# Patient Record
Sex: MALE | Race: WHITE | NOT HISPANIC OR LATINO | Employment: FULL TIME | URBAN - METROPOLITAN AREA
[De-identification: names, ages, dates, MRNs, and addresses within clinical notes are randomized per-mention and may not be internally consistent; named-entity substitution may affect disease eponyms.]

---

## 2020-05-15 ENCOUNTER — HOSPITAL ENCOUNTER (EMERGENCY)
Facility: HOSPITAL | Age: 55
Discharge: HOME | End: 2020-05-16
Attending: EMERGENCY MEDICINE | Admitting: EMERGENCY MEDICINE
Payer: COMMERCIAL

## 2020-05-15 VITALS
DIASTOLIC BLOOD PRESSURE: 64 MMHG | WEIGHT: 200 LBS | SYSTOLIC BLOOD PRESSURE: 109 MMHG | RESPIRATION RATE: 18 BRPM | OXYGEN SATURATION: 97 % | HEART RATE: 81 BPM | TEMPERATURE: 97.4 F

## 2020-05-15 DIAGNOSIS — S09.90XA INJURY OF HEAD, INITIAL ENCOUNTER: Primary | ICD-10-CM

## 2020-05-15 DIAGNOSIS — S01.81XA LACERATION OF FOREHEAD, INITIAL ENCOUNTER: ICD-10-CM

## 2020-05-15 PROCEDURE — 12011 RPR F/E/E/N/L/M 2.5 CM/<: CPT | Performed by: EMERGENCY MEDICINE

## 2020-05-15 PROCEDURE — 99282 EMERGENCY DEPT VISIT SF MDM: CPT | Performed by: EMERGENCY MEDICINE

## 2020-05-15 PROCEDURE — 99284 EMERGENCY DEPT VISIT MOD MDM: CPT

## 2020-05-15 PROCEDURE — 90715 TDAP VACCINE 7 YRS/> IM: CPT | Performed by: EMERGENCY MEDICINE

## 2020-05-15 PROCEDURE — 90471 IMMUNIZATION ADMIN: CPT

## 2020-05-15 RX ORDER — LIDOCAINE HYDROCHLORIDE AND EPINEPHRINE 10; 10 MG/ML; UG/ML
5 INJECTION, SOLUTION INFILTRATION; PERINEURAL ONCE
Status: COMPLETED | OUTPATIENT
Start: 2020-05-15 | End: 2020-05-15

## 2020-05-15 RX ADMIN — TETANUS TOXOID, REDUCED DIPHTHERIA TOXOID AND ACELLULAR PERTUSSIS VACCINE, ADSORBED 0.5 ML: 5; 2.5; 8; 8; 2.5 SUSPENSION INTRAMUSCULAR at 23:46

## 2020-05-15 RX ADMIN — LIDOCAINE HYDROCHLORIDE,EPINEPHRINE BITARTRATE 5 ML: 10; .01 INJECTION, SOLUTION INFILTRATION; PERINEURAL at 23:46

## 2020-05-16 ENCOUNTER — APPOINTMENT (EMERGENCY)
Dept: RADIOLOGY | Facility: HOSPITAL | Age: 55
End: 2020-05-16
Payer: COMMERCIAL

## 2020-05-16 PROCEDURE — 70450 CT HEAD/BRAIN W/O DYE: CPT

## 2023-04-02 ENCOUNTER — HOSPITAL ENCOUNTER (EMERGENCY)
Facility: HOSPITAL | Age: 58
End: 2023-04-02
Attending: EMERGENCY MEDICINE

## 2023-04-02 ENCOUNTER — APPOINTMENT (EMERGENCY)
Dept: RADIOLOGY | Facility: HOSPITAL | Age: 58
End: 2023-04-02

## 2023-04-02 VITALS
BODY MASS INDEX: 32.52 KG/M2 | WEIGHT: 219.58 LBS | DIASTOLIC BLOOD PRESSURE: 85 MMHG | TEMPERATURE: 98.7 F | SYSTOLIC BLOOD PRESSURE: 153 MMHG | HEART RATE: 94 BPM | OXYGEN SATURATION: 91 % | RESPIRATION RATE: 21 BRPM | HEIGHT: 69 IN

## 2023-04-02 DIAGNOSIS — E83.42 HYPOMAGNESEMIA: ICD-10-CM

## 2023-04-02 DIAGNOSIS — R94.31 QT PROLONGATION: ICD-10-CM

## 2023-04-02 DIAGNOSIS — F10.939 ALCOHOL WITHDRAWAL (HCC): Primary | ICD-10-CM

## 2023-04-02 PROBLEM — K76.0 HEPATIC STEATOSIS: Status: ACTIVE | Noted: 2023-04-02

## 2023-04-02 PROBLEM — K29.20 CHRONIC ALCOHOLIC GASTRITIS: Status: ACTIVE | Noted: 2023-04-02

## 2023-04-02 PROBLEM — B35.4 TINEA CORPORIS: Status: ACTIVE | Noted: 2023-04-02

## 2023-04-02 PROBLEM — F10.20 ALCOHOL USE DISORDER, SEVERE, DEPENDENCE (HCC): Status: ACTIVE | Noted: 2023-04-02

## 2023-04-02 PROBLEM — I10 ESSENTIAL HYPERTENSION: Status: ACTIVE | Noted: 2023-04-02

## 2023-04-02 PROBLEM — F32.9 MAJOR DEPRESSIVE DISORDER: Status: ACTIVE | Noted: 2023-04-02

## 2023-04-02 PROBLEM — R11.2 NAUSEA AND VOMITING: Status: ACTIVE | Noted: 2023-04-02

## 2023-04-02 LAB
2HR DELTA HS TROPONIN: 0 NG/L
ALBUMIN SERPL BCP-MCNC: 4.8 G/DL (ref 3.5–5)
ALP SERPL-CCNC: 123 U/L (ref 34–104)
ALT SERPL W P-5'-P-CCNC: 53 U/L (ref 7–52)
ANION GAP SERPL CALCULATED.3IONS-SCNC: 22 MMOL/L (ref 4–13)
AST SERPL W P-5'-P-CCNC: 84 U/L (ref 13–39)
BASOPHILS # BLD AUTO: 0.01 THOUSANDS/ÂΜL (ref 0–0.1)
BASOPHILS NFR BLD AUTO: 0 % (ref 0–1)
BILIRUB SERPL-MCNC: 2.34 MG/DL (ref 0.2–1)
BUN SERPL-MCNC: 13 MG/DL (ref 5–25)
CALCIUM SERPL-MCNC: 9.8 MG/DL (ref 8.4–10.2)
CARDIAC TROPONIN I PNL SERPL HS: 6 NG/L
CARDIAC TROPONIN I PNL SERPL HS: 6 NG/L
CHLORIDE SERPL-SCNC: 96 MMOL/L (ref 96–108)
CO2 SERPL-SCNC: 23 MMOL/L (ref 21–32)
CREAT SERPL-MCNC: 1.04 MG/DL (ref 0.6–1.3)
EOSINOPHIL # BLD AUTO: 0 THOUSAND/ÂΜL (ref 0–0.61)
EOSINOPHIL NFR BLD AUTO: 0 % (ref 0–6)
ERYTHROCYTE [DISTWIDTH] IN BLOOD BY AUTOMATED COUNT: 12.5 % (ref 11.6–15.1)
GFR SERPL CREATININE-BSD FRML MDRD: 79 ML/MIN/1.73SQ M
GLUCOSE SERPL-MCNC: 209 MG/DL (ref 65–140)
HCT VFR BLD AUTO: 45.9 % (ref 36.5–49.3)
HGB BLD-MCNC: 15.7 G/DL (ref 12–17)
IMM GRANULOCYTES # BLD AUTO: 0.04 THOUSAND/UL (ref 0–0.2)
IMM GRANULOCYTES NFR BLD AUTO: 1 % (ref 0–2)
LIPASE SERPL-CCNC: 15 U/L (ref 11–82)
LYMPHOCYTES # BLD AUTO: 0.48 THOUSANDS/ÂΜL (ref 0.6–4.47)
LYMPHOCYTES NFR BLD AUTO: 7 % (ref 14–44)
MAGNESIUM SERPL-MCNC: 1.2 MG/DL (ref 1.9–2.7)
MCH RBC QN AUTO: 31.7 PG (ref 26.8–34.3)
MCHC RBC AUTO-ENTMCNC: 34.2 G/DL (ref 31.4–37.4)
MCV RBC AUTO: 93 FL (ref 82–98)
MONOCYTES # BLD AUTO: 0.35 THOUSAND/ÂΜL (ref 0.17–1.22)
MONOCYTES NFR BLD AUTO: 5 % (ref 4–12)
NEUTROPHILS # BLD AUTO: 6.24 THOUSANDS/ÂΜL (ref 1.85–7.62)
NEUTS SEG NFR BLD AUTO: 87 % (ref 43–75)
NRBC BLD AUTO-RTO: 0 /100 WBCS
PLATELET # BLD AUTO: 211 THOUSANDS/UL (ref 149–390)
PMV BLD AUTO: 8.6 FL (ref 8.9–12.7)
POTASSIUM SERPL-SCNC: 4 MMOL/L (ref 3.5–5.3)
PROT SERPL-MCNC: 8.3 G/DL (ref 6.4–8.4)
RBC # BLD AUTO: 4.96 MILLION/UL (ref 3.88–5.62)
SODIUM SERPL-SCNC: 141 MMOL/L (ref 135–147)
WBC # BLD AUTO: 7.12 THOUSAND/UL (ref 4.31–10.16)

## 2023-04-02 RX ORDER — DIAZEPAM 5 MG/ML
10 INJECTION, SOLUTION INTRAMUSCULAR; INTRAVENOUS ONCE
Status: COMPLETED | OUTPATIENT
Start: 2023-04-02 | End: 2023-04-02

## 2023-04-02 RX ORDER — ONDANSETRON 2 MG/ML
4 INJECTION INTRAMUSCULAR; INTRAVENOUS ONCE
Status: DISCONTINUED | OUTPATIENT
Start: 2023-04-02 | End: 2023-04-02

## 2023-04-02 RX ORDER — SODIUM CHLORIDE, SODIUM GLUCONATE, SODIUM ACETATE, POTASSIUM CHLORIDE, MAGNESIUM CHLORIDE, SODIUM PHOSPHATE, DIBASIC, AND POTASSIUM PHOSPHATE .53; .5; .37; .037; .03; .012; .00082 G/100ML; G/100ML; G/100ML; G/100ML; G/100ML; G/100ML; G/100ML
75 INJECTION, SOLUTION INTRAVENOUS CONTINUOUS
Status: DISCONTINUED | OUTPATIENT
Start: 2023-04-02 | End: 2023-04-02 | Stop reason: HOSPADM

## 2023-04-02 RX ORDER — ONDANSETRON 2 MG/ML
INJECTION INTRAMUSCULAR; INTRAVENOUS
Status: COMPLETED
Start: 2023-04-02 | End: 2023-04-02

## 2023-04-02 RX ORDER — MAGNESIUM SULFATE HEPTAHYDRATE 40 MG/ML
2 INJECTION, SOLUTION INTRAVENOUS ONCE
Status: COMPLETED | OUTPATIENT
Start: 2023-04-02 | End: 2023-04-02

## 2023-04-02 RX ORDER — CHLORDIAZEPOXIDE HYDROCHLORIDE 25 MG/1
50 CAPSULE, GELATIN COATED ORAL ONCE
Status: COMPLETED | OUTPATIENT
Start: 2023-04-02 | End: 2023-04-02

## 2023-04-02 RX ORDER — ONDANSETRON 2 MG/ML
1 INJECTION INTRAMUSCULAR; INTRAVENOUS ONCE
Status: DISCONTINUED | OUTPATIENT
Start: 2023-04-02 | End: 2023-04-02

## 2023-04-02 RX ADMIN — MAGNESIUM SULFATE HEPTAHYDRATE 2 G: 40 INJECTION, SOLUTION INTRAVENOUS at 08:18

## 2023-04-02 RX ADMIN — SODIUM CHLORIDE, SODIUM GLUCONATE, SODIUM ACETATE, POTASSIUM CHLORIDE, MAGNESIUM CHLORIDE, SODIUM PHOSPHATE, DIBASIC, AND POTASSIUM PHOSPHATE 75 ML/HR: .53; .5; .37; .037; .03; .012; .00082 INJECTION, SOLUTION INTRAVENOUS at 10:15

## 2023-04-02 RX ADMIN — DIAZEPAM 10 MG: 10 INJECTION, SOLUTION INTRAMUSCULAR; INTRAVENOUS at 08:18

## 2023-04-02 RX ADMIN — DIAZEPAM 10 MG: 10 INJECTION, SOLUTION INTRAMUSCULAR; INTRAVENOUS at 09:52

## 2023-04-02 RX ADMIN — CHLORDIAZEPOXIDE HYDROCHLORIDE 50 MG: 25 CAPSULE ORAL at 08:10

## 2023-04-02 RX ADMIN — DIAZEPAM 10 MG: 10 INJECTION, SOLUTION INTRAMUSCULAR; INTRAVENOUS at 08:03

## 2023-04-02 RX ADMIN — DIAZEPAM 10 MG: 10 INJECTION, SOLUTION INTRAMUSCULAR; INTRAVENOUS at 12:41

## 2023-04-02 RX ADMIN — SODIUM CHLORIDE 1000 ML: 0.9 INJECTION, SOLUTION INTRAVENOUS at 08:11

## 2023-04-02 NOTE — EMTALA/ACUTE CARE TRANSFER
[address] [place] [address]  Dept: [phone]      EMTALA TRANSFER CONSENT    NAME Kriss Coombs                                         1965                              MRN [id number]    I have been informed of my rights regarding examination, treatment, and transfer   by Dr Anton Mathur MD    Benefits: Specialized equipment and/or services available at the receiving facility (Include comment)________________________ (detox unit)    Risks: Potential for delay in receiving treatment, Potential deterioration of medical condition, Loss of IV, Increased discomfort during transfer, Possible worsening of condition or death during transfer      Consent for Transfer:  I acknowledge that my medical condition has been evaluated and explained to me by the emergency department physician or other qualified medical person and/or my attending physician, who has recommended that I be transferred to the service of  Accepting Physician: Flash at [address] [place] [address] Rd Name, [address] : [address] [facility]  The above potential benefits of such transfer, the potential risks associated with such transfer, and the probable risks of not being transferred have been explained to me, and I fully understand them  The doctor has explained that, in my case, the benefits of transfer outweigh the risks  I agree to be transferred  I authorize the performance of emergency medical procedures and treatments upon me in both transit and upon arrival at the receiving facility  Additionally, I authorize the release of any and all medical records to the receiving facility and request they be transported with me, if possible  I understand that the safest mode of transportation during a medical emergency is an ambulance and that the Hospital advocates the use of this mode of transport   Risks of traveling to the receiving facility by car, including absence of medical control, life sustaining equipment, such as oxygen, and medical personnel has been explained to me and I fully understand them  (ELINA CORRECT BOX BELOW)  [  ]  I consent to the stated transfer and to be transported by ambulance/helicopter  [  ]  I consent to the stated transfer, but refuse transportation by ambulance and accept full responsibility for my transportation by car  I understand the risks of non-ambulance transfers and I exonerate the Hospital and its staff from any deterioration in my condition that results from this refusal     X___________________________________________    DATE  23  TIME________  Signature of patient or legally responsible individual signing on patient behalf           RELATIONSHIP TO PATIENT_________________________          Provider Certification    NAME Chi Morin                                        [facility] 1965                              MRN [id number]    A medical screening exam was performed on the above named patient  Based on the examination:    Condition Necessitating Transfer The primary encounter diagnosis was Alcohol withdrawal ([facility] [address] )  Diagnoses of Hypomagnesemia and QT prolongation were also pertinent to this visit      Patient Condition: The patient has been stabilized such that within reasonable medical probability, no material deterioration of the patient condition or the condition of the unborn child(camelia) is likely to result from the transfer    Reason for Transfer: Level of Care needed not available at this facility (detox unit)    Transfer Requirements: [address]   · Space available and qualified personnel available for treatment as acknowledged by    · Agreed to accept transfer and to provide appropriate medical treatment as acknowledged by       Flash  · Appropriate medical records of the examination and treatment of the patient are provided at the time of transfer   [address] _______  · Transfer will be performed by qualified personnel from    and appropriate transfer equipment as required, including the use of necessary and appropriate life support measures  Provider Certification: I have examined the patient and explained the following risks and benefits of being transferred/refusing transfer to the patient/family:  General risk, such as traffic hazards, adverse weather conditions, rough terrain or turbulence, possible failure of equipment (including vehicle or aircraft), or consequences of actions of persons outside the control of the transport personnel      Based on these reasonable risks and benefits to the patient and/or the unborn child(camelia), and based upon the information available at the time of the patient’s examination, I certify that the medical benefits reasonably to be expected from the provision of appropriate medical treatments at another medical facility outweigh the increasing risks, if any, to the individual’s medical condition, and in the case of labor to the unborn child, from effecting the transfer      X____________________________________________ DATE 04/02/23        TIME_______      ORIGINAL - SEND TO MEDICAL RECORDS   COPY - SEND WITH PATIENT DURING TRANSFER

## 2023-04-02 NOTE — ED PROVIDER NOTES
History  Chief Complaint   Patient presents with   • Withdrawal - Alcohol     Pt reports heavy drinking of vodka for about 8 years and Friday decided to stop cold turkey  Yesterday started with shaking and vomiting       History provided by:  Patient   used: No    Withdrawal - Alcohol  Associated symptoms: nausea and vomiting    Associated symptoms: no abdominal pain, no headaches, no palpitations, no shortness of breath and no weakness      65year-old presenting with nausea vomiting for headache feeling anxious  Heavy drinker  Last drink 3 days ago  Has been having tremors, shaking, sweaty, vomiting for the last 48 hours  Having chest pain, unsure if it is due to vomiting  No known medical problems  No drug use  No shortness of breath  None       Past Medical History:   Diagnosis Date   • GERD (gastroesophageal reflux disease)    • Hypertension        No past surgical history on file  No family history on file  I have reviewed and agree with the history as documented  E-Cigarette/Vaping   • E-Cigarette Use Never User      E-Cigarette/Vaping Substances     Social History     Tobacco Use   • Smoking status: Never   • Smokeless tobacco: Never   Vaping Use   • Vaping Use: Never used   Substance Use Topics   • Alcohol use: Yes     Comment: up to a handle a day  • Drug use: Never       Review of Systems   Constitutional: Positive for diaphoresis  Negative for chills and fever  Respiratory: Negative for cough, shortness of breath, wheezing and stridor  Cardiovascular: Negative for chest pain, palpitations and leg swelling  Gastrointestinal: Positive for nausea and vomiting  Negative for abdominal pain, blood in stool and diarrhea  Genitourinary: Negative for dysuria, frequency and urgency  Musculoskeletal: Negative for neck pain and neck stiffness  Skin: Negative for pallor and rash  Neurological: Positive for tremors   Negative for dizziness, syncope, weakness, light-headedness and headaches  All other systems reviewed and are negative  Physical Exam  Physical Exam  Vitals reviewed  Constitutional:       General: He is in acute distress  Appearance: He is well-developed  He is ill-appearing and diaphoretic  HENT:      Head: Normocephalic and atraumatic  Eyes:      Extraocular Movements: Extraocular movements intact  Pupils: Pupils are equal, round, and reactive to light  Cardiovascular:      Rate and Rhythm: Regular rhythm  Tachycardia present  Heart sounds: Normal heart sounds  Pulmonary:      Effort: Pulmonary effort is normal  No respiratory distress  Breath sounds: Normal breath sounds  Abdominal:      General: Bowel sounds are normal       Palpations: Abdomen is soft  Tenderness: There is no abdominal tenderness  Musculoskeletal:         General: No swelling or tenderness  Normal range of motion  Cervical back: Normal range of motion and neck supple  Skin:     General: Skin is warm  Capillary Refill: Capillary refill takes less than 2 seconds  Neurological:      General: No focal deficit present  Mental Status: He is alert and oriented to person, place, and time        Comments: Tremors at rest         Vital Signs  ED Triage Vitals   Temperature Pulse Respirations Blood Pressure SpO2   04/02/23 0812 04/02/23 0747 04/02/23 0747 04/02/23 0747 04/02/23 0747   98 7 °F (37 1 °C) (!) 117 (!) 32 (!) 188/110 95 %      Temp Source Heart Rate Source Patient Position - Orthostatic VS BP Location FiO2 (%)   04/02/23 0812 04/02/23 0747 04/02/23 0747 04/02/23 0747 --   Oral Monitor Sitting Right arm       Pain Score       04/02/23 0747       7           Vitals:    04/02/23 0805 04/02/23 0830 04/02/23 1100 04/02/23 1200   BP: (!) 188/110 136/98 152/95 153/85   Pulse: (!) 117 (!) 118 91 94   Patient Position - Orthostatic VS:  Sitting           Visual Acuity      ED Medications  Medications   sodium chloride 0 9 % bolus 1,000 mL (0 mL Intravenous Stopped 4/2/23 1112)   diazepam (VALIUM) injection 10 mg (10 mg Intravenous Given 4/2/23 0803)   chlordiazePOXIDE (LIBRIUM) capsule 50 mg (50 mg Oral Given 4/2/23 0810)   magnesium sulfate 2 g/50 mL IVPB (premix) 2 g (0 g Intravenous Stopped 4/2/23 1014)   diazepam (VALIUM) injection 10 mg (10 mg Intravenous Given 4/2/23 0818)   diazepam (VALIUM) injection 10 mg (10 mg Intravenous Given 4/2/23 0952)   diazepam (VALIUM) injection 10 mg (10 mg Intravenous Given 4/2/23 1241)       Diagnostic Studies  Results Reviewed     Procedure Component Value Units Date/Time    HS Troponin I 2hr [[id number]]  (Normal) Collected: 04/02/23 1018    Lab Status: Final result Specimen: Blood from Arm, Right Updated: 04/02/23 1057     hs TnI 2hr 6 ng/L      Delta 2hr hsTnI 0 ng/L     HS Troponin 0hr (reflex protocol) [[id number]]  (Normal) Collected: 04/02/23 0812    Lab Status: Final result Specimen: Blood from Arm, Right Updated: 04/02/23 0855     hs TnI 0hr 6 ng/L     Comprehensive metabolic panel [[id number]]  (Abnormal) Collected: 04/02/23 0812    Lab Status: Final result Specimen: Blood from Arm, Right Updated: 04/02/23 0844     Sodium 141 mmol/L      Potassium 4 0 mmol/L      Chloride 96 mmol/L      CO2 23 mmol/L      ANION GAP 22 mmol/L      BUN 13 mg/dL      Creatinine 1 04 mg/dL      Glucose 209 mg/dL      Calcium 9 8 mg/dL      AST 84 U/L      ALT 53 U/L      Alkaline Phosphatase 123 U/L      Total Protein 8 3 g/dL      Albumin 4 8 g/dL      Total Bilirubin 2 34 mg/dL      eGFR 79 ml/min/1 73sq m     Narrative:      Cathleen guidelines for Chronic Kidney Disease (CKD):   •  Stage 1 with normal or high GFR (GFR > 90 mL/min/1 73 square meters)  •  Stage 2 Mild CKD (GFR = 60-89 mL/min/1 73 square meters)  •  Stage 3A Moderate CKD (GFR = 45-59 mL/min/1 73 square meters)  •  Stage 3B Moderate CKD (GFR = 30-44 mL/min/1 73 square meters)  •  Stage 4 Severe CKD (GFR = 15-29 mL/min/1 73 square meters)  •  Stage 5 End Stage CKD (GFR <15 mL/min/1 73 square meters)  Note: GFR calculation is accurate only with a steady state creatinine    Magnesium [[id number]]  (Abnormal) Collected: 04/02/23 0812    Lab Status: Final result Specimen: Blood from Arm, Right Updated: 04/02/23 0844     Magnesium 1 2 mg/dL     Lipase [[id number]]  (Normal) Collected: 04/02/23 0812    Lab Status: Final result Specimen: Blood from Arm, Right Updated: 04/02/23 0844     Lipase 15 u/L     CBC and differential [[id number]]  (Abnormal) Collected: 04/02/23 0812    Lab Status: Final result Specimen: Blood from Arm, Right Updated: 04/02/23 0828     WBC 7 12 Thousand/uL      RBC 4 96 Million/uL      Hemoglobin 15 7 g/dL      Hematocrit 45 9 %      MCV 93 fL      MCH 31 7 pg      MCHC 34 2 g/dL      RDW 12 5 %      MPV 8 6 fL      Platelets 031 Thousands/uL      nRBC 0 /100 WBCs      Neutrophils Relative 87 %      Immat GRANS % 1 %      Lymphocytes Relative 7 %      Monocytes Relative 5 %      Eosinophils Relative 0 %      Basophils Relative 0 %      Neutrophils Absolute 6 24 Thousands/µL      Immature Grans Absolute 0 04 Thousand/uL      Lymphocytes Absolute 0 48 Thousands/µL      Monocytes Absolute 0 35 Thousand/µL      Eosinophils Absolute 0 00 Thousand/µL      Basophils Absolute 0 01 Thousands/µL                  XR chest 1 view portable   ED Interpretation by Shane Gomez MD (04/02 7056)   No acute abnormality                 Procedures  CriticalCare Time    Date/Time: 4/2/2023 2:46 PM  Performed by: Shane Gomez MD  Authorized by: Shane Gomez MD     Critical care provider statement:     Critical care time (minutes):  48    Critical care was necessary to treat or prevent imminent or life-threatening deterioration of the following conditions:  CNS failure or compromise and toxidrome    Critical care was time spent personally by me on the following activities:  Interpretation of cardiac output measurements, ordering and performing treatments and interventions, obtaining history from patient or surrogate, development of treatment plan with patient or surrogate, ordering and review of laboratory studies, ordering and review of radiographic studies, re-evaluation of patient's condition, evaluation of patient's response to treatment, review of old charts and examination of patient             ED Course  ED Course as of 04/02/23 1447   Sun Apr 02, 2023   0825 ECG shows rate of 118, sinus, normal axis, normal QRS, no significant ST or T wave changes, QTc prolonged at 611   0827 Given magnesium bolus for qtc prolongation                               SBIRT 20yo+    Flowsheet Row Most Recent Value   SBIRT (23 yo +)    In order to provide better care to our patients, we are screening all of our patients for alcohol and drug use  Would it be okay to ask you these screening questions? Yes Filed at: 04/02/2023 2092   Initial Alcohol Screen: US AUDIT-C     1  How often do you have a drink containing alcohol? 6 Filed at: 04/02/2023 0828   2  How many drinks containing alcohol do you have on a typical day you are drinking? 6 Filed at: 04/02/2023 0828   3a  Male UNDER 65: How often do you have five or more drinks on one occasion? 6 Filed at: 04/02/2023 0828   3b  FEMALE Any Age, or MALE 65+: How often do you have 4 or more drinks on one occassion? 0 Filed at: 04/02/2023 0828   Audit-C Score 18 Filed at: 04/02/2023 3298   Full Alcohol Screen: US AUDIT    4  How often during the last year have you found that you were not able to stop drinking once you had started? 4 Filed at: 04/02/2023 0828   5  How often during past year have you failed to do what was normally expected of you because of drinking? 0 Filed at: 04/02/2023 0828   6  How often in past year have you needed a first drink in the morning to get yourself going after a heavy drinking session? 0 Filed at: 04/02/2023 0828   7   How often in past year have you had feeling of guilt or remorse after drinking? 2 Filed at: 04/02/2023 0828   8  How often in past year have you been unable to remember what happened night before because you had been drinking? 3 Filed at: 04/02/2023 0828   9  Have you or someone else been injured as a result of your drinking? 0 Filed at: 04/02/2023 0828   10  Has a relative, friend, doctor or other health worker been concerned about your drinking and suggested you cut down? 4 Filed at: 04/02/2023 9353   AUDIT Total Score 31 Filed at: 04/02/2023 2574   MILDRED: How many times in the past year have you    Used an illegal drug or used a prescription medication for non-medical reasons? Never Filed at: 04/02/2023 1497                    Medical Decision Making  60-year-old with likely alcohol withdrawal   Will check electrolytes, EKG, troponin to eval for ischemia, lipase to eval for pancreatitis, will treat with benzodiazepines, fluids, initial CIWA score of 21    Patient improving  We will continue to monitor  Will need admission to hospital     Amount and/or Complexity of Data Reviewed  Labs: ordered  Radiology: ordered and independent interpretation performed  Risk  Prescription drug management            Disposition  Final diagnoses:   Alcohol withdrawal ([facility] )   Hypomagnesemia   QT prolongation     Time reflects when diagnosis was documented in both MDM as applicable and the Disposition within this note     Time User Action Codes Description Comment    4/2/2023  9:43 AM Beba Church [F10 939] [facility]     4/2/2023  9:43 AM Beba Church [E83 42] Hypomagnesemia     4/2/2023  9:43 AM Beba Johns [R94 31] QT prolongation       ED Disposition     ED Disposition   Transfer to Another Facility-In Network    Condition   --    Date/Time   Sun Apr 2, 2023 12:46 PM    Comment   --         MD Documentation    Flowsheet Row Most Recent Value   Patient Condition The patient has been stabilized such that within reasonable medical probability, no material deterioration of the patient condition or the condition of the unborn child(camelia) is likely to result from the transfer   Reason for Transfer Level of Care needed not available at this facility  [detox unit]   Benefits of Transfer Specialized equipment and/or services available at the receiving facility (Include comment)________________________  [facility] unit]   Risks of Transfer Potential for delay in receiving treatment, Potential deterioration of medical condition, Loss of IV, Increased discomfort during transfer, Possible worsening of condition or death during transfer   Accepting Physician [address] Name, [address]   Sending [facility]   Provider Certification General risk, such as traffic hazards, adverse weather conditions, rough terrain or turbulence, possible failure of equipment (including vehicle or aircraft), or consequences of actions of persons outside the control of the transport personnel      RN Documentation    Flowsheet Row Most [address] [facility] Name, [facility]      Follow-up Information    None         There are no discharge medications for this patient  No discharge procedures on file      PDMP Review     None          ED Provider  Electronically Signed by           Emmie Sofia MD  04/02/23 0422

## 2023-04-03 PROBLEM — R05.1 ACUTE COUGH: Status: ACTIVE | Noted: 2023-04-03

## 2023-04-03 PROBLEM — D69.6 THROMBOCYTOPENIA (HCC): Status: ACTIVE | Noted: 2023-04-03

## 2023-04-03 PROBLEM — R11.2 NAUSEA AND VOMITING: Status: RESOLVED | Noted: 2023-04-02 | Resolved: 2023-04-03

## 2023-04-03 PROBLEM — E83.42 HYPOMAGNESEMIA: Status: RESOLVED | Noted: 2023-04-02 | Resolved: 2023-04-03

## 2023-04-03 PROBLEM — E87.6 HYPOKALEMIA: Status: ACTIVE | Noted: 2023-04-03

## 2023-04-03 PROBLEM — R94.31 PROLONGED Q-T INTERVAL ON ECG: Status: RESOLVED | Noted: 2023-04-02 | Resolved: 2023-04-03

## 2023-04-04 PROBLEM — F10.939 ALCOHOL WITHDRAWAL SYNDROME WITH COMPLICATION (HCC): Status: RESOLVED | Noted: 2023-04-02 | Resolved: 2023-04-04

## 2023-04-04 PROBLEM — R05.1 ACUTE COUGH: Status: RESOLVED | Noted: 2023-04-03 | Resolved: 2023-04-04

## 2023-04-09 LAB
ATRIAL RATE: 111 BPM
QRS AXIS: 73 DEGREES
QRSD INTERVAL: 90 MS
QT INTERVAL: 436 MS
QTC INTERVAL: 611 MS
T WAVE AXIS: 56 DEGREES
VENTRICULAR RATE: 118 BPM

## 2023-10-01 ENCOUNTER — APPOINTMENT (EMERGENCY)
Dept: RADIOLOGY | Facility: HOSPITAL | Age: 58
End: 2023-10-01
Payer: COMMERCIAL

## 2023-10-01 ENCOUNTER — HOSPITAL ENCOUNTER (INPATIENT)
Facility: HOSPITAL | Age: 58
LOS: 1 days | Discharge: HOME | End: 2023-10-02
Attending: EMERGENCY MEDICINE | Admitting: HOSPITALIST
Payer: COMMERCIAL

## 2023-10-01 DIAGNOSIS — F10.939 ALCOHOL WITHDRAWAL WITH COMPLICATION WITH INPATIENT TREATMENT (HCC): ICD-10-CM

## 2023-10-01 DIAGNOSIS — F10.930 ALCOHOL WITHDRAWAL SYNDROME WITHOUT COMPLICATION (HCC): ICD-10-CM

## 2023-10-01 DIAGNOSIS — E87.29 ALCOHOLIC KETOACIDOSIS: Primary | ICD-10-CM

## 2023-10-01 DIAGNOSIS — F33.9 EPISODE OF RECURRENT MAJOR DEPRESSIVE DISORDER, UNSPECIFIED DEPRESSION EPISODE SEVERITY (HCC): ICD-10-CM

## 2023-10-01 PROBLEM — R79.89 ELEVATED LACTIC ACID LEVEL: Status: ACTIVE | Noted: 2023-10-01

## 2023-10-01 LAB
2HR DELTA HS TROPONIN: -3 NG/L
4HR DELTA HS TROPONIN: -3 NG/L
ALBUMIN SERPL BCP-MCNC: 4.9 G/DL (ref 3.5–5)
ALP SERPL-CCNC: 127 U/L (ref 34–104)
ALT SERPL W P-5'-P-CCNC: 36 U/L (ref 7–52)
ANION GAP SERPL CALCULATED.3IONS-SCNC: 18 MMOL/L
AST SERPL W P-5'-P-CCNC: 53 U/L (ref 13–39)
BASOPHILS # BLD AUTO: 0.05 THOUSANDS/ÂΜL (ref 0–0.1)
BASOPHILS NFR BLD AUTO: 1 % (ref 0–1)
BETA-HYDROXYBUTYRATE: 0.8 MMOL/L
BILIRUB SERPL-MCNC: 1.37 MG/DL (ref 0.2–1)
BUN SERPL-MCNC: 6 MG/DL (ref 5–25)
CALCIUM SERPL-MCNC: 10 MG/DL (ref 8.4–10.2)
CARDIAC TROPONIN I PNL SERPL HS: 4 NG/L
CARDIAC TROPONIN I PNL SERPL HS: 4 NG/L
CARDIAC TROPONIN I PNL SERPL HS: 7 NG/L
CHLORIDE SERPL-SCNC: 98 MMOL/L (ref 96–108)
CO2 SERPL-SCNC: 24 MMOL/L (ref 21–32)
CREAT SERPL-MCNC: 0.97 MG/DL (ref 0.6–1.3)
EOSINOPHIL # BLD AUTO: 0.01 THOUSAND/ÂΜL (ref 0–0.61)
EOSINOPHIL NFR BLD AUTO: 0 % (ref 0–6)
ERYTHROCYTE [DISTWIDTH] IN BLOOD BY AUTOMATED COUNT: 12.1 % (ref 11.6–15.1)
ETHANOL SERPL-MCNC: <10 MG/DL
GFR SERPL CREATININE-BSD FRML MDRD: 86 ML/MIN/1.73SQ M
GLUCOSE SERPL-MCNC: 166 MG/DL (ref 65–140)
HCT VFR BLD AUTO: 49.6 % (ref 36.5–49.3)
HGB BLD-MCNC: 17.4 G/DL (ref 12–17)
IMM GRANULOCYTES # BLD AUTO: 0.04 THOUSAND/UL (ref 0–0.2)
IMM GRANULOCYTES NFR BLD AUTO: 0 % (ref 0–2)
LACTATE SERPL-SCNC: 1.2 MMOL/L (ref 0.5–2)
LACTATE SERPL-SCNC: 4.6 MMOL/L (ref 0.5–2)
LIPASE SERPL-CCNC: 13 U/L (ref 11–82)
LYMPHOCYTES # BLD AUTO: 1.27 THOUSANDS/ÂΜL (ref 0.6–4.47)
LYMPHOCYTES NFR BLD AUTO: 13 % (ref 14–44)
MAGNESIUM SERPL-MCNC: 1.1 MG/DL (ref 1.9–2.7)
MCH RBC QN AUTO: 32.8 PG (ref 26.8–34.3)
MCHC RBC AUTO-ENTMCNC: 35.1 G/DL (ref 31.4–37.4)
MCV RBC AUTO: 93 FL (ref 82–98)
MONOCYTES # BLD AUTO: 0.57 THOUSAND/ÂΜL (ref 0.17–1.22)
MONOCYTES NFR BLD AUTO: 6 % (ref 4–12)
NEUTROPHILS # BLD AUTO: 8.03 THOUSANDS/ÂΜL (ref 1.85–7.62)
NEUTS SEG NFR BLD AUTO: 80 % (ref 43–75)
NRBC BLD AUTO-RTO: 0 /100 WBCS
PLATELET # BLD AUTO: 274 THOUSANDS/UL (ref 149–390)
PMV BLD AUTO: 8.6 FL (ref 8.9–12.7)
POTASSIUM SERPL-SCNC: 3.9 MMOL/L (ref 3.5–5.3)
PROT SERPL-MCNC: 8.6 G/DL (ref 6.4–8.4)
RBC # BLD AUTO: 5.31 MILLION/UL (ref 3.88–5.62)
SODIUM SERPL-SCNC: 140 MMOL/L (ref 135–147)
WBC # BLD AUTO: 9.97 THOUSAND/UL (ref 4.31–10.16)

## 2023-10-01 PROCEDURE — 99291 CRITICAL CARE FIRST HOUR: CPT | Performed by: EMERGENCY MEDICINE

## 2023-10-01 PROCEDURE — 83690 ASSAY OF LIPASE: CPT | Performed by: EMERGENCY MEDICINE

## 2023-10-01 PROCEDURE — 96368 THER/DIAG CONCURRENT INF: CPT

## 2023-10-01 PROCEDURE — 80053 COMPREHEN METABOLIC PANEL: CPT

## 2023-10-01 PROCEDURE — 93005 ELECTROCARDIOGRAM TRACING: CPT

## 2023-10-01 PROCEDURE — 83735 ASSAY OF MAGNESIUM: CPT

## 2023-10-01 PROCEDURE — 96376 TX/PRO/DX INJ SAME DRUG ADON: CPT

## 2023-10-01 PROCEDURE — 96375 TX/PRO/DX INJ NEW DRUG ADDON: CPT

## 2023-10-01 PROCEDURE — 71045 X-RAY EXAM CHEST 1 VIEW: CPT

## 2023-10-01 PROCEDURE — 96361 HYDRATE IV INFUSION ADD-ON: CPT

## 2023-10-01 PROCEDURE — 83605 ASSAY OF LACTIC ACID: CPT

## 2023-10-01 PROCEDURE — 99223 1ST HOSP IP/OBS HIGH 75: CPT | Performed by: HOSPITALIST

## 2023-10-01 PROCEDURE — 82077 ASSAY SPEC XCP UR&BREATH IA: CPT | Performed by: EMERGENCY MEDICINE

## 2023-10-01 PROCEDURE — 96365 THER/PROPH/DIAG IV INF INIT: CPT

## 2023-10-01 PROCEDURE — 99285 EMERGENCY DEPT VISIT HI MDM: CPT

## 2023-10-01 PROCEDURE — 36415 COLL VENOUS BLD VENIPUNCTURE: CPT

## 2023-10-01 PROCEDURE — 85025 COMPLETE CBC W/AUTO DIFF WBC: CPT

## 2023-10-01 PROCEDURE — 83605 ASSAY OF LACTIC ACID: CPT | Performed by: EMERGENCY MEDICINE

## 2023-10-01 PROCEDURE — 82010 KETONE BODYS QUAN: CPT

## 2023-10-01 PROCEDURE — 84484 ASSAY OF TROPONIN QUANT: CPT

## 2023-10-01 PROCEDURE — 96366 THER/PROPH/DIAG IV INF ADDON: CPT

## 2023-10-01 RX ORDER — ONDANSETRON 2 MG/ML
4 INJECTION INTRAMUSCULAR; INTRAVENOUS ONCE
Status: COMPLETED | OUTPATIENT
Start: 2023-10-01 | End: 2023-10-01

## 2023-10-01 RX ORDER — ACETAMINOPHEN 325 MG/1
650 TABLET ORAL EVERY 6 HOURS PRN
Status: DISCONTINUED | OUTPATIENT
Start: 2023-10-01 | End: 2023-10-02 | Stop reason: HOSPADM

## 2023-10-01 RX ORDER — MAGNESIUM SULFATE HEPTAHYDRATE 40 MG/ML
4 INJECTION, SOLUTION INTRAVENOUS ONCE
Status: COMPLETED | OUTPATIENT
Start: 2023-10-01 | End: 2023-10-01

## 2023-10-01 RX ORDER — ONDANSETRON 2 MG/ML
4 INJECTION INTRAMUSCULAR; INTRAVENOUS EVERY 6 HOURS PRN
Status: DISCONTINUED | OUTPATIENT
Start: 2023-10-01 | End: 2023-10-02 | Stop reason: HOSPADM

## 2023-10-01 RX ORDER — DIAZEPAM 5 MG/ML
10 INJECTION, SOLUTION INTRAMUSCULAR; INTRAVENOUS ONCE
Status: DISCONTINUED | OUTPATIENT
Start: 2023-10-01 | End: 2023-10-01

## 2023-10-01 RX ORDER — ESCITALOPRAM OXALATE 20 MG/1
20 TABLET ORAL DAILY
Status: DISCONTINUED | OUTPATIENT
Start: 2023-10-02 | End: 2023-10-02 | Stop reason: HOSPADM

## 2023-10-01 RX ORDER — FOLIC ACID 1 MG/1
1 TABLET ORAL DAILY
Status: DISCONTINUED | OUTPATIENT
Start: 2023-10-01 | End: 2023-10-02 | Stop reason: HOSPADM

## 2023-10-01 RX ORDER — ENOXAPARIN SODIUM 100 MG/ML
40 INJECTION SUBCUTANEOUS DAILY
Status: DISCONTINUED | OUTPATIENT
Start: 2023-10-02 | End: 2023-10-02 | Stop reason: HOSPADM

## 2023-10-01 RX ORDER — DIAZEPAM 5 MG/1
10 TABLET ORAL EVERY 4 HOURS PRN
Status: DISCONTINUED | OUTPATIENT
Start: 2023-10-01 | End: 2023-10-02 | Stop reason: HOSPADM

## 2023-10-01 RX ORDER — LANOLIN ALCOHOL/MO/W.PET/CERES
100 CREAM (GRAM) TOPICAL DAILY
Status: DISCONTINUED | OUTPATIENT
Start: 2023-10-01 | End: 2023-10-02 | Stop reason: HOSPADM

## 2023-10-01 RX ORDER — PANTOPRAZOLE SODIUM 40 MG/1
40 TABLET, DELAYED RELEASE ORAL
Status: DISCONTINUED | OUTPATIENT
Start: 2023-10-02 | End: 2023-10-02 | Stop reason: HOSPADM

## 2023-10-01 RX ORDER — DIAZEPAM 5 MG/ML
10 INJECTION, SOLUTION INTRAMUSCULAR; INTRAVENOUS ONCE
Status: COMPLETED | OUTPATIENT
Start: 2023-10-01 | End: 2023-10-01

## 2023-10-01 RX ORDER — DIAZEPAM 5 MG/ML
10 INJECTION, SOLUTION INTRAMUSCULAR; INTRAVENOUS
Status: DISCONTINUED | OUTPATIENT
Start: 2023-10-01 | End: 2023-10-02 | Stop reason: HOSPADM

## 2023-10-01 RX ADMIN — DIAZEPAM 10 MG: 10 INJECTION, SOLUTION INTRAMUSCULAR; INTRAVENOUS at 17:47

## 2023-10-01 RX ADMIN — MAGNESIUM SULFATE HEPTAHYDRATE 4 G: 40 INJECTION, SOLUTION INTRAVENOUS at 15:11

## 2023-10-01 RX ADMIN — DEXTROSE AND SODIUM CHLORIDE 1000 ML: 5; .9 INJECTION, SOLUTION INTRAVENOUS at 14:12

## 2023-10-01 RX ADMIN — DIAZEPAM 10 MG: 10 INJECTION, SOLUTION INTRAMUSCULAR; INTRAVENOUS at 14:44

## 2023-10-01 RX ADMIN — PHENOBARBITAL SODIUM 650 MG: 65 INJECTION INTRAMUSCULAR at 15:43

## 2023-10-01 RX ADMIN — DIAZEPAM 10 MG: 10 INJECTION, SOLUTION INTRAMUSCULAR; INTRAVENOUS at 12:36

## 2023-10-01 RX ADMIN — MULTIPLE VITAMINS W/ MINERALS TAB 1 TABLET: TAB ORAL at 16:30

## 2023-10-01 RX ADMIN — ONDANSETRON 4 MG: 2 INJECTION INTRAMUSCULAR; INTRAVENOUS at 12:36

## 2023-10-01 RX ADMIN — Medication 100 MG: at 16:30

## 2023-10-01 RX ADMIN — DIAZEPAM 10 MG: 10 INJECTION, SOLUTION INTRAMUSCULAR; INTRAVENOUS at 19:48

## 2023-10-01 RX ADMIN — SODIUM CHLORIDE 1000 ML: 0.9 INJECTION, SOLUTION INTRAVENOUS at 12:32

## 2023-10-01 RX ADMIN — ONDANSETRON 4 MG: 2 INJECTION INTRAMUSCULAR; INTRAVENOUS at 14:46

## 2023-10-01 RX ADMIN — THIAMINE HYDROCHLORIDE 500 MG: 100 INJECTION, SOLUTION INTRAMUSCULAR; INTRAVENOUS at 13:14

## 2023-10-01 RX ADMIN — FOLIC ACID 1 MG: 1 TABLET ORAL at 16:30

## 2023-10-01 NOTE — ED PROVIDER NOTES
History  Chief Complaint   Patient presents with   • Withdrawal - Alcohol     Pt reports being alcoholic for "years". Had 3/4 bottle of vodka lastnight and started shaking/nausea/vomiting/diaphoretic. No hx seizures     Patient is a           Prior to Admission Medications   Prescriptions Last Dose Informant Patient Reported? Taking? clotrimazole (LOTRIMIN) 1 % cream   Yes No   Sig: Apply 1 application. topically 2 (two) times a day   escitalopram (LEXAPRO) 20 mg tablet   Yes No   Sig: Take 1 tablet by mouth daily   folic acid (FOLVITE) 1 mg tablet   No No   Sig: Take 1 tablet (1 mg total) by mouth daily   hydrOXYzine HCL (ATARAX) 50 mg tablet   No No   Sig: Take 1 tablet (50 mg total) by mouth every 6 (six) hours as needed for anxiety   lisinopril (ZESTRIL) 40 mg tablet   No No   Sig: Take 1 tablet (40 mg total) by mouth daily   magnesium gluconate (MAGONATE) 500 mg tablet   No No   Sig: Take 1 tablet (500 mg total) by mouth once for 1 dose   naltrexone (REVIA) 50 mg tablet   No No   Sig: Take 1 tablet (50 mg total) by mouth daily   omeprazole (PriLOSEC) 40 MG capsule   Yes No   Sig: Take 40 mg by mouth 2 (two) times a day   potassium chloride (K-DUR,KLOR-CON) 20 mEq tablet   No No   Sig: Take 1 tablet (20 mEq total) by mouth daily for 7 days   thiamine 100 MG tablet   No No   Sig: Take 1 tablet (100 mg total) by mouth daily   traZODone (DESYREL) 50 mg tablet   No No   Sig: Take 1 tablet (50 mg total) by mouth daily at bedtime as needed for sleep      Facility-Administered Medications: None       Past Medical History:   Diagnosis Date   • GERD (gastroesophageal reflux disease)    • Hypertension        History reviewed. No pertinent surgical history. History reviewed. No pertinent family history. I have reviewed and agree with the history as documented.     E-Cigarette/Vaping   • E-Cigarette Use Never User      E-Cigarette/Vaping Substances     Social History     Tobacco Use   • Smoking status: Never   • Smokeless tobacco: Never   Vaping Use   • Vaping Use: Never used   Substance Use Topics   • Alcohol use: Yes     Comment: up to a handle a day. • Drug use: Never        Review of Systems    Physical Exam  ED Triage Vitals [10/01/23 1201]   Temperature Pulse Respirations Blood Pressure SpO2   98 °F (36.7 °C) (!) 151 (!) 24 (!) 177/85 98 %      Temp Source Heart Rate Source Patient Position - Orthostatic VS BP Location FiO2 (%)   Oral Monitor Sitting Left arm --      Pain Score       --             Orthostatic Vital Signs  Vitals:    10/01/23 1201   BP: (!) 177/85   Pulse: (!) 151   Patient Position - Orthostatic VS: Sitting       Physical Exam    ED Medications  Medications - No data to display    Diagnostic Studies  Results Reviewed     None                 No orders to display         Procedures  Procedures      ED Course                                       MDM      Disposition  Final diagnoses:   None     ED Disposition     None      Follow-up Information    None         Patient's Medications   Discharge Prescriptions    No medications on file     No discharge procedures on file. PDMP Review     None           ED Provider  Attending physically available and evaluated Ishan Borjas I managed the patient along with the ED Attending.     Electronically Signed by Normal appearance. Comments: Patient appears anxious and mildly agitated   HENT:      Head: Normocephalic and atraumatic. Nose: Nose normal.      Mouth/Throat:      Mouth: Mucous membranes are dry. Pharynx: Oropharynx is clear. Comments: No tongue fasciculations  Eyes:      Extraocular Movements: Extraocular movements intact. Conjunctiva/sclera: Conjunctivae normal.      Pupils: Pupils are equal, round, and reactive to light. Cardiovascular:      Rate and Rhythm: Regular rhythm. Tachycardia present. Pulses: Normal pulses. Heart sounds: Normal heart sounds. Pulmonary:      Breath sounds: Normal breath sounds. Comments: Patient appears mildly tachynpnic  Abdominal:      General: Abdomen is flat. Bowel sounds are normal.      Palpations: Abdomen is soft. Skin:     Capillary Refill: Capillary refill takes less than 2 seconds. Neurological:      General: No focal deficit present. Mental Status: He is alert and oriented to person, place, and time. Comments: Significant tremulousness noted, intention tremor and resting tremor.          ED Medications  Medications   sodium chloride 0.9 % bolus 1,000 mL (0 mL Intravenous Stopped 10/1/23 1559)   ondansetron (ZOFRAN) injection 4 mg (4 mg Intravenous Given 10/1/23 1236)   diazepam (VALIUM) injection 10 mg (10 mg Intravenous Given 10/1/23 1236)   thiamine (VITAMIN B1) 500 mg in sodium chloride 0.9 % 50 mL IVPB (0 mg Intravenous Stopped 10/1/23 1559)   dextrose 5 % and sodium chloride 0.9 % bolus 1,000 mL (0 mL Intravenous Stopped 10/1/23 1600)   magnesium sulfate 4 g/100 mL IVPB (premix) 4 g (4 g Intravenous New Bag 10/1/23 1511)   ondansetron (ZOFRAN) injection 4 mg (4 mg Intravenous Given 10/1/23 1446)   PHENobarbital 650 mg in sodium chloride 0.9 % 100 mL IVPB (0 mg Intravenous Stopped 10/1/23 1643)   diazepam (VALIUM) injection 10 mg (10 mg Intravenous Given 10/1/23 1444)   potassium chloride (K-DUR,KLOR-CON) CR tablet 40 mEq (40 mEq Oral Given 10/2/23 0958)       Diagnostic Studies  Results Reviewed     Procedure Component Value Units Date/Time    HS Troponin I 4hr [[id number]]  (Normal) Collected: 10/01/23 1649    Lab Status: Final result Specimen: Blood from Arm, Left Updated: 10/01/23 1753     hs TnI 4hr 4 ng/L      Delta 4hr hsTnI -3 ng/L     HS Troponin I 2hr [[id number]]  (Normal) Collected: 10/01/23 1450    Lab Status: Final result Specimen: Blood from Arm, Right Updated: 10/01/23 1529     hs TnI 2hr 4 ng/L      Delta 2hr hsTnI -3 ng/L     Lactic acid 2 Hours [[id number]]  (Normal) Collected: 10/01/23 1450    Lab Status: Final result Specimen: Blood from Arm, Right Updated: 10/01/23 1524     LACTIC ACID 1.2 mmol/L     Narrative:      Result may be elevated if tourniquet was used during collection. HS Troponin 0hr (reflex protocol) [[id number]]  (Normal) Collected: 10/01/23 1235    Lab Status: Final result Specimen: Blood from Arm, Right Updated: 10/01/23 1332     hs TnI 0hr 7 ng/L     Lipase [[id number]]  (Normal) Collected: 10/01/23 1235    Lab Status: Final result Specimen: Blood from Arm, Right Updated: 10/01/23 1325     Lipase 13 u/L     Lactic acid, plasma (w/reflex if result > 2.0) [[id number]]  (Abnormal) Collected: 10/01/23 1235    Lab Status: Final result Specimen: Blood from Arm, Right Updated: 10/01/23 1313     LACTIC ACID 4.6 mmol/L     Narrative:      Result may be elevated if tourniquet was used during collection.     Magnesium [[id number]]  (Abnormal) Collected: 10/01/23 1235    Lab Status: Final result Specimen: Blood from Arm, Right Updated: 10/01/23 1313     Magnesium 1.1 mg/dL     Comprehensive metabolic panel [[id number]]  (Abnormal) Collected: 10/01/23 1235    Lab Status: Final result Specimen: Blood from Arm, Right Updated: 10/01/23 1313     Sodium 140 mmol/L      Potassium 3.9 mmol/L      Chloride 98 mmol/L      CO2 24 mmol/L      ANION GAP 18 mmol/L      BUN 6 mg/dL      Creatinine 0.97 mg/dL      Glucose 166 mg/dL      Calcium 10.0 mg/dL      AST 53 U/L      ALT 36 U/L      Alkaline Phosphatase 127 U/L      Total Protein 8.6 g/dL      Albumin 4.9 g/dL      Total Bilirubin 1.37 mg/dL      eGFR 86 ml/min/1.73sq m     Narrative:      [facility] guidelines for Chronic Kidney Disease (CKD):   •  Stage 1 with normal or high GFR (GFR > 90 mL/min/1.73 square meters)  •  Stage 2 Mild CKD (GFR = 60-89 mL/min/1.73 square meters)  •  Stage 3A Moderate CKD (GFR = 45-59 mL/min/1.73 square meters)  •  Stage 3B Moderate CKD (GFR = 30-44 mL/min/1.73 square meters)  •  Stage 4 Severe CKD (GFR = 15-29 mL/min/1.73 square meters)  •  Stage 5 End Stage CKD (GFR <15 mL/min/1.73 square meters)  Note: GFR calculation is accurate only with a steady state creatinine    Ethanol [[id number]]  (Normal) Collected: 10/01/23 1235    Lab Status: Final result Specimen: Blood from Arm, Right Updated: 10/01/23 1313     Ethanol Lvl <10 mg/dL     Beta Hydroxybutyrate [[id number]]  (Abnormal) Collected: 10/01/23 1235    Lab Status: Final result Specimen: Blood from Arm, Right Updated: 10/01/23 1304     BETA-HYDROXYBUTYRATE 0.8 mmol/L     CBC and differential [[id number]]  (Abnormal) Collected: 10/01/23 1235    Lab Status: Final result Specimen: Blood from Arm, Right Updated: 10/01/23 1246     WBC 9.97 Thousand/uL      RBC 5.31 Million/uL      Hemoglobin 17.4 g/dL      Hematocrit 49.6 %      MCV 93 fL      MCH 32.8 pg      MCHC 35.1 g/dL      RDW 12.1 %      MPV 8.6 fL      Platelets 468 Thousands/uL      nRBC 0 /100 WBCs      Neutrophils Relative 80 %      Immat GRANS % 0 %      Lymphocytes Relative 13 %      Monocytes Relative 6 %      Eosinophils Relative 0 %      Basophils Relative 1 %      Neutrophils Absolute 8.03 Thousands/µL      Immature Grans Absolute 0.04 Thousand/uL      Lymphocytes Absolute 1.27 Thousands/µL      Monocytes Absolute 0.57 Thousand/µL      Eosinophils Absolute 0.01 Thousand/µL      Basophils Absolute 0.05 Thousands/µL                  XR chest 1 view portable   Final Result by Mary Mcclelland MD (10/02 1003)      No acute cardiopulmonary disease.                Workstation performed: [id number]               Procedures  ECG 12 Lead Documentation Only    Date/Time: 10/1/2023 12:30 PM    Performed by: Cherelle Barboza MD  Authorized by: Cherelle Barboza MD    Indications / Diagnosis:  Chest Pain  Patient location:  ED  Interpretation:     Interpretation: abnormal    Rate:     ECG rate:  128    ECG rate assessment: tachycardic    Rhythm:     Rhythm: sinus tachycardia    Ectopy:     Ectopy: none    QRS:     QRS axis:  Right    QRS intervals:  Normal  Conduction:     Conduction: normal    ST segments:     ST segments:  Normal  T waves:     T waves: normal            ED Course  ED Course as of 10/10/23 0557   Sun Oct 01, 2023   0815 Patient in AKA, thiamine and IV hydration given   1003 XR chest 1 view portable  Unremarkable CXR   1211 Pulse 151   1235 Lactic acid, plasma (w/reflex if result > 2.0)(!!)  Lactic acid 4.6   1235 HS Troponin 0hr (reflex protocol)  Initial trop 7   1235 Ethanol  Ethanol negative   1235 Beta Hydroxybutyrate(!)  0.8, elevated   1235 Magnesium(!)  1.1   1236 Valium 10mg given for agitation/anxiety   1444 Additional valium 10mg given   1446 Zofran given for nausea   1450 Lactic acid 2 Hours  2 hour delta lactic 1.2   1543 Consulted toxicology for alcohol withdrawal, patient started on phenobarbital             HEART Risk Score    Flowsheet Row Most Recent Value   Heart Score Risk Calculator    History 1 Filed at: 10/01/2023 1638   ECG 0 Filed at: 10/01/2023 1638   Age 1 Filed at: 10/01/2023 1638   Risk Factors 1 Filed at: 10/01/2023 1638   Troponin 0 Filed at: 10/01/2023 1638   HEART Score 3 Filed at: 10/01/2023 1638                                Medical Decision Making  Patient is a 63year old male with history of alcohol use disorder who presented to the ED for vomiting, tremors, and chest pain. Patient states that he believes that he is in alcohol withdrawals but that it has never felt this bad. He was found to have alcoholic ketoacidosis and given thiamine and continued IV fluids. Patient given multiple doses valium for alcohol withdrawal. Toxicology consulted and patient started on phenobarbital. Patient continued to be tachycardic throughout ED stay. Re-evaluated frequently, continued to feel mildly better with intermittent periods of increased tremulousness, discomfort, nausea, and abdominal pain. Patient unsure if he wants to undergo detox/wants help at this time. Patient was admitted to medicine for further management of AKA and alcohol withdrawal.    Amount and/or Complexity of Data Reviewed  Labs: ordered. Decision-making details documented in ED Course. Radiology: ordered. Decision-making details documented in ED Course. Risk  Prescription drug management. Decision regarding hospitalization.             Disposition  Final diagnoses:   Alcohol withdrawal with complication with inpatient treatment [facility])   Alcoholic ketoacidosis     Time reflects when diagnosis was documented in both MDM as applicable and the Disposition within this note     Time User Action Codes Description Comment    10/1/2023  2:43 PM Dre Crisostomo Add [F10.939] Alcohol withdrawal with complication with inpatient treatment ([facility])     10/1/2023  2:51 PM Asya Rogue Add [F10.939] Alcohol withdrawal ([address] )     10/1/2023  2:51 PM Asyaron Herrera Remove [F10.939] Alcohol withdrawal ([address] )     10/1/2023  2:51 PM Asya Rogreyna Add [A78.42] Alcoholic ketoacidosis     10/1/2023  2:51 PM Asyaron Herrera Modify [F10.939] Alcohol withdrawal with complication with inpatient treatment ([address] )     10/1/2023  2:51 PM Deena Silvius [E29.75] Alcoholic ketoacidosis     10/2/2023  8:40 AM Hurley Richters Add [F10.930] Alcohol withdrawal syndrome without complication ([facility] [address] )     24/1/6042  8:40 AM Hurley Richters Add [F33.9] Episode of recurrent major depressive disorder, unspecified depression episode severity [facility])       ED Disposition     ED Disposition   Admit    Condition   Stable    Date/Time   Sun Oct 1, 2023  2:51 PM    Comment   Case was discussed with LILY and the patient's admission status was agreed to be Admission Status: inpatient status to the service of Dr. Francesca Blancas.           Alisha Hunt up With Specialties Details Why Contact Info    Melissa Singh MD Emergency Medicine Follow up  [address] [facility]] [id number] [phone]      [employer]  Follow up  [phone]            Discharge Medication List as of 10/2/2023  2:50 PM      START taking these medications    Details   folic acid (FOLVITE) 1 mg tablet Take 1 tablet (1 mg total) by mouth daily Do not start before October 3, 2023., Starting Tue 10/3/2023, No Print      thiamine 100 MG tablet Take 1 tablet (100 mg total) by mouth daily Do not start before October 3, 2023., Starting Tue 10/3/2023, No Print         CONTINUE these medications which have NOT CHANGED    Details   escitalopram (LEXAPRO) 20 mg tablet Take 1 tablet by mouth daily, Starting Tue 2/21/2023, Historical Med      lisinopril (ZESTRIL) 40 mg tablet Take 1 tablet (40 mg total) by mouth daily, Starting Tue 4/4/2023, Normal      omeprazole (PriLOSEC) 40 MG capsule Take 40 mg by mouth 2 (two) times a day, Starting Mon 12/5/2022, Historical Med         STOP taking these medications       magnesium gluconate (MAGONATE) 500 mg tablet Comments:   Reason for Stopping:         potassium chloride (K-DUR,KLOR-CON) 20 mEq tablet Comments:   Reason for Stopping:             Outpatient Discharge Orders   Discharge Diet     Activity as tolerated       PDMP Review     None           ED Provider  Attending physically available and evaluated Lexie Patton. I managed the patient along with the ED Attending.     Electronically Signed by         Isra Reyna MD  10/10/23 0376

## 2023-10-01 NOTE — H&P
[address]36 [facility]  H&P  Name: Jovan Paulson 63 y.o. male I MRN: [id number]  Unit/Bed#: ED-24 I Date of Admission: 10/1/2023   Date of Service: 10/1/2023 I Hospital Day: 0      Assessment/Plan   * Alcohol withdrawal [facility])  Assessment & Plan  · Discussed with toxicology - formal consult pending  · Refused transfer to [address] [place] [address] detox unit as he does not want to go to [facility]. Has been there before. · Last drink 9/29 8 PM. Drinks 750 ML of vodka/day. · Loading with phenobarbital in ED - no further dosing needed at this time  · S/P 2 doses of IV valium in the ED with improvement in symptoms  · Recommended to use CIWA protocol but in place of ativan to use valium given his response to valium in the ED  · Add thiamine, folic acid, multivitamin  · SD2 given likely high need for IV benzos and high risk for decompensation/withdrawal    Modified CIWA:  0  No intervention  Reassess q4 hours. Consider discontinuing CIWA 24 hours after ethanol concentration of zero, with a score of zero    1-7  Diazepam 10 mg PO Q4hr PRN score 1-7  Reassess q4hr    8-14  Diazepam 10mg IV Q1hr PRN score 8-14  Reassess q1hr  Contact Medical Toxicology/Addiction "Network Detox AP On Call" via [facility] Text to discuss transfer to detox unit, step down or critical care per Detox AP. 15-19  Diazepam 20mg IV Q1hr PRN score 15-19  Reassess q1hr  Contact Medical Toxicology/Addiction "Network Detox AP On Call" via [employer] to discuss transfer to detox unit, step down or critical care per Detox AP and further treatment recommendations.       Elevated lactic acid level  Assessment & Plan  · Anion gap 18, lactic acid 4.6 improved to 1.2, beta hydroxybuyrate 0.8   · Not acidotic based on BMP  · S/P IV fluids in ED  · Repeat BMP in AM  · Likely from poor oral intake x 2 days/alcohol intake    Hypomagnesemia  Assessment & Plan  · Mag noted to be 1.1  · Status post 4 mg of IV magnesium in the emergency department  · Repeat magnesium level in the morning    Essential hypertension  Assessment & Plan  · Previously was on lisinopril 40 mg daily and metoprolol 25 mg p.o. twice daily but stopped these approximately 4 months ago  · Hold off on reinitation and monitor BP to determine if need to reinitiate antihypertensives  · Current -160s    Chronic alcoholic gastritis  Assessment & Plan  · Continue PPI       VTE Pharmacologic Prophylaxis: lovenox  Code Status: FULL CODE  Discussion with family: Updated  (significant other) at bedside. Anticipated Length of Stay: Patient will be admitted on an inpatient basis with an anticipated length of stay of greater than 2 midnights secondary to alcohol withdrawal.    Total Time Spent on Date of Encounter in care of patient: mins. This time was spent on one or more of the following: performing physical exam; counseling and coordination of care; obtaining or reviewing history; documenting in the medical record; reviewing/ordering tests, medications or procedures; communicating with other healthcare professionals and discussing with patient's family/caregivers. Chief Complaint: nausea, vomiting    History of Present Illness:  Jamari Streeter is a 63 y.o. male with a PMH of chronic alcohol abuse, hypertension, chronic alcohol gastritis, obesity who presents with nausea, vomiting, diarrhea, generalized weakness. The patient reports that his mother and father both have cancer and that his girlfriend was not feeling well recently and therefore he has had increased life stressors at home causing him to continue to drink significant amounts of alcohol. He reports that typically he drinks 750 mL of vodka per day. He last drank on Friday when he drank throughout the evening and then stopped with his last drink at approximately 8 PM.  He drank an entire bottle of vodka which he reports may be between a 750 mL bottle and a 1.5 L in size. He began having dizziness, nausea, vomiting, diarrhea on Saturday. He reports that this lasted for 15 to 16 hours prior to coming to the emergency department. He did not eat anything on Friday or Saturday. His significant other found him at home and said that he did not look well and therefore came to the emergency department. He was also noted to start shaking on Saturday evening at home. In the emergency department the patient was given 20 mg total of IV Valium as well as initiated on fluids, magnesium, thiamine and phenobarbital.  Patient was seen in consultation by toxicology. Patient refused transfer to [facility] detox unit. Was noted to have elevated anion gap, elevated lactic acid and elevated beta hydroxybutyrate. Doesn't take BP meds x 4 months at least.     Review of Systems:  Review of Systems   Constitutional: Positive for activity change, appetite change and fatigue. Negative for chills, diaphoresis, fever and unexpected weight change. HENT: Negative for sore throat. Respiratory: Negative for cough, chest tightness and shortness of breath. Cardiovascular: Negative for chest pain, palpitations and leg swelling. Gastrointestinal: Positive for diarrhea, nausea and vomiting. Negative for abdominal pain. Genitourinary: Negative for dysuria. Musculoskeletal: Negative for myalgias. Neurological: Positive for dizziness, tremors, weakness and headaches. Negative for syncope and numbness. Psychiatric/Behavioral: Negative for confusion. All other systems reviewed and are negative. Past Medical and Surgical History:   Past Medical History:   Diagnosis Date   • GERD (gastroesophageal reflux disease)    • Hypertension        History reviewed. No pertinent surgical history. Meds/Allergies:  Prior to Admission medications    Medication Sig Start Date End Date Taking?  Authorizing Provider   escitalopram (LEXAPRO) 20 mg tablet Take 1 tablet by mouth daily 2/21/23   Historical Provider, MD   lisinopril (ZESTRIL) 40 mg tablet Take 1 tablet (40 mg total) by mouth daily 4/4/23   Deneen Velasquez PA-C   magnesium gluconate (MAGONATE) 500 mg tablet Take 1 tablet (500 mg total) by mouth once for 1 dose 4/4/23 4/4/23  Deneen Velasquez PA-C   omeprazole (PriLOSEC) 40 MG capsule Take 40 mg by mouth 2 (two) times a day 12/5/22   Historical Provider, MD   potassium chloride (K-DUR,KLOR-CON) 20 mEq tablet Take 1 tablet (20 mEq total) by mouth daily for 7 days 4/4/23 4/11/23  Deneen Velasquez PA-C   clotrimazole (LOTRIMIN) 1 % cream Apply 1 application. topically 2 (two) times a day 2/28/23 10/1/23  Historical Provider, MD   folic acid (FOLVITE) 1 mg tablet Take 1 tablet (1 mg total) by mouth daily 4/4/23 10/1/23  Deneen Velasquez PA-C   hydrOXYzine HCL (ATARAX) 50 mg tablet Take 1 tablet (50 mg total) by mouth every 6 (six) hours as needed for anxiety 4/4/23 10/1/23  Deneen Velasquez PA-C   naltrexone (REVIA) 50 mg tablet Take 1 tablet (50 mg total) by mouth daily 4/4/23 10/1/23  Deneen Velasquez PA-C   thiamine 100 MG tablet Take 1 tablet (100 mg total) by mouth daily 4/4/23 10/1/23  Deneen Velasquez PA-C   traZODone (DESYREL) 50 mg tablet Take 1 tablet (50 mg total) by mouth daily at bedtime as needed for sleep 4/4/23 10/1/23  Deneen Velasquez PA-C     I have reviewed home medications with patient personally. Allergies: Allergies   Allergen Reactions   • Cefdinir Rash       Social History:  Marital Status: Single   Occupation:   Patient Pre-hospital Living Situation: Home  Patient Pre-hospital Level of Mobility: walks  Patient Pre-hospital Diet Restrictions: none  Substance Use History:   Social History     Substance and Sexual Activity   Alcohol Use Yes    Comment: up to a handle a day. Social History     Tobacco Use   Smoking Status Never   Smokeless Tobacco Never     Social History     Substance and Sexual Activity   Drug Use Never       Family History:  History reviewed. No pertinent family history.     Physical Exam: Vitals:   Blood Pressure: 166/99 (10/01/23 1500)  Pulse: 99 (10/01/23 1500)  Temperature: 98 °F (36.7 °C) (10/01/23 1201)  Temp Source: Oral (10/01/23 1201)  Respirations: 18 (10/01/23 1500)  SpO2: 95 % (10/01/23 1500)    Physical Exam  Vitals and nursing note reviewed. Constitutional:       General: He is not in acute distress. Appearance: Normal appearance. He is obese. He is ill-appearing. He is not diaphoretic. HENT:      Head: Normocephalic and atraumatic. Mouth/Throat:      Mouth: Mucous membranes are dry. Cardiovascular:      Rate and Rhythm: Regular rhythm. Tachycardia present. Heart sounds: No murmur heard. Pulmonary:      Effort: Pulmonary effort is normal.      Breath sounds: Normal breath sounds. No stridor. No wheezing, rhonchi or rales. Abdominal:      General: Bowel sounds are normal.      Palpations: Abdomen is soft. There is no mass. Tenderness: There is no abdominal tenderness. There is no guarding. Musculoskeletal:      Right lower leg: No edema. Left lower leg: No edema. Skin:     General: Skin is warm and dry. Neurological:      Mental Status: He is alert. Comments: Hand tremors noted. + nystagmus horizontal. EOMS intact. follows commands. speech clear.           Additional Data:     Lab Results:  Results from last 7 days   Lab Units 10/01/23  1235   WBC Thousand/uL 9.97   HEMOGLOBIN g/dL 17.4*   HEMATOCRIT % 49.6*   PLATELETS Thousands/uL 274   NEUTROS PCT % 80*   LYMPHS PCT % 13*   MONOS PCT % 6   EOS PCT % 0     Results from last 7 days   Lab Units 10/01/23  1235   SODIUM mmol/L 140   POTASSIUM mmol/L 3.9   CHLORIDE mmol/L 98   CO2 mmol/L 24   BUN mg/dL 6   CREATININE mg/dL 0.97   ANION GAP mmol/L 18   CALCIUM mg/dL 10.0   ALBUMIN g/dL 4.9   TOTAL BILIRUBIN mg/dL 1.37*   ALK PHOS U/L 127*   ALT U/L 36   AST U/L 53*   GLUCOSE RANDOM mg/dL 166*                 Results from last 7 days   Lab Units 10/01/23  1450 10/01/23  1235   LACTIC ACID mmol/L 1.2 4.6*       Lines/Drains:  Invasive Devices     Peripheral Intravenous Line  Duration           Peripheral IV 10/01/23 Left Antecubital <1 day    Peripheral IV 10/01/23 Right Antecubital <1 day                    Imaging: Personally reviewed the following imaging: chest xray  XR chest 1 view portable    (Results Pending)       EKG and Other Studies Reviewed on Admission:   · EKG: Sinus Tachycardia. . ** Please Note: This note has been constructed using a voice recognition system.  **

## 2023-10-01 NOTE — ASSESSMENT & PLAN NOTE
· Previously was on lisinopril 40 mg daily and metoprolol 25 mg p.o. twice daily but stopped these approximately 4 months ago  · Hold off on reinitation and monitor BP to determine if need to reinitiate antihypertensives  · Current -160s

## 2023-10-01 NOTE — ED ATTENDING ATTESTATION
10/1/2023  IMariana MD, saw and evaluated the patient. I have discussed the patient with the resident/non-physician practitioner and agree with the resident's/non-physician practitioner's findings, Plan of Care, and MDM as documented in the resident's/non-physician practitioner's note, except where noted. All available labs and Radiology studies were reviewed. I was present for key portions of any procedure(s) performed by the resident/non-physician practitioner and I was immediately available to provide assistance. At this point I agree with the current assessment done in the Emergency Department. I have conducted an independent evaluation of this patient a history and physical is as follows:    61-year-old male with a history of alcohol use disorder presenting for evaluation of vomiting, tremulousness, and chest pain. Patient was admitted to the withdrawal management unit in April but states he started drinking shortly after that after he lost his job and his father was diagnosed with cancer. Has been drinking at least 8 shots of liquor daily but drinks 750 mL of alcohol yesterday. Last drink was around 6 PM.  Patient states he took a nap and then started making food for his father when he started vomiting. Notes continued nausea and dry heaving in addition to generalized abdominal pain. Has a midsternal chest pressure without radiation in addition to shortness of breath. Notes tremor, as well. Denies recent falls. Patient is unsure if he wants to stop drinking at this time. Denies withdrawal seizures. Please see resident documentation for histories and review of systems.     Exam: Vital signs and nursing notes reviewed  General: Awake, alert, diaphoretic, ill-appearing  HEENT: Normocephalic, atraumatic, mucous membranes dry  Neck: Supple  Heart: Tachycardic but regular  Lungs: Clear to auscultation bilaterally  Abdomen: Soft, nontender, nondistended  Extremities: No swelling or deformity  Skin: Warm, dry, intact, no rash  Neuro: No tongue fasciculations.   Bilateral intention tremor present in addition to resting tremor  Psych: Anxious, agitated    EKG: Reviewed by myself the resident physician agree with documentation: Sinus tachycardia without evidence of ischemia or malignant dysrhythmia    ED Course  ED Course as of 10/01/23 1637   Sun Oct 01, 2023   1211 Pulse(!): 151   1211 Blood Pressure(!): 177/85   1306 BETA-HYDROXYBUTYRATE(!): 0.8   1306 Hemoglobin(!): 17.4   1306 WBC: 9.97   1307 Neutrophils %(!): 80   1316 MEDICAL ALCOHOL: <10   1316 LACTIC ACID(!!): 4.6   1316 Magnesium(!): 1.1   1316 Anion Gap: 18   1316 CO2: 24   1316 TOTAL BILIRUBIN(!): 1.37   1316 Comprehensive metabolic panel(!)  Grossly normal   1317 Blood Pressure: 160/98   1317 Pulse(!): 118   1330 Lipase: 13   1333 hs TnI 0hr: 7   1403 CXR per my interpretation no acute cardiopulmonary abnormality   1520 Pulse: 97   1520 Pulse: 99   1526 LACTIC ACID: 1.2   1606 hs TnI 2hr: 4   1606 Delta 2hr hsTnI: -3     Critical Care Time  CriticalCare Time    Date/Time: 10/1/2023 1:24 PM    Performed by: Mariana Almanzar MD  Authorized by: Mariana Almanzar MD    Critical care provider statement:     Critical care time (minutes):  45    Critical care time was exclusive of:  Separately billable procedures and treating other patients and teaching time    Critical care was necessary to treat or prevent imminent or life-threatening deterioration of the following conditions:  Metabolic crisis and toxidrome    Critical care was time spent personally by me on the following activities:  Obtaining history from patient or surrogate, discussions with primary provider, evaluation of patient's response to treatment, examination of patient, review of old charts, re-evaluation of patient's condition, ordering and review of radiographic studies, ordering and review of laboratory studies and ordering and performing treatments and interventions    I assumed direction of critical care for this patient from another provider in my specialty: no    Comments:      Management of early alcohol withdrawal and alcoholic ketoacidosis      ED course/medical decision makin-year-old male with history of alcohol use disorder presenting for evaluation of vomiting, chest pain, tremor. Differential diagnosis includes acute coronary syndrome, malignant dysrhythmia, pneumonia, pneumothorax, pulmonary edema, electrolyte derangement, dehydration, alcoholic ketoacidosis, pancreatitis, hepatitis, gastroenteritis, gastritis, alcohol withdrawal.  EKG shows sinus tachycardia without evidence of ischemia or malignant dysrhythmia; initial troponin is 7 with a 2-hour troponin of 4 and a delta of -3. Low suspicion for ACS at this time. Heart score is 3. Laboratory studies notable for mildly elevated beta hydroxybutyrate, elevated lactate, mildly elevated anion gap, suspicious for alcoholic ketoacidosis. Significant hypomagnesemia is also present. Hemoconcentration is present, likely due to dehydration and vomiting. Other electrolytes are grossly normal.  Lipase is normal.  Chest x-ray per my interpretation is negative for acute cardiopulmonary abnormality. Patient provided with initially antiemetics, IV fluid hydration, and diazepam with improvement in his symptoms and heart rate. Patient then given additional diazepam and then loaded with phenobarbital for management of early alcohol withdrawal.  Patient given high-dose thiamine in addition to dextrose containing fluids for alcoholic ketoacidosis. Provided with 4 g of IV magnesium for replacement and optimization of magnesium.   At this time, patient would benefit from further inpatient management of his metabolic derangements and alcohol withdrawal.  Discussed transfer to withdrawal management unit with patient on several occasions, who declines transfer due to "not liking the area" where the hospital is located. Patient states he is also not ready to stop drinking alcohol at this time. Therefore, patient will be admitted to medicine here. Placed a consultation to medical toxicology for withdrawal recommendations. Discussed care with internal medicine, as well. Patient will be admitted to stepdown two for further care.     Diagnosis: Alcohol withdrawal with complication, alcohol use disorder severe dependence, hypomagnesemia, alcoholic ketoacidosis  Disposition: Admission

## 2023-10-01 NOTE — ASSESSMENT & PLAN NOTE
· Mag noted to be 1.1  · Status post 4 mg of IV magnesium in the emergency department  · Repeat magnesium level in the morning

## 2023-10-01 NOTE — ED NOTES
Report called to ICU RN. Pt transported to room with RN on monitor.       Shane Penn RN  10/01/23 0792

## 2023-10-01 NOTE — ASSESSMENT & PLAN NOTE
· Anion gap 18, lactic acid 4.6 improved to 1.2, beta hydroxybuyrate 0.8   · Not acidotic based on BMP  · S/P IV fluids in ED  · Repeat BMP in AM  · Likely from poor oral intake x 2 days/alcohol intake

## 2023-10-01 NOTE — ASSESSMENT & PLAN NOTE
· Discussed with toxicology - formal consult pending  · Refused transfer to [address] [place] [address] detox unit as he does not want to go to [facility]. Has been there before. · Last drink 9/29 8 PM. Drinks 750 ML of vodka/day. · Loading with phenobarbital in ED - no further dosing needed at this time  · S/P 2 doses of IV valium in the ED with improvement in symptoms  · Recommended to use CIWA protocol but in place of ativan to use valium given his response to valium in the ED  · Add thiamine, folic acid, multivitamin  · SD2 given likely high need for IV benzos and high risk for decompensation/withdrawal    Modified CIWA:  0  No intervention  Reassess q4 hours. Consider discontinuing CIWA 24 hours after ethanol concentration of zero, with a score of zero    1-7  Diazepam 10 mg PO Q4hr PRN score 1-7  Reassess q4hr    8-14  Diazepam 10mg IV Q1hr PRN score 8-14  Reassess q1hr  Contact Medical Toxicology/Addiction "Network Detox AP On Call" via [employer] Text to discuss transfer to detox unit, step down or critical care per Detox AP. 15-19  Diazepam 20mg IV Q1hr PRN score 15-19  Reassess q1hr  Contact Medical Toxicology/Addiction "Network Detox AP On Call" via [employer] Text to discuss transfer to detox unit, step down or critical care per Detox AP and further treatment recommendations.

## 2023-10-02 VITALS
SYSTOLIC BLOOD PRESSURE: 153 MMHG | DIASTOLIC BLOOD PRESSURE: 95 MMHG | RESPIRATION RATE: 17 BRPM | HEIGHT: 69 IN | OXYGEN SATURATION: 95 % | TEMPERATURE: 98.9 F | BODY MASS INDEX: 32.58 KG/M2 | WEIGHT: 220 LBS | HEART RATE: 77 BPM

## 2023-10-02 LAB
ANION GAP SERPL CALCULATED.3IONS-SCNC: 4 MMOL/L
BUN SERPL-MCNC: 6 MG/DL (ref 5–25)
CALCIUM SERPL-MCNC: 8.6 MG/DL (ref 8.4–10.2)
CHLORIDE SERPL-SCNC: 103 MMOL/L (ref 96–108)
CO2 SERPL-SCNC: 32 MMOL/L (ref 21–32)
CREAT SERPL-MCNC: 0.92 MG/DL (ref 0.6–1.3)
ERYTHROCYTE [DISTWIDTH] IN BLOOD BY AUTOMATED COUNT: 12 % (ref 11.6–15.1)
GFR SERPL CREATININE-BSD FRML MDRD: 91 ML/MIN/1.73SQ M
GLUCOSE SERPL-MCNC: 105 MG/DL (ref 65–140)
HCT VFR BLD AUTO: 38.7 % (ref 36.5–49.3)
HGB BLD-MCNC: 13.5 G/DL (ref 12–17)
MAGNESIUM SERPL-MCNC: 2 MG/DL (ref 1.9–2.7)
MCH RBC QN AUTO: 32.7 PG (ref 26.8–34.3)
MCHC RBC AUTO-ENTMCNC: 33.9 G/DL (ref 31.4–37.4)
MCV RBC AUTO: 97 FL (ref 82–98)
PLATELET # BLD AUTO: 172 THOUSANDS/UL (ref 149–390)
PMV BLD AUTO: 8.6 FL (ref 8.9–12.7)
POTASSIUM SERPL-SCNC: 3.5 MMOL/L (ref 3.5–5.3)
RBC # BLD AUTO: 4.01 MILLION/UL (ref 3.88–5.62)
SODIUM SERPL-SCNC: 139 MMOL/L (ref 135–147)
WBC # BLD AUTO: 4.76 THOUSAND/UL (ref 4.31–10.16)

## 2023-10-02 PROCEDURE — 99239 HOSP IP/OBS DSCHRG MGMT >30: CPT | Performed by: PHYSICIAN ASSISTANT

## 2023-10-02 PROCEDURE — 85027 COMPLETE CBC AUTOMATED: CPT | Performed by: NURSE PRACTITIONER

## 2023-10-02 PROCEDURE — 83735 ASSAY OF MAGNESIUM: CPT | Performed by: NURSE PRACTITIONER

## 2023-10-02 PROCEDURE — 99448 NTRPROF PH1/NTRNET/EHR 21-30: CPT | Performed by: EMERGENCY MEDICINE

## 2023-10-02 PROCEDURE — 80048 BASIC METABOLIC PNL TOTAL CA: CPT | Performed by: NURSE PRACTITIONER

## 2023-10-02 RX ORDER — POTASSIUM CHLORIDE 20 MEQ/1
40 TABLET, EXTENDED RELEASE ORAL ONCE
Status: COMPLETED | OUTPATIENT
Start: 2023-10-02 | End: 2023-10-02

## 2023-10-02 RX ORDER — FOLIC ACID 1 MG/1
1 TABLET ORAL DAILY
Refills: 0
Start: 2023-10-03

## 2023-10-02 RX ORDER — LANOLIN ALCOHOL/MO/W.PET/CERES
100 CREAM (GRAM) TOPICAL DAILY
Refills: 0
Start: 2023-10-03

## 2023-10-02 RX ADMIN — POTASSIUM CHLORIDE 40 MEQ: 1500 TABLET, EXTENDED RELEASE ORAL at 09:58

## 2023-10-02 RX ADMIN — ESCITALOPRAM OXALATE 20 MG: 20 TABLET ORAL at 08:15

## 2023-10-02 RX ADMIN — ENOXAPARIN SODIUM 40 MG: 40 INJECTION SUBCUTANEOUS at 08:14

## 2023-10-02 RX ADMIN — DIAZEPAM 10 MG: 5 TABLET ORAL at 08:30

## 2023-10-02 RX ADMIN — Medication 100 MG: at 08:15

## 2023-10-02 RX ADMIN — DIAZEPAM 10 MG: 10 INJECTION, SOLUTION INTRAMUSCULAR; INTRAVENOUS at 06:21

## 2023-10-02 RX ADMIN — PANTOPRAZOLE SODIUM 40 MG: 40 TABLET, DELAYED RELEASE ORAL at 05:13

## 2023-10-02 RX ADMIN — FOLIC ACID 1 MG: 1 TABLET ORAL at 08:15

## 2023-10-02 RX ADMIN — MULTIPLE VITAMINS W/ MINERALS TAB 1 TABLET: TAB ORAL at 08:15

## 2023-10-02 NOTE — CASE MANAGEMENT
Case Management Progress Note    Patient name Dayna Tyler  Location ICU 13/ICU 13 MRN [id number]  : 1965 Date 10/2/2023       LOS (days): 1  Geometric Mean LOS (GMLOS) (days):   Days to GMLOS:        OBJECTIVE:        Current admission status: Inpatient  Preferred Pharmacy:   [facility] [id number], [place] - [address]  [address] [facility] [address]  Phone: [phone] Fax: [address] [place] [address], [address] [place] [address]  [address] [facility] [address]  Phone: [phone] Fax: [phone]    Primary Care Provider: Ernesto Schmidt MD    Primary Insurance: DESIREE FORBES PENDING  Secondary Insurance:     PROGRESS NOTE:    CM notified by Aureliano Flower, that she completed pt assessment for OP Treatment and will be sending referrals to have pt established. She is aware of pt dc today and pt has her contact information.

## 2023-10-02 NOTE — CASE MANAGEMENT
Case Management Progress Note    Patient name Matt Daniels  Location ICU 13/ICU 13 MRN [id number]  : 1965 Date 10/2/2023       LOS (days): 1  Geometric Mean LOS (GMLOS) (days):   Days to GMLOS:        OBJECTIVE:        Current admission status: Inpatient  Preferred Pharmacy:   [facility] [address] [address], [place] - [address]  [address] [place] [address]  Phone: [phone] Fax: [address] [place] [address], [address] [place] [address]  Phone: [phone] Fax: [phone]    Primary Care Provider: Guero Harrison MD    Primary Insurance: DESRIEE FORBES PENDING  Secondary Insurance:     PROGRESS NOTE:    CM received consult for [facility]. CM contacted [facility]  to refer patient and provided minimal necessary information. CRS to meet with patient and follow up with CM to provide update on plan of care following patient connection.

## 2023-10-02 NOTE — DISCHARGE SUMMARY
[address]  Discharge- Dong Corpus 1965, 63 y.o. male MRN: [id number]  Unit/Bed#: ICU 13 Encounter: [id number]  Primary Care Provider: Lucas Miner MD   Date and time admitted to hospital: 10/1/2023 12:03 PM    * Alcohol withdrawal [facility])  Assessment & Plan  · Discussed with toxicology - consult appreciated  · Refused transfer to [address] [place] [address] detox unit as he does not want to go to [facility]. Has been there before. · Last drink 9/29 8 PM. Drinks 750 ML of vodka/day. · Loading with phenobarbital in ED - no further dosing needed at this time  · S/P 2 doses of IV valium in the ED with improvement in symptoms  · Recommended to use CIWA protocol but in place of ativan to use valium given his response to valium in the ED  · Add thiamine, folic acid, multivitamin    Modified CIWA:  0  No intervention  Reassess q4 hours. Consider discontinuing CIWA 24 hours after ethanol concentration of zero, with a score of zero    1-7  Diazepam 10 mg PO Q4hr PRN score 1-7  Reassess q4hr    8-14  Diazepam 10mg IV Q1hr PRN score 8-14  Reassess q1hr  Contact Medical Toxicology/Addiction "Network Detox AP On Call" via [facility] Text to discuss transfer to detox unit, step down or critical care per Detox AP. 15-19  Diazepam 20mg IV Q1hr PRN score 15-19  Reassess q1hr  Contact Medical Toxicology/Addiction "Network Detox AP On Call" via [employer] Text to discuss transfer to detox unit, step down or critical care per Detox AP and further treatment recommendations. Patient clinically improved. He would like to go home. Discussed that Valium is a ling acting med and he may still have symptoms when he gets home. He understands and prefers to go home. A representative from [facility] will talk to patient about outpatient rehab.        Chronic alcoholic gastritis  Assessment & Plan  · Continue PPI    Elevated lactic acid level  Assessment & Plan  · Anion gap 18, lactic acid 4.6 improved to 1.2, beta hydroxybuyrate 0.8   · Not acidotic based on BMP  · S/P IV fluids in ED  · Repeat BMP in AM  · Likely from poor oral intake x 2 days/alcohol intake    Essential hypertension  Assessment & Plan  · Previously was on lisinopril 40 mg daily and metoprolol 25 mg p.o. twice daily but stopped these approximately 4 months ago  · Current -160s  · Restart Lisinopril 40mg daily at discharge    Hypomagnesemia  Assessment & Plan  · Mag noted to be 1.1  · Status post 4 mg of IV magnesium in the emergency department  · Repeat magnesium level in the morning - 2.0      Medical Problems     Resolved Problems  Date Reviewed: 10/2/2023   None       Discharging Physician / Practitioner: Mine Hahn PA-C  PCP: Roque Peace MD  Admission Date:   Admission Orders (From admission, onward)     Ordered        10/01/23 2002 [facility]  Once                      Discharge Date: 10/02/23    Consultations During Hospital Stay:  · Dr Bridgett Grant    Procedures Performed:     CXR  No acute cardiopulmonary disease. Significant Findings / Test Results:   · See above    Incidental Findings:   · none     Test Results Pending at Discharge (will require follow up):   · none     Outpatient Tests Requested:  · BMP, Mg in 1 week    Complications:  none    Reason for Admission: alcohol withdrawal     Hospital Course:   Ole Judd is a 63 y.o. male patient who originally presented to the hospital on 10/1/2023 due to alcohol withdrawal. He was placed on a modified CIWA protocol with Valium. Patient improved clinically and will discharge to home. He would like to go to outpatient rehab. A referral to [facility] was placed. Please see above list of diagnoses and related plan for additional information. Condition at Discharge: good    Discharge Day Visit / Exam:   Subjective:  Feels much better today. Tolerating diet. Tremor improved.    Vitals: Blood Pressure: 153/95 (10/02/23 1245)  Pulse: 77 (10/02/23 1245)  Temperature: 98.9 °F (37.2 °C) (10/01/23 2300)  Temp Source: Oral (10/01/23 1700)  Respirations: 17 (10/02/23 0635)  Height: 5' 9" (175.3 cm) (10/01/23 1630)  Weight - Scale: 99.8 kg (220 lb) (10/01/23 1630)  SpO2: 95 % (10/02/23 4440)  Exam:   Physical Exam  Vitals and nursing note reviewed. Constitutional:       General: He is not in acute distress. Appearance: He is well-developed. HENT:      Head: Normocephalic and atraumatic. Eyes:      Conjunctiva/sclera: Conjunctivae normal.   Cardiovascular:      Rate and Rhythm: Normal rate and regular rhythm. Heart sounds: No murmur heard. Pulmonary:      Effort: Pulmonary effort is normal. No respiratory distress. Breath sounds: Normal breath sounds. Abdominal:      Palpations: Abdomen is soft. Tenderness: There is no abdominal tenderness. Musculoskeletal:         General: No swelling. Cervical back: Neck supple. Skin:     General: Skin is warm and dry. Neurological:      Mental Status: He is alert. Psychiatric:         Mood and Affect: Mood normal.          Discussion with Family: Patient declined call to . Discharge instructions/Information to patient and family:   See after visit summary for information provided to patient and family. Provisions for Follow-Up Care:  See after visit summary for information related to follow-up care and any pertinent home health orders. Disposition:   Home    Planned Readmission: none     Discharge Statement:  I spent 35 minutes discharging the patient. This time was spent on the day of discharge. I had direct contact with the patient on the day of discharge. Greater than 50% of the total time was spent examining patient, answering all patient questions, arranging and discussing plan of care with patient as well as directly providing post-discharge instructions. Additional time then spent on discharge activities.     Discharge Medications:  See after visit summary for reconciled discharge medications provided to patient and/or family.       **Please Note: This note may have been constructed using a voice recognition system**

## 2023-10-02 NOTE — UTILIZATION REVIEW
Initial Clinical Review    Admission: Date/Time/Statement:   Admission Orders (From admission, onward)     Ordered        10/01/23 [address]  Once                      Orders Placed This Encounter   Procedures   • INPATIENT ADMISSION     Standing Status:   Standing     Number of Occurrences:   1     Order Specific Question:   Level of Care     Answer:   Level 2 Stepdown / HOT [14]     Order Specific Question:   Estimated length of stay     Answer:   More than 2 Midnights     Order Specific Question:   Certification     Answer:   I certify that inpatient services are medically necessary for this patient for a duration of greater than two midnights. See H&P and MD Progress Notes for additional information about the patient's course of treatment. ED Arrival Information     Expected   -    Arrival   10/1/2023 11:39    Acuity   Emergent            Means of arrival   Walk-In    Escorted by   Spouse    Service   Hospitalist    Admission type   Emergency            Arrival complaint   alcohol withdrawl           Chief Complaint   Patient presents with   • Withdrawal - Alcohol     Pt reports being alcoholic for "years". Had 3/4 bottle of vodka lastnight and started shaking/nausea/vomiting/diaphoretic. No hx seizures       Initial Presentation: 63 y.o. male from home to ED admitted inpatient due to alcohol withdrawal/elevated lactic acid/hypomagnesemia. PMH of hpt, alcoholic gastritis. Drinks 8 shots daily and day prior to arrival drank  750 ML of vodka. Last drink 9/29/23 2000. No history of withdrawal seizures, was admitted 4/2023 Detox. Presented due to shaking, vomiting, diaphoretic starting about 17 hours prior to arrival.  On exam: Bilateral intention tremor present in addition to resting tremor. Anxious. Agitated. Hypertensive. Medical alcohol < 10.   lactic acid 4.6. Mg 1.1. Total Bilirubin 1.37.    In the ED given antiemetics, IV hydration and diazepam then given additional diazepam and loaded  With phenobarb for withdrawal.  Given IV Mag and high dose thiamine. Does not want to go to Detox center, not sure if wants to stop drinking. Plan is consult toxicology. CIWA with Valium. Continue oral  thiamine and folic acid. Monitor BP       10/2/23 per toxicology: patient with Alcohol withdrawal/Alcohol use disorder, severe, dependence/AKA (resolved). Plan is modified CIWA, with Diazepam.  Continue daily thiamine and folate, encourage po intake. If deteriorates, contact toxicology or critical care. Modified CIWA:  0  No intervention  Reassess q4 hours. Consider discontinuing CIWA 24 hours after ethanol concentration of zero, with a score of zero  1-7  Diazepam 10 mg PO Q4hr PRN score 1-7  Reassess q4hr   8-14  Diazepam 10mg IV Q1hr PRN score 8-14  Reassess q1hr  Contact Medical Toxicology/Addiction "Network Detox AP On Call" via [employer] Text to discuss transfer to detox unit, step down or critical care per Detox AP. 15-19  Diazepam 20mg IV Q1hr PRN score 15-19  Reassess q1hr  Contact Medical Toxicology/Addiction "Network Detox AP On Call" via [employer] Text to discuss transfer to detox unit, step down or critical care per Detox AP and further treatment recommendations.     ED Triage Vitals   Temperature Pulse Respirations Blood Pressure SpO2   10/01/23 1201 10/01/23 1201 10/01/23 1201 10/01/23 1201 10/01/23 1201   98 °F (36.7 °C) (!) 151 (!) 24 (!) 177/85 98 %      Temp Source Heart Rate Source Patient Position - Orthostatic VS BP Location FiO2 (%)   10/01/23 1201 10/01/23 1201 10/01/23 1201 10/01/23 1201 --   Oral Monitor Sitting Left arm       Pain Score       10/01/23 1630       No Pain          Wt Readings from Last 1 Encounters:   10/01/23 99.8 kg (220 lb)     Additional Vital Signs:   10/02/23 0600 -- 63 -- 145/89 -- -- -- --   10/02/23 0500 -- 62 13 126/70 93 96 % -- --   10/02/23 0400 -- 60 13 121/69 90 97 % -- --   10/02/23 0300 -- 78 15 142/87 109 94 % -- --   10/02/23 0200 -- 66 -- 140/84 -- -- -- --   10/02/23 0100 -- 79 14 122/78 95 94 % -- --   10/02/23 0000 -- 78 16 134/84 104 95 % -- --   10/01/23 2300 98.9 °F (37.2 °C) 82 18 140/87 109 94 % -- --   10/01/23 2125 -- 110 Abnormal  25 Abnormal  149/89 112 100 % -- --   10/01/23 2000 -- 111 Abnormal  33 Abnormal  163/95 122 94 % -- --   10/01/23 1700 99.2 °F (37.3 °C) 102 20 166/100 126 94 % None (Room air) Lying   10/01/23 1600 -- 100 -- 160/108 Abnormal  129 95 % -- --   10/01/23 1500 -- 99 18 166/99 127 95 % None (Room air) Lying   10/01/23 1430 -- 97 -- 147/110 Abnormal  136 95 % -- --   10/01/23 1316 -- 118 Abnormal  24 Abnormal  160/98 123 96 % None (Room air) Sitting   10/01/23 1230 -- 151 Abnormal  -- 177/85 Abnormal  -- -- --      CIWA  10/2/23:   5 at 0813.   9 at 0600.   6 at 0400.   6 at 0100.     10/1/23:  4 at 2125.   12 at 1942.  12 at 1729.  8 at 1630.   14 at 1545.  10 at 1430/  21 at 1230    Pertinent Labs/Diagnostic Test Results:   XR chest 1 view portable   Final Result by Sugar Murphy MD (10/02 1003)      No acute cardiopulmonary disease.                Workstation performed: [id number]             Results from last 7 days   Lab Units 10/02/23  0445 10/01/23  1235   WBC Thousand/uL 4.76 9.97   HEMOGLOBIN g/dL 13.5 17.4*   HEMATOCRIT % 38.7 49.6*   PLATELETS Thousands/uL 172 274   NEUTROS ABS Thousands/µL  --  8.03*     Results from last 7 days   Lab Units 10/02/23  0445 10/01/23  1235   SODIUM mmol/L 139 140   POTASSIUM mmol/L 3.5 3.9   CHLORIDE mmol/L 103 98   CO2 mmol/L 32 24   ANION GAP mmol/L 4 18   BUN mg/dL 6 6   CREATININE mg/dL 0.92 0.97   EGFR ml/min/1.73sq m 91 86   CALCIUM mg/dL 8.6 10.0   MAGNESIUM mg/dL 2.0 1.1*     Results from last 7 days   Lab Units 10/01/23  1235   AST U/L 53*   ALT U/L 36   ALK PHOS U/L 127*   TOTAL PROTEIN g/dL 8.6*   ALBUMIN g/dL 4.9   TOTAL BILIRUBIN mg/dL 1.37*     Results from last 7 days   Lab Units 10/02/23  0445 10/01/23  1235   GLUCOSE RANDOM mg/dL 105 166* BETA-HYDROXYBUTYRATE   Date Value Ref Range Status   10/01/2023 0.8 (H) <0.6 mmol/L Final      Results from last 7 days   Lab Units 10/01/23  1649 10/01/23  1450 10/01/23  1235   HS TNI 0HR ng/L  --   --  7   HS TNI 2HR ng/L  --  4  --    HSTNI D2 ng/L  --  -3  --    HS TNI 4HR ng/L 4  --   --    HSTNI D4 ng/L -3  --   --      Results from last 7 days   Lab Units 10/01/23  1450 10/01/23  1235   LACTIC ACID mmol/L 1.2 4.6*     Results from last 7 days   Lab Units 10/01/23  1235   LIPASE u/L 13     Results from last 7 days   Lab Units 10/01/23  1235   ETHANOL LVL mg/dL <10     ED Treatment:   Medication Administration from 10/01/2023 1139 to 10/01/2023 1657       Date/Time Order Dose Route Action Comments     10/01/2023 1232 EDT sodium chloride 0.9 % bolus 1,000 mL 1,000 mL Intravenous New Bag --     10/01/2023 1236 EDT ondansetron (ZOFRAN) injection 4 mg 4 mg Intravenous Given --     10/01/2023 1236 EDT diazepam (VALIUM) injection 10 mg 10 mg Intravenous Given --     10/01/2023 1314 EDT thiamine (VITAMIN B1) 500 mg in sodium chloride 0.9 % 50 mL IVPB 500 mg Intravenous New Bag --     10/01/2023 1412 EDT dextrose 5 % and sodium chloride 0.9 % bolus 1,000 mL 1,000 mL Intravenous New Bag --     10/01/2023 1511 EDT magnesium sulfate 4 g/100 mL IVPB (premix) 4 g 4 g Intravenous New Bag --     10/01/2023 1446 EDT ondansetron (ZOFRAN) injection 4 mg 4 mg Intravenous Given --     10/01/2023 1543 EDT PHENobarbital 650 mg in sodium chloride 0.9 % 100 mL IVPB 650 mg Intravenous New Bag --     10/01/2023 1444 EDT diazepam (VALIUM) injection 10 mg 10 mg Intravenous Given medication ordered by provider, given by RN, discontinued by provider. Advised to provider that it was already given.      10/01/2023 1630 EDT thiamine tablet 100 mg 100 mg Oral Given --     73/44/0671 2149 EDT folic acid (FOLVITE) tablet 1 mg 1 mg Oral Given --     10/01/2023 1630 EDT multivitamin-minerals (CENTRUM) tablet 1 tablet 1 tablet Oral Given -- Past Medical History:   Diagnosis Date   • GERD (gastroesophageal reflux disease)    • Hypertension      Present on Admission:  • Hypomagnesemia  • Essential hypertension  • Chronic alcoholic gastritis      Admitting Diagnosis: Alcohol withdrawal ([address] [facility]) [G05.763]  Alcoholic ketoacidosis [Z91.12]  Alcohol withdrawal with complication with inpatient treatment [facility]) [F10.939]  Age/Sex: 63 y.o. male  Admission Orders:  10/01/23 1457 inpatient to Level 2 Stepdown/HOT  Scheduled Medications:  enoxaparin, 40 mg, Subcutaneous, Daily  escitalopram, 20 mg, Oral, Daily  folic acid, 1 mg, Oral, Daily  multivitamin-minerals, 1 tablet, Oral, Daily  pantoprazole, 40 mg, Oral, Early Morning  thiamine, 100 mg, Oral, Daily    potassium chloride (K-DUR,KLOR-CON) CR tablet 40 mEq  Dose: 40 mEq  Freq: Once Route: PO  Start: 10/02/23 0915 End: 10/02/23 0958    Continuous IV Infusions: none      PRN Meds:  acetaminophen, 650 mg, Oral, Q6H PRN  diazepam, 10 mg, Intravenous, Q1H PRN x 1 10/1/23,  X 1 10/2/23   diazepam, 10 mg, Oral, Q4H PRN x 1 10/2/23   ondansetron, 4 mg, Intravenous, Q6H PRN    CIWA  Telemetry   Aspiration precautions. Continuous pulse oximetry     IP CONSULT TO TOXICOLOGY      Network Utilization Review Department  ATTENTION: Please call with any questions or concerns to [phone] and carefully listen to the prompts so that you are directed to the right person. All voicemails are confidential.  Kam Lyman all requests for admission clinical reviews, approved or denied determinations and any other requests to dedicated fax number below belonging to the campus where the patient is receiving treatment.  List of dedicated fax numbers for the Facilities:  [place] DENIALS (Administrative/Medical Necessity) [phone] [address] [facility]/Pediatrics) [phone]   [address] [place] [address] Drive [phone]   [facility] [phone]   [facility] [address]  [phone]   [address] [address] [facility] [address] [place] [address] [phone]

## 2023-10-02 NOTE — PROGRESS NOTES
10/02/23 1500   Clinical Encounter Type   Visited With Patient   Routine Visit Introduction   Crisis Visit Critical Care  (Step Down)   Referral From Nurse  (EPIC Consult)   Patient Spiritual Encounters   Spiritual Assessment 4   Suffering Severity 2  (Emotianl/psychological, primarily)   Fear Level 3   Coping 3   Grief Resolution 3   Spiritual Encounter Notes Pt states he is concerned about his father's diagnosis and prognosis and that he worries about being unemployed for the past few months and the financial challenges that will create. Pt states his grief over mother's death is not yet fully resolved and is more challenging due to his father's health situation. Pt states he has younger sisters but that he, as oldest, feels obligated to be his father's primary caregiver. Arron Ortiz has visited with Pt and offered prayer which Pt accepted.

## 2023-10-02 NOTE — CONSULTS
Visited w/Pt for about 10 minutes (on second attempt) on 10/2/23. Pt informed me that Fr. Sarah Wei had stopped by to visit with him and that they had prayed together. Pt. Stated that his mother  last November a few months after a CA diagnosis and that since that time Pt has lost his job and his father has become ill with bladder CA. Pt states he is father's primary caregiver (by choice) but that Pt has some sisters who are involved, but not as much as he is. Pt stated he has had ETOH addiction for some time and that the recent stressors in his life exacerbated his abuse of alcohol, resulting in his hospitalization. Pt seemed in relatively good humor, though did tear up at one point in the visit.   I will check in on Pt again this week if he is still in hospital.

## 2023-10-02 NOTE — ASSESSMENT & PLAN NOTE
· Discussed with toxicology - consult appreciated  · Refused transfer to [address] [place] [address] detox unit as he does not want to go to [facility]. Has been there before. · Last drink 9/29 8 PM. Drinks 750 ML of vodka/day. · Loading with phenobarbital in ED - no further dosing needed at this time  · S/P 2 doses of IV valium in the ED with improvement in symptoms  · Recommended to use CIWA protocol but in place of ativan to use valium given his response to valium in the ED  · Add thiamine, folic acid, multivitamin    Modified CIWA:  0  No intervention  Reassess q4 hours. Consider discontinuing CIWA 24 hours after ethanol concentration of zero, with a score of zero    1-7  Diazepam 10 mg PO Q4hr PRN score 1-7  Reassess q4hr    8-14  Diazepam 10mg IV Q1hr PRN score 8-14  Reassess q1hr  Contact Medical Toxicology/Addiction "Network Detox AP On Call" via [employer] Text to discuss transfer to detox unit, step down or critical care per Detox AP. 15-19  Diazepam 20mg IV Q1hr PRN score 15-19  Reassess q1hr  Contact Medical Toxicology/Addiction "Network Detox AP On Call" via [employer] Text to discuss transfer to detox unit, step down or critical care per Detox AP and further treatment recommendations. Patient clinically improved. He would like to go home. Discussed that Valium is a ling acting med and he may still have symptoms when he gets home. He understands and prefers to go home. A representative from [facility] will talk to patient about outpatient rehab.

## 2023-10-02 NOTE — CONSULTS
PHONE [address] [facility] Toxicology  Ernestina Chapa 63 y.o. male MRN: [id number]  Unit/Bed#: ICU 13 Encounter: [id number]      Reason for Consult / Principal Problem: Alcohol withdrawal    Inpatient consult to Toxicology  Consult performed by: Dyllan Matt MD  Consult ordered by: Arrie Najjar, MD        10/02/23      ASSESSMENT:  1) Alcohol withdrawal  2) Alcohol use disorder, severe, dependence  3) AKA (resolved)    RECOMMENDATIONS:  Agree with plan for modified CIWA per hospitalist H&P. Please continue daily thiamine and folate, and encourage PO intake. Consider engagement with WHO/ case management for outpatient or reheb resources. If patient's condition deteriorates in spite of modified CIWA, please consult critical care or reach back out to us to discuss further. For further questions, please call [facility]  Service or Patient Access Center to reach the medical  on call. Hx and PE limited by the dynamics of a phone consultation. I have not personally interviewed or evaluated the patient, but only advised based on the information provided to me. Primary provider is responsible for all clinical decisions. Pertinent history, physical exam and clinical findings and course discussed: Ernestina Chapa is a 63y.o. year old male with history of alcohol use disorder who presented with alcohol withdrawal.    Review of systems and physical exam not performed by me. Historical Information   Past Medical History:   Diagnosis Date   • GERD (gastroesophageal reflux disease)    • Hypertension      History reviewed. No pertinent surgical history. Social History   Social History     Substance and Sexual Activity   Alcohol Use Yes    Comment: up to a handle a day. Social History     Substance and Sexual Activity   Drug Use Never     Social History     Tobacco Use   Smoking Status Never   Smokeless Tobacco Never     History reviewed.  No pertinent family history. Prior to Admission medications    Medication Sig Start Date End Date Taking? Authorizing Provider   escitalopram (LEXAPRO) 20 mg tablet Take 1 tablet by mouth daily 2/21/23   Historical Provider, MD   lisinopril (ZESTRIL) 40 mg tablet Take 1 tablet (40 mg total) by mouth daily 4/4/23   Deneen Velasquez PA-C   magnesium gluconate (MAGONATE) 500 mg tablet Take 1 tablet (500 mg total) by mouth once for 1 dose 4/4/23 4/4/23  Deneen Velasquez PA-C   omeprazole (PriLOSEC) 40 MG capsule Take 40 mg by mouth 2 (two) times a day 12/5/22   Historical Provider, MD   potassium chloride (K-DUR,KLOR-CON) 20 mEq tablet Take 1 tablet (20 mEq total) by mouth daily for 7 days 4/4/23 4/11/23  Deneen Velasquez PA-C       Current Facility-Administered Medications   Medication Dose Route Frequency   • acetaminophen (TYLENOL) tablet 650 mg  650 mg Oral Q6H PRN   • diazepam (VALIUM) injection 10 mg  10 mg Intravenous Q1H PRN   • diazepam (VALIUM) tablet 10 mg  10 mg Oral Q4H PRN   • enoxaparin (LOVENOX) subcutaneous injection 40 mg  40 mg Subcutaneous Daily   • escitalopram (LEXAPRO) tablet 20 mg  20 mg Oral Daily   • folic acid (FOLVITE) tablet 1 mg  1 mg Oral Daily   • multivitamin-minerals (CENTRUM) tablet 1 tablet  1 tablet Oral Daily   • ondansetron (ZOFRAN) injection 4 mg  4 mg Intravenous Q6H PRN   • pantoprazole (PROTONIX) EC tablet 40 mg  40 mg Oral Early Morning   • thiamine tablet 100 mg  100 mg Oral Daily       Allergies   Allergen Reactions   • Cefdinir Rash       Objective       Intake/Output Summary (Last 24 hours) at 10/2/2023 0752  Last data filed at 10/1/2023 1643  Gross per 24 hour   Intake 2150 ml   Output --   Net 2150 ml       Invasive Devices:   Peripheral IV 10/01/23 Right Antecubital (Active)   Site Assessment WDL 10/01/23 2000   Dressing Type Transparent 10/01/23 2000   Line Status Blood return noted; Flushed & Clamped 10/01/23 2000   Dressing Status Clean;Dry; Intact 10/01/23 2000   Dressing Intervention Dressing changed 10/01/23 1711   Dressing Change Due 10/05/23 10/01/23 2000   Reason Not Rotated Not due 10/01/23 2000       Peripheral IV 10/01/23 Left Antecubital (Active)   Site Assessment WDL 10/01/23 2000   Dressing Type Transparent 10/01/23 2000   Line Status Blood return noted; Flushed & Clamped 10/01/23 2000   Dressing Status Clean;Dry; Intact 10/01/23 2000   Dressing Intervention Dressing changed 10/01/23 1711   Dressing Change Due 10/05/23 10/01/23 2000   Reason Not Rotated Not due 10/01/23 2000       Vitals   Vitals:    10/02/23 0430 10/02/23 0500 10/02/23 0600 10/02/23 0635   BP: 112/62 126/70 145/89 140/89   TempSrc:       Pulse: 65 62 63 77   Resp: 13 13  17   Patient Position - Orthostatic VS:       Temp:             EKG, Pathology, and/or Other Studies: I have personally reviewed pertinent reports. Lab Results: I have personally reviewed pertinent reports. Labs:  Results from last 7 days   Lab Units 10/02/23  0445 10/01/23  1235   WBC Thousand/uL 4.76 9.97   HEMOGLOBIN g/dL 13.5 17.4*   HEMATOCRIT % 38.7 49.6*   PLATELETS Thousands/uL 172 274   NEUTROS PCT %  --  80*   LYMPHS PCT %  --  13*   MONOS PCT %  --  6   EOS PCT %  --  0      Results from last 7 days   Lab Units 10/02/23  0445 10/01/23  1235   POTASSIUM mmol/L 3.5 3.9   CHLORIDE mmol/L 103 98   CO2 mmol/L 32 24   BUN mg/dL 6 6   CREATININE mg/dL 0.92 0.97   CALCIUM mg/dL 8.6 10.0   ALK PHOS U/L  --  127*   ALT U/L  --  36   AST U/L  --  53*   MAGNESIUM mg/dL 2.0 1.1*          Results from last 7 days   Lab Units 10/01/23  1450 10/01/23  1235   LACTIC ACID mmol/L 1.2 4.6*     No results found for: "TROPONINI"      Results from last 7 days   Lab Units 10/01/23  1235   ETHANOL LVL mg/dL <10     Invalid input(s): "EXTPREGUR"    Imaging Studies: I have personally reviewed pertinent reports. Counseling / Coordination of Care  Total time spent today 21 minutes.  This was a phone consultation

## 2023-10-02 NOTE — ASSESSMENT & PLAN NOTE
· Mag noted to be 1.1  · Status post 4 mg of IV magnesium in the emergency department  · Repeat magnesium level in the morning - 2.0

## 2023-10-02 NOTE — ASSESSMENT & PLAN NOTE
· Previously was on lisinopril 40 mg daily and metoprolol 25 mg p.o. twice daily but stopped these approximately 4 months ago  · Current -160s  · Restart Lisinopril 40mg daily at discharge

## 2023-10-03 LAB
ATRIAL RATE: 128 BPM
P AXIS: 56 DEGREES
PR INTERVAL: 136 MS
QRS AXIS: 98 DEGREES
QRSD INTERVAL: 86 MS
QT INTERVAL: 312 MS
QTC INTERVAL: 455 MS
T WAVE AXIS: 26 DEGREES
VENTRICULAR RATE: 128 BPM

## 2023-10-03 PROCEDURE — 93010 ELECTROCARDIOGRAM REPORT: CPT | Performed by: INTERNAL MEDICINE

## 2023-10-16 ENCOUNTER — HOSPITAL ENCOUNTER (INPATIENT)
Facility: HOSPITAL | Age: 58
LOS: 1 days | Discharge: HOME | End: 2023-10-18
Attending: EMERGENCY MEDICINE | Admitting: INTERNAL MEDICINE
Payer: COMMERCIAL

## 2023-10-16 DIAGNOSIS — E87.29 ALCOHOLIC KETOACIDOSIS: Primary | ICD-10-CM

## 2023-10-16 DIAGNOSIS — F10.20 ALCOHOL USE DISORDER, SEVERE, DEPENDENCE (HCC): ICD-10-CM

## 2023-10-16 DIAGNOSIS — F10.939 ALCOHOL WITHDRAWAL (HCC): ICD-10-CM

## 2023-10-16 DIAGNOSIS — F33.9 EPISODE OF RECURRENT MAJOR DEPRESSIVE DISORDER, UNSPECIFIED DEPRESSION EPISODE SEVERITY (HCC): ICD-10-CM

## 2023-10-16 LAB
ALBUMIN SERPL BCP-MCNC: 5.3 G/DL (ref 3.5–5)
ALP SERPL-CCNC: 169 U/L (ref 34–104)
ALT SERPL W P-5'-P-CCNC: 56 U/L (ref 7–52)
ANION GAP SERPL CALCULATED.3IONS-SCNC: 20 MMOL/L
AST SERPL W P-5'-P-CCNC: 81 U/L (ref 13–39)
BASOPHILS # BLD AUTO: 0.07 THOUSANDS/ÂΜL (ref 0–0.1)
BASOPHILS NFR BLD AUTO: 1 % (ref 0–1)
BETA-HYDROXYBUTYRATE: 2.9 MMOL/L
BILIRUB SERPL-MCNC: 1 MG/DL (ref 0.2–1)
BUN SERPL-MCNC: 6 MG/DL (ref 5–25)
CALCIUM SERPL-MCNC: 10.4 MG/DL (ref 8.4–10.2)
CHLORIDE SERPL-SCNC: 91 MMOL/L (ref 96–108)
CO2 SERPL-SCNC: 24 MMOL/L (ref 21–32)
CREAT SERPL-MCNC: 1.04 MG/DL (ref 0.6–1.3)
EOSINOPHIL # BLD AUTO: 0.02 THOUSAND/ÂΜL (ref 0–0.61)
EOSINOPHIL NFR BLD AUTO: 0 % (ref 0–6)
ERYTHROCYTE [DISTWIDTH] IN BLOOD BY AUTOMATED COUNT: 11.9 % (ref 11.6–15.1)
GFR SERPL CREATININE-BSD FRML MDRD: 79 ML/MIN/1.73SQ M
GLUCOSE SERPL-MCNC: 106 MG/DL (ref 65–140)
HCT VFR BLD AUTO: 49.7 % (ref 36.5–49.3)
HGB BLD-MCNC: 17.8 G/DL (ref 12–17)
IMM GRANULOCYTES # BLD AUTO: 0.06 THOUSAND/UL (ref 0–0.2)
IMM GRANULOCYTES NFR BLD AUTO: 1 % (ref 0–2)
LACTATE SERPL-SCNC: 0.7 MMOL/L (ref 0.5–2)
LACTATE SERPL-SCNC: 3.8 MMOL/L (ref 0.5–2)
LIPASE SERPL-CCNC: 11 U/L (ref 11–82)
LYMPHOCYTES # BLD AUTO: 1.21 THOUSANDS/ÂΜL (ref 0.6–4.47)
LYMPHOCYTES NFR BLD AUTO: 11 % (ref 14–44)
MAGNESIUM SERPL-MCNC: 1.4 MG/DL (ref 1.9–2.7)
MCH RBC QN AUTO: 33.8 PG (ref 26.8–34.3)
MCHC RBC AUTO-ENTMCNC: 35.8 G/DL (ref 31.4–37.4)
MCV RBC AUTO: 95 FL (ref 82–98)
MONOCYTES # BLD AUTO: 0.49 THOUSAND/ÂΜL (ref 0.17–1.22)
MONOCYTES NFR BLD AUTO: 4 % (ref 4–12)
NEUTROPHILS # BLD AUTO: 9.51 THOUSANDS/ÂΜL (ref 1.85–7.62)
NEUTS SEG NFR BLD AUTO: 83 % (ref 43–75)
NRBC BLD AUTO-RTO: 0 /100 WBCS
PLATELET # BLD AUTO: 506 THOUSANDS/UL (ref 149–390)
PMV BLD AUTO: 8.6 FL (ref 8.9–12.7)
POTASSIUM SERPL-SCNC: 4.8 MMOL/L (ref 3.5–5.3)
PROT SERPL-MCNC: 9.2 G/DL (ref 6.4–8.4)
RBC # BLD AUTO: 5.26 MILLION/UL (ref 3.88–5.62)
SODIUM SERPL-SCNC: 135 MMOL/L (ref 135–147)
WBC # BLD AUTO: 11.36 THOUSAND/UL (ref 4.31–10.16)

## 2023-10-16 PROCEDURE — 99284 EMERGENCY DEPT VISIT MOD MDM: CPT

## 2023-10-16 PROCEDURE — 82010 KETONE BODYS QUAN: CPT

## 2023-10-16 PROCEDURE — 96374 THER/PROPH/DIAG INJ IV PUSH: CPT

## 2023-10-16 PROCEDURE — 83735 ASSAY OF MAGNESIUM: CPT

## 2023-10-16 PROCEDURE — 96361 HYDRATE IV INFUSION ADD-ON: CPT

## 2023-10-16 PROCEDURE — 85025 COMPLETE CBC W/AUTO DIFF WBC: CPT | Performed by: EMERGENCY MEDICINE

## 2023-10-16 PROCEDURE — 80053 COMPREHEN METABOLIC PANEL: CPT | Performed by: EMERGENCY MEDICINE

## 2023-10-16 PROCEDURE — 83690 ASSAY OF LIPASE: CPT

## 2023-10-16 PROCEDURE — 96376 TX/PRO/DX INJ SAME DRUG ADON: CPT

## 2023-10-16 PROCEDURE — 96375 TX/PRO/DX INJ NEW DRUG ADDON: CPT

## 2023-10-16 PROCEDURE — 83605 ASSAY OF LACTIC ACID: CPT

## 2023-10-16 PROCEDURE — 36415 COLL VENOUS BLD VENIPUNCTURE: CPT

## 2023-10-16 RX ORDER — METOCLOPRAMIDE HYDROCHLORIDE 5 MG/ML
10 INJECTION INTRAMUSCULAR; INTRAVENOUS ONCE
Status: COMPLETED | OUTPATIENT
Start: 2023-10-16 | End: 2023-10-16

## 2023-10-16 RX ORDER — FOLIC ACID 1 MG/1
1 TABLET ORAL DAILY
Status: DISCONTINUED | OUTPATIENT
Start: 2023-10-17 | End: 2023-10-17 | Stop reason: SDUPTHER

## 2023-10-16 RX ORDER — FOLIC ACID 1 MG/1
1 TABLET ORAL DAILY
Status: DISCONTINUED | OUTPATIENT
Start: 2023-10-17 | End: 2023-10-18 | Stop reason: HOSPADM

## 2023-10-16 RX ORDER — LANOLIN ALCOHOL/MO/W.PET/CERES
100 CREAM (GRAM) TOPICAL DAILY
Status: DISCONTINUED | OUTPATIENT
Start: 2023-10-17 | End: 2023-10-18 | Stop reason: HOSPADM

## 2023-10-16 RX ORDER — DIAZEPAM 5 MG/ML
10 INJECTION, SOLUTION INTRAMUSCULAR; INTRAVENOUS ONCE
Status: COMPLETED | OUTPATIENT
Start: 2023-10-16 | End: 2023-10-16

## 2023-10-16 RX ORDER — ESCITALOPRAM OXALATE 20 MG/1
20 TABLET ORAL DAILY
Status: DISCONTINUED | OUTPATIENT
Start: 2023-10-17 | End: 2023-10-18 | Stop reason: HOSPADM

## 2023-10-16 RX ORDER — LISINOPRIL 20 MG/1
40 TABLET ORAL DAILY
Status: DISCONTINUED | OUTPATIENT
Start: 2023-10-17 | End: 2023-10-18 | Stop reason: HOSPADM

## 2023-10-16 RX ORDER — ONDANSETRON 2 MG/ML
4 INJECTION INTRAMUSCULAR; INTRAVENOUS ONCE
Status: COMPLETED | OUTPATIENT
Start: 2023-10-16 | End: 2023-10-16

## 2023-10-16 RX ORDER — ONDANSETRON 4 MG/1
4 TABLET, ORALLY DISINTEGRATING ORAL ONCE
Status: DISCONTINUED | OUTPATIENT
Start: 2023-10-16 | End: 2023-10-16

## 2023-10-16 RX ORDER — ONDANSETRON 2 MG/ML
INJECTION INTRAMUSCULAR; INTRAVENOUS
Status: COMPLETED
Start: 2023-10-16 | End: 2023-10-16

## 2023-10-16 RX ORDER — DIAZEPAM 5 MG/ML
5 INJECTION, SOLUTION INTRAMUSCULAR; INTRAVENOUS ONCE
Status: DISCONTINUED | OUTPATIENT
Start: 2023-10-16 | End: 2023-10-16

## 2023-10-16 RX ORDER — PANTOPRAZOLE SODIUM 20 MG/1
20 TABLET, DELAYED RELEASE ORAL
Status: DISCONTINUED | OUTPATIENT
Start: 2023-10-17 | End: 2023-10-18 | Stop reason: HOSPADM

## 2023-10-16 RX ORDER — ONDANSETRON 2 MG/ML
4 INJECTION INTRAMUSCULAR; INTRAVENOUS ONCE
Status: CANCELLED | OUTPATIENT
Start: 2023-10-16 | End: 2023-10-16

## 2023-10-16 RX ORDER — SODIUM CHLORIDE 9 MG/ML
100 INJECTION, SOLUTION INTRAVENOUS CONTINUOUS
Status: DISCONTINUED | OUTPATIENT
Start: 2023-10-16 | End: 2023-10-17

## 2023-10-16 RX ORDER — MAGNESIUM SULFATE HEPTAHYDRATE 40 MG/ML
2 INJECTION, SOLUTION INTRAVENOUS ONCE
Status: COMPLETED | OUTPATIENT
Start: 2023-10-16 | End: 2023-10-17

## 2023-10-16 RX ORDER — LANOLIN ALCOHOL/MO/W.PET/CERES
100 CREAM (GRAM) TOPICAL DAILY
Status: DISCONTINUED | OUTPATIENT
Start: 2023-10-17 | End: 2023-10-17 | Stop reason: SDUPTHER

## 2023-10-16 RX ADMIN — DIAZEPAM 10 MG: 10 INJECTION, SOLUTION INTRAMUSCULAR; INTRAVENOUS at 20:33

## 2023-10-16 RX ADMIN — ONDANSETRON 4 MG: 2 INJECTION INTRAMUSCULAR; INTRAVENOUS at 19:48

## 2023-10-16 RX ADMIN — SODIUM CHLORIDE 1000 ML: 0.9 INJECTION, SOLUTION INTRAVENOUS at 19:48

## 2023-10-16 RX ADMIN — ONDANSETRON 4 MG: 2 INJECTION INTRAMUSCULAR; INTRAVENOUS at 21:55

## 2023-10-16 RX ADMIN — METOCLOPRAMIDE HYDROCHLORIDE 10 MG: 5 INJECTION INTRAMUSCULAR; INTRAVENOUS at 23:18

## 2023-10-16 RX ADMIN — DIAZEPAM 10 MG: 10 INJECTION, SOLUTION INTRAMUSCULAR; INTRAVENOUS at 21:51

## 2023-10-16 RX ADMIN — SODIUM CHLORIDE 1000 ML: 0.9 INJECTION, SOLUTION INTRAVENOUS at 21:37

## 2023-10-16 RX ADMIN — MAGNESIUM SULFATE HEPTAHYDRATE 2 G: 40 INJECTION, SOLUTION INTRAVENOUS at 23:41

## 2023-10-16 NOTE — Clinical Note
Case was discussed with LILY and the patient's admission status was agreed to be Admission Status: inpatient status to the service of Dr. Susana Johns .

## 2023-10-17 ENCOUNTER — APPOINTMENT (INPATIENT)
Dept: ULTRASOUND IMAGING | Facility: HOSPITAL | Age: 58
End: 2023-10-17
Payer: COMMERCIAL

## 2023-10-17 PROBLEM — R11.10 VOMITING: Status: ACTIVE | Noted: 2023-04-02

## 2023-10-17 LAB
ALBUMIN SERPL BCP-MCNC: 3.8 G/DL (ref 3.5–5)
ALP SERPL-CCNC: 107 U/L (ref 34–104)
ALT SERPL W P-5'-P-CCNC: 33 U/L (ref 7–52)
ANION GAP SERPL CALCULATED.3IONS-SCNC: 7 MMOL/L
AST SERPL W P-5'-P-CCNC: 40 U/L (ref 13–39)
BASOPHILS # BLD AUTO: 0.04 THOUSANDS/ÂΜL (ref 0–0.1)
BASOPHILS NFR BLD AUTO: 1 % (ref 0–1)
BILIRUB SERPL-MCNC: 0.83 MG/DL (ref 0.2–1)
BUN SERPL-MCNC: 8 MG/DL (ref 5–25)
CALCIUM SERPL-MCNC: 8.8 MG/DL (ref 8.4–10.2)
CHLORIDE SERPL-SCNC: 102 MMOL/L (ref 96–108)
CO2 SERPL-SCNC: 29 MMOL/L (ref 21–32)
CREAT SERPL-MCNC: 0.97 MG/DL (ref 0.6–1.3)
EOSINOPHIL # BLD AUTO: 0.06 THOUSAND/ÂΜL (ref 0–0.61)
EOSINOPHIL NFR BLD AUTO: 1 % (ref 0–6)
ERYTHROCYTE [DISTWIDTH] IN BLOOD BY AUTOMATED COUNT: 11.9 % (ref 11.6–15.1)
GFR SERPL CREATININE-BSD FRML MDRD: 86 ML/MIN/1.73SQ M
GLUCOSE P FAST SERPL-MCNC: 86 MG/DL (ref 65–99)
GLUCOSE SERPL-MCNC: 86 MG/DL (ref 65–140)
HCT VFR BLD AUTO: 40.5 % (ref 36.5–49.3)
HGB BLD-MCNC: 13.7 G/DL (ref 12–17)
IMM GRANULOCYTES # BLD AUTO: 0.02 THOUSAND/UL (ref 0–0.2)
IMM GRANULOCYTES NFR BLD AUTO: 0 % (ref 0–2)
LYMPHOCYTES # BLD AUTO: 1.07 THOUSANDS/ÂΜL (ref 0.6–4.47)
LYMPHOCYTES NFR BLD AUTO: 15 % (ref 14–44)
MAGNESIUM SERPL-MCNC: 2 MG/DL (ref 1.9–2.7)
MCH RBC QN AUTO: 32.9 PG (ref 26.8–34.3)
MCHC RBC AUTO-ENTMCNC: 33.8 G/DL (ref 31.4–37.4)
MCV RBC AUTO: 97 FL (ref 82–98)
MONOCYTES # BLD AUTO: 0.54 THOUSAND/ÂΜL (ref 0.17–1.22)
MONOCYTES NFR BLD AUTO: 8 % (ref 4–12)
NEUTROPHILS # BLD AUTO: 5.25 THOUSANDS/ÂΜL (ref 1.85–7.62)
NEUTS SEG NFR BLD AUTO: 75 % (ref 43–75)
NRBC BLD AUTO-RTO: 0 /100 WBCS
PHOSPHATE SERPL-MCNC: 4.1 MG/DL (ref 2.7–4.5)
PLATELET # BLD AUTO: 234 THOUSANDS/UL (ref 149–390)
PMV BLD AUTO: 8.8 FL (ref 8.9–12.7)
POTASSIUM SERPL-SCNC: 4.4 MMOL/L (ref 3.5–5.3)
PROT SERPL-MCNC: 6.4 G/DL (ref 6.4–8.4)
RBC # BLD AUTO: 4.16 MILLION/UL (ref 3.88–5.62)
SODIUM SERPL-SCNC: 138 MMOL/L (ref 135–147)
WBC # BLD AUTO: 6.98 THOUSAND/UL (ref 4.31–10.16)

## 2023-10-17 PROCEDURE — 84100 ASSAY OF PHOSPHORUS: CPT

## 2023-10-17 PROCEDURE — 99223 1ST HOSP IP/OBS HIGH 75: CPT | Performed by: INTERNAL MEDICINE

## 2023-10-17 PROCEDURE — 83735 ASSAY OF MAGNESIUM: CPT

## 2023-10-17 PROCEDURE — 80053 COMPREHEN METABOLIC PANEL: CPT

## 2023-10-17 PROCEDURE — 99254 IP/OBS CNSLTJ NEW/EST MOD 60: CPT | Performed by: PSYCHIATRY & NEUROLOGY

## 2023-10-17 PROCEDURE — 85025 COMPLETE CBC W/AUTO DIFF WBC: CPT

## 2023-10-17 PROCEDURE — 76705 ECHO EXAM OF ABDOMEN: CPT

## 2023-10-17 RX ORDER — DEXTROSE AND SODIUM CHLORIDE 5; .9 G/100ML; G/100ML
75 INJECTION, SOLUTION INTRAVENOUS CONTINUOUS
Status: DISCONTINUED | OUTPATIENT
Start: 2023-10-17 | End: 2023-10-18

## 2023-10-17 RX ORDER — LOPERAMIDE HYDROCHLORIDE 2 MG/1
2 CAPSULE ORAL 3 TIMES DAILY PRN
Status: DISCONTINUED | OUTPATIENT
Start: 2023-10-17 | End: 2023-10-18 | Stop reason: HOSPADM

## 2023-10-17 RX ORDER — LORAZEPAM 1 MG/1
2 TABLET ORAL ONCE
Status: COMPLETED | OUTPATIENT
Start: 2023-10-17 | End: 2023-10-17

## 2023-10-17 RX ORDER — LORAZEPAM 2 MG/ML
2 INJECTION INTRAMUSCULAR ONCE
Status: COMPLETED | OUTPATIENT
Start: 2023-10-17 | End: 2023-10-17

## 2023-10-17 RX ORDER — CHLORDIAZEPOXIDE HYDROCHLORIDE 10 MG/1
10 CAPSULE, GELATIN COATED ORAL EVERY 6 HOURS SCHEDULED
Status: DISCONTINUED | OUTPATIENT
Start: 2023-10-17 | End: 2023-10-17

## 2023-10-17 RX ORDER — ONDANSETRON 2 MG/ML
4 INJECTION INTRAMUSCULAR; INTRAVENOUS EVERY 6 HOURS PRN
Status: DISCONTINUED | OUTPATIENT
Start: 2023-10-17 | End: 2023-10-18 | Stop reason: HOSPADM

## 2023-10-17 RX ADMIN — LORAZEPAM 2 MG: 1 TABLET ORAL at 13:24

## 2023-10-17 RX ADMIN — Medication 100 MG: at 08:53

## 2023-10-17 RX ADMIN — DEXTROSE AND SODIUM CHLORIDE 75 ML/HR: 5; .9 INJECTION, SOLUTION INTRAVENOUS at 08:53

## 2023-10-17 RX ADMIN — SODIUM CHLORIDE 100 ML/HR: 0.9 INJECTION, SOLUTION INTRAVENOUS at 00:00

## 2023-10-17 RX ADMIN — MULTIPLE VITAMINS W/ MINERALS TAB 1 TABLET: TAB ORAL at 08:52

## 2023-10-17 RX ADMIN — LORAZEPAM 2 MG: 2 INJECTION INTRAMUSCULAR; INTRAVENOUS at 00:38

## 2023-10-17 RX ADMIN — PANTOPRAZOLE SODIUM 20 MG: 20 TABLET, DELAYED RELEASE ORAL at 16:25

## 2023-10-17 RX ADMIN — ESCITALOPRAM OXALATE 20 MG: 20 TABLET ORAL at 08:53

## 2023-10-17 RX ADMIN — FOLIC ACID 1 MG: 1 TABLET ORAL at 08:53

## 2023-10-17 RX ADMIN — LISINOPRIL 40 MG: 20 TABLET ORAL at 08:52

## 2023-10-17 RX ADMIN — LOPERAMIDE HYDROCHLORIDE 2 MG: 2 CAPSULE ORAL at 17:15

## 2023-10-17 NOTE — QUICK NOTE
Patient seen and examined at bedside this morning. Reports improvement in his abdominal pain, denies any episodes of nausea or vomiting. Did report a few episodes of diarrhea. Will plan to continue the patient on dextrose and normal saline, as well as CIWA protocol. Psychiatry consult placed due to patient's anxiety, depression, and distress over his social situation. Toxicology consult also placed. Continue to monitor for any signs or symptoms of withdrawal including tachycardia, tremor, diaphoresis, nausea or vomiting.

## 2023-10-17 NOTE — PLAN OF CARE
Problem: Potential for Falls  Goal: Patient will remain free of falls  Description: INTERVENTIONS:  - Educate patient/family on patient safety including physical limitations  - Instruct patient to call for assistance with activity   - Consult OT/PT to assist with strengthening/mobility   - Keep Call bell within reach  - Keep bed low and locked with side rails adjusted as appropriate  - Keep care items and personal belongings within reach  - Initiate and maintain comfort rounds  - Make Fall Risk Sign visible to staff  - Offer Toileting every  Hours, in advance of need  - Initiate/Maintain alarm  - Obtain necessary fall risk management equipment:  - Apply yellow socks and bracelet for high fall risk patients  - Consider moving patient to room near nurses station  Outcome: Progressing     Problem: Nutrition/Hydration-ADULT  Goal: Nutrient/Hydration intake appropriate for improving, restoring or maintaining nutritional needs  Description: Monitor and assess patient's nutrition/hydration status for malnutrition. Collaborate with interdisciplinary team and initiate plan and interventions as ordered. Monitor patient's weight and dietary intake as ordered or per policy. Utilize nutrition screening tool and intervene as necessary. Determine patient's food preferences and provide high-protein, high-caloric foods as appropriate.    INTERVENTIONS:  - Monitor oral intake, urinary output, labs, and treatment plans  - Assess nutrition and hydration status and recommend course of action  - Evaluate amount of meals eaten  - Assist patient with eating if necessary   - Allow adequate time for meals  - Recommend/ encourage appropriate diets, oral nutritional supplements, and vitamin/mineral supplements  - Order, calculate, and assess calorie counts as needed  - Recommend, monitor, and adjust tube feedings and TPN/PPN based on assessed needs  - Assess need for intravenous fluids  - Provide specific nutrition/hydration education as appropriate  - Include patient/family/caregiver in decisions related to nutrition  Outcome: Progressing

## 2023-10-17 NOTE — ASSESSMENT & PLAN NOTE
Pt is known alcoholic, presented due to symptoms of withdrawal such as tremors, nausea, vomiting   Drinks one bottle of wine daily for the past 2 days, last drink today morning   Pt has been recently admitted due to same reasons  Received zofran twice in ED along with valium X2, with fluids  Elevated LFT's    PLAN:  [facility] protocol   Folic acid and thiamine supplementation  Fluids 100ml/hr   Abdominal US

## 2023-10-17 NOTE — H&P
[address] [facility]  H&P  Name: Bennie Goldmann 63 y.o. male I MRN: [id number]  Unit/Bed#: ED-16 I Date of Admission: 10/16/2023   Date of Service: 10/16/2023 I Hospital Day: 0      Assessment/Plan   Alcohol use disorder, severe, dependence ([address])  Assessment & Plan  Pt is known alcoholic, presented due to symptoms of withdrawal such as tremors, nausea, vomiting   Drinks one bottle of wine daily for the past 2 days, last drink today morning   Pt has been recently admitted due to same reasons  Received zofran twice in ED along with valium X2, with fluids  Elevated LFT's    PLAN:  [facility] protocol   Folic acid and thiamine supplementation  Fluids 100ml/hr   Abdominal US        Alcoholic ketoacidosis  Assessment & Plan  Pt has elevated Lactic acid of 3.8  Beta- hydroxybutyrate levels of 2.9  Received 2 L of fluids in the ED    PLAN:  Continue fluids   Monitor lactic acid levels     Hypertension  Assessment & Plan  Continue lisinopril         VTE Pharmacologic Prophylaxis: VTE Score: 2 Low Risk (Score 0-2) - Encourage Ambulation. Code Status: Level 1 - Full Code   Discussion with family: Patient declined call to . Anticipated Length of Stay: Patient will be admitted on an observation basis with an anticipated length of stay of less than 2 midnights secondary to management of alcohol withdrawal.    Total Time Spent on Date of Encounter in care of patient: 45 mins. This time was spent on one or more of the following: performing physical exam; counseling and coordination of care; obtaining or reviewing history; documenting in the medical record; reviewing/ordering tests, medications or procedures; communicating with other healthcare professionals and discussing with patient's family/caregivers. Chief Complaint: Alcohol withdrawal     History of Present Illness:  Bennie Goldmann is a 63 y.o. male with a PMH of Alcohol use disorder who presents with vomiting, nausea and tremors.  Pt is a known alcoholic and has had recent admissions due to similar reason. Pt has been drinking one bottle per day for the past few day, last drink being today when he started to experience nausea and vomiting. Pt is a heavy drinker, has tried to quit few times. But due to some family reasons, he is not able to quit. Patient denies any seizures or syncope and also states that he has never had seizures or DTs with his previous withdrawals     Review of Systems:   Review of Systems   Constitutional:  Positive for appetite change and diaphoresis. HENT: Negative. Eyes: Negative. Respiratory:  Positive for shortness of breath. Cardiovascular: Negative. Gastrointestinal:  Positive for abdominal distention, abdominal pain, nausea and vomiting. Endocrine: Negative. Genitourinary: Negative. Musculoskeletal: Negative. Skin: Negative. Allergic/Immunologic: Negative. Neurological:  Positive for tremors. Negative for seizures. Hematological: Negative. Psychiatric/Behavioral: Negative. Past Medical and Surgical History:   Past Medical History:   Diagnosis Date    GERD (gastroesophageal reflux disease)     Hypertension        History reviewed. No pertinent surgical history. Meds/Allergies:  Prior to Admission medications    Medication Sig Start Date End Date Taking?  Authorizing Provider   escitalopram (LEXAPRO) 20 mg tablet Take 1 tablet by mouth daily 2/21/23   Historical Provider, MD   folic acid (FOLVITE) 1 mg tablet Take 1 tablet (1 mg total) by mouth daily Do not start before October 3, 2023. 10/3/23   Marvel Curiel PA-C   lisinopril (ZESTRIL) 40 mg tablet Take 1 tablet (40 mg total) by mouth daily 4/4/23   Deneen Velasquez PA-C   omeprazole (PriLOSEC) 40 MG capsule Take 40 mg by mouth 2 (two) times a day 12/5/22   Historical Provider, MD   thiamine 100 MG tablet Take 1 tablet (100 mg total) by mouth daily Do not start before October 3, 2023. 10/3/23   ANNEMARIE Sanderson have reviewed home medications with patient personally. Allergies: Allergies   Allergen Reactions    Cefdinir Rash       Social History:  Marital Status: Single   Occupation: N/a  Patient Pre-hospital Living Situation: Home  Patient Pre-hospital Level of Mobility: walks  Patient Pre-hospital Diet Restrictions: None  Substance Use History:   Social History     Substance and Sexual Activity   Alcohol Use Yes    Comment: up to a handle a day. Social History     Tobacco Use   Smoking Status Never   Smokeless Tobacco Never     Social History     Substance and Sexual Activity   Drug Use Never       Family History:  History reviewed. No pertinent family history. Physical Exam:     Vitals:   Blood Pressure: 140/83 (10/16/23 2245)  Pulse: 105 (10/16/23 2245)  Temperature: 98.4 °F (36.9 °C) (10/16/23 1952)  Temp Source: Oral (10/16/23 1952)  Respirations: (!) 24 (10/16/23 1936)  Weight - Scale: 104 kg (230 lb 6.1 oz) (10/16/23 1936)  SpO2: 93 % (10/16/23 2245)     Physical Exam  Constitutional:       General: He is in acute distress. Appearance: He is obese. HENT:      Head: Normocephalic and atraumatic. Right Ear: External ear normal.      Left Ear: External ear normal.      Nose: Nose normal.      Mouth/Throat:      Mouth: Mucous membranes are moist.      Pharynx: Oropharynx is clear. Eyes:      Extraocular Movements: Extraocular movements intact. Conjunctiva/sclera: Conjunctivae normal.      Pupils: Pupils are equal, round, and reactive to light. Cardiovascular:      Rate and Rhythm: Tachycardia present. Pulses: Normal pulses. Heart sounds: Normal heart sounds. Pulmonary:      Effort: Respiratory distress present. Breath sounds: Normal breath sounds. Abdominal:      General: Bowel sounds are normal. There is distension. Tenderness: There is no abdominal tenderness. Musculoskeletal:         General: Normal range of motion. Cervical back: Normal range of motion. Skin:     General: Skin is warm. Capillary Refill: Capillary refill takes less than 2 seconds. Neurological:      General: No focal deficit present. Mental Status: He is alert and oriented to person, place, and time. Additional Data:     Lab Results:  Results from last 7 days   Lab Units 10/16/23  1954   WBC Thousand/uL 11.36*   HEMOGLOBIN g/dL 17.8*   HEMATOCRIT % 49.7*   PLATELETS Thousands/uL 506*   NEUTROS PCT % 83*   LYMPHS PCT % 11*   MONOS PCT % 4   EOS PCT % 0     Results from last 7 days   Lab Units 10/16/23  1954   SODIUM mmol/L 135   POTASSIUM mmol/L 4.8   CHLORIDE mmol/L 91*   CO2 mmol/L 24   BUN mg/dL 6   CREATININE mg/dL 1.04   ANION GAP mmol/L 20   CALCIUM mg/dL 10.4*   ALBUMIN g/dL 5.3*   TOTAL BILIRUBIN mg/dL 1.00   ALK PHOS U/L 169*   ALT U/L 56*   AST U/L 81*   GLUCOSE RANDOM mg/dL 106                 Results from last 7 days   Lab Units 10/16/23  2038   LACTIC ACID mmol/L 3.8*       Lines/Drains:  Invasive Devices       Peripheral Intravenous Line  Duration             Peripheral IV 10/16/23 Distal;Left;Upper;Ventral (anterior) Arm <1 day                        Imaging: Reviewed radiology reports from this admission including: chest xray  US abdomen complete    (Results Pending)       EKG and Other Studies Reviewed on Admission:   EKG: Sinus Tachycardia. . ** Please Note: This note has been constructed using a voice recognition system.  **

## 2023-10-17 NOTE — ED PROVIDER NOTES
History  Chief Complaint   Patient presents with    Alcohol Intoxication     Pt states he was here about a week and a half ago and quit drinking at that time. States he began drinking yesterday after the passing of his dog. Pt states his last drink was a glass of wine today at 1300. Reports has not eatin in a few days. +NVD, +HA. Patient is a 63year old male with history of alcohol use disorder who presented to the ED for vomiting, tremors, tremulousness. Patient was seen here at [facility] ED at the beginning of this month for very similar symptoms and was found to have alcoholic ketoacidosis in addition to withdrawals. He stated that afterwards he stopped drinking for a little while but picked back up a few days ago after his dog . Had last drink today at noon and since then immedietly started feeling nausea and has thrown up 10+ times and then started dry heaving. States that he feels similar to how he did when he had the alcoholic ketoacidosis recently. Patient states that he has been drinking heavily for several decades with some intermittent periods of sobriety. Patient was also admitted to detox unit this last April but started drinking again shortly after which he attributes to stressors in his life including losing his job and his father being diagnosed with cancer. Patient denies any seizures or syncope and also states that he has never had seizures or DTs with his previous withdrawals. Prior to Admission Medications   Prescriptions Last Dose Informant Patient Reported? Taking?   escitalopram (LEXAPRO) 20 mg tablet   Yes No   Sig: Take 1 tablet by mouth daily   folic acid (FOLVITE) 1 mg tablet   No No   Sig: Take 1 tablet (1 mg total) by mouth daily Do not start before October 3, 2023.    lisinopril (ZESTRIL) 40 mg tablet   No No   Sig: Take 1 tablet (40 mg total) by mouth daily   omeprazole (PriLOSEC) 40 MG capsule   Yes No   Sig: Take 40 mg by mouth 2 (two) times a day   thiamine 100 MG tablet   No No   Sig: Take 1 tablet (100 mg total) by mouth daily Do not start before October 3, 2023. Facility-Administered Medications: None       Past Medical History:   Diagnosis Date    GERD (gastroesophageal reflux disease)     Hypertension        History reviewed. No pertinent surgical history. History reviewed. No pertinent family history. I have reviewed and agree with the history as documented. E-Cigarette/Vaping    E-Cigarette Use Never User      E-Cigarette/Vaping Substances     Social History     Tobacco Use    Smoking status: Never    Smokeless tobacco: Never   Vaping Use    Vaping Use: Never used   Substance Use Topics    Alcohol use: Yes     Comment: up to a handle a day. Drug use: Never        Review of Systems   All other systems reviewed and are negative. Review of Systems  Constitutional: Positive for chills, diaphoresis and fatigue. HENT: Negative. Respiratory: Negative for shortness of breath. Negative for cough. Cardiovascular: Negative for chest pain  Gastrointestinal: Positive for nausea and vomiting. Genitourinary: Negative for dysuria and urgency. Skin: Negative for color change and rash. Neurological: Positive for headaches. Negative for syncope. Psychiatric/Behavioral: Positive for agitation. The patient is nervous/anxious.     Physical Exam  ED Triage Vitals   Temperature Pulse Respirations Blood Pressure SpO2   10/16/23 1952 10/16/23 1936 10/16/23 1936 10/16/23 1936 10/16/23 1936   98.4 °F (36.9 °C) (!) 130 (!) 24 (!) 167/123 98 %      Temp Source Heart Rate Source Patient Position - Orthostatic VS BP Location FiO2 (%)   10/16/23 1952 10/16/23 1936 10/16/23 1936 10/16/23 1936 --   Oral Monitor Sitting Left arm       Pain Score       10/17/23 0000       No Pain             Orthostatic Vital Signs  Vitals:    10/17/23 0100 10/17/23 0500 10/17/23 0700 10/17/23 1232   BP: 129/97  149/86 165/99   Pulse: 89 63 74 92   Patient Position - Orthostatic VS: Physical Exam  Physical Exam  Vitals and nursing note reviewed. Constitutional:       General: He is in acute distress. Appearance: Normal appearance. Comments: Patient appears anxious and mildly agitated   HENT:      Head: Normocephalic and atraumatic. Nose: Nose normal.      Mouth/Throat:      Mouth: Mucous membranes are dry. Pharynx: Oropharynx is clear. Comments: Mild tongue fasciculations  Eyes:      Extraocular Movements: Extraocular movements intact. Conjunctiva/sclera: Conjunctivae normal.      Pupils: Pupils are equal, round, and reactive to light. Cardiovascular:      Rate and Rhythm: Regular rhythm. Tachycardia present. Pulses: Normal pulses. Heart sounds: Normal heart sounds. Pulmonary:      Breath sounds: Normal breath sounds. Comments: Patient appears mildly tachynpnic  Abdominal:      General: Abdomen is flat. Bowel sounds are normal.      Palpations: Abdomen is soft. Skin:     Capillary Refill: Capillary refill takes less than 2 seconds. Neurological:      General: No focal deficit present. Mental Status: He is alert and oriented to person, place, and time. Comments: Significant tremulousness noted, intention tremor and resting tremor.    ED Medications  Medications   escitalopram (LEXAPRO) tablet 20 mg (20 mg Oral Given 10/17/23 0853)   lisinopril (ZESTRIL) tablet 40 mg (40 mg Oral Given 10/17/23 0852)   pantoprazole (PROTONIX) EC tablet 20 mg (20 mg Oral Not Given 10/17/23 0501)   thiamine tablet 100 mg (100 mg Oral Given 60/12/35 6910)   folic acid (FOLVITE) tablet 1 mg (1 mg Oral Given 10/17/23 0853)   multivitamin-minerals (CENTRUM) tablet 1 tablet (1 tablet Oral Given 10/17/23 0852)   dextrose 5 % and sodium chloride 0.9 % infusion (75 mL/hr Intravenous New Bag 10/17/23 0853)   ondansetron (ZOFRAN) injection 4 mg (has no administration in time range)   sodium chloride 0.9 % bolus 1,000 mL (0 mL Intravenous Stopped 10/16/23 2048)   ondansetron (ZOFRAN) injection 4 mg (4 mg Intravenous Given 10/16/23 1948)   diazepam (VALIUM) injection 10 mg (10 mg Intravenous Given 10/16/23 2033)   sodium chloride 0.9 % bolus 1,000 mL (0 mL Intravenous Stopped 10/16/23 2237)   diazepam (VALIUM) injection 10 mg (10 mg Intravenous Given 10/16/23 2151)   ondansetron (ZOFRAN) injection 4 mg (4 mg Intravenous Given 10/16/23 2155)   metoclopramide (REGLAN) injection 10 mg (10 mg Intravenous Given 10/16/23 2318)   magnesium sulfate 2 g/50 mL IVPB (premix) 2 g (0 g Intravenous Stopped 10/17/23 0141)   LORazepam (ATIVAN) injection 2 mg (2 mg Intravenous Given 10/17/23 0038)   LORazepam (ATIVAN) tablet 2 mg (2 mg Oral Given 10/17/23 1324)       Diagnostic Studies  Results Reviewed       Procedure Component Value Units Date/Time    Comprehensive metabolic panel [[id number]]  (Abnormal) Collected: 10/17/23 0536    Lab Status: Final result Specimen: Blood from Arm, Left Updated: 10/17/23 0748     Sodium 138 mmol/L      Potassium 4.4 mmol/L      Chloride 102 mmol/L      CO2 29 mmol/L      ANION GAP 7 mmol/L      BUN 8 mg/dL      Creatinine 0.97 mg/dL      Glucose 86 mg/dL      Glucose, Fasting 86 mg/dL      Calcium 8.8 mg/dL      AST 40 U/L      ALT 33 U/L      Alkaline Phosphatase 107 U/L      Total Protein 6.4 g/dL      Albumin 3.8 g/dL      Total Bilirubin 0.83 mg/dL      eGFR 86 ml/min/1.73sq m     Narrative:      [place] guidelines for Chronic Kidney Disease (CKD):     Stage 1 with normal or high GFR (GFR > 90 mL/min/1.73 square meters)    Stage 2 Mild CKD (GFR = 60-89 mL/min/1.73 square meters)    Stage 3A Moderate CKD (GFR = 45-59 mL/min/1.73 square meters)    Stage 3B Moderate CKD (GFR = 30-44 mL/min/1.73 square meters)    Stage 4 Severe CKD (GFR = 15-29 mL/min/1.73 square meters)    Stage 5 End Stage CKD (GFR <15 mL/min/1.73 square meters)  Note: GFR calculation is accurate only with a steady state creatinine    Magnesium [[id number]]  (Normal) Collected: 10/17/23 0536    Lab Status: Final result Specimen: Blood from Arm, Left Updated: 10/17/23 0748     Magnesium 2.0 mg/dL     Lactic acid 2 Hours [[id number]]  (Normal) Collected: 10/16/23 2315    Lab Status: Final result Specimen: Blood from Arm, Left Updated: 10/16/23 2351     LACTIC ACID 0.7 mmol/L     Narrative:      Result may be elevated if tourniquet was used during collection. Lactic acid, plasma (w/reflex if result > 2.0) [[id number]]  (Abnormal) Collected: 10/16/23 2038    Lab Status: Final result Specimen: Blood from Arm, Left Updated: 10/16/23 2116     LACTIC ACID 3.8 mmol/L     Narrative:      Result may be elevated if tourniquet was used during collection.     Lipase [[id number]]  (Normal) Collected: 10/16/23 1954    Lab Status: Final result Specimen: Blood from Arm, Left Updated: 10/16/23 2114     Lipase 11 u/L     Magnesium [[id number]]  (Abnormal) Collected: 10/16/23 1954    Lab Status: Final result Specimen: Blood from Arm, Left Updated: 10/16/23 2114     Magnesium 1.4 mg/dL     Beta Hydroxybutyrate [[id number]]  (Abnormal) Collected: 10/16/23 2038    Lab Status: Final result Specimen: Blood from Arm, Left Updated: 10/16/23 2054     BETA-HYDROXYBUTYRATE 2.9 mmol/L     Comprehensive metabolic panel [[id number]]  (Abnormal) Collected: 10/16/23 1954    Lab Status: Final result Specimen: Blood from Arm, Left Updated: 10/16/23 2022     Sodium 135 mmol/L      Potassium 4.8 mmol/L      Chloride 91 mmol/L      CO2 24 mmol/L      ANION GAP 20 mmol/L      BUN 6 mg/dL      Creatinine 1.04 mg/dL      Glucose 106 mg/dL      Calcium 10.4 mg/dL      AST 81 U/L      ALT 56 U/L      Alkaline Phosphatase 169 U/L      Total Protein 9.2 g/dL      Albumin 5.3 g/dL      Total Bilirubin 1.00 mg/dL      eGFR 79 ml/min/1.73sq m     Narrative:      [place] guidelines for Chronic Kidney Disease (CKD):     Stage 1 with normal or high GFR (GFR > 90 mL/min/1.73 square meters)    Stage 2 Mild CKD (GFR = 60-89 mL/min/1.73 square meters)    Stage 3A Moderate CKD (GFR = 45-59 mL/min/1.73 square meters)    Stage 3B Moderate CKD (GFR = 30-44 mL/min/1.73 square meters)    Stage 4 Severe CKD (GFR = 15-29 mL/min/1.73 square meters)    Stage 5 End Stage CKD (GFR <15 mL/min/1.73 square meters)  Note: GFR calculation is accurate only with a steady state creatinine    CBC and differential [[id number]]  (Abnormal) Collected: 10/16/23 1954    Lab Status: Final result Specimen: Blood from Arm, Left Updated: 10/16/23 2009     WBC 11.36 Thousand/uL      RBC 5.26 Million/uL      Hemoglobin 17.8 g/dL      Hematocrit 49.7 %      MCV 95 fL      MCH 33.8 pg      MCHC 35.8 g/dL      RDW 11.9 %      MPV 8.6 fL      Platelets 040 Thousands/uL      nRBC 0 /100 WBCs      Neutrophils Relative 83 %      Immat GRANS % 1 %      Lymphocytes Relative 11 %      Monocytes Relative 4 %      Eosinophils Relative 0 %      Basophils Relative 1 %      Neutrophils Absolute 9.51 Thousands/µL      Immature Grans Absolute 0.06 Thousand/uL      Lymphocytes Absolute 1.21 Thousands/µL      Monocytes Absolute 0.49 Thousand/µL      Eosinophils Absolute 0.02 Thousand/µL      Basophils Absolute 0.07 Thousands/µL                     right upper quadrant   Final Result by Rojelio Massey MD (10/17 1229)      Hepatomegaly and hepatic steatosis.       Workstation performed: [id number]               Procedures  ECG 12 Lead Documentation Only    Date/Time: 10/16/2023 8:30 PM    Performed by: Loretta Londono MD  Authorized by: Loretta Londono MD    Indications / Diagnosis:  Tachycardia  ECG reviewed by me, the ED Provider: yes    Patient location:  ED  Interpretation:     Interpretation: non-specific    Rate:     ECG rate:  128    ECG rate assessment: tachycardic    Rhythm:     Rhythm: sinus tachycardia    Ectopy:     Ectopy: none    QRS:     QRS axis:  Normal    QRS intervals:  Normal  Conduction:     Conduction: normal    ST segments: ST segments:  Normal  T waves:     T waves: normal          ED Course  ED Course as of 10/17/23 1325   Mon Oct 16, 2023   1948 2L NS given for AKA   1954 Breathalyzer ETOH level negative   2028 WBC(!): 11.36   2033 Valium 10mg given for ETOH withdrawal along with Zofran 4mg for nausea   2038 Lactic acid, plasma (w/reflex if result > 2.0)(!!)  Lactic Acid 3.8   2038 Beta Hydroxybutyrate(!)  Elevated, 2.9   2151 Additional Zofran and Valium given for refractory nausea and withdrawal   2318 Reglan given for persistent nausea                             SBIRT 22yo+      Flowsheet Row Most Recent Value   Initial Alcohol Screen: US AUDIT-C     1. How often do you have a drink containing alcohol? 6  [has only had to wine to drink the past few days] Filed at: 10/16/2023 2143   2. How many drinks containing alcohol do you have on a typical day you are drinking? 6 Filed at: 10/16/2023 2143   3a. Male UNDER 65: How often do you have five or more drinks on one occasion? 6 Filed at: 10/16/2023 2143   Audit-C Score 18 Filed at: 10/16/2023 2143   Full Alcohol Screen: US AUDIT    4. How often during the last year have you found that you were not able to stop drinking once you had started? 4 Filed at: 10/16/2023 2143   5. How often during past year have you failed to do what was normally expected of you because of drinking? 1 Filed at: 10/16/2023 2143   6. How often in past year have you needed a first drink in the morning to get yourself going after a heavy drinking session? 2 Filed at: 10/16/2023 2143   7. How often in past year have you had feeling of guilt or remorse after drinking? 4 Filed at: 10/16/2023 2143   8. How often in past year have you been unable to remember what happened night before because you had been drinking? 0 Filed at: 10/16/2023 2143   9. Have you or someone else been injured as a result of your drinking? 0 Filed at: 10/16/2023 2143   10.  Has a relative, friend, doctor or other health worker been concerned about your drinking and suggested you cut down? 4 Filed at: 10/16/2023 2143   AUDIT Total Score 33 Filed at: 10/16/2023 2143   MILDRED: How many times in the past year have you. .. Used an illegal drug or used a prescription medication for non-medical reasons? Never Filed at: 10/16/2023 2143                  Medical Decision Making  Patient is a 63year old male with history of alcohol use disorder who presented to the ED for vomiting, tremors, tremulousness. Patient was seen here at [facility] ED at the beginning of this month for very similar symptoms and was found to have alcoholic ketoacidosis in addition to withdrawals. He stated that afterwards he stopped drinking for a little while but picked back up a few days ago after his dog . Had last drink today at noon and since then immedietly started feeling nausea and has thrown up 10+ times and then started dry heaving. States that he feels similar to how he did when he had the alcoholic ketoacidosis recently. Patient states that he has been drinking heavily for several decades with some intermittent periods of sobriety. Patient was also admitted to detox unit this last April but started drinking again shortly after which he attributes to stressors in his life including losing his job and his father being diagnosed with cancer. Patient denies any seizures or syncope and also states that he has never had seizures or DTs with his previous withdrawals. Patient appeared with very similar presentation to last time I treated him in [facility] ED at the beginning of the month, was suspecting similar AKA+withdrawal pathology. Beta hydroxybutyrate was elevated 2.9, lactate 3.8, consistent with AKA. Patient was given 2L NS for this presentation. Breath ETOH level negative consistent with patient's withdrawal symptoms, patient was given Valium 10mg x2 for withdrawal which patient responded well to.  He was also given Zofran 4mg x2 and Reglan 10mg x1 for nausea, initial doses helped but patient needed additional medication for refractory nausea. He remained mildly tachycardic to low 100s HR but hemodynamically stable and non-toxic appearing for duration of ED stay and was admitted to medicine. Amount and/or Complexity of Data Reviewed  Labs: ordered. Decision-making details documented in ED Course. Risk  Prescription drug management. Decision regarding hospitalization. Disposition  Final diagnoses:   Alcoholic ketoacidosis   Alcohol withdrawal ([address])     Time reflects when diagnosis was documented in both MDM as applicable and the Disposition within this note       Time User Action Codes Description Comment    10/16/2023 11:15 PM Lonchandral Lecher Add [O45.92] Alcoholic ketoacidosis     10/16/2023 11:15 PM Lonzell Lecher Add [F10.939] Alcohol withdrawal ([address])     10/17/2023 10:12 AM [address] [place], [place] [address]Mary [F33.9] Episode of recurrent major depressive disorder, unspecified depression episode severity ([address])     10/17/2023 10:12 AM [address] [place], [place] [address]Mary [F10.20] Alcohol use disorder, severe, dependence [facility])           ED Disposition       ED Disposition   Admit    Condition   Stable    Date/Time   Mon Oct 16, 2023 7203    Comment   Case was discussed with LILY and the patient's admission status was agreed to be Admission Status: observation status to the service of Dr. Krishna Sarkar. Follow-up Information    None         Current Discharge Medication List        CONTINUE these medications which have NOT CHANGED    Details   escitalopram (LEXAPRO) 20 mg tablet Take 1 tablet by mouth daily      folic acid (FOLVITE) 1 mg tablet Take 1 tablet (1 mg total) by mouth daily Do not start before October 3, 2023.   Refills: 0    Associated Diagnoses: Alcohol withdrawal syndrome without complication (HCC)      lisinopril (ZESTRIL) 40 mg tablet Take 1 tablet (40 mg total) by mouth daily  Qty: 30 tablet, Refills: 0    Associated Diagnoses: Essential hypertension      omeprazole (PriLOSEC) 40 MG capsule Take 40 mg by mouth 2 (two) times a day      thiamine 100 MG tablet Take 1 tablet (100 mg total) by mouth daily Do not start before October 3, 2023. Refills: 0    Associated Diagnoses: Alcohol withdrawal syndrome without complication (HCC)           No discharge procedures on file. PDMP Review       None             ED Provider  Attending physically available and evaluated [place]. I managed the patient along with the ED Attending.     Electronically Signed by           Radha Enrique MD  10/17/23 8748       Radha Enrique MD  10/17/23 9244

## 2023-10-17 NOTE — ASSESSMENT & PLAN NOTE
POA pt has elevated Lactic acid of 3.8  Beta- hydroxybutyrate levels of 2.9  Received 2 L of fluids in the ED and subsequently switched to with IV dextrose NS  Likely in the setting of nausea, vomiting, loose stools with associated alcohol abuse  Resolved

## 2023-10-17 NOTE — ASSESSMENT & PLAN NOTE
Pt is known alcoholic, previously seen [facility] detox. At baseline drinks 1 bottle of wine daily last drink morning of admission. POA presented due to symptoms of withdrawal such as tremors, nausea, vomiting   Drinks one bottle of wine daily for the past 2 days, last drink today morning   Upon admission noted with elevated LFTs, alcoholic ketoacidosis without associated nausea, vomiting and abdominal pain  Ultrasound that showed hepatomegaly with hepatic steatosis likely in the setting of chronic alcohol use. Toxicology consulted recommended be monitoring  over the past 24 hours CIWA score noted 1  no benzodiazepines given overnight. Alcohol use disorder severe dependence likely in the setting of multiple social stressors for discussions  Psychiatry also consulted patient without suicidal ideation homicidal ideation recommendations to switch patient to Zoloft at the time patient declined given was willing stable on current MDD treatment.  -Evaluated patient at baseline referrals and resources provided patient previously established outpatient rehab hopeful to continue pursue outpatient follow-up.     Plan  Alcohol cessation counseling provided  Continue with folic acid, thiamine supplementation as well as multivitamin  As per toxicology okay to discharge patient on naltrexone 50 mg once a day  Follow-up with outpatient rehab resources as provided by rishabh

## 2023-10-17 NOTE — CASE MANAGEMENT
Case Management Progress Note    Patient name Ishan Vaca  Location S /S -75 MRN [id number]  : 1965 Date 10/17/2023       LOS (days): 0  Geometric Mean LOS (GMLOS) (days):   Days to GMLOS:        OBJECTIVE:        Current admission status: Inpatient  Preferred Pharmacy:   [facility]/pharmacy [id number] [place] - [address]  [address] [facility]  [address] [facility]  Phone: [phone] Fax: [address] [place] [address], [address] [place] [address] [address] [address]  Phone: [phone] Fax: [phone]    Primary Care Provider: Lorne Blankenship MD    Primary Insurance:   Secondary Insurance:     PROGRESS NOTE:    Call made to Olamide Sykes at [facility] with referral - will try to stop by to see patient today vs tomorrow AM.

## 2023-10-17 NOTE — PLAN OF CARE
Problem: Potential for Falls  Goal: Patient will remain free of falls  Description: INTERVENTIONS:  - Educate patient/family on patient safety including physical limitations  - Instruct patient to call for assistance with activity   - Consult OT/PT to assist with strengthening/mobility   - Keep Call bell within reach  - Keep bed low and locked with side rails adjusted as appropriate  - Keep care items and personal belongings within reach  - Initiate and maintain comfort rounds  - Make Fall Risk Sign visible to staff  - Offer Toileting every 2 Hours, in advance of need  - Initiate/Maintain bed alarm  - Obtain necessary fall risk management equipment: alarms  - Apply yellow socks and bracelet for high fall risk patients  - Consider moving patient to room near nurses station  Outcome: Progressing     Problem: Nutrition/Hydration-ADULT  Goal: Nutrient/Hydration intake appropriate for improving, restoring or maintaining nutritional needs  Description: Monitor and assess patient's nutrition/hydration status for malnutrition. Collaborate with interdisciplinary team and initiate plan and interventions as ordered. Monitor patient's weight and dietary intake as ordered or per policy. Utilize nutrition screening tool and intervene as necessary. Determine patient's food preferences and provide high-protein, high-caloric foods as appropriate.      INTERVENTIONS:  - Monitor oral intake, urinary output, labs, and treatment plans  - Assess nutrition and hydration status and recommend course of action  - Evaluate amount of meals eaten  - Assist patient with eating if necessary   - Allow adequate time for meals  - Recommend/ encourage appropriate diets, oral nutritional supplements, and vitamin/mineral supplements  - Order, calculate, and assess calorie counts as needed  - Recommend, monitor, and adjust tube feedings and TPN/PPN based on assessed needs  - Assess need for intravenous fluids  - Provide specific nutrition/hydration education as appropriate  - Include patient/family/caregiver in decisions related to nutrition  Outcome: Progressing

## 2023-10-17 NOTE — ASSESSMENT & PLAN NOTE
Pt has elevated Lactic acid of 3.8  Beta- hydroxybutyrate levels of 2.9  Received 2 L of fluids in the ED    PLAN:  Continue fluids   Monitor lactic acid levels

## 2023-10-17 NOTE — ASSESSMENT & PLAN NOTE
POA patient was expressing anxiety and distress over his current social situation.   He recently lost his job, lost his health insurance, is taking care of his father with bladder cancer, and is doing with grievance over the loss of his dog.  -Psychiatry consult placed patient was noted not suicidal no homicidal ideation  Recommendations by psychiatric consultant to switch patient to Zoloft however patient refer  Patient continue with continue home dose of Lexapro  Follow-up with primary care provider within 1 week

## 2023-10-17 NOTE — CONSULTS
Consultation - Ace Travis 63 y.o. male MRN: [id number]  Unit/Bed#: S -01 Encounter: [id number]      Assessment/Plan     Present on Admission:   Alcohol use disorder, severe, dependence ([address])   Hypertension    Assessment:  Unspecified Depressive Disorder  Alcohol dependence    Treatment Plan:  Planned Medication Changes:    T/C change from lexapro to zoloft 50mg Qday (patient not sure if he wants to change at this time given he is just starting to feel better regarding his withdrawal)  Discussed IOP, [facility] referral. Had been referred to [facility] after detox admission and would like referral for outpatient  No indication for inpatient psychiatry at this time  Was started on naltrexone at Detox and found it helpful, encouraged to continue (he as some at home still)  Cont CIWA, MVI and Thiamine  Current Medications:     Current Facility-Administered Medications   Medication Dose Route Frequency Provider Last Rate    dextrose 5 % and sodium chloride 0.9 %  75 mL/hr Intravenous Continuous Alfreda Connell MD 75 mL/hr (10/17/23 0853)    escitalopram  20 mg Oral Daily Abdelrahman Hyde MD      folic acid  1 mg Oral Daily Abdelrahman Hyde MD      lisinopril  40 mg Oral Daily Abdelrahman Hyde MD      multivitamin-minerals  1 tablet Oral Daily Abdelrahman Hyde MD      pantoprazole  20 mg Oral BID AC Abdelrahman Hyde MD      thiamine  100 mg Oral Daily Abdelrahman Hyde MD         Risks / Benefits of Treatment:    Risks, benefits, and possible side effects of medications explained to patient and patient verbalizes understanding.       Other treatment modalities recommended as indicated:    outpatient referral  indication for drug and alcohol treatment    Inpatient consult to Psychiatry  Consult performed by: Zeeshan Cardenas MD  Consult ordered by: Alfreda Connell MD      Physician Requesting Consult: Viridiana Valadez MD  Principal Problem:Alcohol use disorder, severe, dependence [facility])    Reason for Consult: worsening depression, anxiety, and alcohol abuse    History of Present Illness      Patient is a 63 y.o. male with a history of  alcohol withdrawal  who was admitted to the medical service on 10/16/2023 due to worsening withdrawal and depressed and anxious mood. Psychiatric consultation was requested due to depressive symptoms and alcohol abuse. Psychiatric symptoms prior to consultation included alcohol withdrawal, anxiety attacks, and feeling depressed. Onset of symptoms was abrupt starting 2 days ago with rapidly worsening course since that time. Psychosocial stressors included drug and alcohol use problems, occupational problems, and illness in father and death of dog . On initial psychiatric consultation March Ortiz he reports "I am ok, just under a lot of stress and anxiety". Recent death of his mother to cancer with increased alcohol use to cope. Patient's father currently being treated for bladder cancer and he lost his job 4 months ago. Preceding this admission is the death of his dog 2 days ago. He reports drinking "hard alcohol" heavily and did abstain for 3 months after detox admission. Began to drink wine instead this time (large bottles) but was experiencing withdrawal. Just received Ativan which provided relief for his anxiety. He wants to get better for his family and his health and denies any recent thoughts of self harm or suicide. When felt that way during time of divorce used coping skills. On further questioning about his alcohol use he states:  C- yes tried to cut back from hard liquor to wine  A- denies being annoyed but would argue with girlfriend about it  G- yes, guilt over health and "letting [his] family down"  E- denies  Denies ever being formally diagnosed with depression, expresses guilt over thinking about if he was a good enough son or good enough father. Lexapro was started by PCP for anxiety but he denies ever receiving a formal anxiety diagnosis.  Patient is considering switching Lexapro to Zoloft. Psychiatric Review Of Systems:  sleep: yes, poor sleep, sleeping 3 to 4 hours nightly, and using melatonin  appetite changes: yes, worse recently from withdrawal (did not eat for 2.5 days) but was gaining weight from alcohol  energy/anergy: yes, decreased energy  interest/pleasure/anhedonia: yes, gave up golf  concentration: yes, decreased  somatic symptoms: yes, withdrawal sxs  anxiety/panic: yes  guilty/hopeless: yes  self injurious behavior/risky behavior: excessive alcohol use    Historical Information     Past Psychiatric History:   Psychiatric Hospitalizations:   No history of past inpatient psychiatric admissions  Outpatient Treatment History:   no  Suicide Attempts:   None.  Did have prior thoughts to hang self during divorce, hung rope but did not progress further  History of self-harm:   None  Violence History:   None  Past Psychiatric medication trials: lexapro from PCP for anxiety    Substance Abuse History:  Use of Alcohol:  was recently drinking 1.5L wine daily, was drinking 1 bottle hard liquor prior to detox admission     recent clean time: 3 months after his detox stay  History of IP/OP rehabilitation program: hx of detox admission at [facility]  Smoking history:  used to have occasional cigar  Use of Caffeine:  cut back from 2-3 cups a day to 1 cup a week    Family Psychiatric History:   Denies    Social History:  Unemployed, 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  Has girlfriend that he describes as "supportive"  Helps care for his father along with his 3 sisters   with two adult children (21 yo male, 24 yo male)    Traumatic History:   Denies hx verbal, physical, or sexual abuse  No hx seizure  Denies hx traumatic head injury, struck head on a granite countertop while intoxicated in 5/2020 (no acute intracranial abnormality on CT) per chart review      Past Medical History:   Diagnosis Date    GERD (gastroesophageal reflux disease)     Hypertension        Medical Review Of Systems:    Review of Systems    Meds/Allergies     all current active meds have been reviewed  Allergies   Allergen Reactions    Cefdinir Rash       Objective     Vital signs in last 24 hours:  Temp:  [98 °F (36.7 °C)-98.4 °F (36.9 °C)] 98 °F (36.7 °C)  HR:  [] 74  Resp:  [18-24] 18  BP: (129-168)/() 149/86      Intake/Output Summary (Last 24 hours) at 10/17/2023 1139  Last data filed at 10/17/2023 0744  Gross per 24 hour   Intake 2773.33 ml   Output --   Net 2773.33 ml       Mental Status Evaluation:    Appearance:  age appropriate, casually dressed in shorts and T-shirt, well-groomed, obese, good eye contact   Behavior:  normal, cooperative   Speech:  normal pitch and normal volume   Mood:  anxious and depressed   Affect:  mood-congruent and appears tearful at times   Language: naming objects and repeating phrases   Thought Process:  normal   Associations intact associations   Thought Content:  normal   Perceptual Disturbances: None   Risk Potential: Suicidal Ideations none  Homicidal Ideations none and no access to firearms in the house  Potential for Aggression No   Sensorium:  person, place, situation, day of week, month of year, and year   Cognition:  recent and remote memory grossly intact   Consciousness:  alert and awake    Attention: attention span and concentration were age appropriate   Intellect: within normal limits   Fund of Knowledge: awareness of current events: intact   Insight:  fair   Judgment: fair   Muscle Strength:  Muscle Tone: In bed   in bed  Not assessed   Gait/Station: normal gait/station   Motor Activity: no abnormal movements       Lab Results: I have personally reviewed all pertinent laboratory/tests results.      Most Recent Labs:   Lab Results   Component Value Date    WBC 11.36 (H) 10/16/2023    RBC 5.26 10/16/2023    HGB 17.8 (H) 10/16/2023    HCT 49.7 (H) 10/16/2023     (H) 10/16/2023    RDW 11.9 10/16/2023 NEUTROABS 9.51 (H) 10/16/2023    SODIUM 138 10/17/2023    K 4.4 10/17/2023     10/17/2023    CO2 29 10/17/2023    BUN 8 10/17/2023    CREATININE 0.97 10/17/2023    GLUC 86 10/17/2023    GLUF 86 10/17/2023    CALCIUM 8.8 10/17/2023    AST 40 (H) 10/17/2023    ALT 33 10/17/2023    ALKPHOS 107 (H) 10/17/2023    TP 6.4 10/17/2023    ALB 3.8 10/17/2023    TBILI 0.83 10/17/2023    HGBA1C 5.0 11/12/2019    EAG 97 11/12/2019       Imaging Studies: XR chest 1 view portable    Result Date: 10/2/2023  Narrative: CHEST INDICATION:   Chest Pain, SOB. COMPARISON: CXR 4/2/2023. EXAM PERFORMED/VIEWS:  XR CHEST PORTABLE. FINDINGS: Cardiomediastinal silhouette normal. Lungs clear. No effusion or pneumothorax. Upper abdomen normal. Bones normal for age. Impression: No acute cardiopulmonary disease. Workstation performed: [id number]     EKG/Pathology/Other Studies:   Lab Results   Component Value Date    VENTRATE 128 10/01/2023    ATRIALRATE 128 10/01/2023    PRINT 136 10/01/2023    QRSDINT 86 10/01/2023    QTINT 312 10/01/2023    QTCINT 455 10/01/2023    PAXIS 56 10/01/2023    QRSAXIS 98 10/01/2023    TWAVEAXIS 26 10/01/2023        Code Status: Level 1 - Full Code  Advance Directive and Living Will:      Power of :    POLST:      Screenings:    1. Nutrition Screening  Nutrition Assessment (completed by Staff):      2. Pain Screening  Pain Screening: Pain Assessment  Pain Assessment Tool: 0-10  Pain Score: 0    3. Suicide ScreeningSuicide Risk Assessment completed by the Consultant: The following ratings are based on assessment at the time of the interview. Demographic risk factors include: ,  status, male, age: over 48 or older. Historical Risk Factors include: alcohol use. Recent Specific Risk Factors include: current anxiety symptoms, substance abuse. Protective Factors: no current suicidal ideation. Weapons: none.  The following steps have been taken to ensure weapons are properly secured: not applicable. Based on today's assessment, Guillaume Oakes presents the following risk of harm to self: minimal.    Counseling / Coordination of Care: Total floor / unit time spent today 60 minutes. Greater than 50% of total time was spent with the patient and / or family counseling and / or coordination of care.  A description of the counseling / coordination of care: medication, counseling

## 2023-10-17 NOTE — UTILIZATION REVIEW
Initial Clinical Review    Admission: Date/Time/Statement:   Admission Orders (From admission, onward)       Ordered        10/16/23 2716  Place in Observation  Once                          Orders Placed This Encounter   Procedures    Place in Observation     Standing Status:   Standing     Number of Occurrences:   1     Order Specific Question:   Level of Care     Answer:   Med Surg [16]     ED Arrival Information       Expected   -    Arrival   10/16/2023 19:02    Acuity   Emergent              Means of arrival   Walk-In    Escorted by   Self    Service   Hospitalist    Admission type   Emergency              Arrival complaint   vomiting/alcohol             Chief Complaint   Patient presents with    Alcohol Intoxication     Pt states he was here about a week and a half ago and quit drinking at that time. States he began drinking yesterday after the passing of his dog. Pt states his last drink was a glass of wine today at 1300. Reports has not eatin in a few days. +NVD, +HA. Initial Presentation: 63 y.o. male with a PMH of Alcohol use disorder who presents with vomiting, nausea and tremors. Pt is a known alcoholic and has had recent admissions due to similar reason. Pt has been drinking one bottle per day for the past few day, last drink being today when he started to experience nausea and vomiting. Pt is a heavy drinker, has tried to quit few times. But due to some family reasons, he is not able to quit. Patient denies any seizures or syncope and also states that he has never had seizures or DTs with his previous withdrawals. Plan: Observation for alcoholic use disorder, alcoholic ketoacidosis, HTN: CIWA protocol, folic acid, thiamine, IV fluids, abdominal US, IV fluids, monitor lactic acid levels, continue lisinopril.        ED Triage Vitals   Temperature Pulse Respirations Blood Pressure SpO2   10/16/23 1952 10/16/23 1936 10/16/23 1936 10/16/23 1936 10/16/23 1936   98.4 °F (36.9 °C) (!) 130 (!) 24 (!) 167/123 98 %      Temp Source Heart Rate Source Patient Position - Orthostatic VS BP Location FiO2 (%)   10/16/23 1952 10/16/23 1936 10/16/23 1936 10/16/23 1936 --   Oral Monitor Sitting Left arm       Pain Score       10/17/23 0000       No Pain          Wt Readings from Last 1 Encounters:   10/16/23 104 kg (230 lb 6.1 oz)     Additional Vital Signs:     Date/Time Temp Pulse Resp BP MAP (mmHg) SpO2 O2 Device   10/17/23 0700 98 °F (36.7 °C) 74 18 149/86 107 94 % --   10/17/23 0500 -- 63 -- -- -- -- --   10/17/23 0100 -- 89 -- 129/97 -- 94 % None (Room air)   10/17/23 0003 98.2 °F (36.8 °C) 97 18 150/100 117 96 % --   10/16/23 2312 -- -- -- -- -- 96 % None (Room air)   10/16/23 2245 -- 105 -- 140/83 108 93 % --   10/16/23 2215 -- 106 Abnormal  -- 134/85 103 92 % --   10/16/23 2200 -- 111 Abnormal  -- 148/93 112 93 % --   10/16/23 2145 -- 110 Abnormal  -- 165/101 Abnormal  127 94 % --   10/16/23 2133 -- 118 Abnormal  -- 165/101 Abnormal  -- -- --   10/16/23 2100 -- 104 -- 136/93 111 92 % --   10/16/23 2045 -- 104 -- 160/96 121 94 % --   10/16/23 2030 -- 106 Abnormal  -- 168/104 Abnormal  125 94 % --   10/16/23 2015 -- 111 Abnormal  -- 161/104 Abnormal  127 95 % --   10/16/23 2000 -- 113 Abnormal  -- 157/100 119 96 % --   10/16/23 1952 98.4 °F (36.9 °C) -- -- -- -- -- --   10/16/23 1949 -- 121 Abnormal  -- 155/96 -- -- --     CIWA-Ar Score    Row Name 10/17/23 0900 10/17/23 0700 10/17/23 0500 10/17/23 0100 10/17/23 0003   CIWA-Ar   BP -- 149/86  -LC -- 129/97  -/100  -DI   Pulse -- 74  -LC 63  -DS 89  -DS 97  -DI   Nausea and Vomiting 0  -BT -- 0  -DS 1  -DS 1  -DS   Tactile Disturbances 0  -BT -- 0  -DS 0  -DS 0  -DS   Tremor 0  -BT -- 1  -DS 1  -DS 2  -DS   Auditory Disturbances 0  -BT -- 0  -DS 0  -DS 0  -DS   Paroxysmal Sweats 0  -BT -- 1  -DS 1  -DS 1  -DS   Visual Disturbances 0  -BT -- 0  -DS 0  -DS 0  -DS   Anxiety 1  -BT -- 1  -DS 2  -DS 2  -DS   Headache, Fullness in Head 0  -BT -- 0  -DS 1  -DS 1  -DS Agitation 0  -BT -- 0  -DS 0  -DS 1  -DS   Orientation and Clouding of Sensorium 0  -BT -- 0  -DS 0  -DS 0  -DS   CIWA-Ar Total 1  -BT -- 3  -DS 6  -DS 8  -DS   Row Name 10/16/23 2255 10/16/23 2245 10/16/23 2215 10/16/23 2200 10/16/23 2145   CIWA-Ar   BP -- 140/83  -/85  -/93  -/101 Abnormal   -AH   Pulse -- 105  - Abnormal   - Abnormal   - Abnormal   -AH   Nausea and Vomiting 2  -AH -- -- -- --   Tactile Disturbances 0  -AH -- -- -- --   Tremor 1  -AH -- -- -- --   Auditory Disturbances 0  -AH -- -- -- --   Paroxysmal Sweats 1  -AH -- -- -- --   Visual Disturbances 0  -AH -- -- -- --   Anxiety 1  -AH -- -- -- --   Headache, Fullness in Head 1  -AH -- -- -- --   Agitation 1  -AH -- -- -- --   Orientation and Clouding of Sensorium 0  -AH -- -- -- --   CIWA-Ar Total 7  -AH -- -- -- --   Row Name 10/16/23 2133 10/16/23 2110 10/16/23 2100 10/16/23 2045 10/16/23 2030   CIWA-Ar   /101 Abnormal   -AH -- 136/93  -/96  -/104 Abnormal   -AH   Pulse 118 Abnormal   -AH -- 104  -  - Abnormal   -AH   Nausea and Vomiting 3  -AH 2  -AH -- -- --   Tactile Disturbances 0  -AH 2  -AH -- -- --   Tremor 7  -AH 2  -AH -- -- --   Auditory Disturbances 0  -AH 0  -AH -- -- --   Paroxysmal Sweats 1  -AH 2  -AH -- -- --   Visual Disturbances 0  -AH 0  -AH -- -- --   Anxiety 2  -AH 2  -AH -- -- --   Headache, Fullness in Head 1  -AH 2  -AH -- -- --   Agitation 1  -AH 2  -AH -- -- --   Orientation and Clouding of Sensorium 0  -AH 0  -AH -- -- --   CIWA-Ar Total 15  -AH 14  -AH -- -- --   Row Name 10/16/23 2015 10/16/23 2000 10/16/23 1949 10/16/23 1936    CIWA-Ar   /104 Abnormal   -/100  -/96  -/123 Abnormal   -AH    Pulse 111 Abnormal   - Abnormal   - Abnormal   - Abnormal   -AH    Nausea and Vomiting -- -- 4  -AH --    Tactile Disturbances -- -- 2  -AH --    Tremor -- -- 4  -AH --    Auditory Disturbances -- -- 0  -AH --    Paroxysmal Sweats -- -- 2  -AH --    Visual Disturbances -- -- 0  -AH --    Anxiety -- -- 3  -AH --    Headache, Fullness in Head -- -- 4  -AH --    Agitation -- -- 3  -AH --    Orientation and Clouding of Sensorium -- -- 0  -AH --    CIWA-Ar Total -- -- 22  -AH --      Pertinent Labs/Diagnostic Test Results:   US right upper quadrant    (Results Pending)         Results from last 7 days   Lab Units 10/16/23  1954   WBC Thousand/uL 11.36*   HEMOGLOBIN g/dL 17.8*   HEMATOCRIT % 49.7*   PLATELETS Thousands/uL 506*   NEUTROS ABS Thousands/µL 9.51*         Results from last 7 days   Lab Units 10/17/23  0536 10/16/23  1954   SODIUM mmol/L 138 135   POTASSIUM mmol/L 4.4 4.8   CHLORIDE mmol/L 102 91*   CO2 mmol/L 29 24   ANION GAP mmol/L 7 20   BUN mg/dL 8 6   CREATININE mg/dL 0.97 1.04   EGFR ml/min/1.73sq m 86 79   CALCIUM mg/dL 8.8 10.4*   MAGNESIUM mg/dL 2.0 1.4*     Results from last 7 days   Lab Units 10/17/23  0536 10/16/23  1954   AST U/L 40* 81*   ALT U/L 33 56*   ALK PHOS U/L 107* 169*   TOTAL PROTEIN g/dL 6.4 9.2*   ALBUMIN g/dL 3.8 5.3*   TOTAL BILIRUBIN mg/dL 0.83 1.00         Results from last 7 days   Lab Units 10/17/23  0536 10/16/23  1954   GLUCOSE RANDOM mg/dL 86 106             BETA-HYDROXYBUTYRATE   Date Value Ref Range Status   10/16/2023 2.9 (H) <0.6 mmol/L Final   10/01/2023 0.8 (H) <0.6 mmol/L Final              Results from last 7 days   Lab Units 10/16/23  2315 10/16/23  2038   LACTIC ACID mmol/L 0.7 3.8*         Results from last 7 days   Lab Units 10/16/23  1954   LIPASE u/L 11         ED Treatment:   Medication Administration from 10/16/2023 1902 to 10/16/2023 2358         Date/Time Order Dose Route Action     10/16/2023 1948 EDT sodium chloride 0.9 % bolus 1,000 mL 1,000 mL Intravenous New Bag     10/16/2023 1948 EDT ondansetron (ZOFRAN) injection 4 mg 4 mg Intravenous Given     10/16/2023 2033 EDT diazepam (VALIUM) injection 10 mg 10 mg Intravenous Given     10/16/2023 2137 EDT sodium chloride 0.9 % bolus 1,000 mL 1,000 mL Intravenous New Bag     10/16/2023 2151 EDT diazepam (VALIUM) injection 10 mg 10 mg Intravenous Given     10/16/2023 2155 EDT ondansetron (ZOFRAN) injection 4 mg 4 mg Intravenous Given     10/16/2023 2318 EDT metoclopramide (REGLAN) injection 10 mg 10 mg Intravenous Given     10/16/2023 2341 EDT magnesium sulfate 2 g/50 mL IVPB (premix) 2 g 2 g Intravenous New Bag          Past Medical History:   Diagnosis Date    GERD (gastroesophageal reflux disease)     Hypertension      Present on Admission:   Alcohol use disorder, severe, dependence ([facility])   Hypertension      Admitting Diagnosis: Alcohol withdrawal ([facility]) [P28.965]  Alcoholic ketoacidosis [L64.00]  Alcohol abuse [F10.10]  Age/Sex: 63 y.o. male  Admission Orders:  Scheduled Medications:  escitalopram, 20 mg, Oral, Daily  folic acid, 1 mg, Oral, Daily  lisinopril, 40 mg, Oral, Daily  multivitamin-minerals, 1 tablet, Oral, Daily  pantoprazole, 20 mg, Oral, BID AC  thiamine, 100 mg, Oral, Daily      Continuous IV Infusions:  dextrose 5 % and sodium chloride 0.9 %, 75 mL/hr, Intravenous, Continuous      PRN Meds:       None    Network Utilization Review Department  ATTENTION: Please call with any questions or concerns to [phone] and carefully listen to the prompts so that you are directed to the right person. All voicemails are confidential.   For Discharge needs, contact Care Management DC Support Team at [phone] opt. 2  Send all requests for admission clinical reviews, approved or denied determinations and any other requests to dedicated fax number below belonging to the campus where the patient is receiving treatment. List of dedicated fax numbers for the Facilities:  [place] DENIALS (Administrative/Medical Necessity) [phone]   DISCHARGE SUPPORT TEAM (NETWORK) [id number] [address]annon (Maternity/NICU/Pediatrics) [phone]   [address] [phone]   [facility] [address] [facility] [phone] [address] [facility] [address] [address] [facility] [address] [place] [address] [phone]

## 2023-10-17 NOTE — UTILIZATION REVIEW
Initial inpatient Review    Observation 10/16 2326 changed to inpatient 10/17 1018. Pt requiring continued stay for alcohol withdrawal.     Admission Orders (From admission, onward)       Ordered        10/17/23 1018  Inpatient Admission  Once            10/16/23 2326  Place in Observation  Once                          Orders Placed This Encounter   Procedures    Inpatient Admission     Standing Status:   Standing     Number of Occurrences:   1     Order Specific Question:   Level of Care     Answer:   Med Surg [16]     Order Specific Question:   Estimated length of stay     Answer:   More than 2 Midnights     Order Specific Question:   Certification     Answer:   I certify that inpatient services are medically necessary for this patient for a duration of greater than two midnights. See H&P and MD Progress Notes for additional information about the patient's course of treatment. Date: 10/17                          Current Patient Class: IP  Current Level of Care: Med Surg    HPI:58 y.o. male initially admitted on 10/17     Assessment/Plan:     10/17 Observation changed to inpatient. Medical Toxicology consult:  IV fluid hydration, cardiac monitoring, advancing diet. His alcohol ketoacidosis seems to have resolved and his transaminitis associated with alcohol use has improved. Regarding his withdrawal, since he has a normal heart rate at this time, I suspect there has been significant improvement and would suggest continuing the course with [place] protocol. Internal medicine: He has been drinking heavily since his mother passed away and has tried to quit on multiple occasions. He states he gets "the shakes" when he tries to stop drinking. He is try to cut back in the form of drinking only 1 instead of hard liquor however he still drinks 1 bottle a day. He is also clearly struggling from a psychiatry standpoint, with multiple overwhelming life stressors over the last 2 years.  He would benefit from inpatient psychiatric consultation although he thankfully does not have any suicidal or homicidal ideation.  He does admit to having suicidal thoughts in the past.     Vital Signs:       Date/Time Temp Pulse Resp BP MAP (mmHg) SpO2 O2 Device   10/17/23 0700 98 °F (36.7 °C) 74 18 149/86 107 94 % --   10/17/23 0500 -- 63 -- -- -- -- --   10/17/23 0100 -- 89 -- 129/97 -- 94 % None (Room air)   10/17/23 0003 98.2 °F (36.8 °C) 97 18 150/100 117 96 % --   10/16/23 2312 -- -- -- -- -- 96 % None (Room air)   10/16/23 2245 -- 105 -- 140/83 108 93 % --   10/16/23 2215 -- 106 Abnormal  -- 134/85 103 92 % --   10/16/23 2200 -- 111 Abnormal  -- 148/93 112 93 % --   10/16/23 2145 -- 110 Abnormal  -- 165/101 Abnormal  127 94 % --   10/16/23 2133 -- 118 Abnormal  -- 165/101 Abnormal  -- -- --   10/16/23 2100 -- 104 -- 136/93 111 92 % --   10/16/23 2045 -- 104 -- 160/96 121 94 % --   10/16/23 2030 -- 106 Abnormal  -- 168/104 Abnormal  125 94 % --   10/16/23 2015 -- 111 Abnormal  -- 161/104 Abnormal  127 95 % --   10/16/23 2000 -- 113 Abnormal  -- 157/100 119 96 % --   10/16/23 1952 98.4 °F (36.9 °C) -- -- -- -- -- --   10/16/23 1949 -- 121 Abnormal  -- 155/96 -- -- --      CIWA-Ar Score     Row Name 10/17/23 0900 10/17/23 0700 10/17/23 0500 10/17/23 0100 10/17/23 0003   CIWA-Ar   BP -- 149/86  -LC -- 129/97  -/100  -DI   Pulse -- 74  -LC 63  -DS 89  -DS 97  -DI   Nausea and Vomiting 0  -BT -- 0  -DS 1  -DS 1  -DS   Tactile Disturbances 0  -BT -- 0  -DS 0  -DS 0  -DS   Tremor 0  -BT -- 1  -DS 1  -DS 2  -DS   Auditory Disturbances 0  -BT -- 0  -DS 0  -DS 0  -DS   Paroxysmal Sweats 0  -BT -- 1  -DS 1  -DS 1  -DS   Visual Disturbances 0  -BT -- 0  -DS 0  -DS 0  -DS   Anxiety 1  -BT -- 1  -DS 2  -DS 2  -DS   Headache, Fullness in Head 0  -BT -- 0  -DS 1  -DS 1  -DS   Agitation 0  -BT -- 0  -DS 0  -DS 1  -DS   Orientation and Clouding of Sensorium 0  -BT -- 0  -DS 0  -DS 0  -DS   CIWA-Ar Total 1  -BT -- 3  -DS 6  -DS 8  -DS   Row Name 10/16/23 2255 10/16/23 2245 10/16/23 2215 10/16/23 2200 10/16/23 2145   CIWA-Ar   BP -- 140/83  -/85  -/93  -/101 Abnormal   -AH   Pulse -- 105  - Abnormal   - Abnormal   - Abnormal   -AH   Nausea and Vomiting 2  -AH -- -- -- --   Tactile Disturbances 0  -AH -- -- -- --   Tremor 1  -AH -- -- -- --   Auditory Disturbances 0  -AH -- -- -- --   Paroxysmal Sweats 1  -AH -- -- -- --   Visual Disturbances 0  -AH -- -- -- --   Anxiety 1  -AH -- -- -- --   Headache, Fullness in Head 1  -AH -- -- -- --   Agitation 1  -AH -- -- -- --   Orientation and Clouding of Sensorium 0  -AH -- -- -- --   CIWA-Ar Total 7  -AH -- -- -- --   Row Name 10/16/23 2133 10/16/23 2110 10/16/23 2100 10/16/23 2045 10/16/23 2030   CIWA-Ar   /101 Abnormal   -AH -- 136/93  -/96  -/104 Abnormal   -AH   Pulse 118 Abnormal   -AH -- 104  -  - Abnormal   -AH   Nausea and Vomiting 3  -AH 2  -AH -- -- --   Tactile Disturbances 0  -AH 2  -AH -- -- --   Tremor 7  -AH 2  -AH -- -- --   Auditory Disturbances 0  -AH 0  -AH -- -- --   Paroxysmal Sweats 1  -AH 2  -AH -- -- --   Visual Disturbances 0  -AH 0  -AH -- -- --   Anxiety 2  -AH 2  -AH -- -- --   Headache, Fullness in Head 1  -AH 2  -AH -- -- --   Agitation 1  -AH 2  -AH -- -- --   Orientation and Clouding of Sensorium 0  -AH 0  -AH -- -- --   CIWA-Ar Total 15  -AH 14  -AH -- -- --   Row Name 10/16/23 2015 10/16/23 2000 10/16/23 1949 10/16/23 1936     [facility]   /104 Abnormal   -/100  -/96  -/123 Abnormal   -AH     Pulse 111 Abnormal   - Abnormal   - Abnormal   - Abnormal   -AH     Nausea and Vomiting -- -- 4  -AH --     Tactile Disturbances -- -- 2  -AH --     Tremor -- -- 4  -AH --     Auditory Disturbances -- -- 0  -AH --     Paroxysmal Sweats -- -- 2  -AH --     Visual Disturbances -- -- 0  -AH --     Anxiety -- -- 3  -AH --     Headache, Fullness in Head -- -- 4  -AH --     Agitation -- -- 3 -AH --     Orientation and Clouding of Sensorium -- -- 0  -AH --     CIWA-Ar Total -- -- 22  -AH --          Pertinent Labs/Diagnostic Results:       Results from last 7 days   Lab Units 10/16/23  1954   WBC Thousand/uL 11.36*   HEMOGLOBIN g/dL 17.8*   HEMATOCRIT % 49.7*   PLATELETS Thousands/uL 506*   NEUTROS ABS Thousands/µL 9.51*         Results from last 7 days   Lab Units 10/17/23  0536 10/16/23  1954   SODIUM mmol/L 138 135   POTASSIUM mmol/L 4.4 4.8   CHLORIDE mmol/L 102 91*   CO2 mmol/L 29 24   ANION GAP mmol/L 7 20   BUN mg/dL 8 6   CREATININE mg/dL 0.97 1.04   EGFR ml/min/1.73sq m 86 79   CALCIUM mg/dL 8.8 10.4*   MAGNESIUM mg/dL 2.0 1.4*   PHOSPHORUS mg/dL 4.1  --      Results from last 7 days   Lab Units 10/17/23  0536 10/16/23  1954   AST U/L 40* 81*   ALT U/L 33 56*   ALK PHOS U/L 107* 169*   TOTAL PROTEIN g/dL 6.4 9.2*   ALBUMIN g/dL 3.8 5.3*   TOTAL BILIRUBIN mg/dL 0.83 1.00         Results from last 7 days   Lab Units 10/17/23  0536 10/16/23  1954   GLUCOSE RANDOM mg/dL 86 106             BETA-HYDROXYBUTYRATE   Date Value Ref Range Status   10/16/2023 2.9 (H) <0.6 mmol/L Final   10/01/2023 0.8 (H) <0.6 mmol/L Final          Results from last 7 days   Lab Units 10/16/23  2315 10/16/23  2038   LACTIC ACID mmol/L 0.7 3.8*           Results from last 7 days   Lab Units 10/16/23  1954   LIPASE u/L 11         Medications:   Scheduled Medications:  escitalopram, 20 mg, Oral, Daily  folic acid, 1 mg, Oral, Daily  lisinopril, 40 mg, Oral, Daily  multivitamin-minerals, 1 tablet, Oral, Daily  pantoprazole, 20 mg, Oral, BID AC  thiamine, 100 mg, Oral, Daily      Continuous IV Infusions:  dextrose 5 % and sodium chloride 0.9 %, 75 mL/hr, Intravenous, Continuous      PRN Meds:       Discharge Plan: TBD    Network Utilization Review Department  ATTENTION: Please call with any questions or concerns to [phone] and carefully listen to the prompts so that you are directed to the right person.  All voicemails are confidential.   For Discharge needs, contact Care Management DC Support Team at [phone] opt. 2  Send all requests for admission clinical reviews, approved or denied determinations and any other requests to dedicated fax number below belonging to the campus where the patient is receiving treatment.  List of dedicated fax numbers for the Facilities:  [place] [facility] (Administrative/Medical Necessity) [phone]   DISCHARGE SUPPORT TEAM (NETWORK) [id number] [address] [facility] (Maternity/NICU/Pediatrics) [phone]   [address] [place] [address] Drive [address] [place] [address] 1000 [facility] [phone] [address] [place] [address] [address] [address] [facility] [address] [facility] [address] [phone]

## 2023-10-17 NOTE — ASSESSMENT & PLAN NOTE
POA patient presented with multiple episodes of nausea, vomiting, and abdominal pain. This occurred after his last drink in the morning of October 16. Patient has had multiple prior admissions for alcohol use.     Likely in the setting of viral gastroenteritis versus alcohol withdrawals resolved with IV hydration p.o. intake

## 2023-10-17 NOTE — CONSULTS
INTERPROFESSIONAL (PHONE) [phone] [facility] Toxicology  Aguila Pena 63 y.o. male MRN: [id number]  Unit/Bed#: S -01 Encounter: [id number]      Reason for Consult / Principal Problem: alcohol withdrawal   Inpatient consult to Toxicology  Consult performed by: Herminia Santos DO  Consult ordered by: Dal Epley, MD        10/17/23      ASSESSMENT:  60-year-old male with alcohol use disorder  Alcohol withdrawal, mild  Alcoholic ketoacidosis  Hypomagnesemia      RECOMMENDATIONS:  Please continue supportive care, including IV fluid hydration, cardiac monitoring, advancing diet. His alcohol ketoacidosis seems to have resolved and his transaminitis associated with alcohol use has improved. Regarding his withdrawal, since he has a normal heart rate at this time, I suspect there has been significant improvement and would suggest continuing the course with CIWA protocol. If patient for any reason gets worse, ie tachycardic, return of tremor, diaphoreses, nausea/vomiting, please reach out to medical detox unit for discussion of potential next steps. For further questions, please contact the medical  on call via [place] Text or throughl the [employer] or [employer]. Please see additional teaching note below:    Hx and PE limited by the dynamics of a phone consultation. I have not personally interviewed or evaluated the patient, but only advised based on the information provided to me. Primary provider is responsible for all clinical decisions. Pertinent history, physical exam and clinical findings and course discussed: Aguila Pena is a 63y.o. year old male who presents with alcohol withdrawal and alcoholic ketoacidosis to the [facility] emergency department and was admitted to internal medicine. A toxicology consult was placed the next day. Patient seems to have improved at this point.     Review of systems and physical exam not performed by me.    Historical Information   Past Medical History:   Diagnosis Date    GERD (gastroesophageal reflux disease)     Hypertension      History reviewed. No pertinent surgical history. Social History   Social History     Substance and Sexual Activity   Alcohol Use Yes    Comment: up to a handle a day. Social History     Substance and Sexual Activity   Drug Use Never     Social History     Tobacco Use   Smoking Status Never   Smokeless Tobacco Never     History reviewed. No pertinent family history. Prior to Admission medications    Medication Sig Start Date End Date Taking?  Authorizing Provider   escitalopram (LEXAPRO) 20 mg tablet Take 1 tablet by mouth daily 2/21/23   Historical Provider, MD   folic acid (FOLVITE) 1 mg tablet Take 1 tablet (1 mg total) by mouth daily Do not start before October 3, 2023. 10/3/23   Jomar Maldonado PA-C   lisinopril (ZESTRIL) 40 mg tablet Take 1 tablet (40 mg total) by mouth daily 4/4/23   Deneen Velasquez PA-C   omeprazole (PriLOSEC) 40 MG capsule Take 40 mg by mouth 2 (two) times a day 12/5/22   Historical Provider, MD   thiamine 100 MG tablet Take 1 tablet (100 mg total) by mouth daily Do not start before October 3, 2023. 10/3/23   Jomar Maldonado PA-C       Current Facility-Administered Medications   Medication Dose Route Frequency    dextrose 5 % and sodium chloride 0.9 % infusion  75 mL/hr Intravenous Continuous    escitalopram (LEXAPRO) tablet 20 mg  20 mg Oral Daily    folic acid (FOLVITE) tablet 1 mg  1 mg Oral Daily    lisinopril (ZESTRIL) tablet 40 mg  40 mg Oral Daily    multivitamin-minerals (CENTRUM) tablet 1 tablet  1 tablet Oral Daily    pantoprazole (PROTONIX) EC tablet 20 mg  20 mg Oral BID AC    thiamine tablet 100 mg  100 mg Oral Daily       Allergies   Allergen Reactions    Cefdinir Rash       Objective       Intake/Output Summary (Last 24 hours) at 10/17/2023 1038  Last data filed at 10/17/2023 0744  Gross per 24 hour   Intake 2773.33 ml   Output -- Net 2773.33 ml       Invasive Devices:   Peripheral IV 10/16/23 Distal;Left;Upper;Ventral (anterior) Arm (Active)       Vitals   Vitals:    10/17/23 0003 10/17/23 0100 10/17/23 0500 10/17/23 0700   BP: 150/100 129/97  149/86   TempSrc:       Pulse: 97 89 63 74   Resp: 18   18   Patient Position - Orthostatic VS:       Temp: 98.2 °F (36.8 °C)   98 °F (36.7 °C)         Lab Results: I have personally reviewed pertinent reports. Labs:    Results from last 7 days   Lab Units 10/16/23  1954   WBC Thousand/uL 11.36*   HEMOGLOBIN g/dL 17.8*   HEMATOCRIT % 49.7*   PLATELETS Thousands/uL 506*   NEUTROS PCT % 83*   LYMPHS PCT % 11*   MONOS PCT % 4   EOS PCT % 0      Results from last 7 days   Lab Units 10/17/23  0536   SODIUM mmol/L 138   POTASSIUM mmol/L 4.4   CHLORIDE mmol/L 102   CO2 mmol/L 29   BUN mg/dL 8   CREATININE mg/dL 0.97   CALCIUM mg/dL 8.8   ALK PHOS U/L 107*   ALT U/L 33   AST U/L 40*   MAGNESIUM mg/dL 2.0   PHOSPHORUS mg/dL 4.1          Results from last 7 days   Lab Units 10/16/23  2315 10/16/23  2038   LACTIC ACID mmol/L 0.7 3.8*         Counseling / Coordination of Care  Total time spent today 20 minutes. This was a phone consultation.

## 2023-10-17 NOTE — DISCHARGE SUMMARY
[address] [facility]  [facility] 1965, 63 y[facility] male MRN: [id number]  Unit/Bed#: S -01 Encounter: [id number]  Primary Care Provider: Souleymane Graham MD   Date and time admitted to hospital: 10/16/2023  7:29 PM    * Alcohol use disorder, severe, dependence ([address])  Assessment & Plan  Pt is known alcoholic, previously seen [facility] detox. At baseline drinks 1 bottle of wine daily last drink morning of admission. POA presented due to symptoms of withdrawal such as tremors, nausea, vomiting   Drinks one bottle of wine daily for the past 2 days, last drink today morning   Upon admission noted with elevated LFTs, alcoholic ketoacidosis without associated nausea, vomiting and abdominal pain  Ultrasound that showed hepatomegaly with hepatic steatosis likely in the setting of chronic alcohol use. Toxicology consulted recommended be monitoring  over the past 24 hours CIWA score noted 1  no benzodiazepines given overnight. Alcohol use disorder severe dependence likely in the setting of multiple social stressors for discussions  Psychiatry also consulted patient without suicidal ideation homicidal ideation recommendations to switch patient to Zoloft at the time patient declined given was willing stable on current MDD treatment.  -Evaluated patient at baseline referrals and resources provided patient previously established outpatient rehab hopeful to continue pursue outpatient follow-up. Plan  Alcohol cessation counseling provided  Continue with folic acid, thiamine supplementation as well as multivitamin  As per toxicology okay to discharge patient on naltrexone 50 mg once a day  Follow-up with outpatient rehab resources as provided by rishabh        Vomiting-resolved as of 10/18/2023  Assessment & Plan  POA patient presented with multiple episodes of nausea, vomiting, and abdominal pain. This occurred after his last drink in the morning of October 16.   Patient has had multiple prior admissions for alcohol use. Likely in the setting of viral gastroenteritis versus alcohol withdrawals resolved with IV hydration p.o. intake    Alcoholic ketoacidosis-resolved as of 10/18/2023  Assessment & Plan  POA pt has elevated Lactic acid of 3.8  Beta- hydroxybutyrate levels of 2.9  Received 2 L of fluids in the ED and subsequently switched to with IV dextrose NS  Likely in the setting of nausea, vomiting, loose stools with associated alcohol abuse  Resolved    Major depressive disorder  Assessment & Plan  POA patient was expressing anxiety and distress over his current social situation.   He recently lost his job, lost his health insurance, is taking care of his father with bladder cancer, and is doing with grievance over the loss of his dog.  -Psychiatry consult placed patient was noted not suicidal no homicidal ideation  Recommendations by psychiatric consultant to switch patient to Zoloft however patient refer  Patient continue with continue home dose of Lexapro  Follow-up with primary care provider within 1 week    Hypertension  Assessment & Plan  Blood Pressure: 143/99  Continue PTA lisinopril        Tobacco and Toxic Substance Assessment and Intervention:     Alcohol and drug use screening performed    Alcohol cessation counseling provided    Alcohol cessation resource information provided    Medication Assisted Therapy visit recommended      Other interventions: Prescription for naltrexone provided     Medical Problems       Resolved Problems  Date Reviewed: 10/18/2023            Resolved    Vomiting 10/18/2023     Resolved by  Paula Blunt MD    Alcoholic ketoacidosis 57/33/9424     Resolved by  Paula Blunt MD        Discharging Physician / Practitioner: Paula Blunt MD  PCP: Adrianna Stokes MD  Admission Date:   Admission Orders (From admission, onward)       Ordered        10/17/23 1018  Inpatient Admission  Once            10/16/23 2326  Place in Observation  Once                          Discharge Date: 10/18/23    Consultations During Hospital Stay:  Psychiatry  Toxicology    Procedures Performed:   None    Significant Findings / Test Results:   US right upper quadrant   Final Result by Deny Shirley MD (10/17 1221)      Hepatomegaly and hepatic steatosis. Workstation performed: [id number]              Incidental Findings:   None      Test Results Pending at Discharge (will require follow up): None     Outpatient Tests Requested:  None    Complications: None    Reason for Admission: Alcohol ketoacidosis    Hospital Course:   Ole Judd is a 63 y.o. male patient who originally presented to the hospital on 10/16/2023 due to nausea, vomiting, abdominal pain. History of alcohol use disorder, and has had multiple prior admissions for similar episodes. Patient reports increasing nausea and vomiting since the morning of October 16 when he had his last drink. Patient was given IV Zofran as well as 2 doses of Valium while in the ED, as this was given to the patient in prior admissions. Patient was admitted to medicine floors under [facility] protocol for further monitoring of alcohol withdrawal symptoms. Discussion of potential gastroenteritis symptoms were discussed with patient, however symptoms pointing more towards alcohol related issues. Psychiatry was also placed as a consult for the patient due to his distress and anxiety over his current social situation. Patient was maintained on IV fluids, and antinausea medication as needed. He was continued on all of his prior to admission medications. Today patient was deemed stable for discharge, toxicology was consulted recommended continue patient on naltrexone for alcohol cessation. Case management was consulted for catch/rehab evaluation patient acceptable to follow-up on the outpatient setting resources were provided.       Please see above list of diagnoses and related plan for additional information. Condition at Discharge: stable    Discharge Day Visit / Exam:   Subjective: No acute overnight events reported by nursing team.  Patient seen at bedside denies any acute complaint. Endorse tolerating p.o. intake, no abdominal pain, nausea, vomiting. Vitals: Blood Pressure: 143/99 (10/18/23 0751)  Pulse: 74 (10/18/23 0751)  Temperature: 98 °F (36.7 °C) (10/17/23 2054)  Temp Source: Oral (10/17/23 1507)  Respirations: 18 (10/18/23 0751)  Weight - Scale: 104 kg (230 lb 6.1 oz) (10/16/23 1936)  SpO2: 95 % (10/18/23 0751)  Exam:   Physical Exam  Vitals and nursing note reviewed. Constitutional:       General: He is not in acute distress. Appearance: He is well-developed. He is not ill-appearing. HENT:      Head: Normocephalic and atraumatic. Right Ear: External ear normal.      Left Ear: External ear normal.      Nose: Nose normal.      Mouth/Throat:      Mouth: Mucous membranes are moist.   Eyes:      General: No scleral icterus. Extraocular Movements: Extraocular movements intact. Conjunctiva/sclera: Conjunctivae normal.      Pupils: Pupils are equal, round, and reactive to light. Cardiovascular:      Rate and Rhythm: Normal rate and regular rhythm. Pulses: Normal pulses. Heart sounds: Normal heart sounds. No murmur heard. Pulmonary:      Effort: Pulmonary effort is normal. No respiratory distress. Breath sounds: Normal breath sounds. Abdominal:      General: Bowel sounds are normal. There is no distension. Palpations: Abdomen is soft. Tenderness: There is no abdominal tenderness. Musculoskeletal:         General: No swelling. Cervical back: Neck supple. Right lower leg: No edema. Left lower leg: No edema. Skin:     General: Skin is warm and dry. Capillary Refill: Capillary refill takes less than 2 seconds. Neurological:      Mental Status: He is alert and oriented to person, place, and time.       Motor: No tremor. Psychiatric:         Attention and Perception: Attention and perception normal.         Mood and Affect: Mood and affect normal.         Discussion with Family: Patient declined call to . Discharge instructions/Information to patient and family:   See after visit summary for information provided to patient and family. Provisions for Follow-Up Care:  See after visit summary for information related to follow-up care and any pertinent home health orders. Disposition:   Home    Planned Readmission: None     Discharge Statement:  I spent 40 minutes discharging the patient. This time was spent on the day of discharge. I had direct contact with the patient on the day of discharge. Greater than 50% of the total time was spent examining patient, answering all patient questions, arranging and discussing plan of care with patient as well as directly providing post-discharge instructions. Additional time then spent on discharge activities. Discharge Medications:  See after visit summary for reconciled discharge medications provided to patient and/or family.       **Please Note: This note may have been constructed using a voice recognition system**

## 2023-10-18 VITALS
WEIGHT: 230.38 LBS | TEMPERATURE: 98 F | RESPIRATION RATE: 18 BRPM | SYSTOLIC BLOOD PRESSURE: 143 MMHG | DIASTOLIC BLOOD PRESSURE: 99 MMHG | OXYGEN SATURATION: 95 % | HEART RATE: 74 BPM | BODY MASS INDEX: 34.02 KG/M2

## 2023-10-18 PROBLEM — E87.29 ALCOHOLIC KETOACIDOSIS: Status: RESOLVED | Noted: 2023-10-16 | Resolved: 2023-10-18

## 2023-10-18 PROBLEM — R11.10 VOMITING: Status: RESOLVED | Noted: 2023-04-02 | Resolved: 2023-10-18

## 2023-10-18 LAB
ANION GAP SERPL CALCULATED.3IONS-SCNC: 7 MMOL/L
BUN SERPL-MCNC: 9 MG/DL (ref 5–25)
CALCIUM SERPL-MCNC: 9.1 MG/DL (ref 8.4–10.2)
CHLORIDE SERPL-SCNC: 103 MMOL/L (ref 96–108)
CO2 SERPL-SCNC: 29 MMOL/L (ref 21–32)
CREAT SERPL-MCNC: 1.01 MG/DL (ref 0.6–1.3)
ERYTHROCYTE [DISTWIDTH] IN BLOOD BY AUTOMATED COUNT: 11.9 % (ref 11.6–15.1)
GFR SERPL CREATININE-BSD FRML MDRD: 82 ML/MIN/1.73SQ M
GLUCOSE SERPL-MCNC: 88 MG/DL (ref 65–140)
HCT VFR BLD AUTO: 39.4 % (ref 36.5–49.3)
HGB BLD-MCNC: 13 G/DL (ref 12–17)
MCH RBC QN AUTO: 32.9 PG (ref 26.8–34.3)
MCHC RBC AUTO-ENTMCNC: 33 G/DL (ref 31.4–37.4)
MCV RBC AUTO: 100 FL (ref 82–98)
PLATELET # BLD AUTO: 212 THOUSANDS/UL (ref 149–390)
PMV BLD AUTO: 9 FL (ref 8.9–12.7)
POTASSIUM SERPL-SCNC: 4.1 MMOL/L (ref 3.5–5.3)
RBC # BLD AUTO: 3.95 MILLION/UL (ref 3.88–5.62)
SODIUM SERPL-SCNC: 139 MMOL/L (ref 135–147)
WBC # BLD AUTO: 5.4 THOUSAND/UL (ref 4.31–10.16)

## 2023-10-18 PROCEDURE — 80048 BASIC METABOLIC PNL TOTAL CA: CPT

## 2023-10-18 PROCEDURE — 85027 COMPLETE CBC AUTOMATED: CPT

## 2023-10-18 PROCEDURE — 99239 HOSP IP/OBS DSCHRG MGMT >30: CPT | Performed by: INTERNAL MEDICINE

## 2023-10-18 RX ORDER — FOLIC ACID/MULTIVIT,IRON,MINER .4-18-35
1 TABLET,CHEWABLE ORAL DAILY
Qty: 30 TABLET | Refills: 0 | Status: SHIPPED | OUTPATIENT
Start: 2023-10-18 | End: 2023-11-17

## 2023-10-18 RX ORDER — NALTREXONE HYDROCHLORIDE 50 MG/1
50 TABLET, FILM COATED ORAL DAILY
Qty: 30 TABLET | Refills: 0 | Status: SHIPPED | OUTPATIENT
Start: 2023-10-18 | End: 2023-11-17

## 2023-10-18 RX ADMIN — ESCITALOPRAM OXALATE 20 MG: 20 TABLET ORAL at 09:00

## 2023-10-18 RX ADMIN — PANTOPRAZOLE SODIUM 20 MG: 20 TABLET, DELAYED RELEASE ORAL at 04:49

## 2023-10-18 RX ADMIN — MULTIPLE VITAMINS W/ MINERALS TAB 1 TABLET: TAB ORAL at 09:00

## 2023-10-18 RX ADMIN — Medication 100 MG: at 09:00

## 2023-10-18 RX ADMIN — FOLIC ACID 1 MG: 1 TABLET ORAL at 09:00

## 2023-10-18 RX ADMIN — LISINOPRIL 40 MG: 20 TABLET ORAL at 09:00

## 2023-10-18 NOTE — DISCHARGE INSTR - AVS FIRST PAGE
Dear Calli Arteaga,     It was our pleasure to care for you here at [address] [facility]. It is our hope that we were always able to exceed the expected standards for your care during your stay. You were hospitalized due to Abdominal pain/ Ketoacidosis. You were cared for on the 3rd floor by Vin Gillespie MD under the service of Helen De Jesus MD with the [facility] who covers for your primary care physician (PCP), Shelby Matt MD, while you were hospitalized. If you have any questions or concerns related to this hospitalization, you may contact us at [phone]. For follow up as well as any medication refills, we recommend that you follow up with your primary care physician. A registered nurse will reach out to you by phone within a few days after your discharge to answer any additional questions that you may have after going home. However, at this time we provide for you here, the most important instructions / recommendations at discharge:     Notable Medication Adjustments -   Start naltrexone 50 mg once a day  Continue with multivitamins 1 tablet once a day  Continue with folic acid 1 mg tablet once a day  Continue with thiamine 100 mg tablet once a day. Testing Required after Discharge -   None  ** Please contact your PCP to request testing orders for any of the testing recommended here **  Important follow up information -   Follow-up with primary care provider within 1 week  Follow-up with [facility] on the outpatient setting for further outpatient rehab. Follow-up with behavioral health/psychiatry on the outpatient setting. Other Instructions -     Please review this entire after visit summary as additional general instructions including medication list, appointments, activity, diet, any pertinent wound care, and other additional recommendations from your care team that may be provided for you.       Sincerely,     Kamilah Condon Radha Wood MD

## 2023-10-18 NOTE — PLAN OF CARE
Problem: Potential for Falls  Goal: Patient will remain free of falls  Description: INTERVENTIONS:  - Educate patient/family on patient safety including physical limitations  - Instruct patient to call for assistance with activity   - Consult OT/PT to assist with strengthening/mobility   - Keep Call bell within reach  - Keep bed low and locked with side rails adjusted as appropriate  - Keep care items and personal belongings within reach  - Initiate and maintain comfort rounds  - Make Fall Risk Sign visible to staff  - Offer Toileting every 2 Hours, in advance of need  - Initiate/Maintain alarm  - Obtain necessary fall risk management equipment:   - Apply yellow socks and bracelet for high fall risk patients  - Consider moving patient to room near nurses station  Outcome: Progressing     Problem: Nutrition/Hydration-ADULT  Goal: Nutrient/Hydration intake appropriate for improving, restoring or maintaining nutritional needs  Description: Monitor and assess patient's nutrition/hydration status for malnutrition. Collaborate with interdisciplinary team and initiate plan and interventions as ordered. Monitor patient's weight and dietary intake as ordered or per policy. Utilize nutrition screening tool and intervene as necessary. Determine patient's food preferences and provide high-protein, high-caloric foods as appropriate.      INTERVENTIONS:  - Monitor oral intake, urinary output, labs, and treatment plans  - Assess nutrition and hydration status and recommend course of action  - Evaluate amount of meals eaten  - Assist patient with eating if necessary   - Allow adequate time for meals  - Recommend/ encourage appropriate diets, oral nutritional supplements, and vitamin/mineral supplements  - Order, calculate, and assess calorie counts as needed  - Recommend, monitor, and adjust tube feedings and TPN/PPN based on assessed needs  - Assess need for intravenous fluids  - Provide specific nutrition/hydration education as appropriate  - Include patient/family/caregiver in decisions related to nutrition  Outcome: Progressing

## 2023-11-28 ENCOUNTER — HOSPITAL ENCOUNTER (INPATIENT)
Facility: HOSPITAL | Age: 58
LOS: 1 days | End: 2023-11-28
Attending: EMERGENCY MEDICINE | Admitting: INTERNAL MEDICINE
Payer: COMMERCIAL

## 2023-11-28 ENCOUNTER — HOSPITAL ENCOUNTER (INPATIENT)
Facility: HOSPITAL | Age: 58
LOS: 3 days | Discharge: HOME | End: 2023-12-01
Attending: EMERGENCY MEDICINE | Admitting: EMERGENCY MEDICINE
Payer: COMMERCIAL

## 2023-11-28 VITALS
TEMPERATURE: 98 F | SYSTOLIC BLOOD PRESSURE: 154 MMHG | OXYGEN SATURATION: 95 % | HEIGHT: 68 IN | HEART RATE: 126 BPM | BODY MASS INDEX: 33.38 KG/M2 | RESPIRATION RATE: 18 BRPM | WEIGHT: 220.24 LBS | DIASTOLIC BLOOD PRESSURE: 106 MMHG

## 2023-11-28 DIAGNOSIS — F10.939 ALCOHOL WITHDRAWAL (HCC): Primary | ICD-10-CM

## 2023-11-28 DIAGNOSIS — R31.9 HEMATURIA: ICD-10-CM

## 2023-11-28 DIAGNOSIS — R07.89 CHEST TIGHTNESS: ICD-10-CM

## 2023-11-28 DIAGNOSIS — R00.0 RAPID HEART RATE: ICD-10-CM

## 2023-11-28 DIAGNOSIS — R07.9 CHEST PAIN: ICD-10-CM

## 2023-11-28 DIAGNOSIS — F10.20 ALCOHOL USE DISORDER, SEVERE, DEPENDENCE (HCC): Primary | ICD-10-CM

## 2023-11-28 PROBLEM — F10.932 ALCOHOL WITHDRAWAL SYNDROME WITH PERCEPTUAL DISTURBANCE (HCC): Status: ACTIVE | Noted: 2023-10-01

## 2023-11-28 PROBLEM — F10.930 ALCOHOL WITHDRAWAL SYNDROME WITHOUT COMPLICATION (HCC): Status: ACTIVE | Noted: 2023-10-01

## 2023-11-28 LAB
2HR DELTA HS TROPONIN: 1 NG/L
4HR DELTA HS TROPONIN: 1 NG/L
ALBUMIN SERPL BCP-MCNC: 4.5 G/DL (ref 3.5–5)
ALP SERPL-CCNC: 163 U/L (ref 34–104)
ALT SERPL W P-5'-P-CCNC: 20 U/L (ref 7–52)
ANION GAP SERPL CALCULATED.3IONS-SCNC: 18 MMOL/L
APTT PPP: 26 SECONDS (ref 23–37)
AST SERPL W P-5'-P-CCNC: 47 U/L (ref 13–39)
BASE EX.OXY STD BLDV CALC-SCNC: 88.1 % (ref 60–80)
BASE EXCESS BLDV CALC-SCNC: -2.1 MMOL/L
BASOPHILS # BLD AUTO: 0.11 THOUSANDS/ÂΜL (ref 0–0.1)
BASOPHILS NFR BLD AUTO: 1 % (ref 0–1)
BILIRUB SERPL-MCNC: 1.42 MG/DL (ref 0.2–1)
BUN SERPL-MCNC: 9 MG/DL (ref 5–25)
CALCIUM SERPL-MCNC: 9.4 MG/DL (ref 8.4–10.2)
CARDIAC TROPONIN I PNL SERPL HS: 5 NG/L
CARDIAC TROPONIN I PNL SERPL HS: 5 NG/L
CARDIAC TROPONIN I PNL SERPL HS: 6 NG/L
CARDIAC TROPONIN I PNL SERPL HS: 6 NG/L
CHLORIDE SERPL-SCNC: 98 MMOL/L (ref 96–108)
CO2 SERPL-SCNC: 20 MMOL/L (ref 21–32)
CREAT SERPL-MCNC: 1.11 MG/DL (ref 0.6–1.3)
EOSINOPHIL # BLD AUTO: 0.03 THOUSAND/ÂΜL (ref 0–0.61)
EOSINOPHIL NFR BLD AUTO: 0 % (ref 0–6)
ERYTHROCYTE [DISTWIDTH] IN BLOOD BY AUTOMATED COUNT: 13.3 % (ref 11.6–15.1)
ETHANOL SERPL-MCNC: <10 MG/DL
GFR SERPL CREATININE-BSD FRML MDRD: 73 ML/MIN/1.73SQ M
GLUCOSE SERPL-MCNC: 134 MG/DL (ref 65–140)
HCO3 BLDV-SCNC: 20.7 MMOL/L (ref 24–30)
HCT VFR BLD AUTO: 50.3 % (ref 36.5–49.3)
HGB BLD-MCNC: 17.7 G/DL (ref 12–17)
IMM GRANULOCYTES # BLD AUTO: 0.07 THOUSAND/UL (ref 0–0.2)
IMM GRANULOCYTES NFR BLD AUTO: 0 % (ref 0–2)
INR PPP: 1.06 (ref 0.84–1.19)
LIPASE SERPL-CCNC: 20 U/L (ref 11–82)
LYMPHOCYTES # BLD AUTO: 2.72 THOUSANDS/ÂΜL (ref 0.6–4.47)
LYMPHOCYTES NFR BLD AUTO: 16 % (ref 14–44)
MAGNESIUM SERPL-MCNC: 1.4 MG/DL (ref 1.9–2.7)
MCH RBC QN AUTO: 32.7 PG (ref 26.8–34.3)
MCHC RBC AUTO-ENTMCNC: 35.2 G/DL (ref 31.4–37.4)
MCV RBC AUTO: 93 FL (ref 82–98)
MONOCYTES # BLD AUTO: 0.69 THOUSAND/ÂΜL (ref 0.17–1.22)
MONOCYTES NFR BLD AUTO: 4 % (ref 4–12)
NEUTROPHILS # BLD AUTO: 13.02 THOUSANDS/ÂΜL (ref 1.85–7.62)
NEUTS SEG NFR BLD AUTO: 79 % (ref 43–75)
NRBC BLD AUTO-RTO: 0 /100 WBCS
O2 CT BLDV-SCNC: 22.3 ML/DL
PCO2 BLDV: 31.3 MM HG (ref 42–50)
PH BLDV: 7.44 [PH] (ref 7.3–7.4)
PHOSPHATE SERPL-MCNC: 2.8 MG/DL (ref 2.7–4.5)
PLATELET # BLD AUTO: 293 THOUSANDS/UL (ref 149–390)
PMV BLD AUTO: 8.4 FL (ref 8.9–12.7)
PO2 BLDV: 57.7 MM HG (ref 35–45)
POTASSIUM SERPL-SCNC: 4.5 MMOL/L (ref 3.5–5.3)
PROT SERPL-MCNC: 8.1 G/DL (ref 6.4–8.4)
PROTHROMBIN TIME: 14.4 SECONDS (ref 11.6–14.5)
RBC # BLD AUTO: 5.41 MILLION/UL (ref 3.88–5.62)
SODIUM SERPL-SCNC: 136 MMOL/L (ref 135–147)
TSH SERPL DL<=0.05 MIU/L-ACNC: 2.09 UIU/ML (ref 0.45–4.5)
WBC # BLD AUTO: 16.64 THOUSAND/UL (ref 4.31–10.16)

## 2023-11-28 PROCEDURE — 83690 ASSAY OF LIPASE: CPT | Performed by: EMERGENCY MEDICINE

## 2023-11-28 PROCEDURE — 36415 COLL VENOUS BLD VENIPUNCTURE: CPT | Performed by: EMERGENCY MEDICINE

## 2023-11-28 PROCEDURE — 84100 ASSAY OF PHOSPHORUS: CPT | Performed by: EMERGENCY MEDICINE

## 2023-11-28 PROCEDURE — 85025 COMPLETE CBC W/AUTO DIFF WBC: CPT | Performed by: EMERGENCY MEDICINE

## 2023-11-28 PROCEDURE — 80053 COMPREHEN METABOLIC PANEL: CPT | Performed by: EMERGENCY MEDICINE

## 2023-11-28 PROCEDURE — 93005 ELECTROCARDIOGRAM TRACING: CPT

## 2023-11-28 PROCEDURE — 96361 HYDRATE IV INFUSION ADD-ON: CPT

## 2023-11-28 PROCEDURE — HZ2ZZZZ DETOXIFICATION SERVICES FOR SUBSTANCE ABUSE TREATMENT: ICD-10-PCS | Performed by: EMERGENCY MEDICINE

## 2023-11-28 PROCEDURE — NC001 PR NO CHARGE: Performed by: INTERNAL MEDICINE

## 2023-11-28 PROCEDURE — 96374 THER/PROPH/DIAG INJ IV PUSH: CPT

## 2023-11-28 PROCEDURE — 99285 EMERGENCY DEPT VISIT HI MDM: CPT | Performed by: EMERGENCY MEDICINE

## 2023-11-28 PROCEDURE — 83735 ASSAY OF MAGNESIUM: CPT | Performed by: EMERGENCY MEDICINE

## 2023-11-28 PROCEDURE — 84484 ASSAY OF TROPONIN QUANT: CPT | Performed by: PHYSICIAN ASSISTANT

## 2023-11-28 PROCEDURE — C9113 INJ PANTOPRAZOLE SODIUM, VIA: HCPCS | Performed by: INTERNAL MEDICINE

## 2023-11-28 PROCEDURE — 85610 PROTHROMBIN TIME: CPT | Performed by: EMERGENCY MEDICINE

## 2023-11-28 PROCEDURE — 82805 BLOOD GASES W/O2 SATURATION: CPT | Performed by: EMERGENCY MEDICINE

## 2023-11-28 PROCEDURE — 85730 THROMBOPLASTIN TIME PARTIAL: CPT | Performed by: EMERGENCY MEDICINE

## 2023-11-28 PROCEDURE — 84443 ASSAY THYROID STIM HORMONE: CPT | Performed by: INTERNAL MEDICINE

## 2023-11-28 PROCEDURE — 82077 ASSAY SPEC XCP UR&BREATH IA: CPT | Performed by: EMERGENCY MEDICINE

## 2023-11-28 PROCEDURE — 99285 EMERGENCY DEPT VISIT HI MDM: CPT

## 2023-11-28 PROCEDURE — 99223 1ST HOSP IP/OBS HIGH 75: CPT | Performed by: INTERNAL MEDICINE

## 2023-11-28 PROCEDURE — 99291 CRITICAL CARE FIRST HOUR: CPT | Performed by: PHYSICIAN ASSISTANT

## 2023-11-28 PROCEDURE — 84484 ASSAY OF TROPONIN QUANT: CPT | Performed by: EMERGENCY MEDICINE

## 2023-11-28 RX ORDER — PANTOPRAZOLE SODIUM 40 MG/10ML
40 INJECTION, POWDER, LYOPHILIZED, FOR SOLUTION INTRAVENOUS EVERY 12 HOURS SCHEDULED
Status: DISCONTINUED | OUTPATIENT
Start: 2023-11-29 | End: 2023-12-01 | Stop reason: HOSPADM

## 2023-11-28 RX ORDER — ESCITALOPRAM OXALATE 10 MG/1
20 TABLET ORAL DAILY
Status: DISCONTINUED | OUTPATIENT
Start: 2023-11-29 | End: 2023-12-01 | Stop reason: HOSPADM

## 2023-11-28 RX ORDER — SODIUM CHLORIDE, SODIUM GLUCONATE, SODIUM ACETATE, POTASSIUM CHLORIDE, MAGNESIUM CHLORIDE, SODIUM PHOSPHATE, DIBASIC, AND POTASSIUM PHOSPHATE .53; .5; .37; .037; .03; .012; .00082 G/100ML; G/100ML; G/100ML; G/100ML; G/100ML; G/100ML; G/100ML
100 INJECTION, SOLUTION INTRAVENOUS CONTINUOUS
Status: DISCONTINUED | OUTPATIENT
Start: 2023-11-28 | End: 2023-11-28 | Stop reason: HOSPADM

## 2023-11-28 RX ORDER — SUCRALFATE 1 G/1
1 TABLET ORAL EVERY 6 HOURS SCHEDULED
Status: DISCONTINUED | OUTPATIENT
Start: 2023-11-29 | End: 2023-12-01 | Stop reason: HOSPADM

## 2023-11-28 RX ORDER — MAGNESIUM SULFATE HEPTAHYDRATE 40 MG/ML
2 INJECTION, SOLUTION INTRAVENOUS ONCE
Status: COMPLETED | OUTPATIENT
Start: 2023-11-28 | End: 2023-11-28

## 2023-11-28 RX ORDER — FOLIC ACID 1 MG/1
1 TABLET ORAL DAILY
Status: CANCELLED | OUTPATIENT
Start: 2023-11-29

## 2023-11-28 RX ORDER — METOPROLOL TARTRATE 1 MG/ML
5 INJECTION, SOLUTION INTRAVENOUS ONCE
Status: COMPLETED | OUTPATIENT
Start: 2023-11-28 | End: 2023-11-28

## 2023-11-28 RX ORDER — FOLIC ACID 1 MG/1
1 TABLET ORAL DAILY
Status: DISCONTINUED | OUTPATIENT
Start: 2023-11-28 | End: 2023-11-28

## 2023-11-28 RX ORDER — PANTOPRAZOLE SODIUM 40 MG/10ML
40 INJECTION, POWDER, LYOPHILIZED, FOR SOLUTION INTRAVENOUS EVERY 12 HOURS SCHEDULED
Status: DISCONTINUED | OUTPATIENT
Start: 2023-11-28 | End: 2023-11-28 | Stop reason: HOSPADM

## 2023-11-28 RX ORDER — SUCRALFATE 1 G/1
1 TABLET ORAL EVERY 6 HOURS SCHEDULED
Status: CANCELLED | OUTPATIENT
Start: 2023-11-29

## 2023-11-28 RX ORDER — FOLIC ACID 1 MG/1
1 TABLET ORAL DAILY
Status: DISCONTINUED | OUTPATIENT
Start: 2023-11-28 | End: 2023-11-28 | Stop reason: HOSPADM

## 2023-11-28 RX ORDER — LORAZEPAM 2 MG/ML
1 INJECTION INTRAMUSCULAR ONCE
Status: COMPLETED | OUTPATIENT
Start: 2023-11-28 | End: 2023-11-28

## 2023-11-28 RX ORDER — MAGNESIUM HYDROXIDE/ALUMINUM HYDROXICE/SIMETHICONE 120; 1200; 1200 MG/30ML; MG/30ML; MG/30ML
30 SUSPENSION ORAL EVERY 6 HOURS PRN
Status: DISCONTINUED | OUTPATIENT
Start: 2023-11-28 | End: 2023-12-01 | Stop reason: HOSPADM

## 2023-11-28 RX ORDER — DIAZEPAM 5 MG/ML
5 INJECTION, SOLUTION INTRAMUSCULAR; INTRAVENOUS ONCE
Status: COMPLETED | OUTPATIENT
Start: 2023-11-28 | End: 2023-11-28

## 2023-11-28 RX ORDER — SODIUM CHLORIDE 9 MG/ML
150 INJECTION, SOLUTION INTRAVENOUS CONTINUOUS
Status: DISCONTINUED | OUTPATIENT
Start: 2023-11-28 | End: 2023-11-28

## 2023-11-28 RX ORDER — SUCRALFATE 1 G/1
1 TABLET ORAL EVERY 6 HOURS SCHEDULED
Status: DISCONTINUED | OUTPATIENT
Start: 2023-11-28 | End: 2023-11-28 | Stop reason: HOSPADM

## 2023-11-28 RX ORDER — ENOXAPARIN SODIUM 100 MG/ML
40 INJECTION SUBCUTANEOUS DAILY
Status: DISCONTINUED | OUTPATIENT
Start: 2023-11-29 | End: 2023-12-01 | Stop reason: HOSPADM

## 2023-11-28 RX ORDER — METOPROLOL TARTRATE 1 MG/ML
2.5 INJECTION, SOLUTION INTRAVENOUS EVERY 6 HOURS
Status: CANCELLED | OUTPATIENT
Start: 2023-11-29

## 2023-11-28 RX ORDER — LANOLIN ALCOHOL/MO/W.PET/CERES
100 CREAM (GRAM) TOPICAL DAILY
Status: DISCONTINUED | OUTPATIENT
Start: 2023-11-28 | End: 2023-11-28

## 2023-11-28 RX ORDER — LANOLIN ALCOHOL/MO/W.PET/CERES
100 CREAM (GRAM) TOPICAL DAILY
Status: DISCONTINUED | OUTPATIENT
Start: 2023-11-28 | End: 2023-11-28 | Stop reason: HOSPADM

## 2023-11-28 RX ORDER — LORAZEPAM 2 MG/ML
4 INJECTION INTRAMUSCULAR ONCE
Status: CANCELLED | OUTPATIENT
Start: 2023-11-28

## 2023-11-28 RX ORDER — SODIUM CHLORIDE, SODIUM GLUCONATE, SODIUM ACETATE, POTASSIUM CHLORIDE, MAGNESIUM CHLORIDE, SODIUM PHOSPHATE, DIBASIC, AND POTASSIUM PHOSPHATE .53; .5; .37; .037; .03; .012; .00082 G/100ML; G/100ML; G/100ML; G/100ML; G/100ML; G/100ML; G/100ML
125 INJECTION, SOLUTION INTRAVENOUS CONTINUOUS
Status: DISCONTINUED | OUTPATIENT
Start: 2023-11-28 | End: 2023-11-30

## 2023-11-28 RX ORDER — ESCITALOPRAM OXALATE 20 MG/1
20 TABLET ORAL DAILY
Status: DISCONTINUED | OUTPATIENT
Start: 2023-11-28 | End: 2023-11-28 | Stop reason: HOSPADM

## 2023-11-28 RX ORDER — LORAZEPAM 2 MG/ML
4 INJECTION INTRAMUSCULAR ONCE
Status: COMPLETED | OUTPATIENT
Start: 2023-11-28 | End: 2023-11-28

## 2023-11-28 RX ORDER — ADENOSINE 3 MG/ML
1 INJECTION, SOLUTION INTRAVENOUS ONCE
Status: COMPLETED | OUTPATIENT
Start: 2023-11-28 | End: 2023-11-28

## 2023-11-28 RX ORDER — ACETAMINOPHEN 325 MG/1
650 TABLET ORAL EVERY 6 HOURS PRN
Status: DISCONTINUED | OUTPATIENT
Start: 2023-11-28 | End: 2023-12-01 | Stop reason: HOSPADM

## 2023-11-28 RX ORDER — ESCITALOPRAM OXALATE 20 MG/1
20 TABLET ORAL DAILY
Status: CANCELLED | OUTPATIENT
Start: 2023-11-29

## 2023-11-28 RX ORDER — LORAZEPAM 2 MG/ML
4 INJECTION INTRAMUSCULAR ONCE
Status: DISCONTINUED | OUTPATIENT
Start: 2023-11-28 | End: 2023-11-28 | Stop reason: HOSPADM

## 2023-11-28 RX ORDER — DIAZEPAM 5 MG/ML
20 INJECTION, SOLUTION INTRAMUSCULAR; INTRAVENOUS ONCE
Status: COMPLETED | OUTPATIENT
Start: 2023-11-28 | End: 2023-11-28

## 2023-11-28 RX ORDER — DOCUSATE SODIUM 100 MG/1
100 CAPSULE, LIQUID FILLED ORAL 2 TIMES DAILY
Status: DISCONTINUED | OUTPATIENT
Start: 2023-11-29 | End: 2023-12-01 | Stop reason: HOSPADM

## 2023-11-28 RX ORDER — ADENOSINE 3 MG/ML
2 INJECTION, SOLUTION INTRAVENOUS ONCE
Status: COMPLETED | OUTPATIENT
Start: 2023-11-28 | End: 2023-11-28

## 2023-11-28 RX ORDER — DILTIAZEM HYDROCHLORIDE 5 MG/ML
20 INJECTION INTRAVENOUS ONCE
Status: COMPLETED | OUTPATIENT
Start: 2023-11-28 | End: 2023-11-28

## 2023-11-28 RX ORDER — MAGNESIUM SULFATE HEPTAHYDRATE 40 MG/ML
2 INJECTION, SOLUTION INTRAVENOUS ONCE
Status: COMPLETED | OUTPATIENT
Start: 2023-11-28 | End: 2023-11-29

## 2023-11-28 RX ORDER — SODIUM CHLORIDE, SODIUM GLUCONATE, SODIUM ACETATE, POTASSIUM CHLORIDE, MAGNESIUM CHLORIDE, SODIUM PHOSPHATE, DIBASIC, AND POTASSIUM PHOSPHATE .53; .5; .37; .037; .03; .012; .00082 G/100ML; G/100ML; G/100ML; G/100ML; G/100ML; G/100ML; G/100ML
100 INJECTION, SOLUTION INTRAVENOUS CONTINUOUS
Status: CANCELLED | OUTPATIENT
Start: 2023-11-28

## 2023-11-28 RX ORDER — TRAZODONE HYDROCHLORIDE 50 MG/1
50 TABLET ORAL
Status: DISCONTINUED | OUTPATIENT
Start: 2023-11-28 | End: 2023-12-01 | Stop reason: HOSPADM

## 2023-11-28 RX ORDER — PANTOPRAZOLE SODIUM 40 MG/10ML
40 INJECTION, POWDER, LYOPHILIZED, FOR SOLUTION INTRAVENOUS EVERY 12 HOURS SCHEDULED
Status: CANCELLED | OUTPATIENT
Start: 2023-11-28

## 2023-11-28 RX ORDER — LANOLIN ALCOHOL/MO/W.PET/CERES
100 CREAM (GRAM) TOPICAL DAILY
Status: CANCELLED | OUTPATIENT
Start: 2023-11-29

## 2023-11-28 RX ORDER — POTASSIUM CHLORIDE 14.9 MG/ML
20 INJECTION INTRAVENOUS ONCE
Status: COMPLETED | OUTPATIENT
Start: 2023-11-28 | End: 2023-11-28

## 2023-11-28 RX ORDER — ONDANSETRON 2 MG/ML
4 INJECTION INTRAMUSCULAR; INTRAVENOUS EVERY 6 HOURS PRN
Status: DISCONTINUED | OUTPATIENT
Start: 2023-11-28 | End: 2023-12-01 | Stop reason: HOSPADM

## 2023-11-28 RX ORDER — METOPROLOL TARTRATE 1 MG/ML
2.5 INJECTION, SOLUTION INTRAVENOUS EVERY 6 HOURS
Status: DISCONTINUED | OUTPATIENT
Start: 2023-11-28 | End: 2023-11-28 | Stop reason: HOSPADM

## 2023-11-28 RX ORDER — LORAZEPAM 1 MG/1
2 TABLET ORAL ONCE
Status: COMPLETED | OUTPATIENT
Start: 2023-11-28 | End: 2023-11-28

## 2023-11-28 RX ADMIN — LORAZEPAM 1 MG: 2 INJECTION INTRAMUSCULAR; INTRAVENOUS at 12:10

## 2023-11-28 RX ADMIN — MAGNESIUM SULFATE HEPTAHYDRATE 2 G: 40 INJECTION, SOLUTION INTRAVENOUS at 23:22

## 2023-11-28 RX ADMIN — SODIUM CHLORIDE, SODIUM GLUCONATE, SODIUM ACETATE, POTASSIUM CHLORIDE, MAGNESIUM CHLORIDE, SODIUM PHOSPHATE, DIBASIC, AND POTASSIUM PHOSPHATE 100 ML/HR: .53; .5; .37; .037; .03; .012; .00082 INJECTION, SOLUTION INTRAVENOUS at 14:34

## 2023-11-28 RX ADMIN — Medication 100 MG: at 16:30

## 2023-11-28 RX ADMIN — SODIUM CHLORIDE 1000 ML: 0.9 INJECTION, SOLUTION INTRAVENOUS at 12:09

## 2023-11-28 RX ADMIN — DIAZEPAM 5 MG: 10 INJECTION, SOLUTION INTRAMUSCULAR; INTRAVENOUS at 21:29

## 2023-11-28 RX ADMIN — FOLIC ACID 1 MG: 1 TABLET ORAL at 16:30

## 2023-11-28 RX ADMIN — SODIUM CHLORIDE, SODIUM GLUCONATE, SODIUM ACETATE, POTASSIUM CHLORIDE, MAGNESIUM CHLORIDE, SODIUM PHOSPHATE, DIBASIC, AND POTASSIUM PHOSPHATE 125 ML/HR: .53; .5; .37; .037; .03; .012; .00082 INJECTION, SOLUTION INTRAVENOUS at 23:22

## 2023-11-28 RX ADMIN — Medication 650 MG: at 22:54

## 2023-11-28 RX ADMIN — METOROPROLOL TARTRATE 2.5 MG: 5 INJECTION, SOLUTION INTRAVENOUS at 18:23

## 2023-11-28 RX ADMIN — SUCRALFATE 1 G: 1 TABLET ORAL at 16:29

## 2023-11-28 RX ADMIN — POTASSIUM CHLORIDE 20 MEQ: 14.9 INJECTION, SOLUTION INTRAVENOUS at 13:30

## 2023-11-28 RX ADMIN — DIAZEPAM 20 MG: 5 INJECTION, SOLUTION INTRAMUSCULAR; INTRAVENOUS at 18:29

## 2023-11-28 RX ADMIN — METOROPROLOL TARTRATE 5 MG: 5 INJECTION, SOLUTION INTRAVENOUS at 14:45

## 2023-11-28 RX ADMIN — MULTIPLE VITAMINS W/ MINERALS TAB 1 TABLET: TAB ORAL at 16:30

## 2023-11-28 RX ADMIN — ESCITALOPRAM OXALATE 20 MG: 20 TABLET ORAL at 16:30

## 2023-11-28 RX ADMIN — LORAZEPAM 4 MG: 2 INJECTION INTRAMUSCULAR; INTRAVENOUS at 16:45

## 2023-11-28 RX ADMIN — LORAZEPAM 4 MG: 2 INJECTION INTRAMUSCULAR; INTRAVENOUS at 14:22

## 2023-11-28 RX ADMIN — SODIUM CHLORIDE 1000 ML: 0.9 INJECTION, SOLUTION INTRAVENOUS at 23:12

## 2023-11-28 RX ADMIN — LORAZEPAM 2 MG: 1 TABLET ORAL at 15:30

## 2023-11-28 RX ADMIN — SODIUM CHLORIDE 150 ML/HR: 0.9 INJECTION, SOLUTION INTRAVENOUS at 13:30

## 2023-11-28 RX ADMIN — MAGNESIUM SULFATE HEPTAHYDRATE 2 G: 40 INJECTION, SOLUTION INTRAVENOUS at 13:31

## 2023-11-28 RX ADMIN — PANTOPRAZOLE SODIUM 40 MG: 40 INJECTION, POWDER, FOR SOLUTION INTRAVENOUS at 16:31

## 2023-11-28 RX ADMIN — LORAZEPAM 4 MG: 2 INJECTION INTRAMUSCULAR; INTRAVENOUS at 17:42

## 2023-11-28 RX ADMIN — SUCRALFATE 1 G: 1 TABLET ORAL at 23:23

## 2023-11-28 RX ADMIN — LORAZEPAM 1 MG: 2 INJECTION INTRAMUSCULAR; INTRAVENOUS at 19:57

## 2023-11-28 NOTE — ASSESSMENT & PLAN NOTE
Patient presenting with palpitations, generalized shaking, chest tightness, multiple episodes of dry heaving overnight  Last drink was last night -1 large and 1 small bottle of Chardonnay. No hard liquor. His usual daily alcohol intake. On CIWA protocol patient continued to require high-dose IV Ativan. Despite multiple doses patient continued to worsen clinically with increased confusion, agitation and, hallucination. Case discussed with on-call  Dr. Charline Orellana -recommended transferring patient to [facility] detox. Patient to be transferred as soon as transport is arranged.   Called and informed patient's significant other [facility]

## 2023-11-28 NOTE — ASSESSMENT & PLAN NOTE
Multifactorial possibly from sinus tachycardia and also from gastritis/GERD related to alcohol abuse. Troponin negative x1. No acute ST changes seen on EKG. Continue to monitor closely on telemetry. Trend troponin x3. We will start the patient on Protonix IV and Carafate.

## 2023-11-28 NOTE — ASSESSMENT & PLAN NOTE
Multifactorial possibly from sinus tachycardia and also from gastritis/GERD related to alcohol abuse. Troponin negative x3. No acute ST changes seen on EKG. Continue to monitor closely on telemetry. Continue Protonix IV and Carafate.

## 2023-11-28 NOTE — ASSESSMENT & PLAN NOTE
Patient presented with palpitations with chest tightness. Noted to have heart rate in 140s to 160. En route to the hospital was given adenosine and Cardizem. EKG showed sinus tachycardia without any acute ST-T changes  Possibly from alcohol withdrawal.  Troponin negative x 3. We will continue to monitor on telemetry. Improved after getting metoprolol 5 mg IV. Will hold the patient's home and start metoprolol 5 mg IV every 6 to help with tachycardia and blood pressure.

## 2023-11-28 NOTE — H&P
[address] [facility]  H&P  Name: Ishan Vaca 63 y.o. male I MRN: [id number]  Unit/Bed#: S -01 I Date of Admission: 11/28/2023   Date of Service: 11/28/2023 I Hospital Day: 0      Assessment/Plan   * Alcohol withdrawal syndrome without complication [facility])  Assessment & Plan  Patient presenting with palpitations, generalized shaking, chest tightness, multiple episodes of dry heaving overnight  Last drink was last night -1 large and 1 small bottle of Chardonnay. No hard liquor. His usual daily alcohol intake. Continue on CIWA protocol. Last CIWA score very high. We will continue to monitor closely for now. Patient also noted to have mild elevation in AST most likely from alcohol abuse. Continue to monitor. Sinus tachycardia  Assessment & Plan  Patient presented with palpitations with chest tightness. Noted to have heart rate in 140s to 160. In route to the hospital was given adenosine and Cardizem. Possibly from alcohol withdrawal.  We will continue to monitor on telemetry. We will administer metoprolol 5 mg IV for now. Patient without any known cardiac history. Check TSH      Chest tightness  Assessment & Plan  Multifactorial possibly from sinus tachycardia and also from gastritis/GERD related to alcohol abuse. Troponin negative x1. No acute ST changes seen on EKG. Continue to monitor closely on telemetry. Trend troponin x3. We will start the patient on Protonix IV and Carafate. VTE Prophylaxis:  low risk   / sequential compression device   Code Status: full  POLST: POLST form is not discussed and not completed at this time. Discussion with family: pt    Anticipated Length of Stay:  Patient will be admitted on an Observation basis with an anticipated length of stay of  < 2 midnights. Justification for Hospital Stay: above    Total Time for Visit, including Counseling / Coordination of Care: 70 minutes.   Greater than 50% of this total time spent on direct patient counseling and coordination of care. Chief Complaint: Palpitations, chest tightness    History of Present Illness:    Calli Arteaga is a 63 y.o. male who presents with palpitations, chest tightness, multiple episodes of dry heaving starting last night. Patient has history of alcohol abuse. Previously used to drink hard liquor but now has switched to wine. Drinks approximately 1 large bottle of wine every day. Last evening he drank 1 large bottle and a small bottle of wine. Since then has had multiple episodes of dry heaving. Also reports substernal chest tightness/heaviness, difficulty getting air in, palpitations. Also reports generalized shaking. Denies any known cardiac history. He is very anxious and concerned about having a heart attack. Reports parents having heart attack in their 62s. Denies any other drug use. Review of Systems:    Review of Systems   Constitutional:  Negative for chills and fever. HENT:  Negative for congestion and sore throat. Respiratory:  Positive for chest tightness and shortness of breath. Negative for cough. Cardiovascular:  Positive for palpitations. Negative for chest pain and leg swelling. Gastrointestinal:  Positive for nausea and vomiting. Negative for abdominal pain. Genitourinary:  Negative for difficulty urinating, flank pain, frequency and urgency. Musculoskeletal:  Negative for arthralgias and myalgias. Skin:  Negative for color change and rash. Neurological:  Negative for dizziness and light-headedness. Psychiatric/Behavioral:  Negative for agitation, behavioral problems and confusion. Past Medical and Surgical History:     Past Medical History:   Diagnosis Date    Alcoholic ketoacidosis 56/32/7272    GERD (gastroesophageal reflux disease)     Hypertension     Vomiting 04/02/2023       History reviewed. No pertinent surgical history.     Meds/Allergies:    Prior to Admission medications    Medication Sig Start Date End Date Taking? Authorizing Provider   escitalopram (LEXAPRO) 20 mg tablet Take 1 tablet by mouth daily 2/21/23  Yes Historical Provider, MD   folic acid (FOLVITE) 1 mg tablet Take 1 tablet (1 mg total) by mouth daily Do not start before October 3, 2023. 10/3/23  Yes Ernestina Loomis PA-C   lisinopril (ZESTRIL) 40 mg tablet Take 1 tablet (40 mg total) by mouth daily 4/4/23  Yes Deneen Velasquez PA-C   omeprazole (PriLOSEC) 40 MG capsule Take 40 mg by mouth 2 (two) times a day 12/5/22  Yes Historical Provider, MD   thiamine 100 MG tablet Take 1 tablet (100 mg total) by mouth daily Do not start before October 3, 2023. 10/3/23  Yes Ernestina Loomis PA-C   multivitamin-iron-minerals-folic acid (CENTRUM) chewable tablet Chew 1 tablet daily 10/18/23 11/17/23  Marbella Das MD   naltrexone (REVIA) 50 mg tablet Take 1 tablet (50 mg total) by mouth daily 10/18/23 11/17/23  Marbella Das MD     I have reviewed home medications with patient personally. Allergies: Allergies   Allergen Reactions    Cefdinir Rash       Social History:     Marital Status: Single   Occupation:   Patient Pre-hospital Living Situation:   Patient Pre-hospital Level of Mobility:   Patient Pre-hospital Diet Restrictions:   Substance Use History:   Social History     Substance and Sexual Activity   Alcohol Use Yes    Comment: up to a handle a day. Social History     Tobacco Use   Smoking Status Never   Smokeless Tobacco Never     Social History     Substance and Sexual Activity   Drug Use Never       Family History:    History reviewed. No pertinent family history.     Physical Exam:     Vitals:   Blood Pressure: 143/93 (11/28/23 1357)  Pulse: (!) 140 (11/28/23 1357)  Temperature: 97.7 °F (36.5 °C) (11/28/23 1357)  Temp Source: Oral (11/28/23 1357)  Respirations: 18 (11/28/23 1357)  Height: 5' 8" (172.7 cm) (11/28/23 1357)  Weight - Scale: 99.9 kg (220 lb 3.8 oz) (11/28/23 1357)  SpO2: 96 % (11/28/23 1357)    Physical Exam    Constitutional: Pt appears comfortable. Not in any acute distress. HENT:   Head: Normocephalic and atraumatic. Eyes: EOM are normal.   Neck: Neck supple. Cardiovascular: tachycardia, regular rhythm, normal heart sounds. No murmur heard. Pulmonary/Chest: Effort normal, air entry b/l equal. No respiratory distress. Pt has no wheezes or crackles. Abdominal: Soft. Non-distended, Non-tender. Bowel sounds are normal.   Musculoskeletal: Normal range of motion. Neurological: awake, alert. Moving all extremities spontaneously. Psychiatric: anxious    Additional Data:     Lab Results: I have personally reviewed pertinent reports. Results from last 7 days   Lab Units 11/28/23  1208   WBC Thousand/uL 16.64*   HEMOGLOBIN g/dL 17.7*   HEMATOCRIT % 50.3*   PLATELETS Thousands/uL 293   NEUTROS PCT % 79*   LYMPHS PCT % 16   MONOS PCT % 4   EOS PCT % 0     Results from last 7 days   Lab Units 11/28/23  1208   SODIUM mmol/L 136   POTASSIUM mmol/L 4.5   CHLORIDE mmol/L 98   CO2 mmol/L 20*   BUN mg/dL 9   CREATININE mg/dL 1.11   ANION GAP mmol/L 18   CALCIUM mg/dL 9.4   ALBUMIN g/dL 4.5   TOTAL BILIRUBIN mg/dL 1.42*   ALK PHOS U/L 163*   ALT U/L 20   AST U/L 47*   GLUCOSE RANDOM mg/dL 134     Results from last 7 days   Lab Units 11/28/23  1208   INR  1.06                   Imaging: I have personally reviewed pertinent reports. No orders to display       EKG, Pathology, and Other Studies Reviewed on Admission:   EKG: sinus tachycardia, no acute STT changes    [facility] Reviewed: Yes     ** Please Note: This note has been constructed using a voice recognition system.  **

## 2023-11-28 NOTE — ASSESSMENT & PLAN NOTE
Patient presenting with palpitations, generalized shaking, chest tightness, multiple episodes of dry heaving overnight  Last drink was last night -1 large and 1 small bottle of Chardonnay. No hard liquor. His usual daily alcohol intake. Continue on CIWA protocol. Last CIWA score very high. We will continue to monitor closely for now. Patient also noted to have mild elevation in AST most likely from alcohol abuse. Continue to monitor.

## 2023-11-28 NOTE — ASSESSMENT & PLAN NOTE
Patient presented with palpitations with chest tightness. Noted to have heart rate in 140s to 160. In route to the hospital was given adenosine and Cardizem. Possibly from alcohol withdrawal.  We will continue to monitor on telemetry. We will administer metoprolol 5 mg IV for now. Patient without any known cardiac history.   Check TSH English

## 2023-11-28 NOTE — DISCHARGE SUMMARY
[address] [facility]  Discharge- Matt Quale 1965, 63 y.o. male MRN: [id number]  Unit/Bed#: S -01 Encounter: [id number]  Primary Care Provider: Guero Harrison MD   Date and time admitted to hospital: 11/28/2023 11:56 AM    * Alcohol withdrawal syndrome without complication [facility])  Assessment & Plan  Patient presenting with palpitations, generalized shaking, chest tightness, multiple episodes of dry heaving overnight  Last drink was last night -1 large and 1 small bottle of Chardonnay. No hard liquor. His usual daily alcohol intake. On CIWA protocol patient continued to require high-dose IV Ativan. Despite multiple doses patient continued to worsen clinically with increased confusion, agitation and, hallucination. Case discussed with on-call  Dr. Neisha Thompson -recommended transferring patient to [facility] detox. Patient to be transferred as soon as transport is arranged. Called and informed patient's significant other Holly    Sinus tachycardia  Assessment & Plan  Patient presented with palpitations with chest tightness. Noted to have heart rate in 140s to 160. En route to the hospital was given adenosine and Cardizem. EKG showed sinus tachycardia without any acute ST-T changes  Possibly from alcohol withdrawal.  Troponin negative x 3. We will continue to monitor on telemetry. Improved after getting metoprolol 5 mg IV. Will hold the patient's home and start metoprolol 5 mg IV every 6 to help with tachycardia and blood pressure. Chest tightness  Assessment & Plan  Multifactorial possibly from sinus tachycardia and also from gastritis/GERD related to alcohol abuse. Troponin negative x3. No acute ST changes seen on EKG. Continue to monitor closely on telemetry. Continue Protonix IV and Carafate.         Discharging Physician / Practitioner: Erendira Arnett MD  PCP: Guero Harrison MD  Admission Date:   Admission Orders (From admission, onward)       Ordered 11/28/23 1742  Inpatient Admission  Once            11/28/23 1316  Place in Observation  Once                          Discharge Date: 11/28/23    Medical Problems       Resolved Problems  Date Reviewed: 10/18/2023   None             Reason for Admission:   Palpitations, chest tightness, anxiety. Hospital Course:     Carina Jane is a 63 y.o. male patient who originally presented to the hospital on 11/28/2023 due to symptoms. Most likely from alcohol withdrawal.  Despite getting multiple doses of IV Ativan patient symptoms continue to worsen. 20 mg of IV diazepam was administered after discussion with on-call  and it was recommended the patient be transferred to [facility] detox unit. The patient, initially admitted to the hospital as inpatient, was discharged earlier than expected given the following: Transfer to detox unit. Please see above list of diagnoses and related plan for additional information. Condition at Discharge: poor     Discharge Day Visit / Exam:     Subjective: Patient currently very confused, intermittently agitated. Vitals: Blood Pressure: 143/94 (11/28/23 1824)  Pulse: (!) 130 (11/28/23 1824)  Temperature: 98.7 °F (37.1 °C) (11/28/23 1824)  Temp Source: Oral (11/28/23 1505)  Respirations: 18 (11/28/23 1824)  Height: 5' 8" (172.7 cm) (11/28/23 1357)  Weight - Scale: 99.9 kg (220 lb 3.8 oz) (11/28/23 1357)  SpO2: 94 % (11/28/23 1629)    Exam:   Physical Exam    Constitutional: Pt appears comfortable. Not in any acute distress. Cardiovascular: Normal rate, regular rhythm, normal heart sounds. No murmur heard. Pulmonary/Chest: Effort normal, air entry b/l equal. No respiratory distress. Pt has no wheezes or crackles. Abdominal: Soft. Non-distended, Non-tender. Bowel sounds are normal.    Neurological: awake, alert. Moving all extremities spontaneously. Psychiatric: Confused, mildly agitated.   Hallucinating -talking to his father    Discussion with Family: called and updated significant other - Holly    Discharge instructions/Information to patient and family:   See after visit summary for information provided to patient and family. Provisions for Follow-Up Care:  See after visit summary for information related to follow-up care and any pertinent home health orders. Disposition:     [facility]     Discharge Statement:  I spent 45 minutes discharging the patient. This time was spent on the day of discharge. I had direct contact with the patient on the day of discharge. Greater than 50% of the total time was spent examining patient, answering all patient questions, arranging and discussing plan of care with patient as well as directly providing post-discharge instructions. Additional time then spent on discharge activities. Discharge Medications:  See after visit summary for reconciled discharge medications provided to patient and family.       ** Please Note: This note has been constructed using a voice recognition system **

## 2023-11-28 NOTE — ED PROVIDER NOTES
History  Chief Complaint   Patient presents with    Rapid Heart Rate     Chest pressure and palpitations starting yesterday after drinking a bottle of wine. Called EMS today for worsening symptoms. HR for -180s. Eleno Diallo is a 63 y.o. male who is brought in by EMS with palpitations and rapid heart rate. En route he was given adenosine x 2, diltiazem and an IVF bolus without any improvement in his heart rate (he presents in the 150's - looks like sinus tach on monitor). He has a history of alcoholism with withdrawal and reports he first started feeling poorly last night. Patient had an admission last week for similar symptoms. He states he drank a bottle of chardonnay yesterday which is his normal etoh intake. History provided by:  Patient and EMS personnel   used: No    Rapid Heart Rate  Palpitations quality:  Fast  Onset quality:  Sudden  Timing:  Constant  Progression:  Worsening  Chronicity:  New      Prior to Admission Medications   Prescriptions Last Dose Informant Patient Reported? Taking?   escitalopram (LEXAPRO) 20 mg tablet 11/27/2023  Yes Yes   Sig: Take 1 tablet by mouth daily   folic acid (FOLVITE) 1 mg tablet Past Month  No Yes   Sig: Take 1 tablet (1 mg total) by mouth daily Do not start before October 3, 2023. lisinopril (ZESTRIL) 40 mg tablet Past Week  No Yes   Sig: Take 1 tablet (40 mg total) by mouth daily   multivitamin-iron-minerals-folic acid (CENTRUM) chewable tablet   No No   Sig: Chew 1 tablet daily   naltrexone (REVIA) 50 mg tablet   No No   Sig: Take 1 tablet (50 mg total) by mouth daily   omeprazole (PriLOSEC) 40 MG capsule 11/27/2023  Yes Yes   Sig: Take 40 mg by mouth 2 (two) times a day   thiamine 100 MG tablet Past Month  No Yes   Sig: Take 1 tablet (100 mg total) by mouth daily Do not start before October 3, 2023.       Facility-Administered Medications: None       Past Medical History:   Diagnosis Date    Alcoholic ketoacidosis 69/16/7308 GERD (gastroesophageal reflux disease)     Hypertension     Vomiting 04/02/2023       History reviewed. No pertinent surgical history. History reviewed. No pertinent family history. I have reviewed and agree with the history as documented. E-Cigarette/Vaping    E-Cigarette Use Never User      E-Cigarette/Vaping Substances     Social History     Tobacco Use    Smoking status: Never    Smokeless tobacco: Never   Vaping Use    Vaping Use: Never used   Substance Use Topics    Alcohol use: Yes     Comment: up to a handle a day. Drug use: Never       Review of Systems   Cardiovascular:  Positive for palpitations. Physical Exam  Physical Exam  Vitals and nursing note reviewed. Constitutional:       General: He is in acute distress. Appearance: He is well-developed. He is diaphoretic. HENT:      Head: Normocephalic and atraumatic. Eyes:      Extraocular Movements: Extraocular movements intact. Pupils: Pupils are equal, round, and reactive to light. Neck:      Vascular: No JVD. Cardiovascular:      Rate and Rhythm: Regular rhythm. Tachycardia present. Heart sounds: Normal heart sounds. No murmur heard. No friction rub. No gallop. Pulmonary:      Effort: Pulmonary effort is normal. No respiratory distress. Breath sounds: Normal breath sounds. No wheezing or rales. Comments: Hyperventilating    Chest:      Chest wall: No tenderness. Musculoskeletal:         General: No tenderness. Normal range of motion. Cervical back: Normal range of motion. Skin:     General: Skin is warm. Neurological:      General: No focal deficit present. Mental Status: He is alert and oriented to person, place, and time. Psychiatric:         Mood and Affect: Mood is anxious. Behavior: Behavior normal.         Thought Content:  Thought content normal.         Judgment: Judgment normal.         Vital Signs  ED Triage Vitals   Temperature Pulse Respirations Blood Pressure SpO2   11/28/23 1212 11/28/23 1201 11/28/23 1201 11/28/23 1201 11/28/23 1201   99.1 °F (37.3 °C) (!) 162 (!) 24 159/93 96 %      Temp Source Heart Rate Source Patient Position - Orthostatic VS BP Location FiO2 (%)   11/28/23 1212 11/28/23 1201 11/28/23 1201 11/28/23 1201 --   Axillary Monitor Sitting Right arm       Pain Score       --                  Vitals:    11/28/23 1201 11/28/23 1216 11/28/23 1230 11/28/23 1300   BP: 159/93 159/93 143/88 150/91   Pulse: (!) 162 (!) 138 (!) 138 (!) 144   Patient Position - Orthostatic VS: Sitting            Visual Acuity      ED Medications  Medications   thiamine (VITAMIN B1) 100 mg in sodium chloride 0.9 % 50 mL IVPB (has no administration in time range)   magnesium sulfate 2 g/50 mL IVPB (premix) 2 g (has no administration in time range)   potassium chloride 20 mEq IVPB (premix) (has no administration in time range)   sodium chloride 0.9 % infusion (150 mL/hr Intravenous New Bag 11/28/23 1330)   adenosine (FOR EMS ONLY) (ADENOCARD) 6 mg/2 mL injection 12 mg (0 mg Does not apply Given to EMS 11/28/23 1208)   adenosine (FOR EMS ONLY) (ADENOCARD) 6 mg/2 mL injection 6 mg (0 mg Does not apply Given to EMS 11/28/23 1208)   sodium chloride 0.9 % bolus 1,000 mL (0 mL Intravenous Stopped 11/28/23 1309)   LORazepam (ATIVAN) injection 1 mg (1 mg Intravenous Given 11/28/23 1210)   diltiazem (CARDIZEM) injection 20 mg (0 mg Intravenous Given to EMS 11/28/23 1208)       Diagnostic Studies  Results Reviewed       Procedure Component Value Units Date/Time    HS Troponin 0hr (reflex protocol) [[id number]] Collected: 11/28/23 1319    Lab Status: In process Specimen: Blood from Arm, Left Updated: 11/28/23 1326    Lipase [[id number]] Collected: 11/28/23 1319    Lab Status: In process Specimen: Blood from Arm, Left Updated: 11/28/23 1326    Magnesium [[id number]] Collected: 11/28/23 1319    Lab Status:  In process Specimen: Blood from Arm, Left Updated: 11/28/23 1326    Phosphorus [[id number]] Collected: 11/28/23 1319    Lab Status:  In process Specimen: Blood from Arm, Left Updated: 11/28/23 1326    Protime-INR [[id number]]  (Normal) Collected: 11/28/23 1208    Lab Status: Final result Specimen: Blood from Arm, Right Updated: 11/28/23 1312     Protime 14.4 seconds      INR 1.06    APTT [[id number]]  (Normal) Collected: 11/28/23 1208    Lab Status: Final result Specimen: Blood from Arm, Right Updated: 11/28/23 1312     PTT 26 seconds     Comprehensive metabolic panel [[id number]]  (Abnormal) Collected: 11/28/23 1208    Lab Status: Final result Specimen: Blood from Arm, Right Updated: 11/28/23 1241     Sodium 136 mmol/L      Potassium 4.5 mmol/L      Chloride 98 mmol/L      CO2 20 mmol/L      ANION GAP 18 mmol/L      BUN 9 mg/dL      Creatinine 1.11 mg/dL      Glucose 134 mg/dL      Calcium 9.4 mg/dL      AST 47 U/L      ALT 20 U/L      Alkaline Phosphatase 163 U/L      Total Protein 8.1 g/dL      Albumin 4.5 g/dL      Total Bilirubin 1.42 mg/dL      eGFR 73 ml/min/1.73sq m     Narrative:      [facility] guidelines for Chronic Kidney Disease (CKD):     Stage 1 with normal or high GFR (GFR > 90 mL/min/1.73 square meters)    Stage 2 Mild CKD (GFR = 60-89 mL/min/1.73 square meters)    Stage 3A Moderate CKD (GFR = 45-59 mL/min/1.73 square meters)    Stage 3B Moderate CKD (GFR = 30-44 mL/min/1.73 square meters)    Stage 4 Severe CKD (GFR = 15-29 mL/min/1.73 square meters)    Stage 5 End Stage CKD (GFR <15 mL/min/1.73 square meters)  Note: GFR calculation is accurate only with a steady state creatinine    Ethanol [[id number]]  (Normal) Collected: 11/28/23 1208    Lab Status: Final result Specimen: Blood from Arm, Right Updated: 11/28/23 1240     Ethanol Lvl <10 mg/dL     CBC and differential [[id number]]  (Abnormal) Collected: 11/28/23 1208    Lab Status: Final result Specimen: Blood from Arm, Right Updated: 11/28/23 1219     WBC 16.64 Thousand/uL      RBC 5.41 Million/uL      Hemoglobin 17.7 g/dL      Hematocrit 50.3 %      MCV 93 fL      MCH 32.7 pg      MCHC 35.2 g/dL      RDW 13.3 %      MPV 8.4 fL      Platelets 206 Thousands/uL      nRBC 0 /100 WBCs      Neutrophils Relative 79 %      Immat GRANS % 0 %      Lymphocytes Relative 16 %      Monocytes Relative 4 %      Eosinophils Relative 0 %      Basophils Relative 1 %      Neutrophils Absolute 13.02 Thousands/µL      Immature Grans Absolute 0.07 Thousand/uL      Lymphocytes Absolute 2.72 Thousands/µL      Monocytes Absolute 0.69 Thousand/µL      Eosinophils Absolute 0.03 Thousand/µL      Basophils Absolute 0.11 Thousands/µL     Blood gas, venous [[id number]]  (Abnormal) Collected: 11/28/23 1208    Lab Status: Final result Specimen: Blood from Arm, Right Updated: 11/28/23 1219     pH, Cy 7.438     pCO2, Cy 31.3 mm Hg      pO2, Cy 57.7 mm Hg      HCO3, Cy 20.7 mmol/L      Base Excess, Cy -2.1 mmol/L      O2 Content, Cy 22.3 ml/dL      O2 HGB, VENOUS 88.1 %                    No orders to display              Procedures  ECG 12 Lead Documentation Only    Date/Time: 11/28/2023 1:32 PM    Performed by: Gaylin Schwab, MD  Authorized by: Gaylin Schwab, MD    Indications / Diagnosis:  Arrhythmia  ECG reviewed by me, the ED Provider: yes    Patient location:  ED  Previous ECG:     Previous ECG:  Compared to current    Comparison ECG info:  10/01/23    Similarity:  No change  Interpretation:     Interpretation: abnormal    Rate:     ECG rate:  156    ECG rate assessment: tachycardic    Rhythm:     Rhythm: sinus tachycardia    Ectopy:     Ectopy: none    QRS:     QRS axis:  Right    QRS intervals:  Normal  Conduction:     Conduction: normal    ST segments:     ST segments:  Normal  T waves:     T waves: normal    CriticalCare Time    Date/Time: 11/28/2023 1:33 PM    Performed by: Gaylin Schwab, MD  Authorized by: Gaylin Schwab, MD    Critical care provider statement:     Critical care time (minutes):  45    Critical care time was exclusive of:  Separately billable procedures and treating other patients and teaching time    Critical care was necessary to treat or prevent imminent or life-threatening deterioration of the following conditions:  Toxidrome (alcohol withdrawal)    Critical care was time spent personally by me on the following activities:  Blood draw for specimens, obtaining history from patient or surrogate, development of treatment plan with patient or surrogate, evaluation of patient's response to treatment, examination of patient, interpretation of cardiac output measurements, ordering and performing treatments and interventions, ordering and review of laboratory studies, re-evaluation of patient's condition and review of old charts           ED Course                                             Medical Decision Making  Background: 63 y.o. male presents with tachycardia, anxiety, hyperventilation    Differential DX includes but is not limited to: alcohol withdrawal, metabolic derangement, dehydration, arrhythmia (no evidence for SVT or afib at this time), doubt acs, doubt PE    Plan: cbc, cmp, vbg, etoh, ivf, benzodiazepine, admission       Amount and/or Complexity of Data Reviewed  Labs: ordered. Risk  Prescription drug management. Decision regarding hospitalization.              Disposition  Final diagnoses:   Alcohol withdrawal ([address])   Rapid heart rate     Time reflects when diagnosis was documented in both MDM as applicable and the Disposition within this note       Time User Action Codes Description Comment    11/28/2023  1:16 PM Kelly Moss [F10.939] Alcohol withdrawal ([address])     11/28/2023  1:16 PM Kelly Moss [R00.0] Rapid heart rate           ED Disposition       ED Disposition   Admit    Condition   Stable    Date/Time   Tue Nov 28, 2023  1:16 PM    Comment   Case was discussed with Dr. David Bowen and the patient's admission status was agreed to be Admission Status: observation status to the service of Dr. Herman Strickland . Follow-up Information    None         Patient's Medications   Discharge Prescriptions    No medications on file       No discharge procedures on file.     PDMP Review       None            ED Provider  Electronically Signed by             Sebas Yu MD  11/28/23 9987

## 2023-11-29 ENCOUNTER — APPOINTMENT (INPATIENT)
Dept: CT IMAGING | Facility: HOSPITAL | Age: 58
End: 2023-11-29
Payer: COMMERCIAL

## 2023-11-29 ENCOUNTER — APPOINTMENT (INPATIENT)
Dept: NON INVASIVE DIAGNOSTICS | Facility: HOSPITAL | Age: 58
End: 2023-11-29
Payer: COMMERCIAL

## 2023-11-29 PROBLEM — E83.42 HYPOMAGNESEMIA: Status: RESOLVED | Noted: 2023-04-02 | Resolved: 2023-11-29

## 2023-11-29 PROBLEM — E86.0 DEHYDRATION: Status: ACTIVE | Noted: 2023-11-29

## 2023-11-29 LAB
ALBUMIN SERPL BCP-MCNC: 3.9 G/DL (ref 3.5–5)
ALP SERPL-CCNC: 116 U/L (ref 34–104)
ALT SERPL W P-5'-P-CCNC: 11 U/L (ref 7–52)
ANION GAP SERPL CALCULATED.3IONS-SCNC: 10 MMOL/L
AORTIC ROOT: 3.8 CM
APICAL FOUR CHAMBER EJECTION FRACTION: 58 %
AST SERPL W P-5'-P-CCNC: 29 U/L (ref 13–39)
ATRIAL RATE: 115 BPM
ATRIAL RATE: 118 BPM
BACTERIA UR QL AUTO: ABNORMAL /HPF
BILIRUB SERPL-MCNC: 1.56 MG/DL (ref 0.2–1)
BILIRUB UR QL STRIP: NEGATIVE
BUN SERPL-MCNC: 10 MG/DL (ref 5–25)
CALCIUM SERPL-MCNC: 8.7 MG/DL (ref 8.4–10.2)
CARDIAC TROPONIN I PNL SERPL HS: 3 NG/L
CHLORIDE SERPL-SCNC: 103 MMOL/L (ref 96–108)
CLARITY UR: CLEAR
CO2 SERPL-SCNC: 24 MMOL/L (ref 21–32)
COLOR UR: ABNORMAL
CREAT SERPL-MCNC: 1 MG/DL (ref 0.6–1.3)
E WAVE DECELERATION TIME: 121 MS
E/A RATIO: 0.72
ERYTHROCYTE [DISTWIDTH] IN BLOOD BY AUTOMATED COUNT: 13.2 % (ref 11.6–15.1)
FRACTIONAL SHORTENING: 30 (ref 28–44)
GFR SERPL CREATININE-BSD FRML MDRD: 83 ML/MIN/1.73SQ M
GLUCOSE SERPL-MCNC: 101 MG/DL (ref 65–140)
GLUCOSE UR STRIP-MCNC: NEGATIVE MG/DL
HCT VFR BLD AUTO: 44.9 % (ref 36.5–49.3)
HGB BLD-MCNC: 15.1 G/DL (ref 12–17)
HGB UR QL STRIP.AUTO: 250
INTERVENTRICULAR SEPTUM IN DIASTOLE (PARASTERNAL SHORT AXIS VIEW): 1.2 CM
INTERVENTRICULAR SEPTUM: 1.2 CM (ref 0.6–1.1)
KETONES UR STRIP-MCNC: NEGATIVE MG/DL
LEFT ATRIUM SIZE: 2.8 CM
LEFT INTERNAL DIMENSION IN SYSTOLE: 3.1 CM (ref 2.1–4)
LEFT VENTRICULAR INTERNAL DIMENSION IN DIASTOLE: 4.4 CM (ref 3.5–6)
LEFT VENTRICULAR POSTERIOR WALL IN END DIASTOLE: 1.2 CM
LEFT VENTRICULAR STROKE VOLUME: 48 ML
LEUKOCYTE ESTERASE UR QL STRIP: NEGATIVE
LVSV (TEICH): 48 ML
MAGNESIUM SERPL-MCNC: 2.8 MG/DL (ref 1.9–2.7)
MCH RBC QN AUTO: 32.4 PG (ref 26.8–34.3)
MCHC RBC AUTO-ENTMCNC: 33.6 G/DL (ref 31.4–37.4)
MCV RBC AUTO: 96 FL (ref 82–98)
MV E'TISSUE VEL-SEP: 8 CM/S
MV PEAK A VEL: 0.8 M/S
MV PEAK E VEL: 58 CM/S
MV STENOSIS PRESSURE HALF TIME: 35 MS
MV VALVE AREA P 1/2 METHOD: 6.29
NITRITE UR QL STRIP: NEGATIVE
NON-SQ EPI CELLS URNS QL MICRO: ABNORMAL /HPF
P AXIS: 43 DEGREES
P AXIS: 44 DEGREES
PH UR STRIP.AUTO: 7 [PH]
PLATELET # BLD AUTO: 147 THOUSANDS/UL (ref 149–390)
PMV BLD AUTO: 8.9 FL (ref 8.9–12.7)
POTASSIUM SERPL-SCNC: 4.2 MMOL/L (ref 3.5–5.3)
PR INTERVAL: 148 MS
PR INTERVAL: 150 MS
PROT SERPL-MCNC: 6.5 G/DL (ref 6.4–8.4)
PROT UR STRIP-MCNC: NEGATIVE MG/DL
QRS AXIS: 16 DEGREES
QRS AXIS: 7 DEGREES
QRSD INTERVAL: 92 MS
QRSD INTERVAL: 92 MS
QT INTERVAL: 332 MS
QT INTERVAL: 350 MS
QTC INTERVAL: 465 MS
QTC INTERVAL: 484 MS
RBC # BLD AUTO: 4.66 MILLION/UL (ref 3.88–5.62)
RBC #/AREA URNS AUTO: ABNORMAL /HPF
RIGHT VENTRICLE ID DIMENSION: 3.3 CM
SL CV LV EF: 55
SL CV PED ECHO LEFT VENTRICLE DIASTOLIC VOLUME (MOD BIPLANE) 2D: 86 ML
SL CV PED ECHO LEFT VENTRICLE SYSTOLIC VOLUME (MOD BIPLANE) 2D: 38 ML
SODIUM SERPL-SCNC: 137 MMOL/L (ref 135–147)
SP GR UR STRIP.AUTO: 1.01 (ref 1–1.04)
T WAVE AXIS: -2 DEGREES
T WAVE AXIS: 32 DEGREES
TR MAX PG: 18 MMHG
TR PEAK VELOCITY: 2.1 M/S
TRICUSPID ANNULAR PLANE SYSTOLIC EXCURSION: 2 CM
TRICUSPID VALVE PEAK REGURGITATION VELOCITY: 2.12 M/S
UROBILINOGEN UA: 1 MG/DL
VENTRICULAR RATE: 115 BPM
VENTRICULAR RATE: 118 BPM
WBC # BLD AUTO: 6.33 THOUSAND/UL (ref 4.31–10.16)
WBC #/AREA URNS AUTO: ABNORMAL /HPF

## 2023-11-29 PROCEDURE — 70450 CT HEAD/BRAIN W/O DYE: CPT

## 2023-11-29 PROCEDURE — 99233 SBSQ HOSP IP/OBS HIGH 50: CPT | Performed by: EMERGENCY MEDICINE

## 2023-11-29 PROCEDURE — 83735 ASSAY OF MAGNESIUM: CPT | Performed by: PHYSICIAN ASSISTANT

## 2023-11-29 PROCEDURE — 80053 COMPREHEN METABOLIC PANEL: CPT | Performed by: PHYSICIAN ASSISTANT

## 2023-11-29 PROCEDURE — 81001 URINALYSIS AUTO W/SCOPE: CPT | Performed by: PHYSICIAN ASSISTANT

## 2023-11-29 PROCEDURE — 85027 COMPLETE CBC AUTOMATED: CPT | Performed by: PHYSICIAN ASSISTANT

## 2023-11-29 PROCEDURE — G1004 CDSM NDSC: HCPCS

## 2023-11-29 PROCEDURE — 84484 ASSAY OF TROPONIN QUANT: CPT | Performed by: EMERGENCY MEDICINE

## 2023-11-29 PROCEDURE — 93010 ELECTROCARDIOGRAM REPORT: CPT

## 2023-11-29 PROCEDURE — C9113 INJ PANTOPRAZOLE SODIUM, VIA: HCPCS | Performed by: PHYSICIAN ASSISTANT

## 2023-11-29 PROCEDURE — 93005 ELECTROCARDIOGRAM TRACING: CPT

## 2023-11-29 PROCEDURE — 93306 TTE W/DOPPLER COMPLETE: CPT

## 2023-11-29 PROCEDURE — 99223 1ST HOSP IP/OBS HIGH 75: CPT

## 2023-11-29 PROCEDURE — 81003 URINALYSIS AUTO W/O SCOPE: CPT | Performed by: PHYSICIAN ASSISTANT

## 2023-11-29 RX ORDER — PHENOBARBITAL SODIUM 130 MG/ML
130 INJECTION INTRAMUSCULAR ONCE
Status: COMPLETED | OUTPATIENT
Start: 2023-11-29 | End: 2023-11-29

## 2023-11-29 RX ORDER — WATER 10 ML/10ML
INJECTION INTRAMUSCULAR; INTRAVENOUS; SUBCUTANEOUS
Status: COMPLETED
Start: 2023-11-29 | End: 2023-11-29

## 2023-11-29 RX ORDER — PHENOBARBITAL SODIUM 130 MG/ML
260 INJECTION INTRAMUSCULAR ONCE
Status: COMPLETED | OUTPATIENT
Start: 2023-11-29 | End: 2023-11-29

## 2023-11-29 RX ADMIN — SUCRALFATE 1 G: 1 TABLET ORAL at 11:01

## 2023-11-29 RX ADMIN — ENOXAPARIN SODIUM 40 MG: 40 INJECTION SUBCUTANEOUS at 08:49

## 2023-11-29 RX ADMIN — SUCRALFATE 1 G: 1 TABLET ORAL at 05:10

## 2023-11-29 RX ADMIN — SODIUM CHLORIDE, SODIUM GLUCONATE, SODIUM ACETATE, POTASSIUM CHLORIDE, MAGNESIUM CHLORIDE, SODIUM PHOSPHATE, DIBASIC, AND POTASSIUM PHOSPHATE 125 ML/HR: .53; .5; .37; .037; .03; .012; .00082 INJECTION, SOLUTION INTRAVENOUS at 11:40

## 2023-11-29 RX ADMIN — WATER 10 ML: 1 INJECTION, SOLUTION INTRAMUSCULAR; INTRAVENOUS; SUBCUTANEOUS at 08:36

## 2023-11-29 RX ADMIN — THIAMINE HYDROCHLORIDE 500 MG: 100 INJECTION, SOLUTION INTRAMUSCULAR; INTRAVENOUS at 01:39

## 2023-11-29 RX ADMIN — DOCUSATE SODIUM 100 MG: 100 CAPSULE, LIQUID FILLED ORAL at 10:55

## 2023-11-29 RX ADMIN — THIAMINE HYDROCHLORIDE 500 MG: 100 INJECTION, SOLUTION INTRAMUSCULAR; INTRAVENOUS at 05:10

## 2023-11-29 RX ADMIN — SODIUM CHLORIDE, SODIUM GLUCONATE, SODIUM ACETATE, POTASSIUM CHLORIDE, MAGNESIUM CHLORIDE, SODIUM PHOSPHATE, DIBASIC, AND POTASSIUM PHOSPHATE 125 ML/HR: .53; .5; .37; .037; .03; .012; .00082 INJECTION, SOLUTION INTRAVENOUS at 21:24

## 2023-11-29 RX ADMIN — ESCITALOPRAM OXALATE 20 MG: 10 TABLET ORAL at 10:55

## 2023-11-29 RX ADMIN — PANTOPRAZOLE SODIUM 40 MG: 40 INJECTION, POWDER, FOR SOLUTION INTRAVENOUS at 08:37

## 2023-11-29 RX ADMIN — PHENOBARBITAL SODIUM 130 MG: 130 INJECTION INTRAMUSCULAR; INTRAVENOUS at 21:58

## 2023-11-29 RX ADMIN — PHENOBARBITAL SODIUM 130 MG: 130 INJECTION INTRAMUSCULAR; INTRAVENOUS at 07:46

## 2023-11-29 RX ADMIN — PHENOBARBITAL SODIUM 130 MG: 130 INJECTION INTRAMUSCULAR; INTRAVENOUS at 10:48

## 2023-11-29 RX ADMIN — THIAMINE HYDROCHLORIDE 500 MG: 100 INJECTION, SOLUTION INTRAMUSCULAR; INTRAVENOUS at 14:48

## 2023-11-29 RX ADMIN — PHENOBARBITAL SODIUM 130 MG: 130 INJECTION INTRAMUSCULAR; INTRAVENOUS at 17:31

## 2023-11-29 RX ADMIN — PHENOBARBITAL SODIUM 130 MG: 130 INJECTION INTRAMUSCULAR; INTRAVENOUS at 11:40

## 2023-11-29 RX ADMIN — PHENOBARBITAL SODIUM 260 MG: 130 INJECTION INTRAMUSCULAR; INTRAVENOUS at 06:10

## 2023-11-29 RX ADMIN — PANTOPRAZOLE SODIUM 40 MG: 40 INJECTION, POWDER, FOR SOLUTION INTRAVENOUS at 21:15

## 2023-11-29 RX ADMIN — THIAMINE HYDROCHLORIDE 500 MG: 100 INJECTION, SOLUTION INTRAMUSCULAR; INTRAVENOUS at 21:15

## 2023-11-29 RX ADMIN — PHENOBARBITAL SODIUM 130 MG: 130 INJECTION INTRAMUSCULAR; INTRAVENOUS at 14:49

## 2023-11-29 NOTE — ASSESSMENT & PLAN NOTE
Chest pain persistent on 11/28  Repeat ECG last night with sinus tachycardia and new ischemic changes in V2-V3 and q wave lead 3, this morning anterior leads improved. Troponins negative x4  Echocardiogram    Left Ventricle: Left ventricular cavity size is normal. Wall thickness is mildly increased. The left ventricular ejection fraction is 55%. Systolic function is normal. Wall motion is normal.    Aortic Valve: There is trace regurgitation. Mitral Valve: There is trace regurgitation. Tricuspid Valve: There is trace regurgitation. Pulmonic Valve: There is trace regurgitation. Aorta: The aortic root is mildly dilated.      Normal heart rate and no chest pain today or shortness of breath

## 2023-11-29 NOTE — ASSESSMENT & PLAN NOTE
Associated with AUD  Encourage cessation of ETOH use and follow up as outpatient. Anticipate improvement.    Outpatient follow-up

## 2023-11-29 NOTE — ASSESSMENT & PLAN NOTE
Patient with a history of chronic daily alcohol use  Last drink 1130 11/28/23  Serum alcohol <10 in the ED with evidence of withdrawal at that time. Received High dose Ativan (Multiple doses) & Valium PTA with worsening of symptoms while inpatient, prompting consultation.   Received total 1950 phenobarbital, last dose 11/30 at 6 AM  Medically stabilized at this time

## 2023-11-29 NOTE — PROGRESS NOTES
PROGRESS NOTE  DEPARTMENT OF MEDICAL TOXICOLOGY  LEVEL 4 MEDICAL DETOX UNIT  Ross Bains 63 y.o. male MRN: [id number]  Unit/Bed#: 5T DETOX 898-07 Encounter: [id number]      Reason for Admission/Principal Problem: Alcohol withdrawal  Rounding Provider: Herminio Roche DO  Attending Provider: Herminio Roche DO   11/28/2023 10:15 PM           Dehydration  Assessment & Plan  Hemoconcentrated on initial presentation, given an additional saline bolus, and maintenance 125 an hour Plasma-Lyte, now improved. Continue maintenance fluids and encourage oral diet. Given some confusion with his ETOH WD, swallow eval performed and passed. Chest tightness  Assessment & Plan  Chest pain recurrent last night. Repeat ECG last night with sinus tachycardia and new ischemic changes in V2-V3 and q wave lead 3, this morning anterior leads improved. Troponins negative x4  Echocardiogram performed and results pending. I suspect the chest tightness is demand related as he has been significantly tachycardic from alcohol withdrawal during these episodes. Will continue to treat underlying alcohol withdrawal and seek further recommendations from cardiology. Consult cardiology, appreciate recommendations. Thrombocytopenia ([address])  Assessment & Plan  Associated with AUD  Encourage cessation of ETOH use and follow up as outpatient. Anticipate improvement. Hypertension  Assessment & Plan  Takes lisinopril on a daily basis  Self discontinued  Will continue to monitor vital signs and escalate as needed  Restarting lisinopril    Chronic alcoholic gastritis  Assessment & Plan  Epigastric pain, that extends to chest pain, believed to be exacerbated by recent binge  IV Protonix 40 twice daily, transition to PO once improved. Carafate  Maalox  Lipase is normal      Hepatic steatosis  Assessment & Plan  Follow up with GI as o/p  Encourage ETOH cessation.      Alcohol use disorder, severe, dependence ([address])  Assessment & Plan  Daily, 1 handle of liquor  Last noon drink 11/30/2011/28  Follow treatment plan as listed  Continue folate and thiamine   Offer naltrexone. * Alcohol withdrawal syndrome with perceptual disturbance [facility])  Assessment & Plan  Patient with a history of chronic daily alcohol use  Last drink 1130 11/28/23  Serum alcohol <10 in the ED with evidence of withdrawal at that time. Received High dose Ativan (Multiple doses) & Valium PTA with worsening of symptoms while inpatient, prompting consultation. Mild confusion, CT head. Continue SEWS protocol for medical management of alcohol withdrawal, total phenobarbital 1040 mg as of this morning. Continuous pulse ox and telemetry monitoring    Hypomagnesemia-resolved as of 11/29/2023  Assessment & Plan  Recent Labs     11/28/23  1319   MG 1.4*      Replaced in the ED with 2 g  Additional 2 g placed here. Repeat labs in the a.m. Minutes of critical care time 39  -Critical care time was exclusive of separately billable procedures and teaching time.   -Critical care was necessary to treat or prevent imminent or life-threatening deterioration of the following condition: toxidrome, withdrawal, CNS failure/compromise, and dehydration  -Critical care time was spent personally by me on the following activities as well as the above as per the course and rest of chart: obtaining history from patient/surrogate, review of old charts, development of a treatment plan, discussions with referring provider(s) and/or consultants, examination of the patient, performing treatments and interventions, evaluation of patient's response to the treatment, re-evaluation of the patient's condition, ordering/interpreting laboratory studies, ordering/interpreting of radiographic studies.      VTE Pharmacologic Prophylaxis:   Pharmacologic: Enoxaparin (Lovenox)  Mechanical VTE Prophylaxis in Place: no    Code Status: Level 1 - Full Code    Patient Centered Rounds: I have performed bedside rounds with nursing staff today. Discussions with Specialists or Other Care Team Provider: Cardiology    Education and Discussions with Family / Patient: Patient    Time Spent for Care: 45 minutes. More than 50% of total time spent on counseling and coordination of care as described above. Current Length of Stay: 1 day(s)    Current Patient Status: Inpatient     Certification Statement: The patient will continue to require additional inpatient hospital stay due to continued stabilization of alcohol withdrawal.  Discharge Plan: Pending case management      Subjective:   Patient feels better today without chest pain. He reports that he initially presented to the hospital for severe chest pain, crushing in nature with tingling in left arm and diaphoresis. The symptoms subsided and he then experienced similar symptoms last night but again they have subsided.     Objective:       SEWS Total Score: 13 (11/29/2023 11:21 AM)        Last 24 Hours Medication List:   Current Facility-Administered Medications   Medication Dose Route Frequency Provider Last Rate    acetaminophen  650 mg Oral Q6H PRN DESIREE Mayes-CHAPIS      aluminum-magnesium hydroxide-simethicone  30 mL Oral Q6H PRN Harpreet Delgado PA-C      docusate sodium  100 mg Oral BID Harpreet Delgado PA-C      enoxaparin  40 mg Subcutaneous Daily Harpreet Delgado PA-C      escitalopram  20 mg Oral Daily Gabi Mayes      multi-electrolyte  125 mL/hr Intravenous Continuous Gabi Mayes 125 mL/hr (11/28/23 1172)    ondansetron  4 mg Intravenous Q6H PRN Harpreet Delgado PA-C      pantoprazole  40 mg Intravenous Q12H [address] Marley Gonzalez PA-C      PHENobarbital  130 mg Intravenous Once [employer] Nappe, DO      sucralfate  1 g Oral Q6H [address] Harpreet Delgado PA-C      thiamine  500 mg Intravenous [facility] Harpreet Delgado PA-C 500 mg (11/29/23 0510)    traZODone  50 mg Oral HS PRN Harpreet Delgado PA-C           Vitals:   Belenda Prey (24hrs), Av.3 °F (36.8 °C), Min:97.7 °F (36.5 °C), Max:99.1 °F (37.3 °C)    Temp:  [97.7 °F (36.5 °C)-99.1 °F (37.3 °C)] 98.2 °F (36.8 °C)  HR:  [103-162] 115  Resp:  [16-24] 22  BP: (120-159)/() 142/98  SpO2:  [91 %-96 %] 94 %  Body mass index is 33.45 kg/m². Input and Output Summary (last 24 hours):No intake or output data in the 24 hours ending 23 1123    Physical Exam:   Physical Exam  Vitals and nursing note reviewed. Constitutional:       Appearance: He is toxic-appearing and diaphoretic. HENT:      Head: Normocephalic and atraumatic. Nose: Nose normal.      Mouth/Throat:      Mouth: Mucous membranes are moist.   Eyes:      Extraocular Movements: Extraocular movements intact. Conjunctiva/sclera: Conjunctivae normal.      Pupils: Pupils are equal, round, and reactive to light. Cardiovascular:      Rate and Rhythm: Regular rhythm. Tachycardia present. Pulmonary:      Effort: Pulmonary effort is normal.   Abdominal:      General: Abdomen is flat. Palpations: Abdomen is soft. Tenderness: There is no abdominal tenderness. Musculoskeletal:         General: No swelling or tenderness. Normal range of motion. Cervical back: Normal range of motion. Right lower leg: No edema. Left lower leg: No edema. Skin:     General: Skin is warm. Neurological:      General: No focal deficit present. Mental Status: He is alert and oriented to person, place, and time. Psychiatric:         Attention and Perception: He is inattentive. He does not perceive visual hallucinations. Mood and Affect: Mood is anxious. Speech: Speech is slurred. Behavior: Behavior is cooperative.          Cognition and Memory: Cognition normal.      Comments: Speech slightly slurred  Slightly disoriented to place, likely from being transferred between hospitals         Additional Data:     Labs:   Results from last 7 days   Lab Units 23  0605 23  4520 WBC Thousand/uL 6.33 16.64*   HEMOGLOBIN g/dL 15.1 17.7*   HEMATOCRIT % 44.9 50.3*   PLATELETS Thousands/uL 147* 293   NEUTROS PCT %  --  79*   LYMPHS PCT %  --  16   MONOS PCT %  --  4   EOS PCT %  --  0      Results from last 7 days   Lab Units 23  0605   SODIUM mmol/L 137   POTASSIUM mmol/L 4.2   CHLORIDE mmol/L 103   CO2 mmol/L 24   BUN mg/dL 10   CREATININE mg/dL 1.00   ANION GAP mmol/L 10   CALCIUM mg/dL 8.7   ALBUMIN g/dL 3.9   TOTAL BILIRUBIN mg/dL 1.56*   ALK PHOS U/L 116*   ALT U/L 11   AST U/L 29   GLUCOSE RANDOM mg/dL 101      Results from last 7 days   Lab Units 23  1208   INR  1.06                             EK23sinus tachycardia, ST depression in V2, V3, Q waves in 3. EK23nonspecific ST and T waves changes, sinus tachycardia, Q waves in 3.        * I Have Reviewed All Lab Data Listed Above. * Additional Pertinent Lab Tests Reviewed: [address] [place] [address] Admission Reviewed      Imaging Studies: I have personally reviewed pertinent reports. Today, Patient Was Seen By: Juan Jay DO    ** Please Note: Dictation voice to text software may have been used in the creation of this document.  **

## 2023-11-29 NOTE — H&P
HISTORY & PHYSICAL EXAM  DEPARTMENT OF MEDICAL TOXICOLOGY  LEVEL 4 MEDICAL DETOX UNIT  Merari Schooling 63 y.o. male MRN: [id number]  Unit/Bed#:  DETOX 915-09 Encounter: [id number]      Reason for Admission/Principal Problem: Ethanol withdrawal, Ethanol use disorder  Admitting Provider: Ceci Glaser PA-C  Attending Provider: Dorian Cerrato DO   11/28/2023 10:15 PM      Alcohol use disorder, severe, dependence ([address])  Assessment & Plan  Daily, 1 handle of liquor  Last noon drink 11/30/2011/28  Gap is open, will start on fluids repeat labs in the a.m. Follow treatment plan as listed  Repeat labs in the a.m.   Escalate as needed    * Alcohol withdrawal syndrome with perceptual disturbance [facility])  Assessment & Plan  Patient with a history of chronic daily alcohol use  Last drink 1130 11/28/23  Serum alcohol <10 in the ED  Received High dose Ativan (Multiple doses) & Valium PTA  Initiate SEWS protocol for medical management of alcohol withdrawal  Current alcohol withdrawal signs/symptoms include tremors, confusion, tachycardia, nausea, abdominal pain, hallucinations  SEWS score 15 upon admission  Received 650 mg of phenobarbital as initial dose  Continue monitoring under protocol and administer phenobarbital as indicated  Continuous pulse ox and telemetry monitoring    Hemocondensed, given an additional saline bolus, and starting on 125 an hour Plasma-Lyte  Repeat EKG, prior to arrival x 3 troponin negative  Will consider echocardiogram    Hypertension  Assessment & Plan  Takes lisinopril on a daily basis  Self discontinued  Will continue to monitor vital signs and escalate as needed  Restarting lisinopril    Chronic alcoholic gastritis  Assessment & Plan  Epigastric pain, that extends to chest pain, believed to be exacerbated by recent binge  Repeat troponins x 3 are negative  IV Protonix 40 twice daily  Carafate  Maalox  Lipase is normal      Hypomagnesemia  Assessment & Plan  Recent Labs 11/28/23  1319   MG 1.4*      Replaced in the ED with 2 g  Additional 2 g placed here. Repeat labs in the a.m. VTE Prophylaxis: Enoxaparin (Lovenox)  / sequential compression device   Code Status: FULL      Anticipated Length of Stay:  Patient will be admitted on an Inpatient basis with an anticipated length of stay of  2  midnights. Justification for Hospital Stay: Medical management alcohol detox    For any questions or concerns, please Tiger Text the advanced practitioner in the role of [facility]-DETOX-AP On Call      This patient qualifies for Level IV medically managed intensive inpatient services under the criteria set by the American Society of Addiction Medicine, including dimensions 1-3. The patient is in withdrawal (or is intoxicated with high risk of withdrawal), with severe and unstable medical and/or psychiatric (dual diagnosis) problems, requiring requires 24-hour medical and nursing care and the full resources of a [facility] hospital.          [facility] patient to medical detox unit and continue supportive care and stabilization of acute ethanol withdrawal per medical toxicology/detox treatment pathway. Monitor ethanol withdrawal severity via the Severity of Ethanol Withdrawal Scale (SEWS) Q4 hours and then hourly if/when SEWS > 6. Treat withdrawal per pathway and reassess Q30-60 minutes. Mild SEWS Score 1-6  Administer medications* (IV or PO; PO preferred): If initial SEWS score: diazepam 10mg PO/IV x 1 AND phenobarbital 65 mg PO/IV x 1  If repeat SEWS score 1-6: phenobarbital 65 mg PO/IV q1 hour x 5 doses maximum   Reassessment:   SEWS q1 hour after each dose until SEWS 0 x 2 hours  VS q1 hours (until SEWS 0, then q4 hours)  Notify provider for bedside evaluation if 5-dose maximum is reached, RASS of -3 to -5, or SEWS score escalates to moderate or severe.    Moderate SEWS Score 7-12  Administer medications* (IV):  If initial SEWS score: diazepam 10mg IV x 1 AND phenobarbital 260 mg IV x 1  If repeat SEWS score 7-12 or score escalated from mild: phenobarbital 130 mg IV q30 minutes x 5 doses maximum   Reassessment:  SEWS q30 minutes after each dose until SEWS < 7 (then hourly until SEWS 0 x 2 hours)  VS q30 minutes until SEWS < 7 (then hourly until SEWS 0, then q4 hours)  Notify provider for bedside evaluation if 5-dose maximum is reached, RASS of -3 to -5, or SEWS score escalates to severe. Severe SEWS Score ? 13  Administer medications* (IV):  If initial SEWS score: Diazepam 10 mg IV x 1 AND phenobarbital 650 mg IV piggyback x 1 over 15-30 minutes  If repeat SEWS score ? 13 or score escalated from mild or moderate: phenobarbital 130 mg IV q30 minutes x 5 doses maximum   Reassessment:  SEWS q30 minutes after each dose until SEWS < 7 (then hourly until SEWS 0 x 2 hours)   VS q30 minutes until SEWS < 7 (then hourly until SEWS 0, then q4 hours)  Notify provider for bedside evaluation if 5-dose maximum is reached or RASS of -3 to -5   *Hold medications and notify provider if CNS depression, respirations < 10/min, or RASS of -3 to -5. Medications to be administered adjunctively if more than 2 grams of phenobarbital is needed for stabilization of withdrawal; require attending approval.   Dexmedetomidine infusion 0.1-1mcg/kg/hr IV infusion, titratable to reduced agitation (Goal: RASS -2)  Ketamine   Acute agitated delirium: 1-2 mg/kg IV or 4-5 mg/kg IM  Refractory withdrawal: 0.1-1mg/kg/hr IV infusion, titratable to reduced agitation (Goal: RASS -2)    Further evaluation, screening and treatment:  Evaluate complete metabolic panel, transaminases, INR, and lipase. Assess hepatic ultrasound for any sign of alcoholic liver disease or cirrhosis, and ultimately refer for further hepatic evaluation and care as/if indicated. Additional medications for ethanol associated malnutrition:   Thiamine 100 mg IV daily, increase to 500 mg TID for signs/symptoms of Wernicke's Encephalopathy or Wernicke Korsakoff Syndrome   Folic acid 1 mg IV daily   Multivitamin PO daily      Will offer first monthly injection of Naltrexone 380 mg IM, once patient is stabilized, as it has been shown to assist in decreasing cravings for ethanol. Evaluate and treat for coexisting substance use, such as opioids and nicotine. Discuss risk factors for infectious disease, such as history of intravenous drug abuse, and offer hepatitis and HIV screening if indicated. Case management consultation to assist with coordination of subsequent treatment after discharge. HPI: Alfonso Aleman is a 63y.o. year old male who presents with severe alcohol detox. He was seen at [facility] the ED after being brought in via EMS with sustaining palpitations. EMS evaluated in field noted a heart rate into the high 100s, administered 2 doses of adenosine and Cardizem. He was seen in the ED worked up and found refractory to treatment in the ED including high amounts of Ativan. He has a chronic history of alcohol abuse. He recently had 1 bottle of Chardonnay last evening, and then stopped drinking. He had a small amount of drink at 1130 this morning with some water. He then began experiencing palpitations and substernal chest pain. He then summoned EMS. Patient was admitted to the floor at [facility], then consulted detox attending, and the decision to transfer to our facility was made. He presents tremulous, confused, hallucinating, tachycardic, normotensive.     Preferred alcoholic beverage(s): 1 handle of liquor  Quantity and frequency of alcohol intake: Daily  Use of any ethanol substitutes (toxic alcohols): no  Date/Time of last alcohol intake: 11:30 AM 11/28/2023  Current signs and symptoms of ethanol withdrawal: anxiety, tremor, diaphoresis, tachycardia, nausea, hallucinations, ABD/Chest pain, and disorientation    SEWS Total Score: 15 (11/28/2023 10:27 PM)      Ethanol Withdrawal History  Previous ethanol withdrawal? yes  Prior inpatient treatment for ethanol withdrawal? yes  Prior outpatient treatment for ethanol withdrawal? yes  History of seizures with prior ethanol withdrawal? no  Prior treatment with naltrexone (Vivitrol)? yes  Current treatment with naltrexone (Vivitrol)? no  Other current treatment for ethanol use disorder? no  Co-existing substance use? no    Review of PDMP: yes     Social History     Substance and Sexual Activity   Alcohol Use Yes    Comment: up to a handle of vodka per day     Social History     Substance and Sexual Activity   Drug Use Never     Social History     Tobacco Use   Smoking Status Never   Smokeless Tobacco Never       Review of Systems   Constitutional:  Positive for diaphoresis and fatigue. Negative for chills and fever. HENT:  Negative for ear pain and sore throat. Eyes:  Negative for pain and visual disturbance. Respiratory:  Positive for shortness of breath. Negative for cough. Cardiovascular:  Positive for chest pain and palpitations. Gastrointestinal:  Positive for abdominal pain and nausea. Negative for vomiting. Genitourinary:  Negative for dysuria and hematuria. Musculoskeletal:  Negative for arthralgias and back pain. Skin:  Negative for color change and rash. Neurological:  Negative for seizures and syncope. Psychiatric/Behavioral:  Positive for confusion, hallucinations and sleep disturbance. The patient is hyperactive. All other systems reviewed and are negative. Historical Information   Past Medical History:   Diagnosis Date    Alcoholic ketoacidosis 85/51/8909    GERD (gastroesophageal reflux disease)     Hypertension     Vomiting 04/02/2023     History reviewed. No pertinent surgical history. History reviewed. No pertinent family history.   Social History   Marital Status: Single     Allergies   Allergen Reactions    Cefdinir Rash       Prior to Admission medications    Medication Sig Start Date End Date Taking? Authorizing Provider   escitalopram (LEXAPRO) 20 mg tablet Take 1 tablet by mouth daily 2/21/23  Yes Historical Provider, MD   omeprazole (PriLOSEC) 40 MG capsule Take 40 mg by mouth 2 (two) times a day 12/5/22  Yes Historical Provider, MD   folic acid (FOLVITE) 1 mg tablet Take 1 tablet (1 mg total) by mouth daily Do not start before October 3, 2023. Patient not taking: Reported on 11/28/2023 10/3/23   Glendy Mackenzie PA-C   lisinopril (ZESTRIL) 40 mg tablet Take 1 tablet (40 mg total) by mouth daily  Patient not taking: Reported on 11/28/2023 4/4/23   Deneen Velasquez PA-C   multivitamin-iron-minerals-folic acid (CENTRUM) chewable tablet Chew 1 tablet daily 10/18/23 11/17/23  Sami Bedolla MD   naltrexone (REVIA) 50 mg tablet Take 1 tablet (50 mg total) by mouth daily 10/18/23 11/17/23  Sami Bedolla MD   thiamine 100 MG tablet Take 1 tablet (100 mg total) by mouth daily Do not start before October 3, 2023.   Patient not taking: Reported on 11/28/2023 10/3/23   Glendy Mackenzie PA-C       Current Facility-Administered Medications   Medication Dose Route Frequency    acetaminophen (TYLENOL) tablet 650 mg  650 mg Oral Q6H PRN    aluminum-magnesium hydroxide-simethicone (MAALOX) oral suspension 30 mL  30 mL Oral Q6H PRN    [START ON 11/29/2023] docusate sodium (COLACE) capsule 100 mg  100 mg Oral BID    [START ON 11/29/2023] enoxaparin (LOVENOX) subcutaneous injection 40 mg  40 mg Subcutaneous Daily    [START ON 11/29/2023] escitalopram (LEXAPRO) tablet 20 mg  20 mg Oral Daily    magnesium sulfate 2 g/50 mL IVPB (premix) 2 g  2 g Intravenous Once    multi-electrolyte (PLASMALYTE-A/ISOLYTE-S PH 7.4) IV solution  125 mL/hr Intravenous Continuous    ondansetron (ZOFRAN) injection 4 mg  4 mg Intravenous Q6H PRN    [START ON 11/29/2023] pantoprazole (PROTONIX) injection 40 mg  40 mg Intravenous Q12H [facility]    phenobarbital in sodium chloride 0.9% 650 mg  650 mg Intravenous Once    sodium chloride 0.9 % bolus 1,000 mL  1,000 mL Intravenous Once    [START ON 11/29/2023] sucralfate (CARAFATE) tablet 1 g  1 g Oral Q6H [facility]    thiamine (VITAMIN B1) 500 mg in sodium chloride 0.9 % 50 mL IVPB  500 mg Intravenous Q8H [facility]    traZODone (DESYREL) tablet 50 mg  50 mg Oral HS PRN       Continuous Infusions:multi-electrolyte, 125 mL/hr             Objective     No intake or output data in the 24 hours ending 11/28/23 2322    Invasive Devices:   Peripheral IV 11/28/23 Left Antecubital (Active)   Site Assessment WDL 11/28/23 1500   Dressing Type Securing device 11/28/23 1500   Line Status Infusing 11/28/23 1500   Dressing Status Clean;Dry; Intact 11/28/23 1500       Peripheral IV 11/28/23 Right;Ventral (anterior) Forearm (Active)   Site Assessment WDL 11/28/23 1500   Dressing Type Transparent 11/28/23 1500   Line Status Infusing 11/28/23 1500   Dressing Status Clean;Dry; Intact 11/28/23 1500       Vitals   Vitals:    11/28/23 2225   BP: 145/91   TempSrc: Temporal   Pulse: (!) 121   Resp: 18   Patient Position - Orthostatic VS: Lying   Temp: 98.3 °F (36.8 °C)       Physical Exam  PHYSICAL EXAM    General: No acute distress  Neuro: GCS 15, A&Ox3, No focal deficits, Sensation intact, No asterixis, Tremors  Eyes: Conjunctivae are pink. ENT: Mucous membranes are moist and intact. Nares are patent w/o inflammation/foreign body, Oropharynx is clear and symmetric with no erythema or exudates. Neck: Trachea midline, No JVD  CV: Tachycardic, normotensive, No murmurs/gallops/rubs, +2 pulses  Pulm: CTA/B, No wheezing, No chest wall tenderness  GI: Soft, nontender, bowel sounds heard throughout all quadrants. MSK: Moves all extremities  Back: No step offs, ecchymosis, trauma noted, No CVA tenderness  Skin: Warm, diaphoretic, pink  Psych: Appropriate and cooperative, Anxious      Data:    EKG, Pathology, and Other Studies: I have personally reviewed pertinent reports.     EKG: EKG Interpreted independently by ED Provider  Time Interpreted: 1803  Rhythm: sinus tachycardia  Rate: 118  Interpretation:  T wave inversion in the inferior leads, depression and inverted T waves in the anterior leads , QTc is 465  Comparison: These are acute changes when compared to EKG earlier today will repeat troponins, patient still complaining of chest pain    Lab Results:  CBC ETOH     Lab Results   Component Value Date    WBC 16.64 (H) 11/28/2023    RBC 5.41 11/28/2023    HGB 17.7 (H) 11/28/2023    HCT 50.3 (H) 11/28/2023    MCV 93 11/28/2023    MCH 32.7 11/28/2023    MCHC 35.2 11/28/2023    RDW 13.3 11/28/2023     11/28/2023    MPV 8.4 (L) 11/28/2023      Lab Results   Component Value Date    LACTICACID 0.7 10/16/2023      CMP UA         Component Value Date/Time    K 4.5 11/28/2023 1208    CL 98 11/28/2023 1208    CO2 20 (L) 11/28/2023 1208    BUN 9 11/28/2023 1208    CREATININE 1.11 11/28/2023 1208         Component Value Date/Time    CALCIUM 9.4 11/28/2023 1208    ALKPHOS 163 (H) 11/28/2023 1208    AST 47 (H) 11/28/2023 1208    ALT 20 11/28/2023 1208      No results found for: "CLARITYU", "COLORU", "Sumeet Clem", "PHUR", "GLUCOSEU", "[employer]", "BLOODU", "PROTEIN UA", "NITRITE", "BILIRUBINUR", "UROBILINOGEN", "LEUKOCYTESUR", "Randeen Rotunda", "[place]", "HYALINE", "BACTERIA", "EPIS"     Liver Function Test: ASA     Lab Results   Component Value Date    TBILI 1.42 (H) 11/28/2023    ALKPHOS 163 (H) 11/28/2023    AST 47 (H) 11/28/2023    ALT 20 11/28/2023    TP 8.1 11/28/2023    ALB 4.5 11/28/2023      No results found for: "SALICYLATE"   Troponin APAP     No results found for: "TROPONINI"   No results found for: "ACTMNPHEN"   VBG HCG     No results found for: "PHVEN", "SCA1HBN", "PO2VEN", "TPO9OOP", "Chasity Kays", "J9OJIYEQO", "D6GZYDC"   No results found for: "HCGQUANT"   ABG Urine Drug Screen     No results found for: "PHART", "JJR9UJW", "PO2ART", "TFD9ZJB", "BEART", "D5RQFMOMP", "O2HGB", "SOURC", "DEVI", "VTAC", "ACRATE", "INSPIREDAIR", "PEEP"   No results found for: "AMPMETHUR", "Blair Outhouse", "Sharene Latch", "Pantera Crape", "Nancy Hilary", "OPIATEUR", "PCPUR", "Patti Client", "OXYCODONEUR"   Lactate INR     Lab Results   Component Value Date    LACTICACID 0.7 10/16/2023      Lab Results   Component Value Date    INR 1.06 11/28/2023      PTT Protime     Lab Results   Component Value Date/Time    PTT 26 11/28/2023 12:08 PM        Lab Results   Component Value Date/Time    PROTIME 14.4 11/28/2023 12:08 PM              Imaging Studies: I have personally reviewed pertinent reports. Counseling / Coordination of Care  Total floor / unit time spent today 45 minutes. Greater than 50% of total time was spent with the patient and / or family counseling and / or coordination of care. Minutes of critical care time 61  -Critical care time was exclusive of separately billable procedures and teaching time.   -Critical care was necessary to treat or prevent imminent or life-threatening deterioration of the following condition: CNS failure/compromise, toxidrome (ethanol withdrawal),  toxidrome and withdrawal  -Critical care time was spent personally by me on the following activities as well as the above as per the course and rest of chart: obtaining history from patient/surrogate, development of a treatment plan, discussions with referring provider(s), evaluation of patient's response to the treatment, examination of the patient, performing treatments and interventions, re-evaluation of the patient's condition, review of old charts, ordering/interpreting laboratory studies, ordering/interpreting of radiographic studies. ** Please Note: This note has been constructed using a voice recognition system.  **

## 2023-11-29 NOTE — UTILIZATION REVIEW
Initial Clinical Review    Pt initially presented to [facility] ED. Pt was transferred by EMS to [facility] for its Level IV medically managed intensive inpatient detox unit, not available at [address] [facility]. Admission: Date/Time/Statement:   Admission Orders (From admission, onward)       Ordered        11/28/23 2220  Inpatient Admission  Once                          Orders Placed This Encounter   Procedures    Inpatient Admission     Standing Status:   Standing     Number of Occurrences:   1     Order Specific Question:   Level of Care     Answer:   Level 2 Stepdown / HOT [14]     Order Specific Question:   Estimated length of stay     Answer:   More than 2 Midnights     Order Specific Question:   Certification     Answer:   I certify that inpatient services are medically necessary for this patient for a duration of greater than two midnights. See H&P and MD Progress Notes for additional information about the patient's course of treatment. Initial Presentation: 63 y.o. male who presented to medical detox. Inpatient admission for evaluation and treatment of alcohol withdrawal syndrome. Presented w/ chest pain described as crushing, w/ tingling in L arm and diaphoresis. Pt was transferred from [facility] s/t need for detox from alcohol w/ increased confusion, agitation, and hallucinations despite multiple doses of Ativan. Reports 1 handle of liquor daily, last drink on 11/28 @ 1130. Has prior rehab treatment for withdrawal. Reports no hx of withdrawal seizures. On exam, anxiety, tremors, diaphoresis, tachycardic, nausea, hallucinations, disorientation. SEWS 15. Plan: SEWS monitoring w/ phenobarbital management, high-dose IV thiamine/folic acid supplement, IVF, telemetry, continuous pulse ox, IV PPI BID, carafate, trend labs, replete electrolytes as needed. Date: 11/29/23       Day 2: Pt reports feeling improved, no chest pain this morning.  Suspect chest tightness is demand  related as pt has been significantly tachycardic during episodes of chest pain. On exam, diaphoretic, tachycardic, inattentive, anxious, slurred speech, some disorientation remains. SEWS 9. Plan: continue SEWS monitoring w/ phenobarbital management, high-dose IV thiamine/folic acid supplement, telemetry, continuous pulse ox, continue PTA meds, trend labs, replete electrolytes as needed. Cardiology consulted. Cardiology: Pt w/ resting sinus tachycardia possibly related to alcohol withdrawal. Troponins negative. MI ruled out. Consider Zio patch if ongoing palpitations after adequate treatment of alcohol withdrawal.      Wt Readings from Last 1 Encounters:   11/29/23 99.8 kg (220 lb)     Vital Signs:   Date/Time Temp Pulse Resp BP MAP (mmHg) SpO2 O2 Device   11/29/23 0839 -- 108 Abnormal  18 137/95 109 94 % None (Room air)   11/29/23 0833 -- 106 Abnormal  -- 148/102 Abnormal  -- -- --   11/29/23 0721 98.2 °F (36.8 °C) 106 Abnormal  20 148/102 Abnormal  117 95 % None (Room air)   11/29/23 0557 98.3 °F (36.8 °C) 109 Abnormal  18 132/94 -- 93 % None (Room air)   11/29/23 0400 98.3 °F (36.8 °C) 110 Abnormal  18 139/95 -- 92 % None (Room air)   11/29/23 0200 98.2 °F (36.8 °C) 114 Abnormal  18 134/85 -- 92 % None (Room air)   11/29/23 0000 98.3 °F (36.8 °C) 117 Abnormal  18 130/82 -- 92 % None (Room air)   11/28/23 2225 98.3 °F (36.8 °C) 121 Abnormal  18 145/91 109 93 % None (Room air)       Severity of Ethanol Withdrawal Scale (SEWS):    11/29/23 0840 11/29/23 0727 11/29/23 0557 11/28/23 2227   ANXIETY: Do you feel that something bad is about to happen to you right now? 0  -WL 3  -WL 3  -NR 3  -NR   NAUSEA and DRY HEAVES or VOMITING? 0  -WL 0  -WL 0  -NR 0  -NR   SWEATING: (includes moist palms, sweating now)? Score 0 or 2 0  -WL 2  -WL 2  -NR 2  -NR   TREMOR: with arms extended eyes closed? 0  -WL 0  -WL 2  -NR 2  -NR   AGITATION: Fidgety, restless, pacing?  0  -WL 0  -WL 3  -NR 3  -NR DISORIENTATION: 0  -WL 1  -WL 1  -NR 1  -NR   HALLUCINATIONS: 0  -WL 0  -WL 0  -NR 1  -NR   VITAL SIGNS: ANY (Pulse >733, Diastolic BP >54, Temp >49.2) 0  -WL 3  -WL 3  -NR 3  -NR   SEWS Total Score 0  -WL 9  -WL 14  -NR 15  -NR         Pertinent Labs/Diagnostic Test Results:    11/29 - Echo    Left Ventricle: Left ventricular cavity size is normal. Wall thickness is mildly increased. The left ventricular ejection fraction is 55%. Systolic function is normal. Wall motion is normal.    Aortic Valve: There is trace regurgitation. Mitral Valve: There is trace regurgitation. Tricuspid Valve: There is trace regurgitation. Pulmonic Valve: There is trace regurgitation. Aorta: The aortic root is mildly dilated.     Results from last 7 days   Lab Units 11/29/23  0605 11/28/23  1208   WBC Thousand/uL 6.33 16.64*   HEMOGLOBIN g/dL 15.1 17.7*   HEMATOCRIT % 44.9 50.3*   PLATELETS Thousands/uL 147* 293   NEUTROS ABS Thousands/µL  --  13.02*         Results from last 7 days   Lab Units 11/29/23  0605 11/28/23  1319 11/28/23  1208   SODIUM mmol/L 137  --  136   POTASSIUM mmol/L 4.2  --  4.5   CHLORIDE mmol/L 103  --  98   CO2 mmol/L 24  --  20*   ANION GAP mmol/L 10  --  18   BUN mg/dL 10  --  9   CREATININE mg/dL 1.00  --  1.11   EGFR ml/min/1.73sq m 83  --  73   CALCIUM mg/dL 8.7  --  9.4   MAGNESIUM mg/dL 2.8* 1.4*  --    PHOSPHORUS mg/dL  --  2.8  --      Results from last 7 days   Lab Units 11/29/23  0605 11/28/23  1208   AST U/L 29 47*   ALT U/L 11 20   ALK PHOS U/L 116* 163*   TOTAL PROTEIN g/dL 6.5 8.1   ALBUMIN g/dL 3.9 4.5   TOTAL BILIRUBIN mg/dL 1.56* 1.42*         Results from last 7 days   Lab Units 11/29/23  0605 11/28/23  1208   GLUCOSE RANDOM mg/dL 101 134      Results from last 7 days   Lab Units 11/28/23  1208   PH FANG  7.438*   PCO2 FANG mm Hg 31.3*   PO2 FANG mm Hg 57.7*   HCO3 FANG mmol/L 20.7*   BASE EXC FANG mmol/L -2.1   O2 CONTENT FANG ml/dL 22.3   O2 HGB, VENOUS % 88.1*      Results from last 7 days   Lab Units 11/28/23  2319 11/28/23  1651 11/28/23  1438 11/28/23  1319   HS TNI 0HR ng/L 5  --   --  5   HS TNI 2HR ng/L  --   --  6  --    HSTNI D2 ng/L  --   --  1  --    HS TNI 4HR ng/L  --  6  --   --    HSTNI D4 ng/L  --  1  --   --          Results from last 7 days   Lab Units 11/28/23  1208   PROTIME seconds 14.4   INR  1.06   PTT seconds 26     Results from last 7 days   Lab Units 11/28/23  1319   TSH 3RD GENERATON uIU/mL 2.088      Results from last 7 days   Lab Units 11/28/23  1319   LIPASE u/L 20          Results from last 7 days   Lab Units 11/28/23  1208   ETHANOL LVL mg/dL <10             Past Medical History:   Diagnosis Date    Alcoholic ketoacidosis 47/88/1790    GERD (gastroesophageal reflux disease)     Hypertension     Vomiting 04/02/2023     Present on Admission:   Alcohol use disorder, severe, dependence ([address])   Chronic alcoholic gastritis   Hypomagnesemia   Hypertension   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 male  Admission Orders:  Regular Diet. SCDs. Fall & Seizure Precautions. SEWS monitoring. Telemetry & Continuous Pulse Ox. Continual Observation for safety.     Scheduled Medications:  docusate sodium, 100 mg, Oral, BID  enoxaparin, 40 mg, Subcutaneous, Daily  escitalopram, 20 mg, Oral, Daily  pantoprazole, 40 mg, Intravenous, Q12H [facility]  sucralfate, 1 g, Oral, Q6H [facility]  thiamine, 500 mg, Intravenous, Q8H [facility]    Continuous IV Infusions:  multi-electrolyte, 125 mL/hr, Intravenous, Continuous    PRN Meds:  acetaminophen, 650 mg, Oral, Q6H PRN  aluminum-magnesium hydroxide-simethicone, 30 mL, Oral, Q6H PRN  magnesium sulfate, 2 g, Intravenous; 11/28 x1  ondansetron, 4 mg, Intravenous, Q6H PRN  traZODone, 50 mg, Oral, HS PRN  phenobarbital, 650 mg, Intravenous; 11/28 x1  phenobarbital, 260 mg, Intravenous; 11/29 x1  phenobarbital, 130 mg, Intravenous; 11/29 x1  sodium chloride 0.9 %, 1,000 mL bolus, Intravenous, 11/28 x1      IP CONSULT TO CASE MANAGEMENT    Network Utilization Review Department  ATTENTION: Please call with any questions or concerns to [phone] and carefully listen to the prompts so that you are directed to the right person. All voicemails are confidential.   For Discharge needs, contact Care Management DC Support Team at [phone] opt. [phone]  Send all requests for admission clinical reviews, approved or denied determinations and any other requests to dedicated fax number below belonging to the campus where the patient is receiving treatment.  List of dedicated fax numbers for the Facilities:  [place] DENIALS (Administrative/Medical Necessity) [phone]   DISCHARGE SUPPORT TEAM (NETWORK) [address] [facility] (Maternity/NICU/Pediatrics) [phone]   [address] 1521 [facility] 1000 [facility] [phone] 1505 [facility] 207 [facility] 5220 [facility] [address] [address] [facility] [address] 1300 [facility]  [facility] [phone]

## 2023-11-29 NOTE — ASSESSMENT & PLAN NOTE
Daily, 1 handle of liquor  Last noon drink 11/30/2011/28  Follow treatment plan as listed  Continue folate and thiamine   Only wanted outpatient resources, provided  Interested in IM naltrexone, also provided

## 2023-11-29 NOTE — UTILIZATION REVIEW
Initial Clinical Review    Admission: Date/Time/Statement:   Admission Orders (From admission, onward)       Ordered        11/28/23 1742  Inpatient Admission  Once            11/28/23 1316  Place in Observation  Once                          Orders Placed This Encounter   Procedures    Place in Observation     Standing Status:   Standing     Number of Occurrences:   1     Order Specific Question:   Level of Care     Answer:   Med Surg [16]    Inpatient Admission     Standing Status:   Standing     Number of Occurrences:   1     Order Specific Question:   Level of Care     Answer:   Med Surg [16]     Order Specific Question:   Estimated length of stay     Answer:   More than 2 Midnights     Order Specific Question:   Certification     Answer:   I certify that inpatient services are medically necessary for this patient for a duration of greater than two midnights. See H&P and MD Progress Notes for additional information about the patient's course of treatment. ED Arrival Information       Expected   -    Arrival   11/28/2023 11:55    Acuity   Emergent              Means of arrival   Ambulance    Escorted by   [facility]    Admission type   Emergency              Arrival complaint   EMS             Chief Complaint   Patient presents with    Rapid Heart Rate     Chest pressure and palpitations starting yesterday after drinking a bottle of wine. Called EMS today for worsening symptoms. HR for -180s. Initial Presentation: 63 y.o. male with hx alcohol abuse. (  Previously used to drink hard liquor but now has switched to wine. Drinks approximately 1 large bottle of wine every day. Last drink- last evening he drank 1 large bottle and a small bottle of wine.) who presents to ED via EMS from home with palpitations, substernal chest tightness, multiple episodes of dry heaving starting last night . Reports generalized shaking.  Pt anxious and concerned about having a heart attack . Pt given  adenosine and Cardizem en route . On exam, pt tachycardic, hyperventilating, diaphoretic,anxious . normal heat and lung sounds . CIWA 21. Lab WBC 16.64, Elevated H/H , T bili 1.42, AST 47, alk phos 163. Troponin neg x 1. ECG- ST w/o acute changes . Pt  given IV Ativan, IVF, lyte repletion in ED. Pt admitted to telemetry as OBS and then converted to IP with alcohol withdrawal syndrome . ST . Chest tightness. Plan - CIWA  monitoring. Monitor LFT's . Telemetry. IV metoprolol x 1 now. TSH . Trend troponin . Statr IV PPI and Carafate Case discussed with on-call  Dr. Frederick Burkitt -recommended transferring patient to [facility] detox. Update-Despite  multiple doses high dose Ativan, pt continued to worsen clinically with increased confusion, agitation and, hallucination . Case discussed with on-call  Dr. Frederick Burkitt -recommended transferring patient to [facility] detox. 20 mg of IV diazepam given . Tachycardia improved after IV metoprolol. start metoprolol 5 mg IV every 6 to help with tachycardia and blood pressure.        ED Triage Vitals   Temperature Pulse Respirations Blood Pressure SpO2   11/28/23 1212 11/28/23 1201 11/28/23 1201 11/28/23 1201 11/28/23 1201   99.1 °F (37.3 °C) (!) 162 (!) 24 159/93 96 %      Temp Source Heart Rate Source Patient Position - Orthostatic VS BP Location FiO2 (%)   11/28/23 1212 11/28/23 1201 11/28/23 1201 11/28/23 1201 --   Axillary Monitor Sitting Right arm       Pain Score       11/28/23 1357       5          Wt Readings from Last 1 Encounters:   11/28/23 99.9 kg (220 lb 3.8 oz)     Additional Vital Signs:   ate/Time Temp Pulse Resp BP MAP (mmHg) SpO2 Calculated FIO2 (%) - Nasal Cannula Nasal Cannula O2 Flow Rate (L/min)   11/28/23 21:31:08 98 °F (36.7 °C) 126 Abnormal  18 154/106 Abnormal  122 95 % -- --   11/28/23 21:02:24 98.6 °F (37 °C) 120 Abnormal  17 154/111 Abnormal  125 91 % -- --   11/28/23 20:28:01 97.7 °F (36.5 °C) 120 Abnormal  18 120/86 97 95 % -- --   11/28/23 19:22:39 97.9 °F (36.6 °C) 118 Abnormal  18 138/93 108 95 % -- --   11/28/23 18:24:25 98.7 °F (37.1 °C) 130 Abnormal  18 143/94 110 -- -- --   11/28/23 17:15:56 98.2 °F (36.8 °C) 123 Abnormal  18 137/71 93 -- -- --   11/28/23 1700 -- -- -- -- -- 94 % -- --   11/28/23 16:29:42 98.1 °F (36.7 °C) 123 Abnormal  16 131/95 107 94 % -- --   11/28/23 15:05:05 98.7 °F (37.1 °C) 103 16 136/94 108 95 % 28 2 L/min   11/28/23 1500 -- -- -- -- -- 95 % 28 2 L/min   11/28/23 13:57:46 97.7 °F (36.5 °C) 140 Abnormal  18 143/93 110 96 % -- --   11/28/23 13:57:12 -- 139 Abnormal  -- 143/93 110 96 % -- --   11/28/23 1300 -- 144 Abnormal  20 150/91 115 95 % -- --   11/28/23 1230 -- 138 Abnormal  20 143/88 107 91 % -- --   11/28/23 1218 -- -- -- -- -- -- -- --   11/28/23 1216 -- 138 Abnormal  -- 159/93 -- -- -- --     Date and Time Eye Opening Best Verbal Response Best Motor Response [place] Coma Scale Score   11/28/23 1500 4 4 6 14   11/28/23 1219 4 5 6 15       IWA-Ar Score    Row Name 11/28/23 21:31:08 11/28/23 21:02:24 11/28/23 20:28:01 11/28/23 19:22:39 11/28/23 18:24:25   CIWA-Ar   /106 Abnormal   -/111 Abnormal   -/86  -/93  -/94  -DI   Pulse 126 Abnormal   - Abnormal   - Abnormal   - Abnormal   - Abnormal   -LH   Nausea and Vomiting -- 0  -AP 0  -AP 0  -AP 0  -LH   Tactile Disturbances -- 0  -AP 0  -AP 0  -AP 0  -LH   Tremor -- 4  -AP 4  -AP 4  -AP 4  -LH   Auditory Disturbances -- 0  -AP 0  -AP 0  -AP 1  -LH   Paroxysmal Sweats -- 4  -AP 4  -AP 3  -AP 4  -LH   Visual Disturbances -- 1  -AP 1  -AP 1  -AP 2  -LH   Anxiety -- 4  -AP 3  -AP 2  -AP 3  -LH   Headache, Fullness in Head -- 0  -AP 0  -AP 0  -AP 0  -LH   Agitation -- 4  -AP 4  -AP 3  -AP 4  -LH   Orientation and Clouding of Sensorium -- 1  -AP 1  -AP 3  -AP 2  -LH   CIWA-Ar Total -- 18  -AP 17  -AP 16  -AP 20  -736 Nasrin Name 11/28/23 17:15:56 11/28/23 16:29:42 11/28/23 15:05:05 11/28/23 1400 11/28/23 13:57:46   CIWA-Ar   /71  -/95  -/94  -DI -- 143/93  -DI   Pulse 123 Abnormal   - Abnormal   -  -DI -- 140 Abnormal   -DI   Nausea and Vomiting 0  -LH 0  -LH 0  -LH 0  -LH --   Tactile Disturbances 0  -LH 0  -LH 0  -LH 0  -LH --   Tremor 4  -LH 4  -LH 4  -LH 4  -LH --   Auditory Disturbances 0  -LH 1  -LH 0  -LH 0  -LH --   Paroxysmal Sweats 3  -LH 4  -LH 2  -LH 4  -LH --   Visual Disturbances 2  -LH 2  -LH 0  -LH 0  -LH --   Anxiety 3  -LH 3  -LH 3  -LH 4  -LH --   Headache, Fullness in Head 0  -LH 0  -LH 0  -LH 0  -LH --   Agitation 4  -LH 4  -LH 3  -LH 4  -LH --   Orientation and Clouding of Sensorium 1  -LH 2  -LH 0  -LH 0  -LH --   CIWA-Ar Total 17  -LH 20  -LH 12  -LH 16  -LH --   Row Name 11/28/23 13:57:12 11/28/23 1300 11/28/23 1230 11/28/23 1216 11/28/23 1201   CIWA-Ar   /93  -/91  -/88  -/93  -/93  -KM   Pulse 139 Abnormal   - Abnormal   - Abnormal   - Abnormal   - Abnormal   -KM   Nausea and Vomiting -- -- -- 2  -KM --   Tactile Disturbances -- -- -- 2  -KM --   Tremor -- -- -- 4  -KM --   Auditory Disturbances -- -- -- 0  -KM --   Paroxysmal Sweats -- -- -- 5  -KM --   Visual Disturbances -- -- -- 0  -KM --   Anxiety -- -- -- 4  -KM --   Headache, Fullness in Head -- -- -- 0  -KM --   Agitation -- -- -- 4  -KM --   Orientation and Clouding of Sensorium -- -- -- 0  -KM --   CIWA-Ar Total -- -- -- 21  -KM --         Pertinent Labs/Diagnostic Test Results:    11/28 ECG- ECG rate:  156     ECG rate assessment: tachycardic    Rhythm:     Rhythm: sinus tachycardia    Ectopy:     Ectopy: none    QRS:     QRS axis:  Right     QRS intervals:  Normal   Conduction:     Conduction: normal    ST segments:     ST segments:  Normal   T waves:     T waves: normal         Results from last 7 days   Lab Units 11/28/23  1208   WBC Thousand/uL 16.64*   HEMOGLOBIN g/dL 17.7*   HEMATOCRIT % 50.3*   PLATELETS Thousands/uL 293 NEUTROS ABS Thousands/µL 13.02*         Results from last 7 days   Lab Units 11/28/23  1319 11/28/23  1208   SODIUM mmol/L  --  136   POTASSIUM mmol/L  --  4.5   CHLORIDE mmol/L  --  98   CO2 mmol/L  --  20*   ANION GAP mmol/L  --  18   BUN mg/dL  --  9   CREATININE mg/dL  --  1.11   EGFR ml/min/1.73sq m  --  73   CALCIUM mg/dL  --  9.4   MAGNESIUM mg/dL 1.4*  --    PHOSPHORUS mg/dL 2.8  --      Results from last 7 days   Lab Units 11/28/23  1208   AST U/L 47*   ALT U/L 20   ALK PHOS U/L 163*   TOTAL PROTEIN g/dL 8.1   ALBUMIN g/dL 4.5   TOTAL BILIRUBIN mg/dL 1.42*         Results from last 7 days   Lab Units 11/28/23  1208   GLUCOSE RANDOM mg/dL 134             BETA-HYDROXYBUTYRATE   Date Value Ref Range Status   10/16/2023 2.9 (H) <0.6 mmol/L Final   10/01/2023 0.8 (H) <0.6 mmol/L Final          Results from last 7 days   Lab Units 11/28/23  1208   PH FANG  7.438*   PCO2 FANG mm Hg 31.3*   PO2 FANG mm Hg 57.7*   HCO3 FANG mmol/L 20.7*   BASE EXC FANG mmol/L -2.1   O2 CONTENT FANG ml/dL 22.3   O2 HGB, VENOUS % 88.1*             Results from last 7 days   Lab Units 11/28/23  2319 11/28/23  1651 11/28/23  1438 11/28/23  1319   HS TNI 0HR ng/L 5  --   --  5   HS TNI 2HR ng/L  --   --  6  --    HSTNI D2 ng/L  --   --  1  --    HS TNI 4HR ng/L  --  6  --   --    HSTNI D4 ng/L  --  1  --   --          Results from last 7 days   Lab Units 11/28/23  1208   PROTIME seconds 14.4   INR  1.06   PTT seconds 26     Results from last 7 days   Lab Units 11/28/23  1319   TSH 3RD GENERATON uIU/mL 2.088                                         Results from last 7 days   Lab Units 11/28/23  1319   LIPASE u/L 20                                 Results from last 7 days   Lab Units 11/28/23  1208   ETHANOL LVL mg/dL <10                                   ED Treatment:   Medication Administration from 11/28/2023 1155 to 11/28/2023 1353         Date/Time Order Dose Route Action Comments     11/28/2023 1208 EST adenosine (FOR EMS ONLY) (ADENOCARD) 6 mg/2 mL injection 12 mg 0 mg Does not apply Given to EMS --     11/28/2023 1208 EST adenosine (FOR EMS ONLY) (ADENOCARD) 6 mg/2 mL injection 6 mg 0 mg Does not apply Given to EMS --     11/28/2023 1309 EST sodium chloride 0.9 % bolus 1,000 mL 0 mL Intravenous Stopped --     11/28/2023 1209 EST sodium chloride 0.9 % bolus 1,000 mL 1,000 mL Intravenous New Bag --     11/28/2023 1210 EST LORazepam (ATIVAN) injection 1 mg 1 mg Intravenous Given --     11/28/2023 1208 EST diltiazem (CARDIZEM) injection 20 mg 0 mg Intravenous Given to EMS --     11/28/2023 1307 EST thiamine tablet 100 mg 100 mg Oral Not Given fail dysphagia     91/99/8075 7314 EST folic acid (FOLVITE) tablet 1 mg 1 mg Oral Not Given fail dysphagia     11/28/2023 1307 EST multivitamin-minerals (CENTRUM) tablet 1 tablet 1 tablet Oral Not Given Fail dysphagia     11/28/2023 1331 EST magnesium sulfate 2 g/50 mL IVPB (premix) 2 g 2 g Intravenous New Bag --     11/28/2023 1330 EST potassium chloride 20 mEq IVPB (premix) 20 mEq Intravenous New Bag --     11/28/2023 1330 EST sodium chloride 0.9 % infusion 150 mL/hr Intravenous New Bag --          Past Medical History:   Diagnosis Date    Alcoholic ketoacidosis 78/19/1158    GERD (gastroesophageal reflux disease)     Hypertension     Vomiting 04/02/2023     Present on Admission:   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 male  Admission Orders:  Scheduled Medications:  diazepam (VALIUM) injection 20 mg  Dose: 20 mg  Freq: Once Route: IV  Start: 11/28/23 1815 End: 11/28/23 1829   Admin Instructions:         diazepam (VALIUM) injection 5 mg  Dose: 5 mg  Freq:  Once Route: IV  Start: 11/28/23 2115 End: 11/28/23 2129        escitalopram (LEXAPRO) tablet 20 mg  Dose: 20 mg  Freq: Daily Route: PO  Start: 11/28/23 1445 End: 11/28/23 0677   Admin Instructions:         folic acid (FOLVITE) tablet 1 mg  Dose: 1 mg  Freq: Daily Route: PO  Start: 11/28/23 1430 End: 82/50/38 5323      folic acid (FOLVITE) tablet 1 mg  Dose: 1 mg  Freq: Daily Route: PO  Start: 11/28/23 1300 End: 11/28/23 1313      LORazepam (ATIVAN) injection 1 mg  Dose: 1 mg  Freq: Once Route: IV  Indications of Use: AGITATION  Start: 11/28/23 2000 End: 11/28/23 1957        LORazepam (ATIVAN) injection 4 mg  Dose: 4 mg  Freq: Once Route: IV  Start: 11/28/23 1745 End: 11/28/23 1742   Admin Instructions:         LORazepam (ATIVAN) injection 4 mg  Dose: 4 mg  Freq: Once Route: IV  Start: 11/28/23 1645 End: 11/28/23 1645   Admin Instructions:         LORazepam (ATIVAN) injection 4 mg  Dose: 4 mg  Freq: Once Route: IV  Start: 11/28/23 1415 End: 11/28/23 1422   Admin Instructions:         LORazepam (ATIVAN) tablet 2 mg  Dose: 2 mg  Freq: Once Route: PO  Indications of Use: ALCOHOL WITHDRAWAL SYNDROME  Start: 11/28/23 1530 End: 11/28/23 1530        metoprolol (LOPRESSOR) injection 2.5 mg  Dose: 2.5 mg  Freq: Every 6 hours Route: IV  Start: 11/28/23 1815 End: 11/28/23 2215   Admin Instructions:         metoprolol (LOPRESSOR) injection 5 mg  Dose: 5 mg  Freq: Once Route: IV  Start: 11/28/23 1445 End: 11/28/23 1445   Admin Instructions:         multivitamin-minerals (CENTRUM) tablet 1 tablet  Dose: 1 tablet  Freq: Daily Route: PO  Start: 11/28/23 1430 End: 11/28/23 2215      pantoprazole (PROTONIX) injection 40 mg  Dose: 40 mg  Freq: Every 12 hours scheduled Route: IV  Start: 11/28/23 1445 End: 11/28/23 2215   sodium chloride 0.9 % bolus 1,000 mL  Dose: 1,000 mL  Freq:  Once Route: IV  Last Dose: Stopped (11/29/23 0726)  Start: 11/28/23 2245 End: 11/29/23 0726   sucralfate (CARAFATE) tablet 1 g  Dose: 1 g  Freq: Every 6 hours scheduled Route: PO  Start: 11/28/23 1445 End: 11/28/23 2215     thiamine tablet 100 mg  Dose: 100 mg  Freq: Daily Route: PO  Start: 11/28/23 1430 End: 11/28/23 2215                   Continuous IV Infusions:  multi-electrolyte (PLASMALYTE-A/ISOLYTE-S PH 7.4) IV solution  Rate: 100 mL/hr Dose: 100 mL/hr  Freq: Continuous Route: IV  Last Dose: Stopped (11/28/23 2152)  Start: 11/28/23 1430 End: 11/28/23 2215      sodium chloride 0.9 % infusion  Rate: 150 mL/hr Dose: 150 mL/hr  Freq: Continuous Route: IV  Indications of Use: IV Hydration  Last Dose: Stopped (11/28/23 1442)  Start: 11/28/23 1330 End: 11/28/23 1425          PRN Meds:    CIWA    Continuous pulse ox    Telemetry    Continual observation       Network Utilization Review Department  ATTENTION: Please call with any questions or concerns to [phone] and carefully listen to the prompts so that you are directed to the right person. All voicemails are confidential.   For Discharge needs, contact Care Management DC Support Team at [phone] opt. 2  Send all requests for admission clinical reviews, approved or denied determinations and any other requests to dedicated fax number below belonging to the campus where the patient is receiving treatment.  List of dedicated fax numbers for the Facilities:  [place] DENIALS (Administrative/Medical Necessity) [phone]   DISCHARGE SUPPORT TEAM (NETWORK) [id number] [facility] (Maternity/NICU/Pediatrics) [phone]   [address] [facility] [phone]   [address] [facility] [address] [address] [facility] [address] [facility]  [facility] [phone]

## 2023-11-29 NOTE — CONSULTS
Cardiology Consultation  MD Yovany Torres MD, Samira Schilling DO, PRATIMA Ledezma MD Sherron Plumb, DO, Susy Bonner DO, [facility]  ----------------------------------------------------------------  [address], [place] [address]    Noe Band 63 y.o. male MRN: [id number]  Unit/Bed#: Irma Collier 691-25 Encounter: [id number]      11/29/23    Referring Physician: [place] DO    Chief Complain/Reason for Referal: Chest pain    IMPRESSION:  Atypical chest pain, ruled out for acute MI  Sinus tachycardia suspect secondary to acute withdrawal  Cannot rule out underlying SVT. Currently when EMS picked him up to bring him to the hospital he had a heart rate of 170 to 180 bpm and was given adenosine, however looking through the chart I do not see evidence of the prior tachycardic rhythm or the rhythm tracing with adenosine response  Alcohol use disorder and alcohol withdrawal  Hypertension  Thrombocytopenia  Alcoholic gastritis      DISCUSSION/RECOMMENDATIONS:  He is currently in the detox unit for alcohol abuse/alcohol withdrawal.  He has resting sinus tachycardia which may be related to his alcohol withdrawal.  He had some chest pain last night   Troponins have been checked x4 and are all negative. ECG is without acute MI. Echo shows preserved LV systolic function and preserved RV function. There is trace AI MR TR and PI. No regional abnormalities. He is ruled out for acute MI. He does have minimal CAD seen on CT scan about 3 years ago. Would continue to treat alcohol withdrawal as per the detox team and plan for outpatient follow-up with cardiology, consider stress testing as outpatient.   If he does have recurrent chest pain, could consider dedicated aorta imaging to rule out dissection, however right now he is comfortable and is asymptomatic currently  Would hold off on AV node blockers to treat his sinus tachycardia and instead treat the underlying cause which is likely his withdrawal symptoms. He is receiving phenobarbital injections rather frequently for this. .  There is also some question of him having a SVT on admission where he was given adenosine. We will consider outpatient rhythm monitoring with Zio patch if he has continued symptoms of palpitations, after he is adequately treated for his alcohol abuse/withdrawal    Shayna Sanders DO, [facility], [facility]    ----------------------------------------------------  EKG: Sinus tachycardia with poor anterior R wave progression and nonspecific T wave changes anteriorly which have since resolved  TELE: Sinus tachycardia  CXR:   PRIOR STRESS TEST:   PRIOR CATH:    ECHO:      ======================================================    HPI:  I am seeing this patient in cardiology consultation for: Chest pain    Theo Thakkar is a 63 y.o. male with:   Alcohol use disorder and alcohol withdrawal  Hypertension  Thrombocytopenia  Alcoholic gastritis    He has no prior history of coronary artery disease. In the past he had a CT of the chest in 2020 which showed minimal evidence of CAD. He is currently in the inpatient detox unit due to alcohol abuse and alcohol withdrawal.  Prior to coming to the hospital apparently when he was first found by EMS his heart rate was 170-180 bpm and he was given IV adenosine. There is no scanned tracings of the rhythm response to adenosine in his chart however. Since then, he has been moved to the inpatient detox unit and has had persistent sinus tachycardia. He is also having withdrawal symptoms and is being aggressively treated for this as well. Last night he did note chest pain in the center of his chest which was severe. Troponins have been checked x4 and are all negative. ECG is without acute MI. Echo shows preserved LV systolic function and preserved RV function. There is trace AI MR TR and PI.   No regional abnormalities. Past Medical History:   Diagnosis Date    Alcoholic ketoacidosis 53/26/9624    GERD (gastroesophageal reflux disease)     Hypertension     Hypomagnesemia 04/02/2023    Vomiting 04/02/2023         Scheduled Meds:  Current Facility-Administered Medications   Medication Dose Route Frequency Provider Last Rate    acetaminophen  650 mg Oral Q6H PRN Beto Nichols PA-C      aluminum-magnesium hydroxide-simethicone  30 mL Oral Q6H PRN Beto Nichols PA-C      docusate sodium  100 mg Oral BID Beto Nichols PA-C      enoxaparin  40 mg Subcutaneous Daily Beto Nichols PA-C      escitalopram  20 mg Oral Daily Gabi Perez      multi-electrolyte  125 mL/hr Intravenous Continuous Beto Nichols PA-C 125 mL/hr (11/29/23 1140)    ondansetron  4 mg Intravenous Q6H PRN Beto Nichols PA-C      pantoprazole  40 mg Intravenous Q12H [address] Beto Nichols PA-C      sucralfate  1 g Oral Q6H [address]  Beto Nichols PA-C      thiamine  500 mg Intravenous [facility] Beto Nichols PA-C 500 mg (11/29/23 0510)    traZODone  50 mg Oral HS PRN Beto Nichols PA-C       Continuous Infusions:multi-electrolyte, 125 mL/hr, Last Rate: 125 mL/hr (11/29/23 1140)      PRN Meds:.  acetaminophen    aluminum-magnesium hydroxide-simethicone    ondansetron    traZODone  Allergies   Allergen Reactions    Cefdinir Rash     I reviewed the Home Medication list in the chart. History reviewed. No pertinent family history.     Social History     Socioeconomic History    Marital status: Single     Spouse name: Not on file    Number of children: Not on file    Years of education: Not on file    Highest education level: Not on file   Occupational History    Not on file   Tobacco Use    Smoking status: Never    Smokeless tobacco: Never   Vaping Use    Vaping Use: Never used   Substance and Sexual Activity    Alcohol use: Yes     Comment: up to a handle of vodka per day    Drug use: Never    Sexual activity: Not on file   Other Topics Concern    Not on file   Social History Narrative    Not on file     Social Determinants of Health     Financial Resource Strain: Not on file   Food Insecurity: Not on file   Transportation Needs: Not on file   Physical Activity: Not on file   Stress: Not on file   Social Connections: Not on file   Intimate Partner Violence: Not on file   Housing Stability: Not on file       Review of Systems   Review of Systems   Constitutional:  Negative for chills and fever. HENT:  Negative for facial swelling and sore throat. Eyes:  Negative for visual disturbance. Respiratory:  Negative for cough, chest tightness, shortness of breath and wheezing. Cardiovascular:  Positive for chest pain (last night, however he has no chest pain at all right now). Negative for palpitations and leg swelling. Gastrointestinal:  Negative for abdominal pain, blood in stool, constipation, diarrhea, nausea and vomiting. Endocrine: Negative for cold intolerance and heat intolerance. Genitourinary:  Negative for decreased urine volume, difficulty urinating, dysuria and hematuria. Musculoskeletal:  Negative for arthralgias, back pain and myalgias. Skin:  Negative for rash. Neurological:  Negative for dizziness, syncope, weakness and numbness. Psychiatric/Behavioral:  Negative for agitation, behavioral problems and confusion. The patient is not nervous/anxious. Vitals:    11/29/23 1118   BP: 142/98   Pulse: (!) 115   Resp: 22   Temp:    SpO2: 94%     I/O         11/27 0701  11/28 0700 11/28 0701  11/29 0700 11/29 0701  11/30 0700    P. O.   960    Total Intake(mL/kg)   960 (9.6)    Net   +960                 Weight (last 2 days)       Date/Time Weight    11/29/23 0833 99.8 (220)    11/28/23 2225 99.9 (220.24)            Physical Exam  Constitutional: awake, alert and oriented, in no acute distress, no obvious deformities, obese male  Head: Normocephalic, without obvious abnormality, atraumatic  Eyes: conjunctivae clear and moist. Sclera anicteric. No xanthelasmas. Pupils equal bilaterally. Extraocular motions are full. Ear nose mouth and throat: ears are symmetrical bilaterally, hearing appears to be equal bilaterally, no nasal discharge or epistaxis, oropharynx is clear with moist mucous membranes  Neck: Trachea is midline, neck is supple, no thyromegaly or significant lymphadenopathy, there is full range of motion. Lungs: clear to auscultation bilaterally, no wheezes, no rales, no rhonchi, no accessory muscle use, breathing is nonlabored  Heart: regular rate and rhythm, S1, S2 normal, no murmur, no click, no rub and no gallop, no lower extremity edema  Abdomen: Obese, soft, non-tender; bowel sounds normal; no masses, no organomegaly  Psychiatric: Patient is oriented to time, place, person, mood/affect is negative for depression, anxiety, agitation, appears to have appropriate insight  Skin: Skin is warm, dry, intact. No obvious rashes or lesions on exposed extremities. Nail beds are pink with no cyanosis or clubbing.     Results from last 7 days   Lab Units 11/29/23  0605 11/28/23  1208   WBC Thousand/uL 6.33 16.64*   HEMOGLOBIN g/dL 15.1 17.7*   HEMATOCRIT % 44.9 50.3*   PLATELETS Thousands/uL 147* 293   NEUTROS PCT %  --  79*   MONOS PCT %  --  4   EOS PCT %  --  0     Results from last 7 days   Lab Units 11/29/23  0605 11/28/23  1208   POTASSIUM mmol/L 4.2 4.5   CHLORIDE mmol/L 103 98   CO2 mmol/L 24 20*   BUN mg/dL 10 9   CREATININE mg/dL 1.00 1.11   CALCIUM mg/dL 8.7 9.4     Results from last 7 days   Lab Units 11/29/23  0605 11/28/23  1208   POTASSIUM mmol/L 4.2 4.5   CHLORIDE mmol/L 103 98   CO2 mmol/L 24 20*   BUN mg/dL 10 9   CREATININE mg/dL 1.00 1.11   CALCIUM mg/dL 8.7 9.4   ALK PHOS U/L 116* 163*   ALT U/L 11 20   AST U/L 29 47*     No results found for: "TROPONINT"              Results from last 7 days   Lab Units 11/28/23  1208   INR  1.06               I have personally reviewed the EKG, CXR and Telemetry images directly. Patient Active Problem List    Diagnosis Date Noted    Dehydration 11/29/2023    Sinus tachycardia 11/28/2023    Chest tightness 11/28/2023    Alcohol withdrawal syndrome with perceptual disturbance (HCC) 10/01/2023    Elevated lactic acid level 10/01/2023    Hypokalemia 04/03/2023    Thrombocytopenia ([address]) 04/03/2023    Alcohol use disorder, severe, dependence ([address]) 04/02/2023    Hepatic steatosis 04/02/2023    Chronic alcoholic gastritis 49/60/1400    Major depressive disorder 04/02/2023    Hypertension 04/02/2023    Tinea corporis 04/02/2023       Portions of the record may have been created with voice recognition software. Occasional wrong word or "sound a like" substitutions may have occurred due to the inherent limitations of voice recognition software. Read the chart carefully and recognize, using context, where substitutions have occurred.     Corey Crump DO, [facility]  11/29/2023 12:38 PM

## 2023-11-30 ENCOUNTER — TELEPHONE (OUTPATIENT)
Dept: OTHER | Facility: HOSPITAL | Age: 58
End: 2023-11-30

## 2023-11-30 LAB
ALBUMIN SERPL BCP-MCNC: 3.5 G/DL (ref 3.5–5)
ALP SERPL-CCNC: 105 U/L (ref 34–104)
ALT SERPL W P-5'-P-CCNC: 11 U/L (ref 7–52)
ANION GAP SERPL CALCULATED.3IONS-SCNC: 9 MMOL/L
AST SERPL W P-5'-P-CCNC: 30 U/L (ref 13–39)
BILIRUB SERPL-MCNC: 0.91 MG/DL (ref 0.2–1)
BUN SERPL-MCNC: 7 MG/DL (ref 5–25)
CALCIUM SERPL-MCNC: 8.3 MG/DL (ref 8.4–10.2)
CHLORIDE SERPL-SCNC: 102 MMOL/L (ref 96–108)
CO2 SERPL-SCNC: 25 MMOL/L (ref 21–32)
CREAT SERPL-MCNC: 0.91 MG/DL (ref 0.6–1.3)
ERYTHROCYTE [DISTWIDTH] IN BLOOD BY AUTOMATED COUNT: 12.9 % (ref 11.6–15.1)
GFR SERPL CREATININE-BSD FRML MDRD: 93 ML/MIN/1.73SQ M
GLUCOSE SERPL-MCNC: 98 MG/DL (ref 65–140)
HCT VFR BLD AUTO: 37 % (ref 36.5–49.3)
HGB BLD-MCNC: 12.8 G/DL (ref 12–17)
MCH RBC QN AUTO: 32.7 PG (ref 26.8–34.3)
MCHC RBC AUTO-ENTMCNC: 34.6 G/DL (ref 31.4–37.4)
MCV RBC AUTO: 95 FL (ref 82–98)
PLATELET # BLD AUTO: 106 THOUSANDS/UL (ref 149–390)
PMV BLD AUTO: 9 FL (ref 8.9–12.7)
POTASSIUM SERPL-SCNC: 3.8 MMOL/L (ref 3.5–5.3)
PROT SERPL-MCNC: 5.7 G/DL (ref 6.4–8.4)
RBC # BLD AUTO: 3.91 MILLION/UL (ref 3.88–5.62)
SODIUM SERPL-SCNC: 136 MMOL/L (ref 135–147)
WBC # BLD AUTO: 3.91 THOUSAND/UL (ref 4.31–10.16)

## 2023-11-30 PROCEDURE — 99233 SBSQ HOSP IP/OBS HIGH 50: CPT | Performed by: PHYSICIAN ASSISTANT

## 2023-11-30 PROCEDURE — 99222 1ST HOSP IP/OBS MODERATE 55: CPT | Performed by: UROLOGY

## 2023-11-30 PROCEDURE — 80053 COMPREHEN METABOLIC PANEL: CPT | Performed by: PHYSICIAN ASSISTANT

## 2023-11-30 PROCEDURE — 97163 PT EVAL HIGH COMPLEX 45 MIN: CPT

## 2023-11-30 PROCEDURE — C9113 INJ PANTOPRAZOLE SODIUM, VIA: HCPCS | Performed by: PHYSICIAN ASSISTANT

## 2023-11-30 PROCEDURE — 85027 COMPLETE CBC AUTOMATED: CPT | Performed by: PHYSICIAN ASSISTANT

## 2023-11-30 RX ORDER — NALTREXONE HYDROCHLORIDE 50 MG/1
50 TABLET, FILM COATED ORAL DAILY
Status: DISCONTINUED | OUTPATIENT
Start: 2023-11-30 | End: 2023-12-01

## 2023-11-30 RX ORDER — PHENOBARBITAL SODIUM 130 MG/ML
130 INJECTION INTRAMUSCULAR ONCE
Status: COMPLETED | OUTPATIENT
Start: 2023-11-30 | End: 2023-11-30

## 2023-11-30 RX ORDER — LOPERAMIDE HYDROCHLORIDE 2 MG/1
4 CAPSULE ORAL 4 TIMES DAILY PRN
Status: DISCONTINUED | OUTPATIENT
Start: 2023-11-30 | End: 2023-12-01 | Stop reason: HOSPADM

## 2023-11-30 RX ORDER — LISINOPRIL 20 MG/1
40 TABLET ORAL DAILY
Status: DISCONTINUED | OUTPATIENT
Start: 2023-11-30 | End: 2023-12-01 | Stop reason: HOSPADM

## 2023-11-30 RX ADMIN — LOPERAMIDE HYDROCHLORIDE 4 MG: 2 CAPSULE ORAL at 09:52

## 2023-11-30 RX ADMIN — PHENOBARBITAL SODIUM 130 MG: 130 INJECTION INTRAMUSCULAR; INTRAVENOUS at 06:00

## 2023-11-30 RX ADMIN — PHENOBARBITAL SODIUM 130 MG: 130 INJECTION INTRAMUSCULAR; INTRAVENOUS at 03:04

## 2023-11-30 RX ADMIN — THIAMINE HYDROCHLORIDE 500 MG: 100 INJECTION, SOLUTION INTRAMUSCULAR; INTRAVENOUS at 13:57

## 2023-11-30 RX ADMIN — SUCRALFATE 1 G: 1 TABLET ORAL at 17:14

## 2023-11-30 RX ADMIN — SUCRALFATE 1 G: 1 TABLET ORAL at 05:14

## 2023-11-30 RX ADMIN — ENOXAPARIN SODIUM 40 MG: 40 INJECTION SUBCUTANEOUS at 08:04

## 2023-11-30 RX ADMIN — NALTREXONE HYDROCHLORIDE 50 MG: 50 TABLET, FILM COATED ORAL at 13:57

## 2023-11-30 RX ADMIN — PANTOPRAZOLE SODIUM 40 MG: 40 INJECTION, POWDER, FOR SOLUTION INTRAVENOUS at 08:08

## 2023-11-30 RX ADMIN — ESCITALOPRAM OXALATE 20 MG: 10 TABLET ORAL at 08:08

## 2023-11-30 RX ADMIN — SUCRALFATE 1 G: 1 TABLET ORAL at 11:43

## 2023-11-30 RX ADMIN — PANTOPRAZOLE SODIUM 40 MG: 40 INJECTION, POWDER, FOR SOLUTION INTRAVENOUS at 21:46

## 2023-11-30 RX ADMIN — SODIUM CHLORIDE, SODIUM GLUCONATE, SODIUM ACETATE, POTASSIUM CHLORIDE, MAGNESIUM CHLORIDE, SODIUM PHOSPHATE, DIBASIC, AND POTASSIUM PHOSPHATE 125 ML/HR: .53; .5; .37; .037; .03; .012; .00082 INJECTION, SOLUTION INTRAVENOUS at 05:59

## 2023-11-30 RX ADMIN — LISINOPRIL 40 MG: 20 TABLET ORAL at 13:57

## 2023-11-30 RX ADMIN — THIAMINE HYDROCHLORIDE 500 MG: 100 INJECTION, SOLUTION INTRAMUSCULAR; INTRAVENOUS at 21:46

## 2023-11-30 RX ADMIN — THIAMINE HYDROCHLORIDE 500 MG: 100 INJECTION, SOLUTION INTRAMUSCULAR; INTRAVENOUS at 05:14

## 2023-11-30 NOTE — NURSING NOTE
Pt pulled off continuous pulse ox. RN replaced. Pt voices frustration that he doesn't "have time to be in the hospital right now because I have more important things to do."  Pt states that his alcohol use has nothing to do with why he came to the hospital.  Pt also believes that he could safely D/C home at the present and is fully capable of caring for himself, despite being a heavy 2 assist to bedside commode after episode of bowel incontinence. Cipriano Bolden PA-C made aware.

## 2023-11-30 NOTE — DISCHARGE INSTR - OTHER ORDERS
CRISIS INFORMATION  If you are experiencing a mental health emergency, you may call the [facility] 24 hours a day, 7 days per week at [phone]. In [facility], call [phone]. Tate Carrero is a confidential 24/7 telephone support service manned by trained mental health consumers. Silvestre provides support, a listening ear and can provide information about available services. [facility] specializes in the concerns of mental health consumers, their families and friends. However, we are also here for anyone who has a mental health concern, is confused about or just doesn't know anything about mental health or where to get information. To reach Tate Carrero, call [phone]. HOW TO GET SUBSTANCE ABUSE HELP:  If you or someone you know has a drug or alcohol problem, there is help:  Lauren Serna,6Th Floor: [address]: [phone]  An assessment is the first step. In addition to those listed there are other programs available in the area but assessment is best to determine an appropriate level of care. If you DO NOT have Medical Assistance (MA) or [employer], an assessment can be scheduled at one of these providers:  [facility]  [address] [facility], [address] [facility]  [phone]   [facility]  [address] [facility],2 And 3 S Floors., [address]  [address] [facility].  [facility], [address] [facility] [address]  [address]  [address] [facility]   Step by [address]  [address]  [address] [facility]., [facility], [address] [place] [address]  [phone]     If you [address], an assessment can be scheduled at one of these providers:  [place] on Alcohol & Drug Abuse  [employer] [facility], [address] [facility]  [address] [facility] [address], [address] [facility]  150 [facility] D&A Intake Unit  [address] 111[address] [facility]., 1st Floor, [place]  [phone]  1 [facility], [facility] [address] [facility]  1700 [facility],2 And 3 S Floors., [facility], [address] [facility]  [address] [facility]. INDER, [address]  [phone]   NET ([facility])  [address] [facility] 1801 [facility], [place]  [address]  [address]   Step by [address] [facility], [address] [facility]  [address]., [facility], [address] [facility]  [address] [facility] [address] [facility], [address] [facility]  [phone]     If you [address] [facility], an assessment can be scheduled at one of these providers. Please contact these Providers to determine if they are in your network plan:  [facility] D&A Intake Unit  [address] 1113 [facility], 1st Floor, [place]  [facility]  17[address] [facility],2 And 3 S Floors., [facility], [address] [facility]  [phone]   [address] [facility]. INDER, [address]  [phone]   [facility] ([facility])  [address] [facility]  1801 [facility], [place]  [address]  [address] [facility] [address] [facility]., [facility], [address]

## 2023-11-30 NOTE — PROGRESS NOTES
11/30/23 1100   Referral Data   Referral Source Patient   Referral Name self   Referral Reason Drug/Alcohol [address] of Residence [place]   Readmission Root Cause   30 Day Readmission No   Patient Information   Mental Status Alert   Primary Caregiver Self   Accompanied by/Relationship BIB Bill [employer] Butcher   Support System Immediate family   Confucianist/Cultural Requests Jew   Activities of Daily Living Prior to Admission   Functional Status Independent   Assistive Device No device needed   Living Arrangement Lives alone   Ambulation Independent   Access to Firearms   Access to Firearms No   Income [address] [place] [address] [employer] of [employer]   Means of Transport to Appts: Drives Self        15/70/88 1100   Substance Abuse Addendum Details   History of Withdrawal Symptoms Other withdrawal symptoms (specify in comment)  (anxiety, tingling)   Sober Supports girlfriend   Present Treatment withdrawal management unit   Substance Abuse Treatment Hx Past detox   Stage of Change   Stage of Change Contemplation   Additional Substance Use Detail    Questions Responses   Problems Due to Past Use of Alcohol? Yes   Alcohol Use Frequency Daily   Alcohol Drink of Choice Wine   1st Use of Alcohol 13   Last Use of Alcohol & Amount 11/28/23- bottle of wine   Longest Abstinence from Alcohol 14 days     Pt is a 63 yr old male admitted to the withdrawal management unit for alcohol withdrawal. Pt was BIB ambo to [facility]. Pt called [facility] due to shortness of breath and tingling in arms. Pt's name, date of birth, home address and telephone number were verified. Pt was informed of case management role and the purpose of the completion of intake with case management. Pt stated that he began drinking heavily after divorce 7 years ago and heavy drinking increased during [place]. Pt reported that he has been to detox in the past and has managed to stay clean for a few weeks at a time.  Recently he began drinking again around the anniversary of his mother's death. Pt reported that he switched alcohols from Vodka to [place] that he drinks daily with friends and they drink several bottles. Pt stated that he feels strong but foggy. Pt denied past outpatient or facility based recovery programs and denied any AA attendance. Pt denied Blackouts, DT's and withdrawal seizure by hx. Pt denied any other substance use. Pt stated that he does not drink and drive and drinks at home with friends, which became easier during [place] and has become increasingly easier after losing his job 6 months ago. Etoh: <10 in ED  PAWSS: 6    Pt denied past Mental Health treatment. Pt denied past IP tx and no therapist or psychiatrist. Pt noted that the divorce, loss of his mother, father is fighting bladder cancer, and loss of job 6 motnhs ago are traumatic and stressors. Pt denied SI/HI and denied AH/VH in and outside of the context of alcohol use and withdrawal.    Pt denied serious medical issues but has hx of HTN. Pt reported that he has not been to an MD in many years and no longer has a PCP. Pt recently got on [place] medicaid for insurance coverage. Pt stated that he has a preferred pharmacy of [facility]. Pt signed INNA got Chano Kaur to make appointment for PCP. Pt denied past or current legal charges. Pt reported that he was laid off of work 6 months ago. Pt reported epifanio have a college degree. Pt denied  service history. Pt Identified as Evangelical. Pt reported that he has an active license and a car and transports self around. Pt will need transport at time of d/c to home. Pt resides at [facility] in [facility]. Pt lives alone and can return home. Pt has KAMI, Holly and pt signed INNA. Pt denied having any other sober supports. Pt's father is battling bladder cancer and pt does not want to bother him. Pt has 2 adult children that live out of state and pt does not have much contact with them.      Pt and CM completed and signed Relapse Prevention Plan. Pt to receive copy. Pt identified grief and loss and multiple psycho-social stressors as factors in continued drinking. He noted that health activities that he could engage in to occupy his time are working out, outdoor activities like hiking and fishing, and work and job search. Warning signs to be aware of are the physical symptoms he had when he was home and called 911. Clinical impression: pt has limited insight into addiction and problematic drinking and its consequences. Pt believes that his heavy drinking is related to psycho-social stressors. Motivation to remain abstinence is unknown. Pt has been to the detox in the past but lacked any follow up with treatment following D/C. Pt appears to be in contemplation stage of change. Relapse potential remains high with lack of sober supports and lack of treatment history or connection.

## 2023-11-30 NOTE — PLAN OF CARE
Problem: PHYSICAL THERAPY ADULT  Goal: Performs mobility at highest level of function for planned discharge setting. See evaluation for individualized goals. Description: Treatment/Interventions: ADL retraining, LE strengthening/ROM, Functional transfer training, Elevations, Therapeutic exercise, Endurance training, Patient/family training, Equipment eval/education, Bed mobility, Gait training, Spoke to nursing, Spoke to case management, OT  Equipment Recommended: Olga Cedeno       See flowsheet documentation for full assessment, interventions and recommendations. Outcome: Progressing  Note: Prognosis: Good  Problem List: Decreased strength, Decreased endurance, Impaired balance, Decreased mobility, Decreased coordination, Decreased safety awareness, Pain     Barriers to Discharge: Inaccessible home environment, Decreased caregiver support     Rehab Resource Intensity Level, PT: III (Minimum Resource Intensity)    See flowsheet documentation for full assessment.

## 2023-11-30 NOTE — TELEPHONE ENCOUNTER
Patient currently admitted to medical detox unit. Consulted for microscopic hematuria, negative for infection, normal coagulopathy. Urine cytology ordered. Please schedule follow up for office cysto.

## 2023-11-30 NOTE — PROGRESS NOTES
PROGRESS NOTE  DEPARTMENT OF MEDICAL TOXICOLOGY  LEVEL 4 MEDICAL DETOX UNIT  Felisha Baker 63 y.o. male MRN: [id number]  Unit/Bed#: 5T DETOX 993-96 Encounter: [id number]      Reason for Admission/Principal Problem: Medical management of alcohol detox  Rounding Provider: Noah Ohara PA-C  Attending Provider: Chuyita Davidson DO   11/28/2023 10:15 PM           Alcohol use disorder, severe, dependence ([address])  Assessment & Plan  Daily, 1 handle of liquor  Last noon drink 11/30/2011/28  Follow treatment plan as listed  Continue folate and thiamine   Offer naltrexone. * Alcohol withdrawal syndrome with perceptual disturbance [facility])  Assessment & Plan  Patient with a history of chronic daily alcohol use  Last drink 1130 11/28/23  Serum alcohol <10 in the ED with evidence of withdrawal at that time. Received High dose Ativan (Multiple doses) & Valium PTA with worsening of symptoms while inpatient, prompting consultation. Mild confusion, CT head. Continue SEWS protocol for medical management of alcohol withdrawal, total phenobarbital 1040 mg as of this morning. Continuous pulse ox and telemetry monitoring    Dehydration  Assessment & Plan  Resolving  Continue 125/hr plasmalyte  Remains Tachycardic      Chest tightness  Assessment & Plan  Chest pain recurrent last night. Repeat ECG last night with sinus tachycardia and new ischemic changes in V2-V3 and q wave lead 3, this morning anterior leads improved. Troponins negative x4  Echocardiogram Pending results  Remains tachycardic, but resting comfortably, No chest pain at this point  Consider Dissection study/PE study if CP Continues. Thrombocytopenia ([address])  Assessment & Plan  Associated with AUD  Encourage cessation of ETOH use and follow up as outpatient. Anticipate improvement.      Hypertension  Assessment & Plan  Takes lisinopril on a daily basis  Self discontinued  Will continue to monitor vital signs and escalate as needed  Restarting lisinopril    Chronic alcoholic gastritis  Assessment & Plan  Epigastric pain, that extends to chest pain, believed to be exacerbated by recent binge  IV Protonix 40 twice daily, transition to PO once improved. Carafate  Maalox  Lipase is normal      Hepatic steatosis  Assessment & Plan  Follow up with GI as o/p  Encourage ETOH cessation. Hypomagnesemia-resolved as of 11/29/2023  Assessment & Plan  Recent Labs     11/28/23  1319   MG 1.4*      Replaced in the ED with 2 g  Additional 2 g placed here. Repeat labs in the a.m. VTE Pharmacologic Prophylaxis:   Pharmacologic: Enoxaparin (Lovenox)  Mechanical VTE Prophylaxis in Place: yes    Code Status: Level 1 - Full Code    Patient Centered Rounds: I have performed bedside rounds with nursing staff today. Time Spent for Care: 30 minutes. More than 50% of total time spent on counseling and coordination of care as described above. Current Length of Stay: 2 day(s)    Current Patient Status: Inpatient     Certification Statement: The patient will continue to require additional inpatient hospital stay due to Medical managed alcohol detox  Discharge Plan: Case management is consulted        Subjective:   Pt seen and examined bedside today during rounds. He is resting comfortably sleeping in bed. Remains tachycardic. No reported changes per RN.      Objective:     Clinical Opiate Withdrawal Scale  Pulse: 97    SEWS Total Score: 8 (11/30/2023  5:48 AM)        Last 24 Hours Medication List:   Current Facility-Administered Medications   Medication Dose Route Frequency Provider Last Rate    acetaminophen  650 mg Oral Q6H PRN Felicie Axon, PA-C      aluminum-magnesium hydroxide-simethicone  30 mL Oral Q6H PRN Felicie Axon, PA-C      docusate sodium  100 mg Oral BID Felicie Axon, PA-C      enoxaparin  40 mg Subcutaneous Daily Felicie Axon, PA-C      escitalopram  20 mg Oral Daily Felicie Axon, PA-C      multi-electrolyte 125 mL/hr Intravenous Continuous Harpreet Delgado PA-C 125 mL/hr (23 0559)    ondansetron  4 mg Intravenous Q6H PRN Harpreet Delgado PA-C      pantoprazole  40 mg Intravenous Q12H [address] Harpreet Delgado PA-C      sucralfate  1 g Oral Q6H [address] Harpreet Delgado PA-C      thiamine  500 mg Intravenous [facility] HARRY MayesC 500 mg (23 0514)    traZODone  50 mg Oral HS PRN Harpreet Delgado PA-C           Vitals:   Temp (24hrs), Av.2 °F (36.8 °C), Min:98.2 °F (36.8 °C), Max:98.4 °F (36.9 °C)    Temp:  [98.2 °F (36.8 °C)-98.4 °F (36.9 °C)] 98.4 °F (36.9 °C)  HR:  [] 97  Resp:  [18-22] 18  BP: (126-162)/() 147/99  SpO2:  [93 %-96 %] 95 %  Body mass index is 33.45 kg/m². Input and Output Summary (last 24 hours): Intake/Output Summary (Last 24 hours) at 2023 0643  Last data filed at 2023 1515  Gross per 24 hour   Intake 960 ml   Output 78 ml   Net 882 ml       Physical Exam:   Physical Exam    PHYSICAL EXAM    General: Ill appearance, Diaphoretic  Neuro: GCS 15, A&Ox3, No focal deficits, Sensation intact  Eyes: Conjunctivae are pink. ENT: Mucous membranes are moist and intact. Nares are patent w/o inflammation/foreign body, Oropharynx is clear and symmetric with no erythema or exudates. Neck: Trachea midline, No JVD  CV: Tachycardic, No murmurs/gallops/rubs, +2 pulses  Pulm: CTA/B, No wheezing, No chest wall tenderness  GI: Soft, nontender, bowel sounds heard throughout all quadrants.   MSK: Moves all extremities  Back: No step offs, ecchymosis, trauma noted, No CVA tenderness  Skin: Warm, dry, pink  Psych: Appropriate and cooperative, but anxious    Additional Data:     Labs:   Results from last 7 days   Lab Units 23  0605 23  1208   WBC Thousand/uL 6.33 16.64*   HEMOGLOBIN g/dL 15.1 17.7*   HEMATOCRIT % 44.9 50.3*   PLATELETS Thousands/uL 147* 293   NEUTROS PCT %  --  79*   LYMPHS PCT %  --  16   MONOS PCT %  --  4   EOS PCT %  --  0      Results from last 7 days   Lab Units 11/29/23  0605   SODIUM mmol/L 137   POTASSIUM mmol/L 4.2   CHLORIDE mmol/L 103   CO2 mmol/L 24   BUN mg/dL 10   CREATININE mg/dL 1.00   ANION GAP mmol/L 10   CALCIUM mg/dL 8.7   ALBUMIN g/dL 3.9   TOTAL BILIRUBIN mg/dL 1.56*   ALK PHOS U/L 116*   ALT U/L 11   AST U/L 29   GLUCOSE RANDOM mg/dL 101      Results from last 7 days   Lab Units 11/28/23  1208   INR  1.06                         * I Have Reviewed All Lab Data Listed Above. * Additional Pertinent Lab Tests Reviewed: [facility] Admission Reviewed      Imaging Studies: I have personally reviewed pertinent reports. Recent Cultures (last 7 days): Today, Patient Was Seen By: Tulio Linda PA-C    ** Please Note: Dictation voice to text software may have been used in the creation of this document.  **

## 2023-11-30 NOTE — PLAN OF CARE
Problem: SUBSTANCE USE/ABUSE  Goal: By discharge, will develop insight into their chemical dependency and sustain motivation to continue in recovery  Description: INTERVENTIONS:  - Attends all daily group sessions and scheduled AA groups  - Actively practices coping skills through participation in the therapeutic community and adherence to program rules  - Reviews and completes assignments from individual treatment plan  - Assist patient development of understanding of their personal cycle of addiction and relapse triggers  Outcome: Progressing  Goal: By discharge, patient will have ongoing treatment plan addressing chemical dependency  Description: INTERVENTIONS:  - Assist patient with resources and/or appointments for ongoing recovery based living  Outcome: Progressing     Problem: SAFETY ADULT  Goal: Patient will remain free of falls  Description: INTERVENTIONS:  - Educate patient/family on patient safety including physical limitations  - Instruct patient to call for assistance with activity   - Consult OT/PT to assist with strengthening/mobility   - Keep Call bell within reach  - Keep bed low and locked with side rails adjusted as appropriate  - Keep care items and personal belongings within reach  - Initiate and maintain comfort rounds  - Make Fall Risk Sign visible to staff  - Offer Toileting every 2 Hours, in advance of need  - Initiate/Maintain bed alarm  - Obtain necessary fall risk management equipment  - Apply yellow socks and bracelet for high fall risk patients  - Consider moving patient to room near nurses station  Outcome: Progressing  Goal: Maintain or return to baseline ADL function  Description: INTERVENTIONS:  -  Assess patient's ability to carry out ADLs; assess patient's baseline for ADL function and identify physical deficits which impact ability to perform ADLs (bathing, care of mouth/teeth, toileting, grooming, dressing, etc.)  - Assess/evaluate cause of self-care deficits   - Assess range of motion  - Assess patient's mobility; develop plan if impaired  - Assess patient's need for assistive devices and provide as appropriate  - Encourage maximum independence but intervene and supervise when necessary  - Involve family in performance of ADLs  - Assess for home care needs following discharge   - Consider OT consult to assist with ADL evaluation and planning for discharge  - Provide patient education as appropriate  Outcome: Progressing  Goal: Maintains/Returns to pre admission functional level  Description: INTERVENTIONS:  - Perform AM-PAC 6 Click Basic Mobility/ Daily Activity assessment daily.  - Set and communicate daily mobility goal to care team and patient/family/caregiver. - Collaborate with rehabilitation services on mobility goals if consulted  - Reposition patient every 2 hours.   - Dangle patient 3 times a day  - Stand patient 3 times a day    - Out of bed for toileting  - Record patient progress and toleration of activity level   Outcome: Progressing

## 2023-11-30 NOTE — QUICK NOTE
Notified by nursing of patient requesting discharge. Patient re-evaluated at this time. Patient states that he does not understand why he has to stay in the hospital. Re-educated patient that he is not yet medically cleared as we are still monitoring alcohol withdrawal and he remains unsteady and requires PT consultation. Stressed to patient that given his unsteady gait, he is unsafe to be to discharged at this time. Patient informed that physical therapy will be by to assess him soon. Patient agreeable to participate in PT assessment. Addendum: reviewed PT assessment with Juanpablo Bee PT. Per PT, patient is not cleared from PT standpoint at this time. PT will continue to follow and will reassess tomorrow. Patient agreeable to remain in hospital at this time. Continue to monitor. Patient remains a fall risk, continue fall precautions and virtual observation for safety.

## 2023-11-30 NOTE — QUICK NOTE
Patient is currently undergoing medical management of alcohol withdrawal. Received 1950 mg phenobarbital thus far, last dose administered 11/30/2023. Of note, patient has been very unsteady requiring 1-2 assist.     On exam: patient is resting in bedside chair, no tremors, A&Ox4. BP and HR mildly elevated but improving. Alcohol withdrawal appears to be improving. Educated patient that he is not yet medically clear as we are still monitoring his alcohol withdrawal and also educated regarding concerns of unsteady gait. PT consult is pending. Continuing high-dose thiamine and virtual observation for safety. Discussed naltrexone with patient, patient is interested in resuming medication.

## 2023-11-30 NOTE — CONSULTS
Consult - Urology   Bennie Goldmann 1965, 63 y.o. male MRN: [id number]    Unit/Bed#: 5T [facility] 518-01 Encounter: [id number]      Microscopic hematuria   UA negative for infection, + blood   Urine yellow  PT/PTT normal   Will follow up outpatient for office cysto   Will send urine cytology         Subjective:   Brigette Li is a 63year old male who presented to the ED with two days ago with palpitations, chest tightness and dry heaves. He has a hx of alcohol abuse and now admitted to the medical detox unit. Patient denies any hematuria or difficulty urinating. Denies smoking history. Objective:  Vitals: Blood pressure 145/91, pulse 90, temperature (!) 97.2 °F (36.2 °C), temperature source Temporal, resp. rate 16, height 5' 8" (1.727 m), weight 99.8 kg (220 lb), SpO2 94 %. ,Body mass index is 33.45 kg/m². Physical Exam  Vitals reviewed. Constitutional:       Appearance: Normal appearance. He is obese. HENT:      Head: Normocephalic and atraumatic. Cardiovascular:      Rate and Rhythm: Tachycardia present. Pulmonary:      Effort: Pulmonary effort is normal.   Abdominal:      General: Bowel sounds are normal.      Palpations: Abdomen is soft. Musculoskeletal:         General: Normal range of motion. Cervical back: Normal range of motion. Skin:     General: Skin is warm and dry. Neurological:      Mental Status: He is alert and oriented to person, place, and time.    Psychiatric:         Mood and Affect: Mood normal.             Labs:  Recent Labs     11/28/23  1208 11/29/23  0605 11/30/23  0551   WBC 16.64* 6.33 3.91*     Recent Labs     11/28/23  1208 11/29/23  0605 11/30/23  0551   HGB 17.7* 15.1 12.8       Recent Labs     11/28/23  1208 11/29/23  0605 11/30/23  0551   CREATININE 1.11 1.00 0.91       Microbiology:  Positive blood     History:  Social History     Socioeconomic History    Marital status: Single     Spouse name: None    Number of children: None    Years of education: None    Highest education level: None   Occupational History    None   Tobacco Use    Smoking status: Never    Smokeless tobacco: Never   Vaping Use    Vaping Use: Never used   Substance and Sexual Activity    Alcohol use: Yes     Comment: up to a handle of vodka per day    Drug use: Never    Sexual activity: None   Other Topics Concern    None   Social History Narrative    None     Social Determinants of Health     Financial Resource Strain: Not on file   Food Insecurity: Not on file   Transportation Needs: Not on file   Physical Activity: Not on file   Stress: Not on file   Social Connections: Not on file   Intimate Partner Violence: Not on file   Housing Stability: Not on file       Past Medical History:   Diagnosis Date    Alcoholic ketoacidosis 76/79/6587    GERD (gastroesophageal reflux disease)     Hypertension     Hypomagnesemia 04/02/2023    Vomiting 04/02/2023     History reviewed. No pertinent surgical history. History reviewed. No pertinent family history.     MARCK Lang  Date: 11/30/2023 Time: 2:52 PM

## 2023-11-30 NOTE — PHYSICAL THERAPY NOTE
Physical Therapy Evaluation    Patient's Name: Theo Thakakr    Admitting Diagnosis  Alcohol withdrawal syndrome without complication [facility])    Problem List  Patient Active Problem List   Diagnosis    Alcohol use disorder, severe, dependence ([address])    Hepatic steatosis    Chronic alcoholic gastritis    Major depressive disorder    Hypertension    Tinea corporis    Hypokalemia    Thrombocytopenia (HCC)    Alcohol withdrawal syndrome with perceptual disturbance (HCC)    Elevated lactic acid level    Sinus tachycardia    Chest tightness    Dehydration       Past Medical History  Past Medical History:   Diagnosis Date    Alcoholic ketoacidosis 42/15/0408    GERD (gastroesophageal reflux disease)     Hypertension     Hypomagnesemia 04/02/2023    Vomiting 04/02/2023       Past Surgical History  History reviewed. No pertinent surgical history. Recent Imaging  CT head wo contrast   Final Result by JOSEF Mccollum MD (11/29 1329)      No acute intracranial abnormality. Workstation performed: [id number]             Recent Vital Signs  Vitals:    11/30/23 0547 11/30/23 0700 11/30/23 1129 11/30/23 1541   BP: 147/99 140/90 145/91 146/100   BP Location: Left arm Left arm Left arm Left arm   Pulse: 97 94 90 90   Resp: 18 16 16 18   Temp: 98.4 °F (36.9 °C) (!) 97.4 °F (36.3 °C) (!) 97.2 °F (36.2 °C) 98.1 °F (36.7 °C)   TempSrc: Temporal Temporal Temporal Temporal   SpO2: 95% 98% 94% 96%   Weight:       Height:            11/30/23 1450   PT Last Visit   PT Visit Date 11/30/23   Note Type   Note type Evaluation   Pain Assessment   Pain Assessment Tool 0-10   Pain Score No Pain   Restrictions/Precautions   Weight Bearing Precautions Per Order No   Other Precautions Pain; Fall Risk   Home Living   Type of [address] Two level;Stairs to enter with rails;1/2 bath on main level;Bed/bath upstairs   Bathroom Shower/Tub Tub/shower unit   [employer] Standard   Prior Function   Level of [place] Independent with ADLs; Independent with functional mobility   Lives With Juan in the last 6 months 1 to 4   General   Family/Caregiver Present No   Cognition   Overall Cognitive Status WFL   Arousal/Participation Alert   Orientation Level Oriented to person;Oriented to place;Oriented to time  (poor insight into deficits)   Memory Within functional limits   Following Commands Follows one step commands without difficulty   RLE Assessment   RLE Assessment   (4/5)   LLE Assessment   LLE Assessment   (4/5)   Coordination   Movements are Fluid and Coordinated 0   Coordination and Movement Description decreased LE coordination and reduced standing and dynamic balance   Sensation WFL   Light Touch   RLE Light Touch Grossly intact   LLE Light Touch Grossly intact   Bed Mobility   Supine to Sit 5  Supervision   Additional items Increased time required   Sit to Supine 5  Supervision   Additional items Increased time required   Transfers   Sit to Stand 4  Minimal assistance   Additional items Assist x 1; Armrests; Increased time required;Verbal cues   Stand to Sit 4  Minimal assistance   Additional items Assist x 1; Armrests; Increased time required;Verbal cues   Additional Comments posterior LOB when standing, needing multiple attempts to stand successfully   Ambulation/Elevation   Gait pattern Step through pattern;Decreased toe off;Decreased heel strike; Excessively slow; Short stride;Decreased foot clearance;Decreased R stance; Improper Weight shift   Gait Assistance 4  Minimal assist   Additional items Assist x 1;Verbal cues; Tactile cues   Assistive Device None   Distance 220ft   Balance   Static Sitting Fair -   Dynamic Sitting Fair -   Static Standing Poor +   Dynamic Standing Poor +   Ambulatory Poor +   Endurance Deficit   Endurance Deficit Yes   Endurance Deficit Description reduced from baseline   Activity Tolerance   Activity Tolerance Patient limited by fatigue   Medical Staff Made Aware spoke to [facility]   Nurse Made Aware spoke to RN   Assessment   Prognosis Good   Problem List Decreased strength;Decreased endurance; Impaired balance;Decreased mobility; Decreased coordination;Decreased safety awareness;Pain   Barriers to Discharge Inaccessible home environment;Decreased caregiver support   Goals   Patient Goals to go home   STG Expiration Date 12/10/23   Short Term Goal #1 see eval note   Plan   Treatment/Interventions ADL retraining;LE strengthening/ROM; Functional transfer training;Elevations; Therapeutic exercise; Endurance training;Patient/family training;Equipment eval/education; Bed mobility;Gait training;Spoke to nursing;Spoke to case management;OT   PT Frequency 2-3x/wk   Discharge Recommendation   Rehab Resource Intensity Level, PT III (Minimum Resource Intensity)   Equipment Recommended Anita Parkinson Package Recommended Wheeled walker   AM-PAC Basic Mobility Inpatient   Turning in Flat Bed Without Bedrails 3   Lying on Back to Sitting on Edge of Flat Bed Without Bedrails 3   Moving Bed to Chair 3   Standing Up From Chair Using Arms 3   Walk in Room 2   Climb 3-5 Stairs With Railing 1   Basic Mobility Inpatient Raw Score 15   Basic Mobility Standardized Score 36.97   Highest Level Of Mobility   -HL Goal 4: Move to chair/commode   JH-HLM Achieved 7: Walk 25 feet or more   End of Consult   Patient Position at End of Consult Supine; All needs within reach         ASSESSMENT                                                                                                                     María Riley is a 63 y.o. male admitted to [facility] on 11/28/2023 for Alcohol withdrawal syndrome with perceptual disturbance ([address] Pt  has a past medical history of Alcoholic ketoacidosis (58/74/3455), GERD (gastroesophageal reflux disease), Hypertension, Hypomagnesemia (04/02/2023), and Vomiting (04/02/2023). . PT was consulted and pt was seen on 11/30/2023 for mobility assessment and d/c planning.   Impairments limiting pt at this time include impaired balance, decreased endurance, decreased coordination, new onset of impairment of functional mobility, decreased ADLS, decreased IADLS, pain, and decreased strength. Pt is currently functioning at a supervision assistance x1 level for bed mobility, minimum assistance x1 level for transfers, minimum assistance x1 level for ambulation with no assistive device. The patient's AM-PAC Basic Mobility Inpatient Short Form Raw Score is 15. A Raw score of less than or equal to 16 suggests the patient may benefit from discharge to post-acute rehabilitation services. Please also refer to the recommendation of the Physical Therapist for safe discharge planning. Goals                                                                                                                                    1) Bed mobility skills with modified independent assistance to facilitate safe return to previous living environment 2) Functional transfers with modified independent assistance to facilitate safe return to previous living environment  3) Ambulation with least restrictive AD modified independent assistance without LOB and stable vitals for safe ambulation home/ community distances. 4) Stair training up/down flight 12 step/s with appropriate rail/s  and modified independent assistance for safe access to previous living environment. 5) Improve balance grades to fair + to reduce risk of falls. 6)Improve LE strength grades by 1 to increase independence w/ transfers and gait. 7) PT for ongoing pt and family education; DME needs and D/C planning to promote highest level of function in least restrictive environment.      Recommendations                                                                                                              Pt will benefit from continued skilled IP PT to address the above mentioned impairments in order to maximize recovery and increase functional independence when completing mobility and ADLs. See flow sheet for goals and POC.      DME:  likely non, may need RW pending progress    Discharge Disposition:   likely OP vs no needs pending progress, not safe for D/C at this time, will re-assess 12/1/23      Aurelia Mcmillan PT, DPT

## 2023-12-01 VITALS
RESPIRATION RATE: 18 BRPM | HEIGHT: 68 IN | HEART RATE: 100 BPM | DIASTOLIC BLOOD PRESSURE: 101 MMHG | OXYGEN SATURATION: 95 % | TEMPERATURE: 97.8 F | WEIGHT: 220 LBS | BODY MASS INDEX: 33.34 KG/M2 | SYSTOLIC BLOOD PRESSURE: 155 MMHG

## 2023-12-01 LAB
ALBUMIN SERPL BCP-MCNC: 4 G/DL (ref 3.5–5)
ALP SERPL-CCNC: 117 U/L (ref 34–104)
ALT SERPL W P-5'-P-CCNC: 12 U/L (ref 7–52)
ANION GAP SERPL CALCULATED.3IONS-SCNC: 11 MMOL/L
AST SERPL W P-5'-P-CCNC: 36 U/L (ref 13–39)
BILIRUB SERPL-MCNC: 0.99 MG/DL (ref 0.2–1)
BUN SERPL-MCNC: 5 MG/DL (ref 5–25)
CALCIUM SERPL-MCNC: 9.1 MG/DL (ref 8.4–10.2)
CHLORIDE SERPL-SCNC: 102 MMOL/L (ref 96–108)
CO2 SERPL-SCNC: 24 MMOL/L (ref 21–32)
CREAT SERPL-MCNC: 0.86 MG/DL (ref 0.6–1.3)
ERYTHROCYTE [DISTWIDTH] IN BLOOD BY AUTOMATED COUNT: 12.7 % (ref 11.6–15.1)
GFR SERPL CREATININE-BSD FRML MDRD: 96 ML/MIN/1.73SQ M
GLUCOSE SERPL-MCNC: 88 MG/DL (ref 65–140)
HCT VFR BLD AUTO: 41.2 % (ref 36.5–49.3)
HGB BLD-MCNC: 14.4 G/DL (ref 12–17)
MCH RBC QN AUTO: 32.8 PG (ref 26.8–34.3)
MCHC RBC AUTO-ENTMCNC: 35 G/DL (ref 31.4–37.4)
MCV RBC AUTO: 94 FL (ref 82–98)
PLATELET # BLD AUTO: 160 THOUSANDS/UL (ref 149–390)
PMV BLD AUTO: 9.4 FL (ref 8.9–12.7)
POTASSIUM SERPL-SCNC: 3.8 MMOL/L (ref 3.5–5.3)
PROT SERPL-MCNC: 6.7 G/DL (ref 6.4–8.4)
RBC # BLD AUTO: 4.39 MILLION/UL (ref 3.88–5.62)
SODIUM SERPL-SCNC: 137 MMOL/L (ref 135–147)
WBC # BLD AUTO: 7.1 THOUSAND/UL (ref 4.31–10.16)

## 2023-12-01 PROCEDURE — C9113 INJ PANTOPRAZOLE SODIUM, VIA: HCPCS | Performed by: PHYSICIAN ASSISTANT

## 2023-12-01 PROCEDURE — 97116 GAIT TRAINING THERAPY: CPT

## 2023-12-01 PROCEDURE — 85027 COMPLETE CBC AUTOMATED: CPT | Performed by: PHYSICIAN ASSISTANT

## 2023-12-01 PROCEDURE — 97530 THERAPEUTIC ACTIVITIES: CPT

## 2023-12-01 PROCEDURE — 80053 COMPREHEN METABOLIC PANEL: CPT | Performed by: PHYSICIAN ASSISTANT

## 2023-12-01 PROCEDURE — 99239 HOSP IP/OBS DSCHRG MGMT >30: CPT | Performed by: EMERGENCY MEDICINE

## 2023-12-01 RX ORDER — IBUPROFEN 600 MG/1
600 TABLET ORAL EVERY 6 HOURS PRN
Status: DISCONTINUED | OUTPATIENT
Start: 2023-12-01 | End: 2023-12-01 | Stop reason: HOSPADM

## 2023-12-01 RX ADMIN — NALTREXONE HYDROCHLORIDE 50 MG: 50 TABLET, FILM COATED ORAL at 09:13

## 2023-12-01 RX ADMIN — IBUPROFEN 600 MG: 600 TABLET ORAL at 09:13

## 2023-12-01 RX ADMIN — ACETAMINOPHEN 650 MG: 325 TABLET ORAL at 05:11

## 2023-12-01 RX ADMIN — ESCITALOPRAM OXALATE 20 MG: 10 TABLET ORAL at 09:13

## 2023-12-01 RX ADMIN — LISINOPRIL 40 MG: 20 TABLET ORAL at 09:13

## 2023-12-01 RX ADMIN — PANTOPRAZOLE SODIUM 40 MG: 40 INJECTION, POWDER, FOR SOLUTION INTRAVENOUS at 09:13

## 2023-12-01 RX ADMIN — THIAMINE HYDROCHLORIDE 500 MG: 100 INJECTION, SOLUTION INTRAMUSCULAR; INTRAVENOUS at 05:11

## 2023-12-01 RX ADMIN — NALTREXONE 380 MG: KIT at 12:12

## 2023-12-01 RX ADMIN — SUCRALFATE 1 G: 1 TABLET ORAL at 11:30

## 2023-12-01 RX ADMIN — SUCRALFATE 1 G: 1 TABLET ORAL at 05:11

## 2023-12-01 NOTE — DISCHARGE SUMMARY
MEDICAL DETOX UNIT, LEVEL 4  Department of Medical Toxicology  Reason for Admission/Principal Problem: ETOH withdrawl  Admitting provider: Laura Shafer MD  No att. providers found   11/28/2023 10:15 PM       Discharging Physician / Practitioner: Laura Shafer MD  PCP: Lorne Blankenship MD  Admission Date:   Admission Orders (From admission, onward)       Ordered        11/28/23 2220  Inpatient Admission  Once                          Discharge Date: 12/01/23    Medical Problems       Resolved Problems  Date Reviewed: 10/18/2023            Resolved    Hypomagnesemia 11/29/2023     Resolved by  [id number] DO Raquel          Dehydration  Assessment & Plan  Resolved    Chest tightness  Assessment & Plan  Chest pain persistent on 11/28  Repeat ECG last night with sinus tachycardia and new ischemic changes in V2-V3 and q wave lead 3, this morning anterior leads improved. Troponins negative x4  Echocardiogram    Left Ventricle: Left ventricular cavity size is normal. Wall thickness is mildly increased. The left ventricular ejection fraction is 55%. Systolic function is normal. Wall motion is normal.    Aortic Valve: There is trace regurgitation. Mitral Valve: There is trace regurgitation. Tricuspid Valve: There is trace regurgitation. Pulmonic Valve: There is trace regurgitation. Aorta: The aortic root is mildly dilated. Normal heart rate and no chest pain today or shortness of breath      Thrombocytopenia ([address])  Assessment & Plan  Associated with AUD  Encourage cessation of ETOH use and follow up as outpatient. Anticipate improvement. Outpatient follow-up    Hypertension  Assessment & Plan  Continue home lisinopril  Outpatient follow-up    Chronic alcoholic gastritis  Assessment & Plan    Encourage alcohol cessation, outpatient follow-up    Hepatic steatosis  Assessment & Plan  Follow up with GI as o/p  Encourage ETOH cessation.      Alcohol use disorder, severe, dependence ([address])  Assessment & Plan  Daily, 1 handle of liquor  Last noon drink 11/30/2011/28  Follow treatment plan as listed  Continue folate and thiamine   Only wanted outpatient resources, provided  Interested in IM naltrexone, also provided    * Alcohol withdrawal syndrome with perceptual disturbance [facility])  Assessment & Plan  Patient with a history of chronic daily alcohol use  Last drink 1130 11/28/23  Serum alcohol <10 in the ED with evidence of withdrawal at that time. Received High dose Ativan (Multiple doses) & Valium PTA with worsening of symptoms while inpatient, prompting consultation. Received total 1950 phenobarbital, last dose 11/30 at 6 AM  Medically stabilized at this time    Hypomagnesemia-resolved as of 11/29/2023  Assessment & Plan  Recent Labs     11/29/23  0605   MG 2.8*      Improved  Encourage p.o. intake  Outpatient follow-up        Consultations During Hospital Stay:  Case Management    Procedures Performed:   None    Significant Findings / Test Results:   See above    Incidental Findings:   None     Test Results Pending at Discharge (will require follow up): No     Outpatient Tests Requested:  No    Complications:  None    Reason for Admission: ETOH withdrawal    Hospital Course:     Dong Peacock is a 63 y.o. male patient who originally presented to the hospital on 11/28/2023 due to palpitation. Past medical history significant for alcohol use disorder, hyperlipidemia, hypertension. Patient was found by EMS to have heart rates between 160s to 180s, was given 2 doses of adenosine and diltiazem bolus with IV fluids without improvement in his heart rate. On arrival to [facility], patient reported that he typically drinks 1 handle of liquor daily, last drink was a small amount of unspecified liquor on 11/28 after he started to feel unwell. He has a longstanding history of alcohol use disorder with history of previous withdrawals.   Patient was initially admitted to [facility], then transferred to [facility] for further management. On arrival to [address] [place] [address], patient was tremulous, confused, hallucinating, tachycardic. During admission, he was treated with symptom triggered phenobarbital administration and high-dose thiamine. Received a total of 1950 mg phenobarbital.  He also began to develop persistent chest pain which was evaluated on the unit. Patient was found to have EF 55%, but otherwise no evidence of STEMI. Patient requested to be started on naltrexone once withdrawal was medically stabilized. IM naltrexone was provided at patient request after test p.o. dose. On day of discharge, patient was fully ambulatory, cleared by PT, without any complaints. Please see above list of diagnoses and related plan for additional information. Condition at Discharge: good     Discharge Day Visit / Exam:     Subjective: Denies dizziness, headache, visual changes, chest pain, shortness of breath, nausea, vomiting, palpitations, poor appetite, abdominal pain, urinary symptoms, changes in stool, leg pain or leg swelling, rash. Patient does report some right sided hallux pain due to gout symptoms. Otherwise no other complaints and patient able to eat breakfast well. Eboni Nieves to go home. Vitals: Blood Pressure: (!) 155/101 (12/01/23 1113)  Pulse: 100 (12/01/23 1113)  Temperature: 97.8 °F (36.6 °C) (12/01/23 1113)  Temp Source: Temporal (12/01/23 1113)  Respirations: 18 (12/01/23 1113)  Height: 5' 8" (172.7 cm) (11/29/23 4470)  Weight - Scale: 99.8 kg (220 lb) (11/29/23 0833)  SpO2: 95 % (12/01/23 1113)  Exam:   Physical Exam  Vitals and nursing note reviewed. Constitutional:       General: He is not in acute distress. Appearance: Normal appearance. He is well-developed. He is not ill-appearing, toxic-appearing or diaphoretic. HENT:      Head: Normocephalic and atraumatic.       Right Ear: External ear normal.      Left Ear: External ear normal.      Nose: Nose normal.      Mouth/Throat:      Mouth: Mucous membranes are moist.      Pharynx: No oropharyngeal exudate. Eyes:      General:         Right eye: No discharge. Left eye: No discharge. Extraocular Movements: Extraocular movements intact. Conjunctiva/sclera: Conjunctivae normal.      Pupils: Pupils are equal, round, and reactive to light. Cardiovascular:      Rate and Rhythm: Normal rate and regular rhythm. Heart sounds: Normal heart sounds. No friction rub. No gallop. Pulmonary:      Effort: Pulmonary effort is normal. No respiratory distress. Breath sounds: Normal breath sounds. No stridor. No wheezing or rales. Chest:      Chest wall: No tenderness. Abdominal:      General: Bowel sounds are normal.      Palpations: Abdomen is soft. Tenderness: There is no abdominal tenderness. Musculoskeletal:         General: No tenderness or deformity. Normal range of motion. Cervical back: Normal range of motion and neck supple. Right lower leg: No edema. Left lower leg: No edema. Skin:     General: Skin is warm and dry. Capillary Refill: Capillary refill takes less than 2 seconds. Neurological:      Mental Status: He is alert and oriented to person, place, and time. GCS: GCS eye subscore is 4. GCS verbal subscore is 5. GCS motor subscore is 6. Motor: No weakness or tremor. Gait: Gait normal.   Psychiatric:         Mood and Affect: Mood normal.       Discussion with Family: no    Discharge instructions/Information to patient and family:   See after visit summary for information provided to patient and family. Provisions for Follow-Up Care:  See after visit summary for information related to follow-up care and any pertinent home health orders. Disposition:     Home    For Discharges to [facility]:   Not Applicable to this Patient - Not Applicable to this Patient    Planned Readmission: no     Discharge Statement:  I spent 75 minutes discharging the patient.  This time was spent on the day of discharge. I had direct contact with the patient on the day of discharge. Greater than 50% of the total time was spent examining patient, answering all patient questions, arranging and discussing plan of care with patient as well as directly providing post-discharge instructions. Additional time then spent on discharge activities. Discharge Medications:  See after visit summary for reconciled discharge medications provided to patient and family.       ** Please Note: This note has been constructed using a voice recognition system **

## 2023-12-01 NOTE — UTILIZATION REVIEW
URGENT/EMERGENT  INPATIENT/SPU AUTHORIZATION REQUEST    Date: 12/01/23            # Pages in this Request:     X New Request   Additional Information for PA#:     Office Contact Name:  Dalton Anderson Title: Utilization Review, Registed Nurse     Phone: [phone]  Ext. Availability (Date/Time): Wednesday - Friday 8 am- 4 pm    X Inpatient Review  SPU Review       X Current        Late Pick-up   How your facility was first notified of the Late Pick-up:  When your facility was first notified of the Late Pick-up (date):         RECIPIENT INFORMATION    Recipient ID#: [id number]   Recipient Name: Delores Harley      YOB: 1965  63 y.o. Recipient Alias:     Gender:  X Male  Female Medicaid Eligibility ([address] [place] [address]): INSURANCE INFORMATION    (All other private or governmental health insurance benefits must be utilized prior to billing the [place] Program)    Was this admission the result of an MVA, other accident, assault, injury, fall, gunshot, bite etc.? Yes x No                   If yes, provide a brief description of the incident. Does the recipient have other insurance coverage? Yes x No        Insurance Company Name/Policy #      Did that insurance pay on this claim? Yes  No        Did that insurance deny this claim? Yes  No    If yes, reason for denial:      Does the recipient have Medicare? Yes x No        Did Medicare exhaust prior to this admission? Yes  No        Did Medicare partially pay this claim? Yes  No        Did that insurance deny this claim? Yes  No    If yes, reason for denial:          Was the recipient a prisoner at the time of admission? Yes x No            PROVIDER INFORMATION    Hospital Name: [address] [place] [address] Provider ID#: [id number]    [address] [place] Physician Name: Jac Warren Provider ID#: [id number]        ADMISSION INFORMATION    Type of Admission: (please choose one)    X ED      Direct    If yes, from where?      Transfer    If yes, transferring hospital (inpatient, rehab, or psych) Provider Name/Provider ID#: Admission Floor or Unit Type: Med Surg     Dates/Times:        ED Date/Time: 11/28/2023 11:56 AM        Observation Date/Time: 11/28/2023  13:16        Admission Date/Time: 11/28/23  5:42 PM        Discharge or Transfer Date/Time: 11/28/2023  9:52 PM        DIAGNOSIS/PROCEDURE CODES    Primary Diagnosis Code/Primary Diagnosis Code description:  F10.939  Alcohol use, unspecified with withdrawal, unspecified   R00.0 Tachycardia, unspecified  Additional Diagnosis Code(s) and Description(s)-(up to three additional codes):    Procedure Code (one) and description:        CLINICAL INFORMATION - PRIOR ADMISSION ONLY    Is there a prior admission with a discharge date within 30 days of the date of this admission? X No (Proceed to the next section - "Clinical Information - General Review Checklist:)      Yes (Provide the following information)     Prior admission dates:    MA Prior Authorization Number:        Review Outcome:     Diagnosis Code(s)/Description:    Procedure Code/Description:    Findings:    Treatment:    Condition on Discharge:   Vitals:    Labs:   Imaging:   Medications: Follow-up Instructions:    Disposition:        CLINICAL INFORMATION - GENERAL REVIEW CHECKLIST    EMERGENCY DEPARTMENT: (Proceed to "ADMISSION" if Direct Admission)    Presenting Signs/Symptoms:  63 y.o. male with hx alcohol abuse. (  Previously used to drink hard liquor but now has switched to wine. Drinks approximately 1 large bottle of wine every day. Last drink- last evening he drank 1 large bottle and a small bottle of wine.) who presents to ED via EMS from home with palpitations, substernal chest tightness, multiple episodes of dry heaving starting last night . Reports generalized shaking. Pt anxious and concerned about having a heart attack . Pt given  adenosine and Cardizem en route .  On exam, pt tachycardic, hyperventilating, diaphoretic,anxious . normal heat and lung sounds . CIWA 21. Lab WBC 16.64, Elevated H/H , T bili 1.42, AST 47, alk phos 163. Troponin neg x 1. ECG- ST w/o acute changes . Pt  given IV Ativan, IVF, lyte repletion in ED. Pt admitted to telemetry as OBS and then converted to IP with alcohol withdrawal syndrome . ST . Chest tightness. Plan - CIWA  monitoring. Monitor LFT's . Telemetry. IV metoprolol x 1 now. TSH . Trend troponin . Statr IV PPI and Carafate Case discussed with on-call  Dr. Sherri Genao -recommended transferring patient to [facility] detox.       Medication/treatment prior to arrival in the ED:    Past Medical History:    Clinical Exam:    Initial Vital Signs: (Temp, Pulse, Resp, and BP)   ED Triage Vitals   Temperature Pulse Respirations Blood Pressure SpO2   11/28/23 1212 11/28/23 1201 11/28/23 1201 11/28/23 1201 11/28/23 1201   99.1 °F (37.3 °C) (!) 162 (!) 24 159/93 96 %      Temp Source Heart Rate Source Patient Position - Orthostatic VS BP Location FiO2 (%)   11/28/23 1212 11/28/23 1201 11/28/23 1201 11/28/23 1201 --   Axillary Monitor Sitting Right arm       Pain Score       11/28/23 1357       5           Pertinent Repeat Vital Signs: (include times they were obtained)      Date/Time Temp Pulse Resp BP MAP (mmHg) SpO2 Calculated FIO2 (%) - Nasal Cannula Nasal Cannula O2 Flow Rate (L/min) O2 Device Patient Position - Orthostatic VS   11/28/23 21:31:08 98 °F (36.7 °C) 126 Abnormal  18 154/106 Abnormal  122 95 % -- -- -- --   11/28/23 21:02:24 98.6 °F (37 °C) 120 Abnormal  17 154/111 Abnormal  125 91 % -- -- -- --   11/28/23 20:28:01 97.7 °F (36.5 °C) 120 Abnormal  18 120/86 97 95 % -- -- -- Lying   11/28/23 19:22:39 97.9 °F (36.6 °C) 118 Abnormal  18 138/93 108 95 % -- -- None (Room air) Lying   11/28/23 18:24:25 98.7 °F (37.1 °C) 130 Abnormal  18 143/94 110 -- -- -- -- --   11/28/23 17:15:56 98.2 °F (36.8 °C) 123 Abnormal  18 137/71 93 -- -- -- -- --   11/28/23 1700 -- -- -- -- -- 94 % -- -- None (Room air) -- 11/28/23 16:29:42 98.1 °F (36.7 °C) 123 Abnormal  16 131/95 107 94 % -- -- -- --   11/28/23 15:05:05 98.7 °F (37.1 °C) 103 16 136/94 108 95 % 28 2 L/min Nasal cannula Lying   11/28/23 1500 -- -- -- -- -- 95 % 28 2 L/min Nasal cannula --   11/28/23 13:57:46 97.7 °F (36.5 °C) 140 Abnormal  18 143/93 110 96 % -- -- None (Room air) Lying   11/28/23 13:57:12 -- 139 Abnormal  -- 143/93 110 96 % -- -- -- --   11/28/23 1300 -- 144 Abnormal  20 150/91 115 95 % -- -- None (Room air) --   11/28/23 1230 -- 138 Abnormal  20 143/88 107 91 % -- -- None (Room air) --   11/28/23 1218 -- -- -- -- -- -- -- -- None (Room air) --   11/28/23 1216 -- 138 Abnormal  -- 159/93 -- -- -- -- -- --   11/28/23 1212 99.1 °F (37.3 °C) -- -- -- -- -- -- -- -- --   11/28/23 1201 -- 162 Abnormal  24 Abnormal  159/93 -- 96 % -- -- None (Room air) Sitting           Pertinent Sustained Findings: (include times they were obtained)     CIWA-Ar Score     Row Name 11/28/23 21:31:08 11/28/23 21:02:24 11/28/23 20:28:01 11/28/23 19:22:39 11/28/23 18:24:25   CIWA-Ar   /106 Abnormal   -/111 Abnormal   -/86  -/93  -/94  -DI   Pulse 126 Abnormal   - Abnormal   - Abnormal   - Abnormal   - Abnormal   -LH   Nausea and Vomiting -- 0  -AP 0  -AP 0  -AP 0  -LH   Tactile Disturbances -- 0  -AP 0  -AP 0  -AP 0  -LH   Tremor -- 4  -AP 4  -AP 4  -AP 4  -LH   Auditory Disturbances -- 0  -AP 0  -AP 0  -AP 1  -LH   Paroxysmal Sweats -- 4  -AP 4  -AP 3  -AP 4  -LH   Visual Disturbances -- 1  -AP 1  -AP 1  -AP 2  -LH   Anxiety -- 4  -AP 3  -AP 2  -AP 3  -LH   Headache, Fullness in Head -- 0  -AP 0  -AP 0  -AP 0  -LH   Agitation -- 4  -AP 4  -AP 3  -AP 4  -LH   Orientation and Clouding of Sensorium -- 1  -AP 1  -AP 3  -AP 2  -LH   CIWA-Ar Total -- 18  -AP 17  -AP 16  -AP 20  -LH   Row Name 11/28/23 17:15:56 11/28/23 16:29:42 11/28/23 15:05:05 11/28/23 1400 11/28/23 13:57:46   CIWA-Ar   /71  -/95  -/94  -DI -- 143/93  -DI   Pulse 123 Abnormal   - Abnormal   -  -DI -- 140 Abnormal   -DI   Nausea and Vomiting 0  -LH 0  -LH 0  -LH 0  -LH --   Tactile Disturbances 0  -LH 0  -LH 0  -LH 0  -LH --   Tremor 4  -LH 4  -LH 4  -LH 4  -LH --   Auditory Disturbances 0  -LH 1  -LH 0  -LH 0  -LH --   Paroxysmal Sweats 3  -LH 4  -LH 2  -LH 4  -LH --   Visual Disturbances 2  -LH 2  -LH 0  -LH 0  -LH --   Anxiety 3  -LH 3  -LH 3  -LH 4  -LH --   Headache, Fullness in Head 0  -LH 0  -LH 0  -LH 0  -LH --   Agitation 4  -LH 4  -LH 3  -LH 4  -LH --   Orientation and Clouding of Sensorium 1  -LH 2  -LH 0  -LH 0  -LH --   CIWA-Ar Total 17  -LH 20  -LH 12  -LH 16  -LH --   Row Name 11/28/23 13:57:12 11/28/23 1300 11/28/23 1230 11/28/23 1216 11/28/23 1201   CIWA-Ar   /93  -/91  -/88  -/93  -/93  -KM   Pulse 139 Abnormal   - Abnormal   - Abnormal   - Abnormal   - Abnormal   -KM   Nausea and Vomiting -- -- -- 2  -KM --   Tactile Disturbances -- -- -- 2  -KM --   Tremor -- -- -- 4  -KM --   Auditory Disturbances -- -- -- 0  -KM --   Paroxysmal Sweats -- -- -- 5  -KM --   Visual Disturbances -- -- -- 0  -KM --   Anxiety -- -- -- 4  -KM --   Headache, Fullness in Head -- -- -- 0  -KM --   Agitation -- -- -- 4  -KM --   Orientation and Clouding of Sensorium -- -- -- 0  -KM --   CIWA-Ar Total -- -- -- 21  -KM --     Weight in Kilograms:  Wt Readings from Last 1 Encounters:   11/28/23 99.9 kg (220 lb 3.8 oz)       Pertinent Labs (results):       Results from last 7 days   Lab Units 11/28/23  1208   WBC Thousand/uL 16.64*   HEMOGLOBIN g/dL 17.7*   HEMATOCRIT % 50.3*   PLATELETS Thousands/uL 293   NEUTROS ABS Thousands/µL 13.02*                Results from last 7 days   Lab Units 11/28/23  1319 11/28/23  1208   SODIUM mmol/L  --  136   POTASSIUM mmol/L  --  4.5   CHLORIDE mmol/L  --  98   CO2 mmol/L  --  20*   ANION GAP mmol/L  --  18   BUN mg/dL  --  9   CREATININE mg/dL  --  1.11   EGFR ml/min/1.73sq m  --  73   CALCIUM mg/dL  --  9.4   MAGNESIUM mg/dL 1.4*  --    PHOSPHORUS mg/dL 2.8  --            Results from last 7 days   Lab Units 11/28/23  1208   AST U/L 47*   ALT U/L 20   ALK PHOS U/L 163*   TOTAL PROTEIN g/dL 8.1   ALBUMIN g/dL 4.5   TOTAL BILIRUBIN mg/dL 1.42*             Results from last 7 days   Lab Units 11/28/23  1208   GLUCOSE RANDOM mg/dL 134            BETA-HYDROXYBUTYRATE   Date Value Ref Range Status   10/16/2023 2.9 (H) <0.6 mmol/L Final   10/01/2023 0.8 (H) <0.6 mmol/L Final            Results from last 7 days   Lab Units 11/28/23  1208   PH FANG   7.438*   PCO2 FANG mm Hg 31.3*   PO2 FANG mm Hg 57.7*   HCO3 FANG mmol/L 20.7*   BASE EXC FANG mmol/L -2.1   O2 CONTENT FANG ml/dL 22.3   O2 HGB, VENOUS % 88.1*               Results from last 7 days   Lab Units 11/28/23  2319 11/28/23  1651 11/28/23  1438 11/28/23  1319   HS TNI 0HR ng/L 5  --   --  5   HS TNI 2HR ng/L  --   --  6  --    HSTNI D2 ng/L  --   --  1  --    HS TNI 4HR ng/L  --  6  --   --    HSTNI D4 ng/L  --  1  --   --              Results from last 7 days   Lab Units 11/28/23  1208   PROTIME seconds 14.4   INR   1.06   PTT seconds 26           Results from last 7 days   Lab Units 11/28/23  1319   TSH 3RD GENERATON uIU/mL 2.088             Results from last 7 days   Lab Units 11/28/23  1319   LIPASE u/L 20          Results from last 7 days   Lab Units 11/28/23  1208   ETHANOL LVL mg/dL <10              Radiology (results):    EKG (results):   11/28 ECG- ECG rate:  156     ECG rate assessment: tachycardic      Rhythm: sinus tachycardia      Ectopy: none      QRS axis:  Right     QRS intervals:  Normal     Conduction: normal      ST segments:  Normal     T waves: normal            Other tests (results):    Tests pending final results:    Treatment in the ED:   Medication Administration from 11/28/2023 1155 to 11/28/2023 1353         Date/Time Order Dose Route Action Comments     11/28/2023 1209 EST sodium chloride 0.9 % bolus 1,000 mL 1,000 mL Intravenous New Bag --     11/28/2023 1210 EST LORazepam (ATIVAN) injection 1 mg 1 mg Intravenous Given --     11/28/2023 1331 EST magnesium sulfate 2 g/50 mL IVPB (premix) 2 g 2 g Intravenous New Bag --     11/28/2023 1330 EST potassium chloride 20 mEq IVPB (premix) 20 mEq Intravenous New Bag --     11/28/2023 1330 EST sodium chloride 0.9 % infusion 150 mL/hr Intravenous New Bag --             Other treatments:      Change in condition while in the ED:     Response to ED Treatment:          OBSERVATION: (Proceed to "ADMISSION" if Direct Admission)    Orders written during the observation period  Meds Name, dose, route, time, how may doses given:  PRN Meds Name, dose, route, time, how many doses given within the first 24 hrs.:  IVs Type, rate, and total amt. ordered/given:  Labs, imaging, other:  Consults and findings:    Test Results during the observation period  Pertinent Lab tests (dates/results):  Culture results (blood, urine, spinal, wound, respiratory, etc.):  Imaging tests (dates/results):  EKG (dates/results):   Other test (dates/results):  Tests pending (dates/results):    Surgical or Invasive Procedures during the observation period  Name of surgery/procedure:  Date & Time:  Patient Response:  Post-operative orders:  Operative Report/Findings:    Response to Treatment, Major Change in Condition, Major Charge in Treatment during the observation period          ADMISSION:    DIRECT Admissions Only:    Presenting Signs/Symptoms:     Medication/treatment prior to arrival:    Past Medical History:    Clinical Exam on admission:    Vital Signs on admission: (Temp, Pulse, Resp, and BP)    Weight in kilograms:     ALL Admissions:    Admission Orders and Other Orders written within the first 24 hrs after admission    CIWA    Continuous pulse ox    Telemetry    Continual observation             Meds Name, dose, route, time, how may doses given:  diazepam (VALIUM) injection 20 mg  Dose: 20 mg  Freq: Once Route: IV  Start: 11/28/23 1815 End: 11/28/23 1829   Admin Instructions:          diazepam (VALIUM) injection 5 mg  Dose: 5 mg  Freq: Once Route: IV  Start: 11/28/23 2115 End: 11/28/23 2129         escitalopram (LEXAPRO) tablet 20 mg  Dose: 20 mg  Freq: Daily Route: PO  Start: 11/28/23 1445 End: 11/28/23 2215   Admin Instructions:          folic acid (FOLVITE) tablet 1 mg  Dose: 1 mg  Freq: Daily Route: PO  Start: 11/28/23 1430 End: 22/80/98 6675      folic acid (FOLVITE) tablet 1 mg  Dose: 1 mg  Freq: Daily Route: PO  Start: 11/28/23 1300 End: 11/28/23 1313      LORazepam (ATIVAN) injection 1 mg  Dose: 1 mg  Freq: Once Route: IV  Indications of Use: AGITATION  Start: 11/28/23 2000 End: 11/28/23 1957         LORazepam (ATIVAN) injection 4 mg  Dose: 4 mg  Freq: Once Route: IV  Start: 11/28/23 1745 End: 11/28/23 1742   Admin Instructions:          LORazepam (ATIVAN) injection 4 mg  Dose: 4 mg  Freq: Once Route: IV  Start: 11/28/23 1645 End: 11/28/23 1645   Admin Instructions:          LORazepam (ATIVAN) injection 4 mg  Dose: 4 mg  Freq: Once Route: IV  Start: 11/28/23 1415 End: 11/28/23 1422   Admin Instructions:          LORazepam (ATIVAN) tablet 2 mg  Dose: 2 mg  Freq: Once Route: PO  Indications of Use: ALCOHOL WITHDRAWAL SYNDROME  Start: 11/28/23 1530 End: 11/28/23 1530         metoprolol (LOPRESSOR) injection 2.5 mg  Dose: 2.5 mg  Freq: Every 6 hours Route: IV  Start: 11/28/23 1815 End: 11/28/23 2215   Admin Instructions:          metoprolol (LOPRESSOR) injection 5 mg  Dose: 5 mg  Freq:  Once Route: IV  Start: 11/28/23 1445 End: 11/28/23 1445   Admin Instructions:          multivitamin-minerals (CENTRUM) tablet 1 tablet  Dose: 1 tablet  Freq: Daily Route: PO  Start: 11/28/23 1430 End: 11/28/23 2215      pantoprazole (PROTONIX) injection 40 mg  Dose: 40 mg  Freq: Every 12 hours scheduled Route: IV  Start: 11/28/23 1445 End: 11/28/23 2215   sodium chloride 0.9 % bolus 1,000 mL  Dose: 1,000 mL  Freq: Once Route: IV  Last Dose: Stopped (11/29/23 0726)  Start: 11/28/23 2245 End: 11/29/23 0726   sucralfate (CARAFATE) tablet 1 g  Dose: 1 g  Freq: Every 6 hours scheduled Route: PO  Start: 11/28/23 1445 End: 11/28/23 2215      thiamine tablet 100 mg  Dose: 100 mg  Freq: Daily Route: PO  Start: 11/28/23 1430 End: 11/28/23 2215                    PRN Meds Name, dose, route, time, how many doses given within the first 24 hrs.:  IVs Type, rate, and total amt. ordered/given:  multi-electrolyte (PLASMALYTE-A/ISOLYTE-S PH 7.4) IV solution  Rate: 100 mL/hr Dose: 100 mL/hr  Freq: Continuous Route: IV  Last Dose: Stopped (11/28/23 2152)  Start: 11/28/23 1430 End: 11/28/23 2215      sodium chloride 0.9 % infusion  Rate: 150 mL/hr Dose: 150 mL/hr  Freq: Continuous Route: IV  Indications of Use: IV Hydration  Last Dose: Stopped (11/28/23 1442)  Start: 11/28/23 1330 End: 11/28/23 1425           Labs, imaging, other:      Consults and findings:    Test Results after admission  Pertinent Lab tests (dates/results):  Culture results (blood, urine, spinal, wound, respiratory, etc.):  Imaging tests (dates/results):  EKG (dates/results): Other test (dates/results):  Tests pending (dates/results):    Surgical or Invasive Procedures  Name of surgery/procedure:  Date & Time:  Patient Response:  Post-operative orders:  Operative Report/Findings:    Response to Treatment, Major Change in Condition, Major Charge in Treatment anytime during admission  Update-Despite  multiple doses high dose Ativan, pt continued to worsen clinically with increased confusion, agitation and, hallucination . Case discussed with on-call  Dr. Latosha Crook -recommended transferring patient to [facility] detox. 20 mg of IV diazepam given . Tachycardia improved after IV metoprolol. start metoprolol 5 mg IV every 6 to help with tachycardia and blood pressure.         Hospital Course:   Bennie Goldmann is a 63 y.o. male patient who originally presented to the [facility] on 11/28/2023 due to symptoms. Most likely from alcohol withdrawal.  Despite getting multiple doses of IV Ativan patient symptoms continue to worsen. 20 mg of IV diazepam was administered after discussion with on-call  and it was recommended the patient be transferred to [facility] detox unit. The patient, initially admitted to the hospital as inpatient, was discharged earlier than expected given the following: Transfer to detox unit. Disposition on Discharge  Home, Rehab, SNF, LTC, Shelter, etc.: [id number] [facility] (Medical Detox unit )    Cease to Breathe (CTB)  If a patient expires during an admission, in addition to the above information, please include:    Summary/timeline of the patient's decline in condition:    Medications and treatment:    Patient response to treatment:    Date and time patient ceased to breathe:        Is there a Readmission that follows this admission? Yes X No    If yes, provide dates:          InterQual Review    InterQual Criteria Met: X Yes  No  N/A        Please include the InterQual Review, InterQual year/version used, and the criteria selected:   Criteria Review   REVIEW SUMMARY     InterQual® Review Status: In Primary  Review Type: Admission  Criteria Status: Critical Met  Day of review: Episode Day 1  Condition Specific: Yes        REVIEW DETAILS     Product: [place] Adult  Subset: Withdrawal Syndrome            [X] Select Day, One:          [X] Episode Day 1, One:              [X] CRITICAL, >= One:                  [X] Alcohol withdrawal syndrome and CIWA-Ar > 20 or delirium tremens        Version: [email or web address]® 2023, Mar. 2023 Release  InterQual® criteria (IQ) is confidential and proprietary information and is being provided to you solely as it pertains to the information requested. IQ may contain advanced clinical knowledge which we recommend you discuss with your physician upon disclosure to you.  Use permitted by and subject to license with [address] [place] [address] and/or one of its [address] [facility]. IQ reflects clinical interpretations and analyses and cannot alone either (a) resolve medical ambiguities of particular situations; or (b) provide the sole basis for definitive decisions. IQ is intended solely for use as screening guidelines with respect to medical appropriateness of healthcare services. All ultimate care decisions are strictly and solely the obligation and responsibility of your health care provider. © [address] [facility]  and/or one of its subsidiaries. All Rights Reserved. CPT® only © 7403-2439 [employer]. All Rights Reserved. PLEASE SUBMIT THE COMPLETED FORM TO THE DEPARTMENT OF HUMAN SERVICES - DIVISION OF CLINICAL  REVIEW VIA FAX -[phone] or VIA E-MAIL TO [email or web address]    Signature: Shayna Chow Date:  12/01/23    Confidentiality Notice: The documents accompanying this telecopy may contain confidential information belonging to the sender. The information is intended only for the use of the individual named above. If you are not the intended recipient, you are hereby notified  That any disclosure, copying, distribution or taking of any telecopy is strictly prohibited.

## 2023-12-01 NOTE — PHYSICAL THERAPY NOTE
Physical TherapyTreatment Note    Patient's Name: Ross Bains    Admitting Diagnosis  Alcohol withdrawal syndrome without complication [facility])    Problem List  Patient Active Problem List   Diagnosis    Alcohol use disorder, severe, dependence ([address])    Hepatic steatosis    Chronic alcoholic gastritis    Major depressive disorder    Hypertension    Tinea corporis    Hypokalemia    Thrombocytopenia (HCC)    Alcohol withdrawal syndrome with perceptual disturbance (HCC)    Elevated lactic acid level    Sinus tachycardia    Chest tightness    Dehydration       Past Medical History  Past Medical History:   Diagnosis Date    Alcoholic ketoacidosis 74/65/9783    GERD (gastroesophageal reflux disease)     Hypertension     Hypomagnesemia 04/02/2023    Vomiting 04/02/2023       Past Surgical History  History reviewed. No pertinent surgical history. Recent Imaging  CT head wo contrast   Final Result by JOSEF Plascencia MD (11/29 1329)      No acute intracranial abnormality. Workstation performed: [id number]             Recent Vital Signs  Vitals:    11/30/23 2010 12/01/23 0511 12/01/23 0730 12/01/23 1113   BP: 145/91 (!) 162/105 145/96 (!) 155/101   BP Location: Left arm Left arm Right arm Left arm   Pulse: 90 95 80 100   Resp: 18 18 18 18   Temp: 98 °F (36.7 °C) 98 °F (36.7 °C) 97.5 °F (36.4 °C) 97.8 °F (36.6 °C)   TempSrc: Temporal Temporal Temporal Temporal   SpO2: 97% 95% 94% 95%   Weight:       Height:            12/01/23 1025   PT Last Visit   PT Visit Date 12/01/23   Note Type   Note Type Treatment   Pain Assessment   Pain Assessment Tool 0-10   Pain Score 8   Pain Location/Orientation Orientation: Right;Location: Foot  (1st met head)   Restrictions/Precautions   Weight Bearing Precautions Per Order No   Other Precautions Pain   General   Chart Reviewed Yes   Response to Previous Treatment Patient with no complaints from previous session.    Family/Caregiver Present No   Cognition   Overall Cognitive Status WFL   Arousal/Participation Alert   Attention Within functional limits   Orientation Level Oriented X4   Memory Within functional limits   Following Commands Follows one step commands without difficulty   Bed Mobility   Supine to Sit 6  Modified independent   Additional items Increased time required   Sit to Supine 6  Modified independent   Additional items Increased time required   Transfers   Sit to Stand 6  Modified independent   Additional items Increased time required   Stand to Sit 6  Modified independent   Additional items Increased time required   Ambulation/Elevation   Gait pattern Decreased R stance;Decreased foot clearance; Short stride; Step through pattern;Decreased toe off;Decreased heel strike; Improper Weight shift; Antalgic   Gait Assistance 6  Modified independent   Additional items Verbal cues   Assistive Device None   Distance 240ft   Balance   Static Sitting Fair +   Dynamic Sitting Fair   Static Standing Fair   Dynamic Standing Fair -   Ambulatory Fair -   Endurance Deficit   Endurance Deficit Yes   Endurance Deficit Description reduced from baseline due to pain in the foot   Activity Tolerance   Activity Tolerance Patient limited by pain   Medical Staff Made Aware spoke to CM   Nurse Made Aware spoke to RN   Assessment   Prognosis Good   Problem List Decreased strength;Decreased endurance; Impaired balance;Decreased mobility; Decreased coordination;Pain   Assessment Pt with improved balance and safety awareness today. No LOB when ambulating with no AD. Continue to report pain in the R foot at the 1st met head, he feels that is due to gout. Able to tolerate household distance ambulation without LOB. Able to D/C home safely today.    Barriers to Discharge Inaccessible home environment;Decreased caregiver support   Goals   Patient Goals go home   STG Expiration Date 12/10/23   Short Term Goal #1 see eval note   PT Treatment Day 1   Plan   Treatment/Interventions Functional transfer training;ADL retraining;LE strengthening/ROM; Elevations; Therapeutic exercise; Endurance training;Patient/family training;Equipment eval/education; Bed mobility;Gait training;Spoke to nursing;Spoke to case management;OT   Progress Progressing toward goals   PT Frequency 2-3x/wk   Discharge Recommendation   Rehab Resource Intensity Level, PT No post-acute rehabilitation needs   AM-PAC Basic Mobility Inpatient   Turning in Flat Bed Without Bedrails 4   Lying on Back to Sitting on Edge of Flat Bed Without Bedrails 4   Moving Bed to Chair 4   Standing Up From Chair Using Arms 4   Walk in Room 4   Climb 3-5 Stairs With Railing 3   Basic Mobility Inpatient Raw Score 23   Basic Mobility Standardized Score 50.88   Highest Level Of Mobility   JH-HLM Goal 7: Walk 25 feet or more   JH-HLM Achieved 7: Walk 25 feet or more   Education   Education Provided Mobility training   Patient Explanation/teachback used;Demonstrates verbal understanding   End of Consult   Patient Position at End of Consult Seated edge of bed; All needs within reach       [facility] PT, DPT

## 2023-12-01 NOTE — PLAN OF CARE
Problem: PHYSICAL THERAPY ADULT  Goal: Performs mobility at highest level of function for planned discharge setting. See evaluation for individualized goals. Description: Treatment/Interventions: Functional transfer training, ADL retraining, LE strengthening/ROM, Elevations, Therapeutic exercise, Endurance training, Patient/family training, Equipment eval/education, Bed mobility, Gait training, Spoke to nursing, Spoke to case management, OT  Equipment Recommended: Darroll Marking       See flowsheet documentation for full assessment, interventions and recommendations. Outcome: Progressing  Note: Prognosis: Good  Problem List: Decreased strength, Decreased endurance, Impaired balance, Decreased mobility, Decreased coordination, Pain  Assessment: Pt with improved balance and safety awareness today. No LOB when ambulating with no AD. Continue to report pain in the R foot at the 1st met head, he feels that is due to gout. Able to tolerate household distance ambulation without LOB. Able to D/C home safely today. Barriers to Discharge: Inaccessible home environment, Decreased caregiver support     Rehab Resource Intensity Level, PT: No post-acute rehabilitation needs    See flowsheet documentation for full assessment.

## 2023-12-01 NOTE — CASE MANAGEMENT
Case Management Discharge Planning Note    Patient name Aguila Pena  Location 5T DETOX 518/5T DETOX 51* MRN [id number]  : 1965 Date 2023       Current Admission Date: 2023  Current Admission Diagnosis:Alcohol withdrawal syndrome with perceptual disturbance [facility])   Patient Active Problem List    Diagnosis Date Noted    Dehydration 2023    Sinus tachycardia 2023    Chest tightness 2023    Alcohol withdrawal syndrome with perceptual disturbance (HCC) 10/01/2023    Elevated lactic acid level 10/01/2023    Hypokalemia 2023    Thrombocytopenia ([address]) 2023    Alcohol use disorder, severe, dependence ([address]) 2023    Hepatic steatosis 2023    Chronic alcoholic gastritis     Major depressive disorder 2023    Hypertension 2023    Tinea corporis 2023      LOS (days): 3  Geometric Mean LOS (GMLOS) (days): 3.40  Days to GMLOS:0.9     OBJECTIVE:  Risk of Unplanned Readmission Score: 13.2         Current admission status: Inpatient   Preferred Pharmacy:   [facility] [address] [place] [address], [address] [place] [address]  Phone: [phone] Fax: [address] [address]  Phone: [phone] Fax: [phone]    Primary Care Provider: Katherin Camarillo MD    Primary Insurance: [place] [employer]  Secondary Insurance:     DISCHARGE DETAILS:    Discharge planning discussed with[de-identified] patient  Freedom of Choice: Yes                   Contacts  Patient Contacts: JESUS Vu PCP  Relationship to Patient[de-identified] Treatment Provider  Contact Method: Fax              Other Referral/Resources/Interventions Provided:  Referrals Provided[de-identified] Crisis Hotline, Other (Specify) (outpatient TONIA tx)  Post Acute Placement to[de-identified] Substance Abuse Treatment               Transport at Discharge : Auto with designated   Dispatcher Contacted:  No Transport Service Arrived: No  Transfer Mode: Ambulate  Accompanied by: Alone           Family notified[de-identified] MARTINE Barrera           Pt to discharge today. Pt denies any withdrawal symptoms at this time. Pt to discharge to Home and Significant other will  upon discharge. Pt to follow up with PCP on 5/4/23.  Pt to follow up with outpatient [facility]ONIA referral.     Discharge Address:   Phone Number:

## 2023-12-02 LAB
ATRIAL RATE: 156 BPM
P AXIS: 60 DEGREES
PR INTERVAL: 114 MS
QRS AXIS: 90 DEGREES
QRSD INTERVAL: 84 MS
QT INTERVAL: 328 MS
QTC INTERVAL: 528 MS
T WAVE AXIS: 63 DEGREES
VENTRICULAR RATE: 156 BPM

## 2023-12-02 PROCEDURE — 93010 ELECTROCARDIOGRAM REPORT: CPT | Performed by: INTERNAL MEDICINE

## 2023-12-06 NOTE — UTILIZATION REVIEW
URGENT/EMERGENT  INPATIENT/SPU AUTHORIZATION REQUEST    Date: 12/06/23            # Pages in this Request:     X New Request   Additional Information for PA#:     Office Contact Name:  Ly Mcrae Title: Utilization Review, [place] Nurse     Phone: [phone]  Ext. Availability (Date/Time): Wednesday - Friday 8 am- 4 pm    x Inpatient Review  SPU Review       x Current        Late Pick-up   How your facility was first notified of the Late Pick-up:  When your facility was first notified of the Late Pick-up (date):         RECIPIENT INFORMATION    Recipient ID#: [id number]   Recipient Name: María Riley      YOB: 1965  63 y.o. Recipient Alias:     Gender:  X Male  Female Medicaid Eligibility ([address] [place] [address]): INSURANCE INFORMATION    (All other private or governmental health insurance benefits must be utilized prior to billing the [place] Program)    Was this admission the result of an MVA, other accident, assault, injury, fall, gunshot, bite etc.? Yes X No                   If yes, provide a brief description of the incident. Does the recipient have other insurance coverage? Yes x No        Insurance Company Name/Policy #      Did that insurance pay on this claim? Yes  No        Did that insurance deny this claim? Yes  No    If yes, reason for denial:      Does the recipient have Medicare? Yes x No        Did Medicare exhaust prior to this admission? Yes  No        Did Medicare partially pay this claim? Yes  No        Did that insurance deny this claim? Yes  No    If yes, reason for denial:          Was the recipient a prisoner at the time of admission? Yes x No            PROVIDER INFORMATION    Hospital Name: [address] [place] [address] Provider ID#: [id number]    Admitting Physician Name: [facility] [address] [place] [address] ED ( Detox)  [facility] Provider ID#: [id number]        ADMISSION INFORMATION    Type of Admission: (please choose one)     ED      Direct    If yes, from where?      X Transfer If yes, transferring hospital (inpatient, rehab, or psych) Provider Name/Provider ID#:[facility] - 21         Admission Floor or Unit Type: Level 4 medical Detox Unit     Dates/Times:        ED Date/Time:         Observation Date/Time:         Admission Date/Time: 11/28/23 10:15 PM        Discharge or Transfer Date/Time: 12/1/2023 12:22 PM        DIAGNOSIS/PROCEDURE CODES    Primary Diagnosis Code/Primary Diagnosis Code description:  F10.232 Alcohol dependence with withdrawal with perceptual disturbance    D69.6 Thrombocytopenia, unspecified   E83.42 Hypomagnesemia   E86.0 Dehydration   Additional Diagnosis Code(s) and Description(s)-(up to three additional codes):    Procedure Code (one) and description:        CLINICAL INFORMATION - PRIOR ADMISSION ONLY    Is there a prior admission with a discharge date within 30 days of the date of this admission? X No (Proceed to the next section - "Clinical Information - General Review Checklist:)      Yes (Provide the following information)     Prior admission dates:    MA Prior Authorization Number:        Review Outcome:     Diagnosis Code(s)/Description:    Procedure Code/Description:    Findings:    Treatment:    Condition on Discharge:   Vitals:    Labs:   Imaging:   Medications: Follow-up Instructions:    Disposition:        CLINICAL INFORMATION - GENERAL REVIEW CHECKLIST    EMERGENCY DEPARTMENT: (Proceed to "ADMISSION" if Direct Admission)    Presenting Signs/Symptoms:    Medication/treatment prior to arrival in the ED:    Past Medical History:    Clinical Exam:    Initial Vital Signs: (Temp, Pulse, Resp, and BP)       Pertinent Repeat Vital Signs: (include times they were obtained)    Pertinent Sustained Findings: (include times they were obtained)    Weight in Kilograms:  Pertinent Labs (results):    Radiology (results):    EKG (results):      Other tests (results):    Tests pending final results:    Treatment in the ED:          Other treatments:      Change in condition while in the ED:     Response to ED Treatment:          OBSERVATION: (Proceed to "ADMISSION" if Direct Admission)    Orders written during the observation period  Meds Name, dose, route, time, how may doses given:  PRN Meds Name, dose, route, time, how many doses given within the first 24 hrs.:  IVs Type, rate, and total amt. ordered/given:  Labs, imaging, other:  Consults and findings:    Test Results during the observation period  Pertinent Lab tests (dates/results):  Culture results (blood, urine, spinal, wound, respiratory, etc.):  Imaging tests (dates/results):  EKG (dates/results): Other test (dates/results):  Tests pending (dates/results):    Surgical or Invasive Procedures during the observation period  Name of surgery/procedure:  Date & Time:  Patient Response:  Post-operative orders:  Operative Report/Findings:    Response to Treatment, Major Change in Condition, Major Charge in Treatment during the observation period          ADMISSION:    DIRECT Admissions Only:  Pt initially presented to [address] [place] [address] ED. He was admitted inpatient. Pt was transferred by EMS to [facility] for its Level IV medically managed intensive inpatient detox unit, not available at [address] [place] [address]. Presenting Signs/Symptoms:  63 y.o. male who presented to medical detox. Inpatient admission for evaluation and treatment of alcohol withdrawal syndrome. Presented w/ chest pain described as crushing, w/ tingling in L arm and diaphoresis. Pt was transferred from [address] [place] [address] s/t need for detox from alcohol w/ increased confusion, agitation, and hallucinations despite multiple doses of Ativan. Reports 1 handle of liquor daily, last drink on 11/28 @ 1130. Has prior rehab treatment for withdrawal. Reports no hx of withdrawal seizures. On exam, anxiety, tremors, diaphoresis, tachycardic, nausea, hallucinations, disorientation.  SEWS 15. Plan: SEWS monitoring w/ phenobarbital management, high-dose IV thiamine/folic acid supplement, IVF, telemetry, continuous pulse ox, IV PPI BID, carafate, trend labs, replete electrolytes as needed. Medication/treatment prior to arrival:    Past Medical History:    Clinical Exam on admission:    Vital Signs on admission: (Temp, Pulse, Resp, and BP)    Date/Time Temp Pulse Resp BP MAP (mmHg) SpO2 O2 Device   11/29/23 0839 -- 108 Abnormal  18 137/95 109 94 % None (Room air)   11/29/23 0833 -- 106 Abnormal  -- 148/102 Abnormal  -- -- --   11/29/23 0721 98.2 °F (36.8 °C) 106 Abnormal  20 148/102 Abnormal  117 95 % None (Room air)   11/29/23 0557 98.3 °F (36.8 °C) 109 Abnormal  18 132/94 -- 93 % None (Room air)   11/29/23 0400 98.3 °F (36.8 °C) 110 Abnormal  18 139/95 -- 92 % None (Room air)   11/29/23 0200 98.2 °F (36.8 °C) 114 Abnormal  18 134/85 -- 92 % None (Room air)   11/29/23 0000 98.3 °F (36.8 °C) 117 Abnormal  18 130/82 -- 92 % None (Room air)   11/28/23 2225 98.3 °F (36.8 °C) 121 Abnormal  18 145/91 109 93 % None (Room air)        Weight in kilograms:   11/29/23 99.8 kg (220 lb)     ALL Admissions:    Admission Orders and Other Orders written within the first 24 hrs after admission  Regular Diet. SCDs. Fall & Seizure Precautions. SEWS monitoring. Telemetry & Continuous Pulse Ox. Continual Observation for safety.         Meds Name, dose, route, time, how may doses given:  docusate sodium, 100 mg, Oral, BID  enoxaparin, 40 mg, Subcutaneous, Daily  escitalopram, 20 mg, Oral, Daily  pantoprazole, 40 mg, Intravenous, Q12H COLBY  sucralfate, 1 g, Oral, Q6H [facility]  thiamine, 500 mg, Intravenous, Q8H COLBY  Revia 50 mg po qd- started 11/30  Vivitrol 380 mg I'm once- 12/1 x1         PRN Meds Name, dose, route, time, how many doses given within the first 24 hrs.:  acetaminophen, 650 mg, Oral, Q6H PRN-12/1 x 1  aluminum-magnesium hydroxide-simethicone, 30 mL, Oral, Q6H PRN  magnesium sulfate, 2 g, Intravenous; 11/28 x1  ondansetron, 4 mg, Intravenous, Q6H PRN  traZODone, 50 mg, Oral, HS PRN  Motrin 600 mg po prn - 12/1 x 1   Loperamide 4 mg po prn - 11/30 x 1   phenobarbital, 650 mg, Intravenous; 11/28 x1  phenobarbital, 260 mg, Intravenous; 11/29 x1  phenobarbital, 130 mg, Intravenous; 11/29 x 6 - 11/30 x 2   sodium chloride 0.9 %, 1,000 mL bolus, Intravenous, 11/28 x1          IVs Type, rate, and total amt. ordered/given:  multi-electrolyte, 125 mL/hr, Intravenous, Continuous       Labs, imaging, other:      Consults and findings:Cardiology: 11/29 - Pt w/ resting sinus tachycardia possibly related to alcohol withdrawal. Troponins negative. MI ruled out.  Consider Zio patch if ongoing palpitations after adequate treatment of alcohol withdrawal.     Test Results after admission  Pertinent Lab tests (dates/results):  Results from last 7 days   Lab Units 11/29/23  0605 11/28/23  1208   WBC Thousand/uL 6.33 16.64*   HEMOGLOBIN g/dL 15.1 17.7*   HEMATOCRIT % 44.9 50.3*   PLATELETS Thousands/uL 147* 293   NEUTROS ABS Thousands/µL  --  13.02*                 Results from last 7 days   Lab Units 11/29/23  0605 11/28/23  1319 11/28/23  1208   SODIUM mmol/L 137  --  136   POTASSIUM mmol/L 4.2  --  4.5   CHLORIDE mmol/L 103  --  98   CO2 mmol/L 24  --  20*   ANION GAP mmol/L 10  --  18   BUN mg/dL 10  --  9   CREATININE mg/dL 1.00  --  1.11   EGFR ml/min/1.73sq m 83  --  73   CALCIUM mg/dL 8.7  --  9.4   MAGNESIUM mg/dL 2.8* 1.4*  --    PHOSPHORUS mg/dL  --  2.8  --             Results from last 7 days   Lab Units 11/29/23  0605 11/28/23  1208   AST U/L 29 47*   ALT U/L 11 20   ALK PHOS U/L 116* 163*   TOTAL PROTEIN g/dL 6.5 8.1   ALBUMIN g/dL 3.9 4.5   TOTAL BILIRUBIN mg/dL 1.56* 1.42*                Results from last 7 days   Lab Units 11/29/23  0605 11/28/23  1208   GLUCOSE RANDOM mg/dL 101 134           Results from last 7 days   Lab Units 11/28/23  1208   PH FANG   7.438*   PCO2 FANG mm Hg 31.3*   PO2 FANG mm Hg 57.7*   HCO3 FANG mmol/L 20.7*   BASE EXC FANG mmol/L -2.1   O2 CONTENT FANG ml/dL 22.3   O2 HGB, VENOUS % 88.1*              Results from last 7 days   Lab Units 11/28/23  2319 11/28/23  1651 11/28/23  1438 11/28/23  1319   HS TNI 0HR ng/L 5  --   --  5   HS TNI 2HR ng/L  --   --  6  --    HSTNI D2 ng/L  --   --  1  --    HS TNI 4HR ng/L  --  6  --   --    HSTNI D4 ng/L  --  1  --   --                Results from last 7 days   Lab Units 11/28/23  1208   PROTIME seconds 14.4   INR   1.06   PTT seconds 26           Results from last 7 days   Lab Units 11/28/23  1319   TSH 3RD GENERATON uIU/mL 2.088           Results from last 7 days   Lab Units 11/28/23  1319   LIPASE u/L 20               Results from last 7 days   Lab Units 11/28/23  1208   ETHANOL LVL mg/dL <10                  Culture results (blood, urine, spinal, wound, respiratory, etc.):  Imaging tests (dates/results):  EKG (dates/results):    11/29 - Echo    Left Ventricle: Left ventricular cavity size is normal. Wall thickness is mildly increased. The left ventricular ejection fraction is 55%. Systolic function is normal. Wall motion is normal.    Aortic Valve: There is trace regurgitation. Mitral Valve: There is trace regurgitation. Tricuspid Valve: There is trace regurgitation. Pulmonic Valve: There is trace regurgitation. Aorta: The aortic root is mildly dilated. Other test (dates/results):  Severity of Ethanol Withdrawal Scale (SEWS):     11/29/23 0840 11/29/23 0727 11/29/23 0557 11/28/23 2227   ANXIETY: Do you feel that something bad is about to happen to you right now? 0  -WL 3  -WL 3  -NR 3  -NR   NAUSEA and DRY HEAVES or VOMITING? 0  -WL 0  -WL 0  -NR 0  -NR   SWEATING: (includes moist palms, sweating now)? Score 0 or 2 0  -WL 2  -WL 2  -NR 2  -NR   TREMOR: with arms extended eyes closed? 0  -WL 0  -WL 2  -NR 2  -NR   AGITATION: Fidgety, restless, pacing?  0  -WL 0  -WL 3  -NR 3  -NR   DISORIENTATION: 0  -WL 1  -WL 1  -NR 1  -NR   HALLUCINATIONS: 0  -WL 0  -WL 0  -NR 1  -NR VITAL SIGNS: ANY (Pulse >979, Diastolic BP >25, Temp >09.7) 0  -WL 3  -WL 3  -NR 3  -NR   SEWS Total Score 0  -WL 9  -WL 14  -NR 15  -NR              Tests pending (dates/results):    Surgical or Invasive Procedures  Name of surgery/procedure:  Date & Time:  Patient Response:  Post-operative orders:  Operative Report/Findings:    Response to Treatment, Major Change in Condition, Major Charge in Treatment anytime during admission    Date: 11/29/23       Day 2: Pt reports feeling improved, no chest pain this morning. Suspect chest tightness is demand  related as pt has been significantly tachycardic during episodes of chest pain. On exam, diaphoretic, tachycardic, inattentive, anxious, slurred speech, some disorientation remains. SEWS 9. Plan: continue SEWS monitoring w/ phenobarbital management, high-dose IV thiamine/folic acid supplement, telemetry, continuous pulse ox, continue PTA meds, trend labs, replete electrolytes as needed. Cardiology consulted. Disposition on Discharge  Home, Rehab, SNF, LTC, Shelter, etc.: Home/Self Care    Cease to Breathe (CTB)  If a patient expires during an admission, in addition to the above information, please include:    Summary/timeline of the patient's decline in condition:    Medications and treatment:    Patient response to treatment:    Date and time patient ceased to breathe:        Is there a Readmission that follows this admission? Yes X No    If yes, provide dates:          InterQual Review    InterQual Criteria Met: X Yes  No  N/A        Please include the InterQual Review, InterQual year/version used, and the criteria selected:   Criteria Review   REVIEW SUMMARY     InterQual® Review Status: In Primary  Review Type: Admission  Criteria Status: Acute Met  Day of review: Episode Day 1  Condition Specific: Yes        REVIEW DETAILS     Product: Lele Braswell Adult  Subset:  Withdrawal Syndrome            [X] Select Day, One:          [X] Episode Day 1, One: [X] ACUTE, One:                  [X] Alcohol or anxiolytic or hypnotic or sedative withdrawal syndrome and, Both:                      [X] Finding, >= One:                          [X] Heart rate > 100/min, sustained                          [X] Myoclonic contractions or tremors                      [X] Intervention, >= One:                          [X] Barbiturate (includes PO)        Version: InterQual® 2023, Mar. 2023 Release  InterQual® criteria (IQ) is confidential and proprietary information and is being provided to you solely as it pertains to the information requested. IQ may contain advanced clinical knowledge which we recommend you discuss with your physician upon disclosure to you. Use permitted by and subject to license with [address] [place] [address] and/or one of its [address] . IQ reflects clinical interpretations and analyses and cannot alone either (a) resolve medical ambiguities of particular situations; or (b) provide the sole basis for definitive decisions. IQ is intended solely for use as screening guidelines with respect to medical appropriateness of healthcare services. All ultimate care decisions are strictly and solely the obligation and responsibility of your health care provider. © [address]  and/or one of its subsidiaries. All Rights Reserved. CPT® only © 6674-9557 American [employer] Association. All Rights Reserved. PLEASE SUBMIT THE COMPLETED FORM TO THE DEPARTMENT OF HUMAN SERVICES - DIVISION OF CLINICAL  REVIEW VIA FAX -[phone] or VIA E-MAIL TO [email or web address]    Signature: Rodolfo Kebede Date:  12/06/23    Confidentiality Notice: The documents accompanying this telecopy may contain confidential information belonging to the sender. The information is intended only for the use of the individual named above.  If you are not the intended recipient, you are hereby notified  That any disclosure, copying, distribution or taking of any telecopy is strictly prohibited.

## 2023-12-06 NOTE — TELEPHONE ENCOUNTER
11/28/2023 - 12/1/2023 (3 days)  [address] [facility]    2ND Attempt    Called the patient and left a VM informing him I am calling to schedule an appointment for a cysto, in addition urine testing orders are in the system please have this completed, please call us at [phone], thank-you.

## 2023-12-07 NOTE — TELEPHONE ENCOUNTER
3RD Attempt     Called the patient and left a VM informing him I am calling to schedule an appointment for a cysto, in addition urine testing orders are in the system please have this completed, please call us at [phone], thank-you. Attempt to reach letter has been sent.

## 2023-12-12 ENCOUNTER — APPOINTMENT (EMERGENCY)
Dept: CT IMAGING | Facility: HOSPITAL | Age: 58
DRG: 425 | End: 2023-12-12
Payer: COMMERCIAL

## 2023-12-12 ENCOUNTER — HOSPITAL ENCOUNTER (INPATIENT)
Facility: HOSPITAL | Age: 58
LOS: 6 days | Discharge: HOME | DRG: 425 | End: 2023-12-18
Attending: STUDENT IN AN ORGANIZED HEALTH CARE EDUCATION/TRAINING PROGRAM | Admitting: INTERNAL MEDICINE
Payer: COMMERCIAL

## 2023-12-12 ENCOUNTER — APPOINTMENT (EMERGENCY)
Dept: RADIOLOGY | Facility: HOSPITAL | Age: 58
DRG: 425 | End: 2023-12-12
Payer: COMMERCIAL
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
## 2023-12-12 DIAGNOSIS — K29.20 CHRONIC ALCOHOLIC GASTRITIS WITHOUT HEMORRHAGE: ICD-10-CM

## 2023-12-12 DIAGNOSIS — I10 PRIMARY HYPERTENSION: ICD-10-CM

## 2023-12-12 DIAGNOSIS — K59.00 CONSTIPATION: ICD-10-CM

## 2023-12-12 DIAGNOSIS — F10.20 ALCOHOL USE DISORDER, SEVERE, DEPENDENCE (HCC): ICD-10-CM

## 2023-12-12 DIAGNOSIS — F10.932 ALCOHOL WITHDRAWAL SYNDROME WITH PERCEPTUAL DISTURBANCE (HCC): ICD-10-CM

## 2023-12-12 LAB
ABO GROUP BLD: NORMAL
ALBUMIN SERPL BCP-MCNC: 4.7 G/DL (ref 3.5–5)
ALP SERPL-CCNC: 144 U/L (ref 34–104)
ALT SERPL W P-5'-P-CCNC: 24 U/L (ref 7–52)
ANION GAP SERPL CALCULATED.3IONS-SCNC: 28 MMOL/L
APAP SERPL-MCNC: <2 UG/ML (ref 10–20)
AST SERPL W P-5'-P-CCNC: 53 U/L (ref 13–39)
BASE EXCESS BLDA CALC-SCNC: -8 MMOL/L (ref -2–3)
BASOPHILS # BLD AUTO: 0.07 THOUSANDS/ÂΜL (ref 0–0.1)
BASOPHILS NFR BLD AUTO: 1 % (ref 0–1)
BILIRUB SERPL-MCNC: 0.72 MG/DL (ref 0.2–1)
BLD GP AB SCN SERPL QL: NEGATIVE
BUN SERPL-MCNC: 15 MG/DL (ref 5–25)
CA-I BLD-SCNC: 1.02 MMOL/L (ref 1.12–1.32)
CALCIUM SERPL-MCNC: 9.1 MG/DL (ref 8.4–10.2)
CHLORIDE SERPL-SCNC: 96 MMOL/L (ref 96–108)
CO2 SERPL-SCNC: 15 MMOL/L (ref 21–32)
CREAT SERPL-MCNC: 0.82 MG/DL (ref 0.6–1.3)
EOSINOPHIL # BLD AUTO: 0.1 THOUSAND/ÂΜL (ref 0–0.61)
EOSINOPHIL NFR BLD AUTO: 1 % (ref 0–6)
ERYTHROCYTE [DISTWIDTH] IN BLOOD BY AUTOMATED COUNT: 13.4 % (ref 11.6–15.1)
ETHANOL SERPL-MCNC: 198 MG/DL
GFR SERPL CREATININE-BSD FRML MDRD: 98 ML/MIN/1.73SQ M
GLUCOSE SERPL-MCNC: 61 MG/DL (ref 65–140)
GLUCOSE SERPL-MCNC: 69 MG/DL (ref 65–140)
HCO3 BLDA-SCNC: 14.8 MMOL/L (ref 24–30)
HCT VFR BLD AUTO: 44.5 % (ref 36.5–49.3)
HCT VFR BLD CALC: 44 % (ref 36.5–49.3)
HGB BLD-MCNC: 15.2 G/DL (ref 12–17)
HGB BLDA-MCNC: 15 G/DL (ref 12–17)
IMM GRANULOCYTES # BLD AUTO: 0.04 THOUSAND/UL (ref 0–0.2)
IMM GRANULOCYTES NFR BLD AUTO: 0 % (ref 0–2)
LYMPHOCYTES # BLD AUTO: 1.9 THOUSANDS/ÂΜL (ref 0.6–4.47)
LYMPHOCYTES NFR BLD AUTO: 19 % (ref 14–44)
MCH RBC QN AUTO: 32.6 PG (ref 26.8–34.3)
MCHC RBC AUTO-ENTMCNC: 34.2 G/DL (ref 31.4–37.4)
MCV RBC AUTO: 96 FL (ref 82–98)
MONOCYTES # BLD AUTO: 0.39 THOUSAND/ÂΜL (ref 0.17–1.22)
MONOCYTES NFR BLD AUTO: 4 % (ref 4–12)
NEUTROPHILS # BLD AUTO: 7.64 THOUSANDS/ÂΜL (ref 1.85–7.62)
NEUTS SEG NFR BLD AUTO: 75 % (ref 43–75)
NRBC BLD AUTO-RTO: 0 /100 WBCS
PCO2 BLD: 15 MMOL/L (ref 21–32)
PCO2 BLD: 24 MM HG (ref 42–50)
PH BLD: 7.4 [PH] (ref 7.3–7.4)
PLATELET # BLD AUTO: 365 THOUSANDS/UL (ref 149–390)
PMV BLD AUTO: 8.2 FL (ref 8.9–12.7)
PO2 BLD: 71 MM HG (ref 35–45)
POTASSIUM BLD-SCNC: 4.2 MMOL/L (ref 3.5–5.3)
POTASSIUM SERPL-SCNC: 4.1 MMOL/L (ref 3.5–5.3)
PROT SERPL-MCNC: 8.3 G/DL (ref 6.4–8.4)
RBC # BLD AUTO: 4.66 MILLION/UL (ref 3.88–5.62)
RH BLD: POSITIVE
SALICYLATES SERPL-MCNC: <5 MG/DL (ref 3–20)
SAO2 % BLD FROM PO2: 94 % (ref 60–85)
SODIUM BLD-SCNC: 138 MMOL/L (ref 136–145)
SODIUM SERPL-SCNC: 139 MMOL/L (ref 135–147)
SPECIMEN EXPIRATION DATE: NORMAL
SPECIMEN SOURCE: ABNORMAL
WBC # BLD AUTO: 10.14 THOUSAND/UL (ref 4.31–10.16)

## 2023-12-12 PROCEDURE — 80179 DRUG ASSAY SALICYLATE: CPT | Performed by: STUDENT IN AN ORGANIZED HEALTH CARE EDUCATION/TRAINING PROGRAM

## 2023-12-12 PROCEDURE — NC001 PR NO CHARGE: Performed by: SURGERY

## 2023-12-12 PROCEDURE — 80053 COMPREHEN METABOLIC PANEL: CPT | Performed by: STUDENT IN AN ORGANIZED HEALTH CARE EDUCATION/TRAINING PROGRAM

## 2023-12-12 PROCEDURE — 80143 DRUG ASSAY ACETAMINOPHEN: CPT | Performed by: STUDENT IN AN ORGANIZED HEALTH CARE EDUCATION/TRAINING PROGRAM

## 2023-12-12 PROCEDURE — 96367 TX/PROPH/DG ADDL SEQ IV INF: CPT

## 2023-12-12 PROCEDURE — 70450 CT HEAD/BRAIN W/O DYE: CPT

## 2023-12-12 PROCEDURE — 76705 ECHO EXAM OF ABDOMEN: CPT | Performed by: PHYSICIAN ASSISTANT

## 2023-12-12 PROCEDURE — 82330 ASSAY OF CALCIUM: CPT

## 2023-12-12 PROCEDURE — 86900 BLOOD TYPING SEROLOGIC ABO: CPT | Performed by: STUDENT IN AN ORGANIZED HEALTH CARE EDUCATION/TRAINING PROGRAM

## 2023-12-12 PROCEDURE — 71045 X-RAY EXAM CHEST 1 VIEW: CPT

## 2023-12-12 PROCEDURE — 36415 COLL VENOUS BLD VENIPUNCTURE: CPT | Performed by: STUDENT IN AN ORGANIZED HEALTH CARE EDUCATION/TRAINING PROGRAM

## 2023-12-12 PROCEDURE — 71260 CT THORAX DX C+: CPT

## 2023-12-12 PROCEDURE — 96375 TX/PRO/DX INJ NEW DRUG ADDON: CPT

## 2023-12-12 PROCEDURE — 85014 HEMATOCRIT: CPT

## 2023-12-12 PROCEDURE — 99233 SBSQ HOSP IP/OBS HIGH 50: CPT | Performed by: NURSE PRACTITIONER

## 2023-12-12 PROCEDURE — 72125 CT NECK SPINE W/O DYE: CPT

## 2023-12-12 PROCEDURE — EDAIR PR ED AIR: Performed by: EMERGENCY MEDICINE

## 2023-12-12 PROCEDURE — 96376 TX/PRO/DX INJ SAME DRUG ADON: CPT

## 2023-12-12 PROCEDURE — 84132 ASSAY OF SERUM POTASSIUM: CPT

## 2023-12-12 PROCEDURE — 86850 RBC ANTIBODY SCREEN: CPT | Performed by: STUDENT IN AN ORGANIZED HEALTH CARE EDUCATION/TRAINING PROGRAM

## 2023-12-12 PROCEDURE — 99285 EMERGENCY DEPT VISIT HI MDM: CPT

## 2023-12-12 PROCEDURE — 84295 ASSAY OF SERUM SODIUM: CPT

## 2023-12-12 PROCEDURE — 85025 COMPLETE CBC W/AUTO DIFF WBC: CPT | Performed by: STUDENT IN AN ORGANIZED HEALTH CARE EDUCATION/TRAINING PROGRAM

## 2023-12-12 PROCEDURE — 82803 BLOOD GASES ANY COMBINATION: CPT

## 2023-12-12 PROCEDURE — 82077 ASSAY SPEC XCP UR&BREATH IA: CPT | Performed by: STUDENT IN AN ORGANIZED HEALTH CARE EDUCATION/TRAINING PROGRAM

## 2023-12-12 PROCEDURE — 74177 CT ABD & PELVIS W/CONTRAST: CPT

## 2023-12-12 PROCEDURE — 96365 THER/PROPH/DIAG IV INF INIT: CPT

## 2023-12-12 PROCEDURE — 99291 CRITICAL CARE FIRST HOUR: CPT | Performed by: STUDENT IN AN ORGANIZED HEALTH CARE EDUCATION/TRAINING PROGRAM

## 2023-12-12 PROCEDURE — 82947 ASSAY GLUCOSE BLOOD QUANT: CPT

## 2023-12-12 PROCEDURE — 76604 US EXAM CHEST: CPT | Performed by: PHYSICIAN ASSISTANT

## 2023-12-12 PROCEDURE — 86901 BLOOD TYPING SEROLOGIC RH(D): CPT | Performed by: STUDENT IN AN ORGANIZED HEALTH CARE EDUCATION/TRAINING PROGRAM

## 2023-12-12 PROCEDURE — NC001 PR NO CHARGE: Performed by: STUDENT IN AN ORGANIZED HEALTH CARE EDUCATION/TRAINING PROGRAM

## 2023-12-12 PROCEDURE — 93308 TTE F-UP OR LMTD: CPT | Performed by: PHYSICIAN ASSISTANT

## 2023-12-12 RX ORDER — ONDANSETRON 2 MG/ML
INJECTION INTRAMUSCULAR; INTRAVENOUS CODE/TRAUMA/SEDATION MEDICATION
Status: COMPLETED | OUTPATIENT
Start: 2023-12-12 | End: 2023-12-12

## 2023-12-12 RX ORDER — DIAZEPAM 5 MG/ML
INJECTION, SOLUTION INTRAMUSCULAR; INTRAVENOUS
Status: COMPLETED
Start: 2023-12-12 | End: 2023-12-12

## 2023-12-12 RX ORDER — PHENOBARBITAL SODIUM 65 MG/ML
130 INJECTION INTRAMUSCULAR ONCE
Status: COMPLETED | OUTPATIENT
Start: 2023-12-12 | End: 2023-12-12

## 2023-12-12 RX ORDER — DEXTROSE, SODIUM CHLORIDE, SODIUM LACTATE, POTASSIUM CHLORIDE, AND CALCIUM CHLORIDE 5; .6; .31; .03; .02 G/100ML; G/100ML; G/100ML; G/100ML; G/100ML
125 INJECTION, SOLUTION INTRAVENOUS CONTINUOUS
Status: DISCONTINUED | OUTPATIENT
Start: 2023-12-12 | End: 2023-12-18 | Stop reason: HOSPADM

## 2023-12-12 RX ORDER — SODIUM CHLORIDE, SODIUM GLUCONATE, SODIUM ACETATE, POTASSIUM CHLORIDE, MAGNESIUM CHLORIDE, SODIUM PHOSPHATE, DIBASIC, AND POTASSIUM PHOSPHATE .53; .5; .37; .037; .03; .012; .00082 G/100ML; G/100ML; G/100ML; G/100ML; G/100ML; G/100ML; G/100ML
1000 INJECTION, SOLUTION INTRAVENOUS ONCE
Status: COMPLETED | OUTPATIENT
Start: 2023-12-12 | End: 2023-12-12

## 2023-12-12 RX ORDER — MAGNESIUM HYDROXIDE/ALUMINUM HYDROXICE/SIMETHICONE 120; 1200; 1200 MG/30ML; MG/30ML; MG/30ML
30 SUSPENSION ORAL EVERY 4 HOURS PRN
Status: DISCONTINUED | OUTPATIENT
Start: 2023-12-12 | End: 2023-12-17

## 2023-12-12 RX ORDER — ONDANSETRON 2 MG/ML
INJECTION INTRAMUSCULAR; INTRAVENOUS
Status: COMPLETED
Start: 2023-12-12 | End: 2023-12-12

## 2023-12-12 RX ORDER — FAMOTIDINE 10 MG/ML
20 INJECTION, SOLUTION INTRAVENOUS ONCE
Status: COMPLETED | OUTPATIENT
Start: 2023-12-12 | End: 2023-12-12

## 2023-12-12 RX ORDER — ENOXAPARIN SODIUM 100 MG/ML
40 INJECTION SUBCUTANEOUS DAILY
Status: DISCONTINUED | OUTPATIENT
Start: 2023-12-13 | End: 2023-12-18 | Stop reason: HOSPADM

## 2023-12-12 RX ORDER — ACETAMINOPHEN 325 MG/1
650 TABLET ORAL EVERY 4 HOURS PRN
Status: DISCONTINUED | OUTPATIENT
Start: 2023-12-12 | End: 2023-12-18 | Stop reason: HOSPADM

## 2023-12-12 RX ORDER — ONDANSETRON 2 MG/ML
4 INJECTION INTRAMUSCULAR; INTRAVENOUS ONCE
Status: COMPLETED | OUTPATIENT
Start: 2023-12-12 | End: 2023-12-12

## 2023-12-12 RX ORDER — LORAZEPAM 2 MG/ML
2 INJECTION INTRAMUSCULAR ONCE
Status: COMPLETED | OUTPATIENT
Start: 2023-12-12 | End: 2023-12-12

## 2023-12-12 RX ORDER — ACETAMINOPHEN 10 MG/ML
1000 INJECTION, SOLUTION INTRAVENOUS ONCE
Status: COMPLETED | OUTPATIENT
Start: 2023-12-12 | End: 2023-12-12

## 2023-12-12 RX ORDER — FAMOTIDINE 20 MG/1
20 TABLET, FILM COATED ORAL 2 TIMES DAILY
Status: DISCONTINUED | OUTPATIENT
Start: 2023-12-12 | End: 2023-12-14

## 2023-12-12 RX ORDER — DIAZEPAM 5 MG/ML
10 INJECTION, SOLUTION INTRAMUSCULAR; INTRAVENOUS ONCE
Status: COMPLETED | OUTPATIENT
Start: 2023-12-12 | End: 2023-12-12

## 2023-12-12 RX ORDER — DIAZEPAM 5 MG/ML
10 INJECTION, SOLUTION INTRAMUSCULAR; INTRAVENOUS ONCE
Status: DISCONTINUED | OUTPATIENT
Start: 2023-12-12 | End: 2023-12-12

## 2023-12-12 RX ORDER — IBUPROFEN 600 MG/1
600 TABLET ORAL EVERY 6 HOURS SCHEDULED
Status: DISCONTINUED | OUTPATIENT
Start: 2023-12-12 | End: 2023-12-18 | Stop reason: HOSPADM

## 2023-12-12 RX ORDER — CHLORHEXIDINE GLUCONATE ORAL RINSE 1.2 MG/ML
15 SOLUTION DENTAL EVERY 12 HOURS SCHEDULED
Status: DISCONTINUED | OUTPATIENT
Start: 2023-12-12 | End: 2023-12-13

## 2023-12-12 RX ADMIN — THIAMINE HYDROCHLORIDE 500 MG: 100 INJECTION, SOLUTION INTRAMUSCULAR; INTRAVENOUS at 20:52

## 2023-12-12 RX ADMIN — ALUMINUM HYDROXIDE, MAGNESIUM HYDROXIDE, AND DIMETHICONE 30 ML: 200; 20; 200 SUSPENSION ORAL at 23:00

## 2023-12-12 RX ADMIN — IOHEXOL 100 ML: 350 INJECTION, SOLUTION INTRAVENOUS at 16:29

## 2023-12-12 RX ADMIN — DIAZEPAM 10 MG: 5 INJECTION, SOLUTION INTRAMUSCULAR; INTRAVENOUS at 21:28

## 2023-12-12 RX ADMIN — IBUPROFEN 600 MG: 600 TABLET, FILM COATED ORAL at 20:17

## 2023-12-12 RX ADMIN — DEXTROSE, SODIUM CHLORIDE, SODIUM LACTATE, POTASSIUM CHLORIDE, AND CALCIUM CHLORIDE 125 ML/HR: 5; .6; .31; .03; .02 INJECTION, SOLUTION INTRAVENOUS at 22:44

## 2023-12-12 RX ADMIN — ACETAMINOPHEN 1000 MG: 10 INJECTION INTRAVENOUS at 22:44

## 2023-12-12 RX ADMIN — ONDANSETRON 4 MG: 2 INJECTION INTRAMUSCULAR; INTRAVENOUS at 21:26

## 2023-12-12 RX ADMIN — SODIUM CHLORIDE, SODIUM GLUCONATE, SODIUM ACETATE, POTASSIUM CHLORIDE, MAGNESIUM CHLORIDE, SODIUM PHOSPHATE, DIBASIC, AND POTASSIUM PHOSPHATE 1000 ML: .53; .5; .37; .037; .03; .012; .00082 INJECTION, SOLUTION INTRAVENOUS at 20:01

## 2023-12-12 RX ADMIN — FAMOTIDINE 20 MG: 10 INJECTION INTRAVENOUS at 21:33

## 2023-12-12 RX ADMIN — ONDANSETRON 4 MG: 2 INJECTION INTRAMUSCULAR; INTRAVENOUS at 16:18

## 2023-12-12 RX ADMIN — ACETAMINOPHEN 650 MG: 325 TABLET, FILM COATED ORAL at 21:09

## 2023-12-12 RX ADMIN — IBUPROFEN 600 MG: 600 TABLET, FILM COATED ORAL at 23:55

## 2023-12-12 RX ADMIN — PHENOBARBITAL SODIUM 130 MG: 65 INJECTION INTRAMUSCULAR at 20:18

## 2023-12-12 RX ADMIN — LORAZEPAM 2 MG: 2 INJECTION INTRAMUSCULAR; INTRAVENOUS at 22:44

## 2023-12-12 RX ADMIN — PHENOBARBITAL SODIUM 360 MG: 65 INJECTION INTRAMUSCULAR at 16:46

## 2023-12-12 RX ADMIN — DEXTROSE, SODIUM CHLORIDE, SODIUM LACTATE, POTASSIUM CHLORIDE, AND CALCIUM CHLORIDE 125 ML/HR: 5; .6; .31; .03; .02 INJECTION, SOLUTION INTRAVENOUS at 19:59

## 2023-12-12 RX ADMIN — FAMOTIDINE 20 MG: 20 TABLET, FILM COATED ORAL at 20:56

## 2023-12-12 RX ADMIN — PHENOBARBITAL SODIUM 130 MG: 65 INJECTION INTRAMUSCULAR at 21:29

## 2023-12-12 RX ADMIN — PHENOBARBITAL SODIUM 130 MG: 65 INJECTION INTRAMUSCULAR at 18:37

## 2023-12-12 NOTE — CASE MANAGEMENT
Case Management ED Progress Note    Patient name Diogo Travis  Location TR-03/TR-03 MRN [id number]  : 1965 Date 2023        OBJECTIVE:    Chief Complaint: Alcohol use disorder, severe, dependence (HCC)   Patient Class: Emergency  Preferred Pharmacy:   [facility] #[id number]  DESIREE MARIE - [address]  [address] [facility] [address]  Phone: [phone] Fax: [phone]    [facility] DES[place]REE Edwards - [address] [address]  Phone: [phone] Fax: [phone]    Primary Care Provider: Alice Stroud MD    Primary Insurance: [place] [employer]  Secondary Insurance:     ED Progress Note:  CM responded to trauma alert. Patient responsive to medical team's questions and instructions. Family member escorted into ED. No current identified CM needs. CM will follow and update screening, assessment, and discharge planning as appropriate.

## 2023-12-12 NOTE — ED PROVIDER NOTES
Emergency Department Airway Evaluation and Management Form    History  Obtained from: Patient, nursing  Rondadinir  Chief Complaint   Patient presents with    Head Injury     Patient presents for fall down 13 steps this morning while intoxicated. Patient states he has been having vision changes throughout the day including hallucinations of his loved ones. +LOC at time of incident.      HPI    Patient presents as a level B trauma alert due to fall down 13 stairs.  Remainder of HPI per trauma note.    Past Medical History:   Diagnosis Date    Alcoholic ketoacidosis 10/16/2023    GERD (gastroesophageal reflux disease)     Hypertension     Hypomagnesemia 04/02/2023    Vomiting 04/02/2023     No past surgical history on file.  No family history on file.  Social History     Tobacco Use    Smoking status: Never    Smokeless tobacco: Never   Vaping Use    Vaping Use: Never used   Substance Use Topics    Alcohol use: Yes     Comment: up to a handle of vodka per day    Drug use: Never     I have reviewed and agree with the history as documented.    Review of Systems    ROS per trauma note    Physical Exam  /99   Pulse (!) 127   Temp 97.8 °F (36.6 °C) (Oral)   Resp 20   Wt 100 kg (220 lb 10.9 oz)   SpO2 95%   BMI 33.55 kg/m²     Physical Exam    Airway intact.  Remainder of physical exam per trauma note    ED Medications  Medications - No data to display    Intubation  Procedures    Notes  Airway intact    Final Diagnosis  Final diagnoses:   None       ED Provider  Electronically Signed by     Chandu Porter MD  12/12/23 2549

## 2023-12-12 NOTE — QUICK NOTE
On evaluation of patient, patient had removed cervical collar independently. Explained risks and benefits of the collar including my inability to clear the collar at this time due to his clinical intoxication. Patient is refusing to wear collar at this time.    HLD (hyperlipidemia)    HTN (hypertension)

## 2023-12-12 NOTE — ASSESSMENT & PLAN NOTE
- Reports significant alcohol abuse disorder and drinks up to a bottle of rum daily.  - Patient reports last drink was 12/12/2023 at 2 AM (1 bottle of rum) and subsequently fell down stairs.  - Plan to treat alcohol withdrawal symptoms with phenobarbital.  - Plan to admit for observation and further management of alcohol withdrawal symptoms as indicated.   - If no acute traumatic injuries, will discuss with medicine for possible admission to their service.  - Case management consultation for disposition planning.

## 2023-12-12 NOTE — ASSESSMENT & PLAN NOTE
- Fall down stairs earlier this morning with head strike, loss of consciousness, no AC/AP, but intoxicated with alcohol.  - Patient reporting headaches, hallucinations, neck and back pain.  - Issues as noted.  - No significant acute traumatic injury suspected; will await final interpretation by radiology of trauma CT imaging.

## 2023-12-12 NOTE — PROCEDURES
POC FAST US    Date/Time: 12/12/2023 4:13 PM    Performed by: Andi Maya PA-C  Authorized by: Andi Maya PA-C    Patient location:  Trauma  Procedure details:     Exam Type:  Diagnostic    Indications: blunt abdominal trauma and blunt chest trauma      Assess for:  Intra-abdominal fluid, pericardial effusion and pneumothorax    Technique: extended FAST      Views obtained:  Heart - Pericardial sac, LUQ - Splenorenal space, Suprapubic - Pouch of Danilo, RUQ - Clark's Pouch, Left thorax and Right thorax    Image quality: diagnostic      Image availability:  Images available in PACS and video obtained  FAST Findings:     RUQ (Hepatorenal) free fluid: absent      LUQ (Splenorenal) free fluid: absent      Suprapubic free fluid: absent      Cardiac wall motion: identified      Pericardial effusion: absent    extended FAST (Pulmonary) findings:     Left lung sliding: Present      Right lung sliding: Present    Interpretation:     Impressions: negative

## 2023-12-12 NOTE — H&P
[facility]  H&P  Name: Diogo Travis 58 y.o. male I MRN: [id number]  Unit/Bed#: ED-37 I Date of Admission: 12/12/2023   Date of Service: 12/12/2023 I Hospital Day: 0      Assessment/Plan   Fall down stairs  Assessment & Plan  - Fall down stairs earlier this morning with head strike, loss of consciousness, no AC/AP, but intoxicated with alcohol.  - Patient reporting headaches, hallucinations, neck and back pain.  - Issues as noted.  - No significant acute traumatic injury suspected; will await final interpretation by radiology of trauma CT imaging.    Alcohol use disorder, severe, dependence (HCC)  Assessment & Plan  - Reports significant alcohol abuse disorder and drinks up to a bottle of rum daily.  - Patient reports last drink was 12/12/2023 at 2 AM (1 bottle of rum) and subsequently fell down stairs.  - Plan to treat alcohol withdrawal symptoms with phenobarbital.  - Plan to admit for observation and further management of alcohol withdrawal symptoms as indicated.   - If no acute traumatic injuries, will discuss with medicine for possible admission to their service.  - Case management consultation for disposition planning.             Trauma Alert: Level B   Model of Arrival: Self    Trauma Team: Attending Ullrich and CAROLYN Hughes  Consultants:     None     History of Present Illness     Chief Complaint: Back pain  Mechanism:Fall     HPI:    Diogo Travis is a 58 y.o. male who presents with neck and back pain after a fall down 13 stairs. Patient reports he drinks alcohol daily, last drink was at 2 AM. He drank a bottle of rum. While intoxicated, he fell down 13 stairs with head strike, loss of consciousness, no AC/AP. He reports he was hallucinating this morning and his pain worsened, so his father brought him into the ED. He reports he hasn't eaten in 3 days and has been having nausea and intermittent emesis.    Review of Systems   Constitutional:  Positive for activity change and  appetite change.   HENT:  Negative for facial swelling.    Eyes:  Negative for photophobia.   Respiratory:  Negative for shortness of breath.    Cardiovascular:  Negative for chest pain.   Gastrointestinal:  Positive for nausea and vomiting. Negative for abdominal pain.   Musculoskeletal:  Positive for arthralgias, back pain, myalgias, neck pain and neck stiffness.   Skin:  Negative for wound.   Neurological:  Positive for syncope and headaches. Negative for dizziness, weakness, light-headedness and numbness.   Psychiatric/Behavioral:  Positive for confusion.    All other systems reviewed and are negative.    12-point, complete review of systems was reviewed and negative except as stated above.     Historical Information     Past Medical History:   Diagnosis Date    Alcoholic ketoacidosis 10/16/2023    GERD (gastroesophageal reflux disease)     Hypertension     Hypomagnesemia 04/02/2023    Vomiting 04/02/2023     No past surgical history on file.     Social History     Tobacco Use    Smoking status: Never    Smokeless tobacco: Never   Vaping Use    Vaping Use: Never used   Substance Use Topics    Alcohol use: Yes     Comment: up to a handle of vodka per day    Drug use: Never     Immunization History   Administered Date(s) Administered    COVID-19 MODERNA VACC 0.5 ML IM 02/07/2022, 03/04/2022    Tdap 05/15/2020     Last Tetanus: 2020  Family History: Non-contributory     Meds/Allergies   all current active meds have been reviewed  Allergies have not been reviewed;    Allergies   Allergen Reactions    Cefdinir Rash       Objective   Initial Vitals:   Temperature: 98.2 °F (36.8 °C) (12/12/23 1554)  Pulse: (!) 142 (12/12/23 1554)  Respirations: (!) 24 (12/12/23 1554)  Blood Pressure: (!) 139/103 (12/12/23 1554)    Primary Survey:   Airway:        Status: patent;        Pre-hospital Interventions: none        Hospital Interventions: none  Breathing:        Pre-hospital Interventions: none       Effort: normal        Right breath sounds: normal       Left breath sounds: normal  Circulation:        Rhythm: regular       Rate: regular   Right Pulses Left Pulses    R radial: 2+  R femoral: 2+  R pedal: 2+     L radial: 2+  L femoral: 2+  L pedal: 2+       Disability:        GCS: Eye: 4; Verbal: 5 Motor: 6 Total: 15       Right Pupil: round;  reactive         Left Pupil:  round;  reactive      R Motor Strength L Motor Strength    R : 5/5  R dorsiflex: 5/5  R plantarflex: 5/5 L : 5/5  L dorsiflex: 5/5  L plantarflex: 5/5        Sensory:  No sensory deficit  Exposure:       Completed: Yes      Secondary Survey:  Physical Exam  Vitals reviewed.   Constitutional:       General: He is in acute distress.      Appearance: He is toxic-appearing.   HENT:      Head: Normocephalic and atraumatic.      Comments: Tender to posterior scalp     Right Ear: External ear normal.      Left Ear: External ear normal.      Nose: Nose normal.      Mouth/Throat:      Mouth: Mucous membranes are moist.      Pharynx: Oropharynx is clear.   Eyes:      Extraocular Movements: Extraocular movements intact.      Conjunctiva/sclera: Conjunctivae normal.      Pupils: Pupils are equal, round, and reactive to light.   Cardiovascular:      Rate and Rhythm: Regular rhythm. Tachycardia present.      Pulses: Normal pulses.      Heart sounds: Normal heart sounds.   Pulmonary:      Effort: Pulmonary effort is normal. No respiratory distress.      Breath sounds: Normal breath sounds.   Chest:      Chest wall: No tenderness.   Abdominal:      General: Bowel sounds are normal. There is distension.      Palpations: There is no mass.      Tenderness: There is no abdominal tenderness. There is no guarding or rebound.      Hernia: No hernia is present.   Musculoskeletal:         General: Tenderness present. No swelling, deformity or signs of injury.      Cervical back: Tenderness present.      Comments: Thoracic and lumbar tenderness   Skin:     General: Skin is warm and  dry.      Capillary Refill: Capillary refill takes less than 2 seconds.      Findings: No bruising or lesion.   Neurological:      General: No focal deficit present.      Mental Status: He is alert. He is disoriented.      Sensory: No sensory deficit.      Motor: No weakness.   Psychiatric:         Mood and Affect: Mood normal.         Behavior: Behavior normal.         Invasive Devices       Peripheral Intravenous Line  Duration             Peripheral IV 12/12/23 Left Antecubital <1 day                  Lab Results: I have personally reviewed all pertinent laboratory/test results from 12/12/23, including the preceding 24 hours.  Recent Labs     12/12/23  1611 12/12/23  1622   WBC  --  10.14   HGB 15.0 15.2   HCT 44 44.5   PLT  --  365   SODIUM  --  139   K  --  4.1   CL  --  96   CO2 15* 15*   BUN  --  15   CREATININE  --  0.82   GLUC  --  61*   CAIONIZED 1.02*  --    AST  --  53*   ALT  --  24   ALB  --  4.7   TBILI  --  0.72   ALKPHOS  --  144*       Imaging Results: I have personally reviewed pertinent images saved in PACS. CT scan findings (and other pertinent positive findings on images) were discussed with radiology. My interpretation of the images/reports are as follows:  Chest Xray(s): negative for acute findings   FAST exam(s): negative for acute findings   CT Scan(s): See below ; final radiologic interpretation is pending.   Additional Xray(s): N/A     Other Studies:   XR trauma multiple    (Results Pending)   XR chest 1 view    (Results Pending)   TRAUMA - CT head wo contrast    (Results Pending)   TRAUMA - CT chest abdomen pelvis w contrast    (Results Pending)   TRAUMA - CT spine cervical wo contrast    (Results Pending)         Code Status: Prior  Advance Directive and Living Will:      Power of :    POLST:    I have spent 40 minutes with Patient and family today in which greater than 50% of this time was spent in counseling/coordination of care regarding Diagnostic results, Prognosis, Risks  and benefits of tx options, Instructions for management, Patient and family education, Importance of tx compliance, Risk factor reductions, Impressions, Counseling / Coordination of care, Documenting in the medical record, Reviewing / ordering tests, medicine, procedures  , Obtaining or reviewing history  , and Communicating with other healthcare professionals .

## 2023-12-13 LAB
ALBUMIN SERPL BCP-MCNC: 3.8 G/DL (ref 3.5–5)
ALP SERPL-CCNC: 109 U/L (ref 34–104)
ALT SERPL W P-5'-P-CCNC: 17 U/L (ref 7–52)
ANION GAP SERPL CALCULATED.3IONS-SCNC: 9 MMOL/L
AST SERPL W P-5'-P-CCNC: 32 U/L (ref 13–39)
BASOPHILS # BLD AUTO: 0.03 THOUSANDS/ÂΜL (ref 0–0.1)
BASOPHILS NFR BLD AUTO: 0 % (ref 0–1)
BILIRUB SERPL-MCNC: 0.87 MG/DL (ref 0.2–1)
BUN SERPL-MCNC: 13 MG/DL (ref 5–25)
CALCIUM SERPL-MCNC: 8.2 MG/DL (ref 8.4–10.2)
CHLORIDE SERPL-SCNC: 98 MMOL/L (ref 96–108)
CO2 SERPL-SCNC: 28 MMOL/L (ref 21–32)
CREAT SERPL-MCNC: 0.79 MG/DL (ref 0.6–1.3)
EOSINOPHIL # BLD AUTO: 0.09 THOUSAND/ÂΜL (ref 0–0.61)
EOSINOPHIL NFR BLD AUTO: 1 % (ref 0–6)
ERYTHROCYTE [DISTWIDTH] IN BLOOD BY AUTOMATED COUNT: 13.6 % (ref 11.6–15.1)
GFR SERPL CREATININE-BSD FRML MDRD: 99 ML/MIN/1.73SQ M
GLUCOSE SERPL-MCNC: 167 MG/DL (ref 65–140)
HCT VFR BLD AUTO: 36.8 % (ref 36.5–49.3)
HGB BLD-MCNC: 12.5 G/DL (ref 12–17)
IMM GRANULOCYTES # BLD AUTO: 0.03 THOUSAND/UL (ref 0–0.2)
IMM GRANULOCYTES NFR BLD AUTO: 0 % (ref 0–2)
LYMPHOCYTES # BLD AUTO: 1 THOUSANDS/ÂΜL (ref 0.6–4.47)
LYMPHOCYTES NFR BLD AUTO: 15 % (ref 14–44)
MAGNESIUM SERPL-MCNC: 1.8 MG/DL (ref 1.9–2.7)
MCH RBC QN AUTO: 32.5 PG (ref 26.8–34.3)
MCHC RBC AUTO-ENTMCNC: 34 G/DL (ref 31.4–37.4)
MCV RBC AUTO: 96 FL (ref 82–98)
MONOCYTES # BLD AUTO: 0.46 THOUSAND/ÂΜL (ref 0.17–1.22)
MONOCYTES NFR BLD AUTO: 7 % (ref 4–12)
NEUTROPHILS # BLD AUTO: 5.24 THOUSANDS/ÂΜL (ref 1.85–7.62)
NEUTS SEG NFR BLD AUTO: 77 % (ref 43–75)
NRBC BLD AUTO-RTO: 0 /100 WBCS
PHOSPHATE SERPL-MCNC: 2.4 MG/DL (ref 2.7–4.5)
PLATELET # BLD AUTO: 273 THOUSANDS/UL (ref 149–390)
PMV BLD AUTO: 8.6 FL (ref 8.9–12.7)
POTASSIUM SERPL-SCNC: 3.6 MMOL/L (ref 3.5–5.3)
PROT SERPL-MCNC: 6.5 G/DL (ref 6.4–8.4)
RBC # BLD AUTO: 3.85 MILLION/UL (ref 3.88–5.62)
SODIUM SERPL-SCNC: 135 MMOL/L (ref 135–147)
WBC # BLD AUTO: 6.85 THOUSAND/UL (ref 4.31–10.16)

## 2023-12-13 PROCEDURE — NC001 PR NO CHARGE: Performed by: STUDENT IN AN ORGANIZED HEALTH CARE EDUCATION/TRAINING PROGRAM

## 2023-12-13 PROCEDURE — 80053 COMPREHEN METABOLIC PANEL: CPT

## 2023-12-13 PROCEDURE — 83735 ASSAY OF MAGNESIUM: CPT

## 2023-12-13 PROCEDURE — 99232 SBSQ HOSP IP/OBS MODERATE 35: CPT | Performed by: STUDENT IN AN ORGANIZED HEALTH CARE EDUCATION/TRAINING PROGRAM

## 2023-12-13 PROCEDURE — 84100 ASSAY OF PHOSPHORUS: CPT

## 2023-12-13 PROCEDURE — 92610 EVALUATE SWALLOWING FUNCTION: CPT

## 2023-12-13 PROCEDURE — 85025 COMPLETE CBC W/AUTO DIFF WBC: CPT

## 2023-12-13 RX ORDER — LORAZEPAM 1 MG/1
2 TABLET ORAL ONCE
Status: COMPLETED | OUTPATIENT
Start: 2023-12-13 | End: 2023-12-13

## 2023-12-13 RX ORDER — LORAZEPAM 2 MG/ML
4 INJECTION INTRAMUSCULAR ONCE
Status: COMPLETED | OUTPATIENT
Start: 2023-12-13 | End: 2023-12-13

## 2023-12-13 RX ORDER — LORAZEPAM 2 MG/ML
2 INJECTION INTRAMUSCULAR ONCE
Status: COMPLETED | OUTPATIENT
Start: 2023-12-13 | End: 2023-12-13

## 2023-12-13 RX ORDER — MAGNESIUM SULFATE HEPTAHYDRATE 40 MG/ML
2 INJECTION, SOLUTION INTRAVENOUS ONCE
Status: COMPLETED | OUTPATIENT
Start: 2023-12-13 | End: 2023-12-13

## 2023-12-13 RX ORDER — LORAZEPAM 1 MG/1
2 TABLET ORAL ONCE
Status: DISCONTINUED | OUTPATIENT
Start: 2023-12-13 | End: 2023-12-18 | Stop reason: HOSPADM

## 2023-12-13 RX ORDER — LORAZEPAM 2 MG/ML
4 INJECTION INTRAMUSCULAR ONCE
Status: CANCELLED | OUTPATIENT
Start: 2023-12-13 | End: 2023-12-13

## 2023-12-13 RX ADMIN — LORAZEPAM 2 MG: 1 TABLET ORAL at 23:53

## 2023-12-13 RX ADMIN — LORAZEPAM 2 MG: 2 INJECTION INTRAMUSCULAR; INTRAVENOUS at 12:18

## 2023-12-13 RX ADMIN — THIAMINE HYDROCHLORIDE 500 MG: 100 INJECTION, SOLUTION INTRAMUSCULAR; INTRAVENOUS at 10:49

## 2023-12-13 RX ADMIN — IBUPROFEN 600 MG: 600 TABLET, FILM COATED ORAL at 23:53

## 2023-12-13 RX ADMIN — MAGNESIUM SULFATE HEPTAHYDRATE 2 G: 40 INJECTION, SOLUTION INTRAVENOUS at 08:34

## 2023-12-13 RX ADMIN — ALUMINUM HYDROXIDE, MAGNESIUM HYDROXIDE, AND DIMETHICONE 30 ML: 200; 20; 200 SUSPENSION ORAL at 12:08

## 2023-12-13 RX ADMIN — IBUPROFEN 600 MG: 600 TABLET, FILM COATED ORAL at 17:51

## 2023-12-13 RX ADMIN — DEXTROSE, SODIUM CHLORIDE, SODIUM LACTATE, POTASSIUM CHLORIDE, AND CALCIUM CHLORIDE 125 ML/HR: 5; .6; .31; .03; .02 INJECTION, SOLUTION INTRAVENOUS at 17:49

## 2023-12-13 RX ADMIN — LORAZEPAM 4 MG: 2 INJECTION INTRAMUSCULAR; INTRAVENOUS at 20:53

## 2023-12-13 RX ADMIN — FAMOTIDINE 20 MG: 20 TABLET, FILM COATED ORAL at 08:07

## 2023-12-13 RX ADMIN — THIAMINE HYDROCHLORIDE 500 MG: 100 INJECTION, SOLUTION INTRAMUSCULAR; INTRAVENOUS at 04:19

## 2023-12-13 RX ADMIN — IBUPROFEN 600 MG: 600 TABLET, FILM COATED ORAL at 10:50

## 2023-12-13 RX ADMIN — CHLORHEXIDINE GLUCONATE 15 ML: 1.2 SOLUTION ORAL at 08:07

## 2023-12-13 RX ADMIN — FAMOTIDINE 20 MG: 20 TABLET, FILM COATED ORAL at 17:51

## 2023-12-13 RX ADMIN — POTASSIUM & SODIUM PHOSPHATES POWDER PACK 280-160-250 MG 1 PACKET: 280-160-250 PACK at 08:34

## 2023-12-13 RX ADMIN — ENOXAPARIN SODIUM 40 MG: 40 INJECTION SUBCUTANEOUS at 08:07

## 2023-12-13 RX ADMIN — FOLIC ACID 1 MG: 5 INJECTION, SOLUTION INTRAMUSCULAR; INTRAVENOUS; SUBCUTANEOUS at 08:07

## 2023-12-13 RX ADMIN — DEXTROSE, SODIUM CHLORIDE, SODIUM LACTATE, POTASSIUM CHLORIDE, AND CALCIUM CHLORIDE 125 ML/HR: 5; .6; .31; .03; .02 INJECTION, SOLUTION INTRAVENOUS at 07:29

## 2023-12-13 RX ADMIN — IBUPROFEN 600 MG: 600 TABLET, FILM COATED ORAL at 05:10

## 2023-12-13 NOTE — PLAN OF CARE
Problem: PAIN - ADULT  Goal: Verbalizes/displays adequate comfort level or baseline comfort level  Description: Interventions:  - Encourage patient to monitor pain and request assistance  - Assess pain using appropriate pain scale  - Administer analgesics based on type and severity of pain and evaluate response  - Implement non-pharmacological measures as appropriate and evaluate response  - Consider cultural and social influences on pain and pain management  - Notify physician/advanced practitioner if interventions unsuccessful or patient reports new pain  Outcome: Progressing     Problem: INFECTION - ADULT  Goal: Absence or prevention of progression during hospitalization  Description: INTERVENTIONS:  - Assess and monitor for signs and symptoms of infection  - Monitor lab/diagnostic results  - Monitor all insertion sites, i.e. indwelling lines, tubes, and drains  - Monitor endotracheal if appropriate and nasal secretions for changes in amount and color  - [place] appropriate cooling/warming therapies per order  - Administer medications as ordered  - Instruct and encourage patient and family to use good hand hygiene technique  - Identify and instruct in appropriate isolation precautions for identified infection/condition  Outcome: Progressing  Goal: Absence of fever/infection during neutropenic period  Description: INTERVENTIONS:  - Monitor WBC    Outcome: Progressing     Problem: SAFETY ADULT  Goal: Patient will remain free of falls  Description: INTERVENTIONS:  - Educate patient/family on patient safety including physical limitations  - Instruct patient to call for assistance with activity   - Consult OT/PT to assist with strengthening/mobility   - Keep Call bell within reach  - Keep bed low and locked with side rails adjusted as appropriate  - Keep care items and personal belongings within reach  - Initiate and maintain comfort rounds  - Make Fall Risk Sign visible to staff  - Offer Toileting every  Hours,  in advance of need  - Initiate/Maintain alarm  - Obtain necessary fall risk management equipment:   - Apply yellow socks and bracelet for high fall risk patients  - Consider moving patient to room near nurses station  Outcome: Progressing  Goal: Maintain or return to baseline ADL function  Description: INTERVENTIONS:  -  Assess patient's ability to carry out ADLs; assess patient's baseline for ADL function and identify physical deficits which impact ability to perform ADLs (bathing, care of mouth/teeth, toileting, grooming, dressing, etc.)  - Assess/evaluate cause of self-care deficits   - Assess range of motion  - Assess patient's mobility; develop plan if impaired  - Assess patient's need for assistive devices and provide as appropriate  - Encourage maximum independence but intervene and supervise when necessary  - Involve family in performance of ADLs  - Assess for home care needs following discharge   - Consider OT consult to assist with ADL evaluation and planning for discharge  - Provide patient education as appropriate  Outcome: Progressing  Goal: Maintains/Returns to pre admission functional level  Description: INTERVENTIONS:  - Perform AM-PAC 6 Click Basic Mobility/ Daily Activity assessment daily.  - Set and communicate daily mobility goal to care team and patient/family/caregiver.   - Collaborate with rehabilitation services on mobility goals if consulted  - Perform Range of Motion  times a day.  - Reposition patient every  hours.  - Dangle patient  times a day  - Stand patient  times a day  - Ambulate patient  times a day  - Out of bed to chair  times a day   - Out of bed for meal times a day  - Out of bed for toileting  - Record patient progress and toleration of activity level   Outcome: Progressing     Problem: DISCHARGE PLANNING  Goal: Discharge to home or other facility with appropriate resources  Description: INTERVENTIONS:  - Identify barriers to discharge w/patient and caregiver  - Arrange for  needed discharge resources and transportation as appropriate  - Identify discharge learning needs (meds, wound care, etc.)  - Arrange for interpretive services to assist at discharge as needed  - Refer to Case Management Department for coordinating discharge planning if the patient needs post-hospital services based on physician/advanced practitioner order or complex needs related to functional status, cognitive ability, or social support system  Outcome: Progressing     Problem: Knowledge Deficit  Goal: Patient/family/caregiver demonstrates understanding of disease process, treatment plan, medications, and discharge instructions  Description: Complete learning assessment and assess knowledge base.  Interventions:  - Provide teaching at level of understanding  - Provide teaching via preferred learning methods  Outcome: Progressing

## 2023-12-13 NOTE — CONSULTS
[facility]  Consult  Name: Diogo Travis 58 y.o. male I MRN: [id number]  Unit/Bed#: ICU 07 I Date of Admission: 12/12/2023   Date of Service: 12/12/2023 I Hospital Day: 0    Consults    Assessment/Plan   * Alcohol use disorder, severe, dependence (HCC)  Assessment & Plan  Patient with a history of alcohol use disorder that presented to the ED after falling down 13 stairs while intoxicated. States that his last drink was at 2 am. His CIWA score is 12 and he was given a total of 490 mg phenobarbital. He is being admitted to ICU following administration of phenobarbital.    Plan:  -CIWA protocol   -Treatment any withdrawal symptoms as needed with Ativan  -Total goal of phenobarbital is 650 mg as per Tox.  -Thiamine and folic acid repletion  -IV hydration    Alcohol withdrawal syndrome with perceptual disturbance (HCC)  Assessment & Plan  See Alcohol use disorder.          Alcoholic ketoacidosis  Assessment & Plan  Patient with history of alcohol use disorder and alcohol withdrawal presented to the ED as Trauma B after falling down stairs. CIWA score of 12 and was given phenobarbital as per toxicology.    Plan:  -IV hydration using 1 L bolus Isolyte and D5LR.    Fall down stairs  Assessment & Plan  Patient fell down 13 stairs while intoxicated. Was brought into the ED as a Trauma B.     Plan:  -Trauma following and will appreciate recommendations.           History of Present Illness     HPI: Diogo Travis is a 58 y.o. with a PMH of Alcohol use disorder, alcohol withdrawal, GERD, HTN, and HLD who presented to the ED for visual hallucinations that started this afternoon. He mentions that his drinking has increased in the past few months due to several life stressors and he currently drinks a bottle of vodka daily. He also states that his last drink was at 2 am on 12/12/23 and he has not eaten in 3 days. While in the ED, he was seen as a trauma patient for a fall that happened yesterday as  well when he fell down 13 stairs. His scans in showed no acute concerns at this time and he was admitted to the ICU service for observation after administration of phenobarbital.    History obtained from chart review and the patient.  Review of Systems   Constitutional:  Negative for chills, diaphoresis, fatigue and fever.   HENT:  Negative for congestion, drooling, postnasal drip, rhinorrhea and trouble swallowing.    Eyes:  Negative for pain and visual disturbance.   Respiratory:  Negative for cough and shortness of breath.    Cardiovascular:  Negative for chest pain.   Gastrointestinal:  Positive for nausea and vomiting. Negative for abdominal pain, constipation and diarrhea.   Genitourinary:  Negative for dysuria and hematuria.   Musculoskeletal:  Negative for arthralgias and back pain.   Skin:  Negative for color change and rash.   Neurological:  Positive for tremors. Negative for dizziness, light-headedness and headaches.   Psychiatric/Behavioral:  Positive for agitation and hallucinations. The patient is nervous/anxious.      Disposition: Stepdown Level 1   Historical Information   Past Medical History:  10/16/2023: Alcoholic ketoacidosis  No date: GERD (gastroesophageal reflux disease)  No date: Hypertension  04/02/2023: Hypomagnesemia  04/02/2023: Vomiting No past surgical history on file.   Current Outpatient Medications   Medication Instructions    escitalopram (LEXAPRO) 20 mg tablet 1 tablet, Oral, Daily    omeprazole (PRILOSEC) 40 mg, Oral, 2 times daily    Allergies   Allergen Reactions    Cefdinir Rash      Social History     Tobacco Use    Smoking status: Never    Smokeless tobacco: Never   Vaping Use    Vaping Use: Never used   Substance Use Topics    Alcohol use: Yes     Comment: up to a handle of vodka per day    Drug use: Never    No family history on file.     Objective                            Vitals I/O      Most Recent Min/Max in 24hrs   Temp 98.3 °F (36.8 °C) Temp  Min: 97.8 °F (36.6 °C)   Max: 98.3 °F (36.8 °C)   Pulse (!) 112 Pulse  Min: 104  Max: 145   Resp 20 Resp  Min: 13  Max: 97   /90 BP  Min: 130/81  Max: 180/87   O2 Sat 94 % SpO2  Min: 82 %  Max: 96 %      Intake/Output Summary (Last 24 hours) at 12/12/2023 2245  Last data filed at 12/12/2023 2125  Gross per 24 hour   Intake 55 ml   Output 750 ml   Net -695 ml       Diet NPO    Invasive Monitoring           Physical Exam   Physical Exam  Eyes:      General: Vision grossly intact.      Extraocular Movements: Extraocular movements intact.      Conjunctiva/sclera: Conjunctivae normal.      Pupils: Pupils are equal, round, and reactive to light.   Skin:     General: Skin is warm and dry.   HENT:      Head: Normocephalic and atraumatic.      Nose: No congestion or rhinorrhea.      Mouth/Throat:      Mouth: Mucous membranes are dry.   Cardiovascular:      Rate and Rhythm: Normal rate and regular rhythm.      Pulses: Normal pulses.      Heart sounds: Normal heart sounds.      Comments: RRR with +S1 and S2, no murmurs appreciated on exam. Peripheral pulses intact.    Musculoskeletal:         General: Normal range of motion.   Abdominal: General: Bowel sounds are normal. There is no distension.      Palpations: Abdomen is soft.      Tenderness: There is no abdominal tenderness.      Comments: Soft, non tender, normo-active bowel sounds. Without rigidity, guarding, or distension.     Constitutional:       Appearance: He is ill-appearing.   Pulmonary:      Effort: Pulmonary effort is normal.      Breath sounds: Normal breath sounds.      Comments: CTABL with no abnormal lung sounds such as wheezes or rales appreciated on exam.     Neurological:      General: No focal deficit present.      Mental Status: He is alert and oriented to person, place and time. Mental status is at baseline. He is agitated. He is CAM ICU negative.      Sensory: Sensation is intact.      Motor: Strength full and intact in all extremities.      Comments: Patient noted to  have significant upper and lower extremity tremors that are more notable in the lower extremities.            Diagnostic Studies      EK/12- EKG showing sinus tachycardia.  Imagin/12- CT head: showing no acute intracranial abnormality. - CT chest/abdo/pelvis: No acute posttraumatic abnormality in the chest, abdomen, or pelvis. With diffuse hepatic steatosis. - CT cervical spine: No acute fracture or malalignment. - X rays: no acute cardiopulmonary disease.     Medications:  Scheduled PRN   acetaminophen, 1,000 mg, Once  chlorhexidine, 15 mL, Q12H COLBY  [START ON 2023] enoxaparin, 40 mg, Daily  famotidine, 20 mg, BID  [START ON 2023] folic acid 1 mg in sodium chloride 0.9 % 50 mL IVPB, 1 mg, Daily  ibuprofen, 600 mg, Q6H COLBY  thiamine, 500 mg, Q8H      acetaminophen, 650 mg, Q4H PRN       Continuous    dextrose 5% lactated ringer's, 125 mL/hr, Last Rate: 125 mL/hr (23 [id number])         Labs:    CBC    Recent Labs     23  1611 23  1622   WBC  --  10.14   HGB 15.0 15.2   HCT 44 44.5   PLT  --  365     BMP    Recent Labs     23  1611 23  1622   SODIUM  --  139   K  --  4.1   CL  --  96   CO2 15* 15*   AGAP  --  28   BUN  --  15   CREATININE  --  0.82   CALCIUM  --  9.1       Coags    No recent results     Additional Electrolytes  Recent Labs     23  1611   CAIONIZED 1.02*          Blood Gas    No recent results  No recent results LFTs  Recent Labs     23  1622   ALT 24   AST 53*   ALKPHOS 144*   ALB 4.7   TBILI 0.72       Infectious  No recent results  Glucose  Recent Labs     23  1622   GLUC 61*               David Mann MD

## 2023-12-13 NOTE — ASSESSMENT & PLAN NOTE
Patient fell down 13 stairs while intoxicated. Was brought into the ED as a Trauma B.   Xray trauma: No acute cardiopulmonary disease within limitations of supine imaging.   CT head: No acute intracranial abnormality.   CT chest abdomen pelvis:  No acute posttraumatic abnormality in the chest, abdomen, or pelvis. Diffuse hepatic steatosis.  CT cervical spine: No acute cervical spine fracture or traumatic malalignment.     Plan:  -Trauma following and will appreciate recommendations.

## 2023-12-13 NOTE — ASSESSMENT & PLAN NOTE
Patient with history of alcohol use disorder and alcohol withdrawal presented to the ED as Trauma B after falling down stairs. CIWA score of 12 and was given phenobarbital as per toxicology.    Plan:  -IV hydration using 1 L bolus Isolyte and D5LR.

## 2023-12-13 NOTE — PLAN OF CARE
Problem: PAIN - ADULT  Goal: Verbalizes/displays adequate comfort level or baseline comfort level  Description: Interventions:  - Encourage patient to monitor pain and request assistance  - Assess pain using appropriate pain scale  - Administer analgesics based on type and severity of pain and evaluate response  - Implement non-pharmacological measures as appropriate and evaluate response  - Consider cultural and social influences on pain and pain management  - Notify physician/advanced practitioner if interventions unsuccessful or patient reports new pain  Outcome: Progressing     Problem: INFECTION - ADULT  Goal: Absence or prevention of progression during hospitalization  Description: INTERVENTIONS:  - Assess and monitor for signs and symptoms of infection  - Monitor lab/diagnostic results  - Monitor all insertion sites, i.e. indwelling lines, tubes, and drains  - Monitor endotracheal if appropriate and nasal secretions for changes in amount and color  - [place] appropriate cooling/warming therapies per order  - Administer medications as ordered  - Instruct and encourage patient and family to use good hand hygiene technique  - Identify and instruct in appropriate isolation precautions for identified infection/condition  Outcome: Progressing  Goal: Absence of fever/infection during neutropenic period  Description: INTERVENTIONS:  - Monitor WBC    Outcome: Progressing     Problem: SAFETY ADULT  Goal: Patient will remain free of falls  Description: INTERVENTIONS:  - Educate patient/family on patient safety including physical limitations  - Instruct patient to call for assistance with activity   - Consult OT/PT to assist with strengthening/mobility   - Keep Call bell within reach  - Keep bed low and locked with side rails adjusted as appropriate  - Keep care items and personal belongings within reach  - Initiate and maintain comfort rounds  - Make Fall Risk Sign visible to staff  - Offer Toileting every 2 Hours,  in advance of need  - Initiate/Maintain bed alarm  - Apply yellow socks and bracelet for high fall risk patients  - Consider moving patient to room near nurses station  Outcome: Progressing  Goal: Maintain or return to baseline ADL function  Description: INTERVENTIONS:  -  Assess patient's ability to carry out ADLs; assess patient's baseline for ADL function and identify physical deficits which impact ability to perform ADLs (bathing, care of mouth/teeth, toileting, grooming, dressing, etc.)  - Assess/evaluate cause of self-care deficits   - Assess range of motion  - Assess patient's mobility; develop plan if impaired  - Assess patient's need for assistive devices and provide as appropriate  - Encourage maximum independence but intervene and supervise when necessary  - Involve family in performance of ADLs  - Assess for home care needs following discharge   - Consider OT consult to assist with ADL evaluation and planning for discharge  - Provide patient education as appropriate  Outcome: Progressing  Goal: Maintains/Returns to pre admission functional level  Description: INTERVENTIONS:  - Perform AM-PAC 6 Click Basic Mobility/ Daily Activity assessment daily.  - Set and communicate daily mobility goal to care team and patient/family/caregiver.   - Collaborate with rehabilitation services on mobility goals if consulted  - Reposition patient every 2 hours.  - Record patient progress and toleration of activity level   Outcome: Progressing     Problem: DISCHARGE PLANNING  Goal: Discharge to home or other facility with appropriate resources  Description: INTERVENTIONS:  - Identify barriers to discharge w/patient and caregiver  - Arrange for needed discharge resources and transportation as appropriate  - Identify discharge learning needs (meds, wound care, etc.)  - Arrange for interpretive services to assist at discharge as needed  - Refer to Case Management Department for coordinating discharge planning if the patient  needs post-hospital services based on physician/advanced practitioner order or complex needs related to functional status, cognitive ability, or social support system  Outcome: Progressing     Problem: Knowledge Deficit  Goal: Patient/family/caregiver demonstrates understanding of disease process, treatment plan, medications, and discharge instructions  Description: Complete learning assessment and assess knowledge base.  Interventions:  - Provide teaching at level of understanding  - Provide teaching via preferred learning methods  Outcome: Progressing

## 2023-12-13 NOTE — PROGRESS NOTES
[facility]  Progress Note  Name: Diogo Travis I  MRN: [id number]  Unit/Bed#: ICU 07 I Date of Admission: 12/12/2023   Date of Service: 12/13/2023 I Hospital Day: 1    Assessment/Plan   * Alcohol use disorder, severe, dependence (HCC)  Assessment & Plan  Patient with a history of alcohol use disorder that presented to the ED after falling down 13 stairs while intoxicated. States that his last drink was at 2 am. His CIWA score is 12 and he was given a total of 490 mg phenobarbital. He is being admitted to ICU following administration of phenobarbital.    Plan:  -CIWA protocol   -Treatment any withdrawal symptoms as needed with Ativan  -Total goal of phenobarbital is 620 mg as per Tox.  -Thiamine and folic acid repletion  -IV hydration    Alcohol withdrawal syndrome with perceptual disturbance (HCC)  Assessment & Plan  See Alcohol use disorder.          Alcoholic ketoacidosis  Assessment & Plan  Patient with history of alcohol use disorder and alcohol withdrawal presented to the ED as Trauma B after falling down stairs. CIWA score of 12 and was given phenobarbital as per toxicology.    Plan:  -IV hydration using 1 L bolus Isolyte and D5LR.    Fall down stairs  Assessment & Plan  Patient fell down 13 stairs while intoxicated. Was brought into the ED as a Trauma B.   Xray trauma: No acute cardiopulmonary disease within limitations of supine imaging.   CT head: No acute intracranial abnormality.   CT chest abdomen pelvis:  No acute posttraumatic abnormality in the chest, abdomen, or pelvis. Diffuse hepatic steatosis.  CT cervical spine: No acute cervical spine fracture or traumatic malalignment.     Plan:  -Trauma following and will appreciate recommendations.             Disposition: Stepdown Level 1    ICU Core Measures     A: Assess, Prevent, and Manage Pain Has pain been assessed? Yes  Need for changes to pain regimen? No   B: Both SAT/SAT  N/A   C: Choice of Sedation RASS Goal: 0 Alert  and Calm or +1 Restless  Need for changes to sedation or analgesia regimen? No   D: Delirium CAM-ICU: Negative   E: Early Mobility  Plan for early mobility? NA   F: Family Engagement Plan for family engagement today? Yes         Prophylaxis:  VTE VTE covered by:  enoxaparin, Subcutaneous, 40 mg at 12/13/23 0807       Stress Ulcer  covered byfamotidine (PEPCID) tablet 20 mg [[id number]], omeprazole (PriLOSEC) 40 MG capsule [[id number]] (Long-Term Med)         Significant 24hr Events     24hr events: Pt had tachycardia overnight along with some chest burning.   Subjective   Review of Systems   Constitutional: Negative.  Negative for diaphoresis.   HENT: Negative.     Eyes: Negative.    Respiratory: Negative.     Cardiovascular: Negative.    Gastrointestinal: Negative.  Negative for abdominal pain and vomiting.   Endocrine: Negative.    Genitourinary: Negative.    Musculoskeletal: Negative.    Skin: Negative.    Allergic/Immunologic: Negative.    Neurological:  Positive for tremors.   Hematological: Negative.    Psychiatric/Behavioral: Negative.        Objective                            Vitals I/O      Most Recent Min/Max in 24hrs   Temp 98.5 °F (36.9 °C) Temp  Min: 97.6 °F (36.4 °C)  Max: 98.5 °F (36.9 °C)   Pulse 88 Pulse  Min: 88  Max: 145   Resp 16 Resp  Min: 13  Max: 97   /78 BP  Min: 125/76  Max: 180/87   O2 Sat 93 % SpO2  Min: 82 %  Max: 96 %      Intake/Output Summary (Last 24 hours) at 12/13/2023 0838  Last data filed at 12/13/2023 0600  Gross per 24 hour   Intake 1856.25 ml   Output 1250 ml   Net 606.25 ml       Diet Regular; Regular House    Invasive Monitoring           Physical Exam   Physical Exam  Eyes:      General: Vision grossly intact.      Extraocular Movements: Extraocular movements intact.      Conjunctiva/sclera: Conjunctivae normal.      Pupils: Pupils are equal, round, and reactive to light.   Skin:     General: Skin is warm.   HENT:      Head: Normocephalic and atraumatic.       Mouth/Throat:      Mouth: Mucous membranes are moist.   Cardiovascular:      Rate and Rhythm: Normal rate and regular rhythm.      Pulses: Normal pulses.      Heart sounds: Normal heart sounds.   Musculoskeletal:         General: Normal range of motion.   Abdominal:      Palpations: Abdomen is soft.   Constitutional:       Appearance: He is ill-appearing.   Pulmonary:      Effort: Pulmonary effort is normal.      Breath sounds: Normal breath sounds.   Neurological:      Mental Status: He is alert and oriented to person, place and time.      Comments: Tremors present             Diagnostic Studies      Imaging: Xray trauma: No acute cardiopulmonary disease within limitations of supine imaging.   CT head: No acute intracranial abnormality.   CT chest abdomen pelvis:  No acute posttraumatic abnormality in the chest, abdomen, or pelvis. Diffuse hepatic steatosis.  CT cervical spine: No acute cervical spine fracture or traumatic malalignment.  I have personally reviewed pertinent reports.       Medications:  Scheduled PRN   chlorhexidine, 15 mL, Q12H COLBY  enoxaparin, 40 mg, Daily  famotidine, 20 mg, BID  folic acid 1 mg in sodium chloride 0.9 % 50 mL IVPB, 1 mg, Daily  ibuprofen, 600 mg, Q6H COLBY  magnesium sulfate, 2 g, Once  thiamine, 500 mg, Q8H      acetaminophen, 650 mg, Q4H PRN  aluminum-magnesium hydroxide-simethicone, 30 mL, Q4H PRN       Continuous    dextrose 5% lactated ringer's, 125 mL/hr, Last Rate: 125 mL/hr (12/13/23 0729)         Labs:    CBC    Recent Labs     12/12/23  1622 12/13/23  0453   WBC 10.14 6.85   HGB 15.2 12.5   HCT 44.5 36.8    273     BMP    Recent Labs     12/12/23  1622 12/13/23  0453   SODIUM 139 135   K 4.1 3.6   CL 96 98   CO2 15* 28   AGAP 28 9   BUN 15 13   CREATININE 0.82 0.79   CALCIUM 9.1 8.2*       Coags    No recent results     Additional Electrolytes  Recent Labs     12/12/23  1611 12/13/23  0453   MG  --  1.8*   PHOS  --  2.4*   CAIONIZED 1.02*  --           Blood  Gas    No recent results  No recent results LFTs  Recent Labs     12/12/23  1622 12/13/23  0453   ALT 24 17   AST 53* 32   ALKPHOS 144* 109*   ALB 4.7 3.8   TBILI 0.72 0.87       Infectious  No recent results  Glucose  Recent Labs     12/12/23  1622 12/13/23  0453   GLUC 61* 167*             Alan Hernandez MD

## 2023-12-13 NOTE — CONSULTS
INTERPROFESSIONAL (PHONE) CONSULTATION - Medical Toxicology  Diogo Travis 58 y.o. male MRN: [id number]  Unit/Bed#: ED-37 Encounter: [id number]      Reason for Consult / Principal Problem: Alcohol withdrawal with complication    Inpatient consult to Toxicology  Consult performed by: Shari Shelby MD  Consult ordered by: Анна Elias MD        12/12/23    ASSESSMENT:  Alcohol withdrawal with complication  Alcohol use disorder, severe dependence  Alcoholic ketoacidosis  Fall    RECOMMENDATIONS:  Continue supportive care measures, including airway monitoring, head of bed elevation, aspiration precautions, cardiac telemetry, and continuous pulse oximetry.    Recommend loading patient with 650 mg IV phenobarbital and then continuing phenobarbital 130 mg IV for mild-moderate withdrawal symptoms or phenobarbital 260 mg IV for moderate-severe withdrawal symptoms. Phenobarbital dosing can be titrated to RASS -2 to -3. Max total dosing of phenobarbital is 2-2.5 grams. Diazepam dosing per [facility] is also provided below.    If patient's alcohol withdrawal continues to worsen despite phenobarbital, can consider adjunctive medications such as ketamine (0.3 mg/kg/hr continuous infusion) or Precedex. CLAUDIA agonists such as phenobarbital should still be continued while receiving these medications.    Patient's initial laboratory studies are consistent with alcoholic ketoacidosis; recommend dextrose-containing IVFs (D5NS or D5LR) in addition to thiamine 500 mg IV every 8 hours until anion gap closes, acidosis improved, and patient is tolerating PO intake.    Recommend daily folate and multivitamin supplementation.    Recommend case management consultation for disposition planning, including consideration of inpatient rehabilitation if desired.     Patient received IM naltrexone prior to discharge from the withdrawal management unit on 12/1.    For further questions, please contact the medical  on call  via Tiger Text between 8am and 9pm. If between 9pm and 8am, please reach out to the [facility] at [phone].     Please see additional teaching note below:      Alcoholic Ketoacidosis Discussion  Department of Medical Toxicology  [facility]    In alcoholic patients who present with metabolic acidosis, the differential diagnosis is broad and includes sepsis, methanol or ethylene glycol ingestion, alcoholic ketoacidosis (AKA), and diabetic ketoacidosis (DKA).  AKA should always be considered in chronic alcoholics presenting with metabolic acidosis.      In general AKA is seen in chronic ethanol users who present after a few days of “binge” drinking and become acutely starved due to cessation in oral intake which can be due to either binging itself or to nausea, vomiting, gastritis, hepatitis, pancreatitis, or other concurrent acute illness.  The normal response to starvation and depletion of hepatic glycogen stores is for amino acids to be converted to pyruvate.  Pyruvate can then serve as a substrate for gluconeogenesis, be converted to acetyl-CoA which can enter the Kreb’s cycle, or can be utilized in a variety of other pathways.  Metabolism of ethanol requires NAD+ as a cofactor, which is reduced to NADH.  After episodes of heavy drinking this leads to a high redox state (low NAD+ and high NADH) which favors the conversion of pyruvate to lactate with decreased ability to use pyruvate as a substrate for gluconeogenesis.  The body responds by mobilizing fat from adipose tissue and increasing fatty acid metabolism, which ultimately leads to the creation and accumulation of acetoacetate and B-hydroxybutyrate.    Patients with AKA will generally present with a low or non-detectable blood ethanol concentration (YANNA) as cessation of drinking will have occurred some hours before.  However, there are still cases where patients with AKA can present with a high YANNA, particularly if their peak  YANNA several hours earlier was exceptionally high.  The degree of metabolic acidosis and academia seen in these patients is quite variable, but can be profound with the potential to see serum bicarbonate concentrations well under 10 mEq/L and pH well under 7.0.  Often these findings are surprising given that they can be seen in patients who are awake and conversant.  However it should be noted that this can also be seen in patients with other etiologies including toxic alcohol ingestion, DKA, and in some cases sepsis.  In rare situations it is possible to see a metabolic alkalosis rather than a metabolic acidosis early in the clinical course due to profound volume loss, however this is not the typical presentation.  Patients with AKA will usually have a mildly low to normal blood glucose concentration, and will certainly appear volume depleted.  A variety of electrolyte abnormalities are possible due to volume contraction, GI losses, and metabolic acidosis and include hypernatremia or hyponatremia, and hyperkalemia or hypokalemia.      Management includes adequate crystalloid fluid replacement, dextrose, thiamine, folic acid, and electrolyte repletion.  Dextrose containing fluids should be initiated as soon as possible, as it is unlikely to see significant improvement without it.  Dextrose facilitates the synthesis of ATP which reverses the pyruvate-to-lactate and NAD+ to NADH ratio abnormalities.  It additionally stimulates the release of insulin, decreases the release of glucagon, and reduces the oxidation of fatty acids.  Thiamine facilitates pyruvate entry into the Kreb’s cycle, increasing ATP production.  Administration of insulin or sodium bicarbonate is generally unnecessary.  Hemodialysis can be considered especially in cases of renal failure where it is felt that volume repletion will not be well tolerated without it, however it is often not necessary for metabolic abnormalities as these will generally  "improve quite rapidly with adequate medical treatment.      During the initial evaluation other potential etiologies should continue to be considered, and a medical  should be consulted as soon as possible to help evaluate the likelihood of AKA versus other toxicological or medical causes and to guide treatment.    References    antonio Woods al, Ed.  Analilia’s Toxicologic Emergencies, 10th ed.  2015.  [place], [place].  Paulie ROJAS  Ethanol.  pp 5978-2167.  rebeca Zarate, Ed.  Critical Care Toxicology, 2nd ed.  2017.  [place], [place].  Rudy KHALIL.  Complications of chronic alcoholism that affect critical illness.  pp 249-66.    Medical Toxicology recommendations for CIWA monitoring using diazepam for treatment:    CIWA score & Treatment     Monitoring:   Modified CIWA Score   Telemetry  Continuous pulse oximetry   Initiate RASS with dosing and hold any sedatives for RASS less than -2  Request provider re-evaluation after every three doses.  Step down for CIWA > 7  Contact Medical Toxicology/Addiction \"Network Detox AP On Call\" via Tiger Text for worsening CIWA, persistent tachycardia or encephalopathy.       0  No intervention  Reassess q4 hours.   Consider discontinuing CIWA 24 hours after ethanol concentration of zero, with a score of zero    1-7  Diazepam 10 mg PO Q4hr PRN score 1-7  Reassess q4hr    8-14  Diazepam 10mg IV Q1hr PRN score 8-14  Reassess q1hr  Contact Medical Toxicology/Addiction \"Network Detox AP On Call\" via Tiger Text to discuss transfer to detox unit, step down or critical care per Detox AP.    15-19  Diazepam 20mg IV Q1hr PRN score 15-19  Reassess q1hr  Contact Medical Toxicology/Addiction \"Network Detox AP On Call\" via Tiger Text to discuss transfer to detox unit, step down or critical care per Detox AP and further treatment recommendations.    >20  Diazepam 40mg IV Q1hr PRN score > 20  Contact Medical Toxicology/Addiction \"Network Detox AP On Call\" via " "Tiger Text for additional treatment recommendations.       Once the patient's withdrawal is effectively managed, the patient can be placed on a diazepam taper, if needed.    Diazepam can be decreased by 1/2 of the last dose every hour until the patient is not needing more than 10 mg at a time; at which point diazepam 5mg PO  may be given Q6 hours PRN for 24 hours. Prior to discontinuation.     Please reach out to Medical Toxicology/Addiction \"Network Detox AP On Call\" via Tiger Text with any additional concerns.     Hx and PE limited by the dynamics of a phone consultation. I have not personally interviewed or evaluated the patient, but only advised based on the information provided to me. Primary provider is responsible for all clinical decisions.     Pertinent history, physical exam and clinical findings and course discussed: Diogo Travis is a 58 y.o. year old male who presents with fall down stairs while acutely intoxicated. Patient has a history of alcohol use disorder with recent admission to the withdrawal management unit earlier this month. Patient received 2 grams of phenobarbital at that time in addition to IM naltrexone prior to discharge. Patient has been admitted previously for alcohol withdrawal. Received two doses of phenobarbital in ED for alcohol withdrawal. Patient remains tachycardic and tachypneic. Patient does not agree with transfer to the withdrawal management unit at this time. Plan is to admit to trauma service in ICU for further care.    Review of systems and physical exam not performed by me.    Historical Information   Past Medical History:   Diagnosis Date    Alcoholic ketoacidosis 10/16/2023    GERD (gastroesophageal reflux disease)     Hypertension     Hypomagnesemia 04/02/2023    Vomiting 04/02/2023     No past surgical history on file.  Social History   Social History     Substance and Sexual Activity   Alcohol Use Yes    Comment: up to a handle of vodka per day     Social History "     Substance and Sexual Activity   Drug Use Never     Social History     Tobacco Use   Smoking Status Never   Smokeless Tobacco Never     No family history on file.     Prior to Admission medications    Medication Sig Start Date End Date Taking? Authorizing Provider   escitalopram (LEXAPRO) 20 mg tablet Take 1 tablet by mouth daily 2/21/23   Historical Provider, MD   omeprazole (PriLOSEC) 40 MG capsule Take 40 mg by mouth 2 (two) times a day 12/5/22   Historical Provider, MD       Current Facility-Administered Medications   Medication Dose Route Frequency    dextrose 5 % in lactated Ringer's infusion  125 mL/hr Intravenous Continuous    [START ON 12/13/2023] folic acid 1 mg in sodium chloride 0.9 % 50 mL IVPB  1 mg Intravenous Daily    multi-electrolyte (ISOLYTE-S PH 7.4) bolus 1,000 mL  1,000 mL Intravenous Once    ondansetron (ZOFRAN) injection   Intravenous Code/Trauma/Sedation Med    PHENobarbital injection 130 mg  130 mg Intravenous Once    [START ON 12/13/2023] thiamine (VITAMIN B1) 100 mg in sodium chloride 0.9 % 50 mL IVPB  100 mg Intravenous Daily       Allergies   Allergen Reactions    Cefdinir Rash       Objective     No intake or output data in the 24 hours ending 12/12/23 1942    Invasive Devices:   Peripheral IV 12/12/23 Left Antecubital (Active)   Site Assessment Clean;Dry;Intact 12/12/23 1606   Dressing Type Transparent 12/12/23 1606   Line Status Blood return noted;Capped;Flushed 12/12/23 1606   Dressing Status Clean;Dry;Intact 12/12/23 1606       Vitals   Vitals:    12/12/23 1838 12/12/23 1839 12/12/23 1845 12/12/23 1908   BP:   143/87 142/81   TempSrc:       Pulse: (!) 117 (!) 115 (!) 118 (!) 112   Resp: (!) 30 (!) 24 (!) 30    Patient Position - Orthostatic VS:       Temp:             EKG, Pathology, and/or Other Studies: I have personally reviewed pertinent reports.        Lab Results: I have personally reviewed pertinent reports.      Labs:    Results from last 7 days   Lab Units  "12/12/23  1622   WBC Thousand/uL 10.14   HEMOGLOBIN g/dL 15.2   HEMATOCRIT % 44.5   PLATELETS Thousands/uL 365   NEUTROS PCT % 75   LYMPHS PCT % 19   MONOS PCT % 4   EOS PCT % 1      Results from last 7 days   Lab Units 12/12/23  1622 12/12/23  1611   SODIUM mmol/L 139  --    POTASSIUM mmol/L 4.1  --    CHLORIDE mmol/L 96  --    CO2 mmol/L 15*  --    CO2, I-STAT mmol/L  --  15*   BUN mg/dL 15  --    CREATININE mg/dL 0.82  --    CALCIUM mg/dL 9.1  --    ALK PHOS U/L 144*  --    ALT U/L 24  --    AST U/L 53*  --    GLUCOSE, ISTAT mg/dl  --  69              No results found for: \"TROPONINI\"      Results from last 7 days   Lab Units 12/12/23  1622   ACETAMINOPHEN LVL ug/mL <2*   ETHANOL LVL mg/dL 198*   SALICYLATE LVL mg/dL <5     Invalid input(s): \"EXTPREGUR\"      Imaging Studies: I have personally reviewed pertinent reports.      Counseling / Coordination of Care  Total time spent today 24 minutes. This was a phone consultation; greater than 50% of time spent in discussion with primary providers and coordination of care.  "

## 2023-12-13 NOTE — UTILIZATION REVIEW
Initial Clinical Review    Admission: Date/Time/Statement:   Admission Orders (From admission, onward)       Ordered        12/12/23 2048  Inpatient Admission  Once                          Orders Placed This Encounter   Procedures    Inpatient Admission     Standing Status:   Standing     Number of Occurrences:   1     Order Specific Question:   Level of Care     Answer:   Level 1 Stepdown [13]     Order Specific Question:   Estimated length of stay     Answer:   More than 2 Midnights     Order Specific Question:   Certification     Answer:   I certify that inpatient services are medically necessary for this patient for a duration of greater than two midnights. See H&P and MD Progress Notes for additional information about the patient's course of treatment.     ED Arrival Information       Expected   -    Arrival   12/12/2023 15:36    Acuity   Emergent              Means of arrival   Wheelchair    Escorted by   Family Member    Service   Critical Care/ICU    Admission type   Emergency              Arrival complaint   Head injury from fall  while intoxicated  neg thinners and ASA             Chief Complaint   Patient presents with    Head Injury     Patient presents for fall down 13 steps this morning while intoxicated. Patient states he has been having vision changes throughout the day including hallucinations of his loved ones. +LOC at time of incident.        Initial Presentation: 58 y.o. male w/ PMH of alcohol abuse presented to the ED form hoe w/ neck and back pain after a fall down 13 stairs.   Pt reports that he drinks alcohol daily, last drink was at 2 AM. He drank a bottle of rum. While intoxicated, he fell down 13 stairs with head strike, loss of consciousness, no AC/AP. He has not eaten x 3 days d/t nausea and intermittent vomiting. He reports he was hallucinating this morning and his pain worsened, so his father brought him into the ED.   In the ED, , RR 24, /103. On exam, toxic appearing,  alert, disoriented, abdominal distention, thoracic and lumbar tenderness present.  Given IV Zofran IV Phenobarbital, 1L IVf bolus, IV Valium, IV thiamine, IV/po famotidine.    Admit as Inpatient for evaluation and treatment of fall, severe alc use disorder.  Plan: f/u CT trauma imaging. CIWA. treat alcohol withdrawal symptoms with phenobarbital.     12/12  Med Tox Consult: Alcohol withdrawal with complication, Alcohol use disorder, severe dependence, Alcoholic ketoacidosis, Fall:  Plan: Continue supportive care measures, including airway monitoring, head of bed elevation, aspiration precautions, cardiac telemetry, and continuous pulse oximetry. Recommend loading patient with 650 mg IV phenobarbital and then continuing phenobarbital 130 mg IV for mild-moderate withdrawal symptoms or phenobarbital 260 mg IV for moderate-severe withdrawal symptoms. Phenobarbital dosing can be titrated to RASS -2 to -3. Max total dosing of phenobarbital is 2-2.5 grams. Diazepam dosing per CIWA. recommend dextrose-containing IVFs (D5NS or D5LR) in addition to thiamine 500 mg IV every 8 hours until anion gap closes, acidosis improved, and patient is tolerating PO intake. Recommend daily folate and multivitamin supplementation.     Rapid Response Notes: Rapid response was called d/t pt vomiting. . Alert and protecting airway on arrival. Phenobarb 130mg ordered, Zofran 4mg, and Pepcid 20mg ordered. Transferred to ICU for continued ETOH withdrawal.     Critical Care Notes: Pt found w/ alc intoxication. CIWA score is 12 and he was given a total of 490 mg phenobarbital.   Plan: Cont CIWA. Treatment any withdrawal symptoms as needed with Ativan. Total goal of phenobarbital is 650 mg. Thiamine and folic acid repletion. IV hydration    ED Triage Vitals   Temperature Pulse Respirations Blood Pressure SpO2   12/12/23 1554 12/12/23 1554 12/12/23 1554 12/12/23 1554 12/12/23 1554   98.2 °F (36.8 °C) (!) 142 (!) 24 (!) 139/103 96 %      Temp Source  Heart Rate Source Patient Position - Orthostatic VS BP Location FiO2 (%)   12/12/23 1554 12/12/23 1554 12/12/23 1554 12/12/23 1554 --   Oral Monitor Sitting Right arm       Pain Score       12/12/23 2017       10 - Worst Possible Pain          Wt Readings from Last 1 Encounters:   12/13/23 105 kg (231 lb 11.3 oz)     Additional Vital Signs:   Date/Time Temp Pulse Resp BP MAP (mmHg) SpO2 O2 Device Patient Position - Orthostatic VS   12/13/23 0800 -- 88 -- 132/78 -- -- -- --   12/13/23 0719 98.5 °F (36.9 °C) 89 16 129/78 97 93 % None (Room air) Lying   12/13/23 0600 -- 95 17 125/76 96 88 % Abnormal  -- --   12/13/23 0500 -- 108 Abnormal  15 134/84 104 93 % -- --   12/13/23 0457 -- 107 Abnormal  -- -- -- -- -- --   12/13/23 0400 -- 114 Abnormal  23 Abnormal  126/79 98 94 % -- --   12/13/23 0300 97.6 °F (36.4 °C) 106 Abnormal  22 -- -- 94 % Nasal cannula Lying   12/13/23 0200 -- 102 14 134/75 98 94 % -- --   12/13/23 0100 -- 107 Abnormal  15 132/75 96 93 % -- --   12/13/23 0057 -- 106 Abnormal  -- -- -- -- -- --   12/13/23 0000 -- 110 Abnormal  16 133/79 99 93 % -- --   12/12/23 2357 -- 115 Abnormal  -- -- -- -- -- --   12/12/23 2300 -- 107 Abnormal  17 144/77 102 92 % -- --   12/12/23 2257 -- 109 Abnormal  -- 144/77 -- -- -- --   12/12/23 2209 98.3 °F (36.8 °C) 112 Abnormal  20 154/90 -- -- -- --   12/12/23 2200 -- 113 Abnormal  -- 150/87 -- -- -- --   12/12/23 2157 -- 114 Abnormal  22 150/87 112 -- -- --   12/12/23 2135 -- 122 Abnormal  -- 141/88 -- -- -- --   12/12/23 21:32:15 -- 125 Abnormal  24 Abnormal  140/89 -- 94 % Nasal cannula --   12/12/23 2130 -- 125 Abnormal  -- -- -- -- -- --   12/12/23 2125 -- 130 Abnormal  34 Abnormal  178/92 Abnormal  -- 95 % -- --   12/12/23 2120 -- 144 Abnormal  97 Abnormal  -- -- 94 % -- --   12/12/23 2119 -- -- -- 180/87 Abnormal  -- -- -- --   12/12/23 2115 -- 145 Abnormal  32 Abnormal  140/89 -- 93 % -- --   12/12/23 2110 -- 114 Abnormal  23 Abnormal  -- -- 96 % -- --   12/12/23  2105 -- 112 Abnormal  18 -- -- 94 % -- --   12/12/23 2100 -- 114 Abnormal  17 156/87 -- 94 % -- --   12/12/23 2055 -- 111 Abnormal  19 -- -- 94 % -- --   12/12/23 2050 -- 110 Abnormal  19 -- -- 94 % -- --   12/12/23 2045 -- 114 Abnormal  27 Abnormal  130/81 -- 95 % Nasal cannula --   12/12/23 2040 -- 110 Abnormal  18 -- -- 95 % -- --   12/12/23 2035 -- 107 Abnormal  17 -- -- 95 % -- --   12/12/23 2030 -- 104 14 138/81 -- 94 % -- --   12/12/23 2025 -- 107 Abnormal  18 -- -- 95 % -- --   12/12/23 2020 -- 112 Abnormal  23 Abnormal  -- -- -- -- --   12/12/23 2015 -- 112 Abnormal  19 -- -- -- -- --   12/12/23 2010 -- 114 Abnormal  19 -- -- -- -- --   12/12/23 2008 -- -- -- 156/85 -- -- -- --   12/12/23 2005 -- 120 Abnormal  24 Abnormal  -- -- -- -- --   12/12/23 2000 -- 114 Abnormal  19 146/85 -- -- -- --   12/12/23 1955 -- 131 Abnormal  33 Abnormal  -- -- -- -- --   12/12/23 1950 -- 114 Abnormal  16 -- -- 93 % -- --   12/12/23 1945 -- 112 Abnormal  17 145/79 -- 92 % Nasal cannula Lying   12/12/23 1940 -- 114 Abnormal  18 -- -- 92 % -- --   12/12/23 1935 -- 113 Abnormal  17 -- -- 92 % -- --   12/12/23 1930 -- 113 Abnormal  16 145/79 -- 92 % -- --   12/12/23 1925 -- 116 Abnormal  16 -- -- 93 % -- --   12/12/23 1920 -- 122 Abnormal  28 Abnormal  -- -- 94 % -- --   12/12/23 1915 -- 116 Abnormal  23 Abnormal  -- -- 92 % -- --   12/12/23 1910 -- 113 Abnormal  24 Abnormal  -- -- 93 % -- --   12/12/23 1908 -- 112 Abnormal  -- 142/81 -- -- -- --   12/12/23 1905 -- 110 Abnormal  17 -- -- 92 % -- --   12/12/23 1900 -- 110 Abnormal  15 -- -- 91 % -- --   12/12/23 1855 -- 115 Abnormal  29 Abnormal  -- -- 91 % -- --   12/12/23 1850 -- 126 Abnormal  47 Abnormal  -- -- 94 % -- --   12/12/23 1845 -- 118 Abnormal  30 Abnormal  143/87 -- 95 % Nasal cannula --   12/12/23 1840 -- 112 Abnormal  24 Abnormal  -- -- 94 % -- --   12/12/23 1839 -- 115 Abnormal  24 Abnormal  -- -- 95 % -- --   12/12/23 1838 -- 117 Abnormal  30 Abnormal  -- -- 95 %  -- --   12/12/23 1837 -- 114 Abnormal  31 Abnormal  -- -- 95 % -- --   12/12/23 1836 -- 113 Abnormal  24 Abnormal  -- -- 94 % -- --   12/12/23 1835 -- 114 Abnormal  36 Abnormal  -- -- 95 % -- --   12/12/23 1834 -- 114 Abnormal  25 Abnormal  -- -- 95 % -- --   12/12/23 1833 -- 112 Abnormal  22 -- -- 95 % -- --   12/12/23 1832 -- 111 Abnormal  22 -- -- 95 % -- --   12/12/23 1831 -- 113 Abnormal  31 Abnormal  -- -- 95 % -- --   12/12/23 1830 -- 111 Abnormal  30 Abnormal  144/83 106 95 % -- --   12/12/23 1829 -- 113 Abnormal  26 Abnormal  -- -- 95 % -- --   12/12/23 1828 -- 113 Abnormal  24 Abnormal  -- -- 95 % -- --   12/12/23 1827 -- 113 Abnormal  28 Abnormal  -- -- 95 % -- --   12/12/23 1826 -- 113 Abnormal  19 -- -- 96 % -- --   12/12/23 1825 -- 117 Abnormal  21 -- -- 95 % -- --   12/12/23 1824 -- 124 Abnormal  47 Abnormal  -- -- 95 % -- --   12/12/23 1823 -- 119 Abnormal  31 Abnormal  -- -- 95 % -- --   12/12/23 1822 -- 125 Abnormal  29 Abnormal  -- -- 95 % -- --   12/12/23 1821 -- 131 Abnormal  52 Abnormal  -- -- 95 % -- --   12/12/23 1820 -- 128 Abnormal  54 Abnormal  -- -- 96 % -- --   12/12/23 1819 -- 121 Abnormal  47 Abnormal  142/83 -- 95 % -- --   12/12/23 1818 -- 120 Abnormal  35 Abnormal  -- -- 93 % -- --   12/12/23 1817 -- 121 Abnormal  27 Abnormal  -- -- 93 % -- --   12/12/23 1816 -- 118 Abnormal  22 -- -- 94 % -- --   12/12/23 1815 -- 120 Abnormal  25 Abnormal  -- 107 95 % -- --   12/12/23 1814 -- 118 Abnormal  26 Abnormal  -- -- 94 % -- --   12/12/23 1813 -- 116 Abnormal  21 -- -- 95 % -- --   12/12/23 1812 -- 116 Abnormal  24 Abnormal  -- -- 95 % -- --   12/12/23 1811 -- 112 Abnormal  17 -- -- 95 % -- --   12/12/23 1810 -- 107 Abnormal  14 143/80 104 95 % -- --   12/12/23 1809 -- 110 Abnormal  17 -- -- 95 % -- --   12/12/23 1808 -- 107 Abnormal  15 -- -- 95 % -- --   12/12/23 1807 -- 107 Abnormal  13 -- -- 96 % -- --   12/12/23 1806 -- 108 Abnormal  14 -- -- 95 % -- --   12/12/23 1805 -- 106 Abnormal   13 135/77 100 95 % -- --   12/12/23 1804 -- 105 13 -- -- 95 % -- --   12/12/23 1803 -- 107 Abnormal  13 -- -- 95 % -- --   12/12/23 1802 -- 109 Abnormal  14 -- -- 95 % -- --   12/12/23 1801 -- 108 Abnormal  15 -- -- 95 % -- --   12/12/23 1800 -- 107 Abnormal  15 140/80 104 95 % -- --   12/12/23 1759 -- 107 Abnormal  15 -- -- 95 % -- --   12/12/23 1758 -- 109 Abnormal  15 -- -- 95 % -- --   12/12/23 1757 -- 107 Abnormal  15 -- -- 95 % -- --   12/12/23 1756 -- 111 Abnormal  17 -- -- 96 % -- --   12/12/23 1755 -- 105 14 138/80 101 95 % -- --   12/12/23 1754 -- 107 Abnormal  15 -- -- 95 % -- --   12/12/23 1753 -- 105 15 -- -- 95 % -- --   12/12/23 1752 -- 107 Abnormal  16 -- -- 95 % -- --   12/12/23 1751 -- 108 Abnormal  15 -- -- 95 % -- --   12/12/23 1750 -- 106 Abnormal  15 134/78 100 95 % -- --   12/12/23 1749 -- 107 Abnormal  15 -- -- 95 % -- --   12/12/23 1748 -- 107 Abnormal  14 -- -- 95 % -- --   12/12/23 1747 -- 109 Abnormal  16 -- -- 95 % -- --   12/12/23 1746 -- 108 Abnormal  16 -- -- 95 % -- --   12/12/23 1745 -- 107 Abnormal   14 137/78 101 95 % Nasal cannula Lying   Pulse: Simultaneous filing. User may not have seen previous data. at 12/12/23 1745 12/12/23 1744 -- 109 Abnormal  17 -- -- 95 % -- --   12/12/23 1743 -- 109 Abnormal  17 -- -- 95 % -- --   12/12/23 1742 -- 109 Abnormal  16 -- -- 95 % -- --   12/12/23 1741 -- 109 Abnormal  16 -- -- 95 % -- --   12/12/23 1740 -- 111 Abnormal  16 142/82 102 95 % -- --   12/12/23 1739 -- 112 Abnormal  17 -- -- 95 % -- --   12/12/23 1738 -- 110 Abnormal  18 -- -- 95 % -- --   12/12/23 1737 -- 111 Abnormal  16 -- -- 95 % -- --   12/12/23 1736 -- 111 Abnormal  17 -- -- 94 % -- --   12/12/23 1735 -- 111 Abnormal  16 142/83 104 95 % -- --   12/12/23 1734 -- 110 Abnormal  16 -- -- 95 % -- --   12/12/23 1733 -- 112 Abnormal  24 Abnormal  -- -- 96 % -- --   12/12/23 1732 -- 116 Abnormal  28 Abnormal  -- -- 96 % -- --   12/12/23 1731 -- 116 Abnormal  40 Abnormal  -- -- 96  % -- --   12/12/23 1730 -- 116 Abnormal  30 Abnormal  147/85 109 96 % -- --   12/12/23 1729 -- 116 Abnormal  28 Abnormal  -- -- 96 % -- --   12/12/23 1728 -- 115 Abnormal  22 -- -- 96 % -- --   12/12/23 1727 -- 118 Abnormal  27 Abnormal  -- -- 95 % -- --   12/12/23 1726 -- 120 Abnormal  23 Abnormal  -- -- 96 % -- --   12/12/23 1725 -- 115 Abnormal  21 157/88 114 96 % -- --   12/12/23 1724 -- 117 Abnormal  30 Abnormal  -- -- 95 % -- --   12/12/23 1723 -- 116 Abnormal  30 Abnormal  -- -- 95 % -- --   12/12/23 1722 -- 114 Abnormal  37 Abnormal  -- -- 95 % -- --   12/12/23 1721 -- 114 Abnormal  34 Abnormal  -- -- 96 % -- --   12/12/23 1720 -- 112 Abnormal  28 Abnormal  144/90 108 95 % -- --   12/12/23 1719 -- 114 Abnormal  25 Abnormal  -- -- 95 % -- --   12/12/23 1718 -- 113 Abnormal  30 Abnormal  -- -- 95 % -- --   12/12/23 1717 -- 115 Abnormal  18 -- -- 95 % -- --   12/12/23 1716 -- 119 Abnormal  27 Abnormal  -- -- 95 % -- --   12/12/23 1715 -- 118 Abnormal  20 147/94 115 95 % Nasal cannula --   12/12/23 1714 -- 120 Abnormal  21 -- -- 95 % -- --   12/12/23 1713 -- 121 Abnormal  25 Abnormal  -- -- 94 % -- --   12/12/23 1712 -- 117 Abnormal  30 Abnormal  -- -- 95 % -- --   12/12/23 1711 -- 116 Abnormal  18 -- -- 95 % -- --   12/12/23 1710 -- 118 Abnormal  20 136/90 108 95 % -- --   12/12/23 1709 -- 118 Abnormal  27 Abnormal  -- -- 95 % -- --   12/12/23 1708 -- 122 Abnormal  32 Abnormal  -- -- 95 % -- --   12/12/23 1707 -- 122 Abnormal  30 Abnormal  -- -- 95 % -- --   12/12/23 1706 -- 123 Abnormal  44 Abnormal  -- -- 95 % -- --   12/12/23 1705 -- 121 Abnormal  30 Abnormal  158/89 116 95 % -- --   12/12/23 1704 -- 121 Abnormal  27 Abnormal  -- -- 95 % -- --   12/12/23 1703 -- 122 Abnormal  22 -- -- 95 % -- --   12/12/23 1702 -- 120 Abnormal  28 Abnormal  -- -- 95 % -- --   12/12/23 1701 -- 118 Abnormal  19 -- -- 95 % -- --   12/12/23 1700 -- 118 Abnormal  18 144/88 109 94 % -- --   12/12/23 1659 -- 117 Abnormal  25  Abnormal  -- -- 94 % -- --   12/12/23 1658 -- 115 Abnormal  19 -- -- 94 % -- --   12/12/23 1657 -- 114 Abnormal  19 -- -- 93 % -- --   12/12/23 1656 -- 113 Abnormal  16 -- -- 93 % -- --   12/12/23 1655 -- 110 Abnormal  15 150/89 112 93 % -- --   12/12/23 1654 -- 112 Abnormal  17 -- -- 93 % -- --   12/12/23 1653 -- 113 Abnormal  14 -- -- 94 % -- --   12/12/23 1652 -- 116 Abnormal  28 Abnormal  -- -- 94 % -- --   12/12/23 1651 -- 117 Abnormal  20 -- -- 94 % -- --   12/12/23 1650 -- 120 Abnormal  29 Abnormal  150/92 114 94 % -- --   12/12/23 1649 -- 117 Abnormal  33 Abnormal  -- -- 94 % -- --   12/12/23 1648 -- 116 Abnormal  35 Abnormal  -- -- 95 % -- --   12/12/23 1647 -- 118 Abnormal  30 Abnormal  -- -- 95 % -- --   12/12/23 1646 -- 117 Abnormal  39 Abnormal  -- -- 95 % -- --   12/12/23 1645 -- 115 Abnormal  18 150/88 112 91 %  Nasal cannula Lying   SpO2: pt placed 4L nasal cannula at 12/12/23 1645   12/12/23 1644 -- 117 Abnormal  -- -- -- -- -- --   12/12/23 1643 -- 119 Abnormal  29 Abnormal  -- -- 88 % Abnormal  -- --   12/12/23 1642 -- 116 Abnormal  21 -- -- 82 % Abnormal  -- --   12/12/23 1641 -- 114 Abnormal  19 -- -- 84 % Abnormal  -- --   12/12/23 1640 -- 111 Abnormal  14 -- -- 88 % Abnormal  -- --   12/12/23 1639 -- 115 Abnormal  21 -- -- 93 % -- --   12/12/23 1638 -- 118 Abnormal  28 Abnormal  -- -- 94 % -- --   12/12/23 1637 -- 118 Abnormal  37 Abnormal  -- -- 93 % -- --   12/12/23 1636 -- 121 Abnormal  31 Abnormal  -- -- 94 % -- --   12/12/23 1635 -- 122 Abnormal  40 Abnormal  149/89 112 94 % -- --   12/12/23 1634 -- 122 Abnormal  36 Abnormal  144/80 -- 95 % -- --   12/12/23 1630 -- 118 Abnormal  18 144/80 103 91 %  -- Lying   SpO2: pt placed on 2L nasal cannula at 12/12/23 1630   12/12/23 1624 -- -- -- 148/90 113 -- -- --   12/12/23 1615 -- 122 Abnormal  18 148/90 -- 94 % None (Room air) Lying   12/12/23 1610 -- 127 Abnormal  29 Abnormal  -- 113 95 % -- --   12/12/23 1609 -- 128 Abnormal  42 Abnormal  --  -- 95 % -- --   12/12/23 1608 -- 126 Abnormal  32 Abnormal  -- -- 94 % -- --   12/12/23 1607 -- 124 Abnormal  -- -- -- 95 % -- --   12/12/23 1606 -- 127 Abnormal  -- -- -- 95 % -- --   12/12/23 1605 97.8 °F (36.6 °C) 127 Abnormal  20 164/99 -- 95 % None (Room air) Lying   12/12/23 1604 -- 130 Abnormal  -- 164/99 126 94 % -- --       Pertinent Labs/Diagnostic Test Results:   TRAUMA - CT head wo contrast   Final Result by Ziggy Barrera MD (12/12 1719)      No acute intracranial abnormality.               I personally discussed this study with LAURYN ULLRICH on 12/12/2023 5:19 PM.            Workstation performed: [id number]         TRAUMA - CT chest abdomen pelvis w contrast   Final Result by Ziggy Barrera MD (12/12 1719)         1. No acute posttraumatic abnormality in the chest, abdomen, or pelvis.   2. Diffuse hepatic steatosis.   3. Additional findings as noted.         I personally discussed this study with LAURYN ULLRICH on 12/12/2023 5:18 PM.                  Workstation performed: [id number]         TRAUMA - CT spine cervical wo contrast   Final Result by Ziggy Barrera MD (12/12 1719)      No acute cervical spine fracture or traumatic malalignment.               I personally discussed this study with LAURYN ULLRICH on 12/12/2023 5:19 PM.            Workstation performed: [id number]         XR trauma multiple   Final Result by Ziggy Barrera MD (12/12 1719)      No acute cardiopulmonary disease within limitations of supine imaging.               Workstation performed: [id number]         XR chest 1 view    (Results Pending)     Date and Time Eye Opening Best Verbal Response Best Motor Response [place] Coma Scale Score   12/13/23 0800 4 5 6 15   12/13/23 0400 4 5 6 15   12/13/23 0000 4 5 6 15   12/12/23 2209 4 5 6 15   12/12/23 2132 4 5 6 15   12/12/23 2045 4 5 6 15   12/12/23 1945 4 5 6 15   12/12/23 1845 4 5 6 15   12/12/23 1745 4 5 6 15   12/12/23 1715 4 5 6 15   12/12/23 1645 4 5 6 15    12/12/23 1630 4 5 6 15   12/12/23 1615 4 5 6 15   12/12/23 1605 4 5 6 15   12/12/23 1604 4 5 6 15     Row Name 12/12/23 1637 12/12/23 1636 12/12/23 1635 12/12/23 1634 12/12/23 1630   [facility]   BP -- -- 149/89  -/80  -/80  -SA   Pulse 118 Abnormal   - Abnormal   - Abnormal   - Abnormal   - Abnormal   -SA   Nausea and Vomiting -- -- -- 3  -SA --   Tactile Disturbances -- -- -- 4  -SA --   Tremor -- -- -- 7  -SA --   Auditory Disturbances -- -- -- 0  -SA --   Paroxysmal Sweats -- -- -- 0  -SA --   Visual Disturbances -- -- -- 0  -SA --   Anxiety -- -- -- 5  -SA --   Headache, Fullness in Head -- -- -- 4  -SA --   Agitation -- -- -- 5  -SA --   Orientation and Clouding of Sensorium -- -- -- 3  -SA --   CIWA-Ar Total -- -- -- 31  -SA --     Row Name 12/12/23 1822 12/12/23 1821 12/12/23 1820 12/12/23 1819 12/12/23 1818   [facility]   BP -- -- -- 142/83  -SA --   Pulse 125 Abnormal   - Abnormal   - Abnormal   - Abnormal   - Abnormal   -SA   Nausea and Vomiting -- -- -- 3  -SA --   Tactile Disturbances -- -- -- 4  -SA --   Tremor -- -- -- 7  -SA --   Auditory Disturbances -- -- -- 0  -SA --   Paroxysmal Sweats -- -- -- 0  -SA --   Visual Disturbances -- -- -- 3  -SA --   Anxiety -- -- -- 5  -SA --   Headache, Fullness in Head -- -- -- 2  -SA --   Agitation -- -- -- 5  -SA --   Orientation and Clouding of Sensorium -- -- -- 0  -SA --   CIWA-Ar Total -- -- -- 29  -SA --     Row Name 12/12/23 1910 12/12/23 1908 12/12/23 1905 12/12/23 1900 12/12/23 1855   [place]   BP -- 142/81  -SA -- -- --   Pulse 113 Abnormal   - Abnormal   - Abnormal   - Abnormal   - Abnormal   -SA   Nausea and Vomiting -- 2  -SA -- -- --   Tactile Disturbances -- 1  -SA -- -- --   Tremor -- 2  -SA -- -- --   Auditory Disturbances -- 0  -SA -- -- --   Paroxysmal Sweats -- 0  -SA -- -- --   Visual Disturbances -- 0  -SA -- -- --   Anxiety -- 1  -SA -- -- --   Headache,  Fullness in Head -- 6  -SA -- -- --   Agitation -- 0  -SA -- -- --   Orientation and Clouding of Sensorium -- 0  -SA -- -- --   [facility] Total -- 12  -SA -- -- --     Row Name 12/12/23 2040 12/12/23 2035 12/12/23 2030 12/12/23 2025 12/12/23 2021   [facility]   BP -- -- 138/81  -SA -- --   Pulse 110 Abnormal   - Abnormal   -  - Abnormal   -SA --   Nausea and Vomiting -- -- -- -- 1  -SA   Tactile Disturbances -- -- -- -- 1  -SA   Tremor -- -- -- -- 2  -SA   Auditory Disturbances -- -- -- -- 0  -SA   Paroxysmal Sweats -- -- -- -- 0  -SA   Visual Disturbances -- -- -- -- 1  -SA   Anxiety -- -- -- -- 1  -SA   Headache, Fullness in Head -- -- -- -- 5  -SA   Agitation -- -- -- -- 2  -SA   Orientation and Clouding of Sensorium -- -- -- -- 0  -SA   [facility] Total -- -- -- -- 13  -SA     Row Name 12/12/23 2125 12/12/23 2120 12/12/23 2119 12/12/23 2115 12/12/23 2110   [facility]   /92 Abnormal   -SA -- 180/87 Abnormal   -/89  -SA --   Pulse 130 Abnormal   - Abnormal   -SA -- 145 Abnormal   - Abnormal   -SA   Nausea and Vomiting -- -- -- 6  -SA --   Tactile Disturbances -- -- -- 1  -SA --   Tremor -- -- -- 7  -SA --   Auditory Disturbances -- -- -- 1  -SA --   Paroxysmal Sweats -- -- -- 0  -SA --   Visual Disturbances -- -- -- 0  -SA --   Anxiety -- -- -- 7  -SA --   Headache, Fullness in Head -- -- -- 6  -SA --   Agitation -- -- -- 2  -SA --   Orientation and Clouding of Sensorium -- -- -- 0  -SA --   CIWA-Ar Total -- -- -- 30  -SA --     Row Name 12/12/23 2200 12/12/23 2157 12/12/23 2135 12/12/23 21:32:15 12/12/23 2130   CIWA-Ar   /87  -/87  -/88  -/89  -SA --   Pulse 113 Abnormal   - Abnormal   - Abnormal   - Abnormal   - Abnormal   -SA   Nausea and Vomiting -- 0  -BL 1  -SA -- --   Tactile Disturbances -- 1  -BL 1  -SA -- --   Tremor -- 5  -BL 1  -SA -- --   Auditory Disturbances -- 0  -BL 0  -SA -- --   Paroxysmal Sweats -- 2  -BL 0  -SA --  --   Visual Disturbances -- 0  -BL 0  -SA -- --   Anxiety -- 1  -BL 2  -SA -- --   Headache, Fullness in Head -- 4  -BL 6  -SA -- --   Agitation -- 0  -BL 0  -SA -- --   Orientation and Clouding of Sensorium -- 0  -BL 0  -SA -- --   CIWA-Ar Total -- 13  -BL 11  -SA -- --     Row Name 12/13/23 0000 12/12/23 2357 12/12/23 2300 12/12/23 2257 12/12/23 2209   CIWA-Ar   /79  -BL -- 144/77  -/77  -/90  -BL   Pulse 110 Abnormal   - Abnormal   - Abnormal   - Abnormal   - Abnormal   -BL   Nausea and Vomiting -- 0  -BL --  -BL 1  -BL --   Tactile Disturbances -- 1  -BL --  -BL 1  -BL --   Tremor -- 2  -BL --  -BL 4  -BL --   Auditory Disturbances -- 0  -BL -- 0  -BL --   Paroxysmal Sweats -- 0  -BL -- 2  -BL --   Visual Disturbances -- 0  -BL -- 0  -BL --   Anxiety -- 2  -BL -- 1  -BL --   Headache, Fullness in Head -- 3  -BL -- 4  -BL --   Agitation -- 0  -BL -- 0  -BL --   Orientation and Clouding of Sensorium -- 0  -BL -- 0  -BL --   CIWA-Ar Total -- 8  -BL -- 13  -BL --     Row Name 12/13/23 0400 12/13/23 0300 12/13/23 0200 12/13/23 0100 12/13/23 0057   CIWA-Ar   /79  -BL -- 134/75  -/75  -BL --   Pulse 114 Abnormal   - Abnormal   -  - Abnormal   - Abnormal   -BL   Nausea and Vomiting -- -- -- -- 0  -BL   Tactile Disturbances -- -- -- -- 0  -BL   Tremor -- -- -- -- 2  -BL   Auditory Disturbances -- -- -- -- 0  -BL   Paroxysmal Sweats -- -- -- -- 0  -BL   Visual Disturbances -- -- -- -- 0  -BL   Anxiety -- -- -- -- 1  -BL   Headache, Fullness in Head -- -- -- -- 2  -BL   Agitation -- -- -- -- 0  -BL   Orientation and Clouding of Sensorium -- -- -- -- 0  -BL   CIWA-Ar Total -- -- -- -- 5  -BL     Row Name 12/13/23 0800 12/13/23 0719 12/13/23 0600 12/13/23 0500 12/13/23 0457   CIWA-Ar   /78  -/78  -/76  -/84  -BL --   Pulse 88  -AM 89  -AM 95  - Abnormal   - Abnormal   -BL   Nausea and Vomiting -- 0  -AM -- -- 0   -BL   Tactile Disturbances -- 0  -AM -- -- 0  -BL   Tremor -- 4  -AM -- -- 1  -BL   Auditory Disturbances -- 0  -AM -- -- 0  -BL   Paroxysmal Sweats -- 0  -AM -- -- 1  -BL   Visual Disturbances -- 0  -AM -- -- 0  -BL   Anxiety -- 1  -AM -- -- 1  -BL   Headache, Fullness in Head -- 0  -AM -- -- 2  -BL   Agitation -- 0  -AM -- -- 0  -BL   Orientation and Clouding of Sensorium -- 0  -AM -- -- 0  -BL   CIWA-Ar Total -- 5  -AM -- -- 5  -BL         Results from last 7 days   Lab Units 12/13/23 0453 12/12/23  1622 12/12/23  1611   WBC Thousand/uL 6.85 10.14  --    HEMOGLOBIN g/dL 12.5 15.2  --    I STAT HEMOGLOBIN g/dl  --   --  15.0   HEMATOCRIT % 36.8 44.5  --    HEMATOCRIT, ISTAT %  --   --  44   PLATELETS Thousands/uL 273 365  --    NEUTROS ABS Thousands/µL 5.24 7.64*  --          Results from last 7 days   Lab Units 12/13/23 0453 12/12/23  1622 12/12/23  1611   SODIUM mmol/L 135 139  --    POTASSIUM mmol/L 3.6 4.1  --    CHLORIDE mmol/L 98 96  --    CO2 mmol/L 28 15*  --    CO2, I-STAT mmol/L  --   --  15*   ANION GAP mmol/L 9 28  --    BUN mg/dL 13 15  --    CREATININE mg/dL 0.79 0.82  --    EGFR ml/min/1.73sq m 99 98  --    CALCIUM mg/dL 8.2* 9.1  --    CALCIUM, IONIZED, ISTAT mmol/L  --   --  1.02*   MAGNESIUM mg/dL 1.8*  --   --    PHOSPHORUS mg/dL 2.4*  --   --      Results from last 7 days   Lab Units 12/13/23 0453 12/12/23  1622   AST U/L 32 53*   ALT U/L 17 24   ALK PHOS U/L 109* 144*   TOTAL PROTEIN g/dL 6.5 8.3   ALBUMIN g/dL 3.8 4.7   TOTAL BILIRUBIN mg/dL 0.87 0.72         Results from last 7 days   Lab Units 12/13/23  0453 12/12/23  1622   GLUCOSE RANDOM mg/dL 167* 61*             BETA-HYDROXYBUTYRATE   Date Value Ref Range Status   10/16/2023 2.9 (H) <0.6 mmol/L Final   10/01/2023 0.8 (H) <0.6 mmol/L Final              Results from last 7 days   Lab Units 12/12/23  1611   PH, FANG I-STAT  7.397   PCO2, FANG ISTAT mm HG 24.0*   PO2, FANG ISTAT mm HG 71.0*   HCO3, FANG ISTAT mmol/L 14.8*   I STAT BASE EXC  mmol/L -8*   I STAT O2 SAT % 94*                   Results from last 7 days   Lab Units 12/12/23  1622   ETHANOL LVL mg/dL 198*   ACETAMINOPHEN LVL ug/mL <2*   SALICYLATE LVL mg/dL <5         ED Treatment:   Medication Administration from 12/12/2023 1535 to 12/12/2023 2157         Date/Time Order Dose Route Action     12/12/2023 1629 EST iohexol (OMNIPAQUE) 350 MG/ML injection (MULTI-DOSE) 100 mL 100 mL Intravenous Given     12/12/2023 1618 EST ondansetron (ZOFRAN) injection 4 mg Intravenous Given     12/12/2023 1803 EST PHENobarbital 360 mg in sodium chloride 0.9 % 100 mL IVPB 0 mg Intravenous Stopped     12/12/2023 1646 EST PHENobarbital 360 mg in sodium chloride 0.9 % 100 mL IVPB 360 mg Intravenous New Bag     12/12/2023 1837 EST PHENobarbital injection 130 mg 130 mg Intravenous Given     12/12/2023 2018 EST PHENobarbital injection 130 mg 130 mg Intravenous Given     12/12/2023 2126 EST multi-electrolyte (ISOLYTE-S PH 7.4) bolus 1,000 mL 0 mL Intravenous Stopped     12/12/2023 2001 EST multi-electrolyte (ISOLYTE-S PH 7.4) bolus 1,000 mL 1,000 mL Intravenous New Bag     12/12/2023 1959 EST dextrose 5 % in lactated Ringer's infusion 125 mL/hr Intravenous New Bag     12/12/2023 2125 EST thiamine (VITAMIN B1) 500 mg in sodium chloride 0.9 % 50 mL IVPB 0 mg Intravenous Stopped     12/12/2023 2052 EST thiamine (VITAMIN B1) 500 mg in sodium chloride 0.9 % 50 mL IVPB 500 mg Intravenous New Bag     12/12/2023 2109 EST acetaminophen (TYLENOL) tablet 650 mg 650 mg Oral Given     12/12/2023 2017 EST ibuprofen (MOTRIN) tablet 600 mg 600 mg Oral Given     12/12/2023 2056 EST famotidine (PEPCID) tablet 20 mg 20 mg Oral Given     12/12/2023 2128 EST diazepam (VALIUM) injection 10 mg 10 mg Intravenous Given     12/12/2023 2129 EST PHENobarbital injection 130 mg 130 mg Intravenous Given     12/12/2023 2126 EST ondansetron (ZOFRAN) injection 4 mg 4 mg Intravenous Given     12/12/2023 2133 EST Famotidine (PF) (PEPCID) injection 20  mg 20 mg Intravenous Given          Past Medical History:   Diagnosis Date    Alcoholic ketoacidosis 10/16/2023    GERD (gastroesophageal reflux disease)     Hypertension     Hypomagnesemia 04/02/2023    Vomiting 04/02/2023     Present on Admission:   Alcohol use disorder, severe, dependence (HCC)   Fall down stairs   Alcohol withdrawal syndrome with perceptual disturbance (HCC)   Alcoholic ketoacidosis      Admitting Diagnosis: Head trauma [S09.90XA]  Alcohol withdrawal syndrome with perceptual disturbance (HCC) [F10.932]  Age/Sex: 58 y.o. male  Admission Orders:  SCD  PT/OT  Cardio-Pulmonary Monitoring, Neuro Checks, Nursing dysphagia screen, I/O, Daily weights, Vital signs  Continuous pulse ox    Scheduled Medications:  chlorhexidine, 15 mL, Mouth/Throat, Q12H COLBY  enoxaparin, 40 mg, Subcutaneous, Daily  famotidine, 20 mg, Oral, BID  folic acid 1 mg in sodium chloride 0.9 % 50 mL IVPB, 1 mg, Intravenous, Daily  ibuprofen, 600 mg, Oral, Q6H COLBY  magnesium sulfate, 2 g, Intravenous, Once  thiamine, 500 mg, Intravenous, Q8H      Continuous IV Infusions:  dextrose 5% lactated ringer's, 125 mL/hr, Intravenous, Continuous      PRN Meds:  acetaminophen, 650 mg, Oral, Q4H PRN  aluminum-magnesium hydroxide-simethicone, 30 mL, Oral, Q4H PRN 12/12 x 1        IP CONSULT TO MEDICAL CRITICAL CARE  IP CONSULT TO TOXICOLOGY  IP CONSULT TO CASE MANAGEMENT    Network Utilization Review Department  ATTENTION: Please call with any questions or concerns to [phone] and carefully listen to the prompts so that you are directed to the right person. All voicemails are confidential.   For Discharge needs, contact Care Management DC Support Team at [phone] opt. 2  Send all requests for admission clinical reviews, approved or denied determinations and any other requests to dedicated fax number below belonging to the campus where the patient is receiving treatment. List of dedicated fax numbers for the Facilities:  FACILITY NAME UR  FAX NUMBER   ADMISSION DENIALS (Administrative/Medical Necessity) [phone]   DISCHARGE SUPPORT TEAM (NETWORK) [phone]   PARENT CHILD HEALTH (Maternity/NICU/Pediatrics) [phone]   [facility] [phone]   [facility] [phone]   [facility] [phone]   [facility] [phone]   [facility] [phone]   [facility] [phone]   [facility] [phone]   [facility] [phone]   [facility] [phone]   On license of [facility] [phone]   [facility] [phone]

## 2023-12-13 NOTE — ASSESSMENT & PLAN NOTE
Patient fell down 13 stairs while intoxicated. Was brought into the ED as a Trauma B.     Plan:  -Trauma following and will appreciate recommendations.

## 2023-12-13 NOTE — RAPID RESPONSE
Rapid Response Note  Diogo Travis 58 y.o. male MRN: [id number]  Unit/Bed#: ED-37 Encounter: [id number]    Rapid Response Notification(s):   Response called date/time:  12/12/2023 9:20 PM  Response team arrival date/time:  12/12/2023 9:21 PM  Response end date/time:  12/12/2023 9:25 PM  Level of care:  ICU  Rapid response location:  ED  Primary reason for rapid response call:  Other (comment) (Vomitting)    Rapid Response Intervention(s):   Airway:  None  Breathing:  None  Circulation:  None  Fluids administered:  None  Medications administered:  None       Assessment:   RRT called for vomiting. Alert and protecting airway on arrival. Phenobarb 130mg ordered, Zofran 4mg, and Pepcid 20mg ordered.     Plan:   Admit to ICU, continue ETOH withdrawal protocol     Rapid Response Outcome:   Transfer:  Transfer to ICU  Primary service notified of transfer: Yes    Code Status: Level 1 (Full Code)    Comments:  Pt. In ED awaiting ICU bed    Family notified of transfer: no  Family member contacted: No     Background/Situation:   Diogo Travis is a 58 y.o. male who was admitted to fall. No acute injuries noted, pt. Transferred to ICU service for ETOH withdrawal    Review of Systems   Constitutional:  Positive for chills and diaphoresis.   HENT: Negative.     Respiratory: Negative.     Cardiovascular: Negative.    Gastrointestinal: Negative.         Complains of burning sensation in throat   Endocrine: Negative.    Genitourinary: Negative.    Musculoskeletal: Negative.    Neurological:         Shivering       Objective:   Vitals:    12/12/23 2120 12/12/23 2125 12/12/23 2130 12/12/23 2132   BP:  (!) 178/92  140/89   BP Location:       Pulse: (!) 144 (!) 130 (!) 125 (!) 125   Resp: (!) 97 (!) 34  (!) 24   Temp:       TempSrc:       SpO2: 94% 95%  94%   Weight:         Physical Exam  Vitals and nursing note reviewed.   Constitutional:       Appearance: He is ill-appearing and diaphoretic.   HENT:      Head: Normocephalic and  "atraumatic.      Nose: Nose normal.      Mouth/Throat:      Mouth: Mucous membranes are dry.      Pharynx: Oropharynx is clear.   Eyes:      Extraocular Movements: Extraocular movements intact.      Conjunctiva/sclera: Conjunctivae normal.      Pupils: Pupils are equal, round, and reactive to light.   Cardiovascular:      Rate and Rhythm: Regular rhythm. Tachycardia present.      Pulses: Normal pulses.      Heart sounds: Normal heart sounds.   Pulmonary:      Effort: Pulmonary effort is normal.      Breath sounds: Rhonchi present.   Abdominal:      General: Bowel sounds are normal.      Comments: Rounded abdomen   Skin:     General: Skin is warm.      Capillary Refill: Capillary refill takes less than 2 seconds.   Neurological:      Mental Status: He is alert.      Comments: ETOH withdrawal         Portions of the record may have been created with voice recognition software.  Occasional wrong word or \"sound a like\" substitutions may have occurred due to the inherent limitations of voice recognition software.  Read the chart carefully and recognize, using context, where substitutions have occurred.    MARCK Grant      "

## 2023-12-13 NOTE — SPEECH THERAPY NOTE
Speech-Language Pathology Bedside Swallow Evaluation        Patient Name: Diogo Travis    Today's Date: 12/13/2023     Problem List  Principal Problem:    Alcohol use disorder, severe, dependence (HCC)  Active Problems:    Alcohol withdrawal syndrome with perceptual disturbance (HCC)    Alcoholic ketoacidosis    Fall down stairs         Past Medical History  Past Medical History:   Diagnosis Date    Alcoholic ketoacidosis 10/16/2023    GERD (gastroesophageal reflux disease)     Hypertension     Hypomagnesemia 04/02/2023    Vomiting 04/02/2023       Past Surgical History  No past surgical history on file.    Summary    Pt presents with normal oral and pharyngeal swallowing skills, pt c/o food dysphagia symptoms earlier likely related to GERD. No overt s/s aspiration noted, pt stated swallowing is much improved at this time and symptoms have resolved.      Recommendations:   Diet: regular diet and thin liquids   Meds: whole with liquid   Frequent Oral care: 2x/day  Other Recommendations/ considerations: cont PPI, follow up w/ GI as needed.       Current Medical Status  Pt is a 58 y.o. male who presented to [facility]  with alcohol use, s/p fall down steps. Patient reports he drinks alcohol daily, last drink was at 2 AM. He drank a bottle of rum. While intoxicated, he fell down 13 stairs with head strike, loss of consciousness, no AC/AP. He reports he was hallucinating this morning and his pain worsened, so his father brought him into the ED. He reports he hasn't eaten in 3 days and has been having nausea and intermittent emesis.     Past medical history:   Please see H&P for details    Special Studies:  CT chest/abd/pelvis w/ contrast: 12/12/23 1. No acute posttraumatic abnormality in the chest, abdomen, or pelvis.  2. Diffuse hepatic steatosis.  3. Additional findings as noted.    Social/Education/Vocational Hx:  Pt lives  w/ girlfriend sometimes    Swallow Information   Current Risks for Dysphagia &  Aspiration:  head injury, hx of GERD  Current Symptoms/Concerns:  c/o globus sensation, food/pills sticking, pt expressed concern about expectorating blood.   Current Diet: regular diet and thin liquids   Baseline Diet: regular diet and thin liquids  Takes pills- whole w/ water     Baseline Assessment   Behavior/Cognition: alert, ? Confused   Speech/Language Status: able to participate in basic conversation and able to follow commands  Patient Positioning: upright in bed     Swallow Mechanism Exam   Facial: symmetrical  Labial: WFL  Lingual: WFL  Velum: unable to visualize  Mandible: adequate ROM  Dentition: adequate  Vocal quality:clear/adequate   Volitional Cough: strong/productive   Respiratory: RA    Consistencies Assessed and Performance   Consistencies Administered: thin liquids, puree, and soft solids (jelly bread)     Oral Stage: pt was able to bite bread, drink liquids from cup w/ good oral control/transfer. Mastication/manipulation appeared WNL, no pocketing or oral residue. Timely bolus transfers.     Pharyngeal Stage: swallows were prompt w/ complete laryngeal excursion. No coughing, throat clearing noted. Pt denied food stasis at this time, stated it is better than before.       Esophageal Concerns: none reported      Results Reviewed with: patient and RN   Dysphagia Goals: none at this time      Kinga Mayfield MA CCC-SLP  Speech Pathologist  [place] license # [id number]  [place] license # [id number]  Available via [employer] Text

## 2023-12-13 NOTE — QUICK NOTE
Discussed with Dr. Rios.    Patient doing well today. Vital signs have improved; only received one additional dose of lorazepam on CIWA since phenobarbital loading.    Can continue CIWA with symptom-triggered, as needed benzodiazepines (lorazepam or diazepam). Please see initial consultation note for diazepam dosing if needed.    AKA has improved; can continue daily thiamine, folate, and multivitamin.    Thank you for the consult. Please contact the medical  on call via Tiger Text with questions or concerns.

## 2023-12-13 NOTE — PROGRESS NOTES
Trauma Surgery    Case discussed with Medical Toxicology - patient is known to the Detox Unit for prior admissions for alcohol withdrawal. Patient has previously voiced preferences against the withdrawal management unit.     I re-evaluated the patient - he is tachycardic and tachypneic but this appears 2/2 anxiety, agitation. He is answering questions appropriately and not in respiratory distress. His last CIWA score was 12 after a second dose of phenobarbital (CIWA 29 at the time). I discussed transfer to the withdrawal management unit versus inpatient admission to the ICU and he would prefer to stay here for further care.    - Patient to be admitted to medical critical care due to ongoing alcoholic ketoacidosis, phenobarbital administration and high CIWA scores  - Additional phenobarb dose ordered per toxicology recommendations so that he receives the total loading dose of 650 mg. He already received 360 + 130 mg. Ativan dosing per CIWA  - thiamine/folate ordered  - 1L isolyte bolus + D5LR ordered as well for alcoholic ketoacidosis    Plan of care discussed with toxicology and medical critical care.    Анна Elias

## 2023-12-13 NOTE — CASE MANAGEMENT
Case Management Progress Note    Patient name Diogo Travis  Location ICU 07/ICU 07 MRN [id number]  : 1965 Date 2023       LOS (days): 1  Geometric Mean LOS (GMLOS) (days):   Days to GMLOS:        OBJECTIVE:        Current admission status: Inpatient  Preferred Pharmacy:   [id number] - [place] - [address]  [address] [facility] [address]  Phone: [phone] Fax: [phone]    [id number]  [place]  [address] [address]  Phone: [phone] Fax: [phone]    Primary Care Provider: Alice Stroud MD    [place] [employer]  Secondary Insurance:     PROGRESS NOTE:    CM received consult for [facility]. CM contacted [facility]  to refer patient and provided minimal necessary information. CRS to meet with patient and follow up with CM to provide update on plan of care following patient connection.     Olya with [facility] met with pt. Pt is agreeable to IP Treatment for ETOH abuse once medically stable. Olya will coordinate with CM for placement once medically stable.

## 2023-12-13 NOTE — PROGRESS NOTES
Critical Care Attending Note; Eber Rios   Note Date: 23  Note Time: 10:33 AM    Patient: Diogo Travis  Age, : 58 y.o., 1965 MRN: [id number] Code Status: Level 1 - Full Code Patient Location: ICU 07/ICU 07   Hospital LOS:1 days  ]   Patient seen and examined, medical record reviewed, discussed with house staff and nursing staff.     HPI   CC: EtOH Abuse   58M with a PMH Of EtOH abuse, GERD who presents post fall now in EtOH withdrawal.       Main ICU Plans:       #EtOH Withdrawal - Last Drink  - Improved with phenobarbital x3, Received Ativan x1, Vallium x1  - CIWA Protocol  - Ativan CIWA >9   - Thiamine, Folate/B12       #GERD  - PEPCID    #DVT/GI ppx  Lovenox    #Lines/Tubes/Drains:   Invasive Devices       Peripheral Intravenous Line  Duration             Peripheral IV 23 Left Antecubital <1 day    Peripheral IV 23 Right Antecubital <1 day                    #Nutrition:   Diet Regular; Regular House        #Code Status:   Level 1 - Full Code    #Dispo:   SD1        Eber Rios MD  Pulmonary, Critical Care    Critical care time, excluding procedures, teaching, family meetings, and excludes any prior time recorded by the AP/resident, 35 minutes. Upon my evaluation, this patient has a high probability of imminent or life-threatening deterioration due to above problems which required my direct attention, intervention, and personal management.   Impression/Active Problems:    EtoH withdrawal   Fall    Trauam      Physical Exam:     Vital Signs:   Weight: 105 kg (231 lb 11.3 oz)  IBW: Ideal body weight: 68.4 kg (150 lb 12.7 oz)  Adjusted ideal body weight: 83.1 kg (183 lb 2.5 oz)  Temp:  [97.6 °F (36.4 °C)-98.5 °F (36.9 °C)] 98.5 °F (36.9 °C)  HR:  [] 88  Resp:  [13-97] 16  BP: (125-180)/() 132/78  General: NAD  Neuro: AxO 3, No asterixis, Tremor  Heart: RRR  Lungs: CTAB  Abdomen: Soft NT  Extremities: No edema                Ventilator Settings:            "    Invalid input(s): \"PCO2\", \"O2\"  Radiologic Images Reviewed:    CT C/A/P    1. No acute posttraumatic abnormality in the chest, abdomen, or pelvis.   2. Diffuse hepatic steatosis.   3. Additional findings as noted.     CT Head  No acute intracranial abnormality.   Input / Output:     Intake/Output Summary (Last 24 hours) at 12/13/2023 1033  Last data filed at 12/13/2023 0600  Gross per 24 hour   Intake 1856.25 ml   Output 1250 ml   Net 606.25 ml            Infusions:  dextrose 5% lactated ringer's, 125 mL/hr, Last Rate: 125 mL/hr (12/13/23 0729)      Scheduled Medications:  Current Facility-Administered Medications   Medication Dose Route Frequency Provider Last Rate    acetaminophen  650 mg Oral Q4H PRN Анна Elias MD      aluminum-magnesium hydroxide-simethicone  30 mL Oral Q4H PRN David Mann MD      chlorhexidine  15 mL Mouth/Throat Q12H [facility] Irlanda Norris PA-C      dextrose 5% lactated ringer's  125 mL/hr Intravenous Continuous Анна Elias  mL/hr (12/13/23 0729)    enoxaparin  40 mg Subcutaneous Daily Irlanda Norris PA-C      famotidine  20 mg Oral BID Irlanda Norris PA-C      folic acid 1 mg in sodium chloride 0.9 % 50 mL IVPB  1 mg Intravenous Daily Анна Elias MD 1 mg (12/13/23 0807)    ibuprofen  600 mg Oral Q6H [facility] Анна Elias MD      magnesium sulfate  2 g Intravenous Once Margie Cardenas DO 2 g (12/13/23 0834)    thiamine  500 mg Intravenous Q8H Анна Elias  mg (12/13/23 0419)       PRN Medications:    acetaminophen    aluminum-magnesium hydroxide-simethicone    Labs Reviewed:  Results from last 7 days   Lab Units 12/13/23  0453 12/12/23  1622 12/12/23  1611   WBC Thousand/uL 6.85 10.14  --    HEMOGLOBIN g/dL 12.5 15.2  --    I STAT HEMOGLOBIN g/dl  --   --  15.0   HEMATOCRIT % 36.8 44.5  --    HEMATOCRIT, ISTAT %  --   --  44   PLATELETS Thousands/uL 273 365  --       Results from last 7 days   Lab Units 12/13/23  0453 12/12/23  9331 " "12/12/23  1611   SODIUM mmol/L 135 139  --    CO2 mmol/L 28 15*  --    CO2, I-STAT mmol/L  --   --  15*   BUN mg/dL 13 15  --    GLUCOSE, ISTAT mg/dl  --   --  69   CALCIUM mg/dL 8.2* 9.1  --    MAGNESIUM mg/dL 1.8*  --   --    PHOSPHORUS mg/dL 2.4*  --   --          Invalid input(s): \"ASTSGOT\", \"ALTSGPT\"LABRCNTIP@ ,alkphos:3,tbilirubin:3,dbilirubin:3)@      Invalid input(s): \"TROPT\", \"CPK\", \"PBNP\"             I have personally seen and examined the patient on (12/13/23 between 6535-6661). I discussed the patient with the AP/resident including, but not limited to, verifying findings; reviewing labs and x-rays; discussing with consultants; developing the plan of care with the bedside nurse; and discussing treatment plan with patient or surrogate.  I have reviewed the note and assessment performed by the AP/resident and agree with the AP/resident’s documented findings and plan of care with the above additions/exceptions. Please see my comments for details and adjustments.           "

## 2023-12-13 NOTE — PROGRESS NOTES
"Progress Note - Trauma T[facility]iary [facility]   Diogo Travis 58 y.o. male [id number]   Unit/Bed#: ICU 07 Encounter: [id number]     Assessment & Plan   Summary of Diagnosed Injuries:   Fall downstairs  Alcohol use disorder  Alcohol withdrawal syndrome    PLAN:  No acute traumatic injuries identified  Continue to monitor CIWA  Recommend tox consult  Replete electrolytes as indicated  Encouraged alcohol cessation  Rest of care per primary team  Trauma will sign off at this time    VTE Prophylaxis:Enoxaparin (Lovenox)     Disposition: Trauma will sign off at this time. Please reconsult with any questions or concerns.     Code status:  Level 1 - Full Code    Consultants: IP CONSULT TO MEDICAL CRITICAL CARE  IP CONSULT TO TOXICOLOGY  IP CONSULT TO CASE MANAGEMENT     Subjective   Transfer from: N/A    Mechanism of Injury:Fall     Chief Complaint: \"I feel better this morning\"    HPI/Last 24 hour events: Patient reports feeling better this morning, denies any new complaints.  He denies any pain in his extremities, chest pain, shortness of breath, headache, dizziness, abdominal pain, nausea, vomiting.  Tolerating a diet at this time.     Objective   Vitals:   Temp:  [97.8 °F (36.6 °C)-98.3 °F (36.8 °C)] 98.3 °F (36.8 °C)  HR:  [102-145] 102  Resp:  [13-97] 14  BP: (130-180)/() 134/75    I/O         12/11 0701  12/12 0700 12/12 0701  12/13 0700    P.O.  480    I.V. (mL/kg)  481.3 (4.7)    IV Piggyback  155    Total Intake(mL/kg)  1116.3 (10.8)    Urine (mL/kg/hr)  750    Total Output  750    Net  +366.3                   Physical Exam:   GENERAL APPEARANCE: Patient in no acute distress.  HEENT: NCAT; PERRL, EOMs intact; Mucous membranes moist  NECK / BACK: ROM normal  CV: Regular rate and rhythm; no murmur/gallops/rubs appreciated.  CHEST / LUNGS: Clear to auscultation; no wheezes/rales/rhonci.  ABD: NABS; soft; non-distended; non-tender.  : Voiding  EXT: Moving all extremities; +2 pulses bilaterally upper & lower " extremities; no edema.  NEURO: GCS 15; no focal neurologic deficits; neurovascularly intact.  SKIN: Warm, dry and well perfused; no rash; no jaundice.      Invasive Devices       Peripheral Intravenous Line  Duration             Peripheral IV 12/12/23 Left Antecubital <1 day    Peripheral IV 12/12/23 Right Antecubital <1 day                            Lab Results: Results: I have personally reviewed all pertinent laboratory/tests results, BMP/CMP:   Lab Results   Component Value Date    SODIUM 135 12/13/2023    K 3.6 12/13/2023    CL 98 12/13/2023    CO2 28 12/13/2023    CO2 15 (L) 12/12/2023    BUN 13 12/13/2023    CREATININE 0.79 12/13/2023    GLUCOSE 69 12/12/2023    CALCIUM 8.2 (L) 12/13/2023    AST 32 12/13/2023    ALT 17 12/13/2023    ALKPHOS 109 (H) 12/13/2023    EGFR 99 12/13/2023   , and CBC:   Lab Results   Component Value Date    WBC 6.85 12/13/2023    HGB 12.5 12/13/2023    HCT 36.8 12/13/2023    MCV 96 12/13/2023     12/13/2023    RBC 3.85 (L) 12/13/2023    MCH 32.5 12/13/2023    MCHC 34.0 12/13/2023    RDW 13.6 12/13/2023    MPV 8.6 (L) 12/13/2023    NRBC 0 12/13/2023       Imaging Results: I have personally reviewed pertinent reports.    Chest Xray(s): negative for acute findings   FAST exam(s): negative for acute findings   CT Scan(s): negative for acute findings   Additional Xray(s): N/A     Other Studies: None

## 2023-12-13 NOTE — ASSESSMENT & PLAN NOTE
Patient with a history of alcohol use disorder that presented to the ED after falling down 13 stairs while intoxicated. States that his last drink was at 2 am. His CIWA score is 12 and he was given a total of 490 mg phenobarbital. He is being admitted to ICU following administration of phenobarbital.    Plan:  -CIWA protocol   -Treatment any withdrawal symptoms as needed with Ativan  -Total goal of phenobarbital is 650 mg as per Tox.  -Thiamine and folic acid repletion  -IV hydration

## 2023-12-13 NOTE — ASSESSMENT & PLAN NOTE
Patient with a history of alcohol use disorder that presented to the ED after falling down 13 stairs while intoxicated. States that his last drink was at 2 am. His CIWA score is 12 and he was given a total of 490 mg phenobarbital. He is being admitted to ICU following administration of phenobarbital.    Plan:  -CIWA protocol   -Treatment any withdrawal symptoms as needed with Ativan  -Total goal of phenobarbital is 620 mg as per [facility].  -Thiamine and folic acid repletion  -IV hydration

## 2023-12-14 LAB
ALBUMIN SERPL BCP-MCNC: 3.6 G/DL (ref 3.5–5)
ALP SERPL-CCNC: 90 U/L (ref 34–104)
ALT SERPL W P-5'-P-CCNC: 12 U/L (ref 7–52)
ANION GAP SERPL CALCULATED.3IONS-SCNC: 8 MMOL/L
AST SERPL W P-5'-P-CCNC: 20 U/L (ref 13–39)
BASOPHILS # BLD AUTO: 0.03 THOUSANDS/ÂΜL (ref 0–0.1)
BASOPHILS NFR BLD AUTO: 1 % (ref 0–1)
BILIRUB SERPL-MCNC: 0.72 MG/DL (ref 0.2–1)
BUN SERPL-MCNC: 7 MG/DL (ref 5–25)
CALCIUM SERPL-MCNC: 8.8 MG/DL (ref 8.4–10.2)
CHLORIDE SERPL-SCNC: 102 MMOL/L (ref 96–108)
CO2 SERPL-SCNC: 30 MMOL/L (ref 21–32)
CREAT SERPL-MCNC: 0.71 MG/DL (ref 0.6–1.3)
EOSINOPHIL # BLD AUTO: 0.15 THOUSAND/ÂΜL (ref 0–0.61)
EOSINOPHIL NFR BLD AUTO: 3 % (ref 0–6)
ERYTHROCYTE [DISTWIDTH] IN BLOOD BY AUTOMATED COUNT: 13.5 % (ref 11.6–15.1)
GFR SERPL CREATININE-BSD FRML MDRD: 104 ML/MIN/1.73SQ M
GLUCOSE SERPL-MCNC: 135 MG/DL (ref 65–140)
HCT VFR BLD AUTO: 35.7 % (ref 36.5–49.3)
HGB BLD-MCNC: 12.1 G/DL (ref 12–17)
IMM GRANULOCYTES # BLD AUTO: 0.03 THOUSAND/UL (ref 0–0.2)
IMM GRANULOCYTES NFR BLD AUTO: 1 % (ref 0–2)
LYMPHOCYTES # BLD AUTO: 0.81 THOUSANDS/ÂΜL (ref 0.6–4.47)
LYMPHOCYTES NFR BLD AUTO: 18 % (ref 14–44)
MAGNESIUM SERPL-MCNC: 1.8 MG/DL (ref 1.9–2.7)
MCH RBC QN AUTO: 32.6 PG (ref 26.8–34.3)
MCHC RBC AUTO-ENTMCNC: 33.9 G/DL (ref 31.4–37.4)
MCV RBC AUTO: 96 FL (ref 82–98)
MONOCYTES # BLD AUTO: 0.28 THOUSAND/ÂΜL (ref 0.17–1.22)
MONOCYTES NFR BLD AUTO: 6 % (ref 4–12)
NEUTROPHILS # BLD AUTO: 3.18 THOUSANDS/ÂΜL (ref 1.85–7.62)
NEUTS SEG NFR BLD AUTO: 71 % (ref 43–75)
NRBC BLD AUTO-RTO: 0 /100 WBCS
PLATELET # BLD AUTO: 157 THOUSANDS/UL (ref 149–390)
PMV BLD AUTO: 9 FL (ref 8.9–12.7)
POTASSIUM SERPL-SCNC: 3.8 MMOL/L (ref 3.5–5.3)
PROT SERPL-MCNC: 6.3 G/DL (ref 6.4–8.4)
RBC # BLD AUTO: 3.71 MILLION/UL (ref 3.88–5.62)
SODIUM SERPL-SCNC: 140 MMOL/L (ref 135–147)
WBC # BLD AUTO: 4.48 THOUSAND/UL (ref 4.31–10.16)

## 2023-12-14 PROCEDURE — 99232 SBSQ HOSP IP/OBS MODERATE 35: CPT | Performed by: INTERNAL MEDICINE

## 2023-12-14 PROCEDURE — 97116 GAIT TRAINING THERAPY: CPT

## 2023-12-14 PROCEDURE — 80053 COMPREHEN METABOLIC PANEL: CPT

## 2023-12-14 PROCEDURE — 97167 OT EVAL HIGH COMPLEX 60 MIN: CPT

## 2023-12-14 PROCEDURE — 97163 PT EVAL HIGH COMPLEX 45 MIN: CPT

## 2023-12-14 PROCEDURE — 83735 ASSAY OF MAGNESIUM: CPT

## 2023-12-14 PROCEDURE — 85025 COMPLETE CBC W/AUTO DIFF WBC: CPT

## 2023-12-14 RX ORDER — LORAZEPAM 2 MG/ML
4 INJECTION INTRAMUSCULAR ONCE
Status: COMPLETED | OUTPATIENT
Start: 2023-12-14 | End: 2023-12-14

## 2023-12-14 RX ORDER — LABETALOL HYDROCHLORIDE 5 MG/ML
10 INJECTION, SOLUTION INTRAVENOUS ONCE
Status: COMPLETED | OUTPATIENT
Start: 2023-12-14 | End: 2023-12-14

## 2023-12-14 RX ORDER — MAGNESIUM SULFATE HEPTAHYDRATE 40 MG/ML
4 INJECTION, SOLUTION INTRAVENOUS ONCE
Status: COMPLETED | OUTPATIENT
Start: 2023-12-14 | End: 2023-12-14

## 2023-12-14 RX ORDER — AMOXICILLIN 250 MG
1 CAPSULE ORAL
Status: DISCONTINUED | OUTPATIENT
Start: 2023-12-14 | End: 2023-12-17

## 2023-12-14 RX ORDER — OLANZAPINE 10 MG/2ML
5 INJECTION, POWDER, FOR SOLUTION INTRAMUSCULAR ONCE
Status: COMPLETED | OUTPATIENT
Start: 2023-12-14 | End: 2023-12-14

## 2023-12-14 RX ORDER — ENEMA 19; 7 G/133ML; G/133ML
1 ENEMA RECTAL ONCE
Status: DISCONTINUED | OUTPATIENT
Start: 2023-12-14 | End: 2023-12-15

## 2023-12-14 RX ORDER — POLYETHYLENE GLYCOL 3350 17 G/17G
17 POWDER, FOR SOLUTION ORAL DAILY
Status: DISCONTINUED | OUTPATIENT
Start: 2023-12-15 | End: 2023-12-18 | Stop reason: HOSPADM

## 2023-12-14 RX ORDER — FAMOTIDINE 20 MG/1
20 TABLET, FILM COATED ORAL 2 TIMES DAILY
Status: DISCONTINUED | OUTPATIENT
Start: 2023-12-15 | End: 2023-12-18 | Stop reason: HOSPADM

## 2023-12-14 RX ORDER — LIDOCAINE 50 MG/G
1 PATCH TOPICAL DAILY
Status: DISCONTINUED | OUTPATIENT
Start: 2023-12-15 | End: 2023-12-15

## 2023-12-14 RX ORDER — ESCITALOPRAM OXALATE 20 MG/1
20 TABLET ORAL DAILY
Status: DISCONTINUED | OUTPATIENT
Start: 2023-12-15 | End: 2023-12-18 | Stop reason: HOSPADM

## 2023-12-14 RX ORDER — WATER 10 ML/10ML
INJECTION INTRAMUSCULAR; INTRAVENOUS; SUBCUTANEOUS
Status: COMPLETED
Start: 2023-12-14 | End: 2023-12-14

## 2023-12-14 RX ADMIN — THIAMINE HYDROCHLORIDE 500 MG: 100 INJECTION, SOLUTION INTRAMUSCULAR; INTRAVENOUS at 17:18

## 2023-12-14 RX ADMIN — FAMOTIDINE 20 MG: 20 TABLET, FILM COATED ORAL at 08:52

## 2023-12-14 RX ADMIN — WATER 2.1 ML: 1 INJECTION INTRAMUSCULAR; INTRAVENOUS; SUBCUTANEOUS at 21:53

## 2023-12-14 RX ADMIN — Medication 10 MG: at 17:19

## 2023-12-14 RX ADMIN — DEXTROSE, SODIUM CHLORIDE, SODIUM LACTATE, POTASSIUM CHLORIDE, AND CALCIUM CHLORIDE 125 ML/HR: 5; .6; .31; .03; .02 INJECTION, SOLUTION INTRAVENOUS at 11:03

## 2023-12-14 RX ADMIN — DEXTROSE, SODIUM CHLORIDE, SODIUM LACTATE, POTASSIUM CHLORIDE, AND CALCIUM CHLORIDE 125 ML/HR: 5; .6; .31; .03; .02 INJECTION, SOLUTION INTRAVENOUS at 23:40

## 2023-12-14 RX ADMIN — OLANZAPINE 5 MG: 10 INJECTION, POWDER, LYOPHILIZED, FOR SOLUTION INTRAMUSCULAR at 21:47

## 2023-12-14 RX ADMIN — ENOXAPARIN SODIUM 40 MG: 40 INJECTION SUBCUTANEOUS at 08:52

## 2023-12-14 RX ADMIN — FAMOTIDINE 20 MG: 20 TABLET, FILM COATED ORAL at 17:14

## 2023-12-14 RX ADMIN — IBUPROFEN 600 MG: 600 TABLET, FILM COATED ORAL at 17:13

## 2023-12-14 RX ADMIN — LORAZEPAM 4 MG: 2 INJECTION INTRAMUSCULAR; INTRAVENOUS at 19:03

## 2023-12-14 RX ADMIN — IBUPROFEN 600 MG: 600 TABLET, FILM COATED ORAL at 11:02

## 2023-12-14 RX ADMIN — FOLIC ACID 1 MG: 5 INJECTION, SOLUTION INTRAMUSCULAR; INTRAVENOUS; SUBCUTANEOUS at 08:52

## 2023-12-14 RX ADMIN — MAGNESIUM SULFATE IN WATER 4 G: 4 INJECTION, SOLUTION INTRAVENOUS at 08:52

## 2023-12-14 RX ADMIN — SENNOSIDES, DOCUSATE SODIUM 1 TABLET: 8.6; 5 TABLET ORAL at 23:27

## 2023-12-14 NOTE — OCCUPATIONAL THERAPY NOTE
"    Occupational Therapy Evaluation     Patient Name: Diogo Travis  Today's Date: 12/14/2023  Problem List  Principal Problem:    Alcohol use disorder, severe, dependence (HCC)  Active Problems:    Alcohol withdrawal syndrome with perceptual disturbance (HCC)    Alcoholic ketoacidosis    Fall down stairs    Past Medical History  Past Medical History:   Diagnosis Date    Alcoholic ketoacidosis 10/16/2023    GERD (gastroesophageal reflux disease)     Hypertension     Hypomagnesemia 04/02/2023    Vomiting 04/02/2023     Past Surgical History  No past surgical history on file.        12/14/23 1206   OT Last Visit   OT Visit Date 12/14/23   Note Type   Note type Evaluation   Pain Assessment   Pain Assessment Tool 0-10   Pain Score No Pain   Restrictions/Precautions   Weight Bearing Precautions Per Order No   Other Precautions Chair Alarm;Cognitive;Bed Alarm;Multiple lines;Fall Risk   Home Living   Type of Home House   Home Layout Two level;Able to live on main level with bedroom/bathroom;Performs ADLs on one level;1/2 bath on main level;Stairs to enter with rails  (2 ISAMAR , 13 stairs to 2nd floor)   Bathroom Shower/Tub Walk-in shower  (tub shower also available)   Bathroom Toilet Standard   Bathroom Equipment   (none per pt)   Home Equipment   (none per pt)   Prior Function   Level of [place] Independent with functional mobility;Independent with ADLs;Independent with IADLS   Lives With Alone  (significant other stays intermittently)   Receives Help From Family  (significant other)   IADLs Independent with driving;Independent with meal prep;Independent with medication management   Falls in the last 6 months 5 to 10  (\"4 or 5 maybe\")   Vocational Unemployed   Comments Recently D/J[facility] from [facility] 12/1. Per PT notes, was ambulating 240 ft without AD during that admission   Lifestyle   Autonomy At baseline, pt is (I) with ADL/IADLs, no AD. Lives alone in 2 SH, has significant other. + driving   Reciprocal " Relationships supportive girlfriend at bedside   Service to Others unemployed   General   Additional Pertinent History Pt admitted d/t fall down stairs while intoxicated. + HS, + LOC.  Hx of severe alcohol dependence. Alcohol ketoacidosis,   ADL   Eating Assistance 5  Supervision/Setup   Grooming Assistance 5  Supervision/Setup   UB Bathing Assistance 5  Supervision/Setup   LB Bathing Assistance 3  Moderate Assistance   UB Dressing Assistance 4  Minimal Assistance   LB Dressing Assistance 3  Moderate Assistance   Toileting Assistance  3  Moderate Assistance   Functional Assistance 4  Minimal Assistance   Bed Mobility   Supine to Sit 5  Supervision   Additional items Increased time required;Verbal cues  (increased effort)   Additional Comments Sat EOB with fair + static sitting balance, denies lightheaded/dizziness   Transfers   Sit to Stand 4  Minimal assistance   Additional items Assist x 1;Increased time required;Verbal cues  (cues for hand placements and safe body mechanics c limited application)   Stand to Sit 4  Minimal assistance   Additional items Increased time required;Verbal cues;Armrests;Assist x 1   Stand pivot 3  Moderate assistance   Additional items Assist x 1;Increased time required;Verbal cues  (ambulating transfer bed > recliner)   Additional Comments cueing for Rw management during approach to sitting surface with carryover noted.   Functional Mobility   Functional Mobility 4  Minimal assistance   Additional Comments household distance within room. intermittent tremors noted. Pt with difficulty navigating obstacles c RW, requires A intermittently for RW management   Additional items Rolling walker   Balance   Static Sitting Fair +   Dynamic Sitting Fair   Static Standing Poor +   Dynamic Standing Poor +   Activity Tolerance   Activity Tolerance Patient limited by pain;Patient limited by fatigue   Medical Staff Made Aware care coordination c PT Yasmeen. RESHMA , JOSE   Nurse Made Aware CHAD Smith  "pre/post   RUE Assessment   RUE Assessment WFL  (grossly 4/5 throughout, intermittent tremors noted)   LUE Assessment   LUE Assessment WFL  (grossly 4/5 throughout, intermittent tremors noted)   Hand Function   Gross Motor Coordination Impaired   Fine Motor Coordination Impaired   Hand Function Comments tremors intermittently noted throughout UEs and LEs   Sensation   Light Touch No apparent deficits   Vision-Basic Assessment   Patient Visual Report   (hallucinations reported throughout hospitalization, none noted during session. Pt reports overall vision is \"off\" but unable to specifty. Denies blurriness, diploplia, visual field changes)   Cognition   Overall Cognitive Status Impaired   Arousal/Participation Alert;Cooperative   Attention Attends with cues to redirect   Orientation Level Oriented to person;Oriented to place;Oriented to situation  (reports DOMINGA Johnson)   Memory Decreased recall of precautions;Decreased recall of recent events   Following Commands Follows one step commands without difficulty   Comments Pt agreeable to OT session. Agreeable and pleasant, appreciative of recs. Limited comprehension of medical status   Assessment   Limitation Decreased ADL status;Decreased UE strength;Decreased Safe judgement during ADL;Decreased cognition;Decreased endurance;Decreased self-care trans;Decreased high-level ADLs;Decreased fine motor control  (trunk control. balance, safety awareness, attention, memory)   Prognosis Good   Assessment Patient is a 58 y.o. male seen for OT evaluation at [facility] following admission on 12/12/2023  s/p Alcohol use disorder, severe, dependence (HCC). Please see above for comprehensive list of comorbidities and significant PMHx impacting functional performance.  Upon initial evaluation, pt appears to be performing below baseline functional status.   Occupational performance is affected by the following deficits: endurance ,  decreased muscular strength , acute " change in mobility status , impaired coordination , decreased standing tolerance for self care tasks , decreased dynamic balance impacting functional reach, decreased activity tolerance , impaired memory , attention to task, impaired judgement and problem solving , and impaired safety awareness . Personal/Environmental factors impacting D/C include: (+) Hx of falls , decreased caregiver status , steps to enter/navigate the home, Assistance needed for ADLs and functional mobility, High fall risk , and health management. Supporting factors include: support system available and attitude towards recovery Patient would benefit from OT services within the acute care setting to maximize level of functional independence in the following areas self-care transfers, functional mobility, and ADLs.  From OT standpoint, recommendation at time of D/C would be Level 2: moderate resource intensity  however anticipate progression to level III minimum resource intensity pending medical optimization   Goals   Patient Goals to continue walking   Plan   Treatment Interventions ADL retraining;Functional transfer training;UE strengthening/ROM;Endurance training;Patient/family training;Equipment evaluation/education;Compensatory technique education;Continued evaluation   Goal Expiration Date 12/24/23   OT Treatment Day 0   OT Frequency 2-3x/wk   Discharge Recommendation   Rehab Resource Intensity Level, OT II (Moderate Resource Intensity)  (anticipate progression to level III with medical optimization)   Additional Comments  The patient's raw score on the AM-PAC Daily Activity Inpatient Short Form is 15. A raw score of less than 19 suggests the patient may benefit from discharge to post-acute rehabilitation services. Please refer to the recommendation of the Occupational Therapist for safe discharge planning.   AM-PAC Daily Activity Inpatient   Lower Body Dressing 2   Bathing 2   Toileting 2   Upper Body Dressing 3   Grooming 3   Eating 4    Daily Activity Raw Score 16   Daily Activity Standardized Score (Calc for Raw Score >=11) 35.96   AM-PAC Applied Cognition Inpatient   Following a Speech/Presentation 2   Understanding Ordinary Conversation 4   Taking Medications 2   Remembering Where Things Are Placed or Put Away 3   Remembering List of 4-5 Errands 3   Taking Care of Complicated Tasks 2   Applied Cognition Raw Score 16   Applied Cognition Standardized Score 35.03   End of Consult   Education Provided Yes;Family or social support of family present for education by provider  (significant other)   Patient Position at End of Consult Bed/Chair alarm activated;All needs within reach;Bedside chair   Nurse Communication Nurse aware of consult   Goals established on initial evaluation in order to achieve pt's goal of walking more      Pt will complete UB ADLs Mod independent   for increased ADL independence within 10 days.     Pt will complete LB ADLs Supervision  for increased ADL independence within 10 days.     Pt will complete toileting Supervision  with use of DME for increased ADL independence within 10 days.     Pt will demonstrate proper body mechanics to complete self-care transfers and functional mobility with Supervision and use of LRAD for increased safety and functional independence within 10 days.     Pt will demonstrate proper body mechanics and fall prevention strategies during 100% of tx sessions for increased safety awareness during ADL/IADLs    Pt will demonstrate activity tolerance of 30 min in therapeutic tasks for increased participation in meaningful activities upon D/C.    Pt will demonstrate OOB sitting tolerance of 2-4 hr/day for increased activity tolerance and engagement in leisure activities within 10 days.     Pt benefited from co-session of skilled OT and PT therapists in order to most appropriately address functional deficits d/t acute medical complexity  and evolving medical status .  OT/PT objectives were addressed  separately; please see PT note for specific goal areas targeted.    Tess Ballard, OT

## 2023-12-14 NOTE — PLAN OF CARE
Problem: PAIN - ADULT  Goal: Verbalizes/displays adequate comfort level or baseline comfort level  Description: Interventions:  - Encourage patient to monitor pain and request assistance  - Assess pain using appropriate pain scale  - Administer analgesics based on type and severity of pain and evaluate response  - Implement non-pharmacological measures as appropriate and evaluate response  - Consider cultural and social influences on pain and pain management  - Notify physician/advanced practitioner if interventions unsuccessful or patient reports new pain  Outcome: Progressing     Problem: SAFETY ADULT  Goal: Patient will remain free of falls  Description: INTERVENTIONS:  - Educate patient/family on patient safety including physical limitations  - Instruct patient to call for assistance with activity   - Consult OT/PT to assist with strengthening/mobility   - Keep Call bell within reach  - Keep bed low and locked with side rails adjusted as appropriate  - Keep care items and personal belongings within reach  - Initiate and maintain comfort rounds  - Make Fall Risk Sign visible to staff  - Offer Toileting every 2 Hours, in advance of need  - Initiate/Maintain bed alarm  - Obtain necessary fall risk management equipment: bed alarm, call bell  - Apply yellow socks and bracelet for high fall risk patients  - Consider moving patient to room near nurses station  Outcome: Progressing  Goal: Maintain or return to baseline ADL function  Description: INTERVENTIONS:  -  Assess patient's ability to carry out ADLs; assess patient's baseline for ADL function and identify physical deficits which impact ability to perform ADLs (bathing, care of mouth/teeth, toileting, grooming, dressing, etc.)  - Assess/evaluate cause of self-care deficits   - Assess range of motion  - Assess patient's mobility; develop plan if impaired  - Assess patient's need for assistive devices and provide as appropriate  - Encourage maximum independence  but intervene and supervise when necessary  - Involve family in performance of ADLs  - Assess for home care needs following discharge   - Consider OT consult to assist with ADL evaluation and planning for discharge  - Provide patient education as appropriate  Outcome: Progressing  Goal: Maintains/Returns to pre admission functional level  Description: INTERVENTIONS:  - Perform AM-PAC 6 Click Basic Mobility/ Daily Activity assessment daily.  - Set and communicate daily mobility goal to care team and patient/family/caregiver.   - Collaborate with rehabilitation services on mobility goals if consulted  - Out of bed for toileting  - Record patient progress and toleration of activity level   Outcome: Progressing     Problem: DISCHARGE PLANNING  Goal: Discharge to home or other facility with appropriate resources  Description: INTERVENTIONS:  - Identify barriers to discharge w/patient and caregiver  - Arrange for needed discharge resources and transportation as appropriate  - Identify discharge learning needs (meds, wound care, etc.)  - Arrange for interpretive services to assist at discharge as needed  - Refer to Case Management Department for coordinating discharge planning if the patient needs post-hospital services based on physician/advanced practitioner order or complex needs related to functional status, cognitive ability, or social support system  Outcome: Progressing     Problem: Knowledge Deficit  Goal: Patient/family/caregiver demonstrates understanding of disease process, treatment plan, medications, and discharge instructions  Description: Complete learning assessment and assess knowledge base.  Interventions:  - Provide teaching at level of understanding  - Provide teaching via preferred learning methods  Outcome: Progressing

## 2023-12-14 NOTE — ASSESSMENT & PLAN NOTE
- Patient with a history of alcohol use disorder that presented to the ED after falling down 13 stairs while intoxicated  - States that his last drink was at 2 am. His CIWA score is 12 and he was given a total of 490 mg phenobarbital.   - He was admitted to ICU following administration of phenobarbital  - Stable for floor transfer  - Has not needed ativan since 8pm last night     Plan:  - CIWA protocol; treatment any withdrawal symptoms as needed with Ativan  - Thiamine and folic acid repletion  - Agreeable to inpatient alcohol rehab; CM working on finding a bed

## 2023-12-14 NOTE — ASSESSMENT & PLAN NOTE
Patient fell down 13 stairs while intoxicated. Was brought into the ED as a Trauma B.   Xray trauma: No acute cardiopulmonary disease within limitations of supine imaging.   CT head: No acute intracranial abnormality.   CT chest abdomen pelvis:  No acute posttraumatic abnormality in the chest, abdomen, or pelvis. Diffuse hepatic steatosis.  CT cervical spine: No acute cervical spine fracture or traumatic malalignment.      Plan:  - Trauma evaluated and signed off

## 2023-12-14 NOTE — PHYSICAL THERAPY NOTE
"                                                       PHYSICAL THERAPY EVALUATION NOTE          Patient Name: Diogo Travis  Today's Date: 2023          AGE:   58 y.o.  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  Length Of Stay: 2        PHYSICAL THERAPY EVALUATION:    Patient's identity confirmed via 2 patient identifiers (full name and ) at start of session       23 1207   PT Last Visit   PT Visit Date 23   Note Type   Note type Evaluation   Pain Assessment   Pain Assessment Tool 0-10   Pain Score No Pain   Restrictions/Precautions   Weight Bearing Precautions Per Order No   Other Precautions Cognitive;Chair Alarm;Bed Alarm;Multiple lines;Fall Risk;Telemetry   Home Living   Type of Home House   Home Layout Two level;1/2 bath on main level;Bed/bath upstairs;Stairs to enter with rails  (2 ISAMAR, 13 stairs to 2nd floor, can sleep on couch on 1st floor if needed)   Bathroom Shower/Tub Walk-in shower  (and tub.shower available)   Bathroom Toilet Standard   Bathroom Equipment   (none)   Home Equipment   (none per pt)   Prior Function   Level of [place] Independent with functional mobility;Independent with ADLs;Independent with IADLS   Lives With Alone  (significant other stays intermittently)   Receives Help From Family  (significant other)   IADLs Independent with driving;Independent with meal prep;Independent with medication management   Falls in the last 6 months (S)  5 to 10  (\"4 or 5 maybe\")   Comments At baseline pt amb ind w/o AD   General   Additional Pertinent History Pt DC from [place] on . During PT tx on , pt was " "performing bed mobility, transfers, and ambulating 240' w/o AD all at mod I level   Family/Caregiver Present Yes  (pt's significant other Holly)   Cognition   Overall Cognitive Status Impaired   Arousal/Participation Cooperative   Attention Attends with cues to redirect   Orientation Level Oriented to person;Oriented to place;Oriented to situation  (stated DOMINGA Johnson)   Memory Decreased recall of recent events;Decreased recall of precautions   Following Commands Follows one step commands without difficulty   Comments Pt ID via name and ; pt agreeable to PT eval and mobility. Pt pleasant and motivated throughout, impulsive at times w/ decreased problem solving   Strength RLE   RLE Overall Strength 3+/5  (grossly assessed w/ functional mobility)   Strength LLE   LLE Overall Strength 3+/5  (grossly assessed w/ functional mobility)   Vision-Basic Assessment   Patient Visual Report   (pt reports vision feels \"off\" but was unable to describe further (declines blurriness, diploplia), pt w/ reports of hallucinations during current admission)   Coordination   Movements are Fluid and Coordinated 0   Coordination and Movement Description bilateral LE tremors   Bed Mobility   Supine to Sit 5  Supervision   Additional items Assist x 1;HOB elevated;Increased time required   Additional Comments pt able to maintain sitting balance at EOB w/ supervision, declines lightheadedness/dizziness w/ changes in position   Transfers   Sit to Stand 4  Minimal assistance   Additional items Assist x 1;Impulsive;Increased time required;Verbal cues  (VC for hand placement)   Stand to Sit 4  Minimal assistance   Additional items Assist x 1;Increased time required;Verbal cues;Armrests  (VC for hand placement)   Additional Comments VC for alignment of RW and self w/ chair prior to descent   Ambulation/Elevation   Gait pattern Improper Weight shift;Wide MELANY;Decreased foot clearance;Short stride;Excessively slow   Gait Assistance 3  Moderate " assist   Additional items Assist x 1;Verbal cues  (2nd person for line management)   Assistive Device Rolling walker   Distance 5'   Ambulation/Elevation Additional Comments VC for RW management   Balance   Static Sitting Fair +   Dynamic Sitting Fair   Static Standing Poor +  (w/ RW)   Dynamic Standing Poor +   Ambulatory Poor  (w/ RW)   Activity Tolerance   Activity Tolerance Patient limited by fatigue   Medical Staff Made Aware Pt benefited from PT/OT care coordination w/ OT Tess due to to allow for challenge of pt's activity tolerance, PT and OT goals were addressed individually during session; RESHMA Ortiz   Nurse Made Aware RN Sarah   Assessment   Prognosis Good   Problem List Impaired balance;Decreased mobility;Impaired judgement;Decreased safety awareness;Decreased coordination   Assessment Diogo Travis is a 58 y.o. Male who [facility]12[facility]23 due to head injury s/p fall down 13 stairs and diagnosis of alcohol use disorder, severe, dependence. Orders for PT eval and treat received. Comorbidities affecting pt's functional mobility at time of evaluation include: alcohol withdrawal syndrome, depression, HTN. Personal factors affecting DC include: inaccessible home environment, lives in 2 story house, stairs to enter home, and positive fall history. At baseline, pt mobilizes independently w/ no AD, and w/ 4-5 fall(s) in the previous 6 months. Upon evaluation, pt presents w/ the following deficits: impaired coordination, impaired balance, impaired cognition, decreased safety awareness, and gait deviations. Pt currently requires  supervision for bed mobility, min Ax1 for transfers, mod Ax1 w/ RW for ambulation. Pt's clinical presentation is unstable/unpredictable due to abnormal lab values, need for increased assistance w/ functional mobility compared to baseline, need for input for mobility technique, need to input for safety awareness, recent h/o falls, ongoing medical management. From a  PT/mobility standpoint given the above findings, DC recommendation is level: II (Moderate Rehab Resource Intensity). During current admission, pt will benefit from continued skilled inpatient PT in the acute care setting in order to address the above deficits and to maximize function and mobility prior to DC from acute care.   Barriers to Discharge Inaccessible home environment;Decreased caregiver support   Goals   Patient Goals to be able to walk more   STG Expiration Date 12/24/23   Short Term Goal #1 Pt will: perform bed mobility w/ mod I to decrease pt's burden of care and increase pt's independence w/ repositioning in bed; perform transfers w/ mod I to promote increased OOB mobility; ambulate at least 100' w/ LRAD and mod I to increase pt's ambulatory endurance/tolerance; negotiate 2 stair(s) w/ UE support and supervision to facilitate pt returning to previous living environment; increase all balance ratings by at least 1 grade to decrease pt's risk of falls   PT Treatment Day 1  (PT tx note below)   Plan   Treatment/Interventions Functional transfer training;LE strengthening/ROM;Elevations;Therapeutic exercise;Endurance training;Patient/family training;Equipment eval/education;Bed mobility;Gait training;Compensatory technique education   PT Frequency 3-5x/wk   Discharge Recommendation   Rehab Resource Intensity Level, PT II (Moderate Resource Intensity)   Equipment Recommended Walker   Walker Package Recommended Wheeled walker   Change/add to Walker Package? No   AM-PAC Basic Mobility Inpatient   Turning in Flat Bed Without Bedrails 3   Lying on Back to Sitting on Edge of Flat Bed Without Bedrails 3   Moving Bed to Chair 2   Standing Up From Chair Using Arms 3   Walk in Room 2   Climb 3-5 Stairs With Railing 1   Basic Mobility Inpatient Raw Score 14   Basic Mobility Standardized Score 35.55   Highest Level Of Mobility   -HLM Goal 4: Move to chair/commode   -HLM Achieved 4: Move to chair/commode    Additional Treatment Session   Start Time 1220   End Time 1229   Treatment Assessment At end of PT eval, pt sitting OOB in recliner chair; pt agreeable to further PT tx and mobility. Pt performed sit>stand transfer w/ min Ax1 and VC for hand placement. Using RW, pt ambulated an additional 30' w/ min Ax1 (2nd person present for line management). Pt w/ poor obstacle negotiation w/ RW and required assistance at times. Pt performed stand>sit transfer w/ min Ax1 w/ good carryover seen of VC for RW alignment from earlier trial. Pt continues to be functioning below baseline level, and remains limited in functional mobility due to impaired balance, . Pt will continue benefit from PT to promote independence w/ functional mobility and progress towards set goals. Recommend DC w/ level: II (Moderate Rehab Resource Intensity) when medically cleared.   Equipment Use RW   Additional Treatment Day 1   End of Consult   Patient Position at End of Consult Bedside chair;Bed/Chair alarm activated;All needs within reach  (pt's S.O. remaining present in room)       The patient's AM-PAC Basic Mobility Inpatient Short Form Raw Score is 14. A Raw score of less than or equal to 16 suggests the patient may benefit from discharge to post-acute rehabilitation services. Please also refer to the recommendation of the Physical Therapist for safe discharge planning.    Pt will benefit from skilled inpatient PT during this admission in order to facilitate progress towards goals and to maximize functional independence prior to DC      DC rec: level II (Moderate Rehab Resource Intensity)        Yasmeen Barraza, PT, DPT  12/14/23

## 2023-12-14 NOTE — PLAN OF CARE
Problem: PHYSICAL THERAPY ADULT  Goal: Performs mobility at highest level of function for planned discharge setting.  See evaluation for individualized goals.  Description: Treatment/Interventions: Functional transfer training, LE strengthening/ROM, Elevations, Therapeutic exercise, Endurance training, Patient/family training, Equipment eval/education, Bed mobility, Gait training, Compensatory technique education  Equipment Recommended: Walker       See flowsheet documentation for full assessment, interventions and recommendations.  12/14/2023 1651 by Yasmeen Barraza PT  Note: Prognosis: Good  Problem List: Impaired balance, Decreased mobility, Impaired judgement, Decreased safety awareness, Decreased coordination  Assessment: Diogo Travis is a 58 y.o. Male who presents to [facility] on 12/12/23 due to head injury s/p fall down 13 stairs and diagnosis of alcohol use disorder, severe, dependence. Orders for PT eval and treat received. Comorbidities affecting pt's functional mobility at time of evaluation include: alcohol withdrawal syndrome, depression, HTN. Personal factors affecting DC include: inaccessible home environment, lives in 2 story house, stairs to enter home, and positive fall history. At baseline, pt mobilizes independently w/ no AD, and w/ 4-5 fall(s) in the previous 6 months. Upon evaluation, pt presents w/ the following deficits: impaired coordination, impaired balance, impaired cognition, decreased safety awareness, and gait deviations. Pt currently requires  supervision for bed mobility, min Ax1 for transfers, mod Ax1 w/ RW for ambulation. Pt's clinical presentation is unstable/unpredictable due to abnormal lab values, need for increased assistance w/ functional mobility compared to baseline, need for input for mobility technique, need to input for safety awareness, recent h/o falls, ongoing medical management. From a PT/mobility standpoint given the above findings, DC recommendation is level: II  (Moderate Rehab Resource Intensity). During current admission, pt will benefit from continued skilled inpatient PT in the acute care setting in order to address the above deficits and to maximize function and mobility prior to DC from acute care.  Barriers to Discharge: Inaccessible home environment, Decreased caregiver support     Rehab Resource Intensity Level, PT: II (Moderate Resource Intensity)    See flowsheet documentation for full assessment.

## 2023-12-14 NOTE — PROGRESS NOTES
Formerly [facility], later [facility]  Progress Note  Name: Diogo Travis I  MRN: [id number]  Unit/Bed#: ICU 07 I Date of Admission: 12/12/2023   Date of Service: 12/14/2023 I Hospital Day: 2    Assessment/Plan   * Alcohol use disorder, severe, dependence (HCC)  Assessment & Plan  - Patient with a history of alcohol use disorder that presented to the ED after falling down 13 stairs while intoxicated  - States that his last drink was at 2 am. His CIWA score is 12 and he was given a total of 490 mg phenobarbital.   - He was admitted to ICU following administration of phenobarbital  - Stable for floor transfer  - Has not needed ativan since 8pm last night     Plan:  - CIWA protocol; treatment any withdrawal symptoms as needed with Ativan  - Thiamine and folic acid repletion  - Agreeable to inpatient alcohol rehab; CM working on finding a bed     Fall down stairs  Assessment & Plan  Patient fell down 13 stairs while intoxicated. Was brought into the ED as a Trauma B.   Xray trauma: No acute cardiopulmonary disease within limitations of supine imaging.   CT head: No acute intracranial abnormality.   CT chest abdomen pelvis:  No acute posttraumatic abnormality in the chest, abdomen, or pelvis. Diffuse hepatic steatosis.  CT cervical spine: No acute cervical spine fracture or traumatic malalignment.      Plan:  - Trauma evaluated and signed off       Alcoholic ketoacidosis  Assessment & Plan  Patient with history of alcohol use disorder and alcohol withdrawal presented to the ED as Trauma B after falling down stairs. CIWA score of 12 and was given phenobarbital as per toxicology.     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              VTE Pharmacologic Prophylaxis:   Moderate Risk (Score 3-4) - Pharmacological DVT Prophylaxis Ordered: enoxaparin (Lovenox).    Mobility:   Basic Mobility Inpatient Raw Score: 14  -[facility] Goal: 4:  Move to chair/commode  -HLM Achieved: 4: Move to chair/commode  HLM Goal achieved. Continue to encourage appropriate mobility.    Patient Centered Rounds: I performed bedside rounds with nursing staff today.  Discussions with Specialists or Other Care Team Provider: None    Education and Discussions with Family / Patient: Patient declined call to .     Current Length of Stay: 2 day(s)  Current Patient Status: Inpatient   Discharge Plan: Anticipate discharge tomorrow to inpatient alcohol detox    Code Status: Level 1 - Full Code    Subjective:   Patient agitated overnight. Posey applied. Pleasant but lethargic on exam this AM. Denies chest pain, SOB, abd pain, n/v/d.     Objective:     Vitals:   Temp (24hrs), Av.2 °F (36.8 °C), Min:98 °F (36.7 °C), Max:98.5 °F (36.9 °C)    Temp:  [98 °F (36.7 °C)-98.5 °F (36.9 °C)] 98 °F (36.7 °C)  HR:  [] 85  Resp:  [14-17] 14  BP: (129-165)/(78-98) 148/98  SpO2:  [91 %-95 %] 91 %  Body mass index is 35.23 kg/m².     Input and Output Summary (last 24 hours):     Intake/Output Summary (Last 24 hours) at 2023 1430  Last data filed at 2023 1103  Gross per 24 hour   Intake 3631.25 ml   Output 500 ml   Net 3131.25 ml       Physical Exam:   Physical Exam  Constitutional:       Appearance: Normal appearance. He is obese.   HENT:      Head: Normocephalic and atraumatic.      Mouth/Throat:      Mouth: Mucous membranes are moist.      Pharynx: Oropharynx is clear.   Eyes:      Extraocular Movements: Extraocular movements intact.      Conjunctiva/sclera: Conjunctivae normal.   Cardiovascular:      Rate and Rhythm: Normal rate and regular rhythm.      Pulses: Normal pulses.      Heart sounds: Normal heart sounds. No murmur heard.     No gallop.   Pulmonary:      Effort: Pulmonary effort is normal. No respiratory distress.      Breath sounds: Normal breath sounds. No wheezing or rales.   Abdominal:      General: Bowel sounds are normal. There is distension.       Palpations: Abdomen is soft.      Tenderness: There is no abdominal tenderness. There is no guarding.   Musculoskeletal:      Right lower leg: No edema.      Left lower leg: No edema.   Skin:     General: Skin is warm and dry.      Capillary Refill: Capillary refill takes less than 2 seconds.   Neurological:      Mental Status: He is oriented to person, place, and time.         Additional Data:     Labs:  Results from last 7 days   Lab Units 12/14/23  0636   WBC Thousand/uL 4.48   HEMOGLOBIN g/dL 12.1   HEMATOCRIT % 35.7*   PLATELETS Thousands/uL 157   NEUTROS PCT % 71   LYMPHS PCT % 18   MONOS PCT % 6   EOS PCT % 3     Results from last 7 days   Lab Units 12/14/23  0636   SODIUM mmol/L 140   POTASSIUM mmol/L 3.8   CHLORIDE mmol/L 102   CO2 mmol/L 30   BUN mg/dL 7   CREATININE mg/dL 0.71   ANION GAP mmol/L 8   CALCIUM mg/dL 8.8   ALBUMIN g/dL 3.6   TOTAL BILIRUBIN mg/dL 0.72   ALK PHOS U/L 90   ALT U/L 12   AST U/L 20   GLUCOSE RANDOM mg/dL 135                       Lines/Drains:  Invasive Devices       Peripheral Intravenous Line  Duration             Peripheral IV 12/12/23 Left Antecubital 1 day    Peripheral IV 12/12/23 Right Antecubital 1 day                          Imaging: No pertinent imaging reviewed.    Recent Cultures (last 7 days):         Last 24 Hours Medication List:   Current Facility-Administered Medications   Medication Dose Route Frequency Provider Last Rate    acetaminophen  650 mg Oral Q4H PRN Анна Elias MD      aluminum-magnesium hydroxide-simethicone  30 mL Oral Q4H PRN David Mann MD      dextrose 5% lactated ringer's  125 mL/hr Intravenous Continuous Анна Elias  mL/hr (12/14/23 1109)    enoxaparin  40 mg Subcutaneous Daily Irlanda Norris PA-C      famotidine  20 mg Oral BID Irlanda Norris PA-C      folic acid 1 mg in sodium chloride 0.9 % 50 mL IVPB  1 mg Intravenous Daily Анна Elias MD 1 mg (12/14/23 0039)    ibuprofen  600 mg Oral Q6H COLBY  Анна Elias MD      LORazepam  2 mg Oral Once Abel Flores MD      thiamine  500 mg Intravenous Daily Linda Sunshine DO          Today, Patient Was Seen By: Linda Sunshine DO    **Please Note: This note may have been constructed using a voice recognition system.**

## 2023-12-14 NOTE — CASE MANAGEMENT
Case Management Progress Note    Patient name Diogo Travis  Location ICU 07/ICU 07 MRN [id number]  : 1965 Date 2023       LOS (days): 2  Geometric Mean LOS (GMLOS) (days):   Days to GMLOS:        OBJECTIVE:        Current admission status: Inpatient  Preferred Pharmacy:   [facility]/pharmacy #[id number] - [place] - [address]  [address] [facility] [place] [address]  Phone: [phone] Fax: [phone]    [facility] [place] [facility] [address]  Phone: [phone] Fax: [phone]    Primary Care Provider: Alice Stroud MD    Primary Insurance: [place] [employer]  Secondary Insurance:     PROGRESS NOTE:    JOSE completed assessment with pt. CM notified by [facility] that anticipated dc would be tomorrow. CATCH notified and they will initiate a bed search for tomorrow.

## 2023-12-14 NOTE — PLAN OF CARE
Problem: OCCUPATIONAL THERAPY ADULT  Goal: Performs self-care activities at highest level of function for planned discharge setting.  See evaluation for individualized goals.  Description: Treatment Interventions: ADL retraining, Functional transfer training, UE strengthening/ROM, Endurance training, Patient/family training, Equipment evaluation/education, Compensatory technique education, Continued evaluation          See flowsheet documentation for full assessment, interventions and recommendations.   Note: Limitation: Decreased ADL status, Decreased UE strength, Decreased Safe judgement during ADL, Decreased cognition, Decreased endurance, Decreased self-care trans, Decreased high-level ADLs, Decreased fine motor control (trunk control. balance, safety awareness, attention, memory)  Prognosis: Good  Assessment: Patient is a 58 y.o. male seen for OT evaluation at [facility] following admission on 12/12/2023  s/p Alcohol use disorder, severe, dependence (HCC). Please see above for comprehensive list of comorbidities and significant PMHx impacting functional performance.  Upon initial evaluation, pt appears to be performing below baseline functional status.   Occupational performance is affected by the following deficits: endurance ,  decreased muscular strength , acute change in mobility status , impaired coordination , decreased standing tolerance for self care tasks , decreased dynamic balance impacting functional reach, decreased activity tolerance , impaired memory , attention to task, impaired judgement and problem solving , and impaired safety awareness . Personal/Environmental factors impacting D/C include: (+) Hx of falls , decreased caregiver status , steps to enter/navigate the home, Assistance needed for ADLs and functional mobility, High fall risk , and health management. Supporting factors include: support system available and attitude towards recovery Patient would benefit from OT  services within the acute care setting to maximize level of functional independence in the following areas self-care transfers, functional mobility, and ADLs.  From OT standpoint, recommendation at time of D/C would be Level 2: moderate resource intensity  however anticipate progression to level III minimum resource intensity pending medical optimization     Rehab Resource Intensity Level, OT: II (Moderate Resource Intensity) (anticipate progression to level III with medical optimization)     Tess Ballard, OT

## 2023-12-14 NOTE — PLAN OF CARE
Problem: PAIN - ADULT  Goal: Verbalizes/displays adequate comfort level or baseline comfort level  Description: Interventions:  - Encourage patient to monitor pain and request assistance  - Assess pain using appropriate pain scale  - Administer analgesics based on type and severity of pain and evaluate response  - Implement non-pharmacological measures as appropriate and evaluate response  - Consider cultural and social influences on pain and pain management  - Notify physician/advanced practitioner if interventions unsuccessful or patient reports new pain  Outcome: Progressing     Problem: INFECTION - ADULT  Goal: Absence or prevention of progression during hospitalization  Description: INTERVENTIONS:  - Assess and monitor for signs and symptoms of infection  - Monitor lab/diagnostic results  - Monitor all insertion sites, i.e. indwelling lines, tubes, and drains  - Monitor endotracheal if appropriate and nasal secretions for changes in amount and color  - [place] appropriate cooling/warming therapies per order  - Administer medications as ordered  - Instruct and encourage patient and family to use good hand hygiene technique  - Identify and instruct in appropriate isolation precautions for identified infection/condition  Outcome: Progressing  Goal: Absence of fever/infection during neutropenic period  Description: INTERVENTIONS:  - Monitor WBC    Outcome: Progressing     Problem: SAFETY ADULT  Goal: Patient will remain free of falls  Description: INTERVENTIONS:  - Educate patient/family on patient safety including physical limitations  - Instruct patient to call for assistance with activity   - Consult OT/PT to assist with strengthening/mobility   - Keep Call bell within reach  - Keep bed low and locked with side rails adjusted as appropriate  - Keep care items and personal belongings within reach  - Apply yellow socks and bracelet for high fall risk patients  - Consider moving patient to room near nurses  station  Outcome: Progressing  Goal: Maintain or return to baseline ADL function  Description: INTERVENTIONS:  -  Assess patient's ability to carry out ADLs; assess patient's baseline for ADL function and identify physical deficits which impact ability to perform ADLs (bathing, care of mouth/teeth, toileting, grooming, dressing, etc.)  - Assess/evaluate cause of self-care deficits   - Assess range of motion  - Assess patient's mobility; develop plan if impaired  - Assess patient's need for assistive devices and provide as appropriate  - Encourage maximum independence but intervene and supervise when necessary  - Involve family in performance of ADLs  - Assess for home care needs following discharge   - Consider OT consult to assist with ADL evaluation and planning for discharge  - Provide patient education as appropriate  Outcome: Progressing  Goal: Maintains/Returns to pre admission functional level  Description: INTERVENTIONS:  - Perform AM-PAC 6 Click Basic Mobility/ Daily Activity assessment daily.  - Set and communicate daily mobility goal to care team and patient/family/caregiver.   - Out of bed for toileting  - Record patient progress and toleration of activity level   Outcome: Progressing     Problem: DISCHARGE PLANNING  Goal: Discharge to home or other facility with appropriate resources  Description: INTERVENTIONS:  - Identify barriers to discharge w/patient and caregiver  - Arrange for needed discharge resources and transportation as appropriate  - Identify discharge learning needs (meds, wound care, etc.)  - Arrange for interpretive services to assist at discharge as needed  - Refer to Case Management Department for coordinating discharge planning if the patient needs post-hospital services based on physician/advanced practitioner order or complex needs related to functional status, cognitive ability, or social support system  Outcome: Progressing     Problem: Knowledge Deficit  Goal:  Patient/family/caregiver demonstrates understanding of disease process, treatment plan, medications, and discharge instructions  Description: Complete learning assessment and assess knowledge base.  Interventions:  - Provide teaching at level of understanding  - Provide teaching via preferred learning methods  Outcome: Progressing

## 2023-12-14 NOTE — PROGRESS NOTES
Upon initial shift assessment at 2045 pt scored a 15 CIWA. SLIM resident notified, advised to follow CIWA protocol and administer IV ativan. RN re-assessed patient at 2130, Pt is responding appropriately to all orientation questions but is experiencing hallucinations and climbing out of bed, CIWA score is 8. Notified SLIM resident of changes. Per SLIM resident hold off on any medications until RN reassessment at 0000.

## 2023-12-14 NOTE — CASE MANAGEMENT
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) 10/01/2023    Elevated lactic acid level 10/01/2023    Hypokalemia 2023    Thrombocytopenia (HCC) 2023    Alcohol use disorder, severe, dependence (HCC) 2023    Hepatic steatosis 2023    Chronic alcoholic gastritis 2023    Major depressive disorder 2023    Hypertension 2023    Tinea corporis 2023      LOS (days): 2  Geometric Mean LOS (GMLOS) (days):   Days to GMLOS:     OBJECTIVE:  PATIENT READMITTED TO HOSPITAL  Risk of Unplanned Readmission Score: 15.92         Current admission status: Inpatient       Preferred Pharmacy:   [facility]/pharmacy #[id number]  [place] [address] [address]  Phone: [phone] Fax: [phone]    [facility] PHARMACY [facility] [place] [address] [address]  Phone: [phone] Fax: [phone]    Primary Care Provider: Alice Stroud MD    [place] [employer]  Secondary Insurance:     ASSESSMENT:  Active Health Care Proxies       HOLLY BRITO Health Care Agent - Significant Other   Primary Phone: [phone] (Mobile)                 Patient Information  Admitted from:: Home  Mental Status: Alert (a little confused)  During Assessment patient was accompanied by: Not accompanied during assessment  Assessment information provided by:: Other - please comment (S/O Holly)  Primary Caregiver:  Self  Support Systems: Family members, Parent, Children  County of Residence: [place]  What city do you live in?: [place]  Home entry access options. Select all that apply.: Stairs  Number of steps to enter home.: 2  Type of Current Residence: 3 story home  Upon entering residence, is there a bedroom on the main floor (no further steps)?: No  A bedroom is located on the following floor levels of residence (select all that apply):: 2nd Floor  Upon entering residence, is there a bathroom on the main floor (no further steps)?: Yes (half bathroom on first and full second)  Number of steps to 2nd floor from main floor: One Flight  Living Arrangements: Lives Alone    Activities of Daily Living Prior to Admission  Functional Status: Independent  Completes ADLs independently?: Yes  Ambulates independently?: Yes  Does patient use assisted devices?: No  Does patient currently own DME?: No  Does patient have a history of Outpatient Therapy (PT/OT)?: No  Does the patient have a history of Short-Term Rehab?: No  Does patient have a history of HHC?: No  Does patient currently have HHC?: No    Patient Information Continued  Income Source: Unemployed  Does patient have prescription coverage?: Yes  Does patient receive dialysis treatments?: No  Does patient have a history of substance abuse?: Yes  Historical substance use preference: Alcohol/ETOH  Is patient currently in treatment for substance abuse?: No. Treatment options provided ([facility] following)    Means of Transportation  Means of Transport to [facility]:: Drives Self    Housing Stability: High Risk (12/14/2023)    Housing Stability Vital Sign     Unable to Pay for Housing in the Last Year: Yes     Number of Places Lived in the Last Year: 1     Unstable Housing in the Last Year: No   Food Insecurity: No Food Insecurity (12/14/2023)    Hunger Vital Sign     Worried About Running Out of Food in the Last Year: Never true     Ran Out of Food in the Last Year: Never true    Transportation Needs: No Transportation Needs (12/14/2023)    PRAPARE - Transportation     Lack of Transportation (Medical): No     Lack of Transportation (Non-Medical): No   Utilities: At Risk (12/14/2023)    [facility] Utilities     Threatened with loss of utilities: Yes     DISCHARGE DETAILS:    Discharge planning discussed with:: Pt's S/OHolly  Freedom of Choice: Yes  Comments - Freedom of Choice: Confirmed plan for ETOH rehab    Contacts  Patient Contacts: KAMI Barrera  Relationship to Patient:: Family  Contact Method: Phone  Reason/Outcome: Continuity of Care, Referral, Discharge Planning    Other Referral/Resources/Interventions Provided:  Interventions: Other (Specify)  Referral Comments: CM spoke with pt's S/OHolly. Introduced self/role with dcp. She did confirm she and the pt are working with [facility] on placement for his ETOH abuse. CM did review that pt did not do as good with PT/OT today on their evaluation. They do plan to see pt again tomorrow to see if he improves with the goal to transition right to rehab for his alcohol abuse. CM did review if pt does nto do well tomorrow will need to consider STR at a facility for physical rehab prior to transfering to rehab for his ETOH abuse. Holly aware that CM will f/u with pt progress with therapy.

## 2023-12-14 NOTE — ASSESSMENT & PLAN NOTE
Patient with history of alcohol use disorder and alcohol withdrawal presented to the ED as Trauma B after falling down stairs. CIWA score of 12 and was given phenobarbital as per toxicology.     Plan:  -Continue D5LR

## 2023-12-15 LAB
ALBUMIN SERPL BCP-MCNC: 3.8 G/DL (ref 3.5–5)
ALP SERPL-CCNC: 88 U/L (ref 34–104)
ALT SERPL W P-5'-P-CCNC: 11 U/L (ref 7–52)
AMMONIA PLAS-SCNC: 62 UMOL/L (ref 18–72)
ANION GAP SERPL CALCULATED.3IONS-SCNC: 6 MMOL/L
AST SERPL W P-5'-P-CCNC: 27 U/L (ref 13–39)
BASOPHILS # BLD AUTO: 0.04 THOUSANDS/ÂΜL (ref 0–0.1)
BASOPHILS NFR BLD AUTO: 1 % (ref 0–1)
BILIRUB SERPL-MCNC: 0.6 MG/DL (ref 0.2–1)
BUN SERPL-MCNC: 4 MG/DL (ref 5–25)
CALCIUM SERPL-MCNC: 9.1 MG/DL (ref 8.4–10.2)
CHLORIDE SERPL-SCNC: 106 MMOL/L (ref 96–108)
CO2 SERPL-SCNC: 30 MMOL/L (ref 21–32)
CREAT SERPL-MCNC: 0.79 MG/DL (ref 0.6–1.3)
EOSINOPHIL # BLD AUTO: 0.12 THOUSAND/ÂΜL (ref 0–0.61)
EOSINOPHIL NFR BLD AUTO: 2 % (ref 0–6)
ERYTHROCYTE [DISTWIDTH] IN BLOOD BY AUTOMATED COUNT: 13.8 % (ref 11.6–15.1)
GFR SERPL CREATININE-BSD FRML MDRD: 99 ML/MIN/1.73SQ M
GLUCOSE SERPL-MCNC: 99 MG/DL (ref 65–140)
HCT VFR BLD AUTO: 36.3 % (ref 36.5–49.3)
HGB BLD-MCNC: 12.2 G/DL (ref 12–17)
IMM GRANULOCYTES # BLD AUTO: 0.02 THOUSAND/UL (ref 0–0.2)
IMM GRANULOCYTES NFR BLD AUTO: 0 % (ref 0–2)
LYMPHOCYTES # BLD AUTO: 1.02 THOUSANDS/ÂΜL (ref 0.6–4.47)
LYMPHOCYTES NFR BLD AUTO: 14 % (ref 14–44)
MAGNESIUM SERPL-MCNC: 1.9 MG/DL (ref 1.9–2.7)
MCH RBC QN AUTO: 32.5 PG (ref 26.8–34.3)
MCHC RBC AUTO-ENTMCNC: 33.6 G/DL (ref 31.4–37.4)
MCV RBC AUTO: 97 FL (ref 82–98)
MONOCYTES # BLD AUTO: 0.48 THOUSAND/ÂΜL (ref 0.17–1.22)
MONOCYTES NFR BLD AUTO: 7 % (ref 4–12)
NEUTROPHILS # BLD AUTO: 5.54 THOUSANDS/ÂΜL (ref 1.85–7.62)
NEUTS SEG NFR BLD AUTO: 76 % (ref 43–75)
NRBC BLD AUTO-RTO: 0 /100 WBCS
PLATELET # BLD AUTO: 170 THOUSANDS/UL (ref 149–390)
PMV BLD AUTO: 8.8 FL (ref 8.9–12.7)
POTASSIUM SERPL-SCNC: 3.6 MMOL/L (ref 3.5–5.3)
PROT SERPL-MCNC: 6.6 G/DL (ref 6.4–8.4)
RBC # BLD AUTO: 3.75 MILLION/UL (ref 3.88–5.62)
SODIUM SERPL-SCNC: 142 MMOL/L (ref 135–147)
WBC # BLD AUTO: 7.22 THOUSAND/UL (ref 4.31–10.16)

## 2023-12-15 PROCEDURE — 83735 ASSAY OF MAGNESIUM: CPT

## 2023-12-15 PROCEDURE — 82140 ASSAY OF AMMONIA: CPT

## 2023-12-15 PROCEDURE — 80053 COMPREHEN METABOLIC PANEL: CPT

## 2023-12-15 PROCEDURE — 97535 SELF CARE MNGMENT TRAINING: CPT

## 2023-12-15 PROCEDURE — 97530 THERAPEUTIC ACTIVITIES: CPT

## 2023-12-15 PROCEDURE — 85025 COMPLETE CBC W/AUTO DIFF WBC: CPT

## 2023-12-15 PROCEDURE — 99232 SBSQ HOSP IP/OBS MODERATE 35: CPT | Performed by: INTERNAL MEDICINE

## 2023-12-15 PROCEDURE — 97116 GAIT TRAINING THERAPY: CPT

## 2023-12-15 RX ORDER — DIAZEPAM 5 MG/ML
20 INJECTION, SOLUTION INTRAMUSCULAR; INTRAVENOUS ONCE AS NEEDED
Status: COMPLETED | OUTPATIENT
Start: 2023-12-15 | End: 2023-12-15

## 2023-12-15 RX ORDER — LORAZEPAM 2 MG/ML
4 INJECTION INTRAMUSCULAR ONCE
Status: DISCONTINUED | OUTPATIENT
Start: 2023-12-15 | End: 2023-12-18 | Stop reason: HOSPADM

## 2023-12-15 RX ORDER — LORAZEPAM 2 MG/ML
2 INJECTION INTRAMUSCULAR EVERY 4 HOURS PRN
Status: DISCONTINUED | OUTPATIENT
Start: 2023-12-15 | End: 2023-12-18 | Stop reason: HOSPADM

## 2023-12-15 RX ORDER — LABETALOL HYDROCHLORIDE 5 MG/ML
10 INJECTION, SOLUTION INTRAVENOUS EVERY 6 HOURS PRN
Status: DISCONTINUED | OUTPATIENT
Start: 2023-12-15 | End: 2023-12-18 | Stop reason: HOSPADM

## 2023-12-15 RX ORDER — DIAZEPAM 5 MG/ML
20 INJECTION, SOLUTION INTRAMUSCULAR; INTRAVENOUS
Status: DISCONTINUED | OUTPATIENT
Start: 2023-12-15 | End: 2023-12-15

## 2023-12-15 RX ORDER — DIAZEPAM 5 MG/1
10 TABLET ORAL EVERY 4 HOURS PRN
Status: DISCONTINUED | OUTPATIENT
Start: 2023-12-15 | End: 2023-12-18 | Stop reason: HOSPADM

## 2023-12-15 RX ORDER — LIDOCAINE 50 MG/G
1 PATCH TOPICAL DAILY
Status: DISCONTINUED | OUTPATIENT
Start: 2023-12-15 | End: 2023-12-18 | Stop reason: HOSPADM

## 2023-12-15 RX ADMIN — IBUPROFEN 600 MG: 600 TABLET, FILM COATED ORAL at 17:53

## 2023-12-15 RX ADMIN — FAMOTIDINE 20 MG: 20 TABLET, FILM COATED ORAL at 08:11

## 2023-12-15 RX ADMIN — THIAMINE HYDROCHLORIDE 500 MG: 100 INJECTION, SOLUTION INTRAMUSCULAR; INTRAVENOUS at 21:25

## 2023-12-15 RX ADMIN — ESCITALOPRAM OXALATE 20 MG: 20 TABLET ORAL at 08:11

## 2023-12-15 RX ADMIN — LIDOCAINE 5% 1 PATCH: 700 PATCH TOPICAL at 01:06

## 2023-12-15 RX ADMIN — FOLIC ACID 1 MG: 5 INJECTION, SOLUTION INTRAMUSCULAR; INTRAVENOUS; SUBCUTANEOUS at 10:24

## 2023-12-15 RX ADMIN — DIAZEPAM 20 MG: 10 INJECTION, SOLUTION INTRAMUSCULAR; INTRAVENOUS at 02:50

## 2023-12-15 RX ADMIN — DIAZEPAM 10 MG: 5 TABLET ORAL at 18:09

## 2023-12-15 RX ADMIN — DIAZEPAM 10 MG: 5 TABLET ORAL at 22:34

## 2023-12-15 RX ADMIN — FAMOTIDINE 20 MG: 20 TABLET, FILM COATED ORAL at 17:53

## 2023-12-15 RX ADMIN — THIAMINE HYDROCHLORIDE 500 MG: 100 INJECTION, SOLUTION INTRAMUSCULAR; INTRAVENOUS at 12:13

## 2023-12-15 RX ADMIN — POLYETHYLENE GLYCOL 3350 17 G: 17 POWDER, FOR SOLUTION ORAL at 08:11

## 2023-12-15 RX ADMIN — THIAMINE HYDROCHLORIDE 500 MG: 100 INJECTION, SOLUTION INTRAMUSCULAR; INTRAVENOUS at 17:52

## 2023-12-15 RX ADMIN — IBUPROFEN 600 MG: 600 TABLET, FILM COATED ORAL at 13:49

## 2023-12-15 RX ADMIN — THIAMINE HYDROCHLORIDE 500 MG: 100 INJECTION, SOLUTION INTRAMUSCULAR; INTRAVENOUS at 00:43

## 2023-12-15 RX ADMIN — ENOXAPARIN SODIUM 40 MG: 40 INJECTION SUBCUTANEOUS at 08:11

## 2023-12-15 RX ADMIN — IBUPROFEN 600 MG: 600 TABLET, FILM COATED ORAL at 08:11

## 2023-12-15 NOTE — CASE MANAGEMENT
Case Management Progress Note    Patient name Diogo Travis  Location W /W -01 MRN [id number]  : 1965 Date 12/15/2023       LOS (days): 3  Geometric Mean LOS (GMLOS) (days):   Days to GMLOS:        OBJECTIVE:        Current admission status: Inpatient  Preferred Pharmacy:   [facility]/pharmacy #[id number] - [place] MARIE - [address]  [address] [facility]  [place] [address]  Phone: [phone] Fax: [phone]    [facility] PHARMACY [facility] [place] [address] [address]  Phone: [phone] Fax: [phone]    Primary Care Provider: Alice Stroud MD    [place]  Secondary Insurance:     PROGRESS NOTE:  Per [facility] patient accepted to [facility]- aware patient is not ready for d/c yet.     CM to follow up as able to continue with dcp.          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]    Procedure/CPT® Codes:      Procedure(s):  INSERTION OF MEDIPORT     (C-ARM#1)  COLONOSCOPY WITH POSSIBLE POLYPECTOMY   ( DONE IN OR WITH ENDO)      COLONOSCOPY FIRST    Surgeon(s):  Kris Solano MD    Anesthesia: General    Staff:   Circulator: Joshua Ordonez RN  Radiology Technologist: Ernestine Vargas  Scrub Person: Alka Salinas  Assistant: Norma Lamb CSA  Endo Nurse: Arti Morfin RN    Estimated Blood Loss: minimal    Urine Voided: * No values recorded between 3/7/2018  7:15 AM and 3/7/2018  8:40 AM *    Specimens:                  ID Type Source Tests Collected by Time Destination   A : RECTUM Tissue Large Intestine, Rectum TISSUE PATHOLOGY EXAM Kris Solano MD 3/7/2018 0751          Drains:           Findings: Left subclavian port in place, colonoscopy normal with small amount of inflammation in rectum, bopsied    Complications: none      Kris Solano MD     Date: 3/7/2018  Time: 8:52 AM

## 2023-12-15 NOTE — QUICK NOTE
Patient had around 7 PM today appeared very diaphoretic, had a lot of tremors.  Pressure was as high as 200/100.  Patient was tachycardic.  Complained of nausea.  Per nursing CIWA score was around 18-19.  Chart was reviewed patient was previously in the ICU and received phenobarbital.  We did reach out to ICU and they did reassessment.  During that reassessment patient appeared to have improved from most recent Ativan dose.  Vitals did show 162/96.  Highly appreciate assessment from the ICU team.  Tox was also consulted and left the recommendations if the patient continues to have worsening symptoms.

## 2023-12-15 NOTE — PROGRESS NOTES
[facility]  Progress Note  Name: Diogo Travis I  MRN: [id number]  Unit/Bed#: W -01 I Date of Admission: 12/12/2023   Date of Service: 12/15/2023 I Hospital Day: 3    Assessment/Plan   * Alcohol use disorder, severe, dependence (HCC)  Assessment & Plan  - Patient with a history of alcohol use disorder that presented to the ED after falling down 13 stairs while intoxicated  - States that his last drink was at 2 am. His CIWA score is 12 and he was given a total of 490 mg phenobarbital.   - He was admitted to ICU following administration of phenobarbital  - Stable for floor transfer  - Night of 12/14-12/15 control team called as patient was agitated, tox recommended valium, patient required restraints  - Wernicke's encephalopathy vs alcohol withdrawal    Plan:  - Continue CIWA protocol; treatment any withdrawal symptoms as needed with Ativan  - Thiamine and folic acid repletion  - Consider neuro consult  - Agreeable to inpatient alcohol rehab; CM working on finding a bed     Fall down stairs  Assessment & Plan  Patient fell down 13 stairs while intoxicated. Was brought into the ED as a Trauma B.   Xray trauma: No acute cardiopulmonary disease within limitations of supine imaging.   CT head: No acute intracranial abnormality.   CT chest abdomen pelvis:  No acute posttraumatic abnormality in the chest, abdomen, or pelvis. Diffuse hepatic steatosis.  CT cervical spine: No acute cervical spine fracture or traumatic malalignment.      Plan:  - Trauma evaluated and signed off       Alcoholic ketoacidosis  Assessment & Plan  Patient with history of alcohol use disorder and alcohol withdrawal presented to the ED as Trauma B after falling down stairs. CIWA score of 12 and was given phenobarbital as per toxicology.     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     Hypertension  Assessment & Plan  No PTA  meds  Patient's pressure elevated with agitation    Plan:   - Labetalol 10mg IV prn for SBP >180 or DBP >100             VTE Pharmacologic Prophylaxis:   Moderate Risk (Score 3-4) - Pharmacological DVT Prophylaxis Ordered: enoxaparin (Lovenox).    Mobility:   Basic Mobility Inpatient Raw Score: 14  JH-HLM Goal: 4: Move to chair/commode  JH-HLM Achieved: 7: Walk 25 feet or more  HLM Goal achieved. Continue to encourage appropriate mobility.    Patient Centered Rounds: I performed bedside rounds with nursing staff today.  Discussions with Specialists or Other Care Team Provider: None    Education and Discussions with Family / Patient: Patient declined call to .     Current Length of Stay: 3 day(s)  Current Patient Status: Inpatient   Discharge Plan: Anticipate discharge tomorrow to inpatient alcohol detox    Code Status: Level 1 - Full Code    Subjective:   Patient agitated overnight; control team called. Tox recommended valium and further phenobarb. ICU evaluated and patient to be kept medsurg for the time being. Patient complaining of rectal pain overnight - he did not remember this. MIAN performed this AM with stool in the rectal vault. Enema ordered. AAO x 1.  Denies chest pain, SOB, abd pain, n/v/d.     Objective:     Vitals:   Temp (24hrs), Av.8 °F (36.6 °C), Min:97.5 °F (36.4 °C), Max:98.1 °F (36.7 °C)    Temp:  [97.5 °F (36.4 °C)-98.1 °F (36.7 °C)] 98.1 °F (36.7 °C)  HR:  [] 110  Resp:  [17] 17  BP: (148-204)/() 174/99  SpO2:  [94 %-96 %] 95 %  Body mass index is 35.06 kg/m².     Input and Output Summary (last 24 hours):     Intake/Output Summary (Last 24 hours) at 12/15/2023 0847  Last data filed at 12/15/2023 0020  Gross per 24 hour   Intake 4438.33 ml   Output 1905 ml   Net 2533.33 ml       Physical Exam:   Physical Exam  Constitutional:       Appearance: Normal appearance. He is obese.   HENT:      Head: Normocephalic and atraumatic.      Mouth/Throat:      Mouth: Mucous  membranes are moist.      Pharynx: Oropharynx is clear.   Eyes:      Extraocular Movements: Extraocular movements intact.      Conjunctiva/sclera: Conjunctivae normal.   Cardiovascular:      Rate and Rhythm: Normal rate and regular rhythm.      Pulses: Normal pulses.      Heart sounds: Normal heart sounds. No murmur heard.     No gallop.   Pulmonary:      Effort: Pulmonary effort is normal. No respiratory distress.      Breath sounds: Normal breath sounds. No wheezing or rales.   Abdominal:      General: Bowel sounds are normal. There is distension.      Palpations: Abdomen is soft.      Tenderness: There is no abdominal tenderness. There is no guarding.   Genitourinary:     Comments: MIAN performed; stool in rectal vault. Tone normal   Musculoskeletal:      Right lower leg: No edema.      Left lower leg: No edema.   Skin:     General: Skin is warm and dry.      Capillary Refill: Capillary refill takes less than 2 seconds.   Neurological:      Mental Status: He is disoriented.         Additional Data:     Labs:  Results from last 7 days   Lab Units 12/14/23  0636   WBC Thousand/uL 4.48   HEMOGLOBIN g/dL 12.1   HEMATOCRIT % 35.7*   PLATELETS Thousands/uL 157   NEUTROS PCT % 71   LYMPHS PCT % 18   MONOS PCT % 6   EOS PCT % 3     Results from last 7 days   Lab Units 12/14/23  0636   SODIUM mmol/L 140   POTASSIUM mmol/L 3.8   CHLORIDE mmol/L 102   CO2 mmol/L 30   BUN mg/dL 7   CREATININE mg/dL 0.71   ANION GAP mmol/L 8   CALCIUM mg/dL 8.8   ALBUMIN g/dL 3.6   TOTAL BILIRUBIN mg/dL 0.72   ALK PHOS U/L 90   ALT U/L 12   AST U/L 20   GLUCOSE RANDOM mg/dL 135                       Lines/Drains:  Invasive Devices       Peripheral Intravenous Line  Duration             Peripheral IV 12/15/23 Right;Ventral (anterior) Forearm <1 day                          Imaging: No pertinent imaging reviewed.    Recent Cultures (last 7 days):         Last 24 Hours Medication List:   Current Facility-Administered Medications   Medication Dose  Route Frequency Provider Last Rate    acetaminophen  650 mg Oral Q4H PRN Alan Hernandez MD      aluminum-magnesium hydroxide-simethicone  30 mL Oral Q4H PRN Alan Hernandez MD      dextrose 5% lactated ringer's  125 mL/hr Intravenous Continuous Alan Hernandez  mL/hr (12/14/23 2340)    diazepam  10 mg Oral Q4H PRN Mel Andresundkumar, DO      enoxaparin  40 mg Subcutaneous Daily Alan Hernandez MD      escitalopram  20 mg Oral Daily Patrick Reyes, DO      famotidine  20 mg Oral BID Sarah Dangelo MD      folic acid 1 mg in sodium chloride 0.9 % 50 mL IVPB  1 mg Intravenous Daily Alan Hernandez MD 1 mg (12/14/23 5347)    ibuprofen  600 mg Oral Q6H COLBY Alan Hernandez MD      labetalol  10 mg Intravenous Q6H PRN Linda Sunshine DO      lidocaine  1 patch Topical Daily Patrick Reyes, DO      LORazepam  2 mg Intravenous Q4H PRN Mel Andresundkumar DO      LORazepam  4 mg Intravenous Once Abel Flores MD      LORazepam  2 mg Oral Once Alan Hernandez MD      polyethylene glycol  17 g Oral Daily Linda Sunshine DO      senna-docusate sodium  1 tablet Oral HS Linda Sunshine DO      thiamine  500 mg Intravenous TID Mel Balasundram, DO      Followed by    [START ON 12/18/2023] thiamine  500 mg Intravenous Daily Mel Balasundram, DO      Followed by    [START ON 12/21/2023] thiamine  100 mg Intravenous Daily Mel Balasundram, DO          Today, Patient Was Seen By: Linda Sunshine DO    **Please Note: This note may have been constructed using a voice recognition system.**

## 2023-12-15 NOTE — PLAN OF CARE
Problem: OCCUPATIONAL THERAPY ADULT  Goal: Performs self-care activities at highest level of function for planned discharge setting.  See evaluation for individualized goals.  Description: Treatment Interventions: ADL retraining, Functional transfer training, UE strengthening/ROM, Endurance training, Patient/family training, Equipment evaluation/education, Compensatory technique education, Continued evaluation          See flowsheet documentation for full assessment, interventions and recommendations.   Note: Limitation: Decreased ADL status, Decreased UE strength, Decreased Safe judgement during ADL, Decreased cognition, Decreased endurance, Decreased self-care trans, Decreased high-level ADLs, Decreased fine motor control (trunk control. balance, safety awareness, attention, memory)  Prognosis: Good  Assessment: Patient seen for OT treatment on 12/15/2023 s/p admission for Alcohol use disorder, severe, dependence (HCC) Patient agreeable to OT session. Patient participated in fall prevention , bed mobility , functional mobility, and ADLs with intervention focus on optimizing independence in functional mobility, self-care transfers and ADLs with cognitive reorientation techniques. Diogo CHAPIS Travis is continuing to perform below baseline due to the following deficits: endurance , impaired fine motor control, motor planning, decreased balance , decreased standing tolerance for self care tasks , decreased dynamic balance impacting functional reach, decreased activity tolerance , impaired judgement and problem solving , decreased emotional regulation and coping skills , impaired safety awareness , impulsive behavior, impaired learning ability d/t current cognitive status , impaired global mental status, and direction following. Personal factors continuing to impact D/C include: (+) Hx of falls , steps to enter/navigate the home, Assistance needed for ADL/IADLs, Assistance needed for ADLs and functional mobility, High  fall risk , decreased insight toward deficits , and decreased recall of precautions  From OT standpoint, patient would benefit from skilled intervention to maximize independence with ADLs and functional mobility. Goals remain appropriate, continue POC. At this time, recommending D/C to: level II (moderate resource intensity) vs level III (minimum resource intensity) pending patients medical and therapy progression     Rehab Resource Intensity Level, OT: II (Moderate Resource Intensity) (anticipate progression to level III with medical optimization)     Leelee Troncoso MS OTR/L   [place] Licensure# [id number]

## 2023-12-15 NOTE — PLAN OF CARE
Problem: PAIN - ADULT  Goal: Verbalizes/displays adequate comfort level or baseline comfort level  Description: Interventions:  - Encourage patient to monitor pain and request assistance  - Assess pain using appropriate pain scale  - Administer analgesics based on type and severity of pain and evaluate response  - Implement non-pharmacological measures as appropriate and evaluate response  - Consider cultural and social influences on pain and pain management  - Notify physician/advanced practitioner if interventions unsuccessful or patient reports new pain  Outcome: Progressing     Problem: INFECTION - ADULT  Goal: Absence or prevention of progression during hospitalization  Description: INTERVENTIONS:  - Assess and monitor for signs and symptoms of infection  - Monitor lab/diagnostic results  - Monitor all insertion sites, i.e. indwelling lines, tubes, and drains  - Monitor endotracheal if appropriate and nasal secretions for changes in amount and color  - [place] appropriate cooling/warming therapies per order  - Administer medications as ordered  - Instruct and encourage patient and family to use good hand hygiene technique  - Identify and instruct in appropriate isolation precautions for identified infection/condition  Outcome: Progressing  Goal: Absence of fever/infection during neutropenic period  Description: INTERVENTIONS:  - Monitor WBC    Outcome: Progressing     Problem: SAFETY ADULT  Goal: Patient will remain free of falls  Description: INTERVENTIONS:  - Educate patient/family on patient safety including physical limitations  - Instruct patient to call for assistance with activity   - Consult OT/PT to assist with strengthening/mobility   - Keep Call bell within reach  - Keep bed low and locked with side rails adjusted as appropriate  - Keep care items and personal belongings within reach  - Initiate and maintain comfort rounds  - Make Fall Risk Sign visible to staff  - Apply yellow socks and bracelet  for high fall risk patients  - Consider moving patient to room near nurses station  Outcome: Progressing  Goal: Maintain or return to baseline ADL function  Description: INTERVENTIONS:  -  Assess patient's ability to carry out ADLs; assess patient's baseline for ADL function and identify physical deficits which impact ability to perform ADLs (bathing, care of mouth/teeth, toileting, grooming, dressing, etc.)  - Assess/evaluate cause of self-care deficits   - Assess range of motion  - Assess patient's mobility; develop plan if impaired  - Assess patient's need for assistive devices and provide as appropriate  - Encourage maximum independence but intervene and supervise when necessary  - Involve family in performance of ADLs  - Assess for home care needs following discharge   - Consider OT consult to assist with ADL evaluation and planning for discharge  - Provide patient education as appropriate  Outcome: Progressing  Goal: Maintains/Returns to pre admission functional level  Description: INTERVENTIONS:  - Perform AM-PAC 6 Click Basic Mobility/ Daily Activity assessment daily.  - Set and communicate daily mobility goal to care team and patient/family/caregiver.   - Collaborate with rehabilitation services on mobility goals if consulted  - Out of bed for toileting  - Record patient progress and toleration of activity level   Outcome: Progressing     Problem: DISCHARGE PLANNING  Goal: Discharge to home or other facility with appropriate resources  Description: INTERVENTIONS:  - Identify barriers to discharge w/patient and caregiver  - Arrange for needed discharge resources and transportation as appropriate  - Identify discharge learning needs (meds, wound care, etc.)  - Arrange for interpretive services to assist at discharge as needed  - Refer to Case Management Department for coordinating discharge planning if the patient needs post-hospital services based on physician/advanced practitioner order or complex needs  related to functional status, cognitive ability, or social support system  Outcome: Progressing     Problem: Knowledge Deficit  Goal: Patient/family/caregiver demonstrates understanding of disease process, treatment plan, medications, and discharge instructions  Description: Complete learning assessment and assess knowledge base.  Interventions:  - Provide teaching at level of understanding  - Provide teaching via preferred learning methods  Outcome: Progressing     Problem: SAFETY,RESTRAINT: NV/NON-SELF DESTRUCTIVE BEHAVIOR  Goal: Remains free of harm/injury (restraint for non violent/non self-detsructive behavior)  Description: INTERVENTIONS:  - Instruct patient/family regarding restraint use   - Assess and monitor physiologic and psychological status   - Provide interventions and comfort measures to meet assessed patient needs   - Identify and implement measures to help patient regain control  - Assess readiness for release of restraint   Outcome: Progressing  Goal: Returns to optimal restraint-free functioning  Description: INTERVENTIONS:  - Assess the patient's behavior and symptoms that indicate continued need for restraint  - Identify and implement measures to help patient regain control  - Assess readiness for release of restraint   Outcome: Progressing

## 2023-12-15 NOTE — ASSESSMENT & PLAN NOTE
- Patient with a history of alcohol use disorder that presented to the ED after falling down 13 stairs while intoxicated  - States that his last drink was at 2 am. His CIWA score is 12 and he was given a total of 490 mg phenobarbital.   - He was admitted to ICU following administration of phenobarbital  - Stable for floor transfer  - Night of 12/14-12/15 control team called as patient was agitated, tox recommended valium, patient required restraints  - Wernicke's encephalopathy vs alcohol withdrawal  - Patient back to baseline, no longer having hallucinations, and out of soft restraints after having a bowel movement this AM.    Plan:  - Continue CIWA protocol; treatment any withdrawal symptoms as needed with Ativan  - Ammonia level ordered  - Thiamine repletion frequency increased to TID. Continuing Folic acid repletion  - Agreeable to inpatient alcohol rehab;   - Pending PT clearance for D/C to inpatient Rehab

## 2023-12-15 NOTE — PHYSICAL THERAPY NOTE
PHYSICAL THERAPY NOTE          Patient Name: Diogo Travis  Today's Date: 12/15/2023         12/15/23 0838   PT Last Visit   PT Visit Date 12/15/23   Note Type   Note Type Treatment   Pain Assessment   Pain Assessment Tool 0-10   Pain Score 8   Pain Location/Orientation Location: Back;Location: Generalized   Pain Onset/Description Onset: Ongoing   Effect of Pain on Daily Activities limits comfort and activity tolerance   Patient's Stated Pain Goal No pain   Hospital Pain Intervention(s) Repositioned;Ambulation/increased activity;Rest   Multiple Pain Sites No   Restrictions/Precautions   Weight Bearing Precautions Per Order No   Other Precautions (S)  Cognitive;Chair Alarm;Bed Alarm;Fall Risk;Telemetry  (pre tx session pt in bilateral wrist restraints but post tx session pt in recliner iwth call bell, chair alarm activated, pt set up for [address] and no restraints donned on pt)   General   Chart Reviewed Yes   Additional Pertinent History (S)  pt impulsive throughout PT intervention and required max VC's for increased safety and balance   Response to Previous Treatment Other (Comment)   Family/Caregiver Present No   Cognition   Overall Cognitive Status Impaired   Arousal/Participation Alert;Responsive  (pt very impulsive and required max VC's for safety and balance with bed mobility, functional transfers)   Attention Difficulty attending to directions   Orientation Level (S)  Disoriented X4  (pt stated he was at [facility], pt also stated it was  and was unaware of the situation as far as participating in PT intervention)   Memory Decreased short term memory;Decreased recall of recent events;Decreased recall of precautions   Following Commands Follows one step commands inconsistently   Comments pt identified by name and  on wrist band   Subjective   Subjective pt stated he feels like he was hit by a truck. pt also  stated he is constipated. He also stated he is at [facility] and is unaware of where he currently is   Bed Mobility   Supine to Sit 5  Supervision   Additional items Assist x 1;HOB elevated;Bedrails;Increased time required;Verbal cues  (pt impulsive throughout PT intervention as pt was able to complete a supine<>sit EOB transfer with /s but initially attempted to sit EOB w/ bed rail up and pt swung legs over bed rail)   Sit to Supine Unable to assess   Additional Comments pt seated in recliner [facility] call bell, set up for breakfast, chair alarm activated, no restraints and RN made aware that pt continues to be very impulsive but not violent   Transfers   Sit to Stand 4  Minimal assistance   Additional items Assist x 1;Increased time required;Verbal cues  (w/ RW)   Stand to Sit 3  Moderate assistance   Additional items Assist x 1;Armrests;Increased time required;Verbal cues   Stand pivot 3  Moderate assistance   Additional items Assist x 1;Armrests;Increased time required;Impulsive;Verbal cues   Toilet transfer 4  Minimal assistance   Additional items Assist x 1;Increased time required;Verbal cues;Impulsive;Other  (abandoned RW prior to descending to toilet)   Additional Comments pt impulsive with all functional transfers in [facility] tx sesison as pt required max VC's for increased safety and balance and min to mod ax1   Ambulation/Elevation   Gait pattern Improper Weight shift;Narrow MELANY;Decreased foot clearance;Retropulsion;Shuffling;Short stride;Excessively slow;Decreased hip extension;Decreased heel strike;Decreased toe off   Gait Assistance 3  Moderate assist   Additional items Assist x 1;Verbal cues   Assistive Device Rolling walker   Distance 30'x1 RW , 15'x2 RW   Stair Management Assistance 2  Maximal assist   Additional items Assist x 1;Verbal cues;Tactile cues;Increased time required   Stair Management Technique Two rails;Step to pattern;Foreward;Backward   Number of Stairs 1   Ambulation/Elevation Additional  Comments pt required mod Ax1 for all ambulation with RW in todays tx session. pt also required RW management as pt had difficulty advancing and turning RW and bumped into walls and objects on his R side.   Balance   Static Sitting Fair +   Dynamic Sitting Fair   Static Standing Poor +   Dynamic Standing Poor   Ambulatory Poor  (w/ RW)   Endurance Deficit   Endurance Deficit Yes   Endurance Deficit Description limited balnace w/ all OOB activity, functional transfers and ambulation   Activity Tolerance   Activity Tolerance Patient limited by fatigue;Other (Comment)  (generalized weakness and cognition)   Nurse Made Aware Spoke to RN   Exercises   Hip Abduction Sitting;10 reps;AROM;Bilateral   Hip Adduction Sitting;10 reps;AROM;Bilateral  (pillow squeezes)   Knee AROM Long Arc Quad Sitting;10 reps;AROM;Bilateral   Ankle Pumps Sitting;10 reps;AROM;Bilateral   Marching Sitting;10 reps;AROM;Bilateral   Assessment   Prognosis Good   Problem List Decreased endurance;Impaired balance;Decreased mobility;Impaired judgement;Decreased safety awareness;Decreased coordination;Decreased cognition   Assessment pt ebgan tx session lying supine in the bed and was agreeable to get OOB and walk in todays tx session. pt very impulsive throughout PT intervention as pt required max VC's for all bed mobility, functional transfers to and from RW and ambulation with RW. pt required /s for completing a supine<>sit EOBN transfer but initially attempted to step over bed rail and get OOB. Once pt was seated EOB pt impulsive again and didnt give me time to set him up w/ RW and stood /s but was retropulsive and had LOB back to seated EOB. pt educated on hand placement and RW management for all functional transfers prior to attempting STS again. pt continues to be at an increased risk for falls and injuries due to generalized weakness, fatigue, physical and possible cognitive defiicts. pt demonstrateed limited activity tolerance and ambulation  distance in todays tx session as pt was limited to 30'x1 RW with mod Ax1 for increased safety and balance. pt had several LOB and demonstrated difficulty with RW management. pt required several therapeutic seated rest breaks in todayts tx session due to fatigue. pt educated with verbal/visual demonstration on all stair trials. pt required max Ax1 in order to complete 1 step but pt had severe LOB posterior and additioanl steps were not possible in todays tx session due to safety concerns. Post tx session pt in recliner iwth call bell and chair alarm activated pt attempted to get out of recliner with no one in the room. Chair alarm went off as i was walking by and was able to assist pt befofe pt had a fall. Continue to recommend DC w/ level 2 moderate rehab resource intensity when medically cleared. Post tx pt in bed with call bell, bed alarm activated and RN made aware of pt impulsiveness   Barriers to Discharge Inaccessible home environment;Decreased caregiver support   Goals   Patient Goals none stated this tx session   STG Expiration Date 12/24/23   PT Treatment Day 2   Plan   Treatment/Interventions Functional transfer training;LE strengthening/ROM;Therapeutic exercise;Elevations;Endurance training;Cognitive reorientation;Patient/family training;Equipment eval/education;Bed mobility;Gait training;Spoke to nursing   Progress Slow progress, decreased activity tolerance   PT Frequency 3-5x/wk   Discharge Recommendation   Rehab Resource Intensity Level, PT II (Moderate Resource Intensity)   Equipment Recommended Walker   Walker Package Recommended Wheeled walker   Change/add to Walker Package? No   AM-PAC Basic Mobility Inpatient   Turning in Flat Bed Without Bedrails 3   Lying on Back to Sitting on Edge of Flat Bed Without Bedrails 3   Moving Bed to Chair 2   Standing Up From Chair Using Arms 3   Walk in Room 2   Climb 3-5 Stairs With Railing 2   Basic Mobility Inpatient Raw Score 15   Basic Mobility Standardized  Score 36.97   Highest Level Of Mobility   -HL Goal 4: Move to chair/commode   -HLM Achieved 7: Walk 25 feet or more   Education   Education Provided Mobility training;Assistive device;Other  (functional transfers and stair trials)   Patient Demonstrates acceptance/verbal understanding   End of Consult   Patient Position at End of Consult Supine;All needs within reach;Bed/Chair alarm activated   The patient's AM-PAC Basic Mobility Inpatient Short Form Raw Score is 15. A Raw score of less than or equal to 16 suggests the patient may benefit from discharge to post-acute rehabilitation services. Please also refer to the recommendation of the Physical Therapist for safe discharge planning.    Juan C Flores

## 2023-12-15 NOTE — NURSING NOTE
Pt expressed want to sign out AMA. MD notified. Pt unwilling to return to bed after speaking with MD., appearing angry and insisting on leaving the room to go home. Control team called. IM zyprexa ordered. Pt escorted back to bed by staff. Unable to administer IM zyprexa safely, soft limb x4 restraints placed to administer medication. Once IM zyprexa 5 mg administered pt trialed off of restraints. Pt successfully trialed off of bilateral lower extremity restraints. Pt unsuccessfully trialed off of bilateral upper extremity restraints as pt attempting to leave bed. Bilateral upper extremity soft limb restraints reapplied.

## 2023-12-15 NOTE — PLAN OF CARE
Problem: PAIN - ADULT  Goal: Verbalizes/displays adequate comfort level or baseline comfort level  Description: Interventions:  - Encourage patient to monitor pain and request assistance  - Assess pain using appropriate pain scale  - Administer analgesics based on type and severity of pain and evaluate response  - Implement non-pharmacological measures as appropriate and evaluate response  - Consider cultural and social influences on pain and pain management  - Notify physician/advanced practitioner if interventions unsuccessful or patient reports new pain  12/15/2023 0137 by Ingrid Gustafson  Outcome: Progressing  12/15/2023 0132 by Ingrid Gustafson  Outcome: Progressing     Problem: INFECTION - ADULT  Goal: Absence or prevention of progression during hospitalization  Description: INTERVENTIONS:  - Assess and monitor for signs and symptoms of infection  - Monitor lab/diagnostic results  - Monitor all insertion sites, i.e. indwelling lines, tubes, and drains  - Monitor endotracheal if appropriate and nasal secretions for changes in amount and color  - [place] appropriate cooling/warming therapies per order  - Administer medications as ordered  - Instruct and encourage patient and family to use good hand hygiene technique  - Identify and instruct in appropriate isolation precautions for identified infection/condition  12/15/2023 0137 by Ingrid Gustafson  Outcome: Progressing  12/15/2023 0132 by Ingrid Gustafson  Outcome: Progressing  Goal: Absence of fever/infection during neutropenic period  Description: INTERVENTIONS:  - Monitor WBC    12/15/2023 0137 by Ingrid Gustafson  Outcome: Progressing  12/15/2023 0132 by Ingrid Gustafson  Outcome: Progressing     Problem: SAFETY ADULT  Goal: Patient will remain free of falls  Description: INTERVENTIONS:  - Educate patient/family on patient safety including physical limitations  - Instruct patient to call for assistance with activity   - Consult OT/PT to assist with strengthening/mobility    - Keep Call bell within reach  - Keep bed low and locked with side rails adjusted as appropriate  - Keep care items and personal belongings within reach  - Initiate and maintain comfort rounds  - Make Fall Risk Sign visible to staff  - Apply yellow socks and bracelet for high fall risk patients  - Consider moving patient to room near nurses station  12/15/2023 0137 by Ingrid Gustafson  Outcome: Progressing  12/15/2023 0132 by Ingrid Gustafson  Outcome: Progressing  Goal: Maintain or return to baseline ADL function  Description: INTERVENTIONS:  -  Assess patient's ability to carry out ADLs; assess patient's baseline for ADL function and identify physical deficits which impact ability to perform ADLs (bathing, care of mouth/teeth, toileting, grooming, dressing, etc.)  - Assess/evaluate cause of self-care deficits   - Assess range of motion  - Assess patient's mobility; develop plan if impaired  - Assess patient's need for assistive devices and provide as appropriate  - Encourage maximum independence but intervene and supervise when necessary  - Involve family in performance of ADLs  - Assess for home care needs following discharge   - Consider OT consult to assist with ADL evaluation and planning for discharge  - Provide patient education as appropriate  12/15/2023 0137 by Ingrid Gustafson  Outcome: Progressing  12/15/2023 0132 by Ingrid Gustafson  Outcome: Progressing  Goal: Maintains/Returns to pre admission functional level  Description: INTERVENTIONS:  - Perform AM-PAC 6 Click Basic Mobility/ Daily Activity assessment daily.  - Set and communicate daily mobility goal to care team and patient/family/caregiver.   - Collaborate with rehabilitation services on mobility goals if consulted  - Out of bed for toileting  - Record patient progress and toleration of activity level   12/15/2023 0137 by Ingrid Gustafson  Outcome: Progressing  12/15/2023 0132 by Ingrid Gustafson  Outcome: Progressing     Problem: DISCHARGE PLANNING  Goal:  Discharge to home or other facility with appropriate resources  Description: INTERVENTIONS:  - Identify barriers to discharge w/patient and caregiver  - Arrange for needed discharge resources and transportation as appropriate  - Identify discharge learning needs (meds, wound care, etc.)  - Arrange for interpretive services to assist at discharge as needed  - Refer to Case Management Department for coordinating discharge planning if the patient needs post-hospital services based on physician/advanced practitioner order or complex needs related to functional status, cognitive ability, or social support system  12/15/2023 0137 by Ingrid Gustafson  Outcome: Progressing  12/15/2023 0132 by Ignrid Gustafson  Outcome: Progressing     Problem: Knowledge Deficit  Goal: Patient/family/caregiver demonstrates understanding of disease process, treatment plan, medications, and discharge instructions  Description: Complete learning assessment and assess knowledge base.  Interventions:  - Provide teaching at level of understanding  - Provide teaching via preferred learning methods  12/15/2023 0137 by Ingrid Gustafson  Outcome: Progressing  12/15/2023 0132 by Ingrid Gustafson  Outcome: Progressing     Problem: SAFETY,RESTRAINT: [place]/NON-SELF DESTRUCTIVE BEHAVIOR  Goal: Remains free of harm/injury (restraint for non violent/non self-detsructive behavior)  Description: INTERVENTIONS:  - Instruct patient/family regarding restraint use   - Assess and monitor physiologic and psychological status   - Provide interventions and comfort measures to meet assessed patient needs   - Identify and implement measures to help patient regain control  - Assess readiness for release of restraint   12/15/2023 0137 by Ingrid Gustafson  Outcome: Progressing  12/15/2023 0132 by Ingrid Gustafson  Outcome: Progressing  Goal: Returns to optimal restraint-free functioning  Description: INTERVENTIONS:  - Assess the patient's behavior and symptoms that indicate continued need for  restraint  - Identify and implement measures to help patient regain control  - Assess readiness for release of restraint   12/15/2023 0137 by Ingrid Gustafson  Outcome: Progressing  12/15/2023 0132 by Ingrid Gustafson  Outcome: Progressing

## 2023-12-15 NOTE — PLAN OF CARE
Problem: PHYSICAL THERAPY ADULT  Goal: Performs mobility at highest level of function for planned discharge setting.  See evaluation for individualized goals.  Description: Treatment/Interventions: Functional transfer training, LE strengthening/ROM, Elevations, Therapeutic exercise, Endurance training, Patient/family training, Equipment eval/education, Bed mobility, Gait training, Compensatory technique education  Equipment Recommended: Walker       See flowsheet documentation for full assessment, interventions and recommendations.  Outcome: Progressing  Note: Prognosis: Good  Problem List: Decreased endurance, Impaired balance, Decreased mobility, Impaired judgement, Decreased safety awareness, Decreased coordination, Decreased cognition  Assessment: pt ebgan tx session lying supine in the bed and was agreeable to get OOB and walk in todays tx session. pt very impulsive throughout PT intervention as pt required max VC's for all bed mobility, functional transfers to and from RW and ambulation with RW. pt required /s for completing a supine<>sit EOBN transfer but initially attempted to step over bed rail and get OOB. Once pt was seated EOB pt impulsive again and didnt give me time to set him up w/ RW and stood /s but was retropulsive and had LOB back to seated EOB. pt educated on hand placement and RW management for all functional transfers prior to attempting STS again. pt continues to be at an increased risk for falls and injuries due to generalized weakness, fatigue, physical and possible cognitive defiicts. pt demonstrateed limited activity tolerance and ambulation distance in todays tx session as pt was limited to 30'x1 RW with mod Ax1 for increased safety and balance. pt had several LOB and demonstrated difficulty with RW management. pt required several therapeutic seated rest breaks in todayts tx session due to fatigue. pt educated with verbal/visual demonstration on all stair trials. pt required max Ax1 in  order to complete 1 step but pt had severe LOB posterior and additioanl steps were not possible in todays tx session due to safety concerns. Post tx session pt in recliner iwth call bell and chair alarm activated pt attempted to get out of recliner with no one in the room. Chair alarm went off as i was walking by and was able to assist pt befofe pt had a fall. Continue to recommend DC w/ level 2 moderate rehab resource intensity when medically cleared. Post tx pt in bed with call bell, bed alarm activated and RN made aware of pt impulsiveness  Barriers to Discharge: Inaccessible home environment, Decreased caregiver support     Rehab Resource Intensity Level, PT: II (Moderate Resource Intensity)    See flowsheet documentation for full assessment.

## 2023-12-15 NOTE — QUICK NOTE
I was contacted bu the primary team after the patient was transferred out of the ICU to the floor. Med tox gave ETOH withdrawal recs 12/12 with phenobarbital dosing.. The patient received a few doses of phenobarbital that day. Yesterday, the patient was transitioned to lorazepam rather than phenobarbital. Upon evaluation on the floor by the primary team, the clinical impression was that he was still in significant alcohol withdrawal. I agree with Dr. Shelby's initial recommendations, which would require transfer back to the ICU for phenobarbital dosing since that cannot be given on the floors. A dose of 390mg can be given now followed by 130-260mg every 2-4 hours as needed to goal of RASS -2. If approaching 2 to 2.5 grams of phenobarbital and the patient is still in severe withdrawal, adjuncts like dexmedetomidine or ketamine can be considered.

## 2023-12-15 NOTE — ASSESSMENT & PLAN NOTE
No PTA meds  Patient's pressure elevated with agitation    Plan:   - Labetalol 10mg IV prn for SBP >180 or DBP >100

## 2023-12-15 NOTE — QUICK NOTE
Progress Note - Triage Asssessment   Diogo Travis 58 y.o. male MRN: [id number]    Time Called ( Time): 2008  Date Called: 12/14/23  Room#: W 426  Person requesting evaluation: Dr. Jose F Easton    Situation:    58 y.o. male with EtOH dependence, depression/anxiety.  He was admitted in EtOH withdrawal after fall down stairs.  Toxicology was consulted and he was initially treated with phenobarb  mg and then given additional IV doses of 130 mg x3.  He did well and was transitioned to CIWA with Ativan.  Yesterday he received 3 doses of Ativan per CIWA protocol.  Today he received 1 dose of Ativan IV 4 mg at 19:03 for a score of 19.  Of note, he states that he has not been taking his Lexapro as ordered at home, and this was not restarted on admission.  He states that he is having rectal pain and is currently feeling anxious about getting an enema.      CIWA score on my evaluation is 2 (slight tremor and anxiety).  Nursing present at bedside states that patient is much improved from when he last received Ativan.      Interventions:   Given improvement in CIWA with Ativan, would continue CIWA scoring.  Recommend MIAN prior to enema, would start PO bowel regimen as this may cause him less anxiety.  Would also restart his home Lexapro.          Triage Assessment:     Patient can remain on current level of care.      Recommendations discussed with Dr. Jose F Easton

## 2023-12-15 NOTE — OCCUPATIONAL THERAPY NOTE
Occupational Therapy Treatment     Patient Name: Diogo Travis  Today's Date: 12/15/2023  Problem List  Principal Problem:    Alcohol use disorder, severe, dependence (HCC)  Active Problems:    Hypertension    Alcohol withdrawal syndrome with perceptual disturbance (HCC)    Alcoholic ketoacidosis    Fall down stairs    Past Medical History  Past Medical History:   Diagnosis Date    Alcoholic ketoacidosis 10/16/2023    GERD (gastroesophageal reflux disease)     Hypertension     Hypomagnesemia 04/02/2023    Vomiting 04/02/2023     Past Surgical History  No past surgical history on file.        12/15/23 1150   OT Last Visit   OT Visit Date 12/15/23   Note Type   Note Type Treatment   Pain Assessment   Pain Assessment Tool FLACC   Patient's Stated Pain Goal No pain   Hospital Pain Intervention(s) Repositioned;Ambulation/increased activity;Emotional support   Multiple Pain Sites No   Pain Rating: FLACC (Rest) - Face 0   Pain Rating: FLACC (Rest) - Legs 0   Pain Rating: FLACC (Rest) - Activity 0   Pain Rating: FLACC (Rest) - Cry 0   Pain Rating: FLACC (Rest) - Consolability 0   Score: FLACC (Rest) 0   Pain Rating: FLACC (Activity) - Face 0   Pain Rating: FLACC (Activity) - Legs 0   Pain Rating: FLACC (Activity) - Activity 0   Pain Rating: FLACC (Activity) - Cry 0   Pain Rating: FLACC (Activity) - Consolability 0   Score: FLACC (Activity) 0   Restrictions/Precautions   Weight Bearing Precautions Per Order No   Other Precautions Cognitive;Chair Alarm;Bed Alarm;Multiple lines;Fall Risk  (CIWA)   Lifestyle   Autonomy At baseline, pt is (I) with ADL/IADLs, no AD. Lives alone in 2 , has significant other. + driving   Reciprocal Relationships supportive girlfriend at bedside   Service to Others unemployed   ADL   Eating Assistance 5  Supervision/Setup   Eating Deficit Setup;Beverage management   Grooming Assistance 5  Supervision/Setup   Grooming Deficit Setup;Wash/dry face;Verbal cueing;Supervision/safety;Increased time  to complete   LB Dressing Assistance 3  Moderate Assistance   LB Dressing Deficit Setup;Steadying;Requires assistive device for steadying;Verbal cueing;Increased time to complete;Supervision/safety;Don/doff R sock;Don/doff L sock;Thread RLE into underwear;Thread LLE into underwear   LB Dressing Comments increased assistance d/t impaired motor planning and fine motor skills   Toileting Assistance  Unable to assess   Toileting Comments Pt denies need to use the restroom at this time   Functional Standing Tolerance   Time 1 min   Activity LB dressing/ADL tasks   Comments ZAINAB + support of BUE on RW to maintain functional static standing.   Bed Mobility   Supine to Sit 5  Supervision   Additional items HOB elevated;Increased time required;Verbal cues   Sit to Supine 4  Minimal assistance   Additional items Assist x 1;Increased time required;Verbal cues;LE management   Additional Comments Pt tolerated sitting at the EOB for 5-6 mins with varying sitting balance fair+/poor. Pt impulsive, attempting to stand from EOB multiple times and reaching laterally, over the side of the bed to obtain the RW. Verbal/tactile cues for improved safety awareness.   Transfers   Sit to Stand 4  Minimal assistance   Additional items Assist x 1;Increased time required;Verbal cues;Impulsive   Stand to Sit 4  Minimal assistance   Additional items Assist x 1;Increased time required;Verbal cues;Impulsive   Stand pivot 3  Moderate assistance   Additional items Assist x 1;Increased time required;Verbal cues;Impulsive  (RW; Pt attempting to abandon RW)   Additional Comments Pt is impulsive with all transfers, poor insight into body positioning and safety awareness. Continued education and cueing provided.   Functional Mobility   Functional Mobility 4  Minimal assistance   Additional Comments ZAINAB for short household distance with use of RW. Cues for RW managment as patient demonstrates poor spaital awareness and ability to navigate obstacles in the  room.   Additional items Rolling walker   Coordination   Fine Motor Difficulty opening socks and appropriately donning, needing MAX verbal cues.   Subjective   Subjective Pt pleasent and cooperative. Agreeable to OT treatment.   Cognition   Overall Cognitive Status Impaired   Arousal/Participation Alert;Responsive   Attention Difficulty attending to directions  (pt is impulsive)   Orientation Level Oriented to person;Disoriented to place;Oriented to time;Disoriented to situation  (initally recalling the month to be March, able to self-correct to Decemeber with increased time and no verbal cueing.)   Memory Decreased short term memory;Decreased recall of recent events;Decreased recall of precautions   Following Commands Follows one step commands inconsistently   Comments Pt ID via wristband, name and . Pt is impuslive during all mobility, demonstrates limited insight into current limitations and safety awareness. Presents to be very lethargic, requesting to return to bed.   Activity Tolerance   Activity Tolerance Patient limited by fatigue  (impaired cognition)   Medical Staff Made Aware Spoke with PT, Resident Geronimo Hahn   Assessment   Assessment Patient seen for OT treatment on 12/15/2023 s/p admission for Alcohol use disorder, severe, dependence (HCC) Patient agreeable to OT session. Patient participated in fall prevention , bed mobility , functional mobility, and ADLs with intervention focus on optimizing independence in functional mobility, self-care transfers and ADLs with cognitive reorientation techniques. Diogo Travis is continuing to perform below baseline due to the following deficits: endurance , impaired fine motor control, motor planning, decreased balance , decreased standing tolerance for self care tasks , decreased dynamic balance impacting functional reach, decreased activity tolerance , impaired judgement and problem solving , decreased emotional regulation and coping skills , impaired safety  awareness , impulsive behavior, impaired learning ability d/t current cognitive status , impaired global mental status, and direction following. Personal factors continuing to impact D/C include: (+) Hx of falls , steps to enter/navigate the home, Assistance needed for ADL/IADLs, Assistance needed for ADLs and functional mobility, High fall risk , decreased insight toward deficits , and decreased recall of precautions  From OT standpoint, patient would benefit from skilled intervention to maximize independence with ADLs and functional mobility. Goals remain appropriate, continue POC. At this time, recommending D/C to: level II (moderate resource intensity) vs level III (minimum resource intensity) pending patients medical and therapy progression   Plan   Treatment Interventions ADL retraining;Functional transfer training;Cognitive reorientation;Equipment evaluation/education;Patient/family training;Compensatory technique education;Energy conservation;Activityengagement   Goal Expiration Date 12/24/23   OT Treatment Day 1   OT Frequency 2-3x/wk   Discharge Recommendation   Rehab Resource Intensity Level, OT II (Moderate Resource Intensity)  (anticipate progression to level III with medical optimization)   Additional Comments  The patient's raw score on the -PAC Daily Activity Inpatient Short Form is 15. A raw score of less than 19 suggests the patient may benefit from discharge to post-acute rehabilitation services. Please refer to the recommendation of the Occupational Therapist for safe discharge planning.   AM-PAC Daily Activity Inpatient   Lower Body Dressing 2   Bathing 2   Toileting 2   Upper Body Dressing 3   Grooming 3   Eating 4   Daily Activity Raw Score 16   Daily Activity Standardized Score (Calc for Raw Score >=11) 35.96   AM-PAC Applied Cognition Inpatient   Following a Speech/Presentation 2   Understanding Ordinary Conversation 4   Taking Medications 2   Remembering Where Things Are Placed or Put  Away 3   Remembering List of 4-5 Errands 2   Taking Care of Complicated Tasks 2   Applied Cognition Raw Score 15   Applied Cognition Standardized Score 33.54   End of Consult   Education Provided Yes   Patient Position at End of Consult Supine;Bed/Chair alarm activated;All needs within reach   Nurse Communication Nurse aware of consult     Goals established on initial evaluation in order to achieve pt's goal of walking more       Pt will complete UB ADLs Mod independent   for increased ADL independence within 10 days.      Pt will complete LB ADLs Supervision  for increased ADL independence within 10 days. PROGRESSING      Pt will complete toileting Supervision  with use of DME for increased ADL independence within 10 days.      Pt will demonstrate proper body mechanics to complete self-care transfers and functional mobility with Supervision and use of LRAD for increased safety and functional independence within 10 days.      Pt will demonstrate proper body mechanics and fall prevention strategies during 100% of tx sessions for increased safety awareness during ADL/IADLs     Pt will demonstrate activity tolerance of 30 min in therapeutic tasks for increased participation in meaningful activities upon D/C.     Pt will demonstrate OOB sitting tolerance of 2-4 hr/day for increased activity tolerance and engagement in leisure activities within 10 days. PROGRESSING     Leelee Troncoso MS OTR/L   [place] Licensure# [id number]

## 2023-12-16 LAB
ALBUMIN SERPL BCP-MCNC: 3.6 G/DL (ref 3.5–5)
ALP SERPL-CCNC: 82 U/L (ref 34–104)
ALT SERPL W P-5'-P-CCNC: 11 U/L (ref 7–52)
ANION GAP SERPL CALCULATED.3IONS-SCNC: 7 MMOL/L
AST SERPL W P-5'-P-CCNC: 26 U/L (ref 13–39)
BASOPHILS # BLD AUTO: 0.06 THOUSANDS/ÂΜL (ref 0–0.1)
BASOPHILS NFR BLD AUTO: 1 % (ref 0–1)
BILIRUB SERPL-MCNC: 0.56 MG/DL (ref 0.2–1)
BUN SERPL-MCNC: 6 MG/DL (ref 5–25)
CALCIUM SERPL-MCNC: 8.9 MG/DL (ref 8.4–10.2)
CHLORIDE SERPL-SCNC: 106 MMOL/L (ref 96–108)
CO2 SERPL-SCNC: 29 MMOL/L (ref 21–32)
CREAT SERPL-MCNC: 0.79 MG/DL (ref 0.6–1.3)
EOSINOPHIL # BLD AUTO: 0.24 THOUSAND/ÂΜL (ref 0–0.61)
EOSINOPHIL NFR BLD AUTO: 5 % (ref 0–6)
ERYTHROCYTE [DISTWIDTH] IN BLOOD BY AUTOMATED COUNT: 13.8 % (ref 11.6–15.1)
GFR SERPL CREATININE-BSD FRML MDRD: 99 ML/MIN/1.73SQ M
GLUCOSE SERPL-MCNC: 98 MG/DL (ref 65–140)
HCT VFR BLD AUTO: 35.2 % (ref 36.5–49.3)
HGB BLD-MCNC: 11.6 G/DL (ref 12–17)
IMM GRANULOCYTES # BLD AUTO: 0.03 THOUSAND/UL (ref 0–0.2)
IMM GRANULOCYTES NFR BLD AUTO: 1 % (ref 0–2)
LYMPHOCYTES # BLD AUTO: 1.13 THOUSANDS/ÂΜL (ref 0.6–4.47)
LYMPHOCYTES NFR BLD AUTO: 25 % (ref 14–44)
MCH RBC QN AUTO: 32.2 PG (ref 26.8–34.3)
MCHC RBC AUTO-ENTMCNC: 33 G/DL (ref 31.4–37.4)
MCV RBC AUTO: 98 FL (ref 82–98)
MONOCYTES # BLD AUTO: 0.33 THOUSAND/ÂΜL (ref 0.17–1.22)
MONOCYTES NFR BLD AUTO: 7 % (ref 4–12)
NEUTROPHILS # BLD AUTO: 2.74 THOUSANDS/ÂΜL (ref 1.85–7.62)
NEUTS SEG NFR BLD AUTO: 61 % (ref 43–75)
NRBC BLD AUTO-RTO: 0 /100 WBCS
PLATELET # BLD AUTO: 137 THOUSANDS/UL (ref 149–390)
PMV BLD AUTO: 9.1 FL (ref 8.9–12.7)
POTASSIUM SERPL-SCNC: 3.3 MMOL/L (ref 3.5–5.3)
PROT SERPL-MCNC: 6.2 G/DL (ref 6.4–8.4)
RBC # BLD AUTO: 3.6 MILLION/UL (ref 3.88–5.62)
SODIUM SERPL-SCNC: 142 MMOL/L (ref 135–147)
WBC # BLD AUTO: 4.53 THOUSAND/UL (ref 4.31–10.16)

## 2023-12-16 PROCEDURE — 85025 COMPLETE CBC W/AUTO DIFF WBC: CPT

## 2023-12-16 PROCEDURE — 80053 COMPREHEN METABOLIC PANEL: CPT

## 2023-12-16 PROCEDURE — 99232 SBSQ HOSP IP/OBS MODERATE 35: CPT | Performed by: INTERNAL MEDICINE

## 2023-12-16 RX ORDER — LISINOPRIL 20 MG/1
40 TABLET ORAL DAILY
Status: DISCONTINUED | OUTPATIENT
Start: 2023-12-16 | End: 2023-12-18 | Stop reason: HOSPADM

## 2023-12-16 RX ORDER — POTASSIUM CHLORIDE 20 MEQ/1
40 TABLET, EXTENDED RELEASE ORAL ONCE
Status: COMPLETED | OUTPATIENT
Start: 2023-12-16 | End: 2023-12-16

## 2023-12-16 RX ADMIN — IBUPROFEN 600 MG: 600 TABLET, FILM COATED ORAL at 05:21

## 2023-12-16 RX ADMIN — ENOXAPARIN SODIUM 40 MG: 40 INJECTION SUBCUTANEOUS at 09:31

## 2023-12-16 RX ADMIN — THIAMINE HYDROCHLORIDE 500 MG: 100 INJECTION, SOLUTION INTRAMUSCULAR; INTRAVENOUS at 22:07

## 2023-12-16 RX ADMIN — POTASSIUM CHLORIDE 40 MEQ: 1500 TABLET, EXTENDED RELEASE ORAL at 13:20

## 2023-12-16 RX ADMIN — DIAZEPAM 10 MG: 5 TABLET ORAL at 04:25

## 2023-12-16 RX ADMIN — IBUPROFEN 600 MG: 600 TABLET, FILM COATED ORAL at 17:24

## 2023-12-16 RX ADMIN — POLYETHYLENE GLYCOL 3350 17 G: 17 POWDER, FOR SOLUTION ORAL at 09:31

## 2023-12-16 RX ADMIN — IBUPROFEN 600 MG: 600 TABLET, FILM COATED ORAL at 13:20

## 2023-12-16 RX ADMIN — ESCITALOPRAM OXALATE 20 MG: 20 TABLET ORAL at 09:31

## 2023-12-16 RX ADMIN — DEXTROSE, SODIUM CHLORIDE, SODIUM LACTATE, POTASSIUM CHLORIDE, AND CALCIUM CHLORIDE 125 ML/HR: 5; .6; .31; .03; .02 INJECTION, SOLUTION INTRAVENOUS at 23:00

## 2023-12-16 RX ADMIN — FAMOTIDINE 20 MG: 20 TABLET, FILM COATED ORAL at 17:24

## 2023-12-16 RX ADMIN — LIDOCAINE 5% 1 PATCH: 700 PATCH TOPICAL at 05:21

## 2023-12-16 RX ADMIN — THIAMINE HYDROCHLORIDE 500 MG: 100 INJECTION, SOLUTION INTRAMUSCULAR; INTRAVENOUS at 17:21

## 2023-12-16 RX ADMIN — FOLIC ACID 1 MG: 5 INJECTION, SOLUTION INTRAMUSCULAR; INTRAVENOUS; SUBCUTANEOUS at 09:31

## 2023-12-16 RX ADMIN — DEXTROSE, SODIUM CHLORIDE, SODIUM LACTATE, POTASSIUM CHLORIDE, AND CALCIUM CHLORIDE 125 ML/HR: 5; .6; .31; .03; .02 INJECTION, SOLUTION INTRAVENOUS at 01:12

## 2023-12-16 RX ADMIN — LISINOPRIL 40 MG: 20 TABLET ORAL at 22:56

## 2023-12-16 RX ADMIN — IBUPROFEN 600 MG: 600 TABLET, FILM COATED ORAL at 00:21

## 2023-12-16 RX ADMIN — THIAMINE HYDROCHLORIDE 500 MG: 100 INJECTION, SOLUTION INTRAMUSCULAR; INTRAVENOUS at 10:14

## 2023-12-16 RX ADMIN — DIAZEPAM 10 MG: 5 TABLET ORAL at 20:12

## 2023-12-16 RX ADMIN — FAMOTIDINE 20 MG: 20 TABLET, FILM COATED ORAL at 09:31

## 2023-12-16 NOTE — PLAN OF CARE
Problem: PAIN - ADULT  Goal: Verbalizes/displays adequate comfort level or baseline comfort level  Description: Interventions:  - Encourage patient to monitor pain and request assistance  - Assess pain using appropriate pain scale  - Administer analgesics based on type and severity of pain and evaluate response  - Implement non-pharmacological measures as appropriate and evaluate response  - Consider cultural and social influences on pain and pain management  - Notify physician/advanced practitioner if interventions unsuccessful or patient reports new pain  Outcome: Progressing     Problem: INFECTION - ADULT  Goal: Absence or prevention of progression during hospitalization  Description: INTERVENTIONS:  - Assess and monitor for signs and symptoms of infection  - Monitor lab/diagnostic results  - Monitor all insertion sites, i.e. indwelling lines, tubes, and drains  - Monitor endotracheal if appropriate and nasal secretions for changes in amount and color  - [place] appropriate cooling/warming therapies per order  - Administer medications as ordered  - Instruct and encourage patient and family to use good hand hygiene technique  - Identify and instruct in appropriate isolation precautions for identified infection/condition  Outcome: Progressing  Goal: Absence of fever/infection during neutropenic period  Description: INTERVENTIONS:  - Monitor WBC    Outcome: Progressing     Problem: SAFETY ADULT  Goal: Patient will remain free of falls  Description: INTERVENTIONS:  - Educate patient/family on patient safety including physical limitations  - Instruct patient to call for assistance with activity   - Consult OT/PT to assist with strengthening/mobility   - Keep Call bell within reach  - Keep bed low and locked with side rails adjusted as appropriate  - Keep care items and personal belongings within reach  - Initiate and maintain comfort rounds  - Make Fall Risk Sign visible to staff  - Offer Toileting every  Hours,  in advance of need  - Initiate/Maintain alarm  - Obtain necessary fall risk management equipment:   - Apply yellow socks and bracelet for high fall risk patients  - Consider moving patient to room near nurses station  Outcome: Progressing  Goal: Maintain or return to baseline ADL function  Description: INTERVENTIONS:  -  Assess patient's ability to carry out ADLs; assess patient's baseline for ADL function and identify physical deficits which impact ability to perform ADLs (bathing, care of mouth/teeth, toileting, grooming, dressing, etc.)  - Assess/evaluate cause of self-care deficits   - Assess range of motion  - Assess patient's mobility; develop plan if impaired  - Assess patient's need for assistive devices and provide as appropriate  - Encourage maximum independence but intervene and supervise when necessary  - Involve family in performance of ADLs  - Assess for home care needs following discharge   - Consider OT consult to assist with ADL evaluation and planning for discharge  - Provide patient education as appropriate  Outcome: Progressing  Goal: Maintains/Returns to pre admission functional level  Description: INTERVENTIONS:  - Perform AM-PAC 6 Click Basic Mobility/ Daily Activity assessment daily.  - Set and communicate daily mobility goal to care team and patient/family/caregiver.   - Collaborate with rehabilitation services on mobility goals if consulted  - Perform Range of Motion  times a day.  - Reposition patient every  hours.  - Dangle patient  times a day  - Stand patient  times a day  - Ambulate patient  times a day  - Out of bed to chair  times a day   - Out of bed for meal times a day  - Out of bed for toileting  - Record patient progress and toleration of activity level   Outcome: Progressing     Problem: DISCHARGE PLANNING  Goal: Discharge to home or other facility with appropriate resources  Description: INTERVENTIONS:  - Identify barriers to discharge w/patient and caregiver  - Arrange for  needed discharge resources and transportation as appropriate  - Identify discharge learning needs (meds, wound care, etc.)  - Arrange for interpretive services to assist at discharge as needed  - Refer to Case Management Department for coordinating discharge planning if the patient needs post-hospital services based on physician/advanced practitioner order or complex needs related to functional status, cognitive ability, or social support system  Outcome: Progressing     Problem: Knowledge Deficit  Goal: Patient/family/caregiver demonstrates understanding of disease process, treatment plan, medications, and discharge instructions  Description: Complete learning assessment and assess knowledge base.  Interventions:  - Provide teaching at level of understanding  - Provide teaching via preferred learning methods  Outcome: Progressing

## 2023-12-16 NOTE — PLAN OF CARE
Problem: PAIN - ADULT  Goal: Verbalizes/displays adequate comfort level or baseline comfort level  Description: Interventions:  - Encourage patient to monitor pain and request assistance  - Assess pain using appropriate pain scale  - Administer analgesics based on type and severity of pain and evaluate response  - Implement non-pharmacological measures as appropriate and evaluate response  - Consider cultural and social influences on pain and pain management  - Notify physician/advanced practitioner if interventions unsuccessful or patient reports new pain  12/15/2023 1907 by Melody Guajardo RN  Outcome: Progressing  12/15/2023 1907 by Melody Guajardo RN  Outcome: Progressing     Problem: INFECTION - ADULT  Goal: Absence or prevention of progression during hospitalization  Description: INTERVENTIONS:  - Assess and monitor for signs and symptoms of infection  - Monitor lab/diagnostic results  - Monitor all insertion sites, i.e. indwelling lines, tubes, and drains  - Monitor endotracheal if appropriate and nasal secretions for changes in amount and color  - [place] appropriate cooling/warming therapies per order  - Administer medications as ordered  - Instruct and encourage patient and family to use good hand hygiene technique  - Identify and instruct in appropriate isolation precautions for identified infection/condition  12/15/2023 1907 by Melody Guajardo RN  Outcome: Progressing  12/15/2023 1907 by Melody Guajardo RN  Outcome: Progressing  Goal: Absence of fever/infection during neutropenic period  Description: INTERVENTIONS:  - Monitor WBC    Outcome: Progressing     Problem: SAFETY ADULT  Goal: Patient will remain free of falls  Description: INTERVENTIONS:  - Educate patient/family on patient safety including physical limitations  - Instruct patient to call for assistance with activity   - Consult OT/PT to assist with strengthening/mobility   - Keep Call bell within reach  - Keep bed low and  locked with side rails adjusted as appropriate  - Keep care items and personal belongings within reach  - Initiate and maintain comfort rounds  - Make Fall Risk Sign visible to staff  - Offer Toileting every  Hours, in advance of need  - Initiate/Maintain alarm  - Obtain necessary fall risk management equipment:   - Apply yellow socks and bracelet for high fall risk patients  - Consider moving patient to room near nurses station  Outcome: Progressing  Goal: Maintain or return to baseline ADL function  Description: INTERVENTIONS:  -  Assess patient's ability to carry out ADLs; assess patient's baseline for ADL function and identify physical deficits which impact ability to perform ADLs (bathing, care of mouth/teeth, toileting, grooming, dressing, etc.)  - Assess/evaluate cause of self-care deficits   - Assess range of motion  - Assess patient's mobility; develop plan if impaired  - Assess patient's need for assistive devices and provide as appropriate  - Encourage maximum independence but intervene and supervise when necessary  - Involve family in performance of ADLs  - Assess for home care needs following discharge   - Consider OT consult to assist with ADL evaluation and planning for discharge  - Provide patient education as appropriate  Outcome: Progressing  Goal: Maintains/Returns to pre admission functional level  Description: INTERVENTIONS:  - Perform AM-PAC 6 Click Basic Mobility/ Daily Activity assessment daily.  - Set and communicate daily mobility goal to care team and patient/family/caregiver.   - Collaborate with rehabilitation services on mobility goals if consulted  - Perform Range of Motion  times a day.  - Reposition patient every  hours.  - Dangle patient  times a day  - Stand patient  times a day  - Ambulate patient  times a day  - Out of bed to chair  times a day   - Out of bed for meals times a day  - Out of bed for toileting  - Record patient progress and toleration of activity level   Outcome:  Progressing     Problem: DISCHARGE PLANNING  Goal: Discharge to home or other facility with appropriate resources  Description: INTERVENTIONS:  - Identify barriers to discharge w/patient and caregiver  - Arrange for needed discharge resources and transportation as appropriate  - Identify discharge learning needs (meds, wound care, etc.)  - Arrange for interpretive services to assist at discharge as needed  - Refer to Case Management Department for coordinating discharge planning if the patient needs post-hospital services based on physician/advanced practitioner order or complex needs related to functional status, cognitive ability, or social support system  Outcome: Progressing     Problem: Knowledge Deficit  Goal: Patient/family/caregiver demonstrates understanding of disease process, treatment plan, medications, and discharge instructions  Description: Complete learning assessment and assess knowledge base.  Interventions:  - Provide teaching at level of understanding  - Provide teaching via preferred learning methods  Outcome: Progressing     Problem: SAFETY,RESTRAINT: NV/NON-SELF DESTRUCTIVE BEHAVIOR  Goal: Remains free of harm/injury (restraint for non violent/non self-detsructive behavior)  Description: INTERVENTIONS:  - Instruct patient/family regarding restraint use   - Assess and monitor physiologic and psychological status   - Provide interventions and comfort measures to meet assessed patient needs   - Identify and implement measures to help patient regain control  - Assess readiness for release of restraint   Outcome: Progressing  Goal: Returns to optimal restraint-free functioning  Description: INTERVENTIONS:  - Assess the patient's behavior and symptoms that indicate continued need for restraint  - Identify and implement measures to help patient regain control  - Assess readiness for release of restraint   Outcome: Progressing

## 2023-12-16 NOTE — PROGRESS NOTES
[facility]  Progress Note  Name: Diogo Travis I  MRN: [id number]  Unit/Bed#: W -01 I Date of Admission: 12/12/2023   Date of Service: 12/16/2023 I Hospital Day: 4    Assessment/Plan   Fall down stairs  Assessment & Plan  Patient fell down 13 stairs while intoxicated. Was brought into the ED as a Trauma B.   Xray trauma: No acute cardiopulmonary disease within limitations of supine imaging.   CT head: No acute intracranial abnormality.   CT chest abdomen pelvis:  No acute posttraumatic abnormality in the chest, abdomen, or pelvis. Diffuse hepatic steatosis.  CT cervical spine: No acute cervical spine fracture or traumatic malalignment.      Plan:  - Trauma evaluated and signed off       Alcoholic ketoacidosis  Assessment & Plan  Patient with history of alcohol use disorder and alcohol withdrawal presented to the ED as Trauma B after falling down stairs. CIWA score of 12 and was given phenobarbital as per toxicology.     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     Hypertension  Assessment & Plan  No PTA meds  Patient's pressure elevated with agitation    Plan:   - Labetalol 10mg IV prn for SBP >180 or DBP >100    * Alcohol use disorder, severe, dependence (HCC)  Assessment & Plan  - Patient with a history of alcohol use disorder that presented to the ED after falling down 13 stairs while intoxicated  - States that his last drink was at 2 am. His CIWA score is 12 and he was given a total of 490 mg phenobarbital.   - He was admitted to ICU following administration of phenobarbital  - Stable for floor transfer  - Night of 12/14-12/15 control team called as patient was agitated, tox recommended valium, patient required restraints  - Wernicke's encephalopathy vs alcohol withdrawal  - Patient back to baseline, no longer having hallucinations, and out of soft restraints after having a bowel movement  this AM.    Plan:  - Continue CIWA protocol; treatment any withdrawal symptoms as needed with Ativan  - Ammonia level ordered  - Thiamine repletion frequency increased to TID. Continuing Folic acid repletion  - Agreeable to inpatient alcohol rehab;   - Pending PT clearance for D/C to inpatient Rehab               VTE Pharmacologic Prophylaxis:   Moderate Risk (Score 3-4) - Pharmacological DVT Prophylaxis Ordered: enoxaparin (Lovenox).    Mobility:   Basic Mobility Inpatient Raw Score: 15  -HLM Goal: 4: Move to chair/commode  JH-HLM Achieved: 6: Walk 10 steps or more  HLM Goal NOT achieved. Continue with multidisciplinary rounding and encourage appropriate mobility to improve upon HLM goals.    Patient Centered Rounds: I performed bedside rounds with nursing staff today.  Discussions with Specialists or Other Care Team Provider: PT/OT Case Management    Education and Discussions with Family / Patient: Patient declined call to .     Current Length of Stay: 4 day(s)  Current Patient Status: Inpatient   Discharge Plan: Anticipate discharge tomorrow to [facility]patient -[place] [facility]    Code Status: Level 1 - Full Code    Subjective:   Patient seen in the morning. Denied hallucinations. Feels much better than the previous night but very tired. Pt tearful and noted he was ashame.      Objective:     Vitals:   Temp (24hrs), Av °F (36.7 °C), Min:97.9 °F (36.6 °C), Max:98.2 °F (36.8 °C)    Temp:  [97.9 °F (36.6 °C)-98.2 °F (36.8 °C)] 97.9 °F (36.6 °C)  HR:  [67-80] 72  Resp:  [16-20] 18  BP: (143-184)/(83-97) 149/83  SpO2:  [95 %-96 %] 95 %  Body mass index is 35.06 kg/m².     Input and Output Summary (last 24 hours):     Intake/Output Summary (Last 24 hours) at 2023 1404  Last data filed at 2023 0001  Gross per 24 hour   Intake 200 ml   Output --   Net 200 ml       Physical Exam:   Physical Exam  Vitals and nursing note reviewed.   Constitutional:       General: He is not in acute distress.      Appearance: Normal appearance. He is obese. He is not toxic-appearing.   HENT:      Head: Normocephalic and atraumatic.      Nose: Nose normal.      Mouth/Throat:      Mouth: Mucous membranes are moist.      Pharynx: Oropharynx is clear. No oropharyngeal exudate.   Eyes:      General: No scleral icterus.        Right eye: No discharge.         Left eye: No discharge.      Extraocular Movements: Extraocular movements intact.      Conjunctiva/sclera: Conjunctivae normal.   Cardiovascular:      Rate and Rhythm: Normal rate and regular rhythm.      Pulses: Normal pulses.      Heart sounds: Normal heart sounds. No murmur heard.     No gallop.   Pulmonary:      Effort: Pulmonary effort is normal. No respiratory distress.      Breath sounds: Normal breath sounds. No stridor. No wheezing, rhonchi or rales.   Abdominal:      General: Bowel sounds are normal. There is no distension.      Palpations: Abdomen is soft.      Tenderness: There is no abdominal tenderness. There is no guarding.   Genitourinary:     Comments: MIAN performed; stool in rectal vault. Tone normal   Musculoskeletal:      Cervical back: No tenderness.      Right lower leg: No edema.      Left lower leg: No edema.   Lymphadenopathy:      Cervical: No cervical adenopathy.   Skin:     General: Skin is warm and dry.      Capillary Refill: Capillary refill takes less than 2 seconds.      Coloration: Skin is not jaundiced.   Neurological:      Mental Status: He is oriented to person, place, and time.          Additional Data:     Labs:  Results from last 7 days   Lab Units 12/16/23  0439   WBC Thousand/uL 4.53   HEMOGLOBIN g/dL 11.6*   HEMATOCRIT % 35.2*   PLATELETS Thousands/uL 137*   NEUTROS PCT % 61   LYMPHS PCT % 25   MONOS PCT % 7   EOS PCT % 5     Results from last 7 days   Lab Units 12/16/23  0439   SODIUM mmol/L 142   POTASSIUM mmol/L 3.3*   CHLORIDE mmol/L 106   CO2 mmol/L 29   BUN mg/dL 6   CREATININE mg/dL 0.79   ANION GAP mmol/L 7   CALCIUM mg/dL 8.9    ALBUMIN g/dL 3.6   TOTAL BILIRUBIN mg/dL 0.56   ALK PHOS U/L 82   ALT U/L 11   AST U/L 26   GLUCOSE RANDOM mg/dL 98                       Lines/Drains:  Invasive Devices       Peripheral Intravenous Line  Duration             Peripheral IV 12/15/23 Dorsal (posterior);Left Hand 1 day                          Imaging: No pertinent imaging reviewed.    Recent Cultures (last 7 days):         Last 24 Hours Medication List:   Current Facility-Administered Medications   Medication Dose Route Frequency Provider Last Rate    acetaminophen  650 mg Oral Q4H PRN Alan Hernandez MD      aluminum-magnesium hydroxide-simethicone  30 mL Oral Q4H PRN Alan Hernandez MD      dextrose 5% lactated ringer's  125 mL/hr Intravenous Continuous Alan Hernandez  mL/hr (12/16/23 0112)    diazepam  10 mg Oral Q4H PRN Melofelia Andresundram, DO      enoxaparin  40 mg Subcutaneous Daily Alan Hernandez MD      escitalopram  20 mg Oral Daily Patrick Reyes, DO      famotidine  20 mg Oral BID Sarah Dangelo MD      folic acid 1 mg in sodium chloride 0.9 % 50 mL IVPB  1 mg Intravenous Daily Alan Hernandez MD 1 mg (12/16/23 0931)    ibuprofen  600 mg Oral Q6H COLBY Alan Hernandez MD      labetalol  10 mg Intravenous Q6H PRN Linda Sunshine DO      lidocaine  1 patch Topical Daily Patrick Reyes, DO      LORazepam  2 mg Intravenous Q4H PRN Mel Andresundkumar, DO      LORazepam  4 mg Intravenous Once Abel Flores MD      LORazepam  2 mg Oral Once Alan Hernandez MD      polyethylene glycol  17 g Oral Daily Linda Sunshine DO      senna-docusate sodium  1 tablet Oral HS Linda Sunshine DO      thiamine  500 mg Intravenous TID Mel Balasundram,  mg (12/16/23 1014)    Followed by    [START ON 12/18/2023] thiamine  500 mg Intravenous Daily Mel Balasundram, DO      Followed by    [START ON 12/21/2023] thiamine  100 mg Intravenous Daily Mel Balasundram, DO          Today, Patient Was Seen By: Orlando Cline,  MD    **Please Note: This note may have been constructed using a voice recognition system.**

## 2023-12-17 LAB
ANION GAP SERPL CALCULATED.3IONS-SCNC: 5 MMOL/L
BASOPHILS # BLD AUTO: 0.05 THOUSANDS/ÂΜL (ref 0–0.1)
BASOPHILS NFR BLD AUTO: 1 % (ref 0–1)
BUN SERPL-MCNC: 6 MG/DL (ref 5–25)
CALCIUM SERPL-MCNC: 8.4 MG/DL (ref 8.4–10.2)
CHLORIDE SERPL-SCNC: 106 MMOL/L (ref 96–108)
CO2 SERPL-SCNC: 30 MMOL/L (ref 21–32)
CREAT SERPL-MCNC: 0.83 MG/DL (ref 0.6–1.3)
EOSINOPHIL # BLD AUTO: 0.21 THOUSAND/ÂΜL (ref 0–0.61)
EOSINOPHIL NFR BLD AUTO: 4 % (ref 0–6)
ERYTHROCYTE [DISTWIDTH] IN BLOOD BY AUTOMATED COUNT: 13.9 % (ref 11.6–15.1)
GFR SERPL CREATININE-BSD FRML MDRD: 97 ML/MIN/1.73SQ M
GLUCOSE SERPL-MCNC: 104 MG/DL (ref 65–140)
HCT VFR BLD AUTO: 34.3 % (ref 36.5–49.3)
HGB BLD-MCNC: 11.6 G/DL (ref 12–17)
IMM GRANULOCYTES # BLD AUTO: 0.02 THOUSAND/UL (ref 0–0.2)
IMM GRANULOCYTES NFR BLD AUTO: 0 % (ref 0–2)
LYMPHOCYTES # BLD AUTO: 1.02 THOUSANDS/ÂΜL (ref 0.6–4.47)
LYMPHOCYTES NFR BLD AUTO: 20 % (ref 14–44)
MCH RBC QN AUTO: 32.9 PG (ref 26.8–34.3)
MCHC RBC AUTO-ENTMCNC: 33.8 G/DL (ref 31.4–37.4)
MCV RBC AUTO: 97 FL (ref 82–98)
MONOCYTES # BLD AUTO: 0.37 THOUSAND/ÂΜL (ref 0.17–1.22)
MONOCYTES NFR BLD AUTO: 7 % (ref 4–12)
NEUTROPHILS # BLD AUTO: 3.41 THOUSANDS/ÂΜL (ref 1.85–7.62)
NEUTS SEG NFR BLD AUTO: 68 % (ref 43–75)
NRBC BLD AUTO-RTO: 0 /100 WBCS
PLATELET # BLD AUTO: 143 THOUSANDS/UL (ref 149–390)
PMV BLD AUTO: 8.9 FL (ref 8.9–12.7)
POTASSIUM SERPL-SCNC: 3 MMOL/L (ref 3.5–5.3)
RBC # BLD AUTO: 3.53 MILLION/UL (ref 3.88–5.62)
SODIUM SERPL-SCNC: 141 MMOL/L (ref 135–147)
WBC # BLD AUTO: 5.08 THOUSAND/UL (ref 4.31–10.16)

## 2023-12-17 PROCEDURE — 85025 COMPLETE CBC W/AUTO DIFF WBC: CPT

## 2023-12-17 PROCEDURE — 80048 BASIC METABOLIC PNL TOTAL CA: CPT

## 2023-12-17 PROCEDURE — 97116 GAIT TRAINING THERAPY: CPT

## 2023-12-17 PROCEDURE — 97530 THERAPEUTIC ACTIVITIES: CPT

## 2023-12-17 PROCEDURE — 99232 SBSQ HOSP IP/OBS MODERATE 35: CPT | Performed by: INTERNAL MEDICINE

## 2023-12-17 RX ORDER — POTASSIUM CHLORIDE 20 MEQ/1
40 TABLET, EXTENDED RELEASE ORAL ONCE
Status: DISCONTINUED | OUTPATIENT
Start: 2023-12-17 | End: 2023-12-18

## 2023-12-17 RX ORDER — CALCIUM CARBONATE 500 MG/1
500 TABLET, CHEWABLE ORAL DAILY PRN
Status: DISCONTINUED | OUTPATIENT
Start: 2023-12-17 | End: 2023-12-18 | Stop reason: HOSPADM

## 2023-12-17 RX ORDER — AMOXICILLIN 250 MG
2 CAPSULE ORAL
Status: DISCONTINUED | OUTPATIENT
Start: 2023-12-17 | End: 2023-12-18 | Stop reason: HOSPADM

## 2023-12-17 RX ORDER — POTASSIUM CHLORIDE 20 MEQ/1
40 TABLET, EXTENDED RELEASE ORAL ONCE
Status: COMPLETED | OUTPATIENT
Start: 2023-12-17 | End: 2023-12-17

## 2023-12-17 RX ADMIN — THIAMINE HYDROCHLORIDE 500 MG: 100 INJECTION, SOLUTION INTRAMUSCULAR; INTRAVENOUS at 17:53

## 2023-12-17 RX ADMIN — SENNOSIDES, DOCUSATE SODIUM 2 TABLET: 8.6; 5 TABLET ORAL at 21:37

## 2023-12-17 RX ADMIN — DIAZEPAM 10 MG: 5 TABLET ORAL at 21:37

## 2023-12-17 RX ADMIN — THIAMINE HYDROCHLORIDE 500 MG: 100 INJECTION, SOLUTION INTRAMUSCULAR; INTRAVENOUS at 11:50

## 2023-12-17 RX ADMIN — ACETAMINOPHEN 650 MG: 325 TABLET, FILM COATED ORAL at 03:31

## 2023-12-17 RX ADMIN — IBUPROFEN 600 MG: 600 TABLET, FILM COATED ORAL at 11:51

## 2023-12-17 RX ADMIN — FOLIC ACID 1 MG: 5 INJECTION, SOLUTION INTRAMUSCULAR; INTRAVENOUS; SUBCUTANEOUS at 10:27

## 2023-12-17 RX ADMIN — DIAZEPAM 10 MG: 5 TABLET ORAL at 09:59

## 2023-12-17 RX ADMIN — IBUPROFEN 600 MG: 600 TABLET, FILM COATED ORAL at 17:58

## 2023-12-17 RX ADMIN — FAMOTIDINE 20 MG: 20 TABLET, FILM COATED ORAL at 09:50

## 2023-12-17 RX ADMIN — ENOXAPARIN SODIUM 40 MG: 40 INJECTION SUBCUTANEOUS at 09:50

## 2023-12-17 RX ADMIN — DEXTROSE, SODIUM CHLORIDE, SODIUM LACTATE, POTASSIUM CHLORIDE, AND CALCIUM CHLORIDE 125 ML/HR: 5; .6; .31; .03; .02 INJECTION, SOLUTION INTRAVENOUS at 07:17

## 2023-12-17 RX ADMIN — POLYETHYLENE GLYCOL 3350 17 G: 17 POWDER, FOR SOLUTION ORAL at 09:50

## 2023-12-17 RX ADMIN — DIAZEPAM 10 MG: 5 TABLET ORAL at 03:29

## 2023-12-17 RX ADMIN — ESCITALOPRAM OXALATE 20 MG: 20 TABLET ORAL at 09:50

## 2023-12-17 RX ADMIN — LISINOPRIL 40 MG: 20 TABLET ORAL at 09:50

## 2023-12-17 RX ADMIN — DEXTROSE, SODIUM CHLORIDE, SODIUM LACTATE, POTASSIUM CHLORIDE, AND CALCIUM CHLORIDE 125 ML/HR: 5; .6; .31; .03; .02 INJECTION, SOLUTION INTRAVENOUS at 21:30

## 2023-12-17 RX ADMIN — FAMOTIDINE 20 MG: 20 TABLET, FILM COATED ORAL at 17:59

## 2023-12-17 RX ADMIN — POTASSIUM CHLORIDE 40 MEQ: 1500 TABLET, EXTENDED RELEASE ORAL at 11:51

## 2023-12-17 RX ADMIN — THIAMINE HYDROCHLORIDE 500 MG: 100 INJECTION, SOLUTION INTRAMUSCULAR; INTRAVENOUS at 21:32

## 2023-12-17 RX ADMIN — Medication 10 MG: at 03:31

## 2023-12-17 NOTE — PLAN OF CARE
Problem: PHYSICAL THERAPY ADULT  Goal: Performs mobility at highest level of function for planned discharge setting.  See evaluation for individualized goals.  Description: Treatment/Interventions: Functional transfer training, LE strengthening/ROM, Elevations, Therapeutic exercise, Endurance training, Patient/family training, Equipment eval/education, Bed mobility, Gait training, Compensatory technique education  Equipment Recommended: Walker       See flowsheet documentation for full assessment, interventions and recommendations.  Note: Prognosis: Good  Problem List: Impaired balance, Decreased cognition  Assessment: PT interventions provided include: bed mobility, transfer, ambulation, balance, endurance, and stair negotiation training in order to challenge pt's activity, improve pt's balance and safety w/ mobility, and to maximize pt independence w/ functional mobility. In comparison to previous session, pt w/ significantly improved ambulatory balance, endurance, and safety awareness. Pt demonstrated ability to ambulate w/in room and hallway w/ supervision using RW (negotiated multiple turns and obstacles w/o requiring external assistance). Pt w/ improved balance and demonstrated ability to ascend/descend 10 practice steps w/ BUE support and supervision. Education provided on RW management and to encourage further mobility w/ hospital staff. Pt continues to be functioning below baseline level, and remains limited in functional mobility due to impaired cognition, impaired balance. Pt will continue benefit from PT to promote independence w/ functional mobility and progress towards set goals. Recommend DC w/ level: II (Moderate Rehab Resource Intensity) when medically cleared.  Barriers to Discharge: Decreased caregiver support (pt reports his girlfriend can stay w/ him upon DC as needed)     Rehab Resource Intensity Level, PT: III (Minimum Resource Intensity)    See flowsheet documentation for full assessment.

## 2023-12-17 NOTE — PROGRESS NOTES
[facility]  Progress Note  Name: Diogo Travis I  MRN: [id number]  Unit/Bed#: W -01 I Date of Admission: 12/12/2023   Date of Service: 12/17/2023 I Hospital Day: 5    Assessment/Plan   * Alcohol use disorder, severe, dependence (HCC)  Assessment & Plan  - Patient with a history of alcohol use disorder that presented to the ED after falling down 13 stairs while intoxicated  - States that his last drink was at 2 am. His CIWA score is 12 and he was given a total of 490 mg phenobarbital.   - He was admitted to ICU following administration of phenobarbital  - Stable for floor transfer  - Night of 12/14-12/15 control team called as patient was agitated, tox recommended valium, patient required restraints  - Wernicke's encephalopathy vs alcohol withdrawal  - Patient back to baseline, no longer having hallucinations, and out of soft restraints after having a bowel movement this AM.  - Continues to have ambulatory dysfunction    Plan:  - Continue CIWA protocol; treatment any withdrawal symptoms as needed with Ativan  - Thiamine repletion frequency increased to TID. Continuing Folic acid repletion  - Agreeable to inpatient alcohol rehab;   - Pending PT clearance for D/C to inpatient Rehab    Fall down stairs  Assessment & Plan  Patient fell down 13 stairs while intoxicated. Was brought into the ED as a Trauma B.   Xray trauma: No acute cardiopulmonary disease within limitations of supine imaging.   CT head: No acute intracranial abnormality.   CT chest abdomen pelvis:  No acute posttraumatic abnormality in the chest, abdomen, or pelvis. Diffuse hepatic steatosis.  CT cervical spine: No acute cervical spine fracture or traumatic malalignment.      Plan:  - Trauma evaluated and signed off       Alcoholic ketoacidosis  Assessment & Plan  Patient with history of alcohol use disorder and alcohol withdrawal presented to the ED as Trauma B after falling down stairs. CIWA score of 12 and was given  phenobarbital as per toxicology.     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     Hypertension  Assessment & Plan  No PTA meds  Patient's pressure elevated with agitation    Plan:   - Labetalol 10mg IV prn for SBP >180 or DBP >100               VTE Pharmacologic Prophylaxis:   Moderate Risk (Score 3-4) - Pharmacological DVT Prophylaxis Ordered: enoxaparin (Lovenox).    Mobility:   Basic Mobility Inpatient Raw Score: 15  JH-HLM Goal: 4: Move to chair/commode  JH-HLM Achieved: 3: Sit at edge of bed  HLM Goal NOT achieved. Continue with multidisciplinary rounding and encourage appropriate mobility to improve upon HLM goals.    Patient Centered Rounds: I performed bedside rounds with nursing staff today.  Discussions with Specialists or Other Care Team Provider: PT/OT Case Management    Education and Discussions with Family / Patient: Patient declined call to .     Current Length of Stay: 5 day(s)  Current Patient Status: Inpatient   Discharge Plan: Anticipate discharge tomorrow to Inpatient [facility]    Code Status: Level 1 - Full Code    Subjective:   Patient seen in the morning. Denied hallucinations. Feels much better than the previous night but very tired. Pt tearful on exam.       Objective:     Vitals:   Temp (24hrs), Av °F (36.7 °C), Min:97.4 °F (36.3 °C), Max:98.4 °F (36.9 °C)    Temp:  [97.4 °F (36.3 °C)-98.4 °F (36.9 °C)] 98.4 °F (36.9 °C)  HR:  [72-86] 75  Resp:  [12-18] 18  BP: (149-190)/() 165/96  SpO2:  [93 %-98 %] 93 %  Body mass index is 35.87 kg/m².     Input and Output Summary (last 24 hours):     Intake/Output Summary (Last 24 hours) at 2023 1032  Last data filed at 2023 2253  Gross per 24 hour   Intake 756 ml   Output --   Net 756 ml       Physical Exam:   Physical Exam  Vitals and nursing note reviewed.   Constitutional:       General: He is not in acute distress.      Appearance: Normal appearance. He is obese. He is not toxic-appearing.   HENT:      Head: Normocephalic and atraumatic.      Nose: Nose normal.      Mouth/Throat:      Mouth: Mucous membranes are moist.      Pharynx: Oropharynx is clear. No oropharyngeal exudate.   Eyes:      General: No scleral icterus.        Right eye: No discharge.         Left eye: No discharge.      Extraocular Movements: Extraocular movements intact.      Conjunctiva/sclera: Conjunctivae normal.   Cardiovascular:      Rate and Rhythm: Normal rate and regular rhythm.      Pulses: Normal pulses.      Heart sounds: Normal heart sounds. No murmur heard.     No gallop.   Pulmonary:      Effort: Pulmonary effort is normal. No respiratory distress.      Breath sounds: Normal breath sounds. No stridor. No wheezing, rhonchi or rales.   Abdominal:      General: Bowel sounds are normal. There is no distension.      Palpations: Abdomen is soft.      Tenderness: There is no abdominal tenderness. There is no guarding.   Musculoskeletal:      Cervical back: No tenderness.      Right lower leg: No edema.      Left lower leg: No edema.   Lymphadenopathy:      Cervical: No cervical adenopathy.   Skin:     General: Skin is warm and dry.      Capillary Refill: Capillary refill takes less than 2 seconds.      Coloration: Skin is not jaundiced.   Neurological:      Mental Status: He is oriented to person, place, and time.          Additional Data:     Labs:  Results from last 7 days   Lab Units 12/17/23  0557   WBC Thousand/uL 5.08   HEMOGLOBIN g/dL 11.6*   HEMATOCRIT % 34.3*   PLATELETS Thousands/uL 143*   NEUTROS PCT % 68   LYMPHS PCT % 20   MONOS PCT % 7   EOS PCT % 4     Results from last 7 days   Lab Units 12/17/23  0557 12/16/23  0439   SODIUM mmol/L 141 142   POTASSIUM mmol/L 3.0* 3.3*   CHLORIDE mmol/L 106 106   CO2 mmol/L 30 29   BUN mg/dL 6 6   CREATININE mg/dL 0.83 0.79   ANION GAP mmol/L 5 7   CALCIUM mg/dL 8.4 8.9   ALBUMIN g/dL  --  3.6   TOTAL  BILIRUBIN mg/dL  --  0.56   ALK PHOS U/L  --  82   ALT U/L  --  11   AST U/L  --  26   GLUCOSE RANDOM mg/dL 104 98                       Lines/Drains:  Invasive Devices       Peripheral Intravenous Line  Duration             Peripheral IV 12/17/23 Left;Upper;Ventral (anterior) Arm <1 day                          Imaging: No pertinent imaging reviewed.    Recent Cultures (last 7 days):         Last 24 Hours Medication List:   Current Facility-Administered Medications   Medication Dose Route Frequency Provider Last Rate    acetaminophen  650 mg Oral Q4H PRN Alan Hernandez MD      aluminum-magnesium hydroxide-simethicone  30 mL Oral Q4H PRN Alan Hernandez MD      dextrose 5% lactated ringer's  125 mL/hr Intravenous Continuous Alan Hernandez  mL/hr (12/17/23 0717)    diazepam  10 mg Oral Q4H PRN Mel Wyatt, DO      enoxaparin  40 mg Subcutaneous Daily Alan Hernandez MD      escitalopram  20 mg Oral Daily Patrick Reyes, DO      famotidine  20 mg Oral BID Sarah Dangelo MD      folic acid 1 mg in sodium chloride 0.9 % 50 mL IVPB  1 mg Intravenous Daily Alan Hernandez MD 1 mg (12/17/23 1027)    ibuprofen  600 mg Oral Q6H COLBY Alan Hernandez MD      labetalol  10 mg Intravenous Q6H PRN Linda Sunshine DO      lidocaine  1 patch Topical Daily Patrick Reyes, DO      lisinopril  40 mg Oral Daily Becky Skelton MD      LORazepam  2 mg Intravenous Q4H PRN Mel Wyatt, DO      LORazepam  4 mg Intravenous Once Abel Flores MD      LORazepam  2 mg Oral Once Alan Hernandez MD      polyethylene glycol  17 g Oral Daily Linda Sunshine DO      potassium chloride  40 mEq Oral Once Linda Sunshine DO      Followed by    potassium chloride  40 mEq Oral Once Linda Sunshine DO      senna-docusate sodium  2 tablet Oral HS Linda Sunshine DO      thiamine  500 mg Intravenous TID Mel Wyatt DO Stopped (12/16/23 2253)    Followed by    [START ON 12/18/2023] thiamine  500 mg  Intravenous Daily Mel Wyatt DO      Followed by    [START ON 12/21/2023] thiamine  100 mg Intravenous Daily Mel DO Yoselin          Today, Patient Was Seen By: Linda Sunshine DO    **Please Note: This note may have been constructed using a voice recognition system.**

## 2023-12-17 NOTE — PHYSICAL THERAPY NOTE
"                                                   PHYSICAL THERAPY TREATMENT NOTE          Patient Name: Diogo Travis  Today's Date: 12/17/2023          AGE:   58 y.o.  Mrn:   [id number]  ADMIT DX:  Head trauma [S09.90XA]  Alcohol withdrawal syndrome with perceptual disturbance (HCC) [F10.932]    Past Medical History:  Past Medical History:   Diagnosis Date    Alcoholic ketoacidosis 10/16/2023    GERD (gastroesophageal reflux disease)     Hypertension     Hypomagnesemia 04/02/2023    Vomiting 04/02/2023 12/17/23 1444   PT Last Visit   PT Visit Date 12/17/23   Note Type   Note Type Treatment   Pain Assessment   Pain Assessment Tool 0-10   Pain Score No Pain   Restrictions/Precautions   Weight Bearing Precautions Per Order No   Other Precautions Cognitive;Chair Alarm;Bed Alarm;Multiple lines;Fall Risk   General   Chart Reviewed Yes   Family/Caregiver Present No   Cognition   Overall Cognitive Status Impaired  (impaired short-term memory)   Arousal/Participation Alert;Cooperative   Attention Within functional limits   Orientation Level Oriented X4   Memory Decreased short term memory;Decreased recall of recent events   Following Commands Follows one step commands without difficulty   Comments Pt very motivated and agreeable to work w/ PT and mobilize. Compared to previous session, pt w/ improved safety awareness and problem solving ability   Subjective   Subjective \"It feels so good to be up\"   Bed Mobility   Supine to Sit 5  Supervision   Additional items Assist x 1;HOB elevated;Bedrails;Increased time required   Transfers   Sit to Stand 5  Supervision   Additional items Assist x 1;Increased time required;Verbal cues   Stand to Sit 5  Supervision   Additional items Assist x 1;Armrests;Increased time required;Verbal cues   Additional Comments 2 sit<>stand transfer trials. pt able to maintain static standing balance at toilet w/ close supervision and no UE support for approx 60 sec   Ambulation/Elevation "   Gait pattern Decreased foot clearance;Short stride;Excessively slow   Gait Assistance 5  Supervision   Additional items Assist x 1;Verbal cues   Assistive Device Rolling walker   Distance 20'+260'   Stair Management Assistance 5  Supervision   Additional items Assist x 1;Verbal cues   Stair Management Technique Two rails;Step to pattern   Number of Stairs 10   Ambulation/Elevation Additional Comments VC for RW management (maintain walker in contact w/ floor) w/ pt demonstrating good carryover of cues. Pt able to negotiate self and RW around obstacles and turns w/o assistance   Balance   Static Sitting Good   Dynamic Sitting Fair +   Static Standing Fair   Dynamic Standing Fair -   Ambulatory Fair -  (w/ RW)   Activity Tolerance   Activity Tolerance Patient tolerated treatment well   Medical Staff Made Aware Contacted Resident Linda Sunshine post, updated on pt performance and pt's wish to DC home and attend meetings/support groups a few times/wk vs going to an inpatient (alcohol) rehab facility. PCA Shira   Nurse Made Aware RN Selma   Assessment   Prognosis Good   Problem List Impaired balance;Decreased cognition   Assessment PT interventions provided include: bed mobility, transfer, ambulation, balance, endurance, and stair negotiation training in order to challenge pt's activity, improve pt's balance and safety w/ mobility, and to maximize pt independence w/ functional mobility. In comparison to previous session, pt w/ significantly improved ambulatory balance, endurance, and safety awareness. Pt demonstrated ability to ambulate w/in room and hallway w/ supervision using RW (negotiated multiple turns and obstacles w/o requiring external assistance). Pt w/ improved balance and demonstrated ability to ascend/descend 10 practice steps w/ BUE support and supervision. Education provided on RW management and to encourage further mobility w/ hospital staff. Pt continues to be functioning below baseline level, and  remains limited in functional mobility due to impaired cognition, impaired balance. Pt will continue benefit from PT to promote independence w/ functional mobility and progress towards set goals. Recommend DC w/ level: II (Moderate Rehab Resource Intensity) when medically cleared.   Barriers to Discharge Decreased caregiver support  (pt reports his girlfriend can stay w/ him upon DC as needed)   Goals   Patient Goals to get better, to go home   STG Expiration Date 12/27/23   Short Term Goal #1 Pt will: perform bed mobility w/ mod I to decrease pt's burden of care and increase pt's independence w/ repositioning in bed; perform transfers w/ mod I to promote increased OOB mobility; ambulate at least 350' w/ LRAD and mod I to increase pt's ambulatory endurance/tolerance; negotiate 13 stair(s) w/ UE support and mod I to facilitate pt returning to previous living environment; increase all balance ratings by at least 1 grade to decrease pt's risk of falls   PT Treatment Day 3   Plan   Treatment/Interventions Functional transfer training;LE strengthening/ROM;Therapeutic exercise;Endurance training;Patient/family training;Gait training;Equipment eval/education;Bed mobility;Compensatory technique education   PT Frequency 2-3x/wk   Discharge Recommendation   Rehab Resource Intensity Level, PT III (Minimum Resource Intensity)   Equipment Recommended Walker   Walker Package Recommended Wheeled walker   Change/add to Walker Package? No   AM-PAC Basic Mobility Inpatient   Turning in Flat Bed Without Bedrails 3   Lying on Back to Sitting on Edge of Flat Bed Without Bedrails 3   Moving Bed to Chair 3   Standing Up From Chair Using Arms 3   Walk in Room 3   Climb 3-5 Stairs With Railing 3   Basic Mobility Inpatient Raw Score 18   Basic Mobility Standardized Score 41.05   Highest Level Of Mobility   JH-HLM Goal 6: Walk 10 steps or more   JH-HLM Achieved 8: Walk 250 feet ot more   Education   Education Provided Mobility  training;Assistive device   Patient Demonstrates acceptance/verbal understanding;Demonstrates verbal understanding   End of Consult   Patient Position at End of Consult Bedside chair;Bed/Chair alarm activated;All needs within reach       The patient's AM-PAC Basic Mobility Inpatient Short Form Raw Score is 18. A Raw score of greater than 16 suggests the patient may benefit from discharge to home. Please also refer to the recommendation of the Physical Therapist for safe discharge planning.    Pt will continue to benefit from skilled inpatient PT during this admission in order to facilitate progress towards goals and to maximize pt's independence w/ functional mobility.    DC rec: level III (Minimum Rehab Resource Intensity)      Yasmeen Barraza, PT, DPT  12/17/23

## 2023-12-17 NOTE — ASSESSMENT & PLAN NOTE
- Patient with a history of alcohol use disorder that presented to the ED after falling down 13 stairs while intoxicated  - States that his last drink was at 2 am. His CIWA score is 12 and he was given a total of 490 mg phenobarbital.   - He was admitted to ICU following administration of phenobarbital  - Stable for floor transfer  - Night of 12/14-12/15 control team called as patient was agitated, tox recommended valium, patient required restraints  - Wernicke's encephalopathy vs alcohol withdrawal  - Patient back to baseline, no longer having hallucinations, and out of soft restraints after having a bowel movement this AM.  - Continues to have ambulatory dysfunction  - PT - OK for discharge to rehab with outpatient physical therapy     Plan:  - Continue CIWA protocol; treatment any withdrawal symptoms as needed with Ativan  - Thiamine repletion frequency increased to TID. Continuing Folic acid repletion  - Agreeable to inpatient alcohol rehab;   - Pending PT clearance for D/C to inpatient Rehab

## 2023-12-17 NOTE — PLAN OF CARE
Problem: PAIN - ADULT  Goal: Verbalizes/displays adequate comfort level or baseline comfort level  Description: Interventions:  - Encourage patient to monitor pain and request assistance  - Assess pain using appropriate pain scale  - Administer analgesics based on type and severity of pain and evaluate response  - Implement non-pharmacological measures as appropriate and evaluate response  - Consider cultural and social influences on pain and pain management  - Notify physician/advanced practitioner if interventions unsuccessful or patient reports new pain  Outcome: Progressing     Problem: INFECTION - ADULT  Goal: Absence or prevention of progression during hospitalization  Description: INTERVENTIONS:  - Assess and monitor for signs and symptoms of infection  - Monitor lab/diagnostic results  - Monitor all insertion sites, i.e. indwelling lines, tubes, and drains  - Monitor endotracheal if appropriate and nasal secretions for changes in amount and color  - [place] appropriate cooling/warming therapies per order  - Administer medications as ordered  - Instruct and encourage patient and family to use good hand hygiene technique  - Identify and instruct in appropriate isolation precautions for identified infection/condition  Outcome: Progressing  Goal: Absence of fever/infection during neutropenic period  Description: INTERVENTIONS:  - Monitor WBC    Outcome: Progressing     Problem: SAFETY ADULT  Goal: Patient will remain free of falls  Description: INTERVENTIONS:  - Educate patient/family on patient safety including physical limitations  - Instruct patient to call for assistance with activity   - Consult OT/PT to assist with strengthening/mobility   - Keep Call bell within reach  - Keep bed low and locked with side rails adjusted as appropriate  - Keep care items and personal belongings within reach  - Initiate and maintain comfort rounds  - Make Fall Risk Sign visible to staff  - Offer Toileting every  Hours,  in advance of need  - Initiate/Maintain alarm  - Obtain necessary fall risk management equipment:   - Apply yellow socks and bracelet for high fall risk patients  - Consider moving patient to room near nurses station  Outcome: Progressing  Goal: Maintain or return to baseline ADL function  Description: INTERVENTIONS:  -  Assess patient's ability to carry out ADLs; assess patient's baseline for ADL function and identify physical deficits which impact ability to perform ADLs (bathing, care of mouth/teeth, toileting, grooming, dressing, etc.)  - Assess/evaluate cause of self-care deficits   - Assess range of motion  - Assess patient's mobility; develop plan if impaired  - Assess patient's need for assistive devices and provide as appropriate  - Encourage maximum independence but intervene and supervise when necessary  - Involve family in performance of ADLs  - Assess for home care needs following discharge   - Consider OT consult to assist with ADL evaluation and planning for discharge  - Provide patient education as appropriate  Outcome: Progressing  Goal: Maintains/Returns to pre admission functional level  Description: INTERVENTIONS:  - Perform AM-PAC 6 Click Basic Mobility/ Daily Activity assessment daily.  - Set and communicate daily mobility goal to care team and patient/family/caregiver.   - Collaborate with rehabilitation services on mobility goals if consulted  - Perform Range of Motion  times a day.  - Reposition patient every  hours.  - Dangle patient  times a day  - Stand patient  times a day  - Ambulate patient  times a day  - Out of bed to chair  times a day   - Out of bed for meals times a day  - Out of bed for toileting  - Record patient progress and toleration of activity level   Outcome: Progressing     Problem: DISCHARGE PLANNING  Goal: Discharge to home or other facility with appropriate resources  Description: INTERVENTIONS:  - Identify barriers to discharge w/patient and caregiver  - Arrange for  needed discharge resources and transportation as appropriate  - Identify discharge learning needs (meds, wound care, etc.)  - Arrange for interpretive services to assist at discharge as needed  - Refer to Case Management Department for coordinating discharge planning if the patient needs post-hospital services based on physician/advanced practitioner order or complex needs related to functional status, cognitive ability, or social support system  Outcome: Progressing     Problem: Knowledge Deficit  Goal: Patient/family/caregiver demonstrates understanding of disease process, treatment plan, medications, and discharge instructions  Description: Complete learning assessment and assess knowledge base.  Interventions:  - Provide teaching at level of understanding  - Provide teaching via preferred learning methods  Outcome: Progressing

## 2023-12-18 VITALS
OXYGEN SATURATION: 95 % | BODY MASS INDEX: 36.19 KG/M2 | TEMPERATURE: 97.6 F | HEIGHT: 68 IN | WEIGHT: 238.76 LBS | SYSTOLIC BLOOD PRESSURE: 160 MMHG | DIASTOLIC BLOOD PRESSURE: 103 MMHG | HEART RATE: 76 BPM | RESPIRATION RATE: 16 BRPM

## 2023-12-18 PROBLEM — F10.932 ALCOHOL WITHDRAWAL SYNDROME WITH PERCEPTUAL DISTURBANCE (HCC): Status: RESOLVED | Noted: 2023-10-01 | Resolved: 2023-12-18

## 2023-12-18 PROBLEM — K59.00 CONSTIPATION: Status: ACTIVE | Noted: 2023-12-18

## 2023-12-18 PROBLEM — E87.29 ALCOHOLIC KETOACIDOSIS: Status: RESOLVED | Noted: 2023-10-16 | Resolved: 2023-12-18
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
## 2023-12-18 LAB
ANION GAP SERPL CALCULATED.3IONS-SCNC: 6 MMOL/L
BASOPHILS # BLD AUTO: 0.04 THOUSANDS/ÂΜL (ref 0–0.1)
BASOPHILS NFR BLD AUTO: 1 % (ref 0–1)
BUN SERPL-MCNC: 4 MG/DL (ref 5–25)
CALCIUM SERPL-MCNC: 9 MG/DL (ref 8.4–10.2)
CHLORIDE SERPL-SCNC: 104 MMOL/L (ref 96–108)
CO2 SERPL-SCNC: 30 MMOL/L (ref 21–32)
CREAT SERPL-MCNC: 0.82 MG/DL (ref 0.6–1.3)
EOSINOPHIL # BLD AUTO: 0.23 THOUSAND/ÂΜL (ref 0–0.61)
EOSINOPHIL NFR BLD AUTO: 4 % (ref 0–6)
ERYTHROCYTE [DISTWIDTH] IN BLOOD BY AUTOMATED COUNT: 13.9 % (ref 11.6–15.1)
GFR SERPL CREATININE-BSD FRML MDRD: 98 ML/MIN/1.73SQ M
GLUCOSE SERPL-MCNC: 107 MG/DL (ref 65–140)
HCT VFR BLD AUTO: 36.9 % (ref 36.5–49.3)
HGB BLD-MCNC: 12.7 G/DL (ref 12–17)
IMM GRANULOCYTES # BLD AUTO: 0.03 THOUSAND/UL (ref 0–0.2)
IMM GRANULOCYTES NFR BLD AUTO: 1 % (ref 0–2)
LYMPHOCYTES # BLD AUTO: 1.04 THOUSANDS/ÂΜL (ref 0.6–4.47)
LYMPHOCYTES NFR BLD AUTO: 20 % (ref 14–44)
MAGNESIUM SERPL-MCNC: 1.6 MG/DL (ref 1.9–2.7)
MCH RBC QN AUTO: 33 PG (ref 26.8–34.3)
MCHC RBC AUTO-ENTMCNC: 34.4 G/DL (ref 31.4–37.4)
MCV RBC AUTO: 96 FL (ref 82–98)
MONOCYTES # BLD AUTO: 0.44 THOUSAND/ÂΜL (ref 0.17–1.22)
MONOCYTES NFR BLD AUTO: 8 % (ref 4–12)
NEUTROPHILS # BLD AUTO: 3.47 THOUSANDS/ÂΜL (ref 1.85–7.62)
NEUTS SEG NFR BLD AUTO: 66 % (ref 43–75)
NRBC BLD AUTO-RTO: 0 /100 WBCS
PLATELET # BLD AUTO: 170 THOUSANDS/UL (ref 149–390)
PMV BLD AUTO: 8.6 FL (ref 8.9–12.7)
POTASSIUM SERPL-SCNC: 3.4 MMOL/L (ref 3.5–5.3)
RBC # BLD AUTO: 3.85 MILLION/UL (ref 3.88–5.62)
SODIUM SERPL-SCNC: 140 MMOL/L (ref 135–147)
WBC # BLD AUTO: 5.25 THOUSAND/UL (ref 4.31–10.16)

## 2023-12-18 PROCEDURE — 99239 HOSP IP/OBS DSCHRG MGMT >30: CPT | Performed by: INTERNAL MEDICINE

## 2023-12-18 PROCEDURE — 83735 ASSAY OF MAGNESIUM: CPT

## 2023-12-18 PROCEDURE — 85025 COMPLETE CBC W/AUTO DIFF WBC: CPT

## 2023-12-18 PROCEDURE — 80048 BASIC METABOLIC PNL TOTAL CA: CPT

## 2023-12-18 RX ORDER — PANTOPRAZOLE SODIUM 40 MG/1
40 TABLET, DELAYED RELEASE ORAL
Status: DISCONTINUED | OUTPATIENT
Start: 2023-12-18 | End: 2023-12-18 | Stop reason: HOSPADM

## 2023-12-18 RX ORDER — PANTOPRAZOLE SODIUM 40 MG/1
40 TABLET, DELAYED RELEASE ORAL
Qty: 30 TABLET | Refills: 0 | Status: SHIPPED | OUTPATIENT
Start: 2023-12-18 | End: 2024-01-17

## 2023-12-18 RX ORDER — AMLODIPINE BESYLATE 5 MG/1
5 TABLET ORAL DAILY
Qty: 30 TABLET | Refills: 0 | Status: SHIPPED | OUTPATIENT
Start: 2023-12-18 | End: 2024-01-17

## 2023-12-18 RX ORDER — LANOLIN ALCOHOL/MO/W.PET/CERES
400 CREAM (GRAM) TOPICAL DAILY
Qty: 30 TABLET | Refills: 0 | Status: SHIPPED | OUTPATIENT
Start: 2023-12-18 | End: 2024-01-17

## 2023-12-18 RX ORDER — FAMOTIDINE 20 MG/1
20 TABLET, FILM COATED ORAL 2 TIMES DAILY
Qty: 60 TABLET | Refills: 0 | Status: SHIPPED | OUTPATIENT
Start: 2023-12-18 | End: 2024-01-17

## 2023-12-18 RX ORDER — POLYETHYLENE GLYCOL 3350 17 G/17G
17 POWDER, FOR SOLUTION ORAL DAILY
Qty: 30 EACH | Refills: 0 | Status: SHIPPED | OUTPATIENT
Start: 2023-12-19 | End: 2024-01-18

## 2023-12-18 RX ORDER — AMOXICILLIN 250 MG
2 CAPSULE ORAL
Qty: 60 TABLET | Refills: 0 | Status: SHIPPED | OUTPATIENT
Start: 2023-12-18 | End: 2024-01-17

## 2023-12-18 RX ORDER — PANTOPRAZOLE SODIUM 40 MG/1
40 TABLET, DELAYED RELEASE ORAL
Status: DISCONTINUED | OUTPATIENT
Start: 2023-12-19 | End: 2023-12-18

## 2023-12-18 RX ORDER — BISACODYL 10 MG
10 SUPPOSITORY, RECTAL RECTAL DAILY PRN
Status: DISCONTINUED | OUTPATIENT
Start: 2023-12-18 | End: 2023-12-18 | Stop reason: HOSPADM

## 2023-12-18 RX ORDER — POTASSIUM CHLORIDE 20 MEQ/1
40 TABLET, EXTENDED RELEASE ORAL ONCE
Status: COMPLETED | OUTPATIENT
Start: 2023-12-18 | End: 2023-12-18

## 2023-12-18 RX ORDER — GAUZE BANDAGE 2" X 2"
100 BANDAGE TOPICAL DAILY
Qty: 30 TABLET | Refills: 0 | Status: SHIPPED | OUTPATIENT
Start: 2023-12-18 | End: 2024-01-17

## 2023-12-18 RX ORDER — LISINOPRIL 40 MG/1
40 TABLET ORAL DAILY
Qty: 30 TABLET | Refills: 0 | Status: SHIPPED | OUTPATIENT
Start: 2023-12-19 | End: 2024-01-18

## 2023-12-18 RX ORDER — AMLODIPINE BESYLATE 5 MG/1
5 TABLET ORAL DAILY
Status: DISCONTINUED | OUTPATIENT
Start: 2023-12-18 | End: 2023-12-18 | Stop reason: HOSPADM

## 2023-12-18 RX ORDER — MAGNESIUM SULFATE HEPTAHYDRATE 40 MG/ML
2 INJECTION, SOLUTION INTRAVENOUS ONCE
Status: COMPLETED | OUTPATIENT
Start: 2023-12-18 | End: 2023-12-18

## 2023-12-18 RX ADMIN — PANTOPRAZOLE SODIUM 40 MG: 40 TABLET, DELAYED RELEASE ORAL at 14:39

## 2023-12-18 RX ADMIN — Medication 10 MG: at 06:23

## 2023-12-18 RX ADMIN — FAMOTIDINE 20 MG: 20 TABLET, FILM COATED ORAL at 10:22

## 2023-12-18 RX ADMIN — DIAZEPAM 10 MG: 5 TABLET ORAL at 06:10

## 2023-12-18 RX ADMIN — MAGNESIUM SULFATE HEPTAHYDRATE 2 G: 40 INJECTION, SOLUTION INTRAVENOUS at 10:28

## 2023-12-18 RX ADMIN — POTASSIUM CHLORIDE 40 MEQ: 1500 TABLET, EXTENDED RELEASE ORAL at 10:19

## 2023-12-18 RX ADMIN — ESCITALOPRAM OXALATE 20 MG: 20 TABLET ORAL at 10:19

## 2023-12-18 RX ADMIN — THIAMINE HYDROCHLORIDE 500 MG: 100 INJECTION, SOLUTION INTRAMUSCULAR; INTRAVENOUS at 13:48

## 2023-12-18 RX ADMIN — IBUPROFEN 600 MG: 600 TABLET, FILM COATED ORAL at 06:10

## 2023-12-18 RX ADMIN — ENOXAPARIN SODIUM 40 MG: 40 INJECTION SUBCUTANEOUS at 10:19

## 2023-12-18 RX ADMIN — LISINOPRIL 40 MG: 20 TABLET ORAL at 10:19

## 2023-12-18 RX ADMIN — POLYETHYLENE GLYCOL 3350 17 G: 17 POWDER, FOR SOLUTION ORAL at 10:19

## 2023-12-18 RX ADMIN — AMLODIPINE BESYLATE 5 MG: 5 TABLET ORAL at 14:39

## 2023-12-18 NOTE — CASE MANAGEMENT
Case Management Progress Note    Patient name Diogo Travis  Location W /W -01 MRN [id number]  : 1965 Date 2023       LOS (days): 6  Geometric Mean LOS (GMLOS) (days):   Days to GMLOS:        OBJECTIVE:        Current admission status: Inpatient  Preferred Pharmacy:   [facility]/pharmacy #[id number] - [place] - [address]  [address] [facility] [address]  Phone: [phone] Fax: [phone]    [facility] [address] [place] [address], [place] [facility] [address]  Phone: [phone] Fax: [phone]    Primary Care Provider: Alice Stroud MD    [place]: [place] [employer]  Secondary Insurance:     PROGRESS NOTE:  CM notified by [facility] that patient is medially cleared for d/c- notified Olya from [facility].     Olya met with patient at bedside who is now declining IP treatment.     Patient stating he has transportation home.     No further CM needs anticipated at this time.

## 2023-12-18 NOTE — ASSESSMENT & PLAN NOTE
Patient fell down 13 stairs while intoxicated. Was brought into the ED as a Trauma B.   Xray trauma: No acute cardiopulmonary disease within limitations of supine imaging.   CT head: No acute intracranial abnormality.   CT chest abdomen pelvis:  No acute posttraumatic abnormality in the chest, abdomen, or pelvis. Diffuse hepatic steatosis.  CT cervical spine: No acute cervical spine fracture or traumatic malalignment.      Plan:  - Trauma evaluated and signed off, continue alcohol cessation

## 2023-12-18 NOTE — ASSESSMENT & PLAN NOTE
Attempted to call patient with over-read by radiologist showing malunion of previous fracture. Review of records shows patient was splinted and given ortho follow up. Patient just needs to be notified and ortho follow up stressed. There was no answer, message left to return call to the ED IMPRESSION IMPRESSION: 
  
  
1. 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 
 
Governcarolann Vences FNP-BC 7:50 AM 2/4/19 Patient constipated, continue bowel regimen at home   Plan:  -Miralax daily  -Senokot qHS

## 2023-12-18 NOTE — DISCHARGE INSTR - AVS FIRST PAGE
Dear Diogo Travis,     It was our pleasure to care for you here at [facility].  It is our hope that we were always able to exceed the expected standards for your care during your stay.  You were hospitalized due to a fall and alcohol withdrawal.  You were cared for on the 4th floor by Álvaro Cunningham MD under the service of Abel Flores MD with the [facility] who covers for your primary care physician (PCP), Alice Stroud MD, while you were hospitalized.  If you have any questions or concerns related to this hospitalization, you may contact us at .  For follow up as well as any medication refills, we recommend that you follow up with your primary care physician.  A registered nurse will reach out to you by phone within a few days after your discharge to answer any additional questions that you may have after going home.  However, at this time we provide for you here, the most important instructions / recommendations at discharge:     Notable Medication Adjustments -   Please take one tablet of Norvasc (amlodipine) 5 mg daily for blood pressure  Please take one tablet of Zestril (lisinopril) 40 mg daily for blood pressure  Please take one tablet of Pepcid (famotidine) 20 mg twice a day for GERD  Please take one tablet of Protonix (pantoprazole) 40 mg daily for GERD  Please take one packet of Miralax (polyethylene glycol) daily for constipation  Please take two tablets of Senokot (senna-docusate sodium) nightly for constipation   Please take one tablet of Vitamin B1 (Thiamine) 100 mg daily  Please take one tablet of Folic Acid 400 mcg daily  Testing Required after Discharge -   None  ** Please contact your PCP to request testing orders for any of the testing recommended here **  Important follow up information -   Please follow up with outpatient Internal Medicine in one week. A referral has been sent.   Please follow up with  outpatient Physical Therapy in one week. A referral has been sent.  Please follow up with outpatient Cardiology appointment. It is scheduled for 1/16/2024.  Please follow up with outpatient alcohol rehab and recovery at [facility].  Other Instructions -   Please continue to abstain from alcohol.  Please review this entire after visit summary as additional general instructions including medication list, appointments, activity, diet, any pertinent wound care, and other additional recommendations from your care team that may be provided for you.      Sincerely,     Álvaro Cunningham MD

## 2023-12-18 NOTE — ASSESSMENT & PLAN NOTE
No PTA meds  Patient's pressure elevated with agitation    Plan:   - Continue taking lisinopril 40 mg daily  - Start taking amlodipine 5 mg daily

## 2023-12-18 NOTE — ASSESSMENT & PLAN NOTE
- Patient with a history of alcohol use disorder that presented to the ED after falling down 13 stairs while intoxicated  - States that his last drink was at 2 am. His CIWA score is 12 and he was given a total of 490 mg phenobarbital.   - He was admitted to ICU following administration of phenobarbital  - Stable for floor transfer  - Night of 12/14-12/15 control team called as patient was agitated, tox recommended valium, patient required restraints  - Wernicke's encephalopathy vs alcohol withdrawal  - Patient back to baseline, no longer having hallucinations, and out of soft restraints after having a bowel movement this AM.  - Continues to have ambulatory dysfunction  - PT - OK for discharge to rehab with outpatient physical therapy     Plan:  - Patient to complete alcohol rehab outpatient at [facility]  - Start taking folic acid 400 mcg daily  - Start taking Thiamine Mononitrate 100 mg daily   - Start taking Folic acid 400 mcg daily  - Start taking Pepcid 20 mg BID for alcoholic gastritis  - Start taking Protonix 40 mg daily for alcoholic gastritis

## 2023-12-18 NOTE — DISCHARGE SUMMARY
Formerly [facility], [facility]  Discharge- Diogo Travis 1965, 58 y.o. male MRN: [id number]  Unit/Bed#: W -01 Encounter: [id number]  Primary Care Provider: Alice Stroud MD   Date and time admitted to hospital: 12/12/2023  4:02 PM    * Alcohol use disorder, severe, dependence (HCC)  Assessment & Plan  - Patient with a history of alcohol use disorder that presented to the ED after falling down 13 stairs while intoxicated  - States that his last drink was at 2 am. His CIWA score is 12 and he was given a total of 490 mg phenobarbital.   - He was admitted to ICU following administration of phenobarbital  - Stable for floor transfer  - Night of 12/14-12/15 control team called as patient was agitated, tox recommended valium, patient required restraints  - Wernicke's encephalopathy vs alcohol withdrawal  - Patient back to baseline, no longer having hallucinations, and out of soft restraints after having a bowel movement this AM.  - Continues to have ambulatory dysfunction  - PT - OK for discharge to rehab with outpatient physical therapy     Plan:  - Patient to complete alcohol rehab outpatient at [facility]  - Start taking folic acid 400 mcg daily  - Start taking Thiamine Mononitrate 100 mg daily   - Start taking Folic acid 400 mcg daily  - Start taking Pepcid 20 mg BID for alcoholic gastritis  - Start taking Protonix 40 mg daily for alcoholic gastritis    Constipation  Assessment & Plan  Patient constipated, continue bowel regimen at home   Plan:  -Miralax daily  -Senokot [facility]    Hypertension  Assessment & Plan  No PTA meds  Patient's pressure elevated with agitation    Plan:   - Continue taking lisinopril 40 mg daily  - Start taking amlodipine 5 mg daily       Fall down stairs-resolved as of 12/18/2023  Assessment & Plan  Patient fell down 13 stairs while intoxicated. Was brought into the ED as a Trauma B.   Xray trauma: No acute cardiopulmonary disease within limitations of supine imaging.   CT head:  No acute intracranial abnormality.   CT chest abdomen pelvis:  No acute posttraumatic abnormality in the chest, abdomen, or pelvis. Diffuse hepatic steatosis.  CT cervical spine: No acute cervical spine fracture or traumatic malalignment.      Plan:  - Trauma evaluated and signed off, continue alcohol cessation       Alcoholic ketoacidosis-resolved as of 12/18/2023  Assessment & Plan  Patient with history of alcohol use disorder and alcohol withdrawal presented to the ED as Trauma B after falling down stairs. CIWA score of 12 and was given phenobarbital as per toxicology.     Plan:  -Continue alcohol cessation    Alcohol withdrawal syndrome with perceptual disturbance (HCC)-resolved as of 12/18/2023  Assessment & Plan  See Alcohol use disorder.         Medical Problems       Resolved Problems  Date Reviewed: 12/16/2023            Resolved    Alcohol withdrawal syndrome with perceptual disturbance (HCC) 12/18/2023     Resolved by  Álvaro Cunningham MD    Alcoholic ketoacidosis 12/18/2023     Resolved by  Álvaro Cunningham MD    Fall down stairs 12/18/2023     Resolved by  Álvaro Cunningham MD        Discharging Resident: Álvaro Cunningham MD  Discharging Attending: Abel Flores MD  PCP: Alice Stroud MD  Admission Date:   Admission Orders (From admission, onward)       Ordered        12/12/23 2048  Inpatient Admission  Once                          Discharge Date: 12/18/23    Consultations During Hospital Stay:  Medical Toxicology, Trauma surgery    Procedures Performed:   Fast exam    Significant Findings / Test Results:   XR chest 1 view    Result Date: 12/13/2023  Impression: No acute cardiopulmonary disease within limitations of supine imaging. Workstation performed: [id number]     TRAUMA - CT spine cervical wo contrast    Result Date: 12/12/2023  Impression: No acute cervical spine fracture or traumatic malalignment. I personally discussed this study with LAURYN ULLRICH on 12/12/2023 5:19 PM.  Workstation performed: [id number]     TRAUMA - CT head wo contrast    Result Date: 12/12/2023  Impression: No acute intracranial abnormality. I personally discussed this study with LAURYN ULLRICH on 12/12/2023 5:19 PM. Workstation performed: [id number]     XR trauma multiple    Result Date: 12/12/2023  Impression: No acute cardiopulmonary disease within limitations of supine imaging. Workstation performed: [id number]     TRAUMA - CT chest abdomen pelvis w contrast    Result Date: 12/12/2023  Impression: 1. No acute posttraumatic abnormality in the chest, abdomen, or pelvis. 2. Diffuse hepatic steatosis. 3. Additional findings as noted. I personally discussed this study with LAURYN ULLRICH on 12/12/2023 5:18 PM. Workstation performed: [id number]       Incidental Findings:   Diffuse hepatic steatosis on trauma CT chest abdomen pelvis   I reviewed the above mentioned incidental findings with the patient and/or family and they expressed understanding.    Test Results Pending at Discharge (will require follow up):   None     Outpatient Tests Requested:  None    Complications:  Alcohol withdrawal    Reason for Admission: Fall    Hospital Course:   Diogo Travis is a 58 y.o. male patient with a pmhx of alcohol use disorder, HTN, and depression who originally presented to the hospital on 12/12/2023 due to fall while intoxicated. On admission, patient was a trauma alert, however on their assessment there was no acute traumatic injuries.  Patient was then transferred to the ICU where patient was given phenobarbital for alcohol withdrawal with perceptual disturbance, was given IV hydration for alcoholic ketoacidosis, and high dose thiamine IV for possible Wernicke syndrome. Over the course of the patient's hospital course, he was tachycardic, hypertensive, tremulous and experiencing auditory and visual hallucinations as a result of his alcohol withdrawal, which was treated using IV Valium via the CIWA protocol. By day of  discharge, patient was consistently a CIWA of 0, and was not showing evidence of alcohol withdrawal. He is to be discharged home with outpatient alcohol rehab and recovery.      Please see above list of diagnoses and related plan for additional information.     Condition at Discharge: stable    Discharge Day Visit / Exam:   * Please refer to separate progress note for these details *    Discussion with Family: Patient declined call to .     Discharge instructions/Information to patient and family:   See after visit summary for information provided to patient and family.      Provisions for Follow-Up Care:  See after visit summary for information related to follow-up care and any pertinent home health orders.      Mobility at time of Discharge:   Basic Mobility Inpatient Raw Score: 18  JH-HLM Goal: 6: Walk 10 steps or more  JH-HLM Achieved: 6: Walk 10 steps or more  HLM Goal achieved. Continue to encourage appropriate mobility.     Disposition:   Home    Planned Readmission: No    Discharge Medications:  See after visit summary for reconciled discharge medications provided to patient and/or family.      **Please Note: This note may have been constructed using a voice recognition system**

## 2023-12-18 NOTE — PLAN OF CARE
Problem: PAIN - ADULT  Goal: Verbalizes/displays adequate comfort level or baseline comfort level  Description: Interventions:  - Encourage patient to monitor pain and request assistance  - Assess pain using appropriate pain scale  - Administer analgesics based on type and severity of pain and evaluate response  - Implement non-pharmacological measures as appropriate and evaluate response  - Consider cultural and social influences on pain and pain management  - Notify physician/advanced practitioner if interventions unsuccessful or patient reports new pain  Outcome: Progressing     Problem: INFECTION - ADULT  Goal: Absence or prevention of progression during hospitalization  Description: INTERVENTIONS:  - Assess and monitor for signs and symptoms of infection  - Monitor lab/diagnostic results  - Monitor all insertion sites, i.e. indwelling lines, tubes, and drains  - Monitor endotracheal if appropriate and nasal secretions for changes in amount and color  - [place] appropriate cooling/warming therapies per order  - Administer medications as ordered  - Instruct and encourage patient and family to use good hand hygiene technique  - Identify and instruct in appropriate isolation precautions for identified infection/condition  Outcome: Progressing  Goal: Absence of fever/infection during neutropenic period  Description: INTERVENTIONS:  - Monitor WBC    Outcome: Progressing     Problem: SAFETY ADULT  Goal: Patient will remain free of falls  Description: INTERVENTIONS:  - Educate patient/family on patient safety including physical limitations  - Instruct patient to call for assistance with activity   - Consult OT/PT to assist with strengthening/mobility   - Keep Call bell within reach  - Keep bed low and locked with side rails adjusted as appropriate  - Keep care items and personal belongings within reach  - Initiate and maintain comfort rounds  - Make Fall Risk Sign visible to staff  - Offer Toileting every  Hours,  in advance of need  - Initiate/Maintain alarm  - Obtain necessary fall risk management equipment:   - Apply yellow socks and bracelet for high fall risk patients  - Consider moving patient to room near nurses station  Outcome: Progressing  Goal: Maintain or return to baseline ADL function  Description: INTERVENTIONS:  -  Assess patient's ability to carry out ADLs; assess patient's baseline for ADL function and identify physical deficits which impact ability to perform ADLs (bathing, care of mouth/teeth, toileting, grooming, dressing, etc.)  - Assess/evaluate cause of self-care deficits   - Assess range of motion  - Assess patient's mobility; develop plan if impaired  - Assess patient's need for assistive devices and provide as appropriate  - Encourage maximum independence but intervene and supervise when necessary  - Involve family in performance of ADLs  - Assess for home care needs following discharge   - Consider OT consult to assist with ADL evaluation and planning for discharge  - Provide patient education as appropriate  Outcome: Progressing  Goal: Maintains/Returns to pre admission functional level  Description: INTERVENTIONS:  - Perform AM-PAC 6 Click Basic Mobility/ Daily Activity assessment daily.  - Set and communicate daily mobility goal to care team and patient/family/caregiver.   - Collaborate with rehabilitation services on mobility goals if consulted  - Perform Range of Motion  times a day.  - Reposition patient every  hours.  - Dangle patient  times a day  - Stand patient  times a day  - Ambulate patient  times a day  - Out of bed to chair  times a day   - Out of bed for meals times a day  - Out of bed for toileting  - Record patient progress and toleration of activity level   Outcome: Progressing     Problem: DISCHARGE PLANNING  Goal: Discharge to home or other facility with appropriate resources  Description: INTERVENTIONS:  - Identify barriers to discharge w/patient and caregiver  - Arrange for  needed discharge resources and transportation as appropriate  - Identify discharge learning needs (meds, wound care, etc.)  - Arrange for interpretive services to assist at discharge as needed  - Refer to Case Management Department for coordinating discharge planning if the patient needs post-hospital services based on physician/advanced practitioner order or complex needs related to functional status, cognitive ability, or social support system  Outcome: Progressing     Problem: Knowledge Deficit  Goal: Patient/family/caregiver demonstrates understanding of disease process, treatment plan, medications, and discharge instructions  Description: Complete learning assessment and assess knowledge base.  Interventions:  - Provide teaching at level of understanding  - Provide teaching via preferred learning methods  Outcome: Progressing

## 2023-12-18 NOTE — PROGRESS NOTES
[facility]  Progress Note  Name: Diogo Travis I  MRN: [id number]  Unit/Bed#: W -01 I Date of Admission: 12/12/2023   Date of Service: 12/18/2023 I Hospital Day: 6    Assessment/Plan   * Alcohol use disorder, severe, dependence (HCC)  Assessment & Plan  - Patient with a history of alcohol use disorder that presented to the ED after falling down 13 stairs while intoxicated  - States that his last drink was at 2 am. His CIWA score is 12 and he was given a total of 490 mg phenobarbital.   - He was admitted to ICU following administration of phenobarbital  - Stable for floor transfer  - Night of 12/14-12/15 control team called as patient was agitated, tox recommended valium, patient required restraints  - Wernicke's encephalopathy vs alcohol withdrawal  - Patient back to baseline, no longer having hallucinations, and out of soft restraints after having a bowel movement this AM.  - Continues to have ambulatory dysfunction  - PT - OK for discharge to rehab with outpatient physical therapy     Plan:  - Continue CIWA protocol; treatment any withdrawal symptoms as needed with Ativan  - Thiamine repletion frequency increased to TID. Continuing Folic acid repletion  - Agreeable to inpatient alcohol rehab;   - Pending PT clearance for D/C to inpatient Rehab    Fall down stairs  Assessment & Plan  Patient fell down 13 stairs while intoxicated. Was brought into the ED as a Trauma B.   Xray trauma: No acute cardiopulmonary disease within limitations of supine imaging.   CT head: No acute intracranial abnormality.   CT chest abdomen pelvis:  No acute posttraumatic abnormality in the chest, abdomen, or pelvis. Diffuse hepatic steatosis.  CT cervical spine: No acute cervical spine fracture or traumatic malalignment.      Plan:  - Trauma evaluated and signed off       Alcoholic ketoacidosis  Assessment & Plan  Patient with history of alcohol use disorder and alcohol withdrawal presented to the ED as  Trauma B after falling down stairs. CIWA score of 12 and was given phenobarbital as per toxicology.     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     Hypertension  Assessment & Plan  No PTA meds  Patient's pressure elevated with agitation    Plan:   - Labetalol 10mg IV prn for SBP >180 or DBP >100               VTE Pharmacologic Prophylaxis:   Moderate Risk (Score 3-4) - Pharmacological DVT Prophylaxis Ordered: enoxaparin (Lovenox).    Mobility:   Basic Mobility Inpatient Raw Score: 18  JH-HLM Goal: 6: Walk 10 steps or more  JH-HLM Achieved: 6: Walk 10 steps or more  HLM Goal achieved. Continue to encourage appropriate mobility.    Patient Centered Rounds: I performed bedside rounds with nursing staff today.  Discussions with Specialists or Other Care Team Provider: None    Education and Discussions with Family / Patient: Patient declined call to .     Current Length of Stay: 6 day(s)  Current Patient Status: Inpatient   Discharge Plan: Anticipate discharge later today or tomorrow to home.    Code Status: Level 1 - Full Code    Subjective:   Patient evaluated at bedside. Denies any auditory/visual hallucination. Was not tremulous on exam. Reports back pain which he attributes to not having a bowel movement for two days. Expresses uncertainty about inpatient alcohol rehab vs outpatient alcohol rehab.     Objective:     Vitals:   Temp (24hrs), Av.1 °F (36.7 °C), Min:97.8 °F (36.6 °C), Max:98.4 °F (36.9 °C)    Temp:  [97.8 °F (36.6 °C)-98.4 °F (36.9 °C)] 98.2 °F (36.8 °C)  HR:  [56-75] 63  Resp:  [12-18] 16  BP: (154-218)/() 154/86  SpO2:  [91 %-98 %] 91 %  Body mass index is 36.3 kg/m².     Input and Output Summary (last 24 hours):     Intake/Output Summary (Last 24 hours) at 2023 0905  Last data filed at 2023 0401  Gross per 24 hour   Intake 4149.59 ml   Output --   Net 4149.59 ml        Physical Exam:   Physical Exam  Vitals and nursing note reviewed.   Constitutional:       General: He is not in acute distress.     Appearance: He is well-developed. He is obese.   HENT:      Head: Normocephalic and atraumatic.      Mouth/Throat:      Mouth: Mucous membranes are moist.      Pharynx: Oropharynx is clear.   Eyes:      Conjunctiva/sclera: Conjunctivae normal.   Cardiovascular:      Rate and Rhythm: Normal rate and regular rhythm.      Heart sounds: No murmur heard.  Pulmonary:      Effort: Pulmonary effort is normal. No respiratory distress.      Breath sounds: Normal breath sounds.   Abdominal:      General: Abdomen is flat. Bowel sounds are normal.      Palpations: Abdomen is soft.      Tenderness: There is no abdominal tenderness.   Musculoskeletal:         General: No swelling. Normal range of motion.      Cervical back: Normal range of motion and neck supple.   Skin:     General: Skin is warm and dry.      Capillary Refill: Capillary refill takes less than 2 seconds.   Neurological:      Mental Status: He is alert and oriented to person, place, and time. Mental status is at baseline.   Psychiatric:         Behavior: Behavior normal.      Comments: Tearful at times          Additional Data:     Labs:  Results from last 7 days   Lab Units 12/18/23  0822   WBC Thousand/uL 5.25   HEMOGLOBIN g/dL 12.7   HEMATOCRIT % 36.9   PLATELETS Thousands/uL 170   NEUTROS PCT % 66   LYMPHS PCT % 20   MONOS PCT % 8   EOS PCT % 4     Results from last 7 days   Lab Units 12/18/23  0822 12/17/23  0557 12/16/23  0439   SODIUM mmol/L 140   < > 142   POTASSIUM mmol/L 3.4*   < > 3.3*   CHLORIDE mmol/L 104   < > 106   CO2 mmol/L 30   < > 29   BUN mg/dL 4*   < > 6   CREATININE mg/dL 0.82   < > 0.79   ANION GAP mmol/L 6   < > 7   CALCIUM mg/dL 9.0   < > 8.9   ALBUMIN g/dL  --   --  3.6   TOTAL BILIRUBIN mg/dL  --   --  0.56   ALK PHOS U/L  --   --  82   ALT U/L  --   --  11   AST U/L  --   --  26   GLUCOSE RANDOM mg/dL  107   < > 98    < > = values in this interval not displayed.                       Lines/Drains:  Invasive Devices       Peripheral Intravenous Line  Duration             Peripheral IV 12/17/23 Left;Upper;Ventral (anterior) Arm <1 day                          Imaging: No pertinent imaging reviewed.    Recent Cultures (last 7 days):         Last 24 Hours Medication List:   Current Facility-Administered Medications   Medication Dose Route Frequency Provider Last Rate    acetaminophen  650 mg Oral Q4H PRN Alan Hernandez MD      bisacodyl  10 mg Rectal Daily PRN Álvaro Emery MD      calcium carbonate  500 mg Oral Daily PRN Linda Sunshine DO      dextrose 5% lactated ringer's  125 mL/hr Intravenous Continuous Alan Hernandez  mL/hr (12/17/23 2130)    diazepam  10 mg Oral Q4H PRN Mel Balasundram, DO      enoxaparin  40 mg Subcutaneous Daily Alan Hernandez MD      escitalopram  20 mg Oral Daily Patrick Reyes, DO      famotidine  20 mg Oral BID Sarah Dangelo MD      folic acid 1 mg in sodium chloride 0.9 % 50 mL IVPB  1 mg Intravenous Daily Alan Hernandez MD 1 mg (12/17/23 1027)    ibuprofen  600 mg Oral Q6H COLBY Alan Hernandez MD      labetalol  10 mg Intravenous Q6H PRN Linda Sunshine DO      lidocaine  1 patch Topical Daily Patrick Reyes, DO      lisinopril  40 mg Oral Daily Becky Skelton MD      LORazepam  2 mg Intravenous Q4H PRN Mlemaury Andresundram, DO      LORazepam  4 mg Intravenous Once Abel Flores MD      LORazepam  2 mg Oral Once Alan Hernandez MD      magnesium sulfate  2 g Intravenous Once Álvaro Emery MD      polyethylene glycol  17 g Oral Daily Linda Sunshine DO      potassium chloride  40 mEq Oral Once Álvaro Emery MD      senna-docusate sodium  2 tablet Oral HS Linda Sunshine DO      thiamine  500 mg Intravenous Daily Mel Balasundram, DO      Followed by    [START ON 12/21/2023] thiamine  100 mg Intravenous Daily Mel Balasundram, DO           Today, Patient Was Seen By: Álvaro Cunningham MD    **Please Note: This note may have been constructed using a voice recognition system.**

## 2023-12-18 NOTE — PLAN OF CARE
Problem: PAIN - ADULT  Goal: Verbalizes/displays adequate comfort level or baseline comfort level  Description: Interventions:  - Encourage patient to monitor pain and request assistance  - Assess pain using appropriate pain scale  - Administer analgesics based on type and severity of pain and evaluate response  - Implement non-pharmacological measures as appropriate and evaluate response  - Consider cultural and social influences on pain and pain management  - Notify physician/advanced practitioner if interventions unsuccessful or patient reports new pain  12/18/2023 1519 by Janet Duckworth RN  Outcome: Adequate for Discharge  12/18/2023 1519 by Janet Duckworth RN  Outcome: Progressing  12/18/2023 1050 by Janet Duckworth RN  Outcome: Progressing     Problem: INFECTION - ADULT  Goal: Absence or prevention of progression during hospitalization  Description: INTERVENTIONS:  - Assess and monitor for signs and symptoms of infection  - Monitor lab/diagnostic results  - Monitor all insertion sites, i.e. indwelling lines, tubes, and drains  - Monitor endotracheal if appropriate and nasal secretions for changes in amount and color  - Sentinel appropriate cooling/warming therapies per order  - Administer medications as ordered  - Instruct and encourage patient and family to use good hand hygiene technique  - Identify and instruct in appropriate isolation precautions for identified infection/condition  12/18/2023 1519 by Janet Duckworth RN  Outcome: Adequate for Discharge  12/18/2023 1519 by Janet Duckworth RN  Outcome: Progressing  12/18/2023 1050 by Janet Duckworth RN  Outcome: Progressing  Goal: Absence of fever/infection during neutropenic period  Description: INTERVENTIONS:  - Monitor WBC    12/18/2023 1519 by Janet Duckworth RN  Outcome: Adequate for Discharge  12/18/2023 1519 by Janet Duckworth RN  Outcome: Progressing  12/18/2023 1050 by Janet Duckworth RN  Outcome: Progressing     Problem: SAFETY ADULT  Goal: Patient will  remain free of falls  Description: INTERVENTIONS:  - Educate patient/family on patient safety including physical limitations  - Instruct patient to call for assistance with activity   - Consult OT/PT to assist with strengthening/mobility   - Keep Call bell within reach  - Keep bed low and locked with side rails adjusted as appropriate  - Keep care items and personal belongings within reach  - Initiate and maintain comfort rounds  - Make Fall Risk Sign visible to staff  - Offer Toileting every  Hours, in advance of need  - Initiate/Maintain alarm  - Obtain necessary fall risk management equipment:   - Apply yellow socks and bracelet for high fall risk patients  - Consider moving patient to room near nurses station  12/18/2023 1519 by Janet Duckworth RN  Outcome: Adequate for Discharge  12/18/2023 1519 by Janet Duckworth RN  Outcome: Progressing  12/18/2023 1050 by Janet Duckworth RN  Outcome: Progressing  Goal: Maintain or return to baseline ADL function  Description: INTERVENTIONS:  -  Assess patient's ability to carry out ADLs; assess patient's baseline for ADL function and identify physical deficits which impact ability to perform ADLs (bathing, care of mouth/teeth, toileting, grooming, dressing, etc.)  - Assess/evaluate cause of self-care deficits   - Assess range of motion  - Assess patient's mobility; develop plan if impaired  - Assess patient's need for assistive devices and provide as appropriate  - Encourage maximum independence but intervene and supervise when necessary  - Involve family in performance of ADLs  - Assess for home care needs following discharge   - Consider OT consult to assist with ADL evaluation and planning for discharge  - Provide patient education as appropriate  12/18/2023 1519 by Janet Duckworth RN  Outcome: Adequate for Discharge  12/18/2023 1519 by Janet Duckworth RN  Outcome: Progressing  12/18/2023 1050 by Janet Duckworth RN  Outcome: Progressing  Goal: Maintains/Returns to pre admission  functional level  Description: INTERVENTIONS:  - Perform AM-PAC 6 Click Basic Mobility/ Daily Activity assessment daily.  - Set and communicate daily mobility goal to care team and patient/family/caregiver.   - Collaborate with rehabilitation services on mobility goals if consulted  - Perform Range of Motion  times a day.  - Reposition patient every  hours.  - Dangle patient  times a day  - Stand patient  times a day  - Ambulate patient  times a day  - Out of bed to chair  times a day   - Out of bed for meals times a day  - Out of bed for toileting  - Record patient progress and toleration of activity level   12/18/2023 1519 by Janet Duckworth RN  Outcome: Adequate for Discharge  12/18/2023 1519 by Janet Duckworth RN  Outcome: Progressing  12/18/2023 1050 by Janet Duckworth RN  Outcome: Progressing     Problem: DISCHARGE PLANNING  Goal: Discharge to home or other facility with appropriate resources  Description: INTERVENTIONS:  - Identify barriers to discharge w/patient and caregiver  - Arrange for needed discharge resources and transportation as appropriate  - Identify discharge learning needs (meds, wound care, etc.)  - Arrange for interpretive services to assist at discharge as needed  - Refer to Case Management Department for coordinating discharge planning if the patient needs post-hospital services based on physician/advanced practitioner order or complex needs related to functional status, cognitive ability, or social support system  12/18/2023 1519 by Janet Duckworth RN  Outcome: Adequate for Discharge  12/18/2023 1519 by Janet Duckworth RN  Outcome: Progressing  12/18/2023 1050 by Janet Duckworth RN  Outcome: Progressing     Problem: Knowledge Deficit  Goal: Patient/family/caregiver demonstrates understanding of disease process, treatment plan, medications, and discharge instructions  Description: Complete learning assessment and assess knowledge base.  Interventions:  - Provide teaching at level of  understanding  - Provide teaching via preferred learning methods  12/18/2023 1519 by Janet Duckworth RN  Outcome: Adequate for Discharge  12/18/2023 1519 by Janet Duckworth RN  Outcome: Progressing  12/18/2023 1050 by Janet Duckworth RN  Outcome: Progressing

## 2023-12-18 NOTE — ASSESSMENT & PLAN NOTE
Patient with history of alcohol use disorder and alcohol withdrawal presented to the ED as Trauma B after falling down stairs. CIWA score of 12 and was given phenobarbital as per toxicology.     Plan:  -Continue alcohol cessation

## 2023-12-22 NOTE — UTILIZATION REVIEW
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 2  -SA --   Orientation and Clouding of Sensorium -- -- -- 0  -SA --   CIWA-Ar Total -- -- -- 30  -SA --      Row Name 12/12/23 2200 12/12/23 2157 12/12/23 2135 12/12/23 21:32:15 12/12/23 2130   CIWA-Ar   /87  -/87  -/88  -/89  -SA --   Pulse 113 Abnormal   - Abnormal   - Abnormal   - Abnormal   - Abnormal   -SA   Nausea and Vomiting -- 0  -BL 1  -SA -- --   Tactile Disturbances -- 1  -BL 1  -SA -- --   Tremor -- 5  -BL 1  -SA -- --   Auditory Disturbances -- 0  -BL 0  -SA -- --   Paroxysmal Sweats -- 2  -BL 0  -SA -- --   Visual Disturbances -- 0  -BL 0  -SA -- --   Anxiety -- 1  -BL 2  -SA -- --   Headache, Fullness in Head -- 4  -BL 6  -SA -- --   Agitation -- 0  -BL 0  -SA -- --   Orientation and Clouding of Sensorium -- 0  -BL 0  -SA -- --   CIWA-Ar Total -- 13  -BL 11  -SA -- --      Row Name 12/13/23 0000 12/12/23 2357 12/12/23 2300 12/12/23 2257 12/12/23 2209   CIWA-Ar   /79  -BL -- 144/77  -/77  -/90  -BL   Pulse 110 Abnormal   - Abnormal   - Abnormal   - Abnormal   - Abnormal   -BL   Nausea and Vomiting -- 0  -BL --  -BL 1  -BL --   Tactile  Disturbances -- 1  -BL --  -BL 1  -BL --   Tremor -- 2  -BL --  -BL 4  -BL --   Auditory Disturbances -- 0  -BL -- 0  -BL --   Paroxysmal Sweats -- 0  -BL -- 2  -BL --   Visual Disturbances -- 0  -BL -- 0  -BL --   Anxiety -- 2  -BL -- 1  -BL --   Headache, Fullness in Head -- 3  -BL -- 4  -BL --   Agitation -- 0  -BL -- 0  -BL --   Orientation and Clouding of Sensorium -- 0  -BL -- 0  -BL --   CIWA-Ar Total -- 8  -BL -- 13  -BL --      Row Name 12/13/23 0400 12/13/23 0300 12/13/23 0200 12/13/23 0100 12/13/23 0057   CIWA-Ar   /79  -BL -- 134/75  -/75  -BL --   Pulse 114 Abnormal   - Abnormal   -  - Abnormal   - Abnormal   -BL   Nausea and Vomiting -- -- -- -- 0  -BL   Tactile Disturbances -- -- -- -- 0  -BL   Tremor -- -- -- -- 2  -BL   Auditory Disturbances -- -- -- -- 0  -BL   Paroxysmal Sweats -- -- -- -- 0  -BL   Visual Disturbances -- -- -- -- 0  -BL   Anxiety -- -- -- -- 1  -BL   Headache, Fullness in Head -- -- -- -- 2  -BL   Agitation -- -- -- -- 0  -BL   Orientation and Clouding of Sensorium -- -- -- -- 0  -BL   CIWA-Ar Total -- -- -- -- 5  -BL      Row Name 12/13/23 0800 12/13/23 0719 12/13/23 0600 12/13/23 0500 12/13/23 0457   CIWA-Ar   /78  -/78  -/76  -/84  -BL --   Pulse 88  -AM 89  -AM 95  - Abnormal   - Abnormal   -BL   Nausea and Vomiting -- 0  -AM -- -- 0  -BL   Tactile Disturbances -- 0  -AM -- -- 0  -BL   Tremor -- 4  -AM -- -- 1  -BL   Auditory Disturbances -- 0  -AM -- -- 0  -BL   Paroxysmal Sweats -- 0  -AM -- -- 1  -BL   Visual Disturbances -- 0  -AM -- -- 0  -BL   Anxiety -- 1  -AM -- -- 1  -BL   Headache, Fullness in Head -- 0  -AM -- -- 2  -BL   Agitation -- 0  -AM -- -- 0  -BL   Orientation and Clouding of Sensorium -- 0  -AM -- -- 0  -BL   CIWA-Ar Total -- 5  -AM -- -- 5  -BL          Tests pending final results:    Treatment in the ED:   Medication Administration from 12/12/2023 1535 to 12/12/2023 6939          "Date/Time Order Dose Route Action Comments     12/12/2023 1629 EST iohexol (OMNIPAQUE) 350 MG/ML injection (MULTI-DOSE) 100 mL 100 mL Intravenous Given --     12/12/2023 1618 EST ondansetron (ZOFRAN) injection 4 mg Intravenous Given --     12/12/2023 1646 EST PHENobarbital 360 mg in sodium chloride 0.9 % 100 mL IVPB 360 mg Intravenous New Bag --     12/12/2023 1837 EST PHENobarbital injection 130 mg 130 mg Intravenous Given --     12/12/2023 2018 EST PHENobarbital injection 130 mg 130 mg Intravenous Given --     12/12/2023 2001 EST multi-electrolyte (ISOLYTE-S PH 7.4) bolus 1,000 mL 1,000 mL Intravenous New Bag --     12/12/2023 1959 EST dextrose 5 % in lactated Ringer's infusion 125 mL/hr Intravenous New Bag --     12/12/2023 2052 EST thiamine (VITAMIN B1) 500 mg in sodium chloride 0.9 % 50 mL IVPB 500 mg Intravenous New Bag --     12/12/2023 2109 EST acetaminophen (TYLENOL) tablet 650 mg 650 mg Oral Given --     12/12/2023 2017 EST ibuprofen (MOTRIN) tablet 600 mg 600 mg Oral Given --     12/12/2023 2056 EST famotidine (PEPCID) tablet 20 mg 20 mg Oral Given --     12/12/2023 2128 EST diazepam (VALIUM) injection 10 mg 10 mg Intravenous Given --     12/12/2023 2129 EST PHENobarbital injection 130 mg 130 mg Intravenous Given --     12/12/2023 2126 EST ondansetron (ZOFRAN) injection 4 mg 4 mg Intravenous Given --     12/12/2023 2133 EST Famotidine (PF) (PEPCID) injection 20 mg 20 mg Intravenous Given --             Other treatments:      Change in condition while in the ED:     Response to ED Treatment:          OBSERVATION: (Proceed to \"ADMISSION\" if Direct Admission)    Orders written during the observation period  Meds Name, dose, route, time, how may doses given:  PRN Meds Name, dose, route, time, how many doses given within the first 24 hrs.:  IVs Type, rate, and total amt. ordered/given:  Labs, imaging, other:  Consults and findings:    Test Results during the observation period  Pertinent Lab tests " (dates/results):  Culture results (blood, urine, spinal, wound, respiratory, etc.):  Imaging tests (dates/results):  EKG (dates/results):  Other test (dates/results):  Tests pending (dates/results):    Surgical or Invasive Procedures during the observation period  Name of surgery/procedure:  Date & Time:  Patient Response:  Post-operative orders:  Operative Report/Findings:    Response to Treatment, Major Change in Condition, Major Charge in Treatment during the observation period          ADMISSION:    DIRECT Admissions Only:    Presenting Signs/Symptoms:     Medication/treatment prior to arrival:    Past Medical History:    Clinical Exam on admission:    Vital Signs on admission: (Temp, Pulse, Resp, and BP)    Weight in kilograms:     ALL Admissions:    Admission Orders and Other Orders written within the first 24 hrs after admission    SCD  PT/OT  Cardio-Pulmonary Monitoring, Neuro Checks, Nursing dysphagia screen, I/O, Daily weights, Vital signs  Continuous pulse ox        Meds Name, dose, route, time, how may doses given:  chlorhexidine, 15 mL, Mouth/Throat, Q12H COLBY  enoxaparin, 40 mg, Subcutaneous, Daily  famotidine, 20 mg, Oral, BID  folic acid 1 mg in sodium chloride 0.9 % 50 mL IVPB, 1 mg, Intravenous, Daily  ibuprofen, 600 mg, Oral, Q6H COLBY  magnesium sulfate, 2 g, Intravenous, Once  thiamine, 500 mg, Intravenous, Q8H  Amlodipine 5 mg po qd- started 12/18  Lexapro 20 mg po qd- started 12/15  Lidoderm Patch 5% - 12/15 x 1 - 12/16 x 1  then d/c'd  Lisinopril 40 mg po qd- started 12/16  Ativan 2 mg iv once- 12/12 x 1 - 12/13 x 3-12/14 x 1   Mag Sulfate 2G IVPB - 12/13 x 1- 12/14 x 1 12/18 x 1   Zyprexa IM 5 mg I'm Once 12/14 x 1  Protonix 40 mg po q am - 12/18   Miralax 17g po qd- started 12/15  K dur 40 meq po once- 12/16 x1  Phos- Nak 1 packet - 12/13 x 1  Senna 8.69-50 mg po HS - started 12/14  Fleets enema  once- 12/14 x 1                      PRN Meds Name, dose, route, time, how many doses given within  the first 24 hrs.:  acetaminophen, 650 mg, Oral, Q4H PRN-12/12 x1 - 12/17 x 1   aluminum-magnesium hydroxide-simethicone, 30 mL, Oral, Q4H PRN 12/12 x 1 - 12/13 x 1  Valium 20 mg iv once- 12/15 x 3 - 12/16 x 2 - 12/17 x 3 - 12/18 x 1   Labetalol 10 mg iv prn - 12/17 x 1 - 12/18 x 1           IVs Type, rate, and total amt. ordered/given:  dextrose 5% lactated ringer's, 125 mL/hr, Intravenous, Continuous             Labs, imaging, other:      Consults and findings:    Med Tox Consult: 12/12 - Alcohol withdrawal with complication, Alcohol use disorder, severe dependence, Alcoholic ketoacidosis, Fall:  Plan: Continue supportive care measures, including airway monitoring, head of bed elevation, aspiration precautions, cardiac telemetry, and continuous pulse oximetry. Recommend loading patient with 650 mg IV phenobarbital and then continuing phenobarbital 130 mg IV for mild-moderate withdrawal symptoms or phenobarbital 260 mg IV for moderate-severe withdrawal symptoms. Phenobarbital dosing can be titrated to RASS -2 to -3. Max total dosing of phenobarbital is 2-2.5 grams. Diazepam dosing per CIWA. recommend dextrose-containing IVFs (D5NS or D5LR) in addition to thiamine 500 mg IV every 8 hours until anion gap closes, acidosis improved, and patient is tolerating PO intake. Recommend daily folate and multivitamin supplementation.        Test Results after admission  Pertinent Lab tests (dates/results):  Culture results (blood, urine, spinal, wound, respiratory, etc.):  Imaging tests (dates/results):  EKG (dates/results):  Other test (dates/results):  Tests pending (dates/results):    Surgical or Invasive Procedures  Name of surgery/procedure:  Date & Time:  Patient Response:  Post-operative orders:  Operative Report/Findings:    Response to Treatment, Major Change in Condition, Major Charge in Treatment anytime during admission  Rapid Response Notes: 12/12 - Rapid response was called d/t pt vomiting. . Alert and protecting  airway on arrival. Phenobarb 130mg ordered, Zofran 4mg, and Pepcid 20mg ordered. Transferred to ICU for continued ETOH withdrawal.      Critical Care Notes:12/12 -  Pt found w/ alc intoxication. CIWA score is 12 and he was given a total of 490 mg phenobarbital.   Plan: Cont CIWA. Treatment any withdrawal symptoms as needed with Ativan. Total goal of phenobarbital is 650 mg. Thiamine and folic acid repletion. IV hydration    Hospital Course:   Diogo Travis is a 58 y.o. male patient with a pmhx of alcohol use disorder, HTN, and depression who originally presented to the hospital on 12/12/2023 due to fall while intoxicated. On admission, patient was a trauma alert, however on their assessment there was no acute traumatic injuries.  Patient was then transferred to the ICU where patient was given phenobarbital for alcohol withdrawal with perceptual disturbance, was given IV hydration for alcoholic ketoacidosis, and high dose thiamine IV for possible Wernicke syndrome. Over the course of the patient's hospital course, he was tachycardic, hypertensive, tremulous and experiencing auditory and visual hallucinations as a result of his alcohol withdrawal, which was treated using IV Valium via the CIWA protocol. By day of discharge, patient was consistently a CIWA of 0, and was not showing evidence of alcohol withdrawal. He is to be discharged home with outpatient alcohol rehab and recovery.       Disposition on Discharge  Home, Rehab, SNF, LTC, Shelter, etc.: Home/Self Care    Cease to Breathe (CTB)  If a patient expires during an admission, in addition to the above information, please include:    Summary/timeline of the patient's decline in condition:    Medications and treatment:    Patient response to treatment:    Date and time patient ceased to breathe:        Is there a Readmission that follows this admission?   Yes X No    If yes, provide dates:          InterQual Review    InterQual Criteria Met: X Yes  No  N/A         Please include the InterQual Review, InterQual year/version used, and the criteria selected:   Criteria Review   REVIEW SUMMARY     InterQual® Review Status: In Primary  Criteria Status: Acute Met  Day of review: Episode Day 1  Condition Specific: Yes        REVIEW DETAILS     Product: LOC:Acute Adult  Subset: Withdrawal Syndrome            [X] Select Day, One:          [X] Episode Day 1, One:              [X] ACUTE, One:                  [X] Alcohol or anxiolytic or hypnotic or sedative withdrawal syndrome and, Both:                      [X] Finding, >= One:                          [  ] Alcohol withdrawal syndrome and, >= One:                              [X] Clinical risk factor or finding, >= One:                                  [X] Positive blood alcohol concentration (YANNA)                          [X] Blood pressure, >= One:                              [X] Systolic > 150 mmHg                          [X] Hallucinations                          [X] Heart rate > 100/min, sustained                      [X] Intervention, >= One:                          [X] Barbiturate (includes PO)        Version: InterQual® 2023, Mar. 2023 Release  InterQual® criteria (IQ) is confidential and proprietary information and is being provided to you solely as it pertains to the information requested. IQ may contain advanced clinical knowledge which we recommend you discuss with your physician upon disclosure to you. Use permitted by and subject to license with [facility] and/or one of its subsidiaries. IQ reflects clinical interpretations and analyses and cannot alone either (a) resolve medical ambiguities of particular situations; or (b) provide the sole basis for definitive decisions. IQ is intended solely for use as screening guidelines with respect to medical appropriateness of healthcare services. All ultimate care decisions are strictly and solely the obligation and responsibility of your health care provider.  © 2023 [employer] and/or one of its subsidiaries. All Rights Reserved. CPT® only © 4551-3850 [employer]. All Rights Reserved.         PLEASE SUBMIT THE COMPLETED FORM TO THE DEPARTMENT OF HUMAN SERVICES - DIVISION OF CLINICAL  REVIEW VIA FAX -[phone] or VIA E-MAIL TO [email or web address]    Signature: Parker Warren Date:  12/22/23    Confidentiality Notice: The documents accompanying this telecopy may contain confidential information belonging to the sender.  The information is intended only for the use of the individual named above. If you are not the intended recipient, you are hereby notified  That any disclosure, copying, distribution or taking of any telecopy is strictly prohibited.

## 2024-01-28 PROBLEM — E86.0 DEHYDRATION: Status: RESOLVED | Noted: 2023-11-29 | Resolved: 2024-01-28

## 2024-02-05 ENCOUNTER — HOSPITAL ENCOUNTER (INPATIENT)
Facility: HOSPITAL | Age: 59
LOS: 2 days | Discharge: HOME | End: 2024-02-07
Attending: EMERGENCY MEDICINE | Admitting: EMERGENCY MEDICINE
Payer: COMMERCIAL

## 2024-02-05 ENCOUNTER — APPOINTMENT (EMERGENCY)
Dept: CT IMAGING | Facility: HOSPITAL | Age: 59
End: 2024-02-05
Payer: COMMERCIAL

## 2024-02-05 ENCOUNTER — HOSPITAL ENCOUNTER (EMERGENCY)
Facility: HOSPITAL | Age: 59
End: 2024-02-05
Attending: EMERGENCY MEDICINE
Payer: COMMERCIAL

## 2024-02-05 ENCOUNTER — APPOINTMENT (EMERGENCY)
Dept: RADIOLOGY | Facility: HOSPITAL | Age: 59
End: 2024-02-05
Payer: COMMERCIAL

## 2024-02-05 VITALS
SYSTOLIC BLOOD PRESSURE: 150 MMHG | HEART RATE: 74 BPM | DIASTOLIC BLOOD PRESSURE: 79 MMHG | OXYGEN SATURATION: 95 % | RESPIRATION RATE: 18 BRPM | TEMPERATURE: 98.6 F

## 2024-02-05 DIAGNOSIS — R21 RASH AND NONSPECIFIC SKIN ERUPTION: ICD-10-CM

## 2024-02-05 DIAGNOSIS — K29.70 GASTRITIS: ICD-10-CM

## 2024-02-05 DIAGNOSIS — K70.0 FATTY LIVER DUE TO ALCOHOLISM: ICD-10-CM

## 2024-02-05 DIAGNOSIS — F32.A ANXIETY AND DEPRESSION: ICD-10-CM

## 2024-02-05 DIAGNOSIS — K29.20 CHRONIC ALCOHOLIC GASTRITIS WITHOUT HEMORRHAGE: ICD-10-CM

## 2024-02-05 DIAGNOSIS — K29.70 GASTRITIS: Primary | ICD-10-CM

## 2024-02-05 DIAGNOSIS — I10 PRIMARY HYPERTENSION: ICD-10-CM

## 2024-02-05 DIAGNOSIS — F10.939 ALCOHOL WITHDRAWAL (HCC): ICD-10-CM

## 2024-02-05 DIAGNOSIS — F10.20 ALCOHOL USE DISORDER, SEVERE, DEPENDENCE (HCC): Primary | ICD-10-CM

## 2024-02-05 DIAGNOSIS — F41.9 ANXIETY AND DEPRESSION: ICD-10-CM

## 2024-02-05 DIAGNOSIS — F41.9 ANXIETY: ICD-10-CM

## 2024-02-05 LAB
ALBUMIN SERPL BCP-MCNC: 4.7 G/DL (ref 3.5–5)
ALP SERPL-CCNC: 94 U/L (ref 34–104)
ALT SERPL W P-5'-P-CCNC: 38 U/L (ref 7–52)
ANION GAP SERPL CALCULATED.3IONS-SCNC: 11 MMOL/L
AST SERPL W P-5'-P-CCNC: 52 U/L (ref 13–39)
BASOPHILS # BLD AUTO: 0.05 THOUSANDS/ÂΜL (ref 0–0.1)
BASOPHILS NFR BLD AUTO: 1 % (ref 0–1)
BILIRUB SERPL-MCNC: 0.86 MG/DL (ref 0.2–1)
BNP SERPL-MCNC: 33 PG/ML (ref 0–100)
BUN SERPL-MCNC: 15 MG/DL (ref 5–25)
CALCIUM SERPL-MCNC: 10 MG/DL (ref 8.4–10.2)
CARDIAC TROPONIN I PNL SERPL HS: <2 NG/L
CHLORIDE SERPL-SCNC: 104 MMOL/L (ref 96–108)
CO2 SERPL-SCNC: 25 MMOL/L (ref 21–32)
CREAT SERPL-MCNC: 1.07 MG/DL (ref 0.6–1.3)
D DIMER PPP FEU-MCNC: 0.42 UG/ML FEU
EOSINOPHIL # BLD AUTO: 0.1 THOUSAND/ÂΜL (ref 0–0.61)
EOSINOPHIL NFR BLD AUTO: 2 % (ref 0–6)
ERYTHROCYTE [DISTWIDTH] IN BLOOD BY AUTOMATED COUNT: 13.3 % (ref 11.6–15.1)
ETHANOL EXG-MCNC: 0 MG/DL
GFR SERPL CREATININE-BSD FRML MDRD: 76 ML/MIN/1.73SQ M
GLUCOSE SERPL-MCNC: 113 MG/DL (ref 65–140)
HCT VFR BLD AUTO: 45.2 % (ref 36.5–49.3)
HGB BLD-MCNC: 15.4 G/DL (ref 12–17)
IMM GRANULOCYTES # BLD AUTO: 0.02 THOUSAND/UL (ref 0–0.2)
IMM GRANULOCYTES NFR BLD AUTO: 0 % (ref 0–2)
LIPASE SERPL-CCNC: 8 U/L (ref 11–82)
LYMPHOCYTES # BLD AUTO: 1.34 THOUSANDS/ÂΜL (ref 0.6–4.47)
LYMPHOCYTES NFR BLD AUTO: 20 % (ref 14–44)
MCH RBC QN AUTO: 32.7 PG (ref 26.8–34.3)
MCHC RBC AUTO-ENTMCNC: 34.1 G/DL (ref 31.4–37.4)
MCV RBC AUTO: 96 FL (ref 82–98)
MONOCYTES # BLD AUTO: 0.39 THOUSAND/ÂΜL (ref 0.17–1.22)
MONOCYTES NFR BLD AUTO: 6 % (ref 4–12)
NEUTROPHILS # BLD AUTO: 4.67 THOUSANDS/ÂΜL (ref 1.85–7.62)
NEUTS SEG NFR BLD AUTO: 71 % (ref 43–75)
NRBC BLD AUTO-RTO: 0 /100 WBCS
PLATELET # BLD AUTO: 228 THOUSANDS/UL (ref 149–390)
PMV BLD AUTO: 8.8 FL (ref 8.9–12.7)
POTASSIUM SERPL-SCNC: 4.3 MMOL/L (ref 3.5–5.3)
PROT SERPL-MCNC: 7.9 G/DL (ref 6.4–8.4)
RBC # BLD AUTO: 4.71 MILLION/UL (ref 3.88–5.62)
SODIUM SERPL-SCNC: 140 MMOL/L (ref 135–147)
WBC # BLD AUTO: 6.57 THOUSAND/UL (ref 4.31–10.16)

## 2024-02-05 PROCEDURE — 93005 ELECTROCARDIOGRAM TRACING: CPT

## 2024-02-05 PROCEDURE — 85025 COMPLETE CBC W/AUTO DIFF WBC: CPT | Performed by: EMERGENCY MEDICINE

## 2024-02-05 PROCEDURE — HZ2ZZZZ DETOXIFICATION SERVICES FOR SUBSTANCE ABUSE TREATMENT: ICD-10-PCS | Performed by: EMERGENCY MEDICINE

## 2024-02-05 PROCEDURE — 84484 ASSAY OF TROPONIN QUANT: CPT

## 2024-02-05 PROCEDURE — 85379 FIBRIN DEGRADATION QUANT: CPT

## 2024-02-05 PROCEDURE — 82075 ASSAY OF BREATH ETHANOL: CPT | Performed by: EMERGENCY MEDICINE

## 2024-02-05 PROCEDURE — 96375 TX/PRO/DX INJ NEW DRUG ADDON: CPT

## 2024-02-05 PROCEDURE — 99285 EMERGENCY DEPT VISIT HI MDM: CPT

## 2024-02-05 PROCEDURE — 96374 THER/PROPH/DIAG INJ IV PUSH: CPT

## 2024-02-05 PROCEDURE — 71045 X-RAY EXAM CHEST 1 VIEW: CPT

## 2024-02-05 PROCEDURE — 74177 CT ABD & PELVIS W/CONTRAST: CPT

## 2024-02-05 PROCEDURE — G1004 CDSM NDSC: HCPCS

## 2024-02-05 PROCEDURE — 83880 ASSAY OF NATRIURETIC PEPTIDE: CPT | Performed by: EMERGENCY MEDICINE

## 2024-02-05 PROCEDURE — 99291 CRITICAL CARE FIRST HOUR: CPT | Performed by: EMERGENCY MEDICINE

## 2024-02-05 PROCEDURE — 99223 1ST HOSP IP/OBS HIGH 75: CPT | Performed by: EMERGENCY MEDICINE

## 2024-02-05 PROCEDURE — 80053 COMPREHEN METABOLIC PANEL: CPT | Performed by: EMERGENCY MEDICINE

## 2024-02-05 PROCEDURE — 83690 ASSAY OF LIPASE: CPT | Performed by: EMERGENCY MEDICINE

## 2024-02-05 PROCEDURE — 36415 COLL VENOUS BLD VENIPUNCTURE: CPT

## 2024-02-05 RX ORDER — ONDANSETRON 2 MG/ML
4 INJECTION INTRAMUSCULAR; INTRAVENOUS ONCE
Status: COMPLETED | OUTPATIENT
Start: 2024-02-05 | End: 2024-02-05

## 2024-02-05 RX ORDER — PANTOPRAZOLE SODIUM 40 MG/1
40 TABLET, DELAYED RELEASE ORAL DAILY
Qty: 20 TABLET | Refills: 0 | Status: SHIPPED | OUTPATIENT
Start: 2024-02-05 | End: 2024-02-07

## 2024-02-05 RX ORDER — ENOXAPARIN SODIUM 100 MG/ML
40 INJECTION SUBCUTANEOUS DAILY
Status: DISCONTINUED | OUTPATIENT
Start: 2024-02-06 | End: 2024-02-07 | Stop reason: HOSPADM

## 2024-02-05 RX ORDER — FOLIC ACID 1 MG/1
1 TABLET ORAL ONCE
Status: DISCONTINUED | OUTPATIENT
Start: 2024-02-05 | End: 2024-02-05 | Stop reason: HOSPADM

## 2024-02-05 RX ORDER — KETOROLAC TROMETHAMINE 30 MG/ML
30 INJECTION, SOLUTION INTRAMUSCULAR; INTRAVENOUS EVERY 6 HOURS PRN
Status: ACTIVE | OUTPATIENT
Start: 2024-02-05 | End: 2024-02-06

## 2024-02-05 RX ORDER — LANOLIN ALCOHOL/MO/W.PET/CERES
100 CREAM (GRAM) TOPICAL ONCE
Status: DISCONTINUED | OUTPATIENT
Start: 2024-02-05 | End: 2024-02-05 | Stop reason: HOSPADM

## 2024-02-05 RX ORDER — ACETAMINOPHEN 325 MG/1
650 TABLET ORAL EVERY 6 HOURS PRN
Status: DISCONTINUED | OUTPATIENT
Start: 2024-02-05 | End: 2024-02-07 | Stop reason: HOSPADM

## 2024-02-05 RX ORDER — DIAZEPAM 5 MG/ML
5 INJECTION, SOLUTION INTRAMUSCULAR; INTRAVENOUS ONCE
Status: COMPLETED | OUTPATIENT
Start: 2024-02-05 | End: 2024-02-05

## 2024-02-05 RX ORDER — MAGNESIUM HYDROXIDE/ALUMINUM HYDROXICE/SIMETHICONE 120; 1200; 1200 MG/30ML; MG/30ML; MG/30ML
30 SUSPENSION ORAL ONCE
Status: COMPLETED | OUTPATIENT
Start: 2024-02-05 | End: 2024-02-05

## 2024-02-05 RX ORDER — SUCRALFATE 1 G/1
1 TABLET ORAL 4 TIMES DAILY
Qty: 56 TABLET | Refills: 0 | Status: ON HOLD | OUTPATIENT
Start: 2024-02-05 | End: 2024-02-07

## 2024-02-05 RX ORDER — ONDANSETRON 2 MG/ML
4 INJECTION INTRAMUSCULAR; INTRAVENOUS EVERY 6 HOURS PRN
Status: DISCONTINUED | OUTPATIENT
Start: 2024-02-05 | End: 2024-02-07 | Stop reason: HOSPADM

## 2024-02-05 RX ORDER — TRAZODONE HYDROCHLORIDE 50 MG/1
50 TABLET ORAL
Status: DISCONTINUED | OUTPATIENT
Start: 2024-02-05 | End: 2024-02-07 | Stop reason: HOSPADM

## 2024-02-05 RX ORDER — PANTOPRAZOLE SODIUM 40 MG/1
40 TABLET, DELAYED RELEASE ORAL
Status: DISCONTINUED | OUTPATIENT
Start: 2024-02-06 | End: 2024-02-07 | Stop reason: HOSPADM

## 2024-02-05 RX ORDER — ESCITALOPRAM OXALATE 20 MG/1
20 TABLET ORAL DAILY
Qty: 14 TABLET | Refills: 0 | Status: SHIPPED | OUTPATIENT
Start: 2024-02-05 | End: 2024-02-07

## 2024-02-05 RX ORDER — SODIUM CHLORIDE 9 MG/ML
125 INJECTION, SOLUTION INTRAVENOUS CONTINUOUS
Status: DISCONTINUED | OUTPATIENT
Start: 2024-02-05 | End: 2024-02-07

## 2024-02-05 RX ADMIN — Medication 650 MG: at 22:20

## 2024-02-05 RX ADMIN — ONDANSETRON 4 MG: 2 INJECTION INTRAMUSCULAR; INTRAVENOUS at 13:54

## 2024-02-05 RX ADMIN — DIAZEPAM 5 MG: 10 INJECTION, SOLUTION INTRAMUSCULAR; INTRAVENOUS at 13:56

## 2024-02-05 RX ADMIN — SODIUM CHLORIDE 125 ML/HR: 0.9 INJECTION, SOLUTION INTRAVENOUS at 22:20

## 2024-02-05 RX ADMIN — ALUMINUM HYDROXIDE, MAGNESIUM HYDROXIDE, AND DIMETHICONE 30 ML: 200; 20; 200 SUSPENSION ORAL at 18:15

## 2024-02-05 RX ADMIN — IOHEXOL 100 ML: 350 INJECTION, SOLUTION INTRAVENOUS at 16:02

## 2024-02-05 NOTE — ED NOTES
Provider made aware via TT that pt is having increased abd pain.     Elin Tirado RN  02/05/24 [id number]

## 2024-02-05 NOTE — ED PROVIDER NOTES
History  Chief Complaint   Patient presents with    Abdominal Pain     Pt presents to the ED with abd distention x5 days. Nausea. Pt reports noticeable dyspnea with the distention. Difficulty laying down. Last etoh was yesterday.      (Diogo Travis) Diogo Travis is a 58 y.o. male     They presented to the emergency department on February 5, 2024. Patient presents with:  Generalized Abdominal Pain, difficulty breathing, and abdominal distention that started 5 days ago. The patients abdominal pain has severely worsened in the last 24 hours. PMH includes alcoholism and hepatic steatosis. The patient has been binge drinking for the last month. The patient's last drink was 10 pm yesterday in which he consumed 3 bottles of wine. The abdominal pain worsens when laying down. The patient's life has been really stressful lately, patient lost his job 1 month ago, his father was diagnosed with cancer, and is afraid of becoming homeless. The patient stopped taking his escitalopram suddenly 1 month ago due to being unable to afford it. Patient denies headache, chest pain, palpitations, vomiting, diarrhea, constipation, rash, dysuria, polyuria, hematuria, or any other complaint at this time.                Prior to Admission Medications   Prescriptions Last Dose Informant Patient Reported? Taking?   Thiamine Mononitrate (VITAMIN B1) 100 mg tablet   No No   Sig: Take 1 tablet (100 mg total) by mouth daily   amLODIPine (NORVASC) 5 mg tablet   No No   Sig: Take 1 tablet (5 mg total) by mouth daily   escitalopram (LEXAPRO) 20 mg tablet   Yes No   Sig: Take 1 tablet by mouth daily   famotidine (PEPCID) 20 mg tablet   No No   Sig: Take 1 tablet (20 mg total) by mouth 2 (two) times a day   folic acid (FOLVITE) 400 mcg tablet   No No   Sig: Take 1 tablet (400 mcg total) by mouth daily   lisinopril (ZESTRIL) 40 mg tablet   No No   Sig: Take 1 tablet (40 mg total) by mouth daily   pantoprazole (PROTONIX) 40 mg tablet   No No   Sig: Take  1 tablet (40 mg total) by mouth daily in the early morning   polyethylene glycol (MIRALAX) 17 g packet   No No   Sig: Take 17 g by mouth daily   senna-docusate sodium (SENOKOT S) 8.6-50 mg per tablet   No No   Sig: Take 2 tablets by mouth daily at bedtime      Facility-Administered Medications: None       Past Medical History:   Diagnosis Date    Alcoholic ketoacidosis 10/16/2023    GERD (gastroesophageal reflux disease)     Hypertension     Hypomagnesemia 04/02/2023    Vomiting 04/02/2023       No past surgical history on file.    No family history on file.  I have reviewed and agree with the history as documented.    E-Cigarette/Vaping    E-Cigarette Use Never User      E-Cigarette/Vaping Substances     Social History     Tobacco Use    Smoking status: Never    Smokeless tobacco: Never   Vaping Use    Vaping status: Never Used   Substance Use Topics    Alcohol use: Yes     Comment: up to a handle of vodka per day    Drug use: Never        Review of Systems   Constitutional:  Negative for chills and fever.   HENT:  Negative for ear pain and sore throat.    Respiratory:  Negative for cough and shortness of breath.    Cardiovascular:  Negative for chest pain and palpitations.   Gastrointestinal:  Positive for abdominal pain and nausea. Negative for blood in stool, constipation, diarrhea and vomiting.   Genitourinary:  Negative for dysuria and hematuria.   Musculoskeletal:  Negative for arthralgias and back pain.   Skin:  Negative for color change and rash.   Neurological:  Negative for seizures and syncope.   All other systems reviewed and are negative.      Physical Exam  ED Triage Vitals   Temperature Pulse Respirations Blood Pressure SpO2   02/05/24 1105 02/05/24 1103 02/05/24 1105 02/05/24 1103 02/05/24 1105   98.6 °F (37 °C) (!) 115 22 162/94 97 %      Temp Source Heart Rate Source Patient Position - Orthostatic VS BP Location FiO2 (%)   02/05/24 1105 02/05/24 1200 02/05/24 1200 02/05/24 1200 --   Oral Monitor  Sitting Right arm       Pain Score       --                    Orthostatic Vital Signs  Vitals:    02/05/24 1700 02/05/24 1730 02/05/24 1800 02/05/24 1930   BP: 137/72 142/76 148/94 135/73   Pulse: 93 84 89 91   Patient Position - Orthostatic VS: Sitting Sitting         Physical Exam  Vitals and nursing note reviewed.   Constitutional:       General: He is not in acute distress.     Appearance: He is well-developed.   HENT:      Head: Normocephalic and atraumatic.   Eyes:      Conjunctiva/sclera: Conjunctivae normal.   Cardiovascular:      Rate and Rhythm: Normal rate and regular rhythm.      Heart sounds: No murmur heard.  Pulmonary:      Effort: Pulmonary effort is normal. No respiratory distress.      Breath sounds: Normal breath sounds.   Abdominal:      General: Abdomen is protuberant. There is distension.      Palpations: Abdomen is soft. There is no mass or pulsatile mass.      Tenderness: There is no abdominal tenderness. There is right CVA tenderness. There is no left CVA tenderness.      Hernia: No hernia is present.   Musculoskeletal:         General: No swelling.      Cervical back: Neck supple.   Skin:     General: Skin is warm and dry.      Capillary Refill: Capillary refill takes less than 2 seconds.   Neurological:      Mental Status: He is alert.   Psychiatric:         Mood and Affect: Mood normal.         ED Medications  Medications   thiamine tablet 100 mg (0 mg Oral Hold 2/5/24 1418)   folic acid (FOLVITE) tablet 1 mg (0 mg Oral Hold 2/5/24 1419)   ondansetron (ZOFRAN) injection 4 mg (4 mg Intravenous Given 2/5/24 1354)   diazepam (VALIUM) injection 5 mg (5 mg Intravenous Given 2/5/24 1356)   iohexol (OMNIPAQUE) 350 MG/ML injection (MULTI-DOSE) 100 mL (100 mL Intravenous Given 2/5/24 1602)   aluminum-magnesium hydroxide-simethicone (MAALOX) oral suspension 30 mL (30 mL Oral Given 2/5/24 1815)       Diagnostic Studies  Results Reviewed       Procedure Component Value Units Date/Time    HS Troponin  0hr (reflex protocol) [[id number]]  (Normal) Collected: 02/05/24 1352    Lab Status: Final result Specimen: Blood from Arm, Left Updated: 02/05/24 1429     hs TnI 0hr <2 ng/L     B-Type Natriuretic Peptide(BNP) [[id number]]  (Normal) Collected: 02/05/24 1133    Lab Status: Final result Specimen: Blood from Arm, Left Updated: 02/05/24 1427     BNP 33 pg/mL     POCT alcohol breath test [[id number]]  (Normal) Resulted: 02/05/24 1424    Lab Status: Final result Updated: 02/05/24 1424     EXTBreath Alcohol 0.000    D-dimer, quantitative [[id number]]  (Normal) Collected: 02/05/24 1353    Lab Status: Final result Specimen: Blood from Arm, Left Updated: 02/05/24 1424     D-Dimer, Quant 0.42 ug/ml FEU     Narrative:      In the evaluation for possible pulmonary embolism, in the appropriate (Well's Score of 4 or less) patient, the age adjusted d-dimer cutoff for this patient can be calculated as:    Age x 0.01 (in ug/mL) for Age-adjusted D-dimer exclusion threshold for a patient over 50 years.    Comprehensive metabolic panel [[id number]]  (Abnormal) Collected: 02/05/24 1133    Lab Status: Final result Specimen: Blood from Arm, Left Updated: 02/05/24 1203     Sodium 140 mmol/L      Potassium 4.3 mmol/L      Chloride 104 mmol/L      CO2 25 mmol/L      ANION GAP 11 mmol/L      BUN 15 mg/dL      Creatinine 1.07 mg/dL      Glucose 113 mg/dL      Calcium 10.0 mg/dL      AST 52 U/L      ALT 38 U/L      Alkaline Phosphatase 94 U/L      Total Protein 7.9 g/dL      Albumin 4.7 g/dL      Total Bilirubin 0.86 mg/dL      eGFR 76 ml/min/1.73sq m     Narrative:      National Kid[employer] Foundation guidelines for Chronic Kidney Disease (CKD):     Stage 1 with normal or high GFR (GFR > 90 mL/min/1.73 square meters)    Stage 2 Mild CKD (GFR = 60-89 mL/min/1.73 square meters)    Stage 3A Moderate CKD (GFR = 45-59 mL/min/1.73 square meters)    Stage 3B Moderate CKD (GFR = 30-44 mL/min/1.73 square meters)    Stage 4 Severe CKD (GFR = 15-29  mL/min/1.73 square meters)    Stage 5 End Stage CKD (GFR <15 mL/min/1.73 square meters)  Note: GFR calculation is accurate only with a steady state creatinine    Lipase [[id number]]  (Abnormal) Collected: 02/05/24 1133    Lab Status: Final result Specimen: Blood from Arm, Left Updated: 02/05/24 1203     Lipase 8 u/L     CBC and differential [[id number]]  (Abnormal) Collected: 02/05/24 1133    Lab Status: Final result Specimen: Blood from Arm, Left Updated: 02/05/24 1148     WBC 6.57 Thousand/uL      RBC 4.71 Million/uL      Hemoglobin 15.4 g/dL      Hematocrit 45.2 %      MCV 96 fL      MCH 32.7 pg      MCHC 34.1 g/dL      RDW 13.3 %      MPV 8.8 fL      Platelets 228 Thousands/uL      nRBC 0 /100 WBCs      Neutrophils Relative 71 %      Immat GRANS % 0 %      Lymphocytes Relative 20 %      Monocytes Relative 6 %      Eosinophils Relative 2 %      Basophils Relative 1 %      Neutrophils Absolute 4.67 Thousands/µL      Immature Grans Absolute 0.02 Thousand/uL      Lymphocytes Absolute 1.34 Thousands/µL      Monocytes Absolute 0.39 Thousand/µL      Eosinophils Absolute 0.10 Thousand/µL      Basophils Absolute 0.05 Thousands/µL                    CT abdomen pelvis with contrast   Final Result by Paco Vinson DO (02/05 1651)      1. Stomach is largely collapsed, assessment limited. Otherwise, no acute intra-abdominal abnormality.      2. Enlarged fatty liver.      Additional incidental findings as above.      Workstation performed: [id number]         XR chest 1 view portable   ED Interpretation by Yany Blancas MD (02/05 1512)   No acute cardiopulmonary abnormalities      Final Result by Eric Saha MD (02/05 1659)      No acute cardiopulmonary disease.            Workstation performed: [id number]               Procedures  ECG 12 Lead Documentation Only    Date/Time: 2/5/2024 4:32 PM    Performed by: Glenn Mills MD  Authorized by: Glenn Mills MD    Indications / Diagnosis:  Generalized abdominal pain  ECG  reviewed by me, the ED Provider: yes    Patient location:  ED  Previous ECG:     Previous ECG:  Compared to current    Comparison ECG info:  Left posterior fascicular block    Similarity:  Changes noted    Comparison to cardiac monitor: No    Interpretation:     Interpretation: non-specific    Rate:     ECG rate:  116    ECG rate assessment: tachycardic    Rhythm:     Rhythm: sinus tachycardia    Ectopy:     Ectopy: none    QRS:     QRS axis:  Left  Conduction:     Conduction: abnormal      Abnormal conduction: LPFB    ST segments:     ST segments:  Normal  T waves:     T waves: normal          ED Course                                       Medical Decision Making  Patient presents with:  Generalized Abdominal Pain, difficulty breathing, and abdominal distention that started 5 days ago. The patients abdominal pain has severely worsened in the last 24 hours. PMH includes alcoholism and hepatic steatosis. The patient has been binge drinking for the last month. The patient's last drink was 10 pm yesterday in which he consumed 3 bottles of wine.      Patient seen and examined noted to have generalized non-tender abdominal pain with distention.      Differential diagnosis includes but is not limited to gastritis, liver cirrhosis, fatty liver disease, alcoholism, alcohol withdrawal.      Patient's labs notable for: unremarkable alcohol breath test, d-dimer, BNP, lipase, CMP, and CBC.    Imaging revealed:   Chest x-ray  Impression: no acute cardiopulmonary disease    CT abdomen pelvis with contrast  IMPRESSION:  1. Stomach is largely collapsed, assessment limited. Otherwise, no acute intra-abdominal abnormality.  2. Enlarged fatty liver.    EKG: interpreted by myself as above.    Patients initial CIWA score was 12. Patient was given 5 valium.     Patients CIWA reduced to 5.     Patient was given maalox and zofran for his nausea and abdominal pain which improved both.     Discussed patient's case with Dr. Priscilla Burden  regarding admission who accepted the patient for further evaluation and management at [facility].    Patient was signed out to Dr. Pitts while waiting for transport.    Patient was rx'd as below     Amount and/or Complexity of Data Reviewed  Labs: ordered.  Radiology: ordered and independent interpretation performed.    Risk  OTC drugs.  Prescription drug management.          Disposition  Final diagnoses:   Fatty liver due to alcoholism   Gastritis   Anxiety   Alcohol withdrawal (HCC)     Time reflects when diagnosis was documented in both MDM as applicable and the Disposition within this note       Time User Action Codes Description Comment    2/5/2024  6:22 PM Glenn Mills Add [K70.0] Fatty liver due to alcoholism     2/5/2024  7:06 PM HewGlenn Add [K29.70] Gastritis     2/5/2024  7:06 PM HewGlenn Modify [K70.0] Fatty liver due to alcoholism     2/5/2024  7:06 PM Glenn Mills Modify [K29.70] Gastritis     2/5/2024  7:06 PM Glenn Mills Add [F41.9] Anxiety     2/5/2024  8:03 PM Yany Blancas Add [F10.939] Alcohol withdrawal (HCC)           ED Disposition       ED Disposition   Transfer to Another Facility-In Network    Condition   --    Date/Time   Mon Feb 5, 2024  8:03 PM    Comment   Diogo Travis should be transferred out to [facility].               MD Documentation      Flowsheet Row Most Recent Value   Patient Condition The patient has been stabilized such that within reasonable medical probability, no material deterioration of the patient condition or the condition of the unborn child(camelia) is likely to result from the transfer   Reason for Transfer Level of Care needed not available at this facility   Benefits of Transfer Continuity of care, Specialized equipment and/or services available at the receiving facility (Include comment)________________________   Accepting Physician Dr. Priscilla Burden   Accepting Facility Name, [facility]   Sending MD Dr. Yany Blancas   Provider  Certification General risk, such as traffic hazards, adverse weather conditions, rough terrain or turbulence, possible failure of equipment (including vehicle or aircraft), or consequences of actions of persons outside the control of the transport personnel, Unanticipated needs of medical equipment and personnel during transport, Risk of worsening condition, The possibility of a transport vehicle being unavailable          RN Documentation      Flowsheet Row Most Recent Value   Accepting Facility Name, City & State  [place]          Follow-up Information       Follow up With Specialties Details Why Contact Info    Alice Stroud MD Emergency Medicine In 3 days  [address]  [address]  [facility] [id number] [phone]              Patient's Medications   Discharge Prescriptions    ESCITALOPRAM (LEXAPRO) 20 MG TABLET    Take 1 tablet (20 mg total) by mouth daily for 14 days       Start Date: 2/5/2024  End Date: 2/19/2024       Order Dose: 20 mg       Quantity: 14 tablet    Refills: 0    PANTOPRAZOLE (PROTONIX) 40 MG TABLET    Take 1 tablet (40 mg total) by mouth daily for 20 days       Start Date: 2/5/2024  End Date: 2/25/2024       Order Dose: 40 mg       Quantity: 20 tablet    Refills: 0    SUCRALFATE (CARAFATE) 1 G TABLET    Take 1 tablet (1 g total) by mouth 4 (four) times a day for 14 days       Start Date: 2/5/2024  End Date: 2/19/2024       Order Dose: 1 g       Quantity: 56 tablet    Refills: 0         PDMP Review         Value Time User    PDMP Reviewed  Yes 12/1/2023 11:15 AM Priscilla Burden MD             ED Provider  Attending physically available and evaluated Diogo NATION Angy. I managed the patient along with the ED Attending.    Electronically Signed by           Glenn Mills MD  02/05/24 2037       Glenn Mills MD  02/05/24 2039

## 2024-02-05 NOTE — ED ATTENDING ATTESTATION
2/5/2024  I, Yany Blancas MD, saw and evaluated the patient. I have discussed the patient with the resident/non-physician practitioner and agree with the resident's/non-physician practitioner's findings, Plan of Care, and MDM as documented in the resident's/non-physician practitioner's note, except where noted. All available labs and Radiology studies were reviewed.  I was present for key portions of any procedure(s) performed by the resident/non-physician practitioner and I was immediately available to provide assistance.       At this point I agree with the current assessment done in the Emergency Department.  I have conducted an independent evaluation of this patient a history and physical is as follows:    58-year-old male with history of alcohol abuse and anxiety presenting for evaluation of abdominal pain and anxiety.  Patient reports 3 days of worsening abdominal pain and distention.  Pain is constant, described as a pressure sensation in his abdomen.  Denies chest pain but reports shortness of breath.  No lower extremity edema.  Reports nausea without vomiting.  No diarrhea, blood in his stool, or black tarry stools.    Of note, the patient is a longstanding history of alcohol abuse and has been admitted for alcohol withdrawal requiring phenobarbital and Valium in the past.  He recently began drinking again 3 weeks ago, drinking 3 bottles of wine daily.  Last drink was at 10 PM last night.  The patient reports feeling tremulous and extremely anxious.  He has a history of anxiety but has not been taking his Lexapro over the last month due to inability to get into see his PCP.  He denies suicidal ideation.        Physical Exam  Vitals and nursing note reviewed.   Constitutional:       Appearance: He is well-developed. He is not ill-appearing or diaphoretic.      Comments: Anxious, tearful   HENT:      Head: Normocephalic and atraumatic.      Right Ear: External ear normal.      Left Ear: External ear  normal.      Nose: Nose normal.   Eyes:      Conjunctiva/sclera: Conjunctivae normal.   Cardiovascular:      Rate and Rhythm: Normal rate and regular rhythm.      Pulses: Normal pulses.      Heart sounds: Normal heart sounds. No murmur heard.     No friction rub. No gallop.   Pulmonary:      Effort: Pulmonary effort is normal. No respiratory distress.      Breath sounds: Normal breath sounds. No wheezing or rales.   Abdominal:      General: Bowel sounds are normal. There is distension (no fluid wave).      Palpations: Abdomen is soft.      Tenderness: There is abdominal tenderness (diffusely tender to deep palpation). There is no guarding.   Musculoskeletal:         General: No deformity. Normal range of motion.      Cervical back: Normal range of motion and neck supple.   Skin:     General: Skin is warm and dry.   Neurological:      Mental Status: He is alert and oriented to person, place, and time.      Motor: No abnormal muscle tone.      Comments: Intermittent fine tremor to the whole body. Face symmetric, speech clear. Denies paresthesias.    Psychiatric:      Comments: Anxious, tearful. Denies SI. Denies AVH.            ED Course  ED Course as of 02/05/24 2003 Mon Feb 05, 2024   1311 CIWA score 12. Patient is extremely anxious, reports a history of anxiety and has been off of his celexa for a month. 5 mg of IV valium   1421 Tachycardia resolved following IV valium.    1511 CIWA score improved to 5. D-dimer is not elevated. Low-risk by wells criteria- doubt PE.    1512 Chest x-ray with no acute cardiopulmonary abnormalities   1807 CT scan of the abdomen pelvis shows an enlarged fatty liver, no acute intra-abdominal abnormalities. Suspect gastritis, likely alcohol induced as the cause of the pt's abdominal discomfort. Will administer maalox and talk to the patient about possible admission to [facility] for detox. He does not require medical admission at this time.    1902 Will start Protonix as well as Carafate for  suspected alcoholic gastritis.  After further discussion the patient is agreeable to transfer to [facility] ([facility] for inpatient detox.  Message sent to Pippa Carmichael on call to discuss the case.    1931 If patient is not excepted for detox at [facility], will place warm handoff referral to [facility] to arrange for outpatient detox.  Prescriptions for PPT, carafate, and the pt's lexapro were called into the pt's pharmacy for treatment of his suspected gastritis.  [facility] referral was also placed for follow-up with gastroenterology for consideration of colonoscopy given the patient's symptoms.  Care of patient transferred to Dr. Jose Juan Blancas at 2000 while awaiting disposition to detox at [facility] vs. [facility] warm handoff.    1950 Case reviewed by CAROLYN Colon on call for inpatient detox. Patient has been accepted for detox at [facility].          Critical Care Time  CriticalCare Time    Date/Time: 2/5/2024 8:04 PM    Performed by: Yany Blancas MD  Authorized by: Yany Blancas MD    Critical care provider statement:     Critical care time (minutes):  35    Critical care time was exclusive of:  Separately billable procedures and treating other patients and teaching time    Critical care was necessary to treat or prevent imminent or life-threatening deterioration of the following conditions: alcohol withdrawal requiring IV benzodiazepines.    Critical care was time spent personally by me on the following activities:  Blood draw for specimens, obtaining history from patient or surrogate, development of treatment plan with patient or surrogate, discussions with consultants, evaluation of patient's response to treatment, examination of patient, review of old charts, re-evaluation of patient's condition, ordering and review of laboratory studies, ordering and performing treatments and interventions and ordering and review of radiographic studies    I assumed direction of critical care for  this patient from another provider in my specialty: no

## 2024-02-06 PROBLEM — F41.9 ANXIETY AND DEPRESSION: Status: ACTIVE | Noted: 2024-02-06

## 2024-02-06 PROBLEM — F32.A ANXIETY AND DEPRESSION: Status: ACTIVE | Noted: 2024-02-06

## 2024-02-06 LAB
ALBUMIN SERPL BCP-MCNC: 3.8 G/DL (ref 3.5–5)
ALP SERPL-CCNC: 66 U/L (ref 34–104)
ALT SERPL W P-5'-P-CCNC: 24 U/L (ref 7–52)
ANION GAP SERPL CALCULATED.3IONS-SCNC: 10 MMOL/L
AST SERPL W P-5'-P-CCNC: 32 U/L (ref 13–39)
ATRIAL RATE: 78 BPM
ATRIAL RATE: 80 BPM
BILIRUB SERPL-MCNC: 0.68 MG/DL (ref 0.2–1)
BUN SERPL-MCNC: 13 MG/DL (ref 5–25)
CALCIUM SERPL-MCNC: 8.8 MG/DL (ref 8.4–10.2)
CHLORIDE SERPL-SCNC: 104 MMOL/L (ref 96–108)
CO2 SERPL-SCNC: 25 MMOL/L (ref 21–32)
CREAT SERPL-MCNC: 0.97 MG/DL (ref 0.6–1.3)
ERYTHROCYTE [DISTWIDTH] IN BLOOD BY AUTOMATED COUNT: 13.1 % (ref 11.6–15.1)
GFR SERPL CREATININE-BSD FRML MDRD: 86 ML/MIN/1.73SQ M
GLUCOSE SERPL-MCNC: 93 MG/DL (ref 65–140)
HCT VFR BLD AUTO: 40.2 % (ref 36.5–49.3)
HGB BLD-MCNC: 13.6 G/DL (ref 12–17)
MAGNESIUM SERPL-MCNC: 1.8 MG/DL (ref 1.9–2.7)
MCH RBC QN AUTO: 32.9 PG (ref 26.8–34.3)
MCHC RBC AUTO-ENTMCNC: 33.8 G/DL (ref 31.4–37.4)
MCV RBC AUTO: 97 FL (ref 82–98)
P AXIS: 17 DEGREES
P AXIS: 22 DEGREES
PLATELET # BLD AUTO: 175 THOUSANDS/UL (ref 149–390)
PMV BLD AUTO: 8.9 FL (ref 8.9–12.7)
POTASSIUM SERPL-SCNC: 3.7 MMOL/L (ref 3.5–5.3)
PR INTERVAL: 140 MS
PR INTERVAL: 142 MS
PROT SERPL-MCNC: 6.5 G/DL (ref 6.4–8.4)
QRS AXIS: 12 DEGREES
QRS AXIS: 3 DEGREES
QRSD INTERVAL: 94 MS
QRSD INTERVAL: 96 MS
QT INTERVAL: 388 MS
QT INTERVAL: 392 MS
QTC INTERVAL: 442 MS
QTC INTERVAL: 452 MS
RBC # BLD AUTO: 4.14 MILLION/UL (ref 3.88–5.62)
SODIUM SERPL-SCNC: 139 MMOL/L (ref 135–147)
T WAVE AXIS: 10 DEGREES
T WAVE AXIS: 11 DEGREES
VENTRICULAR RATE: 78 BPM
VENTRICULAR RATE: 80 BPM
WBC # BLD AUTO: 5.07 THOUSAND/UL (ref 4.31–10.16)

## 2024-02-06 PROCEDURE — 80053 COMPREHEN METABOLIC PANEL: CPT | Performed by: PHYSICIAN ASSISTANT

## 2024-02-06 PROCEDURE — 99232 SBSQ HOSP IP/OBS MODERATE 35: CPT | Performed by: EMERGENCY MEDICINE

## 2024-02-06 PROCEDURE — 85027 COMPLETE CBC AUTOMATED: CPT | Performed by: PHYSICIAN ASSISTANT

## 2024-02-06 PROCEDURE — 83735 ASSAY OF MAGNESIUM: CPT | Performed by: PHYSICIAN ASSISTANT

## 2024-02-06 RX ORDER — SUCRALFATE 1 G/1
1 TABLET ORAL
Status: DISCONTINUED | OUTPATIENT
Start: 2024-02-06 | End: 2024-02-07 | Stop reason: HOSPADM

## 2024-02-06 RX ORDER — PHENOBARBITAL SODIUM 65 MG/ML
65 INJECTION, SOLUTION INTRAMUSCULAR; INTRAVENOUS ONCE
Status: COMPLETED | OUTPATIENT
Start: 2024-02-06 | End: 2024-02-06

## 2024-02-06 RX ORDER — NALTREXONE HYDROCHLORIDE 50 MG/1
50 TABLET, FILM COATED ORAL DAILY
Status: DISCONTINUED | OUTPATIENT
Start: 2024-02-07 | End: 2024-02-07 | Stop reason: HOSPADM

## 2024-02-06 RX ORDER — MAGNESIUM SULFATE HEPTAHYDRATE 40 MG/ML
2 INJECTION, SOLUTION INTRAVENOUS ONCE
Status: COMPLETED | OUTPATIENT
Start: 2024-02-06 | End: 2024-02-07

## 2024-02-06 RX ORDER — ESCITALOPRAM OXALATE 10 MG/1
10 TABLET ORAL DAILY
Status: DISCONTINUED | OUTPATIENT
Start: 2024-02-06 | End: 2024-02-07 | Stop reason: HOSPADM

## 2024-02-06 RX ORDER — PHENOBARBITAL SODIUM 130 MG/ML
260 INJECTION, SOLUTION INTRAMUSCULAR; INTRAVENOUS ONCE
Status: COMPLETED | OUTPATIENT
Start: 2024-02-06 | End: 2024-02-06

## 2024-02-06 RX ADMIN — ESCITALOPRAM OXALATE 10 MG: 10 TABLET ORAL at 09:47

## 2024-02-06 RX ADMIN — MULTIPLE VITAMINS W/ MINERALS TAB 1 TABLET: TAB ORAL at 09:47

## 2024-02-06 RX ADMIN — TRAZODONE HYDROCHLORIDE 50 MG: 50 TABLET ORAL at 21:00

## 2024-02-06 RX ADMIN — SUCRALFATE 1 G: 1 TABLET ORAL at 16:37

## 2024-02-06 RX ADMIN — THIAMINE HYDROCHLORIDE: 100 INJECTION, SOLUTION INTRAMUSCULAR; INTRAVENOUS at 07:35

## 2024-02-06 RX ADMIN — PHENOBARBITAL SODIUM 65 MG: 65 INJECTION INTRAMUSCULAR; INTRAVENOUS at 18:18

## 2024-02-06 RX ADMIN — PHENOBARBITAL SODIUM 260 MG: 130 INJECTION INTRAMUSCULAR; INTRAVENOUS at 16:59

## 2024-02-06 RX ADMIN — ACETAMINOPHEN 650 MG: 325 TABLET ORAL at 21:00

## 2024-02-06 RX ADMIN — SUCRALFATE 1 G: 1 TABLET ORAL at 21:00

## 2024-02-06 RX ADMIN — SUCRALFATE 1 G: 1 TABLET ORAL at 09:47

## 2024-02-06 RX ADMIN — SODIUM CHLORIDE 125 ML/HR: 0.9 INJECTION, SOLUTION INTRAVENOUS at 07:35

## 2024-02-06 RX ADMIN — ENOXAPARIN SODIUM 40 MG: 100 INJECTION SUBCUTANEOUS at 07:53

## 2024-02-06 RX ADMIN — MAGNESIUM SULFATE IN WATER 2 G: 40 INJECTION, SOLUTION INTRAVENOUS at 09:47

## 2024-02-06 RX ADMIN — PANTOPRAZOLE SODIUM 40 MG: 40 TABLET, DELAYED RELEASE ORAL at 05:30

## 2024-02-06 NOTE — ED CARE HANDOFF
[facility] Warm Handoff Outcome Note    Patient name Diogo Travis  Location ED-36/ED-36 MRN [id number]  Age: 58 y.o.          Plan Type:  Warm Handoff                                                                                    Plan Date: 2/5/2024  Service:  ED Warm Handoff      Substance Use History:  ETOH      Warm Handoff Update:  Per CATCH patient addmitted to [facility]    Warm Handoff Outcome: Withdrawal Management

## 2024-02-06 NOTE — ASSESSMENT & PLAN NOTE
Patient with a history of chronic daily alcohol use  Last drink 10 pm 02/04/24  Previous admission to detox unit 12/2023   Serum alcohol <10 in the ED with evidence of withdrawal at that time.  Received 5 mg of Valium IV in the ED  SEWS protocol with symptom-triggered phenobarbital followed for medically-assisted alcohol withdrawal  Received 975 mg phenobarbital total, last dose administered 2/6/2024 1818  Appears stable off phenobarbital- VSS, no tremors  Acute withdrawal resolved

## 2024-02-06 NOTE — ASSESSMENT & PLAN NOTE
Longstanding history of alcohol use disorder, w/o hx of withdrawal related seizures   Drinks about 3 bottles of wine daily for the past 1 month  Continue naltrexone   Withdrawal management as above  Continue daily thiamine/folic acid supplementation, and MVI  Consult case management for assistance with aftercare resources - pt interested in outpatient resources

## 2024-02-06 NOTE — PROGRESS NOTES
PROGRESS NOTE  DEPARTMENT OF MEDICAL TOXICOLOGY  LEVEL 4 MEDICAL DETOX UNIT  Diogo Travis 58 y.o. male MRN: [id number]  Unit/Bed#: 5T DETOX 511-01 Encounter: [id number]      Reason for Admission/Principal Problem: Alcohol withdrawal  Rounding Provider: Pablito Larry MD  Attending Provider: Priscilla Bruden MD   2/5/2024  9:37 PM       * Alcohol withdrawal syndrome with complication (HCC)  Assessment & Plan  Patient with a history of chronic daily alcohol use  Last drink 10pm 02/04/24  Admits was last seen here in the detox unit 12/2023 for similar complaint  Serum alcohol <10 in the ED with evidence of withdrawal at that time.  Received 5 mg of Valium IV in the ED  Currently endorsing increased anxiety.  Continue SEWS protocol with symptom-triggered phenobarbital for medically-assisted alcohol withdrawal  SEWs score upon admission:13  Received initial dose 650 mg Phenobarbital 02/05  Will continue to monitor and dose phenobarbital as withdrawal symptoms appear   Telemetry and pulse oximetry monitoring   Continue to monitor vitals, symptoms    SEWS Total Score: 0 (2/6/2024  7:45 AM)       Alcohol use disorder, severe, dependence (HCC)  Assessment & Plan  Longstanding history of alcohol use disorder, w/o hx of withdrawal related seizures   Drinks about 3 bottles of wine daily for the past 1 month  Interested in naltrexone, can start Revia tomorrow  Withdrawal management as above  Initiate IVFs, daily thiamine/folic acid supplementation, and MVI  Consult case management for assistance with aftercare resources - pt interested in outpatient resources     Chronic alcoholic gastritis  Assessment & Plan  Admits history of alcoholic gastritis chronic  Presents to the ED complaining of a diffuse upper abdominal pain with abdominal bloating  CT scan of the abdomen pelvis 02/05: Enlarged fatty liver  Continue 40 mg pantoprazole daily  Encourage abstinence from alcohol consumption  Consider adding carafate    Anxiety and  depression  Assessment & Plan  History of depression with anxiety that has worsened over the last 1 month  Admits several family stressors including divorce, inability to speak to his son and a recent cancer diagnosis with his father  Admit was on Lexapro 20 mg daily, last took his medication more than 1 month ago, states he found the medication effective in controlling his anxiety  May restart Lexapro 10 mg qd, for anxiety and depression  May follow up w/ PCP or outpatient psych for ongoing management    Hepatic steatosis  Assessment & Plan  Recent Labs     02/05/24  1133 02/06/24  0528   AST 52* 32   ALT 38 24   ALKPHOS 94 66   TBILI 0.86 0.68      Likely alcohol induced, given history of chronic alcohol consumption  Presents to the ED complaining of diffuse abdominal bloating with abdominal pain  Pertinent lab: AST 52, ALT 38, alkaline phosphatase 94  Continue supportive care, monitor a.m. lab for improvement  Encourage alcohol abstinence, gastroenterology follow-up upon discharge    Hypertension  Assessment & Plan  Admits history of hypertension, noncompliant  Was on lisinopril 40 mg daily  Current admitting blood pressure 147/98 02/05/2023  Will hold off on initiating blood pressure medication while on SEWs protocol  Continue to monitor blood pressure, consider restarting blood pressure if blood pressure is elevated    Major depressive disorder  Assessment & Plan  History of depression with anxiety that has worsened over the last 1 month  Admits several family stressors including divorce, inability to speak to his son and a recent cancer diagnosis with his father  Admit was on Lexapro 20 mg daily, last took his medication more than 1 month ago  Will continue to monitor, consider restarting medication once pt is stable  Consider psych consult for medication adjustment and management        VTE Pharmacologic Prophylaxis:   Pharmacologic: Enoxaparin (Lovenox)  Mechanical VTE Prophylaxis in Place: yes    Code  Status: Level 1 - Full Code    Patient Centered Rounds: I have performed bedside rounds with nursing staff today.    Discussions with Specialists or Other Care Team Provider: None     Education and Discussions with Family / Patient: Yes    Time Spent for Care: 30 minutes.  More than 50% of total time spent on counseling and coordination of care as described above.    Current Length of Stay: 1 day(s)    Current Patient Status: Inpatient     Certification Statement: The patient will continue to require additional inpatient hospital stay due to alcohol withdrawal  Discharge Plan: home w/ outpatient resources, IM Vivitriol        Subjective:   Pt reports increased anxiety but denies any current nausea, vomiting, sweating, tremors, or other withdrawal symptoms. Pt reports some abdominal pain/abdominal fullness secondary to alcoholic gastritis and enlarged, fatty liver. Pt states he would like to restart his Lexapro, which he says helped with his anxiety symptoms. Pt also states that he is interested in starting naltrexone as well.    Objective:     Clinical Opiate Withdrawal Scale  Pulse: 68    SEWS Total Score: 0 (2/6/2024  7:45 AM)        Last 24 Hours Medication List:   Current Facility-Administered Medications   Medication Dose Route Frequency Provider Last Rate    acetaminophen  650 mg Oral Q6H PRN Obioma Maykel Billy PA-C      acetaminophen  650 mg Oral Q6H PRN Obioma Maykel Billy PA-C      enoxaparin  40 mg Subcutaneous Daily Marina Billy PA-C      escitalopram  10 mg Oral Daily Clau Schulz PA-C      folic acid 1 mg, thiamine (VITAMIN B1) 100 mg in sodium chloride 0.9 % 100 mL IV piggyback   Intravenous Daily Marina Billy PA-C 200 mL/hr at 02/06/24 0735    ketorolac  30 mg Intravenous Q6H PRN Obioma Maykel Billy PA-C      multivitamin-minerals  1 tablet Oral Daily Marina Billy PA-C      [START ON 2/7/2024] naltrexone  50 mg Oral Daily Pablito Larry MD       ondansetron  4 mg Intravenous Q6H PRN Obcatie Brar ANNEMARIE Billy      pantoprazole  40 mg Oral Early Morning Obcatie Maykel Billy PA-C      sodium chloride  125 mL/hr Intravenous Continuous Obcatie Myakel Billy PA-C 125 mL/hr (24 0735)    sucralfate  1 g Oral 4x Daily (AC & HS) Clausue Schulz PA-C      traZODone  50 mg Oral HS PRN Obcatie Maykel Billy PA-C           Vitals:   Temp (24hrs), Av.2 °F (36.8 °C), Min:97.8 °F (36.6 °C), Max:98.6 °F (37 °C)    Temp:  [97.8 °F (36.6 °C)-98.6 °F (37 °C)] 98.1 °F (36.7 °C)  HR:  [] 68  Resp:  [16-20] 16  BP: (117-168)/() 129/90  SpO2:  [91 %-97 %] 96 %  Body mass index is 36.2 kg/m².     Input and Output Summary (last 24 hours):No intake or output data in the 24 hours ending 24 1157    Physical Exam:   Physical Exam  Vitals and nursing note reviewed.   Constitutional:       General: He is not in acute distress.     Appearance: He is well-developed. He is not ill-appearing or diaphoretic.   HENT:      Head: Normocephalic and atraumatic.   Eyes:      Conjunctiva/sclera: Conjunctivae normal.   Cardiovascular:      Rate and Rhythm: Normal rate and regular rhythm.      Heart sounds: Normal heart sounds, S1 normal and S2 normal. No murmur heard.  Pulmonary:      Effort: Pulmonary effort is normal. No respiratory distress.      Breath sounds: Normal breath sounds.   Abdominal:      Palpations: Abdomen is soft.      Tenderness: There is no abdominal tenderness.   Musculoskeletal:         General: No swelling.      Cervical back: Neck supple.   Skin:     General: Skin is warm and dry.      Capillary Refill: Capillary refill takes less than 2 seconds.   Neurological:      Mental Status: He is alert and oriented to person, place, and time.      Motor: No tremor.   Psychiatric:         Mood and Affect: Mood is anxious.         Additional Data:     Labs:   Results from last 7 days   Lab Units 24  0528 24  1133   WBC  Thousand/uL 5.07 6.57   HEMOGLOBIN g/dL 13.6 15.4   HEMATOCRIT % 40.2 45.2   PLATELETS Thousands/uL 175 228   NEUTROS PCT %  --  71   LYMPHS PCT %  --  20   MONOS PCT %  --  6   EOS PCT %  --  2      Results from last 7 days   Lab Units 02/06/24  0528   SODIUM mmol/L 139   POTASSIUM mmol/L 3.7   CHLORIDE mmol/L 104   CO2 mmol/L 25   BUN mg/dL 13   CREATININE mg/dL 0.97   ANION GAP mmol/L 10   CALCIUM mg/dL 8.8   ALBUMIN g/dL 3.8   TOTAL BILIRUBIN mg/dL 0.68   ALK PHOS U/L 66   ALT U/L 24   AST U/L 32   GLUCOSE RANDOM mg/dL 93                              * I Have Reviewed All Lab Data Listed Above.  * Additional Pertinent Lab Tests Reviewed: All Labs For Current Hospital Admission Reviewed      Imaging Studies: I have personally reviewed pertinent reports.        Recent Cultures (last 7 days):          Today, Patient Was Seen By: Pablito Larry MD    ** Please Note: Dictation voice to text software may have been used in the creation of this document. **

## 2024-02-06 NOTE — H&P
HISTORY & PHYSICAL EXAM  DEPARTMENT OF MEDICAL TOXICOLOGY  LEVEL 4 MEDICAL DETOX UNIT  Diogo Travis 58 y.o. male MRN: [id number]  Unit/Bed#:  DETOX 511-01 Encounter: [id number]      Reason for Admission/Principal Problem: Ethanol withdrawal, Ethanol use disorder  Admitting Provider: Marina Billy PA-C  Attending Provider: Priscilla Burden MD   2/5/2024  9:37 PM        Hypertension  Assessment & Plan  Admits history of hypertension, noncompliant  Was on lisinopril 40 mg daily  Current admitting blood pressure 147/98 02/05/2023  Will hold off on initiating blood pressure medication while on SEWs protocol  Continue to monitor blood pressure, consider restarting blood pressure if blood pressure is elevated    Major depressive disorder  Assessment & Plan  History of depression with anxiety that has worsened over the last 1 month  Admits several family stressors including divorce, inability to speak to his son and a recent cancer diagnosis with his father  Admit was on Lexapro 20 mg daily, last took his medication more than 1 month ago  Will continue to monitor, consider restarting medication once pt is stable  Consider psych consult for medication adjustment and management    Chronic alcoholic gastritis  Assessment & Plan  Admits history of alcoholic gastritis chronic  Presents to the ED complaining of a diffuse upper abdominal pain with abdominal bloating  CT scan of the abdomen pelvis 02/05: Enlarged fatty liver  Continue 40 mg pantoprazole daily  Encourage abstinence from alcohol consumption    Hepatic steatosis  Assessment & Plan  Likely alcohol induced, given history of chronic alcohol consumption  Presents to the ED complaining of diffuse abdominal bloating with abdominal pain  Pertinent lab: AST 52, ALT 38, alkaline phosphatase 94  Continue supportive care, monitor a.m. lab for improvement  Encourage alcohol abstinence, gastroenterology follow-up upon discharge    Alcohol use disorder, severe,  dependence (HCC)  Assessment & Plan  Longstanding history of alcohol use disorder, w/o hx of withdrawal related seizures   Drinks about 3 bottles of wine daily for the past 1 month  Interested in naltrexone  Withdrawal management as above  Initiate IVFs, daily thiamine/folic acid supplementation, and MVI  Consult case management for assistance with aftercare resources - pt interested in outpatient resources     * Alcohol withdrawal syndrome with complication (HCC)  Assessment & Plan  Patient with a history of chronic daily alcohol use  Last drink 10pm 02/04/24  Admits was last seen here in the detox unit 12/2023 for similar complaint  Serum alcohol <10 in the ED with evidence of withdrawal at that time.  Received 5 mg of Valium IV in the ED  Currently endorsing diaphoresis, tremors, abdominal pain, and increased anxiety.  Initiate SEWS protocol with symptom-triggered phenobarbital for medically-assisted alcohol withdrawal  SEWs score upon admission:13  Received initial dose 650 mg Phenobarbital 02/05  Will continue to monitor and dose phenobarbital as withdrawal symptoms appear   Telemetry and pulse oximetry monitoring   Continue to monitor vitals, symptoms               VTE Prophylaxis: Enoxaparin (Lovenox)  / sequential compression device   Code Status: Full code      Anticipated Length of Stay:  Patient will be admitted on an Inpatient basis with an anticipated length of stay of  2  midnights.   Justification for Hospital Stay: Alcohol withdrawal syndrome with complication    For any questions or concerns, please Tiger Text the advanced practitioner in the role of [facility]-DETOX-AP On Call      This patient qualifies for Level IV medically managed intensive inpatient services under the criteria set by the American Society of Addiction Medicine, including dimensions 1-3. The patient is in withdrawal (or is intoxicated with high risk of withdrawal), with severe and unstable medical and/or psychiatric (dual diagnosis)  problems, requiring requires 24-hour medical and nursing care and the full resources of a [facility] hospital.          SEWS PHENOBARBITAL PROTOCOL FOR ALCOHOL WITHDRAWAL    Admit patient to medical detox unit and continue supportive care and stabilization of acute ethanol withdrawal per medical toxicology/detox treatment pathway. Monitor ethanol withdrawal severity via the Severity of Ethanol Withdrawal Scale (SEWS) Q4 hours and then hourly if/when SEWS > 6. Treat withdrawal per pathway and reassess Q30-60 minutes.           Mild SEWS Score 1-6  Administer medications* (IV or PO; PO preferred):  If initial SEWS score: diazepam 10mg PO/IV x 1 AND phenobarbital 65 mg PO/IV x 1  If repeat SEWS score 1-6: phenobarbital 65 mg PO/IV q1 hour x 5 doses maximum   Reassessment:   SEWS q1 hour after each dose until SEWS 0 x 2 hours  VS q1 hours (until SEWS 0, then q4 hours)  Notify provider for bedside evaluation if 5-dose maximum is reached, RASS of -3 to -5, or SEWS score escalates to moderate or severe.   Moderate SEWS Score 7-12  Administer medications* (IV):  If initial SEWS score: diazepam 10mg IV x 1 AND phenobarbital 260 mg IV x 1  If repeat SEWS score 7-12 or score escalated from mild: phenobarbital 130 mg IV q30 minutes x 5 doses maximum   Reassessment:  SEWS q30 minutes after each dose until SEWS < 7 (then hourly until SEWS 0 x 2 hours)  VS q30 minutes until SEWS < 7 (then hourly until SEWS 0, then q4 hours)  Notify provider for bedside evaluation if 5-dose maximum is reached, RASS of -3 to -5, or SEWS score escalates to severe.   Severe SEWS Score ? 13  Administer medications* (IV):  If initial SEWS score: Diazepam 10 mg IV x 1 AND phenobarbital 650 mg IV piggyback x 1 over 15-30 minutes  If repeat SEWS score ? 13 or score escalated from mild or moderate: phenobarbital 130 mg IV q30 minutes x 5 doses maximum   Reassessment:  SEWS q30 minutes after each dose until SEWS < 7 (then hourly until SEWS 0 x 2 hours)   VS  q30 minutes until SEWS < 7 (then hourly until SEWS 0, then q4 hours)  Notify provider for bedside evaluation if 5-dose maximum is reached or RASS of -3 to -5   *Hold medications and notify provider if CNS depression, respirations < 10/min, or RASS of -3 to -5.         Medications to be administered adjunctively if more than 2 grams of phenobarbital is needed for stabilization of withdrawal; require attending approval.   Dexmedetomidine infusion 0.1-1mcg/kg/hr IV infusion, titratable to reduced agitation (Goal: RASS -2)  Ketamine   Acute agitated delirium: 1-2 mg/kg IV or 4-5 mg/kg IM  Refractory withdrawal: 0.1-1mg/kg/hr IV infusion, titratable to reduced agitation (Goal: RASS -2)    Further evaluation, screening and treatment:  Evaluate complete metabolic panel, transaminases, INR, and lipase. Assess hepatic ultrasound for any sign of alcoholic liver disease or cirrhosis, and ultimately refer for further hepatic evaluation and care as/if indicated.    Additional medications for ethanol associated malnutrition:  Thiamine 100 mg IV daily, increase to 500 mg TID for signs/symptoms of Wernicke's Encephalopathy or Wernicke Korsakoff Syndrome   Folic acid 1 mg IV daily   Multivitamin PO daily      Will offer first monthly injection of Naltrexone 380 mg IM, once patient is stabilized, as it has been shown to assist in decreasing cravings for ethanol.     Evaluate and treat for coexisting substance use, such as opioids and nicotine. Discuss risk factors for infectious disease, such as history of intravenous drug abuse, and offer hepatitis and HIV screening if indicated.     Case management consultation to assist with coordination of subsequent treatment after discharge.          Hx and PE limited by: none    HPI: Diogo Travis is a 58 y.o. year old male with a past medical history of alcoholism, hepatic steatosis, hypertension and GERD who initially presented to [facility] today complaining of generalized abdominal  pain, difficulty breathing and abdominal distention for the past 5 days in the setting of chronic alcohol consumption that has been persistent for the past 1 month.  Upon arrival to the ED, patient was evaluated and had full cardiac workup including a CT scan of the abdomen pelvis which came back notable for no acute findings.  Patient CT scan showed fatty liver.  D-dimer was negative as well as cardiac enzyme.  Patient was medically cleared and was sent to the detox unit for further evaluation due to patient's continued complaint of abdominal pain, diaphoresis and tremors.    Upon arrival to the unit, patient admitted that he was given 5 mg of Valium and 4 mg of Zofran in the ED but continues to have diaphoresis, tremors and abdominal pain.  Patient further stated that he has been drinking consistently 3 bottles of wine daily for the past 1 month.  Admits last alcohol consumption was at 10 PM on 02/04/2024.  Admits has had history of DTs but no withdrawal seizures and has had multiple inpatient admission for alcohol-related complaint.  Patient otherwise denies any other complaint on review of systems.    Preferred alcoholic beverage(s): Wine  Quantity and frequency of alcohol intake: 3 bottles of wine daily x 1 month  Use of any ethanol substitutes (toxic alcohols): yes  Date/Time of last alcohol intake: 10 PM, 02/04/2024  Current signs and symptoms of ethanol withdrawal: anxiety, insomnia, tremor, diaphoresis, tachycardia, and hypertension    SEWS Total Score: 13 (2/5/2024  9:58 PM)      Ethanol Withdrawal History  Previous ethanol withdrawal? yes  Prior inpatient treatment for ethanol withdrawal? yes  Prior outpatient treatment for ethanol withdrawal? yes  History of seizures with prior ethanol withdrawal? no  Prior treatment with naltrexone (Vivitrol)? yes  Current treatment with naltrexone (Vivitrol)? no  Other current treatment for ethanol use disorder? no  Co-existing substance use? no    Review of PDMP: yes      Social History     Substance and Sexual Activity   Alcohol Use Yes    Comment: 3 bottles of wine per day     Social History     Substance and Sexual Activity   Drug Use Never     Social History     Tobacco Use   Smoking Status Never   Smokeless Tobacco Never       Review of Systems   Constitutional:  Positive for diaphoresis and fatigue. Negative for chills and fever.   HENT:  Negative for congestion, ear pain and sore throat.    Eyes:  Negative for pain and visual disturbance.   Respiratory:  Negative for cough, chest tightness, shortness of breath, wheezing and stridor.    Cardiovascular:  Negative for chest pain and palpitations.   Gastrointestinal:  Positive for abdominal pain. Negative for anal bleeding, blood in stool, diarrhea, nausea and vomiting.   Genitourinary:  Negative for dysuria and hematuria.   Musculoskeletal:  Negative for arthralgias and back pain.   Skin:  Negative for color change and rash.   Neurological:  Positive for tremors. Negative for dizziness, seizures, syncope and headaches.   Psychiatric/Behavioral:  Negative for dysphoric mood and hallucinations. The patient is nervous/anxious.    All other systems reviewed and are negative.      Historical Information   Past Medical History:   Diagnosis Date    Alcoholic ketoacidosis 10/16/2023    GERD (gastroesophageal reflux disease)     Hypertension     Hypomagnesemia 04/02/2023    Vomiting 04/02/2023     History reviewed. No pertinent surgical history.  History reviewed. No pertinent family history.  Social History   Marital Status: Single   Occupation: Unemployed  Patient Pre-hospital Living Situation: Alone  Patient Pre-hospital Level of Mobility: Independent  Patient Pre-hospital Diet Restrictions: None    Allergies   Allergen Reactions    Cefdinir Rash       Prior to Admission medications    Medication Sig Start Date End Date Taking? Authorizing Provider   amLODIPine (NORVASC) 5 mg tablet Take 1 tablet (5 mg total) by mouth daily  12/18/23 1/17/24  Álvaro Emery MD   escitalopram (LEXAPRO) 20 mg tablet Take 1 tablet by mouth daily 2/21/23   Jolene Provider, MD   escitalopram (LEXAPRO) 20 mg tablet Take 1 tablet (20 mg total) by mouth daily for 14 days 2/5/24 2/19/24  Glenn Mills MD   famotidine (PEPCID) 20 mg tablet Take 1 tablet (20 mg total) by mouth 2 (two) times a day 12/18/23 1/17/24  Álvaro Emery MD   folic acid (FOLVITE) 400 mcg tablet Take 1 tablet (400 mcg total) by mouth daily 12/18/23 1/17/24  Álvaro Emery MD   lisinopril (ZESTRIL) 40 mg tablet Take 1 tablet (40 mg total) by mouth daily 12/19/23 1/18/24  Álvaro Emery MD   pantoprazole (PROTONIX) 40 mg tablet Take 1 tablet (40 mg total) by mouth daily in the early morning 12/18/23 1/17/24  Álvaro Emery MD   pantoprazole (PROTONIX) 40 mg tablet Take 1 tablet (40 mg total) by mouth daily for 20 days  Patient not taking: Reported on 2/5/2024 2/5/24 2/25/24  Glenn Mills MD   polyethylene glycol (MIRALAX) 17 g packet Take 17 g by mouth daily 12/19/23 1/18/24  Álvaro Emery MD   senna-docusate sodium (SENOKOT S) 8.6-50 mg per tablet Take 2 tablets by mouth daily at bedtime 12/18/23 1/17/24  Álvaro Emery MD   sucralfate (CARAFATE) 1 g tablet Take 1 tablet (1 g total) by mouth 4 (four) times a day for 14 days  Patient not taking: Reported on 2/5/2024 2/5/24 2/19/24  Glenn Mills MD   Thiamine Mononitrate (VITAMIN B1) 100 mg tablet Take 1 tablet (100 mg total) by mouth daily 12/18/23 1/17/24  Álvaro Emery MD       Current Facility-Administered Medications   Medication Dose Route Frequency    acetaminophen (TYLENOL) tablet 650 mg  650 mg Oral Q6H PRN    acetaminophen (TYLENOL) tablet 650 mg  650 mg Oral Q6H PRN    [START ON 2/6/2024] enoxaparin (LOVENOX) subcutaneous injection 40 mg  40 mg Subcutaneous Daily    [START ON 2/6/2024] folic acid 1 mg, thiamine (VITAMIN B1) 100 mg in sodium chloride 0.9 % 100 mL IV piggyback   Intravenous Daily     ketorolac (TORADOL) injection 30 mg  30 mg Intravenous Q6H PRN    [START ON 2/6/2024] multivitamin-minerals (CENTRUM) tablet 1 tablet  1 tablet Oral Daily    ondansetron (ZOFRAN) injection 4 mg  4 mg Intravenous Q6H PRN    [START ON 2/6/2024] pantoprazole (PROTONIX) EC tablet 40 mg  40 mg Oral Early Morning    sodium chloride 0.9 % infusion  125 mL/hr Intravenous Continuous    traZODone (DESYREL) tablet 50 mg  50 mg Oral HS PRN       Continuous Infusions:sodium chloride, 125 mL/hr, Last Rate: 125 mL/hr (02/05/24 2220)             Objective     No intake or output data in the 24 hours ending 02/05/24 2310    Invasive Devices:   Peripheral IV 02/05/24 Right;Ventral (anterior) Forearm (Active)   Site Assessment WDL;Clean;Dry;Intact 02/05/24 2200   Dressing Type Transparent 02/05/24 2200   Line Status Flushed;Saline locked 02/05/24 2200   Dressing Status Clean;Dry;Intact 02/05/24 2200       Vitals   Vitals:    02/05/24 2142   BP: 147/98   TempSrc: Oral   Pulse: 90   Resp: 18   Patient Position - Orthostatic VS: Lying   Temp: 98.6 °F (37 °C)       Physical Exam  Vitals and nursing note reviewed.   Constitutional:       General: He is not in acute distress.     Appearance: Normal appearance. He is diaphoretic. He is not toxic-appearing.   HENT:      Head: Normocephalic and atraumatic.      Nose: No congestion or rhinorrhea.   Eyes:      General: No scleral icterus.     Extraocular Movements: Extraocular movements intact.      Conjunctiva/sclera: Conjunctivae normal.      Pupils: Pupils are equal, round, and reactive to light.   Cardiovascular:      Rate and Rhythm: Normal rate and regular rhythm.      Pulses: Normal pulses.      Heart sounds: Normal heart sounds.   Pulmonary:      Effort: Pulmonary effort is normal. No respiratory distress.      Breath sounds: Normal breath sounds. No wheezing.   Abdominal:      Tenderness: There is abdominal tenderness (Diffuse abdomen).   Musculoskeletal:         General: No swelling  or tenderness. Normal range of motion.      Cervical back: Normal range of motion and neck supple. No rigidity or tenderness.      Right lower leg: No edema.      Left lower leg: No edema.   Skin:     General: Skin is warm.      Capillary Refill: Capillary refill takes less than 2 seconds.      Findings: No bruising.   Neurological:      Mental Status: He is alert and oriented to person, place, and time.      GCS: GCS eye subscore is 4. GCS verbal subscore is 5. GCS motor subscore is 6.      Cranial Nerves: Cranial nerves 2-12 are intact.      Sensory: Sensation is intact.      Motor: Tremor present.      Coordination: Finger-Nose-Finger Test abnormal.      Comments: Positive tongue fasciculation with finger-to-nose dysmetria   Psychiatric:         Mood and Affect: Mood is anxious.         Thought Content: Thought content does not include homicidal or suicidal ideation. Thought content does not include homicidal or suicidal plan.           Data:    EKG, Pathology, and Other Studies: I have personally reviewed pertinent reports.    EKG: EKG obtained 02/05/24 @2225: Normal sinus rhythm with possible inferior infarct age undetermined, ventricular rate 80 bpm, WY interval 142ms, QRS duration 96ms, QT/QTc 392/452 ms    Lab Results:  CBC ETOH     Lab Results   Component Value Date    WBC 6.57 02/05/2024    RBC 4.71 02/05/2024    HGB 15.4 02/05/2024    HCT 45.2 02/05/2024    MCV 96 02/05/2024    MCH 32.7 02/05/2024    MCHC 34.1 02/05/2024    RDW 13.3 02/05/2024     02/05/2024    MPV 8.8 (L) 02/05/2024      Lab Results   Component Value Date    LACTICACID 0.7 10/16/2023      CMP UA         Component Value Date/Time    K 4.3 02/05/2024 1133    K 3.7 12/27/2022 1818     02/05/2024 1133     12/27/2022 1818    CO2 25 02/05/2024 1133    CO2 15 (L) 12/12/2023 1611    CO2 23 12/27/2022 1818    BUN 15 02/05/2024 1133    BUN 10 12/27/2022 1818    CREATININE 1.07 02/05/2024 1133    CREATININE 0.92 12/27/2022 1815  "        Component Value Date/Time    CALCIUM 10.0 02/05/2024 1133    CALCIUM 9.7 12/27/2022 1818    ALKPHOS 94 02/05/2024 1133    ALKPHOS 93 12/27/2022 1818    AST 52 (H) 02/05/2024 1133    AST 51 (H) 12/27/2022 1818    ALT 38 02/05/2024 1133    ALT 41 12/27/2022 1818      Lab Results   Component Value Date    CLARITYU Clear 11/29/2023    COLORU Vale (A) 11/29/2023    SPECGRAV 1.010 11/29/2023    PHUR 7.0 11/29/2023    GLUCOSEU Negative 11/29/2023    KETONESU Negative 11/29/2023    BLOODU 250.0 (A) 11/29/2023    PROTEIN UA Negative 11/29/2023    NITRITE Negative 11/29/2023    BILIRUBINUR Negative 11/29/2023    UROBILINOGEN 1.0 11/29/2023    LEUKOCYTESUR Negative 11/29/2023    WBCUA None Seen 11/29/2023    RBCUA 20-30 (A) 11/29/2023    BACTERIA None Seen 11/29/2023    EPIS None Seen 11/29/2023        Liver Function Test: ASA     Lab Results   Component Value Date    TBILI 0.86 02/05/2024    TBILI 1.4 (H) 12/27/2022    ALKPHOS 94 02/05/2024    ALKPHOS 93 12/27/2022    AST 52 (H) 02/05/2024    AST 51 (H) 12/27/2022    ALT 38 02/05/2024    ALT 41 12/27/2022    TP 7.9 02/05/2024    TP 7.9 12/27/2022    ALB 4.7 02/05/2024    ALB 4.7 12/27/2022      Lab Results   Component Value Date    SALICYLATE <5 12/12/2023      Troponin APAP     No results found for: \"TROPONINI\"   Lab Results   Component Value Date    ACTMNPHEN <2 (L) 12/12/2023      VBG HCG     No results found for: \"PHVEN\", \"NYK5FLO\", \"PO2VEN\", \"QXB4KJQ\", \"BEVEN\", \"B8DBECDWT\", \"G1YPLXJ\"   No results found for: \"HCGQUANT\"   ABG Urine Drug Screen     No results found for: \"PHART\", \"NMT9VRH\", \"PO2ART\", \"WKB9GEL\", \"BEART\", \"S2HWABEZZ\", \"O2HGB\", \"SOURC\", \"DEVI\", \"VTAC\", \"ACRATE\", \"INSPIREDAIR\", \"PEEP\"   No results found for: \"AMPMETHUR\", \"BARBTUR\", \"BDZUR\", \"COCAINEUR\", \"METHADONEUR\", \"OPIATEUR\", \"PCPUR\", \"THCUR\", \"OXYCODONEUR\"   Lactate INR     Lab Results   Component Value Date    LACTICACID 0.7 10/16/2023      Lab Results   Component Value Date    INR 1.06 " 11/28/2023      PTT Protime     Lab Results   Component Value Date/Time    PTT 26 11/28/2023 12:08 PM        Lab Results   Component Value Date/Time    PROTIME 14.4 11/28/2023 12:08 PM              Imaging Studies: I have personally reviewed pertinent reports.        Counseling / Coordination of Care  Total floor / unit time spent today 45 minutes. Greater than 50% of total time was spent with the patient and / or family counseling and / or coordination of care.       Minutes of critical care time 45  -Critical care time was exclusive of separately billable procedures and teaching time.   -Critical care was necessary to treat or prevent imminent or life-threatening deterioration of the following condition: CNS failure/compromise, toxidrome (ethanol withdrawal),  withdrawal  -Critical care time was spent personally by me on the following activities as well as the above as per the course and rest of chart: obtaining history from patient/surrogate, development of a treatment plan, discussions with referring provider(s), evaluation of patient's response to the treatment, examination of the patient, performing treatments and interventions, re-evaluation of the patient's condition, review of old charts, ordering/interpreting laboratory studies, ordering/interpreting of radiographic studies.      ** Please Note: This note has been constructed using a voice recognition system. **

## 2024-02-06 NOTE — ASSESSMENT & PLAN NOTE
Recent Labs     02/05/24  1133 02/06/24  0528   AST 52* 32   ALT 38 24   ALKPHOS 94 66   TBILI 0.86 0.68      Likely alcohol induced, given history of chronic alcohol consumption  LFTs stable  Denies acute abdominal pain  Routine outpatient monitoring  Encourage alcohol cessation   Encourage alcohol abstinence, gastroenterology follow-up upon discharge

## 2024-02-06 NOTE — ED NOTES
[facility] Report given to Adrienne Mason  02/05/24 7247

## 2024-02-06 NOTE — ASSESSMENT & PLAN NOTE
history of alcoholic gastritis chronic  Presented to the ED complaining of a diffuse upper abdominal pain with abdominal bloating  CT scan of the abdomen pelvis 02/05: Enlarged fatty liver  Continue 40 mg pantoprazole daily  Encourage abstinence from alcohol consumption

## 2024-02-06 NOTE — UTILIZATION REVIEW
NOTIFICATION OF INPATIENT MEDICAL ADMISSION   AUTHORIZATION REQUEST   SERVICING FACILITY:   [address] [address]  Tax ID: [id number]  NPI: [id number] ATTENDING PROVIDER:  Attending Name and NPI#: Priscilla Burden Md [[id number]]  Address: [address] [place] [address]  Phone: [phone]     ADMISSION INFORMATION:  Place of Service: Inpatient [facility]  Place of Service Code: 21  Inpatient Admission Date/Time: 2/5/24  9:37 PM  Discharge Date/Time: No discharge date for patient encounter.  Admitting Diagnosis Code/Description:  Fatty liver due to alcoholism     UTILIZATION REVIEW CONTACT:  Dawna Lomas, Utilization   Network Utilization Review Department  Phone: [phone]  Fax [phone]  Email: [email or web address]  Contact for approvals/pending authorizations, clinical reviews, and discharge.     PHYSICIAN ADVISORY SERVICES:  Medical Necessity Denial & Qoja-vr-Ukgb Review  Phone: [phone]  Fax: [phone]  Email: [email or web address]     DISCHARGE SUPPORT TEAM:  For Patients Discharge Needs & Updates  Phone: [phone] opt. 2 Fax: [phone]  Email: [email or web address]

## 2024-02-06 NOTE — ASSESSMENT & PLAN NOTE
History of depression with anxiety that has worsened over the last 1 month  Admits several family stressors including divorce, inability to speak to his son and a recent cancer diagnosis with his father  Home regimen: lexapro 20 mg daily   Reports he recently has been out of his lexapro for about 1 month  Patient was restarted on lexapro during admission: has received 10 mg daily during admission   Patient notes 10 mg daily is ineffective and requests to be increased to 20 mg daily  resume lexapro 20 mg daily tomorrow   Recommend outpatient f/u PCP, psychiatry

## 2024-02-06 NOTE — ASSESSMENT & PLAN NOTE
History of depression with anxiety that has worsened over the last 1 month  Admits several family stressors including divorce, inability to speak to his son and a recent cancer diagnosis with his father  Admit was on Lexapro 20 mg daily, last took his medication more than 1 month ago  Will continue to monitor, consider restarting medication once pt is stable  Consider psych consult for medication adjustment and management

## 2024-02-06 NOTE — CERTIFIED RECOVERY SPECIALIST
Certified  Note    Patient name: Diogo Travis  Location: 5T DETOX 511/5T DETOX 51*  [place]: [facility]  Attending:  Priscilla Burden MD MRN [id number]  : 1965  Age: 58 y.o.    Sex: male Date 2024         Substance Use History:     Social History     Substance and Sexual Activity   Alcohol Use Yes    Comment: 3 bottles of wine per day        Social History     Substance and Sexual Activity   Drug Use Never        CRS attempted engagement, patient sleeping.     Doris Cruz    2024    CRS engaged with patient and discussed AUD and recovery resources. Informed patient of IOP, OP and meetings as some options as well as inpatient treatment.     Patient expressed his concern with finances and job searching as well as other stressors in his life.

## 2024-02-06 NOTE — ASSESSMENT & PLAN NOTE
history of hypertension  Home regimen: lisinopril 40 mg daily  BP stable: most recent /77  Continue lisinopril  Routine outpatient monitoring

## 2024-02-06 NOTE — EMTALA/ACUTE CARE TRANSFER
[facility] EMERGENCY DEPARTMENT   [facility]  [place] [address]  Dept: [phone]      EMTALA TRANSFER CONSENT    NAME Diogo Travis                                         1965                              MRN [id number]    I have been informed of my rights regarding examination, treatment, and transfer   by Dr. Yany Blancas MD    Benefits: Continuity of care, Specialized equipment and/or services available at the receiving facility (Include comment)________________________    Risks:        Consent for Transfer:  I acknowledge that my medical condition has been evaluated and explained to me by the emergency department physician or other qualified medical person and/or my attending physician, who has recommended that I be transferred to the service of  Accepting Physician: Dr. Priscilla Burden at Accepting Facility Name, City & State : [place]. The above potential benefits of such transfer, the potential risks associated with such transfer, and the probable risks of not being transferred have been explained to me, and I fully understand them.  The doctor has explained that, in my case, the benefits of transfer outweigh the risks.  I agree to be transferred.    I authorize the performance of emergency medical procedures and treatments upon me in both transit and upon arrival at the receiving facility.  Additionally, I authorize the release of any and all medical records to the receiving facility and request they be transported with me, if possible.  I understand that the safest mode of transportation during a medical emergency is an ambulance and that the Hospital advocates the use of this mode of transport. Risks of traveling to the receiving facility by car, including absence of medical control, life sustaining equipment, such as oxygen, and medical personnel has been explained to me and I fully understand them.    (ELINA CORRECT BOX BELOW)  [  ]  I consent to the stated  transfer and to be transported by ambulance/helicopter.  [  ]  I consent to the stated transfer, but refuse transportation by ambulance and accept full responsibility for my transportation by car.  I understand the risks of non-ambulance transfers and I exonerate the Hospital and its staff from any deterioration in my condition that results from this refusal.    X___________________________________________    DATE  24  TIME________  Signature of patient or legally responsible individual signing on patient behalf           RELATIONSHIP TO PATIENT_________________________          Provider Certification    NAME Diogo Travis                                         1965                              MRN [id number]    A medical screening exam was performed on the above named patient.  Based on the examination:    Condition Necessitating Transfer The primary encounter diagnosis was Gastritis. Diagnoses of Fatty liver due to alcoholism, Anxiety, and Alcohol withdrawal (HCC) were also pertinent to this visit.    Patient Condition: The patient has been stabilized such that within reasonable medical probability, no material deterioration of the patient condition or the condition of the unborn child(camelia) is likely to result from the transfer    Reason for Transfer: Level of Care needed not available at this facility    Transfer Requirements: Facility [place]   Space available and qualified personnel available for treatment as acknowledged by    Agreed to accept transfer and to provide appropriate medical treatment as acknowledged by       Dr. Priscilla Burden  Appropriate medical records of the examination and treatment of the patient are provided at the time of transfer   STAFF INITIAL WHEN COMPLETED _______  Transfer will be performed by qualified personnel from    and appropriate transfer equipment as required, including the use of necessary and appropriate life support measures.    Provider Certification: I  have examined the patient and explained the following risks and benefits of being transferred/refusing transfer to the patient/family:  General risk, such as traffic hazards, adverse weather conditions, rough terrain or turbulence, possible failure of equipment (including vehicle or aircraft), or consequences of actions of persons outside the control of the transport personnel, Unanticipated needs of medical equipment and personnel during transport, Risk of worsening condition, The possibility of a transport vehicle being unavailable      Based on these reasonable risks and benefits to the patient and/or the unborn child(camelia), and based upon the information available at the time of the patient’s examination, I certify that the medical benefits reasonably to be expected from the provision of appropriate medical treatments at another medical facility outweigh the increasing risks, if any, to the individual’s medical condition, and in the case of labor to the unborn child, from effecting the transfer.    X____________________________________________ DATE 02/05/24        TIME_______      ORIGINAL - SEND TO MEDICAL RECORDS   COPY - SEND WITH PATIENT DURING TRANSFER

## 2024-02-06 NOTE — UTILIZATION REVIEW
Initial Clinical Review    Pt initially presented to [facility] ED. Pt was transferred by EMS to [facility] for its Level IV medically managed intensive inpatient detox unit, not available at [facility].    Admission: Date/Time/Statement:   Admission Orders (From admission, onward)       Ordered        02/05/24 2157  Inpatient Admission  Once                          Orders Placed This Encounter   Procedures    Inpatient Admission     Standing Status:   Standing     Number of Occurrences:   1     Order Specific Question:   Level of Care     Answer:   Med Surg [16]     Order Specific Question:   Estimated length of stay     Answer:   More than 2 Midnights     Order Specific Question:   Certification     Answer:   I certify that inpatient services are medically necessary for this patient for a duration of greater than two midnights. See H&P and MD Progress Notes for additional information about the patient's course of treatment.     Initial Presentation: 58 y.o. male who presented to medical detox. Inpatient admission for evaluation and treatment of alcohol withdrawal syndrome. Presented w/ need for detox from alcohol. Serum ETOH: <10. Reports 3 bottles wine daily, last drink on 2/4 @ 2200. Has prior rehab treatment for withdrawal. Reports no hx of withdrawal seizures. On exam, anxiety, insomia, tremor, tachycardic, htn, diaphoretic. SEWS 13.  Given IV Valium, IV zofran in the ED.  Plan: SEWS monitoring w/ phenobarbital management, IV thiamine/folic acid supplement, IVF, telemetry, continuous pulse ox, continue PTA meds except anti-hypertensives, trend labs, replete electrolytes as needed.       Date: 2/6       Day 2: Pt reports abdominal pain. On exam, abdominal pain. SEWS 0. Plan: continue SEWS monitoring w/ phenobarbital management, IV thiamine/folic acid supplement, telemetry, continuous pulse ox, continue PTA meds, trend labs, replete electrolytes as needed.     ED Triage  Vitals [02/05/24 2142]   Temperature Pulse Respirations Blood Pressure SpO2   98.6 °F (37 °C) 90 18 147/98 97 %      Temp Source Heart Rate Source Patient Position - Orthostatic VS BP Location FiO2 (%)   Oral Monitor Lying Left arm --      Pain Score       No Pain          Wt Readings from Last 1 Encounters:   02/05/24 108 kg (238 lb 1.6 oz)     Vital Signs: Vital Signs (last 2 days)    Date/Time Temp Pulse Resp BP MAP (mmHg) SpO2 O2 Device Patient Position - Orthostatic VS   02/06/24 0718 98.1 °F (36.7 °C) 64 16 122/77 92 91 % None (Room air) Lying   02/06/24 0500 97.8 °F (36.6 °C) 84 16 130/94 -- 97 % None (Room air) Lying   02/05/24 2142 98.6 °F (37 °C) 90 18 147/98 114 97 % None (Room air) Lying       Severity of Ethanol Withdrawal Scale (SEWS): Admission  Current  2/5/2024  [address] Inpatient Detox  Default Flowsheet Data (all recorded)    SEWS    Row Name 02/06/24 0745 02/06/24 0500 02/05/24 2158     Severity of Ethanol Withdrawal Scale (SEWS)   ANXIETY: Do you feel that something bad is about to happen to you right now? 0  -KARLA 0  -NR 3  -NR     NAUSEA and DRY HEAVES or VOMITING? 0  -KARLA 0  -NR 0  -NR     SWEATING: (includes moist palms, sweating now)? Score 0 or 2 0  -KARLA 0  -NR 2  -NR     TREMOR: with arms extended eyes closed? 0  -KARLA 0  -NR 2  -NR     AGITATION: Fidgety, restless, pacing? 0  -KARLA 0  -NR 3  -NR     DISORIENTATION: 0  -KARLA 0  -NR 0  -NR     HALLUCINATIONS: 0  -KARLA 0  -NR 0  -NR     VITAL SIGNS: ANY (Pulse >110, Diastolic BP >90, Temp >99.6) 0  -KARLA 0  -NR 3  -NR     SEWS Total Score 0  -KARLA 0  -NR 13  -NR     Alexander Agitation Sedation Scale (RASS)         Pertinent Labs/Diagnostic Test Results:   2/5/24 CT a/p :IMPRESSION:    Stomach is largely collapsed, assessment limited. Otherwise, no acute intra-abdominal abnormality.    Enlarged fatty liver.   2/5 CXR: No acute cardiopulmonary disease.       Results from last 7 days   Lab Units 02/06/24  0528 02/05/24  1133   WBC  Thousand/uL 5.07 6.57   HEMOGLOBIN g/dL 13.6 15.4   HEMATOCRIT % 40.2 45.2   PLATELETS Thousands/uL 175 228   NEUTROS ABS Thousands/µL  --  4.67         Results from last 7 days   Lab Units 02/06/24  0528 02/05/24  1133   SODIUM mmol/L 139 140   POTASSIUM mmol/L 3.7 4.3   CHLORIDE mmol/L 104 104   CO2 mmol/L 25 25   ANION GAP mmol/L 10 11   BUN mg/dL 13 15   CREATININE mg/dL 0.97 1.07   EGFR ml/min/1.73sq m 86 76   CALCIUM mg/dL 8.8 10.0   MAGNESIUM mg/dL 1.8*  --      Results from last 7 days   Lab Units 02/06/24  0528 02/05/24  1133   AST U/L 32 52*   ALT U/L 24 38   ALK PHOS U/L 66 94   TOTAL PROTEIN g/dL 6.5 7.9   ALBUMIN g/dL 3.8 4.7   TOTAL BILIRUBIN mg/dL 0.68 0.86         Results from last 7 days   Lab Units 02/06/24  0528 02/05/24  1133   GLUCOSE RANDOM mg/dL 93 113             BETA-HYDROXYBUTYRATE   Date Value Ref Range Status   10/16/2023 2.9 (H) <0.6 mmol/L Final   10/01/2023 0.8 (H) <0.6 mmol/L Final         Results from last 7 days   Lab Units 02/05/24  1352   HS TNI 0HR ng/L <2     Results from last 7 days   Lab Units 02/05/24  1353   D-DIMER QUANTITATIVE ug/ml FEU 0.42     Results from last 7 days   Lab Units 02/05/24  1133   BNP pg/mL 33     Results from last 7 days   Lab Units 02/05/24  1133   LIPASE u/L 8*       Past Medical History:   Diagnosis Date    Alcoholic ketoacidosis 10/16/2023    GERD (gastroesophageal reflux disease)     Hypertension     Hypomagnesemia 04/02/2023    Vomiting 04/02/2023           Admitting Diagnosis: Fatty liver due to alcoholism  Age/Sex: 58 y.o. male  Admission Orders:  REgular Diet. SCDs.  Fall & Seizure Precautions.  SEWS monitoring.  Telemetry & Continuous Pulse Ox.    Scheduled Medications:  enoxaparin, 40 mg, Subcutaneous, Daily  escitalopram, 10 mg, Oral, Daily  folic acid 1 mg, thiamine (VITAMIN B1) 100 mg in sodium chloride 0.9 % 100 mL IV piggyback, , Intravenous, Daily  magnesium sulfate, 2 g, Intravenous, Once  multivitamin-minerals, 1 tablet, Oral,  Daily  [START ON 2/7/2024] Naltrexone, 380 mg, Intramuscular, Once  pantoprazole, 40 mg, Oral, Early Morning  sucralfate, 1 g, Oral, 4x Daily (AC & HS)      Continuous IV Infusions:  sodium chloride, 125 mL/hr, Intravenous, Continuous      PRN Meds:  acetaminophen, 650 mg, Oral, Q6H PRN  acetaminophen, 650 mg, Oral, Q6H PRN  ketorolac, 30 mg, Intravenous, Q6H PRN  ondansetron, 4 mg, Intravenous, Q6H PRN  traZODone, 50 mg, Oral, HS PRN      Phenobarbital:   650 mg iv 2/5      IP CONSULT TO CASE MANAGEMENT    Network Utilization Review Department  ATTENTION: Please call with any questions or concerns to [phone] and carefully listen to the prompts so that you are directed to the right person. All voicemails are confidential.   For Discharge needs, contact Care Management DC Support Team at [phone] opt. [phone]  Send all requests for admission clinical reviews, approved or denied determinations and any other requests to dedicated fax number below belonging to the [facility] where the patient is receiving treatment. List of dedicated fax numbers for the Facilities:  FACILITY NAME UR FAX NUMBER   ADMISSION DENIALS (Administrative/Medical Necessity) [phone]   DISCHARGE SUPPORT TEAM (NETWORK) [phone]   PARENT CHILD HEALTH (Maternity/NICU/Pediatrics) [phone]   [facility] [phone]   [facility] [phone]   [facility] [phone]   [facility] [phone]   [facility] [phone]   [facility] [phone]   [facility] [phone]   [facility] [phone]   [facility] [phone]   Critical access hospital [phone]   [facility] [phone]   [facility] [phone]

## 2024-02-07 VITALS
HEART RATE: 61 BPM | BODY MASS INDEX: 36.09 KG/M2 | OXYGEN SATURATION: 96 % | TEMPERATURE: 97.8 F | SYSTOLIC BLOOD PRESSURE: 128 MMHG | RESPIRATION RATE: 18 BRPM | WEIGHT: 238.1 LBS | DIASTOLIC BLOOD PRESSURE: 77 MMHG | HEIGHT: 68 IN

## 2024-02-07 PROBLEM — R21 RASH AND NONSPECIFIC SKIN ERUPTION: Status: ACTIVE | Noted: 2024-02-07

## 2024-02-07 PROBLEM — E83.42 HYPOMAGNESEMIA: Status: RESOLVED | Noted: 2023-04-02 | Resolved: 2024-02-07

## 2024-02-07 PROBLEM — F10.939 ALCOHOL WITHDRAWAL SYNDROME WITH COMPLICATION (HCC): Status: RESOLVED | Noted: 2023-04-02 | Resolved: 2024-02-07

## 2024-02-07 LAB
ALBUMIN SERPL BCP-MCNC: 3.7 G/DL (ref 3.5–5)
ALP SERPL-CCNC: 68 U/L (ref 34–104)
ALT SERPL W P-5'-P-CCNC: 24 U/L (ref 7–52)
ANION GAP SERPL CALCULATED.3IONS-SCNC: 10 MMOL/L
AST SERPL W P-5'-P-CCNC: 40 U/L (ref 13–39)
BILIRUB SERPL-MCNC: 0.69 MG/DL (ref 0.2–1)
BUN SERPL-MCNC: 10 MG/DL (ref 5–25)
CALCIUM SERPL-MCNC: 8.8 MG/DL (ref 8.4–10.2)
CHLORIDE SERPL-SCNC: 105 MMOL/L (ref 96–108)
CO2 SERPL-SCNC: 23 MMOL/L (ref 21–32)
CREAT SERPL-MCNC: 0.85 MG/DL (ref 0.6–1.3)
GFR SERPL CREATININE-BSD FRML MDRD: 96 ML/MIN/1.73SQ M
GLUCOSE SERPL-MCNC: 94 MG/DL (ref 65–140)
MAGNESIUM SERPL-MCNC: 2 MG/DL (ref 1.9–2.7)
POTASSIUM SERPL-SCNC: 3.6 MMOL/L (ref 3.5–5.3)
PROT SERPL-MCNC: 6.3 G/DL (ref 6.4–8.4)
SODIUM SERPL-SCNC: 138 MMOL/L (ref 135–147)

## 2024-02-07 PROCEDURE — 80053 COMPREHEN METABOLIC PANEL: CPT

## 2024-02-07 PROCEDURE — 99239 HOSP IP/OBS DSCHRG MGMT >30: CPT | Performed by: EMERGENCY MEDICINE

## 2024-02-07 PROCEDURE — 83735 ASSAY OF MAGNESIUM: CPT

## 2024-02-07 RX ORDER — NALTREXONE HYDROCHLORIDE 50 MG/1
50 TABLET, FILM COATED ORAL DAILY
Qty: 30 TABLET | Refills: 0 | Status: SHIPPED | OUTPATIENT
Start: 2024-02-08

## 2024-02-07 RX ORDER — BENZOCAINE/MENTHOL 6 MG-10 MG
LOZENGE MUCOUS MEMBRANE 4 TIMES DAILY PRN
Qty: 20 G | Refills: 0 | Status: SHIPPED | OUTPATIENT
Start: 2024-02-07 | End: 2024-02-14

## 2024-02-07 RX ORDER — SUCRALFATE 1 G/1
1 TABLET ORAL 4 TIMES DAILY
Start: 2024-02-07 | End: 2024-02-07

## 2024-02-07 RX ORDER — ESCITALOPRAM OXALATE 20 MG/1
20 TABLET ORAL DAILY
Qty: 30 TABLET | Refills: 0 | Status: SHIPPED | OUTPATIENT
Start: 2024-02-07 | End: 2024-02-07

## 2024-02-07 RX ORDER — FOLIC ACID 1 MG/1
1 TABLET ORAL DAILY
Status: DISCONTINUED | OUTPATIENT
Start: 2024-02-07 | End: 2024-02-07 | Stop reason: HOSPADM

## 2024-02-07 RX ORDER — ESCITALOPRAM OXALATE 20 MG/1
20 TABLET ORAL DAILY
Qty: 30 TABLET | Refills: 0 | Status: SHIPPED | OUTPATIENT
Start: 2024-02-08 | End: 2024-02-14 | Stop reason: SDUPTHER

## 2024-02-07 RX ORDER — LISINOPRIL 40 MG/1
40 TABLET ORAL DAILY
Qty: 30 TABLET | Refills: 0 | Status: SHIPPED | OUTPATIENT
Start: 2024-02-07 | End: 2024-03-08

## 2024-02-07 RX ORDER — PANTOPRAZOLE SODIUM 40 MG/1
40 TABLET, DELAYED RELEASE ORAL
Qty: 30 TABLET | Refills: 0 | Status: SHIPPED | OUTPATIENT
Start: 2024-02-07 | End: 2024-02-14 | Stop reason: SDUPTHER

## 2024-02-07 RX ORDER — LANOLIN ALCOHOL/MO/W.PET/CERES
100 CREAM (GRAM) TOPICAL DAILY
Status: DISCONTINUED | OUTPATIENT
Start: 2024-02-07 | End: 2024-02-07 | Stop reason: HOSPADM

## 2024-02-07 RX ADMIN — FOLIC ACID 1 MG: 1 TABLET ORAL at 09:15

## 2024-02-07 RX ADMIN — SUCRALFATE 1 G: 1 TABLET ORAL at 07:00

## 2024-02-07 RX ADMIN — NALTREXONE HYDROCHLORIDE 50 MG: 50 TABLET, FILM COATED ORAL at 09:15

## 2024-02-07 RX ADMIN — ESCITALOPRAM OXALATE 10 MG: 10 TABLET ORAL at 09:15

## 2024-02-07 RX ADMIN — THIAMINE HCL TAB 100 MG 100 MG: 100 TAB at 09:15

## 2024-02-07 RX ADMIN — MULTIPLE VITAMINS W/ MINERALS TAB 1 TABLET: TAB ORAL at 09:15

## 2024-02-07 RX ADMIN — PANTOPRAZOLE SODIUM 40 MG: 40 TABLET, DELAYED RELEASE ORAL at 05:09

## 2024-02-07 NOTE — PROGRESS NOTES
Social Determinants:   02/07/24 1043   Housing Stability   In the last 12 months, was there a time when you were not able to pay the mortgage or rent on time? Y  (Lost job 7 months ago)   In the last 12 months, how many places have you lived? 1   In the last 12 months, was there a time when you did not have a steady place to sleep or slept in a shelter (including now)? N   Transportation Needs   In the past 12 months, has lack of transportation kept you from medical appointments or from getting medications? no   In the past 12 months, has lack of transportation kept you from meetings, work, or from getting things needed for daily living? No   Food Insecurity   Within the past 12 months, you worried that your food would run out before you got the money to buy more. Never true   Within the past 12 months, the food you bought just didn't last and you didn't have money to get more. Never true   Intimate Partner Violence   Within the last year, have you been afraid of your partner or ex-partner? No   Within the last year, have you been humiliated or emotionally abused in other ways by your partner or ex-partner? No   Within the last year, have you been kicked, hit, slapped, or otherwise physically hurt by your partner or ex-partner? No   Within the last year, have you been raped or forced to have any kind of sexual activity by your partner or ex-partner? No   Alcohol Use   Q1: How often do you have a drink containing alcohol? 4 or more ti   Q2: How many drinks containing alcohol do you have on a typical day when you are drinking? 10 or more   Q3: How often do you have six or more drinks on one occasion? Daily   Utilities   In the past 12 months has the electric, gas, oil, or water company threatened to shut off services in your home? Yes

## 2024-02-07 NOTE — PROGRESS NOTES
02/07/24 1108   Referral Data   Referral Source Patient   Referral Reason Drug/Alcohol Abuse   County Information   County of Residence [facility]   Patient Information   Mental Status Alert   Primary Caregiver Self   Support System Friends   Congregational/Cultural Requests [employer]   Legal Information   Legal Issues Pt denies legal issues   Activities of Daily Living Prior to Admission   Functional Status Independent   Assistive Device No device needed   Living Arrangement Lives alone   Ambulation Independent   Access to Firearms   Access to Firearms No  (Pt denies access to firearms and intent to obtain a firearm to use on self or others.)   Income Information   Income Source Unemployed   Means of Transportation   Means of Transport to Appts: Friends          02/07/24 1129   Substance Abuse Addendum Details   History of Withdrawal Symptoms Other withdrawal symptoms (specify in comment)  (withdrawal symptoms include abdominal pain, tremors, diaphoresis, fatigue, anxious, tachycardia, HTN, tingling)   Medical Complications Alcoholic gastritis, HTN, GERD,  Hepatic Stenosis   Sober Supports Girlfriend   Present Treatment Denies   Substance Abuse Treatment Hx Denies past history   Stage of Change   Stage of Change Precontemplation     Additional Substance Use Detail    Questions Responses   Problems Due to Past Use of Alcohol? Yes   Alcohol Use Frequency Daily   Alcohol Drink of Choice Wine   1st Use of Alcohol 13   Last Use of Alcohol & Amount 11/28/23- 3 bottle of wine   Longest Abstinence from Alcohol 14 days          Pt is a 58 yr old male admitted to the withdrawal management unit for alcohol withdrawal. Pt initially presented to [facility]. Pt's name, date of birth, home address and telephone number were verified. Pt was informed of case management role and the purpose of the completion of intake with case management. Pt stated that he began drinking heavily after divorce 7 years ago and heavy  drinking increased during COVID. Pt reported that he has been to detox in the past and has managed to stay clean for a few weeks at a time. Recently he began drinking again around the anniversary of his mother's death. Pt reported that he switched alcohols from Vodka to Wine that he drinks daily with friends and they drink several bottles a day.   Pt denied past outpatient or facility based recovery programs and denied any AA attendance. Pt denied Blackouts, DT's and withdrawal seizure by hx.   Pt denied any other substance use.   Pt stated that he does not drink and drive and drinks at home with friends, which became easier during COVID and has become increasingly easier after losing his job 7 months ago.   Pt reports added stress of      UDS- Not completed  Audit- Not Completed  Etoh: Breathe test in ED 0.00  PAWSS: 5     Pt reports mental health DX of MDD and Anxiety  which was managed by PCP.   Pt denied past Mental Health treatment.   Pt denied past IP tx and no therapist or psychiatrist.  Pt noted that the divorce, loss of his mother, father is fighting bladder cancer, and loss of job 6 motnhs ago are traumatic and stressors. Pt denied SI/HI and denied AH/VH in and outside of the context of alcohol use and withdrawal.  Pt denied access to firearms and intent to obtain firearm to use on self or others.       Pt denied serious medical issues but has hx of HTN, GERD, Alcoholic Gastritis, and Septic Stenosis. Pt reported that he has not been to an MD in at least 2 years and will continue to use Alice Stroud MD as PCP upon discharge. Pt recently got on [place] medicaid for insurance coverage. Pt stated that he has a preferred pharmacy of [facility] on [address] in [place] [phone].   Pt declined to sign INNA for CW to schedule a medical appt. post discharge.   Pt did sign INNA for insurance.     Pt denied past or current legal charges.      Pt reported that he was laid off of work 7 months ago. Pt reported to have a  BA degree in Economics. Pt denied  service history. Pt Identified as Jewish. Pt reported that he has an active license and a car and transports self around. Pt's girlfriend will transport him home at time of d/c to home.     Pt resides at [address] [place] [address] in [place]. Pt lives alone and can return home. Pt has SO, Holly and pt signed INNA. Pt denied having any other sober supports. Pt's father is battling bladder cancer and pt does not want to bother him. Pt has 2 adult sons (25 and 23) one lives in [place] and the other is local.  Pt reports having no contact with his 24 yo son in 5 years and  no contact with his 22 yo son in 1 year.      Pt declined Relapse Prevention Plan.   Pt identified grief and loss and multiple psycho-social stressors as factors in his continued choice to drink.  He noted that his drinking caused  him to loose his family and end up divorce.  Pt noted that his current girlfriend of 5 years is very upset at his current choice to drink and has verbalized that he needs to make a change to maintain their relationship.   Clinical impression: pt has limited insight into addiction and problematic drinking and its consequences. Pt believes that his heavy drinking is related to psycho-social stressors. Motivation to remain abstinence is unknown. Pt has been to the detox in the past but lacked any follow up with treatment following D/C.   Pt appears to be in pre-contemplation stage of change. Relapse potential remains high with lack of sober supports and lack of treatment history or connection.

## 2024-02-07 NOTE — PLAN OF CARE
Problem: SUBSTANCE USE/ABUSE  Goal: By discharge, will develop insight into their chemical dependency and sustain motivation to continue in recovery  Description: INTERVENTIONS:  - Attends all daily group sessions and scheduled AA groups  - Actively practices coping skills through participation in the therapeutic community and adherence to program rules  - Reviews and completes assignments from individual treatment plan  - Assist patient development of understanding of their personal cycle of addiction and relapse triggers  Outcome: Progressing  Goal: By discharge, patient will have ongoing treatment plan addressing chemical dependency  Description: INTERVENTIONS:  - Assist patient with resources and/or appointments for ongoing recovery based living  Outcome: Progressing     Problem: DISCHARGE PLANNING  Goal: Discharge to home or other facility with appropriate resources  Description: INTERVENTIONS:  - Identify barriers to discharge w/patient and caregiver  - Arrange for needed discharge resources and transportation as appropriate  - Identify discharge learning needs (meds, wound care, etc.)  - Arrange for interpretive services to assist at discharge as needed  - Refer to Case Management Department for coordinating discharge planning if the patient needs post-hospital services based on physician/advanced practitioner order or complex needs related to functional status, cognitive ability, or social support system  Outcome: Progressing

## 2024-02-07 NOTE — DISCHARGE INSTR - OTHER ORDERS
Drug and Alcohol Resources in [facility]    If you have health insurance, including medical assistance, there should be a phone number on your insurance card that you can call to find out how to access services. The card may say, “For Behavioral Health Services” or “For Drug and Alcohol Services” or “For Substance Abuse Services” call the number provided. OR [place] Get Help Now ([phone]-HELP)    [facility] Drug & Alcohol Division  Located at [address] [place] [address]. [phone]. A  is available Monday through Friday from 8:00 am to 5:00 pm to provide you with assistance on accessing services for substance abuse. If you do not have health insurance and are in financial need, this office may also help you get the funding for the services that are necessary.   If you are calling after 5:00 pm, on a weekend, or a holiday and need immediate assistance, contact Emergency Services at [phone].  No cost drug and alcohol assessments and Case Management services are available to any resident in need. Please call [phone] or call [phone] and you will be connected with an .    Recovery Centers  [facility] Drug & Alcohol provides funding to support three Recovery Centers in [facility]. These centers offer a safe, sober environment to those in recovery. A variety of programming including 12-Step Meetings, Yoga, Karaoke, Life Skills Workshops, etc. is offered at each location.  [facility]  [address] [place] [address]  [phone]  www.[email or web address] [facility]  [address] [place] [address]  [phone]  hh[facility]-ip-awvb.org [facility]  [address] [place] [address]  [phone]   [place]  [address] [place] [address]  [phone]  [email or web address].[email or web address] [facility]  [address] [place], [place] [address] [phone]  [email or web address].org      's Drop In [facility]  [facility] at [address] in [place], [place] Drop-In Program for veterans. This collaborative programming between Drug & Alcohol, [facility], [facility] and [facility] will be held every Friday from 9AM-4PM to assist veterans in their pursuit of their overall wellness and provide them with access to services.   Services available for veterans on Fridays at the [facility] will include peer support groups, professional support groups/MST, assistance with job searches and writing resumes, legal clinics, trainings, VA benefit assistance, on-site Drug & Alcohol assessments and referrals to treatment. A Certified  will also be available on-site.   [facility] residents struggling with substance abuse can call the Drug and Alcohol Division at 502-824-ZGKT([phone]). Veterans can also call [facility] at [phone].    [facility] National Helpline  Confidential free help, from public health agencies, to find substance use treatment and information. [phone]    Link for Zoom Codes for Virtual 12 step Meetings  [email or web address]/default.aspx    Outpatient Treatment Providers  [facility]   [address] [place] [address]  Phone: [phone]  [facility]  [address], [address]  [place] [address]  Phone: [phone]  [facility].  [address] [place] [address]  Phone: [phone]  [facility]  [address] [place] [address]  Phone: [phone]  [facility]   [address]  [address]  [place] [address] [phone]

## 2024-02-07 NOTE — CASE MANAGEMENT
Case Management Discharge Planning Note    Patient name Diogo Travis  Location 5T DETOX 511/5T DETOX 51* MRN [id number]  : 1965 Date 2024       Current Admission Date: 2024  Current Admission Diagnosis:Alcohol use disorder, severe, dependence (HCC)   Patient Active Problem List    Diagnosis Date Noted    Rash and nonspecific skin eruption 2024    Anxiety and depression 2024    Constipation 2023    Sinus tachycardia 2023    Chest tightness 2023    Elevated lactic acid level 10/01/2023    Hypokalemia 2023    Thrombocytopenia (HCC) 2023    Alcohol use disorder, severe, dependence (HCC) 2023    Hepatic steatosis 2023    Chronic alcoholic gastritis 2023    Major depressive disorder 2023    Hypertension 2023    Tinea corporis 2023      LOS (days): 2  Geometric Mean LOS (GMLOS) (days):   Days to GMLOS:     OBJECTIVE:  Risk of Unplanned Readmission Score: 21.72         Current admission status: Inpatient   Preferred Pharmacy:   [address] [address]  Phone: [phone] Fax: [phone]    Primary Care Provider: Alice Stroud MD    Primary Insurance: [employer]  Secondary Insurance:     DISCHARGE DETAILS:    CM was notified by medical provider that Pt is medically cleared for discharge.  Pt denies withdrawal symptoms at this time.  Pt will discharge to his home and will be transported by his girlfriend upon discharge.  Pt is scheduled with [facility] for OP AUD treatment and medication management.  Pt will schedule his own appt. With PCP.  Medications were sent to preferred pharmacy.

## 2024-02-07 NOTE — ASSESSMENT & PLAN NOTE
Patient reports rash on his torso for about 1 week  Notes rash has been present since before starting naltrexone  Denies new body lotions, washes, detergents, etc  States he has had this previously from sweating at the gym  On exam: blanchable maculopapular rash in clusters on upper torso (from and back); some extension on upper thighs; not present on palms  Patient also with redness of face which he states is rosacea  No angioedema present  Unclear etiology, possible dermatitis from sweat or contact with something at the gym  Will order hydrocortisone cream  Recommend outpatient f/u PCP

## 2024-02-07 NOTE — DISCHARGE SUMMARY
[facility]  Discharge- Diogo Travis 1965, 58 y.o. male MRN: [id number]  Unit/Bed#: 5T DETOX 511-01 Encounter: [id number]  Primary Care Provider: Alice Stroud MD   Date and time admitted to hospital: 2/5/2024  9:37 PM    MEDICAL DETOX UNIT, LEVEL 4  Department of Medical Toxicology  Reason for Admission/Principal Problem: alcohol withdrawal   Admitting provider: ANNEMARIE Terry DO   2/5/2024  9:37 PM     Discharging Physician / Practitioner: Deneen Velasquez PA-C  PCP: Alice Stroud MD  Admission Date:   Admission Orders (From admission, onward)       Ordered        02/05/24 2157  Inpatient Admission  Once                          Discharge Date: 02/07/24    Medical Problems       Resolved Problems  Date Reviewed: 2/5/2024            Resolved    * (Principal) Alcohol withdrawal syndrome with complication (HCC) 2/7/2024     Resolved by  Deneen Velasquez PA-C    Hypomagnesemia 2/7/2024     Resolved by  Deneen Velasquez PA-C          * Alcohol withdrawal syndrome with complication (HCC)-resolved as of 2/7/2024  Assessment & Plan  Patient with a history of chronic daily alcohol use  Last drink 10 pm 02/04/24  Previous admission to detox unit 12/2023   Serum alcohol <10 in the ED with evidence of withdrawal at that time.  Received 5 mg of Valium IV in the ED  SEWS protocol with symptom-triggered phenobarbital followed for medically-assisted alcohol withdrawal  Received 975 mg phenobarbital total, last dose administered 2/6/2024 1818  Appears stable off phenobarbital- VSS, no tremors  Acute withdrawal resolved     Chronic alcoholic gastritis  Assessment & Plan  history of alcoholic gastritis chronic  Presented to the ED complaining of a diffuse upper abdominal pain with abdominal bloating  CT scan of the abdomen pelvis 02/05: Enlarged fatty liver  Continue 40 mg pantoprazole daily  Encourage abstinence from alcohol consumption    Alcohol use  disorder, severe, dependence (HCC)  Assessment & Plan  Longstanding history of alcohol use disorder, w/o hx of withdrawal related seizures   Drinks about 3 bottles of wine daily for the past 1 month  Continue naltrexone   Withdrawal management as above  Continue daily thiamine/folic acid supplementation, and MVI  Consult case management for assistance with aftercare resources - pt interested in outpatient resources     Rash and nonspecific skin eruption  Assessment & Plan  Patient reports rash on his torso for about 1 week  Notes rash has been present since before starting naltrexone  Denies new body lotions, washes, detergents, etc  States he has had this previously from sweating at the gym  On exam: blanchable maculopapular rash in clusters on upper torso (from and back); some extension on upper thighs; not present on palms  Patient also with redness of face which he states is rosacea  No angioedema present  Unclear etiology, possible dermatitis from sweat or contact with something at the gym  Will order hydrocortisone cream  Recommend outpatient f/u PCP    Anxiety and depression  Assessment & Plan  History of depression with anxiety that has worsened over the last 1 month  Admits several family stressors including divorce, inability to speak to his son and a recent cancer diagnosis with his father  Home regimen: lexapro 20 mg daily   Reports he recently has been out of his lexapro for about 1 month  Patient was restarted on lexapro during admission: has received 10 mg daily during admission   Patient notes 10 mg daily is ineffective and requests to be increased to 20 mg daily  resume lexapro 20 mg daily tomorrow   Recommend outpatient f/u PCP, psychiatry     Hypertension  Assessment & Plan  history of hypertension  Home regimen: lisinopril 40 mg daily  BP stable: most recent /77  Continue lisinopril  Routine outpatient monitoring    Major depressive disorder  Assessment & Plan  History of depression with  anxiety that has worsened over the last 1 month  Admits several family stressors including divorce, inability to speak to his son and a recent cancer diagnosis with his father  Admit was on Lexapro 20 mg daily, last took his medication more than 1 month ago  Will continue to monitor, consider restarting medication once pt is stable  Consider psych consult for medication adjustment and management    Hepatic steatosis  Assessment & Plan  Recent Labs     02/05/24  1133 02/06/24  0528   AST 52* 32   ALT 38 24   ALKPHOS 94 66   TBILI 0.86 0.68      Likely alcohol induced, given history of chronic alcohol consumption  LFTs stable  Denies acute abdominal pain  Routine outpatient monitoring  Encourage alcohol cessation   Encourage alcohol abstinence, gastroenterology follow-up upon discharge    Hypomagnesemia-resolved as of 2/7/2024  Assessment & Plan  Recent Labs     02/06/24  0528 02/07/24  0533   MG 1.8* 2.0     Resolved with supplementation       Consultations During Hospital Stay:  None    Procedures Performed:   None    Significant Findings / Test Results:   Hypomagnesmia *    Incidental Findings:   None      Test Results Pending at Discharge (will require follow up):   None      Outpatient Tests/ Follow up Requested:  Follow up with PCP upon discharge    Complications:  none    Reason for Admission: alcohol withdrawal     Hospital Course:     Diogo Travis is a 58 y.o. male patient PMH AUD, alcoholic gastritis, hepatic steatosis, anxiety/depression who originally presented to the hospital on 2/5/2024 due to alcohol withdrawal. Patient initially presented to the [facility] ED requesting detoxification from alcohol. Patient was [facility] to [facility] withdrawal [facility] for medically assisted alcohol withdrawal and received a total of 975 mg phenobarbital without complication, with last dose of phenobarbital administered 2/6/2024 around 6 pm. Patient's alcohol withdrawal symptoms  "subsequently resolved, and he has remained without objective evidence of alcohol withdrawal at this time. During this hospitalization, patient was found to have hypomagnesmia, which resolved with electrolyte supplementation. He was interested in restarting Lexapro and naltrexone during this admission. Case management was consulted for assistance with aftercare resources, and patient will be discharged with outpatient resources.     Please see above list of diagnoses and related plan for additional information.     Condition at Discharge: good     Discharge Day Visit / Exam:     Subjective:  Patient seen and examined bedside this morning. Reports the is feeling well today. Does note rash on his torso that has been present for close to 1 week. Denies new body washes, lotions, detergents; rash was present before starting naltrexone. States he has had something like this before from sweating at the gym. Also  notes that he is anxious and states that 20 mg lexapro worked better for him.     Vitals: Blood Pressure: 128/77 (02/07/24 0705)  Pulse: 61 (02/07/24 0705)  Temperature: 97.8 °F (36.6 °C) (02/07/24 0705)  Temp Source: Temporal (02/07/24 0705)  Respirations: 18 (02/07/24 0705)  Height: 5' 8\" (172.7 cm) (02/05/24 2142)  Weight - Scale: 108 kg (238 lb 1.6 oz) (02/05/24 2142)  SpO2: 96 % (02/07/24 0705)  Exam:   Physical Exam  Vitals and nursing note reviewed.   Constitutional:       General: He is not in acute distress.     Appearance: He is well-developed. He is obese. He is not ill-appearing or diaphoretic.   HENT:      Head: Normocephalic and atraumatic.   Eyes:      Conjunctiva/sclera: Conjunctivae normal.   Cardiovascular:      Rate and Rhythm: Normal rate and regular rhythm.      Pulses: Normal pulses.      Heart sounds: Normal heart sounds. No murmur heard.     No friction rub. No gallop.   Pulmonary:      Effort: Pulmonary effort is normal. No respiratory distress.      Breath sounds: Normal breath sounds. No " wheezing, rhonchi or rales.   Abdominal:      General: Abdomen is flat. Bowel sounds are normal. There is no distension.      Palpations: Abdomen is soft.      Tenderness: There is no abdominal tenderness. There is no guarding.   Musculoskeletal:         General: No swelling.      Cervical back: Normal range of motion.      Right lower leg: No edema.      Left lower leg: No edema.   Skin:     General: Skin is warm and dry.      Findings: Rash present.          Neurological:      General: No focal deficit present.      Mental Status: He is alert and oriented to person, place, and time. Mental status is at baseline.      Motor: No tremor.      Gait: Gait normal.   Psychiatric:         Attention and Perception: Attention normal.         Mood and Affect: Mood is anxious.         Speech: Speech normal.         Behavior: Behavior is cooperative.         Discussion with Family: I personally did not discuss patient with family at this time. Discussed current plan with patient, answered all questions to best of my ability.     Discharge instructions/Information to patient and family:   See after visit summary for information provided to patient and family.      Provisions for Follow-Up Care:  See after visit summary for information related to follow-up care and any pertinent home health orders.      Disposition:     Home    For Discharges to [facility]:   Not Applicable to this Patient - Not Applicable to this Patient    Planned Readmission: none     Discharge Statement:  I spent 35 minutes discharging the patient. This time was spent on the day of discharge. I had direct contact with the patient on the day of discharge. Greater than 50% of the total time was spent examining patient, answering all patient questions, arranging and discussing plan of care with patient as well as directly providing post-discharge instructions.  Additional time then spent on discharge activities.    Discharge Medications:  See  after visit summary for reconciled discharge medications provided to patient and family.      ** Please Note: This note has been constructed using a voice recognition system **

## 2024-02-07 NOTE — NURSING NOTE
Reviewed discharge instructions with the patient. Patient aware of prescriptions to be picked up at his pharmacy. Patient acknowledged he has all personal belongings. Patient escorted to the lobby with pca where his ride was waiting.

## 2024-02-08 LAB
ATRIAL RATE: 116 BPM
P AXIS: 61 DEGREES
PR INTERVAL: 140 MS
QRS AXIS: 117 DEGREES
QRSD INTERVAL: 86 MS
QT INTERVAL: 340 MS
QTC INTERVAL: 472 MS
T WAVE AXIS: 33 DEGREES
VENTRICULAR RATE: 116 BPM

## 2024-02-09 NOTE — UTILIZATION REVIEW
NOTIFICATION OF ADMISSION DISCHARGE   This is a Notification of Discharge from [facility]. Please be advised that this patient has been discharge from our facility. Below you will find the admission and discharge date and time including the patient’s disposition.   UTILIZATION REVIEW CONTACT:  Dawna Lomas MA  Utilization   Network Utilization Review Department  Phone: [phone] x carefully listen to the prompts. All voicemails are confidential.  Email: [email or web address]     ADMISSION INFORMATION  PRESENTATION DATE: 2/5/2024  9:37 PM  OBERVATION ADMISSION DATE:   INPATIENT ADMISSION DATE: 2/5/24  9:37 PM   DISCHARGE DATE: 2/7/2024 11:50 AM   DISPOSITION:Home/Self Care    Network Utilization Review Department  ATTENTION: Please call with any questions or concerns to [phone] and carefully listen to the prompts so that you are directed to the right person. All voicemails are confidential.   For Discharge needs, contact Care Management DC Support Team at [phone] opt. 2  Send all requests for admission clinical reviews, approved or denied determinations and any other requests to dedicated fax number below belonging to the campus where the patient is receiving treatment. List of dedicated fax numbers for the Facilities:  FACILITY NAME UR FAX NUMBER   ADMISSION DENIALS (Administrative/Medical Necessity) [phone]   DISCHARGE SUPPORT TEAM ([facility]) [phone]   PARENT CHILD HEALTH (Maternity/NICU/Pediatrics) [phone]   [facility] [phone]   [facility] [phone]   [facility] [phone]   [facility] [phone]   [facility] [phone]   [facility] [phone]   [facility] [phone]   [facility] [phone]    [facility] [phone]   [facility] [phone]   [facility] [phone]   [facility] [phone]

## 2024-02-10 ENCOUNTER — HOSPITAL ENCOUNTER (EMERGENCY)
Facility: HOSPITAL | Age: 59
Discharge: HOME | End: 2024-02-10
Attending: EMERGENCY MEDICINE | Admitting: EMERGENCY MEDICINE
Payer: COMMERCIAL

## 2024-02-10 ENCOUNTER — APPOINTMENT (EMERGENCY)
Dept: RADIOLOGY | Facility: HOSPITAL | Age: 59
End: 2024-02-10
Payer: COMMERCIAL

## 2024-02-10 VITALS
DIASTOLIC BLOOD PRESSURE: 72 MMHG | SYSTOLIC BLOOD PRESSURE: 149 MMHG | OXYGEN SATURATION: 97 % | HEART RATE: 60 BPM | RESPIRATION RATE: 16 BRPM | TEMPERATURE: 98.8 F

## 2024-02-10 DIAGNOSIS — R06.00 DYSPNEA: Primary | ICD-10-CM

## 2024-02-10 DIAGNOSIS — R20.2 NUMBNESS AND TINGLING OF LEFT ARM AND LEG: ICD-10-CM

## 2024-02-10 DIAGNOSIS — R20.0 NUMBNESS AND TINGLING OF LEFT ARM AND LEG: ICD-10-CM

## 2024-02-10 DIAGNOSIS — F41.9 ANXIETY: ICD-10-CM

## 2024-02-10 LAB
ALBUMIN SERPL BCP-MCNC: 4.2 G/DL (ref 3.5–5)
ALP SERPL-CCNC: 74 U/L (ref 34–104)
ALT SERPL W P-5'-P-CCNC: 33 U/L (ref 7–52)
ANION GAP SERPL CALCULATED.3IONS-SCNC: 8 MMOL/L
AST SERPL W P-5'-P-CCNC: 47 U/L (ref 13–39)
BASOPHILS # BLD AUTO: 0.04 THOUSANDS/ÂΜL (ref 0–0.1)
BASOPHILS NFR BLD AUTO: 1 % (ref 0–1)
BILIRUB SERPL-MCNC: 0.44 MG/DL (ref 0.2–1)
BUN SERPL-MCNC: 8 MG/DL (ref 5–25)
CALCIUM SERPL-MCNC: 9.2 MG/DL (ref 8.4–10.2)
CARDIAC TROPONIN I PNL SERPL HS: 3 NG/L
CHLORIDE SERPL-SCNC: 101 MMOL/L (ref 96–108)
CO2 SERPL-SCNC: 25 MMOL/L (ref 21–32)
CREAT SERPL-MCNC: 0.92 MG/DL (ref 0.6–1.3)
EOSINOPHIL # BLD AUTO: 0.14 THOUSAND/ÂΜL (ref 0–0.61)
EOSINOPHIL NFR BLD AUTO: 2 % (ref 0–6)
ERYTHROCYTE [DISTWIDTH] IN BLOOD BY AUTOMATED COUNT: 13 % (ref 11.6–15.1)
GFR SERPL CREATININE-BSD FRML MDRD: 91 ML/MIN/1.73SQ M
GLUCOSE SERPL-MCNC: 123 MG/DL (ref 65–140)
HCT VFR BLD AUTO: 38.6 % (ref 36.5–49.3)
HGB BLD-MCNC: 13.7 G/DL (ref 12–17)
IMM GRANULOCYTES # BLD AUTO: 0.02 THOUSAND/UL (ref 0–0.2)
IMM GRANULOCYTES NFR BLD AUTO: 0 % (ref 0–2)
LYMPHOCYTES # BLD AUTO: 1.55 THOUSANDS/ÂΜL (ref 0.6–4.47)
LYMPHOCYTES NFR BLD AUTO: 20 % (ref 14–44)
MAGNESIUM SERPL-MCNC: 1.7 MG/DL (ref 1.9–2.7)
MCH RBC QN AUTO: 33.3 PG (ref 26.8–34.3)
MCHC RBC AUTO-ENTMCNC: 35.5 G/DL (ref 31.4–37.4)
MCV RBC AUTO: 94 FL (ref 82–98)
MONOCYTES # BLD AUTO: 0.72 THOUSAND/ÂΜL (ref 0.17–1.22)
MONOCYTES NFR BLD AUTO: 10 % (ref 4–12)
NEUTROPHILS # BLD AUTO: 5.12 THOUSANDS/ÂΜL (ref 1.85–7.62)
NEUTS SEG NFR BLD AUTO: 67 % (ref 43–75)
NRBC BLD AUTO-RTO: 0 /100 WBCS
PLATELET # BLD AUTO: 241 THOUSANDS/UL (ref 149–390)
PMV BLD AUTO: 9.1 FL (ref 8.9–12.7)
POTASSIUM SERPL-SCNC: 3.6 MMOL/L (ref 3.5–5.3)
PROT SERPL-MCNC: 7.1 G/DL (ref 6.4–8.4)
RBC # BLD AUTO: 4.12 MILLION/UL (ref 3.88–5.62)
SODIUM SERPL-SCNC: 134 MMOL/L (ref 135–147)
WBC # BLD AUTO: 7.59 THOUSAND/UL (ref 4.31–10.16)

## 2024-02-10 PROCEDURE — 99285 EMERGENCY DEPT VISIT HI MDM: CPT | Performed by: EMERGENCY MEDICINE

## 2024-02-10 PROCEDURE — 84484 ASSAY OF TROPONIN QUANT: CPT

## 2024-02-10 PROCEDURE — 36415 COLL VENOUS BLD VENIPUNCTURE: CPT

## 2024-02-10 PROCEDURE — 80053 COMPREHEN METABOLIC PANEL: CPT

## 2024-02-10 PROCEDURE — 83735 ASSAY OF MAGNESIUM: CPT

## 2024-02-10 PROCEDURE — 99285 EMERGENCY DEPT VISIT HI MDM: CPT

## 2024-02-10 PROCEDURE — 85025 COMPLETE CBC W/AUTO DIFF WBC: CPT

## 2024-02-10 PROCEDURE — 71045 X-RAY EXAM CHEST 1 VIEW: CPT

## 2024-02-10 RX ORDER — MAGNESIUM SULFATE HEPTAHYDRATE 40 MG/ML
2 INJECTION, SOLUTION INTRAVENOUS ONCE
Status: DISCONTINUED | OUTPATIENT
Start: 2024-02-10 | End: 2024-02-10

## 2024-02-10 RX ORDER — UREA 10 %
250 LOTION (ML) TOPICAL ONCE
Status: COMPLETED | OUTPATIENT
Start: 2024-02-10 | End: 2024-02-10

## 2024-02-10 RX ORDER — HYDROXYZINE HYDROCHLORIDE 25 MG/1
25 TABLET, FILM COATED ORAL EVERY 6 HOURS PRN
Qty: 20 TABLET | Refills: 0 | Status: SHIPPED | OUTPATIENT
Start: 2024-02-10 | End: 2024-02-15

## 2024-02-10 RX ORDER — HYDROXYZINE HYDROCHLORIDE 25 MG/1
50 TABLET, FILM COATED ORAL ONCE
Status: COMPLETED | OUTPATIENT
Start: 2024-02-10 | End: 2024-02-10

## 2024-02-10 RX ADMIN — Medication 250 MG: at 19:01

## 2024-02-10 RX ADMIN — HYDROXYZINE HYDROCHLORIDE 50 MG: 25 TABLET ORAL at 18:06

## 2024-02-10 NOTE — ED PROVIDER NOTES
"History  Chief Complaint   Patient presents with    Shortness of Breath     Patient arrived via EMS from home c/o SOB for several days, worsening today, stating he was watching TV and feels like he need to take a large gasp of breath once. Recent discharge from detox. Now complaining of numbness and tingling on left side of body     58-year-old male with past medical history of hypertension, GERD presents for evaluation of dyspnea described as \"needing to gasp for air\" very frequently x 2-3 days.  Patient states he had feelings of dyspnea even prior to onset 2 to 3 days ago however seemed to worsen recently.  Patient states exertion worsens shortness of breath and has had occurrences where he has a \"swayed\" due to lightheadedness after climbing the stairs in his home.  Reports associated symptoms of left arm and left leg tingling/numbness and reports intermittent left arm pain with exertion.  Denies associated symptoms of chest pain, headache, vision changes, back pain, cough, abdominal pain, N/V/D or any other symptoms.  Patient was recently discharged from the detox unit at [facility] for chronic alcohol abuse.  Patient states that he has not had any EtOH ingestion since leaving the detox unit.  No other concerns.        Prior to Admission Medications   Prescriptions Last Dose Informant Patient Reported? Taking?   Thiamine Mononitrate (VITAMIN B1) 100 mg tablet   No No   Sig: Take 1 tablet (100 mg total) by mouth daily   escitalopram (LEXAPRO) 20 mg tablet   No No   Sig: Take 1 tablet (20 mg total) by mouth daily Do not start before February 8, 2024.   folic acid (FOLVITE) 400 mcg tablet   No No   Sig: Take 1 tablet (400 mcg total) by mouth daily   hydrocortisone 1 % cream   No No   Sig: Apply topically 4 (four) times a day as needed for rash for up to 7 days   lisinopril (ZESTRIL) 40 mg tablet   No No   Sig: Take 1 tablet (40 mg total) by mouth daily   naltrexone (REVIA) 50 mg tablet   No No   Sig: Take 1 " tablet (50 mg total) by mouth daily   pantoprazole (PROTONIX) 40 mg tablet   No No   Sig: Take 1 tablet (40 mg total) by mouth daily in the early morning      Facility-Administered Medications: None       Past Medical History:   Diagnosis Date    Alcoholic ketoacidosis 10/16/2023    GERD (gastroesophageal reflux disease)     Hypertension     Hypomagnesemia 04/02/2023    Vomiting 04/02/2023       History reviewed. No pertinent surgical history.    History reviewed. No pertinent family history.  I have reviewed and agree with the history as documented.    E-Cigarette/Vaping    E-Cigarette Use Never User      E-Cigarette/Vaping Substances     Social History     Tobacco Use    Smoking status: Never    Smokeless tobacco: Never   Vaping Use    Vaping status: Never Used   Substance Use Topics    Alcohol use: Yes     Comment: 3 bottles of wine per day    Drug use: Never        Review of Systems   Constitutional:  Negative for chills and fever.   HENT:  Negative for ear pain and sore throat.    Eyes:  Negative for pain and visual disturbance.   Respiratory:  Positive for shortness of breath. Negative for cough.    Cardiovascular:  Negative for chest pain and palpitations.   Gastrointestinal:  Negative for abdominal pain, diarrhea, nausea and vomiting.   Genitourinary:  Negative for dysuria and hematuria.   Musculoskeletal:  Positive for arthralgias. Negative for back pain.   Skin:  Negative for color change and rash.   Neurological:  Positive for light-headedness. Negative for seizures and syncope.        Tingling   All other systems reviewed and are negative.      Physical Exam  ED Triage Vitals   Temperature Pulse Respirations Blood Pressure SpO2   02/10/24 1735 02/10/24 1733 02/10/24 1733 02/10/24 1733 02/10/24 1733   98.8 °F (37.1 °C) 68 18 (!) 171/96 96 %      Temp Source Heart Rate Source Patient Position - Orthostatic VS BP Location FiO2 (%)   02/10/24 1735 02/10/24 1733 02/10/24 1733 02/10/24 1733 --   Oral Monitor  Lying Left arm       Pain Score       --                    Orthostatic Vital Signs  Vitals:    02/10/24 1733 02/10/24 1903 02/10/24 1908   BP: (!) 171/96  149/72   Pulse: 68 60    Patient Position - Orthostatic VS: Lying Sitting Lying       Physical Exam  Vitals and nursing note reviewed.   Constitutional:       General: He is not in acute distress.     Appearance: Normal appearance. He is well-developed. He is obese. He is not ill-appearing, toxic-appearing or diaphoretic.   HENT:      Head: Normocephalic and atraumatic.      Right Ear: External ear normal.      Left Ear: External ear normal.      Nose: Nose normal.      Mouth/Throat:      Mouth: Mucous membranes are moist.   Eyes:      Extraocular Movements: Extraocular movements intact.      Conjunctiva/sclera: Conjunctivae normal.   Cardiovascular:      Rate and Rhythm: Normal rate and regular rhythm.      Pulses: Normal pulses.      Heart sounds: Normal heart sounds. No murmur heard.  Pulmonary:      Effort: Pulmonary effort is normal. No respiratory distress.      Breath sounds: Normal breath sounds.   Abdominal:      General: Abdomen is flat.      Palpations: Abdomen is soft.      Tenderness: There is no abdominal tenderness. There is no guarding or rebound.   Musculoskeletal:         General: No swelling. Normal range of motion.      Cervical back: Normal range of motion and neck supple.      Right lower leg: No tenderness. No edema.      Left lower leg: No tenderness. No edema.   Skin:     General: Skin is warm and dry.      Capillary Refill: Capillary refill takes less than 2 seconds.   Neurological:      General: No focal deficit present.      Mental Status: He is alert and oriented to person, place, and time. Mental status is at baseline.      Cranial Nerves: No cranial nerve deficit.      Sensory: No sensory deficit.      Motor: No weakness.      Gait: Gait normal.   Psychiatric:         Mood and Affect: Mood normal.         Behavior: Behavior normal.          ED Medications  Medications   hydrOXYzine HCL (ATARAX) tablet 50 mg (50 mg Oral Given 2/10/24 1806)   magnesium gluconate (MAGONATE) tablet 250 mg (250 mg Oral Given 2/10/24 1901)       Diagnostic Studies  Results Reviewed       Procedure Component Value Units Date/Time    HS Troponin 0hr (reflex protocol) [[id number]]  (Normal) Collected: 02/10/24 1751    Lab Status: Final result Specimen: Blood from Arm, Right Updated: 02/10/24 1821     hs TnI 0hr 3 ng/L     Magnesium [[id number]]  (Abnormal) Collected: 02/10/24 1751    Lab Status: Final result Specimen: Blood from Arm, Right Updated: 02/10/24 1815     Magnesium 1.7 mg/dL     Comprehensive metabolic panel [[id number]]  (Abnormal) Collected: 02/10/24 1751    Lab Status: Final result Specimen: Blood from Arm, Right Updated: 02/10/24 1815     Sodium 134 mmol/L      Potassium 3.6 mmol/L      Chloride 101 mmol/L      CO2 25 mmol/L      ANION GAP 8 mmol/L      BUN 8 mg/dL      Creatinine 0.92 mg/dL      Glucose 123 mg/dL      Calcium 9.2 mg/dL      AST 47 U/L      ALT 33 U/L      Alkaline Phosphatase 74 U/L      Total Protein 7.1 g/dL      Albumin 4.2 g/dL      Total Bilirubin 0.44 mg/dL      eGFR 91 ml/min/1.73sq m     Narrative:      National Kidney Disease Foundation guidelines for Chronic Kidney Disease (CKD):     Stage 1 with normal or high GFR (GFR > 90 mL/min/1.73 square meters)    Stage 2 Mild CKD (GFR = 60-89 mL/min/1.73 square meters)    Stage 3A Moderate CKD (GFR = 45-59 mL/min/1.73 square meters)    Stage 3B Moderate CKD (GFR = 30-44 mL/min/1.73 square meters)    Stage 4 Severe CKD (GFR = 15-29 mL/min/1.73 square meters)    Stage 5 End Stage CKD (GFR <15 mL/min/1.73 square meters)  Note: GFR calculation is accurate only with a steady state creatinine    CBC and differential [[id number]] Collected: 02/10/24 1751    Lab Status: Final result Specimen: Blood from Arm, Right Updated: 02/10/24 1756     WBC 7.59 Thousand/uL      RBC 4.12 Million/uL       Hemoglobin 13.7 g/dL      Hematocrit 38.6 %      MCV 94 fL      MCH 33.3 pg      MCHC 35.5 g/dL      RDW 13.0 %      MPV 9.1 fL      Platelets 241 Thousands/uL      nRBC 0 /100 WBCs      Neutrophils Relative 67 %      Immat GRANS % 0 %      Lymphocytes Relative 20 %      Monocytes Relative 10 %      Eosinophils Relative 2 %      Basophils Relative 1 %      Neutrophils Absolute 5.12 Thousands/µL      Immature Grans Absolute 0.02 Thousand/uL      Lymphocytes Absolute 1.55 Thousands/µL      Monocytes Absolute 0.72 Thousand/µL      Eosinophils Absolute 0.14 Thousand/µL      Basophils Absolute 0.04 Thousands/µL                    XR chest 1 view portable   ED Interpretation by Kelly Tavarez MD (02/10 1827)   No acute cardiopulmonary process            Procedures  ECG 12 Lead Documentation Only    Date/Time: 2/10/2024 5:28 PM    Performed by: Kelly Tavarez MD  Authorized by: Kelly Tavarez MD    Indications / Diagnosis:  Chest pain  ECG reviewed by me, the ED Provider: yes    Patient location:  ED  Previous ECG:     Previous ECG:  Unavailable    Comparison to cardiac monitor: Yes    Interpretation:     Interpretation: non-specific    Rate:     ECG rate:  75    ECG rate assessment: normal    Rhythm:     Rhythm: sinus rhythm      Rhythm comment:  With sinus arrhythmia  Ectopy:     Ectopy: none    QRS:     QRS axis:  Normal    QRS intervals:  Normal  Conduction:     Conduction: normal    ST segments:     ST segments:  Normal  T waves:     T waves: non-specific and flattening          ED Course  ED Course as of 02/10/24 2016   Sat Feb 10, 2024   1820 MAGNESIUM(!): 1.7  repleting   1822 hs TnI 0hr: 3   1826 2/5/24 D-dimer 0.42             HEART Risk Score      Flowsheet Row Most Recent Value   Heart Score Risk Calculator    History 0 Filed at: 02/10/2024 2016   ECG 1 Filed at: 02/10/2024 2016   Age 1 Filed at: 02/10/2024 2016   Risk Factors 1 Filed at: 02/10/2024 2016   Troponin 0 Filed at: 02/10/2024 2016   HEART Score 3 Filed  at: 02/10/2024 2016                            Wells' Criteria for PE      Flowsheet Row Most Recent Value   Wells' Criteria for PE    Clinical signs and symptoms of DVT 0 Filed at: 02/10/2024 1745   PE is primary diagnosis or equally likely 0 Filed at: 02/10/2024 1745   HR >100 0 Filed at: 02/10/2024 1745   Immobilization at least 3 days or Surgery in the previous 4 weeks 0 Filed at: 02/10/2024 1745   Previous, objectively diagnosed PE or DVT 0 Filed at: 02/10/2024 1745   Hemoptysis 0 Filed at: 02/10/2024 1745   Malignancy with treatment within 6 months or palliative 0 Filed at: 02/10/2024 1745   Wells' Criteria Total 0 Filed at: 02/10/2024 1745              Medical Decision Making  Patient remained stable throughout ED course.  No evidence of hypoxemia or tachycardia.  Patient was recently evaluated in this ED on 2/5 at which point dyspnea was worked up for possible PE, D-dimer at that time 0.42 indicating very low/minimal risk for acute PE.  This was not repeated today as patient reported that dyspnea started prior to 2/5.  Patient was provided 50 mg Atarax with significant improvement of feelings of dyspnea and anxiety overall.  No evidence of lateralizing deficits consistent with CVA/TIA.  Patient was provided Rx Atarax for further anxiety control and upon further discussion, patient reported that he has had significant life stressors including the death of his mother, recent diagnosis of cancer in his father and other family deaths.  No evidence of acute electrolyte abnormalities or elevated troponin/EKG changes.  Doubt ACS.  Patient states that he has standing appointment with his PCP in 4 days and has plans for an outpatient appointment with cardiology as patient would benefit from an outpatient stress test and echocardiogram.  Stable for discharge home at this time. Pt understands and agreed with plan. All questions answered. No other concerns.        Amount and/or Complexity of Data Reviewed  Labs:  ordered. Decision-making details documented in ED Course.  Radiology: ordered and independent interpretation performed.    Risk  OTC drugs.  Prescription drug management.          Disposition  Final diagnoses:   Dyspnea   Numbness and tingling of left arm and leg   Anxiety     Time reflects when diagnosis was documented in both MDM as applicable and the Disposition within this note       Time User Action Codes Description Comment    2/10/2024  6:27 PM Kelly Tavarez Add [R06.00] Dyspnea     2/10/2024  6:27 PM Kelly Tavarez Add [R20.0,  R20.2] Numbness and tingling of left arm and leg     2/10/2024  6:28 PM Kelly Tavarez Add [F41.9] Anxiety           ED Disposition       ED Disposition   Discharge    Condition   Stable    Date/Time   Sat Feb 10, 2024 1827    Comment   Diogo Travis discharge to home/self care.                   Follow-up Information       Follow up With Specialties Details Why Contact Info Additional Information    Alice Stroud MD Emergency Medicine Call  As needed [address] [facility]  [address]  [facility] [address] [phone]       [facility] Emergency Medicine Go to  As needed, If symptoms worsen [address] [place] [id number]  [phone] [facility], [address] [place] [address], [id number]            Discharge Medication List as of 2/10/2024  6:28 PM        START taking these medications    Details   hydrOXYzine HCL (ATARAX) 25 mg tablet Take 1 tablet (25 mg total) by mouth every 6 (six) hours as needed for itching for up to 5 days, Starting Sat 2/10/2024, Until Thu 2/15/2024 at 2359, Normal           CONTINUE these medications which have NOT CHANGED    Details   escitalopram (LEXAPRO) 20 mg tablet Take 1 tablet (20 mg total) by mouth daily Do not start before February 8, 2024., Starting Thu 2/8/2024, Normal      folic acid (FOLVITE) 400 mcg tablet Take 1 tablet (400  mcg total) by mouth daily, Starting Mon 12/18/2023, Until Wed 1/17/2024, Normal      hydrocortisone 1 % cream Apply topically 4 (four) times a day as needed for rash for up to 7 days, Starting Wed 2/7/2024, Until Wed 2/14/2024 at 2359, Normal      lisinopril (ZESTRIL) 40 mg tablet Take 1 tablet (40 mg total) by mouth daily, Starting Wed 2/7/2024, Until Fri 3/8/2024, Normal      naltrexone (REVIA) 50 mg tablet Take 1 tablet (50 mg total) by mouth daily, Starting Thu 2/8/2024, Normal      pantoprazole (PROTONIX) 40 mg tablet Take 1 tablet (40 mg total) by mouth daily in the early morning, Starting Wed 2/7/2024, Until Fri 3/8/2024, Normal      Thiamine Mononitrate (VITAMIN B1) 100 mg tablet Take 1 tablet (100 mg total) by mouth daily, Starting Mon 12/18/2023, Until Wed 1/17/2024, Normal           No discharge procedures on file.    PDMP Review         Value Time User    PDMP Reviewed  Yes 12/1/2023 11:15 AM Priscilla Burden MD             ED Provider  Attending physically available and evaluated Diogo Travis. I managed the patient along with the ED Attending.    Electronically Signed by           Kelly Tavarez MD  02/10/24 2016

## 2024-02-10 NOTE — ED ATTENDING ATTESTATION
2/10/2024  IAmaury DO, saw and evaluated the patient. I have discussed the patient with the resident/non-physician practitioner and agree with the resident's/non-physician practitioner's findings, Plan of Care, and MDM as documented in the resident's/non-physician practitioner's note, except where noted. All available labs and Radiology studies were reviewed.  I was present for key portions of any procedure(s) performed by the resident/non-physician practitioner and I was immediately available to provide assistance.       At this point I agree with the current assessment done in the Emergency Department.  I have conducted an independent evaluation of this patient a history and physical is as follows: 58-year-old male, past medical history per resident chart, presenting with ongoing dyspnea.  Patient notes that this is the same dyspnea that has been occurring over the last few weeks time and has been evaluated for prior.  Denies any chest pain, dyspnea exertion, cough, fever, chills, sore throat.  Does note ongoing anxieties.    Vital signs stable.  Patient resting comfortably.  Lungs clear to auscultation.  No increased work of breathing.  Heart regular rhythm and without murmur, gallop, rub.  Bilateral lower extremities without calf tenderness, edema, or dissymmetry.    58-year-old male presenting to the emergency department with ongoing sensation of dyspnea.  CBC, CMP, with only mild hyponatremia.  EKG with nonspecific changes.  QT normal.  Hypomagnesemia at 1.7 orally repleted here and patient states will speak with his PCP regarding further oral repletion initiate multivitamin in the meantime.  D-dimer was performed under the same conditions per patient report and per chart and the D-dimer at that time was negative.  Recommended outpatient follow-up with PCP who he's visiting with in 4 days. Also has established cardiology appt. Reviewed all findings both relevant and incidental with the patient at  bedside. Pt verbalized understanding of findings, neccesary follow up, return to ED precautions. Pt agreed to review today's findings with their primary care provider. Pt non-toxic appearing upon discharge.       ED Course         Critical Care Time  Procedures

## 2024-02-11 NOTE — ED NOTES
Provider at bedside to discuss discharge and results with patient. Patient verbalized understanding.     Kylee Gallardo, RN  02/10/24 [id number]

## 2024-02-13 NOTE — PROGRESS NOTES
Assessment/Plan:  Problem List Items Addressed This Visit          Digestive    Hepatic steatosis     Pending Hepatology consult.  ETOH cessation advised.  Recommend lifestyle modifications.         Relevant Orders    Ambulatory Referral to Gastroenterology    Ambulatory Referral to Hepatology    Chronic alcoholic gastritis     Management per GI.  Continue Protonix 40mg QD.  ETOH Cessation advised.           Relevant Medications    pantoprazole (PROTONIX) 40 mg tablet    Other Relevant Orders    Ambulatory Referral to Gastroenterology    Ambulatory Referral to Hepatology    Ambulatory Referral to Social Work Care Management Program    GERD (gastroesophageal reflux disease)     Management per GI - Overdue for appt.  Continue Protonix 40mg QD.  ETOH cessation advised.  Watch GERD diet.           Relevant Medications    pantoprazole (PROTONIX) 40 mg tablet    Other Relevant Orders    Ambulatory Referral to Gastroenterology    IBS (irritable bowel syndrome)     Management per GI.  Overdue for colonoscopy.           Relevant Orders    Ambulatory Referral to Gastroenterology    Hemoglobin A1C       Respiratory    AR (allergic rhinitis)     Stable s OTC meds.              Cardiovascular and Mediastinum    Hypertension     Borderline BP.  Check blood pressure outside of office.  Recommend lifestyle modifications.  ETOH cessation advised.              Musculoskeletal and Integument    Rosacea     Uncontrolled.  Pending Derm consult.  Continue Metronidazole 0.75% cream BID.  ETOH cessation advised.           Relevant Medications    metroNIDAZOLE (METROCREAM) 0.75 % cream    Other Relevant Orders    Ambulatory Referral to Dermatology    Rash and nonspecific skin eruption     Etiology?  Improved c Hydrocortisone 1% QID PRN per inpatient team.  Pending Derm consult.           Relevant Medications    metroNIDAZOLE (METROCREAM) 0.75 % cream    Other Relevant Orders    Ambulatory Referral to Dermatology       Other    Alcohol use  disorder, severe, dependence (HCC)     In remission.  Patient is working on outpatient ETOH Rehab options - additional resources provided today.  Continue Naltrexone.  ETOH cessation advised.           Relevant Orders    Ambulatory Referral to Gastroenterology    Ambulatory Referral to Hepatology    Complete PFT with post bronchodilator    Urinalysis with microscopic (Completed)    Vitamin B1, whole blood    Vitamin B12 (Completed)    Folate (Completed)    Obesity     Improved.  Recommend lifestyle modifications.           Relevant Orders    Vitamin D 25 hydroxy (Completed)    Anxiety     Stable.  Continue Lexapro 20mg QD.  Start Atarax 25mg q6 hours PRN.  Patient declines starting Buspar 7.5mg TID PRN, but may reconsider in future.           Relevant Orders    TSH, 3rd generation with Free T4 reflex (Completed)    Hemoglobin A1C    Depression     Stable.  Continue Lexapro 20mg QD.  Start Atarax 25mg q6 hours PRN.  Patient declines starting Buspar 7.5mg TID PRN, but may reconsider in future.           Relevant Medications    escitalopram (LEXAPRO) 20 mg tablet    Other Relevant Orders    TSH, 3rd generation with Free T4 reflex (Completed)    Hemoglobin A1C    Hyperlipidemia     Pending labs.  Recommend lifestyle modifications.           Relevant Orders    Complete PFT with post bronchodilator    TSH, 3rd generation with Free T4 reflex (Completed)    RESOLVED: Anxiety and depression    Relevant Medications    escitalopram (LEXAPRO) 20 mg tablet     Other Visit Diagnoses       Annual physical exam    -  Primary    Health Maintenance   Topic Date Due   • Hepatitis C Screening  Never done   • Pneumococcal Vaccine: Pediatrics (0 to 5 Years) and At-Risk Patients (6 to 64 Years) (1 of 2 - PCV) Never done   • HIV Screening  Never done   • Colorectal Cancer Screening  Never done   • COVID-19 Vaccine (3 - 2023-24 season) 05/14/2024 (Originally 9/1/2023)   • Influenza Vaccine (1) 06/30/2024 (Originally 9/1/2023)   • Zoster  Vaccine (1 of 2) 02/14/2025 (Originally 6/28/2016)   • Depression Screening  02/14/2025   • Annual Physical  02/14/2025   • Depression Follow-up Plan  02/14/2025   • DTaP,Tdap,and Td Vaccines (3 - Td or Tdap) 05/15/2030   • HIB Vaccine  Aged Out   • IPV Vaccine  Aged Out   • Hepatitis A Vaccine  Aged Out   • Meningococcal ACWY Vaccine  Aged Out   • HPV Vaccine  Aged Out         Encounter for screening for HIV        Relevant Orders    HIV 1/2 AG/AB w Reflex [facility] for 2 yr old and above    Need for hepatitis C screening test        Relevant Orders    Hepatitis C antibody    Obesity (BMI 30.0-34.9)        Relevant Orders    Vitamin D 25 hydroxy (Completed)    BMI 34.0-34.9,adult        Relevant Orders    Vitamin D 25 hydroxy (Completed)    Neoplasm of uncertain behavior of skin        Relevant Medications    metroNIDAZOLE (METROCREAM) 0.75 % cream    Other Relevant Orders    Ambulatory Referral to Dermatology    Suspect seborrheic keratosis.  Trial of OTC Hydrocortisone BID PRN.        Screening for colorectal cancer        Relevant Orders    Ambulatory Referral to Gastroenterology    Screening for prostate cancer        Relevant Orders    PSA, Total Screen (Completed)    Screening for diabetes mellitus        Relevant Orders    Hemoglobin A1C    Screening for cardiovascular condition        Relevant Orders    CBC and differential (Completed)    Comprehensive metabolic panel (Completed)    Lipid panel (Completed)    LDL cholesterol, direct (Completed)    Microscopic hematuria        Relevant Orders    Urinalysis with microscopic (Completed)    Urine culture    Pending labs.        SOB (shortness of breath)        Relevant Orders    Ambulatory Referral to Cardiology    Complete PFT with post bronchodilator    D-dimer, quantitative (Completed)    Check D-dimer to R/O PE.  If positive, to ER.  Check PFTs to R/O Asthma / COPD.  Pending Cardio consult for consideration of Echo and Stress Test.  Patient denies anxiety as  "contributory factor - trial of Atarax PRN.      Encounter for screening involving social determinants of health (SDoH)        Relevant Orders    Ambulatory Referral to Social Work Care Management Program    Patient is currently unemployed, caring for elderly father, has limited insurance coverage, trying to enroll in outpatient ETOH rehab.        Hypomagnesemia        Relevant Orders    Comprehensive metabolic panel (Completed)    Magnesium (Completed)    Pending labs.        Myalgia        Paresthesias        Relevant Orders    Vitamin B12 (Completed)    Folate (Completed)    Pending labs.  Suspect Vitamin B12 deficiency due to ETOH abuse.               Return in about 6 weeks (around 3/27/2024) for F/U HTN, HL, Anx/Dep, ETOH, GERD, Labs.      Future Appointments   Date Time Provider Department Center   3/27/2024 10:40 AM Enid Altman,  [facility]          Subjective:     Diogo is a 58 y.o. male who presents today as a new patient for his medical conditions.      New Patient    Previous PCP:  Dr. Alice Stroud at [facility] - [address]  Reason for Transfer:  Preference, Location  Last seen by previous PCP:  1/25/21  Last Labs:  2/10/24  Last Physical:  Unknown  Medical Records Requested:   No      HPI:  Chief Complaint   Patient presents with   • New Patient Visit     Here to establish care and address concerns. F/u for ER. Last physical is unknown.    • Skin Lesion     Pt notes a new skin lesion on the L lateral thigh approx one month ago. Pt reports the area is itchy and now has a white center. Has not tried any home tx.    • Breathing Problem     Pt states he is \"taking involuntary deep breaths every 2-3 min.\" This has been for the last week. ER workup was neg, but pt feels something is not right.     • HM     No additional covid boosters. Declines flu and shingrix today. Last CRC was over ten years ago with .      -- Above per clinical staff and reviewed. " --      HPI      Today:      Controlled Substance Review    [place]MP or [place]  reviewed: No red flags were identified; safe to proceed with prescription.    F/U [facility] ER 2/10/24    Dyspnea - ER team attributes to anxiety.  S/p Negative PE work-up in ER 24 and was not repeated as symptoms started prior to last ER evaluation.  Improved s/p Atarax 50mg QD administered in ER.  Pending Cardio appt? For stress test and Echo?      2.  Numbness and Tingling of Left Arm and Leg - ER team attributes to anxiety.  Stable Neuro exam in ER.  No CVA work-up performed.  Intermittent.      3.  Anxiety - Improved s/p Atarax 50mg QD administered in ER.  ER team Rx Atarax 25mg 1 tab q6 hours PRN, but patient did not  Rx yet.     Desires PSA, MIAN c overdue colonoscopy and yearly per PCP thereafter.      Obesity - Trying to watch diet.  No exercise.      HTN - On Lisinopril 40mg QD.  No other HTN meds.  BP check outside of office on arm cuff 134/86.      Hyperlipidemia - No statin previously.      Anxiety / Depression - Triggered by divorce .  Mood is stable.  Has good and bad days.  Good social supports.  No SI/HI/AH/VH.  No Psych previously.  On Lexapro 20mg QD and Atarax 25mg q6 hours PRN - patient did not  Rx yet.  Previously on Xanax 0.5mg QD PRN.  No other mood meds.  No counseling previously.      PHQ-2/9 Depression Screening    Little interest or pleasure in doing things: 1 - several days  Feeling down, depressed, or hopeless: 1 - several days  Trouble falling or staying asleep, or sleeping too much: 3 - nearly every day  Feeling tired or having little energy: 2 - more than half the days  Poor appetite or overeatin - more than half the days  Feeling bad about yourself - or that you are a failure or have let yourself or your family down: 3 - nearly every day  Trouble concentrating on things, such as reading the newspaper or watching television: 1 - several days  Moving or speaking so slowly  that other people could have noticed. Or the opposite - being so fidgety or restless that you have been moving around a lot more than usual: 0 - not at all  Thoughts that you would be better off dead, or of hurting yourself in some way: 0 - not at all  PHQ-9 Score: 13  PHQ-9 Interpretation: Moderate depression       POLO-7 Flowsheet Screening    Flowsheet Row Most Recent Value   Over the last 2 weeks, how often have you been bothered by any of the following problems?    Feeling nervous, anxious, or on edge 2   Not being able to stop or control worrying 2   Worrying too much about different things 2   Trouble relaxing 2   Being so restless that it is hard to sit still 2   Becoming easily annoyed or irritable 1   Feeling afraid as if something awful might happen 2   POLO-7 Total Score 13        MDQ:   1, Asynchronous, No Problem      GERD / IBS / Chronic Alcoholic Gastritis / Hepatomegaly / Fatty Liver - Management per GI Dr. Art - Next appt PRN as his insurance is not participating.  Last EGD and Colonoscopy 2/12.  On Protonix 40mg QD. Overdue for 10-year repeat colonoscopy 2/2022.  Watching GERD diet.      Microscopic Hematuria - No Uro work-up previously.      ETOH Abuse / H/O ETOH Withdrawal Seizures - Triggered by divorce 2017.  s/p Medical Detox Admission [facility] 2/5/24 - 2/7/24.  Last ETOH drink 2/4/24.  Previously drinking 3 bottles of wine daily.  On Naltrexone, MVI, Thiamine, Folic Acid.   provided patient with outpatient Alcohol Rehab resources -  patient has not yet made call due to caring for his father.        AR - Stable s meds.      Rosacea - Pending Derm consult.  On Metronidazole cream.      Reviewed:  Labs 2/10/24    No Uro.    Overdue for Dentist.  Overdue for Optho.      The following portions of the patient's history were reviewed and updated as appropriate: allergies, current medications, past family history, past medical history, past social history, past surgical  "history and problem list.      Review of Systems   Constitutional:  Negative for appetite change, chills, diaphoresis, fatigue and fever.   Respiratory:  Positive for shortness of breath (At rest, Denies RODRIGUEZ). Negative for cough, chest tightness and wheezing.    Gastrointestinal:  Positive for diarrhea. Negative for abdominal pain, blood in stool, constipation, nausea and vomiting.   Genitourinary:  Negative for dysuria and hematuria.        Denies nocturia        Current Outpatient Medications   Medication Sig Dispense Refill   • escitalopram (LEXAPRO) 20 mg tablet Take 1 tablet (20 mg total) by mouth daily 90 tablet 0   • folic acid (FOLVITE) 400 mcg tablet Take 1 tablet (400 mcg total) by mouth daily 30 tablet 0   • hydrocortisone 1 % cream Apply topically 4 (four) times a day as needed for rash for up to 7 days 20 g 0   • lisinopril (ZESTRIL) 40 mg tablet Take 1 tablet (40 mg total) by mouth daily (Patient taking differently: Take 40 mg by mouth daily) 30 tablet 0   • Magnesium 400 MG CAPS Take 1 capsule by mouth in the morning     • metroNIDAZOLE (METROCREAM) 0.75 % cream Apply 1 Application topically 2 (two) times a day     • naltrexone (REVIA) 50 mg tablet Take 1 tablet (50 mg total) by mouth daily 30 tablet 0   • Omega-3 Fatty Acids (fish oil) 1,000 mg Take 2,000 mg by mouth daily     • pantoprazole (PROTONIX) 40 mg tablet Take 1 tablet (40 mg total) by mouth daily in the early morning 90 tablet 0   • Thiamine Mononitrate (VITAMIN B1) 100 mg tablet Take 1 tablet (100 mg total) by mouth daily 30 tablet 0   • hydrOXYzine HCL (ATARAX) 25 mg tablet Take 1 tablet (25 mg total) by mouth every 6 (six) hours as needed for itching for up to 5 days (Patient not taking: Reported on 2/14/2024) 20 tablet 0     No current facility-administered medications for this visit.       Objective:  /86   Pulse 80   Temp 98.8 °F (37.1 °C)   Resp 16   Ht 5' 8\" (1.727 m)   Wt 103 kg (227 lb 9.6 oz)   SpO2 96%   BMI 34.61 " kg/m²    Wt Readings from Last 3 Encounters:   02/14/24 103 kg (227 lb 9.6 oz)   02/05/24 108 kg (238 lb 1.6 oz)   12/18/23 108 kg (238 lb 12.1 oz)      BP Readings from Last 3 Encounters:   02/14/24 134/86   02/10/24 149/72   02/07/24 128/77          Physical Exam  Vitals and nursing note reviewed.   Constitutional:       Appearance: Normal appearance. He is well-developed. He is obese.   HENT:      Head: Normocephalic and atraumatic.      Right Ear: Tympanic membrane, ear canal and external ear normal.      Left Ear: Tympanic membrane, ear canal and external ear normal.      Nose: Nose normal.      Right Sinus: No maxillary sinus tenderness or frontal sinus tenderness.      Left Sinus: No maxillary sinus tenderness or frontal sinus tenderness.      Mouth/Throat:      Mouth: Mucous membranes are moist.      Pharynx: Oropharynx is clear. Uvula midline.      Tonsils: No tonsillar exudate.   Eyes:      Extraocular Movements: Extraocular movements intact.      Conjunctiva/sclera: Conjunctivae normal.      Pupils: Pupils are equal, round, and reactive to light.   Cardiovascular:      Rate and Rhythm: Normal rate and regular rhythm.      Pulses: Normal pulses.      Heart sounds: Normal heart sounds.   Pulmonary:      Effort: Pulmonary effort is normal.      Breath sounds: Normal breath sounds.   Abdominal:      General: Bowel sounds are normal. There is no distension.      Palpations: Abdomen is soft. There is no mass.      Tenderness: There is no abdominal tenderness. There is no guarding or rebound.   Musculoskeletal:         General: No swelling or tenderness.      Cervical back: Neck supple.      Right lower leg: No edema.      Left lower leg: No edema.   Lymphadenopathy:      Cervical: No cervical adenopathy.   Skin:     Findings: Erythema (Generalized on face c papules), lesion (Left anterior thigh c 7mm x 4mm, dry, pink papule) and rash (Generalized on torso, nonblanching, salmon-colored macules) present.  "  Neurological:      General: No focal deficit present.      Mental Status: He is alert and oriented to person, place, and time.      Cranial Nerves: No cranial nerve deficit.      Sensory: No sensory deficit.      Motor: No weakness.      Coordination: Coordination normal.      Deep Tendon Reflexes: Reflexes normal.   Psychiatric:         Mood and Affect: Mood normal.         Behavior: Behavior normal.         Thought Content: Thought content normal.         Judgment: Judgment normal.         Lab Results:      Lab Results   Component Value Date    WBC 7.65 02/14/2024    HGB 14.8 02/14/2024    HCT 43.1 02/14/2024     02/14/2024    TRIG 152 (H) 02/14/2024    HDL 46 02/14/2024    LDLDIRECT 252 (H) 02/14/2024    ALT 26 02/14/2024    AST 38 02/14/2024    K 3.8 02/14/2024     02/14/2024    CREATININE 0.97 02/14/2024    BUN 12 02/14/2024    CO2 26 02/14/2024    PSA 0.51 02/14/2024    INR 1.06 11/28/2023    GLUF 61 (L) 02/14/2024    HGBA1C 5.0 11/12/2019     No results found for: \"URICACID\"  Invalid input(s): \"BASENAME\" Vitamin D    XR chest 1 view portable    Result Date: 2/11/2024  Narrative: XR CHEST PORTABLE INDICATION: SOB. COMPARISON: CXR and abdomen CT 2/5/2024, chest CT 12/12/2023. FINDINGS: Clear lungs. No pneumothorax or pleural effusion. Normal cardiomediastinal silhouette. Bones are unremarkable for age. Normal upper abdomen.     Impression: No acute cardiopulmonary disease. Workstation performed: [id number]     XR chest 1 view portable    Result Date: 2/5/2024  Narrative: XR CHEST PORTABLE INDICATION: dyspnea. COMPARISON: Radiograph from 12/12/2023 FINDINGS: Clear lungs. No pneumothorax or pleural effusion. Normal cardiomediastinal silhouette. Bones are unremarkable for age. Normal upper abdomen.     Impression: No acute cardiopulmonary disease. Workstation performed: [id number]     CT abdomen pelvis with contrast    Result Date: 2/5/2024  Narrative: CT ABDOMEN AND PELVIS WITH IV CONTRAST " INDICATION: Abdominal pain, acute, nonlocalized abdominal distention and pain. COMPARISON: CT chest, abdomen and pelvis 12/12/2023 TECHNIQUE: CT examination of the abdomen and pelvis was performed. Multiplanar 2D reformatted images were created from the source data. This examination, like all CT scans performed in the [facility], was performed utilizing techniques to minimize radiation dose exposure, including the use of iterative reconstruction and automated exposure control. Radiation dose length product (DLP) for this visit: 1402 mGy-cm IV Contrast: 100 mL of iohexol (OMNIPAQUE) Enteric Contrast: Not administered. FINDINGS: ABDOMEN LOWER CHEST: No clinically significant abnormality in the visualized lower chest. LIVER/BILIARY TREE: Enlarged with diffuse hepatic steatosis. No suspicious mass. Normal hepatic contours. No biliary dilation. GALLBLADDER: No calcified gallstones. No pericholecystic inflammatory change. SPLEEN: Unremarkable. PANCREAS: Unremarkable. ADRENAL GLANDS: Unremarkable. KIDNEYS/URETERS: Unremarkable. No hydronephrosis. Mild nonspecific bilateral perinephric edema. STOMACH AND BOWEL: Evaluation of the stomach is limited by underdistention.  No focal pathology seen within limitations. Small bowel is normal caliber. No focal inflammatory changes or fluid collections are identified. APPENDIX: No findings to suggest appendicitis. ABDOMINOPELVIC CAVITY: No ascites. No pneumoperitoneum. No enlarged lymphadenopathy. VESSELS: Unremarkable for patient's age. PELVIS REPRODUCTIVE ORGANS: Unremarkable for patient's age. URINARY BLADDER: Unremarkable. ABDOMINAL WALL/INGUINAL REGIONS: Small fat-containing umbilical and bilateral inguinal hernias. BONES: No acute fracture or suspicious osseous lesion. Mild degenerative changes of the spine.     Impression: 1. Stomach is largely collapsed, assessment limited. Otherwise, no acute intra-abdominal abnormality. 2. Enlarged fatty liver. Additional  incidental findings as above. Workstation performed: [id number]        POCT Labs      BMI Counseling: Body mass index is 34.61 kg/m². The BMI is above normal. Exercise recommendations include exercising 3-5 times per week.     Depression Screening and Follow-up Plan: Patient's depression screening was positive with a PHQ-9 score of 13. Patient advised to follow-up with PCP for further management.     Tobacco Cessation Counseling: Tobacco cessation counseling was provided. The patient is sincerely urged to quit consumption of tobacco. He is not ready to quit tobacco. Patient agreed to medication.                   BMI Counseling: Body mass index is 34.61 kg/m². The BMI is above normal. Nutrition recommendations include 3-5 servings of fruits/vegetables daily. Exercise recommendations include exercising 3-5 times per week.

## 2024-02-14 ENCOUNTER — OFFICE VISIT (OUTPATIENT)
Dept: FAMILY MEDICINE CLINIC | Facility: CLINIC | Age: 59
End: 2024-02-14
Payer: COMMERCIAL

## 2024-02-14 ENCOUNTER — LAB (OUTPATIENT)
Dept: LAB | Facility: CLINIC | Age: 59
End: 2024-02-14
Payer: COMMERCIAL

## 2024-02-14 VITALS
SYSTOLIC BLOOD PRESSURE: 134 MMHG | OXYGEN SATURATION: 96 % | BODY MASS INDEX: 34.49 KG/M2 | HEART RATE: 80 BPM | HEIGHT: 68 IN | RESPIRATION RATE: 16 BRPM | DIASTOLIC BLOOD PRESSURE: 86 MMHG | TEMPERATURE: 98.8 F | WEIGHT: 227.6 LBS

## 2024-02-14 DIAGNOSIS — E78.5 HYPERLIPIDEMIA, UNSPECIFIED HYPERLIPIDEMIA TYPE: ICD-10-CM

## 2024-02-14 DIAGNOSIS — F41.9 ANXIETY: ICD-10-CM

## 2024-02-14 DIAGNOSIS — R31.29 MICROSCOPIC HEMATURIA: ICD-10-CM

## 2024-02-14 DIAGNOSIS — R06.02 SOB (SHORTNESS OF BREATH): ICD-10-CM

## 2024-02-14 DIAGNOSIS — R20.2 PARESTHESIAS: ICD-10-CM

## 2024-02-14 DIAGNOSIS — R21 RASH AND NONSPECIFIC SKIN ERUPTION: ICD-10-CM

## 2024-02-14 DIAGNOSIS — Z12.11 SCREENING FOR COLORECTAL CANCER: ICD-10-CM

## 2024-02-14 DIAGNOSIS — E83.42 HYPOMAGNESEMIA: ICD-10-CM

## 2024-02-14 DIAGNOSIS — Z12.5 SCREENING FOR PROSTATE CANCER: ICD-10-CM

## 2024-02-14 DIAGNOSIS — E66.9 OBESITY (BMI 30.0-34.9): ICD-10-CM

## 2024-02-14 DIAGNOSIS — J30.9 ALLERGIC RHINITIS, UNSPECIFIED SEASONALITY, UNSPECIFIED TRIGGER: ICD-10-CM

## 2024-02-14 DIAGNOSIS — Z12.12 SCREENING FOR COLORECTAL CANCER: ICD-10-CM

## 2024-02-14 DIAGNOSIS — F10.20 ALCOHOL USE DISORDER, SEVERE, DEPENDENCE (HCC): ICD-10-CM

## 2024-02-14 DIAGNOSIS — Z13.1 SCREENING FOR DIABETES MELLITUS: ICD-10-CM

## 2024-02-14 DIAGNOSIS — Z13.9 ENCOUNTER FOR SCREENING INVOLVING SOCIAL DETERMINANTS OF HEALTH (SDOH): ICD-10-CM

## 2024-02-14 DIAGNOSIS — F32.A ANXIETY AND DEPRESSION: ICD-10-CM

## 2024-02-14 DIAGNOSIS — I10 PRIMARY HYPERTENSION: ICD-10-CM

## 2024-02-14 DIAGNOSIS — K58.0 IRRITABLE BOWEL SYNDROME WITH DIARRHEA: ICD-10-CM

## 2024-02-14 DIAGNOSIS — E66.9 CLASS 1 OBESITY WITH SERIOUS COMORBIDITY AND BODY MASS INDEX (BMI) OF 34.0 TO 34.9 IN ADULT, UNSPECIFIED OBESITY TYPE: ICD-10-CM

## 2024-02-14 DIAGNOSIS — F32.1 CURRENT MODERATE EPISODE OF MAJOR DEPRESSIVE DISORDER, UNSPECIFIED WHETHER RECURRENT (HCC): ICD-10-CM

## 2024-02-14 DIAGNOSIS — Z11.4 ENCOUNTER FOR SCREENING FOR HIV: ICD-10-CM

## 2024-02-14 DIAGNOSIS — F41.9 ANXIETY AND DEPRESSION: ICD-10-CM

## 2024-02-14 DIAGNOSIS — D48.5 NEOPLASM OF UNCERTAIN BEHAVIOR OF SKIN: ICD-10-CM

## 2024-02-14 DIAGNOSIS — L71.9 ROSACEA: ICD-10-CM

## 2024-02-14 DIAGNOSIS — Z13.6 SCREENING FOR CARDIOVASCULAR CONDITION: ICD-10-CM

## 2024-02-14 DIAGNOSIS — K21.9 GASTROESOPHAGEAL REFLUX DISEASE, UNSPECIFIED WHETHER ESOPHAGITIS PRESENT: ICD-10-CM

## 2024-02-14 DIAGNOSIS — Z11.59 NEED FOR HEPATITIS C SCREENING TEST: ICD-10-CM

## 2024-02-14 DIAGNOSIS — K29.20 CHRONIC ALCOHOLIC GASTRITIS WITHOUT HEMORRHAGE: ICD-10-CM

## 2024-02-14 DIAGNOSIS — M79.10 MYALGIA: ICD-10-CM

## 2024-02-14 DIAGNOSIS — K76.0 HEPATIC STEATOSIS: ICD-10-CM

## 2024-02-14 DIAGNOSIS — Z00.00 ANNUAL PHYSICAL EXAM: Primary | ICD-10-CM

## 2024-02-14 PROBLEM — E55.9 VITAMIN D DEFICIENCY: Status: ACTIVE | Noted: 2024-02-14

## 2024-02-14 PROBLEM — F10.10 ALCOHOL ABUSE: Status: RESOLVED | Noted: 2024-02-14 | Resolved: 2024-02-14

## 2024-02-14 PROBLEM — F32.9 MAJOR DEPRESSIVE DISORDER: Status: RESOLVED | Noted: 2023-04-02 | Resolved: 2024-02-14

## 2024-02-14 PROBLEM — F10.10 ALCOHOL ABUSE: Status: ACTIVE | Noted: 2024-02-14

## 2024-02-14 PROBLEM — E53.8 VITAMIN B12 DEFICIENCY: Status: ACTIVE | Noted: 2024-02-14

## 2024-02-14 PROBLEM — K58.9 IBS (IRRITABLE BOWEL SYNDROME): Status: ACTIVE | Noted: 2024-02-14

## 2024-02-14 PROBLEM — K59.00 CONSTIPATION: Status: RESOLVED | Noted: 2023-12-18 | Resolved: 2024-02-14

## 2024-02-14 LAB
25(OH)D3 SERPL-MCNC: 11.3 NG/ML (ref 30–100)
ALBUMIN SERPL BCP-MCNC: 4.8 G/DL (ref 3.5–5)
ALP SERPL-CCNC: 70 U/L (ref 34–104)
ALT SERPL W P-5'-P-CCNC: 26 U/L (ref 7–52)
ANION GAP SERPL CALCULATED.3IONS-SCNC: 10 MMOL/L
AST SERPL W P-5'-P-CCNC: 38 U/L (ref 13–39)
BACTERIA UR QL AUTO: ABNORMAL /HPF
BASOPHILS # BLD AUTO: 0.06 THOUSANDS/ÂΜL (ref 0–0.1)
BASOPHILS NFR BLD AUTO: 1 % (ref 0–1)
BILIRUB SERPL-MCNC: 0.62 MG/DL (ref 0.2–1)
BILIRUB UR QL STRIP: NEGATIVE
BUN SERPL-MCNC: 12 MG/DL (ref 5–25)
CALCIUM SERPL-MCNC: 9.8 MG/DL (ref 8.4–10.2)
CHLORIDE SERPL-SCNC: 102 MMOL/L (ref 96–108)
CHOLEST SERPL-MCNC: 303 MG/DL
CLARITY UR: CLEAR
CO2 SERPL-SCNC: 26 MMOL/L (ref 21–32)
COLOR UR: YELLOW
CREAT SERPL-MCNC: 0.97 MG/DL (ref 0.6–1.3)
D DIMER PPP FEU-MCNC: 0.4 UG/ML FEU
EOSINOPHIL # BLD AUTO: 0.09 THOUSAND/ÂΜL (ref 0–0.61)
EOSINOPHIL NFR BLD AUTO: 1 % (ref 0–6)
ERYTHROCYTE [DISTWIDTH] IN BLOOD BY AUTOMATED COUNT: 12.8 % (ref 11.6–15.1)
FOLATE SERPL-MCNC: 8.4 NG/ML
GFR SERPL CREATININE-BSD FRML MDRD: 86 ML/MIN/1.73SQ M
GLUCOSE P FAST SERPL-MCNC: 61 MG/DL (ref 65–99)
GLUCOSE UR STRIP-MCNC: NEGATIVE MG/DL
HCT VFR BLD AUTO: 43.1 % (ref 36.5–49.3)
HDLC SERPL-MCNC: 46 MG/DL
HGB BLD-MCNC: 14.8 G/DL (ref 12–17)
HGB UR QL STRIP.AUTO: NEGATIVE
HYALINE CASTS #/AREA URNS LPF: ABNORMAL /LPF
IMM GRANULOCYTES # BLD AUTO: 0.03 THOUSAND/UL (ref 0–0.2)
IMM GRANULOCYTES NFR BLD AUTO: 0 % (ref 0–2)
KETONES UR STRIP-MCNC: ABNORMAL MG/DL
LDLC SERPL CALC-MCNC: 227 MG/DL (ref 0–100)
LDLC SERPL DIRECT ASSAY-MCNC: 252 MG/DL (ref 0–100)
LEUKOCYTE ESTERASE UR QL STRIP: ABNORMAL
LYMPHOCYTES # BLD AUTO: 1.62 THOUSANDS/ÂΜL (ref 0.6–4.47)
LYMPHOCYTES NFR BLD AUTO: 21 % (ref 14–44)
MAGNESIUM SERPL-MCNC: 2 MG/DL (ref 1.9–2.7)
MCH RBC QN AUTO: 33.7 PG (ref 26.8–34.3)
MCHC RBC AUTO-ENTMCNC: 34.3 G/DL (ref 31.4–37.4)
MCV RBC AUTO: 98 FL (ref 82–98)
MONOCYTES # BLD AUTO: 0.68 THOUSAND/ÂΜL (ref 0.17–1.22)
MONOCYTES NFR BLD AUTO: 9 % (ref 4–12)
MUCOUS THREADS UR QL AUTO: ABNORMAL
NEUTROPHILS # BLD AUTO: 5.17 THOUSANDS/ÂΜL (ref 1.85–7.62)
NEUTS SEG NFR BLD AUTO: 68 % (ref 43–75)
NITRITE UR QL STRIP: NEGATIVE
NON-SQ EPI CELLS URNS QL MICRO: ABNORMAL /HPF
NONHDLC SERPL-MCNC: 257 MG/DL
NRBC BLD AUTO-RTO: 0 /100 WBCS
PH UR STRIP.AUTO: 5.5 [PH]
PLATELET # BLD AUTO: 309 THOUSANDS/UL (ref 149–390)
PMV BLD AUTO: 9 FL (ref 8.9–12.7)
POTASSIUM SERPL-SCNC: 3.8 MMOL/L (ref 3.5–5.3)
PROT SERPL-MCNC: 8 G/DL (ref 6.4–8.4)
PROT UR STRIP-MCNC: ABNORMAL MG/DL
PSA SERPL-MCNC: 0.51 NG/ML (ref 0–4)
RBC # BLD AUTO: 4.39 MILLION/UL (ref 3.88–5.62)
RBC #/AREA URNS AUTO: ABNORMAL /HPF
SODIUM SERPL-SCNC: 138 MMOL/L (ref 135–147)
SP GR UR STRIP.AUTO: 1.03 (ref 1–1.03)
TRIGL SERPL-MCNC: 152 MG/DL
TSH SERPL DL<=0.05 MIU/L-ACNC: 1.35 UIU/ML (ref 0.45–4.5)
UROBILINOGEN UR STRIP-ACNC: 2 MG/DL
VIT B12 SERPL-MCNC: 182 PG/ML (ref 180–914)
WBC # BLD AUTO: 7.65 THOUSAND/UL (ref 4.31–10.16)
WBC #/AREA URNS AUTO: ABNORMAL /HPF

## 2024-02-14 PROCEDURE — 85025 COMPLETE CBC W/AUTO DIFF WBC: CPT

## 2024-02-14 PROCEDURE — 83735 ASSAY OF MAGNESIUM: CPT

## 2024-02-14 PROCEDURE — 82607 VITAMIN B-12: CPT

## 2024-02-14 PROCEDURE — 82746 ASSAY OF FOLIC ACID SERUM: CPT

## 2024-02-14 PROCEDURE — 85379 FIBRIN DEGRADATION QUANT: CPT

## 2024-02-14 PROCEDURE — 80061 LIPID PANEL: CPT

## 2024-02-14 PROCEDURE — 83721 ASSAY OF BLOOD LIPOPROTEIN: CPT

## 2024-02-14 PROCEDURE — 81001 URINALYSIS AUTO W/SCOPE: CPT | Performed by: FAMILY MEDICINE

## 2024-02-14 PROCEDURE — 80053 COMPREHEN METABOLIC PANEL: CPT

## 2024-02-14 PROCEDURE — 87086 URINE CULTURE/COLONY COUNT: CPT | Performed by: FAMILY MEDICINE

## 2024-02-14 PROCEDURE — 99204 OFFICE O/P NEW MOD 45 MIN: CPT | Performed by: FAMILY MEDICINE

## 2024-02-14 PROCEDURE — 84443 ASSAY THYROID STIM HORMONE: CPT

## 2024-02-14 PROCEDURE — G0103 PSA SCREENING: HCPCS

## 2024-02-14 PROCEDURE — 83036 HEMOGLOBIN GLYCOSYLATED A1C: CPT

## 2024-02-14 PROCEDURE — 82306 VITAMIN D 25 HYDROXY: CPT

## 2024-02-14 PROCEDURE — 84425 ASSAY OF VITAMIN B-1: CPT

## 2024-02-14 PROCEDURE — 99386 PREV VISIT NEW AGE 40-64: CPT | Performed by: FAMILY MEDICINE

## 2024-02-14 PROCEDURE — 36415 COLL VENOUS BLD VENIPUNCTURE: CPT

## 2024-02-14 PROCEDURE — 87389 HIV-1 AG W/HIV-1&-2 AB AG IA: CPT

## 2024-02-14 RX ORDER — ESCITALOPRAM OXALATE 20 MG/1
20 TABLET ORAL DAILY
Qty: 90 TABLET | Refills: 0 | Status: SHIPPED | OUTPATIENT
Start: 2024-02-14

## 2024-02-14 RX ORDER — PANTOPRAZOLE SODIUM 40 MG/1
40 TABLET, DELAYED RELEASE ORAL
Qty: 90 TABLET | Refills: 0 | Status: SHIPPED | OUTPATIENT
Start: 2024-02-14 | End: 2024-03-15

## 2024-02-14 RX ORDER — CHLORAL HYDRATE 500 MG
2000 CAPSULE ORAL DAILY
COMMUNITY

## 2024-02-14 NOTE — RESULT ENCOUNTER NOTE
Urinalysis shows signs of dehydration, but no abnormal blood in the urine seen.  Urine culture is pending.      Message sent to patient via [facility] patient portal.

## 2024-02-14 NOTE — RESULT ENCOUNTER NOTE
Unstable labs - will review with patient at upcoming appointment.    Vitamin D Deficiency - Recommend start multivitamin and prescription vitamin D (Drisdol).  When 12 weeks of Drisdol completed, continue multivitamin and start over-the-counter Vitamin D3 1,000-3,000 International Units daily.  Check vitamin D level 1 week after completing Drisdol.

## 2024-02-14 NOTE — RESULT ENCOUNTER NOTE
D- dimer is negative.  No concern for pulmonary embolism or clot in the lung.  Continue plan of care.      Repeat Magnesium is normal.      Message sent to patient via [facility] patient portal.    ^^^^    Unstable labs - will review with patient at upcoming appointment.    Hyperlipidemia - Needs statin.  Recommend lifestyle modifications.    The 10-year ASCVD risk score (Rex SALTER, et al., 2019) is: 23.3%    Values used to calculate the score:      Age: 58 years      Sex: Male      Is Non- : No      Diabetic: No      Tobacco smoker: Yes      Systolic Blood Pressure: 134 mmHg      Is BP treated: Yes      HDL Cholesterol: 46 mg/dL      Total Cholesterol: 303 mg/dL

## 2024-02-14 NOTE — PATIENT INSTRUCTIONS
"Please contact your insurance if you are uncertain of coverage for plan of care items.    Your insurance may not cover the cost of your Vitamin D blood test, which is approximately $65-70.  Please notify the lab prior to blood draw if you would like to decline this test.        Refer to the back of insurance card for counseling options.    Apps and Websites for Counseling, Anxiety/Depression, Chronic Pain, Lifestyle Change, Problem-solving, Self-Esteem, Anger Management, Coping with Uncertainty    (Prices current as of 9/5/19)    Mood Kit - Available in Apple Raul Store for $4.99.  Supports a person's success in specific situations, includes thought , mood tracker, and journal.  Can be accessed in an unstructured way and used as an unguided self-help raul.      2.  Moodnotes - Available in Apple Raul Store for $4.99.  Focuses on healthier thinking habits and identifying \"traps\" in thinking.  Tracks mood over a period of time and identifies factors that influence mood.      3.  MoodMission - Available in Apple Raul Store and Google Play for free.  Includes five \"missions\" to promote confidence in handling stressors and coping in specific situations.  The raul personalizes its style and techniques based on when the user engages it most frequently.  In-raul rewards are used to motivate, increase fun and self-confidence.  Helpful for patients who need improvement in mood or a decrease in anxiety and depression symptoms.      4.   What's Up - Available in Apple Raul Store and Google Play for free.  Recognizes negative thinking patterns and methods to overcome them.  Uses helpful metaphors, a catastrophe scale, grounding techniques, and breathing exercises.  Has the ability to sync data across multiple devices and protect this information with a unique pass code.  Has the capability to be active in forums where people can discuss similar feelings and strategies that have been helpful.      5.  Moodpath - Available in " Apple Raul Store and Google Play for free.  Uses daily screenings to create a better understanding of thoughts, feelings, and emotions.  When needed, it provides a discussion guide to talking with a medical professional based on the responses to daily screenings.  Includes over 150 psychological exercises and videos to promote and strengthen overall mental health.    6.  MindShift - Available in Apple Raul Store and Google Play for free.  Helpful for youth and young adults in coping with anxiety.  Creates an individualized toolbox to help users deal with test anxiety, perfectionism, social anxiety, worry, panic, and conflict.  Includes directions on how to construct “belief experiments” to trial common beliefs that feed anxiety, guided relaxation, as well as tools and tips to help establish and accomplish goals.  Differentiates between helpful and unhelpful anxiety, and explains how to overcome fears by gradually facing them in manageable steps.    7.  CBT-I  - Available in Apple Raul Store and Google Play for free.  For insomnia.  Educates about sleep, developing positive sleep routines, and improved sleep environment.  Encourages user to change behaviors which will provide confidence for better sleep on a regular basis.    8.  Inspire Energy[employer].uk - Free website.  Provides self-help and therapy resources that encourages change to combat negative and destructive thought patterns.  Includes access to numerous handouts on a variety of symptoms related to anxiety, depression, low self-esteem, panic attacks, social disorders, and more.  The solution section has interventions that can be printed and saved for future use.    9.  Woebot - Available in Apple Raul store and Google Play for free.  Recommended for age 17+.  Friendly self-care  that helps you think through situations with step-by-step guidance using counseling tools, learn about yourself with intelligent mood tracking, and master skills to reduce stress and  "live happier through 100+ evidence-based stories from clinicians.  Chat as often or as little as you'd like, whenever you'd like to.      10.  Jonah:  AI  CBT DBT Chatbot - Available in Apple appsstore and Google Play for free, has in-raul purchases.  Recommended for 12+.  Psychologist support at $30/month, 50% off to start.  AI  / chatbot is free.  Uses techniques of CBT, DBT, yoga, and meditation to support your needs regarding stress, anxiety, sleep, loss, and a full range of other mental health and wellness issues.      11.  Curable Pain Relief - Available in Apple Raul store and Google Play for free, has in-raul purchases.   Teaches about the chronic pain cycle and how to reverse it, while retraining your brain and nervous system for long-term results.  Smart  Lolis guides user through updates in pain science to identify, target, eliminate the factors that are keeping user \"stuck\" in the pain cycle.      12.  [facility]ks[facility] Therapy - Available in Apple Raul store and Google Play for a fee.  Subscription service, starting at $59/week (billed monthly).  All plans include unlimited messaging, and add live video sessions if desired.  Unlimited messaging therapy for teens ages 13-17 and special services for couples therapy.  You can change therapists or stop subscription renewal at any time.  Recommended for 13+.  User is matched with a licensed therapist in your state and communicate on your device by text, audio, and video from anywhere, at any time.  User will hear back at least once a day, 5 days per week.      Counseling services:    Go to [place] to find a therapist near you. You can sort by gender, insurance, issues, etc.     [facility]   [address], [place], [place] [id number] [phone]    [facility] (they have equine therapy too)  [address],  [place] [address] [address],  " [place] [address]  [phone]     [facility]   [address]; [place], [place] [address]   [phone]     [facility]  [address]  [place], [place] [address]    [phone]     [facility]   [address] [place] [address]     [phone]     [facility]   [address] [place], [place]   [phone]    [facility]  [address] [place] [address]  [phone]    [facility]   [address]   [place] [address]  [email or web address]    EMILY Herndon, LSW  (patients age 11-adult)   [address], [place], [place] [address]   [phone]     [facility]  [address]   [place] [address]  [phone]     Caitlyn Hubbard [facility]  [address], [place] [address]  [phone]     [facility] (specializing in addiction)  [address] [place] [address]  [phone]    Raquel Antonio  [address] [place], [place] [address]   [phone]      Orlando Bates  [address], [place], [place] [address]   [phone]      Lizet Goodman, MS, [facility]  [address], [place] [address]  [phone]     Sarita Osborn PsyD  [address] [place] [id number] [phone]    Viji Helms LPC   [address], [place] [address] [phone]      Hotlines:   Please program these numbers into your phone in case you or someone you know needs them. All services are free.     988  People who call, text, or chat with 988 will be directly connected to the National Suicide Prevention Lifeline. The existing Lifeline phone number (1-880.157.2038) will remain available. Callers can also connect with the Veterans Crisis Line or assistance in [place]. 988 can be used by  anyone, any time, at no cost. Trained crisis response professionals can support individuals considering suicide, self-harm, or any behavioral, substance abuse or misuse or mental health need for themselves or people looking for help for a loved one experiencing a mental health crisis. Lifeline services are available 24 hours a day, seven days a week at no cost to the caller.  Crisis Text Line: text 545577     You are connected to a Crisis Counselor to bring a hot moment to a cool calm through active listening and collaborative problem solving.   WarmLine:  397.706.9494 or 605-371-4779   They provide a supportive listening ear if you need it. They also can also provide information about mental health concerns and services.   Crisis Line:  [facility] [phone],  [facility] [phone], [facility]: [phone]   24/7 crisis counseling, on-site counseling and walk-in counseling services available.   National Suicide Prevention Lifeline:  1-992.291.3155.  En Espanol Nacional de Prevencion del Suicidio 4-911-104-6391   This is free, confidential, and available 24/7.   [place]: [phone]   For those facing or having survived abuse 24 hour confidential help line, emergency safe house, counseling, advocacy and education.    If you or someone your know are feeling as though you are going to hurt yourself, do not wait - GET HELP RIGHT AWAY.  Go to the closest Emergency Room, call 911, or call someone you trust, family member or friend to be with you until you can get some help.      Drug & Alcohol Outpatient Providers  DRUG AND ALCOHOL:  OUTPATIENT PROVIDERS     [facility]                                                         Ph: [phone]  [address] [address]     [place] [facility])                            Ph: [phone]  [address] [address]     [place] [facility]                                           Ph: [phone]  [address]  [place] [address]     [facility]                                                                                 Ph: [phone]  [address]   [place] [address]     [facility]                                                                [phone]  [address]  [place] [address]  -sees pediatric patients also  -offers medication management if needed     [facility]                                       Ph: [phone]  [address]  [place] [address]     [facility]                                                                                      Ph: [phone]  [address] [place] [address]   [place] [address]   **Adults, Adolescents, Families**     [facility]                                                            Ph: [phone]  [address] [facility] [place] [address]  **Accepts adolescents**     [facility]                                                                                    Ph: [phone]  [address]  [place] [address]     [facility]                                                                                    Ph: [phone]  [address] [place] [address]     [facility]                                                                           Ph: [phone]  [address]   [place] [address]     [facility]                                                        Ph: [phone]  [address]   [place] [address]     [facility]                                                        Ph: [phone]  [address]  [place] [address]     [facility]                                                                                  Ph: [phone]  [address] [facility]  [place] [address]    Drug & Alcohol Partial Programs  DRUG AND ALCOHOL:  PARTIAL PROGRAMS     [place] [facility]                                          Ph: [phone]  [address] [place] [address]     [facility]                                                         Ph: [phone]  [address] [place] [address]  **Children and Adolescents**     [facility]                                                                          Ph: [phone]  [address] [place] [address]  **Dual     [facility] (walk in hours available)                                                                        Ph: [phone]  [address] [place] [address]     [facility]                                                                              Ph: [phone]  [facility]  [address]  [place] [address]     [facility]     Drug and/or Alcohol Services [phone]  Main Location: [address] [place] [address]  Outpatient Locations:  Main Location: [address] [facility] [address], [place] [address]  [address], [place] [address]    Walk in Help Mon-Thurs 9:00 am - 2:00 pm     Mental Health Outpatient [phone]    [email or web address]     Obesity   AMBULATORY CARE:   Obesity  means your body mass index (BMI) is greater than 30. Your healthcare provider will use your age, height, and weight to measure your BMI.  The risks of obesity include  many health problems, including injuries or physical disability.  Diabetes (high blood sugar level)    High blood pressure or high cholesterol    Heart disease or heart failure    Stroke    Gallbladder or liver disease    Cancer of the colon, breast, prostate, liver, or kidney    Sleep apnea    Arthritis or gout    Screening  is done to check for health conditions before you have signs or symptoms. If you are 35 to 70 years old, your blood sugar level may be checked every 3 years for signs of prediabetes or diabetes. Your healthcare provider will check your  blood pressure at each visit. High blood pressure can lead to a stroke or other problems. Your provider may check for signs of heart disease, cancer, or other health problems.  Seek care immediately if:   You have a severe headache, confusion, or difficulty speaking.    You have weakness on one side of your body.    You have chest pain, sweating, or shortness of breath.    Call your doctor if:   You have symptoms of gallbladder or liver disease, such as pain in your upper abdomen.    You have knee or hip pain and discomfort while walking.    You have symptoms of diabetes, such as intense hunger and thirst, and frequent urination.    You have symptoms of sleep apnea, such as snoring or daytime sleepiness.    You have questions or concerns about your condition or care.    Treatment for obesity  focuses on helping you lose weight to improve your health. Even a small decrease in BMI can reduce the risk for many health problems. Your healthcare provider will help you set a weight-loss goal.  Lifestyle changes  are the first step in treating obesity. These include making healthy food choices and getting regular physical activity. Your healthcare provider may suggest a weight-loss program that involves coaching, education, and therapy.    Medicine  may help you lose weight when it is used with a healthy foods and physical activity.    Surgery  can help you lose weight if you have obesity along with other health problems. Several types of weight-loss surgery are available. Ask your healthcare provider for more information.    Tips for safe weight loss:   Set small, realistic goals.  An example of a small goal is to walk for 20 minutes 5 days a week. Anther goal is to lose 5% of your body weight.    Ask for support.  Tell friends, family members, and coworkers about your goals. Ask someone to lose weight with you. You may also want to join a weight-loss support group.    Identify foods or triggers that may cause you to  overeat.  Remove tempting high-calorie foods from your home and workplace. Place a bowl of fresh fruit on your kitchen counter. If stress causes you to eat, find other ways to cope with stress. A counselor or therapist may be able to help you.    Track your daily calories and activity.  Write down what you eat and drink. Also write down how many minutes of physical activity you do each day.     Track your weekly weight.  Weigh yourself in the morning, before you eat or drink anything but after you use the bathroom. Use the same scale, in the same place, and in similar clothing each time. Only weigh yourself 1 to 2 times each week, or as directed. You may become discouraged if you weigh yourself every day.    Eating changes:  You will need to eat 500 to 1,000 fewer calories each day than you currently eat to lose 1 to 2 pounds a week. The following changes will help you cut calories:  Eat smaller portions.  Use small plates, no larger than 9 inches in diameter. Fill your plate half full of fruits and vegetables. Measure your food using measuring cups until you know what a serving size looks like.         Eat 3 meals and 1 or 2 snacks each day.  Plan your meals in advance. Cook and eat at home most of the time. Eat slowly. Do not skip meals. Skipping meals can lead to overeating later in the day. This can make it harder for you to lose weight. Talk with a dietitian to help you make a meal plan and schedule that is right for you.    Eat fruits and vegetables at every meal.  They are low in calories and high in fiber, which makes you feel full. Do not add butter, margarine, or cream sauce to vegetables. Use herbs to season steamed vegetables.    Eat less fat and fewer fried foods.  Eat more baked or grilled chicken and fish. These protein sources are lower in calories and fat than red meat. Limit fast food. Dress your salads with olive oil and vinegar instead of bottled dressing.    Limit the amount of sugar you eat.   Do not drink sugary beverages. Limit alcohol.       Activity changes:  Physical activity is good for your body in many ways. It helps you burn calories and build strong muscles. It decreases stress and depression, and improves your mood. It can also help you sleep better. Talk to your healthcare provider before you begin an exercise program.  Exercise for at least 30 minutes 5 days a week.  Start slowly. Set aside time each day for physical activity that you enjoy and that is convenient for you. It is best to do both weight training and an activity that increases your heart rate, such as walking, bicycling, or swimming.            Find ways to be more active.  Do yard work and housecleaning. Walk up the stairs instead of using elevators. Spend your leisure time going to events that require walking, such as outdoor festivals or fairs. This extra physical activity can help you lose weight and keep it off.       Follow up with your doctor as directed:  You may need to meet with a dietitian. Write down your questions so you remember to ask them during your visits.  © Copyright Merative 2023 Information is for End User's use only and may not be sold, redistributed or otherwise used for commercial purposes.  The above information is an  only. It is not intended as medical advice for individual conditions or treatments. Talk to your doctor, nurse or pharmacist before following any medical regimen to see if it is safe and effective for you.    Wellness Visit for Adults   AMBULATORY CARE:   A wellness visit  is when you see your healthcare provider to get screened for health problems. Your healthcare provider will also give you advice on how to stay healthy. Write down your questions so you remember to ask them. Ask your healthcare provider how often you should have a wellness visit.  What happens at a wellness visit:  Your healthcare provider will ask about your health, and your family history of health  problems. This includes high blood pressure, heart disease, and cancer. He or she will ask if you have symptoms that concern you, if you smoke, and about your mood. You may also be asked about your intake of medicines, supplements, food, and alcohol. Any of the following may be done:  Your weight  will be checked. Your height may also be checked so your body mass index (BMI) can be calculated. Your BMI shows if you are at a healthy weight.    Your blood pressure  and heart rate will be checked. Your temperature may also be checked.    Blood and urine tests  may be done. Blood tests may be done to check your cholesterol levels. Abnormal cholesterol levels increase your risk for heart disease and stroke. You may also need a blood or urine test to check for diabetes if you are at increased risk. Urine tests may be done to look for signs of an infection or kidney disease.    A physical exam  includes checking your heartbeat and lungs with a stethoscope. Your healthcare provider may also check your skin to look for sun damage.    Screening tests  may be recommended. A screening test is done to check for diseases that may not cause symptoms. The screening tests you may need depend on your age, gender, family history, and lifestyle habits. For example, colorectal screening may be recommended if you are 50 years old or older.    Screening tests you need if you are a woman:   A Pap smear  is used to screen for cervical cancer. Pap smears are usually done every 3 to 5 years depending on your age. You may need them more often if you have had abnormal Pap smear test results in the past. Ask your healthcare provider how often you should have a Pap smear.    A mammogram  is an x-ray of your breasts to screen for breast cancer. Experts recommend mammograms every 2 years starting at age 50 years. You may need a mammogram at age 49 years or younger if you have an increased risk for breast cancer. Talk to your healthcare provider  about when you should start having mammograms and how often you need them.    Vaccines you may need:   Get an influenza vaccine  every year. The influenza vaccine protects you from the flu. Several types of viruses cause the flu. The viruses change over time, so new vaccines are made each year.    Get a tetanus-diphtheria (Td) booster vaccine  every 10 years. This vaccine protects you against tetanus and diphtheria. Tetanus is a severe infection that may cause painful muscle spasms and lockjaw. Diphtheria is a severe bacterial infection that causes a thick covering in the back of your mouth and throat.    Get a human papillomavirus (HPV) vaccine  if you are female and aged 19 to 26 or male 19 to 21 and never received it. This vaccine protects you from HPV infection. HPV is the most common infection spread by sexual contact. HPV may also cause vaginal, penile, and anal cancers.    Get a pneumococcal vaccine  if you are aged 65 years or older. The pneumococcal vaccine is an injection given to protect you from pneumococcal disease. Pneumococcal disease is an infection caused by pneumococcal bacteria. The infection may cause pneumonia, meningitis, or an ear infection.    Get a shingles vaccine  if you are 60 or older, even if you have had shingles before. The shingles vaccine is an injection to protect you from the varicella-zoster virus. This is the same virus that causes chickenpox. Shingles is a painful rash that develops in people who had chickenpox or have been exposed to the virus.    How to eat healthy:  My Plate is a model for planning healthy meals. It shows the types and amounts of foods that should go on your plate. Fruits and vegetables make up about half of your plate, and grains and protein make up the other half. A serving of dairy is included on the side of your plate. The amount of calories and serving sizes you need depends on your age, gender, weight, and height. Examples of healthy foods are listed  below:  Eat a variety of vegetables  such as dark green, red, and orange vegetables. You can also include canned vegetables low in sodium (salt) and frozen vegetables without added butter or sauces.    Eat a variety of fresh fruits , canned fruit in 100% juice, frozen fruit, and dried fruit.    Include whole grains.  At least half of the grains you eat should be whole grains. Examples include whole-wheat bread, wheat pasta, brown rice, and whole-grain cereals such as oatmeal.    Eat a variety of protein foods such as seafood (fish and shellfish), lean meat, and poultry without skin (turkey and chicken). Examples of lean meats include pork leg, shoulder, or tenderloin, and beef round, sirloin, tenderloin, and extra lean ground beef. Other protein foods include eggs and egg substitutes, beans, peas, soy products, nuts, and seeds.    Choose low-fat dairy products such as skim or 1% milk or low-fat yogurt, cheese, and cottage cheese.    Limit unhealthy fats  such as butter, hard margarine, and shortening.       Exercise:  Exercise at least 30 minutes per day on most days of the week. Some examples of exercise include walking, biking, dancing, and swimming. You can also fit in more physical activity by taking the stairs instead of the elevator or parking farther away from stores. Include muscle strengthening activities 2 days each week. Regular exercise provides many health benefits. It helps you manage your weight, and decreases your risk for type 2 diabetes, heart disease, stroke, and high blood pressure. Exercise can also help improve your mood. Ask your healthcare provider about the best exercise plan for you.       General health and safety guidelines:   Do not smoke.  Nicotine and other chemicals in cigarettes and cigars can cause lung damage. Ask your healthcare provider for information if you currently smoke and need help to quit. E-cigarettes or smokeless tobacco still contain nicotine. Talk to your healthcare  provider before you use these products.    Limit alcohol.  A drink of alcohol is 12 ounces of beer, 5 ounces of wine, or 1½ ounces of liquor.    Lose weight, if needed.  Being overweight increases your risk of certain health conditions. These include heart disease, high blood pressure, type 2 diabetes, and certain types of cancer.    Protect your skin.  Do not sunbathe or use tanning beds. Use sunscreen with a SPF 15 or higher. Apply sunscreen at least 15 minutes before you go outside. Reapply sunscreen every 2 hours. Wear protective clothing, hats, and sunglasses when you are outside.    Drive safely.  Always wear your seatbelt. Make sure everyone in your car wears a seatbelt. A seatbelt can save your life if you are in an accident. Do not use your cell phone when you are driving. This could distract you and cause an accident. Pull over if you need to make a call or send a text message.    Practice safe sex.  Use latex condoms if are sexually active and have more than one partner. Your healthcare provider may recommend screening tests for sexually transmitted infections (STIs).    Wear helmets, lifejackets, and protective gear.  Always wear a helmet when you ride a bike or motorcycle, go skiing, or play sports that could cause a head injury. Wear protective equipment when you play sports. Wear a lifejacket when you are on a boat or doing water sports.    © Copyright [employer] 2023 Information is for End User's use only and may not be sold, redistributed or otherwise used for commercial purposes.  The above information is an  only. It is not intended as medical advice for individual conditions or treatments. Talk to your doctor, nurse or pharmacist before following any medical regimen to see if it is safe and effective for you.    Cigarette Smoking and Your Health   AMBULATORY CARE:   Risks to your health if you smoke:  Nicotine and other chemicals found in tobacco and e-cigarettes can damage every  cell in your body. Even if you are a light smoker, you have an increased risk for cancer, heart disease, and lung disease. If you are pregnant or have diabetes, smoking increases your risk for complications. Nicotine can affect an adolescent's developing brain. This can lead to trouble thinking, learning, or paying attention.  Benefits to your health if you stop smoking:   You decrease respiratory symptoms such as coughing, wheezing, and shortness of breath.    You reduce your risk for cancers of the lung, mouth, throat, kidney, bladder, pancreas, stomach, and cervix. If you already have cancer, you increase the benefits of chemotherapy. You also reduce your risk for cancer returning or a second cancer from developing.    You reduce your risk for heart disease, blood clots, heart attack, and stroke.    You reduce your risk for lung infections, and diseases such as pneumonia, asthma, chronic bronchitis, and emphysema.    Your circulation improves. More oxygen can be delivered to your body. If you have diabetes, you lower your risk for complications, such as kidney, artery, and eye diseases. You also lower your risk for nerve damage. Nerve damage can lead to amputations, poor vision, and blindness.    You improve your body's ability to heal and to fight infections.    An adolescent can help his or her brain and body develop in a healthy way. Talk to your adolescent about all the health risks of nicotine. If you can, start talking about nicotine when your child is younger than 12 years. This may make it easier for him or her not to start using nicotine as a teenager or adult. Explain to him or her that it is best never to start. It can be hard to try to quit later.    Benefits to the health of others if you stop smoking:  Tobacco is harmful to nonsmokers who breathe in your secondhand smoke. The following are ways the health of others around you may improve when you stop smoking:  You lower the risks for lung cancer,  heart disease, and stroke in nonsmoking adults.    If you are pregnant, you lower the risk for miscarriage, early delivery, low birth weight, and stillbirth. You also lower your baby's risk for SIDS, obesity, developmental delay, and neurobehavioral problems, such as ADHD.    If you have children, you lower their risk for ear infections, colds, pneumonia, bronchitis, and asthma.    Follow up with your doctor as directed:  Write down your questions so you remember to ask them during your visits.  For support and more information:   [employer]  [address] [place] [address]  Phone: 4- 352 - 615-8041  Phone: 3- 485 - 728-7785  Web Address: www.lung.org    Smokefree.gov  Phone: 2- 198 - 603-8767  Web Address: www.smokefree.gov  © Copyright [employer] 2023 Information is for End User's use only and may not be sold, redistributed or otherwise used for commercial purposes.  The above information is an  only. It is not intended as medical advice for individual conditions or treatments. Talk to your doctor, nurse or pharmacist before following any medical regimen to see if it is safe and effective for you.    Cholesterol and Your Health   AMBULATORY CARE:   Cholesterol  is a waxy, fat-like substance. Your body uses cholesterol to make hormones and new cells, and to protect nerves. Cholesterol is made by your body. It also comes from certain foods you eat, such as meat and dairy products. Your healthcare provider can help you set goals for your cholesterol levels. Your provider can help you create a plan to meet your goals.  Cholesterol level goals:  Your cholesterol level goals depend on your risk for heart disease, your age, and your other health conditions. The following are general guidelines:  Total cholesterol  includes low-density lipoprotein (LDL), high-density lipoprotein (HDL), and triglyceride levels. The total cholesterol level should be lower than 200 mg/dL and is  best at about 150 mg/dL.    LDL cholesterol  is called bad cholesterol  because it forms plaque in your arteries. As plaque builds up, your arteries become narrow, and less blood flows through. When plaque decreases blood flow to your heart, you may have chest pain. If plaque completely blocks an artery that brings blood to your heart, you may have a heart attack. Plaque can break off and form blood clots. Blood clots may block arteries in your brain and cause a stroke. The level should be less than 130 mg/dL and is best at about 100 mg/dL.         HDL cholesterol  is called good cholesterol  because it helps remove LDL cholesterol from your arteries. It does this by attaching to LDL cholesterol and carrying it to your liver. Your liver breaks down LDL cholesterol so your body can get rid of it. High levels of HDL cholesterol can help prevent a heart attack and stroke. Low levels of HDL cholesterol can increase your risk for heart disease, heart attack, and stroke. The level should be at least 40 mg/dL in males or at least 50 mg/dL in females.    Triglycerides  are a type of fat that store energy from foods you eat. High levels of triglycerides also cause plaque buildup. This can increase your risk for a heart attack or stroke. If your triglyceride level is high, your LDL cholesterol level may also be high. The level should be less than 150 mg/dL.    Any of the following can increase your risk for high cholesterol:   Smoking or drinking large amounts of alcohol    Having overweight or obesity, or not getting enough exercise    A medical condition such as hypertension (high blood pressure) or diabetes    A family history of high cholesterol    Age older than 65    What you need to know about having your cholesterol levels checked:  Adults 20 to 45 years of age should have their cholesterol levels checked every 4 to 6 years. Adults 45 years or older should have their cholesterol checked every 1 to 2 years. You may  need your cholesterol checked more often, or at a younger age, if you have risk factors for heart disease. You may also need to have your cholesterol checked more often if you have other health conditions, such as diabetes. Blood tests are used to check cholesterol levels. Blood tests measure your levels of triglycerides, LDL cholesterol, and HDL cholesterol.  How healthy fats affect your cholesterol levels:  Healthy fats, also called unsaturated fats, help lower LDL cholesterol and triglyceride levels. Healthy fats include the following:  Monounsaturated fats  are found in foods such as olive oil, canola oil, avocado, nuts, and olives.    Polyunsaturated fats,  such as omega 3 fats, are found in fish, such as salmon, trout, and tuna. They can also be found in plant foods such as flaxseed, walnuts, and soybeans.    How unhealthy fats affect your cholesterol levels:  Unhealthy fats increase LDL cholesterol and triglyceride levels. They are found in foods high in cholesterol, saturated fat, and trans fat:  Cholesterol  is found in eggs, dairy, and meat.    Saturated fat  is found in butter, cheese, ice cream, whole milk, and coconut oil. Saturated fat is also found in meat, such as sausage, hot dogs, and bologna.    Trans fat  is found in liquid oils and is used in fried and baked foods. Foods that contain trans fats include chips, crackers, muffins, sweet rolls, microwave popcorn, and cookies.    Treatment  for high cholesterol will also decrease your risk of heart disease, heart attack, and stroke. Treatment may include any of the following:  Lifestyle changes  may include food, exercise, weight loss, and quitting smoking. You may also need to decrease the amount of alcohol you drink. Your healthcare provider will want you to start with lifestyle changes. Other treatment may be added if lifestyle changes are not enough. Your healthcare provider may recommend you work with a team to manage hyperlipidemia. The team  may include medical experts such as a dietitian, an exercise or physical therapist, and a behavior therapist. Your family members may be included in helping you create lifestyle changes.    Medicines  may be given to lower your LDL cholesterol, triglyceride levels, or total cholesterol level. You may need medicines to lower your cholesterol if any of the following is true:    You have a history of stroke, TIA, unstable angina, or a heart attack.    Your LDL cholesterol level is 190 mg/dL or higher.    You are age 40 to 75 years, have diabetes or heart disease risk factors, and your LDL cholesterol is 70 mg/dL or higher.    Supplements  include fish oil, red yeast rice, and garlic. Fish oil may help lower your triglyceride and LDL cholesterol levels. It may also increase your HDL cholesterol level. Red yeast rice may help decrease your total cholesterol level and LDL cholesterol level. Garlic may help lower your total cholesterol level. Do not take any supplements without talking to your healthcare provider.    Food changes you can make to lower your cholesterol levels:  A dietitian can help you create a healthy eating plan. Your dietitian can show you how to read food labels and choose foods low in saturated fat, trans fats, and cholesterol.     Decrease the total amount of fat you eat.  Choose lean meats, fat-free or 1% fat milk, and low-fat dairy products, such as yogurt and cheese. Try to limit or avoid red meats. Limit or do not eat fried foods or baked goods, such as cookies.    Replace unhealthy fats with healthy fats.  Cook foods in olive oil or canola oil. Choose soft margarines that are low in saturated fat and trans fat. Seeds, nuts, and avocados are other examples of healthy fats.    Eat foods with omega-3 fats.  Examples include salmon, tuna, mackerel, walnuts, and flaxseed. Eat fish 2 times per week. Pregnant women should not eat fish that have high levels of mercury, such as shark, swordfish, and raeann  mackerel.         Increase the amount of high-fiber foods you eat.  High-fiber foods can help lower your LDL cholesterol. Aim to get between 20 and 30 grams of fiber each day. Fruits and vegetables are high in fiber. Eat at least 5 servings each day. Other high-fiber foods are whole-grain or whole-wheat breads, pastas, or cereals, and brown rice. Eat 3 ounces of whole-grain foods each day. Increase fiber slowly. You may have abdominal discomfort, bloating, and gas if you add fiber to your diet too quickly.         Eat healthy protein foods.  Examples include low-fat dairy products, skinless chicken and turkey, fish, and nuts.    Limit foods and drinks that are high in sugar.  Your dietitian or healthcare provider can help you create daily limits for high-sugar foods and drinks. The limit may be lower if you have diabetes or another health condition. Limits can also help you lose weight if needed.  Lifestyle changes you can make to lower your cholesterol levels:   Maintain a healthy weight.  Ask your healthcare provider what a healthy weight is for you. Ask your provider to help you create a weight loss plan if needed. Weight loss can decrease your total cholesterol and triglyceride levels. Weight loss may also help keep your blood pressure at a healthy level.    Be physically active throughout the day.  Physical activity, such as exercise, can help lower your total cholesterol level and maintain a healthy weight. Physical activity can also help increase your HDL cholesterol level. Work with your healthcare provider to create an program that is right for you. Get at least 30 to 40 minutes of moderate physical activity most days of the week. Examples of exercise include brisk walking, swimming, or biking. Also include strength training at least 2 times each week. Your healthcare providers can help you create a physical activity plan.            Do not smoke.  Nicotine and other chemicals in cigarettes and cigars can  raise your cholesterol levels. Ask your healthcare provider for information if you currently smoke and need help to quit. E-cigarettes or smokeless tobacco still contain nicotine. Talk to your healthcare provider before you use these products.         Limit or do not drink alcohol.  Alcohol can increase your triglyceride levels. Ask your healthcare provider before you drink alcohol. Ask how much is okay for you to drink in 24 hours or 1 week.    Follow up with your doctor as directed:  Write down your questions so you remember to ask them during your visits.  © Copyright [employer] 2023 Information is for End User's use only and may not be sold, redistributed or otherwise used for commercial purposes.  The above information is an  only. It is not intended as medical advice for individual conditions or treatments. Talk to your doctor, nurse or pharmacist before following any medical regimen to see if it is safe and effective for you.

## 2024-02-14 NOTE — RESULT ENCOUNTER NOTE
Unstable labs - please call patient.    Vitamin B12 Deficiency - Goal Vitamin B12 is 500 and above.  This may be contributing to patient's left arm and leg numbness and is likely due to alcohol abuse.    Schedule for nurse visits for injections of Vitamin B12 1000mcg IM/SQ daily x 1 week, then weekly x 1 month, then start over-the-counter vitamin B12 1000mcg daily - take on an empty stomach.       Normal Folic acid.    Will discuss other lab results at next appt.      Message sent to patient via [facility] patient portal.    Nurse to also call patient.

## 2024-02-15 ENCOUNTER — TELEPHONE (OUTPATIENT)
Age: 59
End: 2024-02-15

## 2024-02-15 LAB
BACTERIA UR CULT: NORMAL
EST. AVERAGE GLUCOSE BLD GHB EST-MCNC: 94 MG/DL
HBA1C MFR BLD: 4.9 %
HIV 1+2 AB+HIV1 P24 AG SERPL QL IA: NORMAL
HIV 2 AB SERPL QL IA: NORMAL
HIV1 AB SERPL QL IA: NORMAL
HIV1 P24 AG SERPL QL IA: NORMAL

## 2024-02-15 NOTE — RESULT ENCOUNTER NOTE
Urine culture is negative for urinary tract infection.  Continue plan of care.      Message sent to patient via [facility] patient portal.

## 2024-02-15 NOTE — ASSESSMENT & PLAN NOTE
In remission.  Patient is working on outpatient ETOH Rehab options - additional resources provided today.  Continue Naltrexone.  ETOH cessation advised.

## 2024-02-15 NOTE — ASSESSMENT & PLAN NOTE
Uncontrolled.  Pending Derm consult.  Continue Metronidazole 0.75% cream BID.  ETOH cessation advised.

## 2024-02-15 NOTE — TELEPHONE ENCOUNTER
Phone call placed to pt, no answer. LM letting pt know that he will need to call the pharmacy and follow up with them regarding the rxs. This is most likely and insurance issue that he will need to discuss with them.

## 2024-02-15 NOTE — TELEPHONE ENCOUNTER
Patient was seen yesterday by Dr. Altman.  He requested his medications be for 90 day supplies.  Dr. Altman DID send the   escitalopram (LEXAPRO) 20 mg tablet and the pantoprazole (PROTONIX) 40 mg tablet  as 90 days but pharmacy only dispensing 30 with no refills.  Patient is asking if this can be resent to reflect 90day supply.    Please call patient and let him know

## 2024-02-15 NOTE — ASSESSMENT & PLAN NOTE
Management per GI - Overdue for appt.  Continue Protonix 40mg QD.  ETOH cessation advised.  Watch GERD diet.

## 2024-02-15 NOTE — ASSESSMENT & PLAN NOTE
Borderline BP.  Check blood pressure outside of office.  Recommend lifestyle modifications.  ETOH cessation advised.

## 2024-02-15 NOTE — ASSESSMENT & PLAN NOTE
Stable.  Continue Lexapro 20mg QD.  Start Atarax 25mg q6 hours PRN.  Patient declines starting Buspar 7.5mg TID PRN, but may reconsider in future.

## 2024-02-16 ENCOUNTER — TELEPHONE (OUTPATIENT)
Dept: FAMILY MEDICINE CLINIC | Facility: CLINIC | Age: 59
End: 2024-02-16

## 2024-02-16 ENCOUNTER — PATIENT OUTREACH (OUTPATIENT)
Dept: FAMILY MEDICINE CLINIC | Facility: CLINIC | Age: 59
End: 2024-02-16

## 2024-02-16 NOTE — TELEPHONE ENCOUNTER
Pt was calling in returning a missed call, pt was transferred to [facility] to review his labs

## 2024-02-16 NOTE — PROGRESS NOTES
JORGE LOUIS received referral for several SDOH needs. 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 JORGE LOUIS reached out to patient and left a detailed message.

## 2024-02-16 NOTE — TELEPHONE ENCOUNTER
Patient returned call and RN reviewed provider's comments for lab results. Patient is agreeable to vitamin B12 injections weekly for 4 injections and to start oral post injections. Will review other results with PCP at next OV 3/27.    Please order B12 injections and schedule patient for them.

## 2024-02-16 NOTE — TELEPHONE ENCOUNTER
----- Message from Enid Altman DO sent at 2/14/2024  5:07 PM EST -----  Unstable labs - please call patient.    Vitamin B12 Deficiency - Goal Vitamin B12 is 500 and above.  This may be contributing to patient's left arm and leg numbness and is likely due to alcohol abuse.    Schedule for nurse visits for injections of Vitamin B12 1000mcg IM/SQ daily x 1 week, then weekly x 1 month, then start over-the-counter vitamin B12 1000mcg daily - take on an empty stomach.       Normal Folic acid.    Will discuss other lab results at next appt.      Message sent to patient via [facility] patient portal.    Nurse to also call patient.

## 2024-02-22 ENCOUNTER — PATIENT OUTREACH (OUTPATIENT)
Dept: FAMILY MEDICINE CLINIC | Facility: CLINIC | Age: 59
End: 2024-02-22

## 2024-02-22 NOTE — PROGRESS NOTES
OPCM MISSY attempted second outreach to patient regarding resources.  OPCBETTY LOUIS left a voicemail and [facility]

## 2024-02-22 NOTE — LETTER
[address] [facility]  [address]  [place] [address]  [phone]    Re: Unable to Reach   2/22/2024       Dear Diogo,    I am a [facility]  and wanted to be certain you had information to contact me should you desire assistance with or have questions about non-medical aspects of your care such as [but not limited to] medical insurance, housing, transportation, material needs, or emergency needs.  If I do not have an answer I will assist you in finding the appropriate agency or individual who can help.      Please feel free to contact me at [phone]. Thank You.    Sincerely,         EMILY Talley

## 2024-02-28 LAB — VIT B1 BLD-SCNC: 136.3 NMOL/L (ref 66.5–200)

## 2024-04-27 ENCOUNTER — APPOINTMENT (EMERGENCY)
Dept: RADIOLOGY | Facility: HOSPITAL | Age: 59
End: 2024-04-27
Payer: COMMERCIAL

## 2024-04-27 ENCOUNTER — HOSPITAL ENCOUNTER (EMERGENCY)
Facility: HOSPITAL | Age: 59
Discharge: HOME | End: 2024-04-28
Attending: EMERGENCY MEDICINE | Admitting: EMERGENCY MEDICINE
Payer: COMMERCIAL

## 2024-04-27 VITALS
SYSTOLIC BLOOD PRESSURE: 117 MMHG | OXYGEN SATURATION: 95 % | RESPIRATION RATE: 17 BRPM | TEMPERATURE: 97.5 F | HEART RATE: 90 BPM | DIASTOLIC BLOOD PRESSURE: 60 MMHG

## 2024-04-27 DIAGNOSIS — B35.4 TINEA CORPORIS: Primary | ICD-10-CM

## 2024-04-27 DIAGNOSIS — R07.9 CHEST PAIN: ICD-10-CM

## 2024-04-27 LAB
2HR DELTA HS TROPONIN: -2 NG/L
ALBUMIN SERPL BCP-MCNC: 3.9 G/DL (ref 3.5–5)
ALP SERPL-CCNC: 80 U/L (ref 34–104)
ALT SERPL W P-5'-P-CCNC: 9 U/L (ref 7–52)
ANION GAP SERPL CALCULATED.3IONS-SCNC: 10 MMOL/L (ref 4–13)
AST SERPL W P-5'-P-CCNC: 19 U/L (ref 13–39)
BASOPHILS # BLD AUTO: 0.06 THOUSANDS/ÂΜL (ref 0–0.1)
BASOPHILS NFR BLD AUTO: 1 % (ref 0–1)
BILIRUB SERPL-MCNC: 0.47 MG/DL (ref 0.2–1)
BUN SERPL-MCNC: 9 MG/DL (ref 5–25)
CALCIUM SERPL-MCNC: 8.5 MG/DL (ref 8.4–10.2)
CARDIAC TROPONIN I PNL SERPL HS: 2 NG/L
CARDIAC TROPONIN I PNL SERPL HS: 4 NG/L
CHLORIDE SERPL-SCNC: 101 MMOL/L (ref 96–108)
CO2 SERPL-SCNC: 26 MMOL/L (ref 21–32)
CREAT SERPL-MCNC: 0.66 MG/DL (ref 0.6–1.3)
EOSINOPHIL # BLD AUTO: 0.18 THOUSAND/ÂΜL (ref 0–0.61)
EOSINOPHIL NFR BLD AUTO: 2 % (ref 0–6)
ERYTHROCYTE [DISTWIDTH] IN BLOOD BY AUTOMATED COUNT: 13.5 % (ref 11.6–15.1)
GFR SERPL CREATININE-BSD FRML MDRD: 107 ML/MIN/1.73SQ M
GLUCOSE SERPL-MCNC: 87 MG/DL (ref 65–140)
HCT VFR BLD AUTO: 40.8 % (ref 36.5–49.3)
HGB BLD-MCNC: 13.7 G/DL (ref 12–17)
IMM GRANULOCYTES # BLD AUTO: 0.05 THOUSAND/UL (ref 0–0.2)
IMM GRANULOCYTES NFR BLD AUTO: 1 % (ref 0–2)
LIPASE SERPL-CCNC: 23 U/L (ref 11–82)
LYMPHOCYTES # BLD AUTO: 3.23 THOUSANDS/ÂΜL (ref 0.6–4.47)
LYMPHOCYTES NFR BLD AUTO: 44 % (ref 14–44)
MCH RBC QN AUTO: 31 PG (ref 26.8–34.3)
MCHC RBC AUTO-ENTMCNC: 33.6 G/DL (ref 31.4–37.4)
MCV RBC AUTO: 92 FL (ref 82–98)
MONOCYTES # BLD AUTO: 0.45 THOUSAND/ÂΜL (ref 0.17–1.22)
MONOCYTES NFR BLD AUTO: 6 % (ref 4–12)
NEUTROPHILS # BLD AUTO: 3.4 THOUSANDS/ÂΜL (ref 1.85–7.62)
NEUTS SEG NFR BLD AUTO: 46 % (ref 43–75)
NRBC BLD AUTO-RTO: 0 /100 WBCS
PLATELET # BLD AUTO: 255 THOUSANDS/UL (ref 149–390)
PMV BLD AUTO: 8.6 FL (ref 8.9–12.7)
POTASSIUM SERPL-SCNC: 3.9 MMOL/L (ref 3.5–5.3)
PROT SERPL-MCNC: 6.8 G/DL (ref 6.4–8.4)
RBC # BLD AUTO: 4.42 MILLION/UL (ref 3.88–5.62)
SODIUM SERPL-SCNC: 137 MMOL/L (ref 135–147)
WBC # BLD AUTO: 7.37 THOUSAND/UL (ref 4.31–10.16)

## 2024-04-27 PROCEDURE — 93005 ELECTROCARDIOGRAM TRACING: CPT

## 2024-04-27 PROCEDURE — 99285 EMERGENCY DEPT VISIT HI MDM: CPT | Performed by: EMERGENCY MEDICINE

## 2024-04-27 PROCEDURE — 84484 ASSAY OF TROPONIN QUANT: CPT

## 2024-04-27 PROCEDURE — 83690 ASSAY OF LIPASE: CPT

## 2024-04-27 PROCEDURE — 96374 THER/PROPH/DIAG INJ IV PUSH: CPT

## 2024-04-27 PROCEDURE — 80053 COMPREHEN METABOLIC PANEL: CPT

## 2024-04-27 PROCEDURE — 99285 EMERGENCY DEPT VISIT HI MDM: CPT

## 2024-04-27 PROCEDURE — 71046 X-RAY EXAM CHEST 2 VIEWS: CPT

## 2024-04-27 PROCEDURE — 36415 COLL VENOUS BLD VENIPUNCTURE: CPT

## 2024-04-27 PROCEDURE — 85025 COMPLETE CBC W/AUTO DIFF WBC: CPT

## 2024-04-27 RX ORDER — SUCRALFATE 1 G/1
1 TABLET ORAL ONCE
Status: COMPLETED | OUTPATIENT
Start: 2024-04-27 | End: 2024-04-27

## 2024-04-27 RX ORDER — KETOROLAC TROMETHAMINE 30 MG/ML
15 INJECTION, SOLUTION INTRAMUSCULAR; INTRAVENOUS ONCE
Status: COMPLETED | OUTPATIENT
Start: 2024-04-27 | End: 2024-04-27

## 2024-04-27 RX ORDER — ONDANSETRON 2 MG/ML
1 INJECTION INTRAMUSCULAR; INTRAVENOUS ONCE
Status: COMPLETED | OUTPATIENT
Start: 2024-04-27 | End: 2024-04-27

## 2024-04-27 RX ORDER — ASPIRIN 81 MG/1
324 TABLET, CHEWABLE ORAL ONCE
Status: COMPLETED | OUTPATIENT
Start: 2024-04-27 | End: 2024-04-27

## 2024-04-27 RX ORDER — ACETAMINOPHEN 325 MG/1
975 TABLET ORAL ONCE
Status: COMPLETED | OUTPATIENT
Start: 2024-04-27 | End: 2024-04-27

## 2024-04-27 RX ORDER — CLOTRIMAZOLE 1 %
CREAM (GRAM) TOPICAL
Qty: 15 G | Refills: 0 | Status: SHIPPED | OUTPATIENT
Start: 2024-04-27

## 2024-04-27 RX ADMIN — SUCRALFATE 1 G: 1 TABLET ORAL at 21:53

## 2024-04-27 RX ADMIN — ASPIRIN 81 MG CHEWABLE TABLET 324 MG: 81 TABLET CHEWABLE at 21:50

## 2024-04-27 RX ADMIN — KETOROLAC TROMETHAMINE 15 MG: 30 INJECTION, SOLUTION INTRAMUSCULAR; INTRAVENOUS at 21:51

## 2024-04-27 RX ADMIN — ACETAMINOPHEN 975 MG: 325 TABLET, FILM COATED ORAL at 21:51

## 2024-04-28 LAB
ATRIAL RATE: 65 BPM
P AXIS: 92 DEGREES
QRS AXIS: 55 DEGREES
QRSD INTERVAL: 90 MS
QT INTERVAL: 400 MS
QTC INTERVAL: 416 MS
T WAVE AXIS: 56 DEGREES
VENTRICULAR RATE: 65 BPM

## 2024-04-28 PROCEDURE — 93010 ELECTROCARDIOGRAM REPORT: CPT | Performed by: INTERNAL MEDICINE

## 2024-04-28 RX ORDER — KETOCONAZOLE 20 MG/ML
3 SHAMPOO TOPICAL ONCE
Qty: 100 ML | Refills: 0 | Status: SHIPPED | OUTPATIENT
Start: 2024-04-28 | End: 2024-04-28

## 2024-04-28 NOTE — ED PROVIDER NOTES
History  Chief Complaint   Patient presents with    Chest Pain     Pt was having a few drinks when he started with sharp CP radiating down L arm. Pt denied nitro/aspirin for EMS. Pt still reports sharp pain in chest with L arm pain and SOB/dizziness     HPI  Patient is a 58 y.o. male with history of HTN, HLD presenting to the emergency department for chest pain and arm pain. Patient states that he was sitting at a bar and had a few drinks at ate pizza, when he started feeling pain in his left arm and left side of his chest. He did not have any SOB. He did feel nauseated and lightheaded, and was lowered to the floor. He then had multiple episodes of non bloody non bilious vomiting. Patient states that he has been having intermittent episodes of left arm pain similar to this that were associated with exertion. He has also been having a cough and recently finished antibiotics. He was recently incarcerated for over a month and developed a cough while he was in prison. He also developed a generalized rash while he was in prison. He also complains of having family history of CAD / cardiac disease.     Prior to Admission Medications   Prescriptions Last Dose Informant Patient Reported? Taking?   Magnesium 400 MG CAPS   Yes No   Sig: Take 1 capsule by mouth in the morning   Omega-3 Fatty Acids (fish oil) 1,000 mg   Yes No   Sig: Take 2,000 mg by mouth daily   Thiamine Mononitrate (VITAMIN B1) 100 mg tablet   No No   Sig: Take 1 tablet (100 mg total) by mouth daily   escitalopram (LEXAPRO) 20 mg tablet   No No   Sig: Take 1 tablet (20 mg total) by mouth daily   folic acid (FOLVITE) 400 mcg tablet   No No   Sig: Take 1 tablet (400 mcg total) by mouth daily   hydrOXYzine HCL (ATARAX) 25 mg tablet   No No   Sig: Take 1 tablet (25 mg total) by mouth every 6 (six) hours as needed for itching for up to 5 days   Patient not taking: Reported on 2/14/2024   hydrocortisone 1 % cream   No No   Sig: Apply topically 4 (four) times a day  as needed for rash for up to 7 days   lisinopril (ZESTRIL) 40 mg tablet   No No   Sig: Take 1 tablet (40 mg total) by mouth daily   Patient taking differently: Take 40 mg by mouth daily   metroNIDAZOLE (METROCREAM) 0.75 % cream   Yes No   Sig: Apply 1 Application topically 2 (two) times a day   naltrexone (REVIA) 50 mg tablet   No No   Sig: Take 1 tablet (50 mg total) by mouth daily   pantoprazole (PROTONIX) 40 mg tablet   No No   Sig: Take 1 tablet (40 mg total) by mouth daily in the early morning      Facility-Administered Medications: None       Past Medical History:   Diagnosis Date    Alcohol use disorder, severe, dependence (HCC) 04/02/2023    Alcohol withdrawal seizure (HCC)     Alcoholic ketoacidosis 10/16/2023    Anxiety     AR (allergic rhinitis)     Chronic alcoholic gastritis 04/02/2023    Depression     GERD (gastroesophageal reflux disease)     Hepatic steatosis 04/02/2023    Hyperlipidemia     Hypertension     IBS (irritable bowel syndrome)     Obesity     Rosacea     Vitamin B12 deficiency 02/14/2024    Vitamin D deficiency 02/14/2024       Past Surgical History:   Procedure Laterality Date    ANTERIOR CRUCIATE LIGAMENT REPAIR Left     WISDOM TOOTH EXTRACTION         Family History   Problem Relation Age of Onset    Cancer Mother 78        Type unknown    Hypertension Father     Coronary artery disease Father 60    Cancer Father 82        Bladder; + Smoker    No Known Problems Sister     No Known Problems Sister     No Known Problems Sister     No Known Problems Son     No Known Problems Son     Heart attack Paternal Grandfather 60    Coronary artery disease Paternal Grandfather 60     I have reviewed and agree with the history as documented.    E-Cigarette/Vaping    E-Cigarette Use Never User      E-Cigarette/Vaping Substances     Social History     Tobacco Use    Smoking status: Some Days     Types: Cigarettes, Cigars     Start date: 1/1/2014     Passive exposure: Past (Father)    Smokeless  tobacco: Never    Tobacco comments:     Smokes cigars 2-3 times per year   Vaping Use    Vaping status: Never Used   Substance Use Topics    Alcohol use: Yes     Comment: 3-4 vodka drinks/day    Drug use: Never        Review of Systems   Constitutional:  Negative for fever.   HENT:  Negative for congestion.    Eyes:  Negative for pain.   Respiratory:  Positive for cough.    Cardiovascular:  Positive for chest pain.   Gastrointestinal:  Positive for nausea and vomiting. Negative for diarrhea.   Genitourinary:  Negative for dysuria.   Musculoskeletal:  Negative for back pain.        Left arm pain   Skin:  Positive for rash.   Neurological:  Negative for dizziness.   All other systems reviewed and are negative.      Physical Exam  ED Triage Vitals   Temperature Pulse Respirations Blood Pressure SpO2   04/27/24 2057 04/27/24 2057 04/27/24 2057 04/27/24 2100 04/27/24 2057   97.5 °F (36.4 °C) 76 22 145/79 99 %      Temp Source Heart Rate Source Patient Position - Orthostatic VS BP Location FiO2 (%)   04/27/24 2057 04/27/24 2057 04/27/24 2100 04/27/24 2100 --   Oral Monitor Lying Right arm       Pain Score       04/27/24 2057       6             Orthostatic Vital Signs  Vitals:    04/27/24 2057 04/27/24 2100 04/27/24 2115 04/27/24 2345   BP:  145/79 135/69 117/60   Pulse: 76  68 90   Patient Position - Orthostatic VS:  Lying Lying        Physical Exam  Vitals and nursing note reviewed.   Constitutional:       Appearance: Normal appearance.   HENT:      Head: Normocephalic and atraumatic.      Mouth/Throat:      Mouth: Mucous membranes are moist.   Eyes:      Conjunctiva/sclera: Conjunctivae normal.   Cardiovascular:      Rate and Rhythm: Normal rate and regular rhythm.      Pulses: Normal pulses.      Heart sounds: Normal heart sounds.   Pulmonary:      Effort: Pulmonary effort is normal.      Breath sounds: Normal breath sounds.   Chest:      Chest wall: No tenderness.   Abdominal:      Palpations: Abdomen is soft.       Tenderness: There is abdominal tenderness.   Musculoskeletal:         General: No tenderness.      Cervical back: Neck supple.   Skin:     General: Skin is warm and dry.      Capillary Refill: Capillary refill takes less than 2 seconds.   Neurological:      General: No focal deficit present.      Mental Status: He is alert. Mental status is at baseline.   Psychiatric:      Comments: tearful         ED Medications  Medications   ondansetron (FOR EMS ONLY) (ZOFRAN) 4 mg/2 mL injection 4 mg (0 mg Does not apply Given to EMS 4/27/24 2137)   aspirin chewable tablet 324 mg (324 mg Oral Given 4/27/24 2150)   sucralfate (CARAFATE) tablet 1 g (1 g Oral Given 4/27/24 2153)   acetaminophen (TYLENOL) tablet 975 mg (975 mg Oral Given 4/27/24 2151)   ketorolac (TORADOL) injection 15 mg (15 mg Intravenous Given 4/27/24 2151)       Diagnostic Studies  Results Reviewed       Procedure Component Value Units Date/Time    HS Troponin I 2hr [[id number]]  (Normal) Collected: 04/27/24 2315    Lab Status: Final result Specimen: Blood from Arm, Right Updated: 04/27/24 2347     hs TnI 2hr 2 ng/L      Delta 2hr hsTnI -2 ng/L     HS Troponin 0hr (reflex protocol) [[id number]]  (Normal) Collected: 04/27/24 2133    Lab Status: Final result Specimen: Blood from Arm, Right Updated: 04/27/24 2206     hs TnI 0hr 4 ng/L     Comprehensive metabolic panel [[id number]] Collected: 04/27/24 2133    Lab Status: Final result Specimen: Blood from Arm, Right Updated: 04/27/24 2203     Sodium 137 mmol/L      Potassium 3.9 mmol/L      Chloride 101 mmol/L      CO2 26 mmol/L      ANION GAP 10 mmol/L      BUN 9 mg/dL      Creatinine 0.66 mg/dL      Glucose 87 mg/dL      Calcium 8.5 mg/dL      AST 19 U/L      ALT 9 U/L      Alkaline Phosphatase 80 U/L      Total Protein 6.8 g/dL      Albumin 3.9 g/dL      Total Bilirubin 0.47 mg/dL      eGFR 107 ml/min/1.73sq m     Narrative:      [employer] Kid[employer] guidelines for Chronic Kidney Disease (CKD):      Stage 1 with normal or high GFR (GFR > 90 mL/min/1.73 square meters)    Stage 2 Mild CKD (GFR = 60-89 mL/min/1.73 square meters)    Stage 3A Moderate CKD (GFR = 45-59 mL/min/1.73 square meters)    Stage 3B Moderate CKD (GFR = 30-44 mL/min/1.73 square meters)    Stage 4 Severe CKD (GFR = 15-29 mL/min/1.73 square meters)    Stage 5 End Stage CKD (GFR <15 mL/min/1.73 square meters)  Note: GFR calculation is accurate only with a steady state creatinine    Lipase [[id number]]  (Normal) Collected: 04/27/24 2133    Lab Status: Final result Specimen: Blood from Arm, Right Updated: 04/27/24 2203     Lipase 23 u/L     CBC and differential [[id number]]  (Abnormal) Collected: 04/27/24 2133    Lab Status: Final result Specimen: Blood from Arm, Right Updated: 04/27/24 2142     WBC 7.37 Thousand/uL      RBC 4.42 Million/uL      Hemoglobin 13.7 g/dL      Hematocrit 40.8 %      MCV 92 fL      MCH 31.0 pg      MCHC 33.6 g/dL      RDW 13.5 %      MPV 8.6 fL      Platelets 255 Thousands/uL      nRBC 0 /100 WBCs      Segmented % 46 %      Immature Grans % 1 %      Lymphocytes % 44 %      Monocytes % 6 %      Eosinophils Relative 2 %      Basophils Relative 1 %      Absolute Neutrophils 3.40 Thousands/µL      Absolute Immature Grans 0.05 Thousand/uL      Absolute Lymphocytes 3.23 Thousands/µL      Absolute Monocytes 0.45 Thousand/µL      Eosinophils Absolute 0.18 Thousand/µL      Basophils Absolute 0.06 Thousands/µL                    XR chest 2 views   ED Interpretation by Nabeel Meneses MD (04/27 2154)   No acute cardiopulmonary disease.      Final Result by Emerald Ramos MD (04/28 0628)      No acute cardiopulmonary disease.            Workstation performed: [id number]               Procedures  Procedures      ED Course  ED Course as of 04/29/24 0128   Sat Apr 27, 2024 2123 Procedure Note: EKG  Date/Time: 04/27/24 9:23 PM   Interpreted by: Hamida Marmolejo  Indications / Diagnosis: chest pain  ECG reviewed by me, the ED  Provider: yes   The EKG demonstrates:  Rate: 65  Rhythm: NSR  Intervals: regular  Axis: regular  QRS/Blocks: regular  ST Changes: No acute ST Changes, no STD/ISAMAR.  Compared to prior EKG on 2/5/24, no acute change.      2154 CBC and differential(!)  No acute abnormality   2218 Patients pain somewhat improved, but still present. Does not want additional medication at this time.    2225 hs TnI 0hr: 4  Will check 2 hour delta troponin   2225 LIPASE: 23  Low concern for acute pancreatitis    2225 Comprehensive metabolic panel  Normal LFTs / alk phos - low concern for cholecystitis    2322 Patient feeling better at this time. Disposition pending 2 hour delta troponin     2358 Delta 2hr hsTnI: -2             HEART Risk Score      Flowsheet Row Most Recent Value   Heart Score Risk Calculator    History 1 Filed at: 04/27/2024 2325   ECG 0 Filed at: 04/27/2024 2325   Age 1 Filed at: 04/27/2024 2325   Risk Factors 2 Filed at: 04/27/2024 2325   Troponin 0 Filed at: 04/27/2024 2325   HEART Score 4 Filed at: 04/27/2024 2325                                  Medical Decision Making  Amount and/or Complexity of Data Reviewed  Labs: ordered. Decision-making details documented in ED Course.  Radiology: ordered and independent interpretation performed.    Risk  OTC drugs.  Prescription drug management.    Patient is a 58 y.o. male with PMH of HTN, HLD who presents to the ED with chest pain, vomiting.    Vital signs stable. On exam patient tearful appearing, no acute distress.    Differential diagnosis included but not limited to ACS, pneumonia, pancreatitis, cholecystitis, esophageal spasm, GERD, PUD. Skin rash consistent with tinea.     Plan: CBC, CMP, lipase, troponin, EKG. Will give Carafate, tylenol, toradol, and ASA.     View ED course above for further discussion on patient workup.     On review of previous records echocardiogram from 11/29/23 - LVEF 55%.    All labs reviewed and utilized in the medical decision making  "process  All radiology studies independently viewed by me and interpreted by the radiologist.  I reviewed all testing with the patient.     Upon re-evaluation patients pain improved, requesting discharge home at this time.    Disposition: I have reviewed the patient's vital signs, nursing notes, and other relevant tests/information. I had a detailed discussion with the patient regarding the history, exam findings, and any diagnostic results.   Plan to discharge home in stable condition, follow up with primary care provider.  Discussed with patient who is agreeable to plan.  I discussed discharge instructions, need for follow-up, and oral return precautions for what to return for in addition to the written return precautions and discharge instructions, specifically highlighting areas of special concern.  The patient verbalized understanding of the discharge instructions and warnings that would necessitate return to the Emergency Department including worsening chest pain.  All questions the patient had were answered prior to discharge to the best of my ability.       Portions of the record may have been created with voice recognition software. Occasional wrong word or \"sound a like\" substitutions may have occurred due to the inherent limitations of voice recognition software. Read the chart carefully and recognize, using context, where substitutions have occurred.        Disposition  Final diagnoses:   Tinea corporis   Chest pain     Time reflects when diagnosis was documented in both MDM as applicable and the Disposition within this note       Time User Action Codes Description Comment    4/27/2024 11:24 PM Hamida Marmolejo Add [B35.4] Tinea corporis     4/27/2024 11:25 PM Hamida Marmolejo Add [R07.9] Chest pain           ED Disposition       ED Disposition   Discharge    Condition   Stable    Date/Time   Sat Apr 27, 2024 0303    Comment   Diogo Travis discharge to home/self care.                   Follow-up " Information       Follow up With Specialties Details Why Contact Info    Enid Altman DO Family Medicine Schedule an appointment as soon as possible for a visit   [address] [facility]  [address]  [place] [address]  [phone]              Discharge Medication List as of 4/28/2024 12:02 AM        START taking these medications    Details   clotrimazole (LOTRIMIN) 1 % cream Apply to affected area 2 times daily, Normal      ketoconazole (NIZORAL) 2 % shampoo Apply 3 Applications topically once for 1 dose Apply to affected areas and wash off after 5 minutes. Use for 3 consecutive days., Starting Sun 4/28/2024, Normal           CONTINUE these medications which have NOT CHANGED    Details   escitalopram (LEXAPRO) 20 mg tablet Take 1 tablet (20 mg total) by mouth daily, Starting Wed 2/14/2024, Normal      folic acid (FOLVITE) 400 mcg tablet Take 1 tablet (400 mcg total) by mouth daily, Starting Mon 12/18/2023, Until Fri 2/14/2025, Normal      hydrocortisone 1 % cream Apply topically 4 (four) times a day as needed for rash for up to 7 days, Starting Wed 2/7/2024, Until Wed 2/14/2024 at 2359, Normal      hydrOXYzine HCL (ATARAX) 25 mg tablet Take 1 tablet (25 mg total) by mouth every 6 (six) hours as needed for itching for up to 5 days, Starting Sat 2/10/2024, Until Thu 2/15/2024 at 2359, Normal      lisinopril (ZESTRIL) 40 mg tablet Take 1 tablet (40 mg total) by mouth daily, Starting Wed 2/7/2024, Until Fri 3/8/2024, Normal      Magnesium 400 MG CAPS Take 1 capsule by mouth in the morning, Historical Med      metroNIDAZOLE (METROCREAM) 0.75 % cream Apply 1 Application topically 2 (two) times a day, Starting Thu 2/8/2024, Historical Med      naltrexone (REVIA) 50 mg tablet Take 1 tablet (50 mg total) by mouth daily, Starting Thu 2/8/2024, Normal      Omega-3 Fatty Acids (fish oil) 1,000 mg Take 2,000 mg by mouth daily, Historical Med      pantoprazole (PROTONIX) 40 mg tablet Take 1 tablet (40 mg total) by mouth daily  in the early morning, Starting Wed 2/14/2024, Until Fri 3/15/2024, Normal      Thiamine Mononitrate (VITAMIN B1) 100 mg tablet Take 1 tablet (100 mg total) by mouth daily, Starting Mon 12/18/2023, Until Fri 2/14/2025, Normal           No discharge procedures on file.    PDMP Review         Value Time User    PDMP Reviewed  Yes 2/14/2024  7:35 PM Enid Altman DO             ED Provider  Attending physically available and evaluated Diogo NATION Angy. I managed the patient along with the ED Attending.    Electronically Signed by           Hamida Marmolejo DO  04/29/24 0128

## 2024-04-28 NOTE — ED ATTENDING ATTESTATION
4/27/2024  I, Nabeel Meneses MD, saw and evaluated the patient. I have discussed the patient with the resident/non-physician practitioner and agree with the resident's/non-physician practitioner's findings, Plan of Care, and MDM as documented in the resident's/non-physician practitioner's note, except where noted. All available labs and Radiology studies were reviewed.  I was present for key portions of any procedure(s) performed by the resident/non-physician practitioner and I was immediately available to provide assistance.       At this point I agree with the current assessment done in the Emergency Department.  I have conducted an independent evaluation of this patient a history and physical is as follows:    ED Course     58-year-old male presenting to the emergency department for evaluation of chest pain and left arm pain.  Patient states that he was having several beers at the bar when he experienced left arm pain.  Patient states that this was at rest.  He states that he has been having some exertional left arm pain occasionally leading up to this event.  Additionally the patient felt nauseous and felt lightheaded and was lowered off of the stool.  Patient states that he has some substernal chest discomfort and had a single episode of vomiting when he was laying flat.    Secondary complaint is generalized rash since being incarcerated.    The patient is resting comfortably on a stretcher in no acute respiratory distress. The patient appears nontoxic. HEENT reveals moist mucous membranes. Head is normocephalic and atraumatic. Conjunctiva and sclera are normal. Neck is nontender and supple with full range of motion to flexion, extension, lateral rotation. No meningismus appreciated. No masses are appreciated. Lungs are clear to auscultation bilaterally without any wheezes, rales or rhonchi. Heart is regular rate and rhythm without any murmurs, rubs or gallops. Abdomen is soft and nontender without any  rebound or guarding. Extremities appear grossly normal without any significant arthropathy. Patient is awake, alert, and oriented x3. The patient has normal interaction.  Patient has diffuse scaly rash located over his torso with macular patches where there is pigmentation and discoloration as well as scaling consistent with tinea versicolor.  Cranial nerves grossly intact.  Motor is 5 out of 5 bilateral upper and lower extremities.    MEDICAL DECISION MAKING    Number and Complexity of Problems  Differential diagnosis: Acute coronary syndrome, pneumothorax, pneumonia, reflux disease with esophageal spasm, pancreatitis.  Tinea versicolor.    Medical Decision Making Data  External documents reviewed: Reviewed office visit with [facility] where patient was seen for cough.  Patient was prescribed doxycycline as well as promethazine/dextromethorphan cough syrup.  My EKG interpretation: Normal sinus rhythm at 65 bpm.  No acute ST or T wave changes.  My X-ray interpretation: No acute cardiopulmonary disease.    XR chest 2 views   ED Interpretation   No acute cardiopulmonary disease.          Labs Reviewed   CBC AND DIFFERENTIAL - Abnormal       Result Value Ref Range Status    WBC 7.37  4.31 - 10.16 Thousand/uL Final    RBC 4.42  3.88 - 5.62 Million/uL Final    Hemoglobin 13.7  12.0 - 17.0 g/dL Final    Hematocrit 40.8  36.5 - 49.3 % Final    MCV 92  82 - 98 fL Final    MCH 31.0  26.8 - 34.3 pg Final    MCHC 33.6  31.4 - 37.4 g/dL Final    RDW 13.5  11.6 - 15.1 % Final    MPV 8.6 (*) 8.9 - 12.7 fL Final    Platelets 255  149 - 390 Thousands/uL Final    nRBC 0  /100 WBCs Final    Segmented % 46  43 - 75 % Final    Immature Grans % 1  0 - 2 % Final    Lymphocytes % 44  14 - 44 % Final    Monocytes % 6  4 - 12 % Final    Eosinophils Relative 2  0 - 6 % Final    Basophils Relative 1  0 - 1 % Final    Absolute Neutrophils 3.40  1.85 - 7.62 Thousands/µL Final    Absolute Immature Grans 0.05  0.00 - 0.20  "Thousand/uL Final    Absolute Lymphocytes 3.23  0.60 - 4.47 Thousands/µL Final    Absolute Monocytes 0.45  0.17 - 1.22 Thousand/µL Final    Eosinophils Absolute 0.18  0.00 - 0.61 Thousand/µL Final    Basophils Absolute 0.06  0.00 - 0.10 Thousands/µL Final   HS TROPONIN I 0HR - Normal    hs TnI 0hr 4  \"Refer to ACS Flowchart\"- see link ng/L Final    Comment:                                              Initial (time 0) result  If >=50 ng/L, Myocardial injury suggested ;  Type of myocardial injury and treatment strategy  to be determined.  If 5-49 ng/L, a delta result at 2 hours and or 4 hours will be needed to further evaluate.  If <4 ng/L, and chest pain has been >3 hours since onset, patient may qualify for discharge based on the HEART score in the ED.  If <5 ng/L and <3hours since onset of chest pain, a delta result at 2 hours will be needed to further evaluate.    HS Troponin 99th Percentile URL of a Health Population=12 ng/L with a 95% Confidence Interval of 8-18 ng/L.    Second Troponin (time 2 hours)  If calculated delta >= 20 ng/L,  Myocardial injury suggested ; Type of myocardial injury and treatment strategy to be determined.  If 5-49 ng/L and the calculated delta is 5-19 ng/L, consult medical service for evaluation.  Continue evaluation for ischemia on ecg and other possible etiology and repeat hs troponin at 4 hours.  If delta is <5 ng/L at 2 hours, consider discharge based on risk stratification via the HEART score (if in ED), or JASEN risk score in IP/Observation.    HS Troponin 99th Percentile URL of a Health Population=12 ng/L with a 95% Confidence Interval of 8-18 ng/L.   LIPASE - Normal    Lipase 23  11 - 82 u/L Final   HS TROPONIN I 2HR - Normal    hs TnI 2hr 2  \"Refer to ACS Flowchart\"- see link ng/L Final    Comment:                                              Initial (time 0) result  If >=50 ng/L, Myocardial injury suggested ;  Type of myocardial injury and treatment strategy  to be " determined.  If 5-49 ng/L, a delta result at 2 hours and or 4 hours will be needed to further evaluate.  If <4 ng/L, and chest pain has been >3 hours since onset, patient may qualify for discharge based on the HEART score in the ED.  If <5 ng/L and <3hours since onset of chest pain, a delta result at 2 hours will be needed to further evaluate.    HS Troponin 99th Percentile URL of a Health Population=12 ng/L with a 95% Confidence Interval of 8-18 ng/L.    Second Troponin (time 2 hours)  If calculated delta >= 20 ng/L,  Myocardial injury suggested ; Type of myocardial injury and treatment strategy to be determined.  If 5-49 ng/L and the calculated delta is 5-19 ng/L, consult medical service for evaluation.  Continue evaluation for ischemia on ecg and other possible etiology and repeat hs troponin at 4 hours.  If delta is <5 ng/L at 2 hours, consider discharge based on risk stratification via the HEART score (if in ED), or JASEN risk score in IP/Observation.    HS Troponin 99th Percentile URL of a Health Population=12 ng/L with a 95% Confidence Interval of 8-18 ng/L.    Delta 2hr hsTnI -2  <20 ng/L Final   COMPREHENSIVE METABOLIC PANEL    Sodium 137  135 - 147 mmol/L Final    Potassium 3.9  3.5 - 5.3 mmol/L Final    Chloride 101  96 - 108 mmol/L Final    CO2 26  21 - 32 mmol/L Final    ANION GAP 10  4 - 13 mmol/L Final    BUN 9  5 - 25 mg/dL Final    Creatinine 0.66  0.60 - 1.30 mg/dL Final    Comment: Standardized to IDMS reference method    Glucose 87  65 - 140 mg/dL Final    Comment: If the patient is fasting, the ADA then defines impaired fasting glucose as > 100 mg/dL and diabetes as > or equal to 123 mg/dL.    Calcium 8.5  8.4 - 10.2 mg/dL Final    AST 19  13 - 39 U/L Final    ALT 9  7 - 52 U/L Final    Comment: Specimen collection should occur prior to Sulfasalazine administration due to the potential for falsely depressed results.     Alkaline Phosphatase 80  34 - 104 U/L Final    Total Protein 6.8  6.4 - 8.4  g/dL Final    Albumin 3.9  3.5 - 5.0 g/dL Final    Total Bilirubin 0.47  0.20 - 1.00 mg/dL Final    Comment: Use of this assay is not recommended for patients undergoing treatment with eltrombopag due to the potential for falsely elevated results.  N-acetyl-p-benzoquinone imine (metabolite of Acetaminophen) will generate erroneously low results in samples for patients that have taken an overdose of Acetaminophen.    eGFR 107  ml/min/1.73sq m Final    Narrative:     [employer] guidelines for Chronic Kidney Disease (CKD):     Stage 1 with normal or high GFR (GFR > 90 mL/min/1.73 square meters)    Stage 2 Mild CKD (GFR = 60-89 mL/min/1.73 square meters)    Stage 3A Moderate CKD (GFR = 45-59 mL/min/1.73 square meters)    Stage 3B Moderate CKD (GFR = 30-44 mL/min/1.73 square meters)    Stage 4 Severe CKD (GFR = 15-29 mL/min/1.73 square meters)    Stage 5 End Stage CKD (GFR <15 mL/min/1.73 square meters)  Note: GFR calculation is accurate only with a steady state creatinine   HS TROPONIN I 4HR       Labs reviewed by me are significant for:  No significant lab abnormalities.    Treatment and Disposition  ED course: Patient remained hemodynamically stable in the emergency department.  Patient was evaluated with EKG, lab work, chest x-ray which were all reviewed by myself.  Patient already has ambulatory referral to cardiology.  Patient has previously recorded ambulatory referral to dermatology.  We will start the patient on ketoconazole 2% shampoo applied to the affected areas for 5 minutes at a time for 3 consecutive days.  Shared decision making: Patient requesting discharge.  Code status: Full code.              Critical Care Time  Procedures

## 2024-04-28 NOTE — DISCHARGE INSTRUCTIONS
You were seen in the emergency department today for chest pain.    Your testing showed no acute abnormalities.    Follow up with your primary care provider.   Follow up with cardiology regarding your chest pain as previously referred.   Follow up with dermatology regarding your rash as previously referred.     Use Ketocanzole shampoo as prescribed.  Use Clotrimazole as needed for rash.     Return to the emergency department for any new or concerning symptoms including worsening chest pain.     Thank you for choosing [facility] for your care today.

## 2024-06-18 ENCOUNTER — HOSPITAL ENCOUNTER (INPATIENT)
Facility: HOSPITAL | Age: 59
LOS: 3 days | Discharge: HOME | DRG: 425 | End: 2024-06-21
Attending: EMERGENCY MEDICINE | Admitting: INTERNAL MEDICINE
Payer: COMMERCIAL

## 2024-06-18 DIAGNOSIS — K29.20 CHRONIC ALCOHOLIC GASTRITIS WITHOUT HEMORRHAGE: ICD-10-CM

## 2024-06-18 DIAGNOSIS — F10.939 ALCOHOL WITHDRAWAL (HCC): Primary | ICD-10-CM

## 2024-06-18 DIAGNOSIS — K21.9 GASTROESOPHAGEAL REFLUX DISEASE, UNSPECIFIED WHETHER ESOPHAGITIS PRESENT: ICD-10-CM

## 2024-06-18 DIAGNOSIS — E83.42 HYPOMAGNESEMIA: ICD-10-CM

## 2024-06-18 LAB
2HR DELTA HS TROPONIN: 1 NG/L
ALBUMIN SERPL BCG-MCNC: 4.4 G/DL (ref 3.5–5)
ALP SERPL-CCNC: 93 U/L (ref 34–104)
ALT SERPL W P-5'-P-CCNC: 35 U/L (ref 7–52)
ANION GAP SERPL CALCULATED.3IONS-SCNC: 18 MMOL/L (ref 4–13)
APTT PPP: 30 SECONDS (ref 23–37)
AST SERPL W P-5'-P-CCNC: 77 U/L (ref 13–39)
BASOPHILS # BLD AUTO: 0.04 THOUSANDS/ÂΜL (ref 0–0.1)
BASOPHILS NFR BLD AUTO: 1 % (ref 0–1)
BILIRUB SERPL-MCNC: 0.77 MG/DL (ref 0.2–1)
BUN SERPL-MCNC: 10 MG/DL (ref 5–25)
CALCIUM SERPL-MCNC: 9.1 MG/DL (ref 8.4–10.2)
CARDIAC TROPONIN I PNL SERPL HS: 3 NG/L
CARDIAC TROPONIN I PNL SERPL HS: 4 NG/L
CHLORIDE SERPL-SCNC: 99 MMOL/L (ref 96–108)
CO2 SERPL-SCNC: 20 MMOL/L (ref 21–32)
CREAT SERPL-MCNC: 0.89 MG/DL (ref 0.6–1.3)
EOSINOPHIL # BLD AUTO: 0.01 THOUSAND/ÂΜL (ref 0–0.61)
EOSINOPHIL NFR BLD AUTO: 0 % (ref 0–6)
ERYTHROCYTE [DISTWIDTH] IN BLOOD BY AUTOMATED COUNT: 14.6 % (ref 11.6–15.1)
GFR SERPL CREATININE-BSD FRML MDRD: 94 ML/MIN/1.73SQ M
GLUCOSE SERPL-MCNC: 95 MG/DL (ref 65–140)
HCT VFR BLD AUTO: 41.2 % (ref 36.5–49.3)
HGB BLD-MCNC: 14.6 G/DL (ref 12–17)
IMM GRANULOCYTES # BLD AUTO: 0.02 THOUSAND/UL (ref 0–0.2)
IMM GRANULOCYTES NFR BLD AUTO: 0 % (ref 0–2)
INR PPP: 1.01 (ref 0.84–1.19)
LACTATE SERPL-SCNC: 1.6 MMOL/L (ref 0.5–2)
LACTATE SERPL-SCNC: 2.4 MMOL/L (ref 0.5–2)
LIPASE SERPL-CCNC: 6 U/L (ref 11–82)
LYMPHOCYTES # BLD AUTO: 0.55 THOUSANDS/ÂΜL (ref 0.6–4.47)
LYMPHOCYTES NFR BLD AUTO: 8 % (ref 14–44)
MAGNESIUM SERPL-MCNC: 1.3 MG/DL (ref 1.9–2.7)
MCH RBC QN AUTO: 33 PG (ref 26.8–34.3)
MCHC RBC AUTO-ENTMCNC: 35.4 G/DL (ref 31.4–37.4)
MCV RBC AUTO: 93 FL (ref 82–98)
MONOCYTES # BLD AUTO: 0.38 THOUSAND/ÂΜL (ref 0.17–1.22)
MONOCYTES NFR BLD AUTO: 6 % (ref 4–12)
NEUTROPHILS # BLD AUTO: 5.95 THOUSANDS/ÂΜL (ref 1.85–7.62)
NEUTS SEG NFR BLD AUTO: 85 % (ref 43–75)
NRBC BLD AUTO-RTO: 0 /100 WBCS
PHOSPHATE SERPL-MCNC: 2.9 MG/DL (ref 2.7–4.5)
PLATELET # BLD AUTO: 172 THOUSANDS/UL (ref 149–390)
PLATELET # BLD AUTO: 174 THOUSANDS/UL (ref 149–390)
PMV BLD AUTO: 8.2 FL (ref 8.9–12.7)
PMV BLD AUTO: 8.2 FL (ref 8.9–12.7)
POTASSIUM SERPL-SCNC: 4.3 MMOL/L (ref 3.5–5.3)
PROCALCITONIN SERPL-MCNC: <0.05 NG/ML
PROT SERPL-MCNC: 7.6 G/DL (ref 6.4–8.4)
PROTHROMBIN TIME: 13.9 SECONDS (ref 11.6–14.5)
RBC # BLD AUTO: 4.42 MILLION/UL (ref 3.88–5.62)
SODIUM SERPL-SCNC: 137 MMOL/L (ref 135–147)
WBC # BLD AUTO: 6.95 THOUSAND/UL (ref 4.31–10.16)

## 2024-06-18 PROCEDURE — 83605 ASSAY OF LACTIC ACID: CPT

## 2024-06-18 PROCEDURE — 99284 EMERGENCY DEPT VISIT MOD MDM: CPT

## 2024-06-18 PROCEDURE — 36415 COLL VENOUS BLD VENIPUNCTURE: CPT

## 2024-06-18 PROCEDURE — 93005 ELECTROCARDIOGRAM TRACING: CPT

## 2024-06-18 PROCEDURE — 96367 TX/PROPH/DG ADDL SEQ IV INF: CPT

## 2024-06-18 PROCEDURE — 80053 COMPREHEN METABOLIC PANEL: CPT

## 2024-06-18 PROCEDURE — NC001 PR NO CHARGE: Performed by: INTERNAL MEDICINE

## 2024-06-18 PROCEDURE — 83690 ASSAY OF LIPASE: CPT | Performed by: EMERGENCY MEDICINE

## 2024-06-18 PROCEDURE — 99291 CRITICAL CARE FIRST HOUR: CPT | Performed by: EMERGENCY MEDICINE

## 2024-06-18 PROCEDURE — C9113 INJ PANTOPRAZOLE SODIUM, VIA: HCPCS

## 2024-06-18 PROCEDURE — 85610 PROTHROMBIN TIME: CPT

## 2024-06-18 PROCEDURE — 84100 ASSAY OF PHOSPHORUS: CPT

## 2024-06-18 PROCEDURE — 85730 THROMBOPLASTIN TIME PARTIAL: CPT

## 2024-06-18 PROCEDURE — 83735 ASSAY OF MAGNESIUM: CPT

## 2024-06-18 PROCEDURE — 84145 PROCALCITONIN (PCT): CPT

## 2024-06-18 PROCEDURE — 96361 HYDRATE IV INFUSION ADD-ON: CPT

## 2024-06-18 PROCEDURE — 84484 ASSAY OF TROPONIN QUANT: CPT

## 2024-06-18 PROCEDURE — 96375 TX/PRO/DX INJ NEW DRUG ADDON: CPT

## 2024-06-18 PROCEDURE — 96376 TX/PRO/DX INJ SAME DRUG ADON: CPT

## 2024-06-18 PROCEDURE — 85049 AUTOMATED PLATELET COUNT: CPT | Performed by: EMERGENCY MEDICINE

## 2024-06-18 PROCEDURE — 96365 THER/PROPH/DIAG IV INF INIT: CPT

## 2024-06-18 PROCEDURE — 85025 COMPLETE CBC W/AUTO DIFF WBC: CPT

## 2024-06-18 PROCEDURE — 99447 NTRPROF PH1/NTRNET/EHR 11-20: CPT

## 2024-06-18 RX ORDER — ESCITALOPRAM OXALATE 20 MG/1
20 TABLET ORAL DAILY
Status: DISCONTINUED | OUTPATIENT
Start: 2024-06-19 | End: 2024-06-21 | Stop reason: HOSPADM

## 2024-06-18 RX ORDER — ONDANSETRON 2 MG/ML
4 INJECTION INTRAMUSCULAR; INTRAVENOUS ONCE
Status: COMPLETED | OUTPATIENT
Start: 2024-06-18 | End: 2024-06-18

## 2024-06-18 RX ORDER — DIAZEPAM 5 MG/ML
10 INJECTION, SOLUTION INTRAMUSCULAR; INTRAVENOUS ONCE
Status: COMPLETED | OUTPATIENT
Start: 2024-06-18 | End: 2024-06-18

## 2024-06-18 RX ORDER — ONDANSETRON 2 MG/ML
4 INJECTION INTRAMUSCULAR; INTRAVENOUS EVERY 6 HOURS PRN
Status: DISCONTINUED | OUTPATIENT
Start: 2024-06-18 | End: 2024-06-21 | Stop reason: HOSPADM

## 2024-06-18 RX ORDER — SODIUM CHLORIDE, SODIUM GLUCONATE, SODIUM ACETATE, POTASSIUM CHLORIDE, MAGNESIUM CHLORIDE, SODIUM PHOSPHATE, DIBASIC, AND POTASSIUM PHOSPHATE .53; .5; .37; .037; .03; .012; .00082 G/100ML; G/100ML; G/100ML; G/100ML; G/100ML; G/100ML; G/100ML
1000 INJECTION, SOLUTION INTRAVENOUS ONCE
Status: COMPLETED | OUTPATIENT
Start: 2024-06-18 | End: 2024-06-19

## 2024-06-18 RX ORDER — PANTOPRAZOLE SODIUM 40 MG/1
40 TABLET, DELAYED RELEASE ORAL
Status: DISCONTINUED | OUTPATIENT
Start: 2024-06-19 | End: 2024-06-18

## 2024-06-18 RX ORDER — MAGNESIUM SULFATE HEPTAHYDRATE 40 MG/ML
2 INJECTION, SOLUTION INTRAVENOUS ONCE
Status: COMPLETED | OUTPATIENT
Start: 2024-06-18 | End: 2024-06-18

## 2024-06-18 RX ORDER — CHLORHEXIDINE GLUCONATE ORAL RINSE 1.2 MG/ML
15 SOLUTION DENTAL EVERY 12 HOURS SCHEDULED
Status: DISCONTINUED | OUTPATIENT
Start: 2024-06-18 | End: 2024-06-21

## 2024-06-18 RX ORDER — LANOLIN ALCOHOL/MO/W.PET/CERES
6 CREAM (GRAM) TOPICAL
Status: DISCONTINUED | OUTPATIENT
Start: 2024-06-19 | End: 2024-06-21 | Stop reason: HOSPADM

## 2024-06-18 RX ORDER — ENOXAPARIN SODIUM 100 MG/ML
40 INJECTION SUBCUTANEOUS DAILY
Status: DISCONTINUED | OUTPATIENT
Start: 2024-06-19 | End: 2024-06-21 | Stop reason: HOSPADM

## 2024-06-18 RX ORDER — PHENOBARBITAL SODIUM 65 MG/ML
130 INJECTION, SOLUTION INTRAMUSCULAR; INTRAVENOUS ONCE
Status: COMPLETED | OUTPATIENT
Start: 2024-06-18 | End: 2024-06-18

## 2024-06-18 RX ORDER — LANOLIN ALCOHOL/MO/W.PET/CERES
400 CREAM (GRAM) TOPICAL DAILY
Status: DISCONTINUED | OUTPATIENT
Start: 2024-06-19 | End: 2024-06-18

## 2024-06-18 RX ORDER — PANTOPRAZOLE SODIUM 40 MG/10ML
40 INJECTION, POWDER, LYOPHILIZED, FOR SOLUTION INTRAVENOUS
Status: DISCONTINUED | OUTPATIENT
Start: 2024-06-19 | End: 2024-06-21 | Stop reason: HOSPADM

## 2024-06-18 RX ORDER — PANTOPRAZOLE SODIUM 40 MG/10ML
40 INJECTION, POWDER, LYOPHILIZED, FOR SOLUTION INTRAVENOUS ONCE
Status: COMPLETED | OUTPATIENT
Start: 2024-06-18 | End: 2024-06-18

## 2024-06-18 RX ORDER — HYDROXYZINE HYDROCHLORIDE 25 MG/1
25 TABLET, FILM COATED ORAL EVERY 6 HOURS PRN
Status: DISCONTINUED | OUTPATIENT
Start: 2024-06-18 | End: 2024-06-21 | Stop reason: HOSPADM

## 2024-06-18 RX ORDER — LISINOPRIL 20 MG/1
40 TABLET ORAL DAILY
Status: DISCONTINUED | OUTPATIENT
Start: 2024-06-19 | End: 2024-06-21 | Stop reason: HOSPADM

## 2024-06-18 RX ORDER — DEXTROSE MONOHYDRATE AND SODIUM CHLORIDE 5; .9 G/100ML; G/100ML
125 INJECTION, SOLUTION INTRAVENOUS CONTINUOUS
Status: DISCONTINUED | OUTPATIENT
Start: 2024-06-18 | End: 2024-06-19

## 2024-06-18 RX ADMIN — THIAMINE HYDROCHLORIDE 500 MG: 100 INJECTION, SOLUTION INTRAMUSCULAR; INTRAVENOUS at 18:10

## 2024-06-18 RX ADMIN — DEXTROSE AND SODIUM CHLORIDE 125 ML/HR: 5; .9 INJECTION, SOLUTION INTRAVENOUS at 21:26

## 2024-06-18 RX ADMIN — SODIUM CHLORIDE, SODIUM GLUCONATE, SODIUM ACETATE, POTASSIUM CHLORIDE, MAGNESIUM CHLORIDE, SODIUM PHOSPHATE, DIBASIC, AND POTASSIUM PHOSPHATE 1000 ML: .53; .5; .37; .037; .03; .012; .00082 INJECTION, SOLUTION INTRAVENOUS at 22:09

## 2024-06-18 RX ADMIN — PANTOPRAZOLE SODIUM 40 MG: 40 INJECTION, POWDER, FOR SOLUTION INTRAVENOUS at 18:09

## 2024-06-18 RX ADMIN — ONDANSETRON 4 MG: 2 INJECTION INTRAMUSCULAR; INTRAVENOUS at 21:37

## 2024-06-18 RX ADMIN — MAGNESIUM SULFATE HEPTAHYDRATE 2 G: 40 INJECTION, SOLUTION INTRAVENOUS at 21:52

## 2024-06-18 RX ADMIN — ONDANSETRON 4 MG: 2 INJECTION INTRAMUSCULAR; INTRAVENOUS at 19:54

## 2024-06-18 RX ADMIN — CHLORHEXIDINE GLUCONATE 15 ML: 1.2 RINSE ORAL at 21:28

## 2024-06-18 RX ADMIN — PHENOBARBITAL SODIUM 260 MG: 65 INJECTION INTRAMUSCULAR at 19:58

## 2024-06-18 RX ADMIN — FOLIC ACID 1 MG: 5 INJECTION, SOLUTION INTRAMUSCULAR; INTRAVENOUS; SUBCUTANEOUS at 18:35

## 2024-06-18 RX ADMIN — MAGNESIUM SULFATE HEPTAHYDRATE 2 G: 40 INJECTION, SOLUTION INTRAVENOUS at 20:28

## 2024-06-18 RX ADMIN — SODIUM CHLORIDE 500 ML: 0.9 INJECTION, SOLUTION INTRAVENOUS at 17:39

## 2024-06-18 RX ADMIN — DIAZEPAM 10 MG: 10 INJECTION, SOLUTION INTRAMUSCULAR; INTRAVENOUS at 19:54

## 2024-06-18 RX ADMIN — PHENOBARBITAL SODIUM 130 MG: 65 INJECTION INTRAMUSCULAR at 22:18

## 2024-06-18 RX ADMIN — ONDANSETRON 4 MG: 2 INJECTION INTRAMUSCULAR; INTRAVENOUS at 17:36

## 2024-06-18 RX ADMIN — DIAZEPAM 10 MG: 5 INJECTION, SOLUTION INTRAMUSCULAR; INTRAVENOUS at 17:36

## 2024-06-18 RX ADMIN — DIAZEPAM 10 MG: 5 INJECTION, SOLUTION INTRAMUSCULAR; INTRAVENOUS at 18:40

## 2024-06-18 NOTE — Clinical Note
Case was discussed with critical care and the patient's admission status was agreed to be Admission Status: inpatient status to the service of Dr. márquez .

## 2024-06-18 NOTE — ED PROVIDER NOTES
History  Chief Complaint   Patient presents with    Detox Evaluation     Patient arrived via EMS from home due to alcohol withdraw. Last drink was at 9pm, drinks several bottles of wine and bourbon every day.       Withdrawal - Alcohol  Associated symptoms: abdominal pain, agitation, nausea, palpitations, vomiting and weakness    Associated symptoms: no confusion, no hallucinations and no shortness of breath        Diogo Travis is a 58 y.o. male with history of HTN, MDD, GERD, gastritis, hepatic steatosis, alcohol use disorder, alcohol withdrawal seizure presenting for alcohol withdrawal.    Patient presenting for evaluation of alcohol withdrawal symptoms. He has a longstanding history of alcohol use disorder, history of withdrawal seizures and has been admitted to detox before, last February of this year. Had been doing well but started drinking again recently due to life stress. He reports he had been prescribed naltrexone in the past but has not been using it. Last drink 9 pm yesterday. Reports he drinks about 2-3 bottles of wine daily, sometimes supplementing with a hard liquor. At time of presentation he reports anxiety, sweating, palpitations, nausea, non-bloody non-bilious vomiting, abdominal pain. Denies chest pain, hematemesis, history of variceal bleeds..    Prior to Admission Medications   Prescriptions Last Dose Informant Patient Reported? Taking?   Magnesium 400 MG CAPS   Yes No   Sig: Take 1 capsule by mouth in the morning   Omega-3 Fatty Acids (fish oil) 1,000 mg   Yes No   Sig: Take 2,000 mg by mouth daily   Thiamine Mononitrate (VITAMIN B1) 100 mg tablet   No No   Sig: Take 1 tablet (100 mg total) by mouth daily   clotrimazole (LOTRIMIN) 1 % cream Not Taking  No No   Sig: Apply to affected area 2 times daily   Patient not taking: Reported on 6/18/2024   escitalopram (LEXAPRO) 20 mg tablet 6/18/2024  No Yes   Sig: Take 1 tablet (20 mg total) by mouth daily   folic acid (FOLVITE) 400 mcg tablet  6/18/2024  No Yes   Sig: Take 1 tablet (400 mcg total) by mouth daily   hydrOXYzine HCL (ATARAX) 25 mg tablet   No No   Sig: Take 1 tablet (25 mg total) by mouth every 6 (six) hours as needed for itching for up to 5 days   Patient not taking: Reported on 2/14/2024   hydrocortisone 1 % cream   No No   Sig: Apply topically 4 (four) times a day as needed for rash for up to 7 days   ketoconazole (NIZORAL) 2 % shampoo   No No   Sig: Apply 3 Applications topically once for 1 dose Apply to affected areas and wash off after 5 minutes. Use for 3 consecutive days.   lisinopril (ZESTRIL) 40 mg tablet   No No   Sig: Take 1 tablet (40 mg total) by mouth daily   Patient taking differently: Take 40 mg by mouth daily   metroNIDAZOLE (METROCREAM) 0.75 % cream Not Taking  Yes No   Sig: Apply 1 Application topically 2 (two) times a day   Patient not taking: Reported on 6/18/2024   naltrexone (REVIA) 50 mg tablet   No No   Sig: Take 1 tablet (50 mg total) by mouth daily   pantoprazole (PROTONIX) 40 mg tablet   No No   Sig: Take 1 tablet (40 mg total) by mouth daily in the early morning      Facility-Administered Medications: None       Past Medical History:   Diagnosis Date    Alcohol use disorder, severe, dependence (HCC) 04/02/2023    Alcohol withdrawal seizure (HCC)     Alcoholic ketoacidosis 10/16/2023    Anxiety     AR (allergic rhinitis)     Chronic alcoholic gastritis 04/02/2023    Depression     GERD (gastroesophageal reflux disease)     Hepatic steatosis 04/02/2023    Hyperlipidemia     Hypertension     IBS (irritable bowel syndrome)     Obesity     Rosacea     Vitamin B12 deficiency 02/14/2024    Vitamin D deficiency 02/14/2024       Past Surgical History:   Procedure Laterality Date    ANTERIOR CRUCIATE LIGAMENT REPAIR Left     WISDOM TOOTH EXTRACTION         Family History   Problem Relation Age of Onset    Cancer Mother 78        Type unknown    Hypertension Father     Coronary artery disease Father 60    Cancer Father 82         Bladder; + Smoker    No Known Problems Sister     No Known Problems Sister     No Known Problems Sister     No Known Problems Son     No Known Problems Son     Heart attack Paternal Grandfather 60    Coronary artery disease Paternal Grandfather 60     I have reviewed and agree with the history as documented.    E-Cigarette/Vaping    E-Cigarette Use Never User      E-Cigarette/Vaping Substances     Social History     Tobacco Use    Smoking status: Some Days     Types: Cigarettes, Cigars     Start date: 1/1/2014     Passive exposure: Past (Father)    Smokeless tobacco: Never    Tobacco comments:     Smokes cigars 2-3 times per year   Vaping Use    Vaping status: Never Used   Substance Use Topics    Alcohol use: Yes     Comment: 3-4 vodka drinks/day    Drug use: Never        Review of Systems   Constitutional:  Positive for chills and diaphoresis.   HENT:  Positive for sore throat.    Respiratory:  Negative for choking, chest tightness and shortness of breath.    Cardiovascular:  Positive for palpitations. Negative for chest pain.   Gastrointestinal:  Positive for abdominal pain, nausea and vomiting.   Musculoskeletal:  Positive for myalgias.   Neurological:  Positive for tremors and weakness.   Psychiatric/Behavioral:  Positive for agitation. Negative for confusion and hallucinations. The patient is nervous/anxious.        Physical Exam  ED Triage Vitals   Temperature Pulse Respirations Blood Pressure SpO2   06/18/24 1800 06/18/24 1725 06/18/24 1725 06/18/24 1725 06/18/24 1725   100 °F (37.8 °C) (!) 124 (!) 24 (!) 196/92 95 %      Temp Source Heart Rate Source Patient Position - Orthostatic VS BP Location FiO2 (%)   06/18/24 1800 06/18/24 1725 06/18/24 1725 06/18/24 1725 --   Oral Monitor Lying Right arm       Pain Score       06/18/24 1725       No Pain             Orthostatic Vital Signs  Vitals:    06/19/24 0800 06/19/24 0900 06/19/24 1000 06/19/24 1100   BP: 116/60 133/84 110/62 136/84   Pulse: 80 73 70     Patient Position - Orthostatic VS:           Physical Exam  Vitals and nursing note reviewed.   Constitutional:       General: He is in acute distress.      Appearance: Normal appearance. He is well-developed. He is ill-appearing and diaphoretic. He is not toxic-appearing.   HENT:      Head: Normocephalic and atraumatic.      Mouth/Throat:      Mouth: Mucous membranes are moist.      Pharynx: Oropharynx is clear.      Comments: Tongue fasiculations  Eyes:      Extraocular Movements: Extraocular movements intact.      Conjunctiva/sclera: Conjunctivae normal.      Pupils: Pupils are equal, round, and reactive to light.   Cardiovascular:      Rate and Rhythm: Normal rate and regular rhythm.      Pulses: Normal pulses.      Heart sounds: Normal heart sounds. No murmur heard.  Pulmonary:      Effort: Tachypnea present. No respiratory distress.      Breath sounds: Normal breath sounds. No wheezing, rhonchi or rales.   Abdominal:      General: Abdomen is flat. Bowel sounds are normal.      Palpations: Abdomen is soft.      Tenderness: There is abdominal tenderness. There is no guarding or rebound.   Musculoskeletal:      Cervical back: Neck supple.      Right lower leg: No edema.      Left lower leg: No edema.   Skin:     General: Skin is warm.      Capillary Refill: Capillary refill takes less than 2 seconds.   Neurological:      General: No focal deficit present.      Mental Status: He is alert and oriented to person, place, and time.      Motor: Tremor (Diffuse tremors) present.   Psychiatric:         Mood and Affect: Mood is anxious.         Behavior: Behavior normal. Behavior is cooperative.         ED Medications  Medications   chlorhexidine (PERIDEX) 0.12 % oral rinse 15 mL (0 mL Mouth/Throat Hold 6/19/24 0847)   enoxaparin (LOVENOX) subcutaneous injection 40 mg (40 mg Subcutaneous Given 6/19/24 0838)   escitalopram (LEXAPRO) tablet 20 mg (20 mg Oral Not Given 6/19/24 0847)   lisinopril (ZESTRIL) tablet 40 mg (40  mg Oral Not Given 6/19/24 0847)   folic acid 1 mg in sodium chloride 0.9 % 50 mL IVPB (1 mg Intravenous New Bag 6/19/24 0909)   dextrose 5 % and sodium chloride 0.9 % infusion (125 mL/hr Intravenous New Bag 6/19/24 0515)   ondansetron (ZOFRAN) injection 4 mg (4 mg Intravenous Given 6/18/24 2137)   pantoprazole (PROTONIX) injection 40 mg (40 mg Intravenous Given 6/19/24 0838)   hydrOXYzine HCL (ATARAX) tablet 25 mg (0 mg Oral Hold 6/19/24 0001)   melatonin tablet 6 mg (0 mg Oral Hold 6/19/24 0006)   multivitamin-minerals (CENTRUM) tablet 1 tablet (1 tablet Oral Not Given 6/19/24 0847)   polyethylene glycol (MIRALAX) packet 17 g (17 g Oral Not Given 6/19/24 0847)   thiamine (VITAMIN B1) 500 mg in sodium chloride 0.9 % 50 mL IVPB (has no administration in time range)   diazepam (VALIUM) injection 10 mg (10 mg Intravenous Given 6/18/24 1736)   ondansetron (ZOFRAN) injection 4 mg (4 mg Intravenous Given 6/18/24 1736)   sodium chloride 0.9 % bolus 500 mL (0 mL Intravenous Stopped 6/18/24 1835)   thiamine (VITAMIN B1) 500 mg in sodium chloride 0.9 % 100 mL IVPB (0 mg Intravenous Stopped 6/18/24 1835)   folic acid 1 mg in sodium chloride 0.9 % 50 mL IVPB (0 mg Intravenous Stopped 6/18/24 1910)   pantoprazole (PROTONIX) injection 40 mg (40 mg Intravenous Given 6/18/24 1809)   diazepam (VALIUM) injection 10 mg (10 mg Intravenous Given 6/18/24 1840)   magnesium sulfate 2 g/50 mL IVPB (premix) 2 g (0 g Intravenous Stopped 6/18/24 2151)   PHENobarbital 260 mg in sodium chloride 0.9 % 100 mL IVPB (0 mg Intravenous Stopped 6/18/24 2027)   diazepam (VALIUM) injection 10 mg (10 mg Intravenous Given 6/18/24 1954)   ondansetron (ZOFRAN) injection 4 mg (4 mg Intravenous Given 6/18/24 1954)   magnesium sulfate 2 g/50 mL IVPB (premix) 2 g (0 g Intravenous Stopped 6/18/24 2341)   multi-electrolyte (ISOLYTE-S PH 7.4) bolus 1,000 mL (0 mL Intravenous Stopped 6/19/24 0006)   PHENobarbital injection 130 mg (130 mg Intravenous Given 6/18/24  5563)   PHENobarbital injection 130 mg (130 mg Intravenous Given 6/19/24 0114)   acetaminophen (Ofirmev) injection 1,000 mg (0 mg Intravenous Stopped 6/19/24 0541)   PHENobarbital 650 mg in sodium chloride 0.9 % 100 mL IVPB (0 mg Intravenous Stopped 6/19/24 0700)       Diagnostic Studies  Results Reviewed       Procedure Component Value Units Date/Time    Phosphorus [[id number]]  (Normal) Collected: 06/19/24 0522    Lab Status: Final result Specimen: Blood from Arm, Right Updated: 06/19/24 0551     Phosphorus 3.0 mg/dL     Magnesium [[id number]]  (Normal) Collected: 06/19/24 0522    Lab Status: Final result Specimen: Blood from Arm, Right Updated: 06/19/24 0551     Magnesium 2.4 mg/dL     Comprehensive metabolic panel [[id number]]  (Abnormal) Collected: 06/19/24 0522    Lab Status: Final result Specimen: Blood from Arm, Right Updated: 06/19/24 0551     Sodium 138 mmol/L      Potassium 3.7 mmol/L      Chloride 103 mmol/L      CO2 28 mmol/L      ANION GAP 7 mmol/L      BUN 11 mg/dL      Creatinine 0.98 mg/dL      Glucose 132 mg/dL      Calcium 8.4 mg/dL      AST 39 U/L      ALT 24 U/L      Alkaline Phosphatase 79 U/L      Total Protein 6.3 g/dL      Albumin 3.8 g/dL      Total Bilirubin 0.96 mg/dL      eGFR 85 ml/min/1.73sq m     Narrative:      National Kid[employer] Foundation guidelines for Chronic Kidney Disease (CKD):     Stage 1 with normal or high GFR (GFR > 90 mL/min/1.73 square meters)    Stage 2 Mild CKD (GFR = 60-89 mL/min/1.73 square meters)    Stage 3A Moderate CKD (GFR = 45-59 mL/min/1.73 square meters)    Stage 3B Moderate CKD (GFR = 30-44 mL/min/1.73 square meters)    Stage 4 Severe CKD (GFR = 15-29 mL/min/1.73 square meters)    Stage 5 End Stage CKD (GFR <15 mL/min/1.73 square meters)  Note: GFR calculation is accurate only with a steady state creatinine    CBC [[id number]]  (Abnormal) Collected: 06/19/24 0522    Lab Status: Final result Specimen: Blood from Arm, Right Updated: 06/19/24 0534     WBC 5.96  Thousand/uL      RBC 4.06 Million/uL      Hemoglobin 13.0 g/dL      Hematocrit 37.7 %      MCV 93 fL      MCH 32.0 pg      MCHC 34.5 g/dL      RDW 14.5 %      Platelets 127 Thousands/uL      MPV 8.6 fL     Calcium, ionized [[id number]]  (Abnormal) Collected: 06/19/24 0522    Lab Status: Final result Specimen: Blood from Arm, Right Updated: 06/19/24 0533     Calcium, Ionized 1.04 mmol/L     Procalcitonin [[id number]]  (Normal) Collected: 06/18/24 1744    Lab Status: Final result Specimen: Blood from Arm, Left Updated: 06/18/24 2215     Procalcitonin <0.05 ng/ml     HS Troponin 0hr (reflex protocol) [[id number]]  (Normal) Collected: 06/18/24 2119    Lab Status: Final result Specimen: Blood from Arm, Right Updated: 06/18/24 2158     hs TnI 0hr 3 ng/L     Lactic acid, plasma (w/reflex if result > 2.0) [[id number]]  (Abnormal) Collected: 06/18/24 2119    Lab Status: Final result Specimen: Blood from Arm, Right Updated: 06/18/24 2149     LACTIC ACID 2.4 mmol/L     Narrative:      Result may be elevated if tourniquet was used during collection.    Platelet count [[id number]]  (Abnormal) Collected: 06/18/24 2119    Lab Status: Final result Specimen: Blood from Arm, Right Updated: 06/18/24 2132     Platelets 174 Thousands/uL      MPV 8.2 fL     Lipase [[id number]]  (Abnormal) Collected: 06/18/24 1744    Lab Status: Final result Specimen: Blood from Arm, Left Updated: 06/18/24 2124     Lipase 6 u/L     Comprehensive metabolic panel [[id number]]  (Abnormal) Collected: 06/18/24 1744    Lab Status: Final result Specimen: Blood from Arm, Left Updated: 06/18/24 1810     Sodium 137 mmol/L      Potassium 4.3 mmol/L      Chloride 99 mmol/L      CO2 20 mmol/L      ANION GAP 18 mmol/L      BUN 10 mg/dL      Creatinine 0.89 mg/dL      Glucose 95 mg/dL      Calcium 9.1 mg/dL      AST 77 U/L      ALT 35 U/L      Alkaline Phosphatase 93 U/L      Total Protein 7.6 g/dL      Albumin 4.4 g/dL      Total Bilirubin 0.77 mg/dL      eGFR 94  ml/min/1.73sq m     Narrative:      National Kid[employer] Disease Foundation guidelines for Chronic Kidney Disease (CKD):     Stage 1 with normal or high GFR (GFR > 90 mL/min/1.73 square meters)    Stage 2 Mild CKD (GFR = 60-89 mL/min/1.73 square meters)    Stage 3A Moderate CKD (GFR = 45-59 mL/min/1.73 square meters)    Stage 3B Moderate CKD (GFR = 30-44 mL/min/1.73 square meters)    Stage 4 Severe CKD (GFR = 15-29 mL/min/1.73 square meters)    Stage 5 End Stage CKD (GFR <15 mL/min/1.73 square meters)  Note: GFR calculation is accurate only with a steady state creatinine    Phosphorus [[id number]]  (Normal) Collected: 06/18/24 1744    Lab Status: Final result Specimen: Blood from Arm, Left Updated: 06/18/24 1810     Phosphorus 2.9 mg/dL     Magnesium [[id number]]  (Abnormal) Collected: 06/18/24 1744    Lab Status: Final result Specimen: Blood from Arm, Left Updated: 06/18/24 1810     Magnesium 1.3 mg/dL     Protime-INR [[id number]]  (Normal) Collected: 06/18/24 1744    Lab Status: Final result Specimen: Blood from Arm, Left Updated: 06/18/24 1803     Protime 13.9 seconds      INR 1.01    APTT [[id number]]  (Normal) Collected: 06/18/24 1744    Lab Status: Final result Specimen: Blood from Arm, Left Updated: 06/18/24 1803     PTT 30 seconds     CBC and differential [[id number]]  (Abnormal) Collected: 06/18/24 1744    Lab Status: Final result Specimen: Blood from Arm, Left Updated: 06/18/24 1757     WBC 6.95 Thousand/uL      RBC 4.42 Million/uL      Hemoglobin 14.6 g/dL      Hematocrit 41.2 %      MCV 93 fL      MCH 33.0 pg      MCHC 35.4 g/dL      RDW 14.6 %      MPV 8.2 fL      Platelets 172 Thousands/uL      nRBC 0 /100 WBCs      Segmented % 85 %      Immature Grans % 0 %      Lymphocytes % 8 %      Monocytes % 6 %      Eosinophils Relative 0 %      Basophils Relative 1 %      Absolute Neutrophils 5.95 Thousands/µL      Absolute Immature Grans 0.02 Thousand/uL      Absolute Lymphocytes 0.55 Thousands/µL      Absolute  Monocytes 0.38 Thousand/µL      Eosinophils Absolute 0.01 Thousand/µL      Basophils Absolute 0.04 Thousands/µL                    No orders to display         Procedures  ECG 12 Lead Documentation Only    Date/Time: 6/18/2024 5:32 PM    Performed by: Abida Alvarenga MD  Authorized by: Abida Alvarenga MD    Indications / Diagnosis:  Alcohol withdrawal  ECG reviewed by me, the ED Provider: yes    Patient location:  ED  Previous ECG:     Previous ECG:  Compared to current    Comparison ECG info:  4/27/2024    Similarity:  Changes noted    Comparison to cardiac monitor: Yes    Interpretation:     Interpretation: non-specific    Quality:     Tracing quality:  Limited by artifact  Rate:     ECG rate:  123    ECG rate assessment: tachycardic    Rhythm:     Rhythm: sinus tachycardia    Ectopy:     Ectopy: none    QRS:     QRS axis:  Normal    QRS intervals:  Normal  Conduction:     Conduction: normal    ST segments:     ST segments:  Non-specific  T waves:     T waves: non-specific    Comments:      QTc 458        ED Course  ED Course as of 06/19/24 1225   e Jun 18, 2024   1728 Asked to room by nursing. Patient diffuse shaking, diaphoretic, anxious, nauseous, tachycardic, tachypneic, hypertensive   1732 Blood Pressure(!): 196/92  196/92, , RR 24, SpO2 95%   1757 Re-evaluated at bedside, reports improvement of shaking, still feeling anxious but better. Not interested in inpatient detox at this time but interested possibly in outpatient resources. Will continue to discuss and evaluate.   1828 Potassium: 4.3  Normal   1828 Sodium: 137  Normal   1828 ANION GAP(!): 18  Likely secondary to dehydration vs AKA   1828 AST(!): 77  Mild elevation   1828 ALT: 35  normal   1828 Total Bilirubin: 0.77  normal   1829 ALK PHOS: 93  normal   1829 GLUCOSE: 95  normal   1829 Protime-INR  Normal   1829 APTT  Normal   1829 Phosphorus: 2.9  Normal   1829 MAGNESIUM(!): 1.3  Will replete   1830 Pulse(!): 131   1844 Increased  symptoms, will give more Valium   1932 Discussed results with patient. He does not wish to go to Detox but is willing to be admitted to the hospital for management of symptoms.   2001 Discussed case with critical care given need for repeated valium doses and repeated elevated CIWA score to determine SD1 vs SD2   2004 Nursing informed patient again with significant tremors and nausea, will give another dose of Valium. Patient not yet started on phenobarbital.                          Initial Sepsis Screening       Row Name 06/18/24 3410                Is the patient's history suggestive of a new or worsening infection? No  -TD                  User Key  (r) = Recorded By, (t) = Taken By, (c) = Cosigned By      Initials Name Provider Type    TD Donald Singleton MD Resident                    SBIRT 20yo+      Flowsheet Row Most Recent Value   Initial Alcohol Screen: US AUDIT-C     1. How often do you have a drink containing alcohol? 6 Filed at: 06/18/2024 1727   2. How many drinks containing alcohol do you have on a typical day you are drinking?  6 Filed at: 06/18/2024 1727   3a. Male UNDER 65: How often do you have five or more drinks on one occasion? 6 Filed at: 06/18/2024 1727   3b. FEMALE Any Age, or MALE 65+: How often do you have 4 or more drinks on one occassion? 6 Filed at: 06/18/2024 1727   Audit-C Score 24 Filed at: 06/18/2024 1727   MILDRED: How many times in the past year have you...    Used an illegal drug or used a prescription medication for non-medical reasons? Never Filed at: 06/18/2024 1727                  Medical Decision Making  Diogo Travis is a 58 y.o. male presenting for alcohol withdrawal, patient at this time not interested in detox. Initial CIWA >25. On presentation to the ED, patient was febrile, vital signs showing tachycardia, tachypnea, hypertension. Exam showing a patient in distress, A&Ox4, visible tremors, sweating, tachycardia and tachypnea. Lungs clear bilaterally. Abdomen with  mild diffuse tenderness, no rebound.    DDx including but not limited to: Alcohol intoxication, metabolic abnormality, overdose, substance abuse. Doubt intracranial process.    Based on results available while the patient was in the ED:  Lab work had CMP showing grossly normal electrolytes, slightly elevated AST, normal calcium, normal kidney function. Did also show elevated anion gap and decreased CO2. CBC without leukocytosis, anemia or thrombocytopenia. Decreased magneium at 1.3 was repleted with 2g of magnesium.. Patient continued to have withdrawal symptoms requiring 30 mg Valium total and 260 of phenobarbital in the ED, as well as protonix and zofran for nausea. Also given thiamine and folate in setting of alcohol use.  After multiple discussions with the patient, he is declining detox admission but is willing to be admitted to the hospital. Discussed case with critical care given need for multiple re-dosing of valium with continued elevated CIWA scores indicating likely need for closer monitoring. Critical care evaluated and accepted.    See ED Course for further updates    After evaluation and workup in the emergency department Discussed patient's case with critical care regarding admission who accepted the patient for further evaluation and management under Dr. Orozco (Critical care).    Amount and/or Complexity of Data Reviewed  Labs: ordered. Decision-making details documented in ED Course.    Risk  Prescription drug management.  Decision regarding hospitalization.          Disposition  Final diagnoses:   Alcohol withdrawal (HCC)   Hypomagnesemia     Time reflects when diagnosis was documented in both MDM as applicable and the Disposition within this note       Time User Action Codes Description Comment    6/18/2024  8:14 PM Abida Alvarenga [F10.939] Alcohol withdrawal (HCC)     6/18/2024  8:36 PM Abida Alvarenga [E83.42] Hypomagnesemia           ED Disposition       ED Disposition   Admit     Condition   Stable    Date/Time   Tue Jun 18, 2024 2037    Comment   Case was discussed with critical care and the patient's admission status was agreed to be Admission Status: inpatient status to the service of Dr. Orozco.               Follow-up Information    None         Current Discharge Medication List        CONTINUE these medications which have NOT CHANGED    Details   escitalopram (LEXAPRO) 20 mg tablet Take 1 tablet (20 mg total) by mouth daily  Qty: 90 tablet, Refills: 0    Associated Diagnoses: Anxiety and depression      folic acid (FOLVITE) 400 mcg tablet Take 1 tablet (400 mcg total) by mouth daily  Qty: 30 tablet, Refills: 0    Associated Diagnoses: Alcohol use disorder, severe, dependence (HCC)      clotrimazole (LOTRIMIN) 1 % cream Apply to affected area 2 times daily  Qty: 15 g, Refills: 0    Associated Diagnoses: Tinea corporis      hydrocortisone 1 % cream Apply topically 4 (four) times a day as needed for rash for up to 7 days  Qty: 20 g, Refills: 0    Associated Diagnoses: Rash and nonspecific skin eruption      hydrOXYzine HCL (ATARAX) 25 mg tablet Take 1 tablet (25 mg total) by mouth every 6 (six) hours as needed for itching for up to 5 days  Qty: 20 tablet, Refills: 0    Associated Diagnoses: Anxiety      ketoconazole (NIZORAL) 2 % shampoo Apply 3 Applications topically once for 1 dose Apply to affected areas and wash off after 5 minutes. Use for 3 consecutive days.  Qty: 100 mL, Refills: 0    Associated Diagnoses: Tinea corporis      lisinopril (ZESTRIL) 40 mg tablet Take 1 tablet (40 mg total) by mouth daily  Qty: 30 tablet, Refills: 0    Associated Diagnoses: Primary hypertension      Magnesium 400 MG CAPS Take 1 capsule by mouth in the morning      metroNIDAZOLE (METROCREAM) 0.75 % cream Apply 1 Application topically 2 (two) times a day      naltrexone (REVIA) 50 mg tablet Take 1 tablet (50 mg total) by mouth daily  Qty: 30 tablet, Refills: 0    Associated Diagnoses: Alcohol use  disorder, severe, dependence (HCC)      Omega-3 Fatty Acids (fish oil) 1,000 mg Take 2,000 mg by mouth daily      pantoprazole (PROTONIX) 40 mg tablet Take 1 tablet (40 mg total) by mouth daily in the early morning  Qty: 90 tablet, Refills: 0    Associated Diagnoses: Chronic alcoholic gastritis without hemorrhage; Gastroesophageal reflux disease, unspecified whether esophagitis present      Thiamine Mononitrate (VITAMIN B1) 100 mg tablet Take 1 tablet (100 mg total) by mouth daily  Qty: 30 tablet, Refills: 0    Associated Diagnoses: Alcohol use disorder, severe, dependence (HCC)           No discharge procedures on file.    PDMP Review         Value Time User    PDMP Reviewed  Yes 2/14/2024  7:35 PM Enid Altman DO             ED Provider  Attending physically available and evaluated Diogo Travis. I managed the patient along with the ED Attending.    Electronically Signed by           Abida Alvarenga MD  06/19/24 3696

## 2024-06-18 NOTE — ED ATTENDING ATTESTATION
6/18/2024  ILorri DO, saw and evaluated the patient. I have discussed the patient with the resident/non-physician practitioner and agree with the resident's/non-physician practitioner's findings, Plan of Care, and MDM as documented in the resident's/non-physician practitioner's note, except where noted. All available labs and Radiology studies were reviewed.  I was present for key portions of any procedure(s) performed by the resident/non-physician practitioner and I was immediately available to provide assistance.       At this point I agree with the current assessment done in the Emergency Department.  I have conducted an independent evaluation of this patient a history and physical is as follows:    History  Patient is a 58 y.o. year old male who presents for evaluation with symptoms of alcohol withdrawal.   States his last drink was last night around 2100  Drank 2 bottles of wine and some bourbon yesterday, states this is pretty typical for him  Tremors, anxiety, diaphoresis     Current Outpatient Medications   Medication Instructions    clotrimazole (LOTRIMIN) 1 % cream Apply to affected area 2 times daily    escitalopram (LEXAPRO) 20 mg, Oral, Daily    fish oil 2,000 mg, Oral, Daily    folic acid (FOLVITE) 400 mcg, Oral, Daily    hydrocortisone 1 % cream Topical, 4 times daily PRN    hydrOXYzine HCL (ATARAX) 25 mg, Oral, Every 6 hours PRN    ketoconazole (NIZORAL) 2 % shampoo 3 Applications, Topical, Once, Apply to affected areas and wash off after 5 minutes. Use for 3 consecutive days.    lisinopril (ZESTRIL) 40 mg, Oral, Daily    Magnesium 400 MG CAPS 1 capsule, Oral, Daily    metroNIDAZOLE (METROCREAM) 0.75 % cream 1 Application, Topical, 2 times daily    naltrexone (REVIA) 50 mg, Oral, Daily    pantoprazole (PROTONIX) 40 mg, Oral, Daily (early morning)    Thiamine Mononitrate (VITAMIN B1) 100 mg, Oral, Daily     Past Medical History:   Diagnosis Date    Alcohol use disorder, severe, dependence  (HCC) 04/02/2023    Alcohol withdrawal seizure (HCC)     Alcoholic ketoacidosis 10/16/2023    Anxiety     AR (allergic rhinitis)     Chronic alcoholic gastritis 04/02/2023    Depression     GERD (gastroesophageal reflux disease)     Hepatic steatosis 04/02/2023    Hyperlipidemia     Hypertension     IBS (irritable bowel syndrome)     Obesity     Rosacea     Vitamin B12 deficiency 02/14/2024    Vitamin D deficiency 02/14/2024     Past Surgical History:   Procedure Laterality Date    ANTERIOR CRUCIATE LIGAMENT REPAIR Left     WISDOM TOOTH EXTRACTION         Objective  Vitals:    06/18/24 1845 06/18/24 1900 06/18/24 2000 06/18/24 2030   BP: 157/100 143/94 148/100 (!) 164/103   BP Location: Right arm Right arm Right arm Right arm   Pulse: (!) 135 (!) 127 (!) 128 (!) 112   Resp: 20 18 21 19   Temp:       TempSrc:       SpO2: (S) (!) 89% 93% 96% 95%   Weight:       Height:           General: Awake, alert, acute distress.    Head: Normocephalic, atraumatic.  Eyes: PERRL, EOM-I.   ENT: Atraumatic external nose and ears.  MMM. No stridor.   Neck: Symmetric, supple, trachea midline.  CV: Tachycardic.   Lungs: Respirations unlabored, mild tachypnea.   Abd: soft, NT/ND. No guarding. No peritoneal signs.   MSK: Extremities without tenderness or gross deformity. No lower extremity edema.   Skin: Warm, diaphoretic. No lesions.  Neuro: AAOx3, GCS 15, CN II-XII grossly intact. Motor grossly intact. Sensory grossly intact. Generalized tremors.  Psychiatric/Behavioral: Anxious mood, tearful affect.    Results Reviewed       Procedure Component Value Units Date/Time    Lipase [[id number]]     Lab Status: No result Specimen: Blood     Comprehensive metabolic panel [[id number]]  (Abnormal) Collected: 06/18/24 1744    Lab Status: Final result Specimen: Blood from Arm, Left Updated: 06/18/24 1810     Sodium 137 mmol/L      Potassium 4.3 mmol/L      Chloride 99 mmol/L      CO2 20 mmol/L      ANION GAP 18 mmol/L      BUN 10 mg/dL       Creatinine 0.89 mg/dL      Glucose 95 mg/dL      Calcium 9.1 mg/dL      AST 77 U/L      ALT 35 U/L      Alkaline Phosphatase 93 U/L      Total Protein 7.6 g/dL      Albumin 4.4 g/dL      Total Bilirubin 0.77 mg/dL      eGFR 94 ml/min/1.73sq m     Narrative:      National Kid[employer] guidelines for Chronic Kidney Disease (CKD):     Stage 1 with normal or high GFR (GFR > 90 mL/min/1.73 square meters)    Stage 2 Mild CKD (GFR = 60-89 mL/min/1.73 square meters)    Stage 3A Moderate CKD (GFR = 45-59 mL/min/1.73 square meters)    Stage 3B Moderate CKD (GFR = 30-44 mL/min/1.73 square meters)    Stage 4 Severe CKD (GFR = 15-29 mL/min/1.73 square meters)    Stage 5 End Stage CKD (GFR <15 mL/min/1.73 square meters)  Note: GFR calculation is accurate only with a steady state creatinine    Phosphorus [[id number]]  (Normal) Collected: 06/18/24 1744    Lab Status: Final result Specimen: Blood from Arm, Left Updated: 06/18/24 1810     Phosphorus 2.9 mg/dL     Magnesium [[id number]]  (Abnormal) Collected: 06/18/24 1744    Lab Status: Final result Specimen: Blood from Arm, Left Updated: 06/18/24 1810     Magnesium 1.3 mg/dL     Protime-INR [[id number]]  (Normal) Collected: 06/18/24 1744    Lab Status: Final result Specimen: Blood from Arm, Left Updated: 06/18/24 1803     Protime 13.9 seconds      INR 1.01    APTT [[id number]]  (Normal) Collected: 06/18/24 1744    Lab Status: Final result Specimen: Blood from Arm, Left Updated: 06/18/24 1803     PTT 30 seconds     CBC and differential [[id number]]  (Abnormal) Collected: 06/18/24 1744    Lab Status: Final result Specimen: Blood from Arm, Left Updated: 06/18/24 1757     WBC 6.95 Thousand/uL      RBC 4.42 Million/uL      Hemoglobin 14.6 g/dL      Hematocrit 41.2 %      MCV 93 fL      MCH 33.0 pg      MCHC 35.4 g/dL      RDW 14.6 %      MPV 8.2 fL      Platelets 172 Thousands/uL      nRBC 0 /100 WBCs      Segmented % 85 %      Immature Grans % 0 %      Lymphocytes % 8 %       Monocytes % 6 %      Eosinophils Relative 0 %      Basophils Relative 1 %      Absolute Neutrophils 5.95 Thousands/µL      Absolute Immature Grans 0.02 Thousand/uL      Absolute Lymphocytes 0.55 Thousands/µL      Absolute Monocytes 0.38 Thousand/µL      Eosinophils Absolute 0.01 Thousand/µL      Basophils Absolute 0.04 Thousands/µL           No orders to display     Medications   magnesium sulfate 2 g/50 mL IVPB (premix) 2 g (2 g Intravenous New Bag 6/18/24 2028)   diazepam (VALIUM) injection 10 mg (10 mg Intravenous Given 6/18/24 1736)   ondansetron (ZOFRAN) injection 4 mg (4 mg Intravenous Given 6/18/24 1736)   sodium chloride 0.9 % bolus 500 mL (0 mL Intravenous Stopped 6/18/24 1835)   thiamine (VITAMIN B1) 500 mg in sodium chloride 0.9 % 100 mL IVPB (0 mg Intravenous Stopped 6/18/24 1835)   folic acid 1 mg in sodium chloride 0.9 % 50 mL IVPB (0 mg Intravenous Stopped 6/18/24 1910)   pantoprazole (PROTONIX) injection 40 mg (40 mg Intravenous Given 6/18/24 1809)   diazepam (VALIUM) injection 10 mg (10 mg Intravenous Given 6/18/24 1840)   PHENobarbital 260 mg in sodium chloride 0.9 % 100 mL IVPB (0 mg Intravenous Stopped 6/18/24 2027)   diazepam (VALIUM) injection 10 mg (10 mg Intravenous Given 6/18/24 1954)   ondansetron (ZOFRAN) injection 4 mg (4 mg Intravenous Given 6/18/24 1954)     ED Course as of 06/18/24 2032 Tue Jun 18, 2024   1729 Pulse(!): 124   1729 Respirations(!): 24   1729 Blood Pressure(!): 196/92   1803 Temperature: 100 °F (37.8 °C)   1834 MAGNESIUM(!): 1.3   1835 ANION GAP(!): 18       MDM      Final Diagnosis:  1. Alcohol withdrawal (HCC)        Critical Care Time  Procedures       night at 2100.  On exam, patient tachycardic, tachypneic, hypertensive, hypothermic, diaphoretic, and anxious appearing with generalized tremors.  Symptoms consistent with delirium tremens.  Patient treated symptomatically with IV fluids, IV thiamine, folic acid, and IV Valium.    Patient's labs notable for hypomagnesemia and a mildly increased anion gap of 18, likely secondary to alcohol use.  IV mag sulfate ordered for replacement.  Patient continues to have symptoms of delirium tremens.  Multiple doses of IV Valium were given without significant improvement, CIWA scores remained in the 20's.  IV phenobarbital then ordered.  Patient will require admission for acute alcohol withdrawal/delirium tremens.  Patient is not interested in going to the detox facility at this time, will admit to critical care service for further evaluation and treatment.    Final Diagnosis:  1. Alcohol withdrawal (HCC)        Critical Care Time  CriticalCare Time    Date/Time: 6/18/2024 7:36 PM    Performed by: Lorri Norris DO  Authorized by: Lorri Norris DO    Critical care provider statement:     Critical care time (minutes):  32    Critical care time was exclusive of:  Separately billable procedures and treating other patients and teaching time    Critical care was necessary to treat or prevent imminent or life-threatening deterioration of the following conditions:  Toxidrome (Delerium tremens/alcohol withdrawal)    Critical care was time spent personally by me on the following activities:  Obtaining history from patient or surrogate, discussions with consultants, development of treatment plan with patient or surrogate, evaluation of patient's response to treatment, examination of patient, re-evaluation of patient's condition, ordering and review of laboratory studies and ordering and performing treatments and interventions

## 2024-06-19 LAB
ALBUMIN SERPL BCG-MCNC: 3.8 G/DL (ref 3.5–5)
ALP SERPL-CCNC: 79 U/L (ref 34–104)
ALT SERPL W P-5'-P-CCNC: 24 U/L (ref 7–52)
ANION GAP SERPL CALCULATED.3IONS-SCNC: 7 MMOL/L (ref 4–13)
AST SERPL W P-5'-P-CCNC: 39 U/L (ref 13–39)
BILIRUB SERPL-MCNC: 0.96 MG/DL (ref 0.2–1)
BUN SERPL-MCNC: 11 MG/DL (ref 5–25)
CA-I BLD-SCNC: 1.04 MMOL/L (ref 1.12–1.32)
CALCIUM SERPL-MCNC: 8.4 MG/DL (ref 8.4–10.2)
CHLORIDE SERPL-SCNC: 103 MMOL/L (ref 96–108)
CO2 SERPL-SCNC: 28 MMOL/L (ref 21–32)
CREAT SERPL-MCNC: 0.98 MG/DL (ref 0.6–1.3)
ERYTHROCYTE [DISTWIDTH] IN BLOOD BY AUTOMATED COUNT: 14.5 % (ref 11.6–15.1)
GFR SERPL CREATININE-BSD FRML MDRD: 85 ML/MIN/1.73SQ M
GLUCOSE SERPL-MCNC: 132 MG/DL (ref 65–140)
HCT VFR BLD AUTO: 37.7 % (ref 36.5–49.3)
HGB BLD-MCNC: 13 G/DL (ref 12–17)
MAGNESIUM SERPL-MCNC: 2.4 MG/DL (ref 1.9–2.7)
MCH RBC QN AUTO: 32 PG (ref 26.8–34.3)
MCHC RBC AUTO-ENTMCNC: 34.5 G/DL (ref 31.4–37.4)
MCV RBC AUTO: 93 FL (ref 82–98)
PHOSPHATE SERPL-MCNC: 3 MG/DL (ref 2.7–4.5)
PLATELET # BLD AUTO: 127 THOUSANDS/UL (ref 149–390)
PMV BLD AUTO: 8.6 FL (ref 8.9–12.7)
POTASSIUM SERPL-SCNC: 3.7 MMOL/L (ref 3.5–5.3)
PROT SERPL-MCNC: 6.3 G/DL (ref 6.4–8.4)
RBC # BLD AUTO: 4.06 MILLION/UL (ref 3.88–5.62)
SODIUM SERPL-SCNC: 138 MMOL/L (ref 135–147)
WBC # BLD AUTO: 5.96 THOUSAND/UL (ref 4.31–10.16)

## 2024-06-19 PROCEDURE — 99233 SBSQ HOSP IP/OBS HIGH 50: CPT | Performed by: STUDENT IN AN ORGANIZED HEALTH CARE EDUCATION/TRAINING PROGRAM

## 2024-06-19 PROCEDURE — 83735 ASSAY OF MAGNESIUM: CPT | Performed by: EMERGENCY MEDICINE

## 2024-06-19 PROCEDURE — 82330 ASSAY OF CALCIUM: CPT | Performed by: EMERGENCY MEDICINE

## 2024-06-19 PROCEDURE — 99232 SBSQ HOSP IP/OBS MODERATE 35: CPT | Performed by: STUDENT IN AN ORGANIZED HEALTH CARE EDUCATION/TRAINING PROGRAM

## 2024-06-19 PROCEDURE — C9113 INJ PANTOPRAZOLE SODIUM, VIA: HCPCS | Performed by: EMERGENCY MEDICINE

## 2024-06-19 PROCEDURE — NC001 PR NO CHARGE: Performed by: STUDENT IN AN ORGANIZED HEALTH CARE EDUCATION/TRAINING PROGRAM

## 2024-06-19 PROCEDURE — 84100 ASSAY OF PHOSPHORUS: CPT | Performed by: EMERGENCY MEDICINE

## 2024-06-19 PROCEDURE — 80053 COMPREHEN METABOLIC PANEL: CPT | Performed by: EMERGENCY MEDICINE

## 2024-06-19 PROCEDURE — 85027 COMPLETE CBC AUTOMATED: CPT | Performed by: EMERGENCY MEDICINE

## 2024-06-19 RX ORDER — ACETAMINOPHEN 10 MG/ML
1000 INJECTION, SOLUTION INTRAVENOUS ONCE
Status: COMPLETED | OUTPATIENT
Start: 2024-06-19 | End: 2024-06-19

## 2024-06-19 RX ORDER — PHENOBARBITAL SODIUM 65 MG/ML
130 INJECTION, SOLUTION INTRAMUSCULAR; INTRAVENOUS ONCE
Status: DISCONTINUED | OUTPATIENT
Start: 2024-06-19 | End: 2024-06-19

## 2024-06-19 RX ORDER — PHENOBARBITAL SODIUM 65 MG/ML
130 INJECTION, SOLUTION INTRAMUSCULAR; INTRAVENOUS ONCE
Status: COMPLETED | OUTPATIENT
Start: 2024-06-19 | End: 2024-06-19

## 2024-06-19 RX ORDER — POLYETHYLENE GLYCOL 3350 17 G/17G
17 POWDER, FOR SOLUTION ORAL DAILY
Status: DISCONTINUED | OUTPATIENT
Start: 2024-06-19 | End: 2024-06-20

## 2024-06-19 RX ORDER — ATORVASTATIN CALCIUM 40 MG/1
40 TABLET, FILM COATED ORAL
Status: DISCONTINUED | OUTPATIENT
Start: 2024-06-20 | End: 2024-06-21 | Stop reason: HOSPADM

## 2024-06-19 RX ADMIN — ACETAMINOPHEN 1000 MG: 10 INJECTION INTRAVENOUS at 05:15

## 2024-06-19 RX ADMIN — HYDROXYZINE HYDROCHLORIDE 25 MG: 25 TABLET ORAL at 21:46

## 2024-06-19 RX ADMIN — THIAMINE HYDROCHLORIDE 100 MG: 100 INJECTION, SOLUTION INTRAMUSCULAR; INTRAVENOUS at 09:08

## 2024-06-19 RX ADMIN — DEXTROSE AND SODIUM CHLORIDE 125 ML/HR: 5; .9 INJECTION, SOLUTION INTRAVENOUS at 05:15

## 2024-06-19 RX ADMIN — THIAMINE HYDROCHLORIDE 500 MG: 100 INJECTION, SOLUTION INTRAMUSCULAR; INTRAVENOUS at 15:13

## 2024-06-19 RX ADMIN — Medication 6 MG: at 21:46

## 2024-06-19 RX ADMIN — PHENOBARBITAL SODIUM 650 MG: 130 INJECTION INTRAMUSCULAR at 05:40

## 2024-06-19 RX ADMIN — ENOXAPARIN SODIUM 40 MG: 40 INJECTION SUBCUTANEOUS at 08:38

## 2024-06-19 RX ADMIN — THIAMINE HYDROCHLORIDE 500 MG: 100 INJECTION, SOLUTION INTRAMUSCULAR; INTRAVENOUS at 21:45

## 2024-06-19 RX ADMIN — PHENOBARBITAL SODIUM 130 MG: 65 INJECTION INTRAMUSCULAR at 01:14

## 2024-06-19 RX ADMIN — PANTOPRAZOLE SODIUM 40 MG: 40 INJECTION, POWDER, FOR SOLUTION INTRAVENOUS at 08:38

## 2024-06-19 RX ADMIN — LISINOPRIL 40 MG: 20 TABLET ORAL at 14:31

## 2024-06-19 RX ADMIN — FOLIC ACID 1 MG: 5 INJECTION, SOLUTION INTRAMUSCULAR; INTRAVENOUS; SUBCUTANEOUS at 09:09

## 2024-06-19 NOTE — H&P
Pending sale to [facility]  H&P  Name: Diogo Travis 58 y.o. male I MRN: [id number]  Unit/Bed#: ICU 14 I Date of Admission: 6/18/2024   Date of Service: 6/18/2024 I Hospital Day: 0      Assessment & Plan   Alcohol use disorder, severe, dependence (HCC)  Assessment & Plan  History of alcohol use disorder  Declining detoxification at this time  Reported relapse due to stress in life, former vodka drinker now drinks 2-3 bottles wine with additional bourbon shooters  Last drink 9 PM on 6/17/2024    Presented to ED on 6/18 tremulous, nauseous, vomiting, diaphoretic, agitated   CIWA-Ar Score       Row Name 06/18/24 2000 06/18/24 1733          CIWA-Ar    BP -- 196/92     Pulse -- 124     Nausea and Vomiting 3 3     Tactile Disturbances 1 2     Tremor 7 7     Auditory Disturbances 1 1     Paroxysmal Sweats 4 4     Visual Disturbances 1 1     Anxiety 4 4     Headache, Fullness in Head 0 1     Agitation 4 4     Orientation and Clouding of Sensorium 1 1     CIWA-Ar Total 26 28                   CIWA protocol  Given total 30 mg of Valium and 260 of phenobarb in ED with improvement of symptoms  Toxicology consulted appreciate recommendations  130 mg phenobarbital as needed  Hold further benzodiazepine use at this time    ANION GAP   Date Value Ref Range Status   06/18/2024 18 (H) 4 - 13 mmol/L Final   04/27/2024 10 4 - 13 mmol/L Final   02/14/2024 10 mmol/L Final   12/27/2022 14 (H) 3 - 11 Final   04/20/2021 8 3 - 11 Final     Comment:     Specimen slightly hemolyzed, results may be affected.   12/21/2020 5 3 - 11 Final     Elevated anion gap in setting of chronic alcohol use.  D5 normal saline  Thiamine and folate repletion  Trend electrolytes    Anxiety  Assessment & Plan  History of,  Continue home Lexapro when able to tolerate PO    Hypertension  Assessment & Plan  BP: (143-196)/() 164/103    Elevated in setting of alcohol withdrawal  Has not had home BP medications 2/2 nausea and vomiting  Zofran  prn  Restart home Lisinopril 40 mg when appropriate    Chronic alcoholic gastritis  Assessment & Plan  History of, now with acute nausea and vomiting in setting of alcohol withdrawal  No reported hematemesis  Zofran prn  NPO sips with clears, advance diet as tolerated  Protonix 40 mg QD    Hypomagnesemia  Assessment & Plan  Magnesium   Date Value Ref Range Status   06/18/2024 1.3 (L) 1.9 - 2.7 mg/dL Final   02/14/2024 2.0 1.9 - 2.7 mg/dL Final   02/10/2024 1.7 (L) 1.9 - 2.7 mg/dL Final     Low likely in setting of chronic alcohol use  2g given ED with addition 2g recommended by Toxicology  Replete prn  Trend with AM labs           History of Present Illness     HPI: Diogo Travis is a 58 y.o. who presents with alcohol withdrawal.  Patient notes he has a history of alcohol abuse, noting he has been through detoxification previously, however relapsed.  Patient notes he was given a prescription for naltrexone however never took the medication.  Patient states that recently he has been under stress, typically drinks 2-3 bottles of wine with additional bourbon shooters.  Prior to this patient noted that he used vodka as his drink of choice however notes that he began experiencing nausea and vomiting prompting him to stop drinking with last drink at 9 PM last evening (6/17/2024).  Patient notes that since that time he has had worsening shaking, occasional paresthesias described as pinpricks, worsening nausea and vomiting, as well as sweating.  Patient denies having seizures during this time, or previously.  Patient expresses concern given his current symptoms and is requesting help, however declines detox unit at this time.  Patient denies any known fevers, cough, headache, back pain, difficulty breathing, change in bowel or bladder habits, or any other complaints at this time.    History obtained from the patient.  Review of Systems: Review of Systems   Constitutional:  Positive for diaphoresis. Negative for chills and  fever.   HENT:  Negative for ear pain and sore throat.    Eyes:  Negative for pain and visual disturbance.   Respiratory:  Negative for cough and shortness of breath.    Cardiovascular:  Negative for chest pain and palpitations.   Gastrointestinal:  Positive for abdominal pain (Epigastric, mild, dull), nausea and vomiting.   Genitourinary:  Negative for dysuria and hematuria.   Musculoskeletal:  Negative for arthralgias and back pain.   Skin:  Negative for color change and rash.   Neurological:  Positive for tremors. Negative for seizures, syncope, weakness and headaches.   Psychiatric/Behavioral:  Negative for confusion and hallucinations. The patient is nervous/anxious.    All other systems reviewed and are negative.    Disposition: Stepdown Level 1  Historical Information   Past Medical History:  04/02/2023: Alcohol use disorder, severe, dependence (HCC)  No date: Alcohol withdrawal seizure (HCC)  10/16/2023: Alcoholic ketoacidosis  No date: Anxiety  No date: AR (allergic rhinitis)  04/02/2023: Chronic alcoholic gastritis  No date: Depression  No date: GERD (gastroesophageal reflux disease)  04/02/2023: Hepatic steatosis  No date: Hyperlipidemia  No date: Hypertension  No date: IBS (irritable bowel syndrome)  No date: Obesity  No date: Rosacea  02/14/2024: Vitamin B12 deficiency  02/14/2024: Vitamin D deficiency Past Surgical History:  No date: ANTERIOR CRUCIATE LIGAMENT REPAIR; Left  No date: WISDOM TOOTH EXTRACTION   Current Outpatient Medications   Medication Instructions    clotrimazole (LOTRIMIN) 1 % cream Apply to affected area 2 times daily    escitalopram (LEXAPRO) 20 mg, Oral, Daily    fish oil 2,000 mg, Oral, Daily    folic acid (FOLVITE) 400 mcg, Oral, Daily    hydrocortisone 1 % cream Topical, 4 times daily PRN    hydrOXYzine HCL (ATARAX) 25 mg, Oral, Every 6 hours PRN    ketoconazole (NIZORAL) 2 % shampoo 3 Applications, Topical, Once, Apply to affected areas and wash off after 5 minutes. Use for 3  consecutive days.    lisinopril (ZESTRIL) 40 mg, Oral, Daily    Magnesium 400 MG CAPS 1 capsule, Oral, Daily    metroNIDAZOLE (METROCREAM) 0.75 % cream 1 Application, Topical, 2 times daily    naltrexone (REVIA) 50 mg, Oral, Daily    pantoprazole (PROTONIX) 40 mg, Oral, Daily (early morning)    Thiamine Mononitrate (VITAMIN B1) 100 mg, Oral, Daily    Allergies   Allergen Reactions    Cefdinir Rash      Social History     Tobacco Use    Smoking status: Some Days     Types: Cigarettes, Cigars     Start date: 1/1/2014     Passive exposure: Past (Father)    Smokeless tobacco: Never    Tobacco comments:     Smokes cigars 2-3 times per year   Vaping Use    Vaping status: Never Used   Substance Use Topics    Alcohol use: Yes     Comment: 3-4 vodka drinks/day    Drug use: Never    Family History   Problem Relation Age of Onset    Cancer Mother 78        Type unknown    Hypertension Father     Coronary artery disease Father 60    Cancer Father 82        Bladder; + Smoker    No Known Problems Sister     No Known Problems Sister     No Known Problems Sister     No Known Problems Son     No Known Problems Son     Heart attack Paternal Grandfather 60    Coronary artery disease Paternal Grandfather 60          Objective                            Vitals I/O      Most Recent Min/Max in 24hrs   Temp 99.1 °F (37.3 °C) Temp  Min: 99.1 °F (37.3 °C)  Max: 100 °F (37.8 °C)   Pulse (!) 122 Pulse  Min: 112  Max: 135   Resp (!) 23 Resp  Min: 18  Max: 24   BP (!) 172/98 BP  Min: 143/94  Max: 196/92   O2 Sat 96 % SpO2  Min: 89 %  Max: 96 %      Intake/Output Summary (Last 24 hours) at 6/18/2024 2147  Last data filed at 6/18/2024 2027  Gross per 24 hour   Intake 750 ml   Output --   Net 750 ml       Diet NPO; Sips of clear liquids    Invasive Monitoring           Physical Exam   Physical Exam  Vitals and nursing note reviewed.   Eyes:      Extraocular Movements: Extraocular movements intact.      Conjunctiva/sclera: Conjunctivae normal.       Pupils: Pupils are equal, round, and reactive to light.   Skin:     General: Skin is warm.   HENT:      Head: Normocephalic and atraumatic.      Right Ear: Hearing normal.      Left Ear: Hearing normal.      Mouth/Throat:      Mouth: Mucous membranes are moist.   Cardiovascular:      Rate and Rhythm: Regular rhythm. Tachycardia present.   Musculoskeletal:         General: Normal range of motion.   Abdominal: General: There is no distension.      Palpations: Abdomen is soft.      Tenderness: There is abdominal tenderness (mild epigastric). There is no guarding.   Constitutional:       General: He is in acute distress (moderate).      Appearance: He is well-nourished. He is obese. He is diaphoretic.   Pulmonary:      Effort: Pulmonary effort is normal.      Breath sounds: Normal breath sounds.   Neurological:      Mental Status: He is alert and oriented to person, place and time. Mental status is at baseline.      Motor: Strength full and intact in all extremities.      Comments: Tongue fasciculations as well as asterixis            Diagnostic Studies      EKG: Sinus tachycardia 123 bpm, no PAC, no PVC, no acute ischemic changes.     Medications:  Scheduled PRN   chlorhexidine, 15 mL, Q12H COLBY  [START ON 6/19/2024] enoxaparin, 40 mg, Daily  [START ON 6/19/2024] folic acid 1 mg in sodium chloride 0.9 % 50 mL IVPB, 1 mg, Daily  magnesium sulfate, 2 g, Once  magnesium sulfate, 2 g, Once  [START ON 6/19/2024] pantoprazole, 40 mg, Q24H COLBY  [START ON 6/19/2024] thiamine, 100 mg, Daily      ondansetron, 4 mg, Q6H PRN       Continuous    dextrose 5 % and sodium chloride 0.9 %, 125 mL/hr, Last Rate: 125 mL/hr (06/18/24 2126)         Labs:    CBC    Recent Labs     06/18/24  1744 06/18/24 2119   WBC 6.95  --    HGB 14.6  --    HCT 41.2  --     174     BMP    Recent Labs     06/18/24  1744   SODIUM 137   K 4.3   CL 99   CO2 20*   AGAP 18*   BUN 10   CREATININE 0.89   CALCIUM 9.1       Coags    Recent Labs      06/18/24  1744   INR 1.01   PTT 30        Additional Electrolytes  Recent Labs     06/18/24  1744   MG 1.3*   PHOS 2.9          Blood Gas    No recent results  No recent results LFTs  Recent Labs     06/18/24  1744   ALT 35   AST 77*   ALKPHOS 93   ALB 4.4   TBILI 0.77       Infectious  No recent results  Glucose  Recent Labs     06/18/24  1744   GLUC 95             Anticipated Length of Stay is > 2 midnights  Donald Singleton MD

## 2024-06-19 NOTE — ASSESSMENT & PLAN NOTE
History of, now with acute nausea and vomiting in setting of alcohol withdrawal  No reported hematemesis  Zofran prn  NPO sips with clears, advance diet as tolerated  Protonix 40 mg QD

## 2024-06-19 NOTE — PROGRESS NOTES
Critical Care Interval Transfer Note:    Brief Hospital Summary:   Pt with history of alcohol withdrawal presented last night about 24h after his last drink in active withdrawal. Toxicology was consulted and provided guidance in dosing of phenobarbital. Pt received 1170mg of phenobarb overnight and has been comfortable with low CIWA scores since his last dose early this morning.      Barriers to discharge:   Alcohol withdrawal     Consults: IP CONSULT TO CASE MANAGEMENT  IP CONSULT TO TOXICOLOGY    Recommended to review admission imaging for incidental findings and document in discharge navigator: Chart reviewed, no known incidental findings noted at this time.      Discharge Plan: Anticipate discharge in 24-48 hrs to home.        Patient seen and evaluated by Critical Care today and deemed to be appropriate for transfer to Med Surg. Spoke to Dr Rai from [facility] to accept transfer. Critical care can be contacted via [employer] with any questions or concerns.

## 2024-06-19 NOTE — ASSESSMENT & PLAN NOTE
History of alcohol use disorder  Declining detoxification at this time  Reported relapse due to stress in life, former vodka drinker now drinks 2-3 bottles wine with additional bourbon shooters  Last drink 9 PM on 6/17/2024    Presented to ED on 6/18 tremulous, nauseous, vomiting, diaphoretic, agitated   CIWA-Ar Score       Row Name 06/18/24 2000 06/18/24 1733          CIWA-Ar    BP -- 196/92     Pulse -- 124     Nausea and Vomiting 3 3     Tactile Disturbances 1 2     Tremor 7 7     Auditory Disturbances 1 1     Paroxysmal Sweats 4 4     Visual Disturbances 1 1     Anxiety 4 4     Headache, Fullness in Head 0 1     Agitation 4 4     Orientation and Clouding of Sensorium 1 1     CIWA-Ar Total 26 28                   CIWA protocol  Given total 30 mg of Valium and 260 of phenobarb in ED with improvement of symptoms  Toxicology consulted appreciate recommendations  130 mg phenobarbital as needed  Hold further benzodiazepine use at this time    ANION GAP   Date Value Ref Range Status   06/18/2024 18 (H) 4 - 13 mmol/L Final   04/27/2024 10 4 - 13 mmol/L Final   02/14/2024 10 mmol/L Final   12/27/2022 14 (H) 3 - 11 Final   04/20/2021 8 3 - 11 Final     Comment:     Specimen slightly hemolyzed, results may be affected.   12/21/2020 5 3 - 11 Final     Elevated anion gap in setting of chronic alcohol use.  D5 normal saline  Thiamine and folate repletion  Trend electrolytes

## 2024-06-19 NOTE — ASSESSMENT & PLAN NOTE
Magnesium   Date Value Ref Range Status   06/19/2024 2.4 1.9 - 2.7 mg/dL Final   06/18/2024 1.3 (L) 1.9 - 2.7 mg/dL Final   02/14/2024 2.0 1.9 - 2.7 mg/dL Final     Low likely in setting of chronic alcohol use  2g given ED with addition 2g recommended by Toxicology  Replete prn    Resolved 6/19 AM  Trend with AM labs

## 2024-06-19 NOTE — PROGRESS NOTES
[facility]  Progress Note  Name: Diogo Travis I  MRN: [id number]  Unit/Bed#: ICU 14 I Date of Admission: 6/18/2024   Date of Service: 6/19/2024 I Hospital Day: 1    Assessment & Plan   Alcohol use disorder, severe, dependence (HCC)  Assessment & Plan  History of alcohol use disorder  Declining detoxification at this time  Reported relapse due to stress in life, former vodka drinker now drinks 2-3 bottles wine with additional bourbon shooters  Last drink 9 PM on 6/17/2024  Presented to ED on 6/18 tremulous, nauseous, vomiting, diaphoretic, agitated   CIWA-Ar Score       Row Name 06/19/24 0500 06/19/24 0100 06/18/24 2113       CIWA-Ar    /94 -- --    Pulse 84 -- --    Nausea and Vomiting 4 4 4    Tactile Disturbances 0 1 1    Tremor 4 4 7    Auditory Disturbances 0 1 0    Paroxysmal Sweats 4 3 4    Visual Disturbances 0 0 1    Anxiety 5 4 4    Headache, Fullness in Head 5 0 0    Agitation 4 4 2    Orientation and Clouding of Sensorium 0 0 1    CIWA-Ar Total 26 21 24      Row Name 06/18/24 2000 06/18/24 1733          CIWA-Ar    BP -- 196/92     Pulse -- 124     Nausea and Vomiting 3 3     Tactile Disturbances 1 2     Tremor 7 7     Auditory Disturbances 1 1     Paroxysmal Sweats 4 4     Visual Disturbances 1 1     Anxiety 4 4     Headache, Fullness in Head 0 1     Agitation 4 4     Orientation and Clouding of Sensorium 1 1     CIWA-Ar Total 26 28                   CIWA protocol  Given total 30 mg of Valium and 260 of phenobarb in ED with improvement of symptoms  Toxicology consulted  Additional 260 and 650 of phenobarb overnight    ANION GAP   Date Value Ref Range Status   06/19/2024 7 4 - 13 mmol/L Final   06/18/2024 18 (H) 4 - 13 mmol/L Final   04/27/2024 10 4 - 13 mmol/L Final   12/27/2022 14 (H) 3 - 11 Final   04/20/2021 8 3 - 11 Final     Comment:     Specimen slightly hemolyzed, results may be affected.   12/21/2020 5 3 - 11 Final     Plan:  D5 normal saline  Thiamine and folate  repletion  Trend electrolytes  130 mg phenobarbital will be ordered as needed  Hold further benzodiazepine use at this time    Anxiety  Assessment & Plan  History of,  Continue home Lexapro and PRN Atarax when able to tolerate PO    Hypertension  Assessment & Plan  Elevated in setting of alcohol withdrawal  Had not had home BP medications on arrival 2/2 nausea and vomiting  Zofran prn  Restart home Lisinopril 40 mg    Chronic alcoholic gastritis  Assessment & Plan  History of, now with acute nausea and vomiting in setting of alcohol withdrawal  No reported hematemesis  Zofran prn  NPO sips with clears, did not tolerate any more this AM, advance diet as tolerated  Continue home Protonix 40 mg QD    Hypomagnesemia  Assessment & Plan  Magnesium   Date Value Ref Range Status   06/19/2024 2.4 1.9 - 2.7 mg/dL Final   06/18/2024 1.3 (L) 1.9 - 2.7 mg/dL Final   02/14/2024 2.0 1.9 - 2.7 mg/dL Final     Low likely in setting of chronic alcohol use  2g given ED with addition 2g recommended by Toxicology  Replete prn    Resolved 6/19 AM  Trend with AM labs             Disposition: Stepdown Level 1    ICU Core Measures     A: Assess, Prevent, and Manage Pain Has pain been assessed? Yes  Need for changes to pain regimen? No   B: Both SAT/SAT  N/A   C: Choice of Sedation RASS Goal: 0 Alert and Calm  Need for changes to sedation or analgesia regimen? No   D: Delirium CAM-ICU: Negative   E: Early Mobility  Plan for early mobility? Yes   F: Family Engagement Plan for family engagement today? declined         Prophylaxis:  VTE VTE covered by:  enoxaparin, Subcutaneous       Stress Ulcer  covered bypantoprazole (PROTONIX) 40 mg tablet [[id number]] (Long-Term Med), pantoprazole (PROTONIX) injection 40 mg [[id number]]         Significant 24hr Events     24hr events: Pt arrived overnight. He has received 1170mg of phenobarbital with improvement in symptoms. He tolerated some sips of water but is not tolerating a diet.     Subjective    Review of Systems: See HPI for Review of Systems     Objective                            Vitals I/O      Most Recent Min/Max in 24hrs   Temp 98.9 °F (37.2 °C) Temp  Min: 98.9 °F (37.2 °C)  Max: 100 °F (37.8 °C)   Pulse 79 Pulse  Min: 79  Max: 135   Resp 15 Resp  Min: 11  Max: 24   /83 BP  Min: 138/75  Max: 196/92   O2 Sat 91 % SpO2  Min: 89 %  Max: 96 %      Intake/Output Summary (Last 24 hours) at 6/19/2024 0712  Last data filed at 6/19/2024 0600  Gross per 24 hour   Intake 3080.83 ml   Output 300 ml   Net 2780.83 ml       Diet NPO; Sips of clear liquids    Invasive Monitoring   N/A        Physical Exam   Physical Exam  Vitals and nursing note reviewed.   Eyes:      General:         Right eye: No discharge.         Left eye: No discharge.      Conjunctiva/sclera: Conjunctivae normal.   HENT:      Head: Normocephalic.      Mouth/Throat:      Mouth: Mucous membranes are moist.   Cardiovascular:      Rate and Rhythm: Regular rhythm. Tachycardia present.      Pulses: Normal pulses.      Heart sounds: Normal heart sounds.   Abdominal: General: Bowel sounds are normal.      Palpations: Abdomen is rigid.      Tenderness: There is no abdominal tenderness.   Constitutional:       General: He is not in acute distress.     Appearance: He is well-developed. He is diaphoretic. He is not ill-appearing.   Pulmonary:      Effort: Pulmonary effort is normal. No respiratory distress.      Breath sounds: Normal breath sounds.   Neurological:      General: No focal deficit present.      Mental Status: He is alert and oriented to person, place and time. He is calm.            Diagnostic Studies      EKG: Sinus tachycardia  Imaging: none     Medications:  Scheduled PRN   chlorhexidine, 15 mL, Q12H COLBY  enoxaparin, 40 mg, Daily  escitalopram, 20 mg, Daily  folic acid 1 mg in sodium chloride 0.9 % 50 mL IVPB, 1 mg, Daily  lisinopril, 40 mg, Daily  melatonin, 6 mg, HS  multivitamin-minerals, 1 tablet, Daily  pantoprazole, 40 mg,  Q24H COLBY  thiamine, 100 mg, Daily      hydrOXYzine HCL, 25 mg, Q6H PRN  ondansetron, 4 mg, Q6H PRN       Continuous    dextrose 5 % and sodium chloride 0.9 %, 125 mL/hr, Last Rate: 125 mL/hr (06/19/24 0515)         Labs:    CBC    Recent Labs     06/18/24 1744 06/18/24 2119 06/19/24  0522   WBC 6.95  --  5.96   HGB 14.6  --  13.0   HCT 41.2  --  37.7    174 127*     BMP    Recent Labs     06/18/24 1744 06/19/24  0522   SODIUM 137 138   K 4.3 3.7   CL 99 103   CO2 20* 28   AGAP 18* 7   BUN 10 11   CREATININE 0.89 0.98   CALCIUM 9.1 8.4       Coags    Recent Labs     06/18/24 1744   INR 1.01   PTT 30        Additional Electrolytes  Recent Labs     06/18/24 1744 06/19/24  0522   MG 1.3* 2.4   PHOS 2.9 3.0   CAIONIZED  --  1.04*          Blood Gas    No recent results  No recent results LFTs  Recent Labs     06/18/24 1744 06/19/24  0522   ALT 35 24   AST 77* 39   ALKPHOS 93 79   ALB 4.4 3.8   TBILI 0.77 0.96       Infectious  Recent Labs     06/18/24 1744   PROCALCITONI <0.05     Glucose  Recent Labs     06/18/24 1744 06/19/24  0522   GLUC 95 132               Dequan Babin MD

## 2024-06-19 NOTE — ASSESSMENT & PLAN NOTE
Magnesium   Date Value Ref Range Status   06/18/2024 1.3 (L) 1.9 - 2.7 mg/dL Final   02/14/2024 2.0 1.9 - 2.7 mg/dL Final   02/10/2024 1.7 (L) 1.9 - 2.7 mg/dL Final     Low likely in setting of chronic alcohol use  2g given ED with addition 2g recommended by Toxicology  Replete prn  Trend with AM labs

## 2024-06-19 NOTE — ASSESSMENT & PLAN NOTE
BP: (143-196)/() 164/103    Elevated in setting of alcohol withdrawal  Has not had home BP medications 2/2 nausea and vomiting  Zofran prn  Restart home Lisinopril 40 mg when appropriate

## 2024-06-19 NOTE — PROGRESS NOTES
Critical Care Attending Note; Eber Rios   Note Date: 24  Note Time: 9:12 AM    Patient: Diogo Travis  Age, : 58 y.o., 1965 MRN: [id number] Code Status: Level 1 - Full Code Patient Location: ICU 14/ICU 14   Hospital LOS:1 days  ]   Patient seen and examined, medical record reviewed, discussed with house staff and nursing staff.     HPI   CC: EtOH withdrawal   58M with a PMH of HTN, HLD, EtOH Abuse who presents with EtOH withdrawal    Main ICU Plans:       #EtOH abuse - last drink    - Phenobarb - 650, 260, 130, 130 - Max dose <1.5gm   - CIWA Scoring  - Thiamine, Folic  - PPI       #Hypomagnesemia  - Replete      #HTN  - lisinopril      #DVT/GI ppx  Lovenox     #Lines/Tubes/Drains:   Invasive Devices       Peripheral Intravenous Line  Duration             Peripheral IV 24 Dorsal (posterior);Right Forearm <1 day    Peripheral IV 24 Left Antecubital <1 day                    #Nutrition:   Diet NPO; Sips of clear liquids        #Code Status:   Level 1 - Full Code    #Dispo:   ICU        Eber Rios MD  Pulmonary, Critical Care    Critical care time, excluding procedures, teaching, family meetings, and excludes any prior time recorded by the AP/resident, 35 minutes. Upon my evaluation, this patient has a high probability of imminent or life-threatening deterioration due to above problems which required my direct attention, intervention, and personal management.   Impression/Active Problems:    EtOH withdrawal   Gastritis   Hypomagnesemia    HTN   HLD      Physical Exam:     Vital Signs:   Weight: 101 kg (222 lb 7.1 oz)  IBW: Ideal body weight: 68.4 kg (150 lb 12.7 oz)  Adjusted ideal body weight: 81.4 kg (179 lb 7.3 oz)  Temp:  [98.4 °F (36.9 °C)-100 °F (37.8 °C)] 98.4 °F (36.9 °C)  HR:  [] 80  Resp:  [11-24] 18  BP: (116-196)/() 116/60  General: NAD  Neuro: AxO 3  Heart: RRR  Lungs: CTAB  Abdomen: Soft NT  Extremities: No edema                Ventilator  "Settings:               Invalid input(s): \"PCO2\", \"O2\"  Radiologic Images Reviewed:    None  Input / Output:     Intake/Output Summary (Last 24 hours) at 6/19/2024 0912  Last data filed at 6/19/2024 0801  Gross per 24 hour   Intake 3432.91 ml   Output 300 ml   Net 3132.91 ml            Infusions:  dextrose 5 % and sodium chloride 0.9 %, 125 mL/hr, Last Rate: 125 mL/hr (06/19/24 0515)      Scheduled Medications:  Current Facility-Administered Medications   Medication Dose Route Frequency Provider Last Rate    chlorhexidine  15 mL Mouth/Throat Q12H [facility] Donald Singleton MD      dextrose 5 % and sodium chloride 0.9 %  125 mL/hr Intravenous Continuous Donald Singleton  mL/hr (06/19/24 0515)    enoxaparin  40 mg Subcutaneous Daily Donald Singleton MD      escitalopram  20 mg Oral Daily Donald Singleton MD      folic acid 1 mg in sodium chloride 0.9 % 50 mL IVPB  1 mg Intravenous Daily Donald Singleton MD 1 mg (06/19/24 0909)    hydrOXYzine HCL  25 mg Oral Q6H PRN Donald Singleton MD      lisinopril  40 mg Oral Daily Donald Singleton MD      melatonin  6 mg Oral HS Donald Singleton MD      multivitamin-minerals  1 tablet Oral Daily Donald Singleton MD      ondansetron  4 mg Intravenous Q6H PRN Donald Singleton MD      pantoprazole  40 mg Intravenous Q24H [facility] Donald Singleton MD      polyethylene glycol  17 g Oral Daily Dequan Amador MD      thiamine  100 mg Intravenous Daily Donald Singleton  mg (06/19/24 0908)       PRN Medications:    hydrOXYzine HCL    ondansetron    Labs Reviewed:  Results from last 7 days   Lab Units 06/19/24  0522 06/18/24 2119 06/18/24  1744   WBC Thousand/uL 5.96  --  6.95   HEMOGLOBIN g/dL 13.0  --  14.6   HEMATOCRIT % 37.7  --  41.2   PLATELETS Thousands/uL 127* 174 172      Results from last 7 days   Lab Units 06/19/24  0522 06/18/24  1744   SODIUM mmol/L 138 137   CO2 mmol/L 28 20*   BUN mg/dL 11 10   CALCIUM mg/dL 8.4 9.1   MAGNESIUM mg/dL " "2.4 1.3*   PHOSPHORUS mg/dL 3.0 2.9         Invalid input(s): \"ASTSGOT\", \"ALTSGPT\"LABRCNTIP@ ,alkphos:3,tbilirubin:3,dbilirubin:3)@  Results from last 7 days   Lab Units 06/18/24  1744   INR  1.01           Invalid input(s): \"TROPT\", \"PBNP\"             I have personally seen and examined the patient on (06/19/24 between 2402-4887). I discussed the patient with the AP/resident including, but not limited to, verifying findings; reviewing labs and x-rays; discussing with consultants; developing the plan of care with the bedside nurse; and discussing treatment plan with patient or surrogate.  I have reviewed the note and assessment performed by the AP/resident and agree with the AP/resident’s documented findings and plan of care with the above additions/exceptions. Please see my comments for details and adjustments.                 "

## 2024-06-19 NOTE — PLAN OF CARE
Problem: Potential for Falls  Goal: Patient will remain free of falls  Description: INTERVENTIONS:  - Educate patient/family on patient safety including physical limitations  - Instruct patient to call for assistance with activity   - Consult OT/PT to assist with strengthening/mobility   - Keep Call bell within reach  - Keep bed low and locked with side rails adjusted as appropriate  - Keep care items and personal belongings within reach  - Initiate and maintain comfort rounds  - Make Fall Risk Sign visible to staff  - Apply yellow socks and bracelet for high fall risk patients  - Consider moving patient to room near nurses station  6/19/2024 0905 by Elin Costa RN  Outcome: Progressing  6/19/2024 0905 by Elin Costa RN  Outcome: Progressing     Problem: Prexisting or High Potential for Compromised Skin Integrity  Goal: Skin integrity is maintained or improved  Description: INTERVENTIONS:  - Identify patients at risk for skin breakdown  - Assess and monitor skin integrity  - Assess and monitor nutrition and hydration status  - Monitor labs   - Assess for incontinence   - Turn and reposition patient  - Assist with mobility/ambulation  - Relieve pressure over bony prominences  - Avoid friction and shearing  - Provide appropriate hygiene as needed including keeping skin clean and dry  - Evaluate need for skin moisturizer/barrier cream  - Collaborate with interdisciplinary team   - Patient/family teaching  - Consider wound care consult   6/19/2024 0905 by Elin Costa RN  Outcome: Progressing  6/19/2024 0905 by Elin Costa RN  Outcome: Progressing     Problem: PAIN - ADULT  Goal: Verbalizes/displays adequate comfort level or baseline comfort level  Description: Interventions:  - Encourage patient to monitor pain and request assistance  - Assess pain using appropriate pain scale  - Administer analgesics based on type and severity of pain and evaluate response  - Implement non-pharmacological measures as appropriate  and evaluate response  - Consider cultural and social influences on pain and pain management  - Notify physician/advanced practitioner if interventions unsuccessful or patient reports new pain  Outcome: Progressing     Problem: INFECTION - ADULT  Goal: Absence or prevention of progression during hospitalization  Description: INTERVENTIONS:  - Assess and monitor for signs and symptoms of infection  - Monitor lab/diagnostic results  - Monitor all insertion sites, i.e. indwelling lines, tubes, and drains  - Monitor endotracheal if appropriate and nasal secretions for changes in amount and color  - [place] appropriate cooling/warming therapies per order  - Administer medications as ordered  - Instruct and encourage patient and family to use good hand hygiene technique  - Identify and instruct in appropriate isolation precautions for identified infection/condition  Outcome: Progressing  Goal: Absence of fever/infection during neutropenic period  Description: INTERVENTIONS:  - Monitor WBC    Outcome: Progressing     Problem: SAFETY ADULT  Goal: Patient will remain free of falls  Description: INTERVENTIONS:  - Educate patient/family on patient safety including physical limitations  - Instruct patient to call for assistance with activity   - Consult OT/PT to assist with strengthening/mobility   - Keep Call bell within reach  - Keep bed low and locked with side rails adjusted as appropriate  - Keep care items and personal belongings within reach  - Initiate and maintain comfort rounds  - Make Fall Risk Sign visible to staff  - Apply yellow socks and bracelet for high fall risk patients  - Consider moving patient to room near nurses station  6/19/2024 0905 by Elin Costa RN  Outcome: Progressing  6/19/2024 0905 by Elin Costa RN  Outcome: Progressing  Goal: Maintain or return to baseline ADL function  Description: INTERVENTIONS:  -  Assess patient's ability to carry out ADLs; assess patient's baseline for ADL function and  identify physical deficits which impact ability to perform ADLs (bathing, care of mouth/teeth, toileting, grooming, dressing, etc.)  - Assess/evaluate cause of self-care deficits   - Assess range of motion  - Assess patient's mobility; develop plan if impaired  - Assess patient's need for assistive devices and provide as appropriate  - Encourage maximum independence but intervene and supervise when necessary  - Involve family in performance of ADLs  - Assess for home care needs following discharge   - Consider OT consult to assist with ADL evaluation and planning for discharge  - Provide patient education as appropriate  Outcome: Progressing  Goal: Maintains/Returns to pre admission functional level  Description: INTERVENTIONS:  - Perform AM-PAC 6 Click Basic Mobility/ Daily Activity assessment daily.  - Set and communicate daily mobility goal to care team and patient/family/caregiver.   - Collaborate with rehabilitation services on mobility goals if consulted  - Record patient progress and toleration of activity level   Outcome: Progressing     Problem: DISCHARGE PLANNING  Goal: Discharge to home or other facility with appropriate resources  Description: INTERVENTIONS:  - Identify barriers to discharge w/patient and caregiver  - Arrange for needed discharge resources and transportation as appropriate  - Identify discharge learning needs (meds, wound care, etc.)  - Arrange for interpretive services to assist at discharge as needed  - Refer to Case Management Department for coordinating discharge planning if the patient needs post-hospital services based on physician/advanced practitioner order or complex needs related to functional status, cognitive ability, or social support system  Outcome: Progressing     Problem: Knowledge Deficit  Goal: Patient/family/caregiver demonstrates understanding of disease process, treatment plan, medications, and discharge instructions  Description: Complete learning assessment and  assess knowledge base.  Interventions:  - Provide teaching at level of understanding  - Provide teaching via preferred learning methods  Outcome: Progressing

## 2024-06-19 NOTE — QUICK NOTE
Discussed with primary team.  Patient doing much better after phenobarbital bolus of 650 mg.  Total received between  mg.  I suspect patient should continue to improve throughout the day but if any further symptoms occur, may readminister phenobarbital 130-260 mg hourly as needed for continued or worsening symptoms.  Total recommended dose of phenobarbital is 2 g prior to any adjunctive treatment, such as dexmedetomidine or ketamine.  Please see original consult note for any additional information.

## 2024-06-19 NOTE — CONSULTS
INTERPROFESSIONAL (PHONE) CONSULTATION - Medical Toxicology  Diogo Travis 58 y.o. male MRN: [id number]  Unit/Bed#: ICU 14 Encounter: [id number]      Reason for Consult / Principal Problem: alcohol withdrawal, alcohol use disorder   Inpatient consult to Toxicology  Consult performed by: Dina Allred PA-C  Consult ordered by: Donald Singleton MD        06/18/24      ASSESSMENT:  Alcohol withdrawal   Alcohol use disorder   Alcoholic ketoacidosis  Hypomagnesemia   Chronic alcoholic gastritis, with probable exacerbation in the setting of acute alcohol withdrawal    RECOMMENDATIONS:  Patient has received 260 mg IV phenobarbital and 30 mg total IV diazepam in the ED at this time. Continue to monitor CIWA score and may administer additional doses of phenobarbital 130 mg IV Q1H as needed for alcohol withdrawal symptoms, would hold for RASS -3 or lower or evidence of CNS/respiratory depression. If persistently worsening CIWA score, may administer IV bolus of phenobarbital 650 mg over 30 minutes then resume PRN dosing as above.    May elect to administer PRN lorazepam per CIWA protocol as an alternative for management of alcohol withdrawal syndrome. If patient is exhibiting signs/symptoms of Wernicke encephalopathy (AMS, significant nystagmus-particularly vertical or torsional, or ataxia), would initiate high-dose IV thiamine 500 mg Q8H x9 doses. Also recommend D5 NS for IVF hydration given evidence of AKA with elevated anion gap. At this time, recommend daily thiamine, folic acid, and oral multivitamin. Recommended additional magnesium supplementation 2 g IV magnesium sulfate (on top of the 2 g given in the ED) as magnesium is significantly low at 1.3 on pre-admission labs. Continue to monitor and optimize electrolytes. Agree with daily IV Protonix and supportive care/PRN anti-emetics for likely exacerbation of alcoholic gastritis in the setting of acute AWD, may advance diet as tolerated once symptoms are  "more controlled. May also utilize modified CIWA with diazepam for management of alcohol withdrawal as outlined below. If patient is not tolerating CIWA protocol with PRN benzodiazepines, may reach out to Medical Detox [facility] On Call in the AM for re-evaluation and further recommendations, including possible transfer to the Withdrawal Management Unit if appropriate.     Medical Toxicology recommendations for CIWA monitoring using diazepam for treatment:    CIWA score & Treatment     Monitoring:   Modified CIWA Score   Telemetry  Continuous pulse oximetry   Initiate RASS with dosing and hold any sedatives for RASS less than -2  Request provider re-evaluation after every three doses.  Step down for CIWA > 7  Contact Medical Toxicology/Addiction \"[facility] On Call\" via [employer] Text for worsening CIWA, persistent tachycardia or encephalopathy.       0  No intervention  Reassess q4 hours.   Consider discontinuing CIWA 24 hours after ethanol concentration of zero, with a score of zero    1-7  Diazepam 10 mg PO Q4hr PRN score 1-7  Reassess q4hr    8-14  Diazepam 10mg IV Q1hr PRN score 8-14  Reassess q1hr  Contact Medical Toxicology/Addiction \"[facility] On Call\" via [employer] Text to discuss transfer to detox unit, step down or critical care per Detox AP.    15-19  Diazepam 20mg IV Q1hr PRN score 15-19  Reassess q1hr  Contact Medical Toxicology/Addiction \"[facility] On Call\" via [employer] Text to discuss transfer to detox unit, step down or critical care per Detox AP and further treatment recommendations.    >20  Diazepam 40mg IV Q1hr PRN score > 20  Contact Medical Toxicology/Addiction \"[facility] On Call\" via [employer] Text for additional treatment recommendations.       Once the patient's withdrawal is effectively managed, the patient can be placed on a diazepam taper, if needed.    Diazepam can be decreased by 1/2 of the last dose every hour until the patient is not needing more than 10 mg at a time; at which " "point diazepam 5mg PO  may be given Q6 hours PRN for 24 hours. Prior to discontinuation.     Please reach out to Medical Toxicology/Addiction \"[facility] On Call\" via [employer] Text with any additional concerns.        Hx and PE limited by the dynamics of a phone consultation. I have not personally interviewed or evaluated the patient, but only advised based on the information provided to me. Primary provider is responsible for all clinical decisions.     Pertinent history, physical exam and clinical findings and course discussed: Diogo Travis is a 58 y.o. year old male who presents with alcohol withdrawal. Per ED documentation, pt arrived via EMS from home due to alcohol WD symptoms. His last drink was at 9 PM today, and he has a known history of chronic alcohol use, currently drinking daily, 2-3 bottles of wine and additional shooters of [employer]. He also has a history of withdrawal-related seizures and previous inpatient rehabilitation treatment multiple times, most recently in February 2024. He has also been prescribed naltrexone but never filled the prescription.  He presented to ED extremely tremulous, with nausea and vomiting. At this time, he has received 30 mg total of Valium, 260 mg IV phenobarbital, in addition to 500 cc bolus of NS, thiamine, folate, 2 g IV magnesium sulfate, and IV Protonix.  His CIWA scores have been between 13 and 28 at maximum and is 13 on the most recent provider examination by the resident admitting the patient to the ICU as Stepdown 1 at this time. Lipase pending at the time of initial chart review but later resulted 6 u/L (low).    Review of systems and physical exam not performed by me.    Historical Information   Past Medical History:   Diagnosis Date    Alcohol use disorder, severe, dependence (HCC) 04/02/2023    Alcohol withdrawal seizure (HCC)     Alcoholic ketoacidosis 10/16/2023    Anxiety     AR (allergic rhinitis)     Chronic alcoholic gastritis 04/02/2023    " Depression     GERD (gastroesophageal reflux disease)     Hepatic steatosis 04/02/2023    Hyperlipidemia     Hypertension     IBS (irritable bowel syndrome)     Obesity     Rosacea     Vitamin B12 deficiency 02/14/2024    Vitamin D deficiency 02/14/2024     Past Surgical History:   Procedure Laterality Date    ANTERIOR CRUCIATE LIGAMENT REPAIR Left     WISDOM TOOTH EXTRACTION       Social History   Social History     Substance and Sexual Activity   Alcohol Use Yes    Comment: 3-4 vodka drinks/day     Social History     Substance and Sexual Activity   Drug Use Never     Social History     Tobacco Use   Smoking Status Some Days    Types: Cigarettes, Cigars    Start date: 1/1/2014    Passive exposure: Past (Father)   Smokeless Tobacco Never   Tobacco Comments    Smokes cigars 2-3 times per year     Family History   Problem Relation Age of Onset    Cancer Mother 78        Type unknown    Hypertension Father     Coronary artery disease Father 60    Cancer Father 82        Bladder; + Smoker    No Known Problems Sister     No Known Problems Sister     No Known Problems Sister     No Known Problems Son     No Known Problems Son     Heart attack Paternal Grandfather 60    Coronary artery disease Paternal Grandfather 60        Prior to Admission medications    Medication Sig Start Date End Date Taking? Authorizing Provider   escitalopram (LEXAPRO) 20 mg tablet Take 1 tablet (20 mg total) by mouth daily 2/14/24  Yes Enid Altman,    folic acid (FOLVITE) 400 mcg tablet Take 1 tablet (400 mcg total) by mouth daily 12/18/23 2/14/25 Yes Álvaro Emery MD   clotrimazole (LOTRIMIN) 1 % cream Apply to affected area 2 times daily  Patient not taking: Reported on 6/18/2024 4/27/24   Hamida Marmolejo,    hydrocortisone 1 % cream Apply topically 4 (four) times a day as needed for rash for up to 7 days 2/7/24 2/14/24  Deneen Velasquez PA-C   hydrOXYzine HCL (ATARAX) 25 mg tablet Take 1 tablet (25 mg total) by mouth every 6  (six) hours as needed for itching for up to 5 days  Patient not taking: Reported on 2/14/2024 2/10/24 2/15/24  Kelly Tavarez MD   ketoconazole (NIZORAL) 2 % shampoo Apply 3 Applications topically once for 1 dose Apply to affected areas and wash off after 5 minutes. Use for 3 consecutive days. 4/28/24 4/28/24  Hamida Marmolejo DO   lisinopril (ZESTRIL) 40 mg tablet Take 1 tablet (40 mg total) by mouth daily  Patient taking differently: Take 40 mg by mouth daily 2/7/24 3/8/24  Deneen Velasquez PA-C   Magnesium 400 MG CAPS Take 1 capsule by mouth in the morning    Historical Provider, MD   metroNIDAZOLE (METROCREAM) 0.75 % cream Apply 1 Application topically 2 (two) times a day  Patient not taking: Reported on 6/18/2024 2/8/24   Historical Provider, MD   naltrexone (REVIA) 50 mg tablet Take 1 tablet (50 mg total) by mouth daily 2/8/24   Deneen Velasquez PA-C   Omega-3 Fatty Acids (fish oil) 1,000 mg Take 2,000 mg by mouth daily    Historical Provider, MD   pantoprazole (PROTONIX) 40 mg tablet Take 1 tablet (40 mg total) by mouth daily in the early morning 2/14/24 3/15/24  Enid Altman DO   Thiamine Mononitrate (VITAMIN B1) 100 mg tablet Take 1 tablet (100 mg total) by mouth daily 12/18/23 2/14/25  Álvaro Emery MD       Current Facility-Administered Medications   Medication Dose Route Frequency    chlorhexidine (PERIDEX) 0.12 % oral rinse 15 mL  15 mL Mouth/Throat Q12H COLBY    dextrose 5 % and sodium chloride 0.9 % infusion  125 mL/hr Intravenous Continuous    [START ON 6/19/2024] enoxaparin (LOVENOX) subcutaneous injection 40 mg  40 mg Subcutaneous Daily    [START ON 6/19/2024] escitalopram (LEXAPRO) tablet 20 mg  20 mg Oral Daily    [START ON 6/19/2024] folic acid 1 mg in sodium chloride 0.9 % 50 mL IVPB  1 mg Intravenous Daily    hydrOXYzine HCL (ATARAX) tablet 25 mg  25 mg Oral Q6H PRN    [START ON 6/19/2024] lisinopril (ZESTRIL) tablet 40 mg  40 mg Oral Daily    [START ON 6/19/2024] melatonin  tablet 6 mg  6 mg Oral HS    ondansetron (ZOFRAN) injection 4 mg  4 mg Intravenous Q6H PRN    [START ON 6/19/2024] pantoprazole (PROTONIX) injection 40 mg  40 mg Intravenous Q24H COLBY    [START ON 6/19/2024] thiamine (VITAMIN B1) 100 mg in sodium chloride 0.9 % 50 mL IVPB  100 mg Intravenous Daily       Allergies   Allergen Reactions    Cefdinir Rash       Objective       Intake/Output Summary (Last 24 hours) at 6/18/2024 2354  Last data filed at 6/18/2024 2337  Gross per 24 hour   Intake 900.83 ml   Output 300 ml   Net 600.83 ml       Invasive Devices:   Peripheral IV 06/18/24 Left Antecubital (Active)   Site Assessment [facility] 06/18/24 2115   Dressing Type Transparent 06/18/24 2115   Line Status No blood return;Flushed;Infusing 06/18/24 2115   Dressing Status Clean;Dry;Intact 06/18/24 2115   Dressing Change Due 06/22/24 06/18/24 2115   Reason Not Rotated Not due 06/18/24 2115       Peripheral IV 06/18/24 Dorsal (posterior);Right Forearm (Active)   Site Assessment [facility] 06/18/24 2115   Dressing Type Transparent 06/18/24 2115   Line Status Blood return noted;Flushed;Infusing 06/18/24 2115   Dressing Status Clean;Dry;Intact 06/18/24 2115   Dressing Change Due 06/22/24 06/18/24 2115   Reason Not Rotated Not due 06/18/24 2115       Vitals   Vitals:    06/18/24 2030 06/18/24 2113 06/18/24 2200 06/18/24 2300   BP: (!) 164/103 (!) 172/98 155/93 138/75   TempSrc:  Oral     Pulse: (!) 112 (!) 122 (!) 111 104   Resp: 19 (!) 23 22 (!) 11   Patient Position - Orthostatic VS: Sitting Lying     Temp:  99.1 °F (37.3 °C)           EKG, Pathology, and/or Other Studies: I have personally reviewed pertinent reports.        Lab Results: I have personally reviewed pertinent reports.      Labs:    Results from last 7 days   Lab Units 06/18/24 2119 06/18/24  1744   WBC Thousand/uL  --  6.95   HEMOGLOBIN g/dL  --  14.6   HEMATOCRIT %  --  41.2   PLATELETS Thousands/uL 174 172   SEGS PCT %  --  85*   LYMPHO PCT %  --  8*   MONO PCT %  --  6    EOS PCT %  --  0      Results from last 7 days   Lab Units 06/18/24  1744   SODIUM mmol/L 137   POTASSIUM mmol/L 4.3   CHLORIDE mmol/L 99   CO2 mmol/L 20*   BUN mg/dL 10   CREATININE mg/dL 0.89   CALCIUM mg/dL 9.1   ALK PHOS U/L 93   ALT U/L 35   AST U/L 77*   MAGNESIUM mg/dL 1.3*   PHOSPHORUS mg/dL 2.9      Results from last 7 days   Lab Units 06/18/24  1744   INR  1.01   PTT seconds 30     Results from last 7 days   Lab Units 06/18/24  2319 06/18/24  2119   LACTIC ACID mmol/L 1.6 2.4*     0   Lab Value Date/Time    TROPONINI <0.02 04/20/2021 0038    TROPONINI <0.02 12/20/2020 1834    TROPONINI <0.02 12/20/2020 1605    TROPONINI <0.02 12/20/2020 1230    TROPONINI <0.02 11/11/2019 2146    TROPONINI <0.02 11/11/2019 1834    TROPONINI <0.02 11/11/2019 1618               Imaging Studies: I have personally reviewed pertinent reports.      Counseling / Coordination of Care  Total time spent today 14 minutes. This was a phone consultation.

## 2024-06-19 NOTE — ASSESSMENT & PLAN NOTE
History of alcohol use disorder  Declining detoxification at this time  Reported relapse due to stress in life, former vodka drinker now drinks 2-3 bottles wine with additional bourbon shooters  Last drink 9 PM on 6/17/2024  Presented to ED on 6/18 tremulous, nauseous, vomiting, diaphoretic, agitated   CIWA-Ar Score       Row Name 06/19/24 0500 06/19/24 0100 06/18/24 2113       CIWA-Ar    /94 -- --    Pulse 84 -- --    Nausea and Vomiting 4 4 4    Tactile Disturbances 0 1 1    Tremor 4 4 7    Auditory Disturbances 0 1 0    Paroxysmal Sweats 4 3 4    Visual Disturbances 0 0 1    Anxiety 5 4 4    Headache, Fullness in Head 5 0 0    Agitation 4 4 2    Orientation and Clouding of Sensorium 0 0 1    CIWA-Ar Total 26 21 24      Row Name 06/18/24 2000 06/18/24 1733          CIWA-Ar    BP -- 196/92     Pulse -- 124     Nausea and Vomiting 3 3     Tactile Disturbances 1 2     Tremor 7 7     Auditory Disturbances 1 1     Paroxysmal Sweats 4 4     Visual Disturbances 1 1     Anxiety 4 4     Headache, Fullness in Head 0 1     Agitation 4 4     Orientation and Clouding of Sensorium 1 1     CIWA-Ar Total 26 28                   CIWA protocol  Given total 30 mg of Valium and 260 of phenobarb in ED with improvement of symptoms  Toxicology consulted  Additional 260 and 650 of phenobarb overnight    ANION GAP   Date Value Ref Range Status   06/19/2024 7 4 - 13 mmol/L Final   06/18/2024 18 (H) 4 - 13 mmol/L Final   04/27/2024 10 4 - 13 mmol/L Final   12/27/2022 14 (H) 3 - 11 Final   04/20/2021 8 3 - 11 Final     Comment:     Specimen slightly hemolyzed, results may be affected.   12/21/2020 5 3 - 11 Final     Plan:  D5 normal saline  Thiamine and folate repletion  Trend electrolytes  130 mg phenobarbital will be ordered as needed  Hold further benzodiazepine use at this time

## 2024-06-19 NOTE — UTILIZATION REVIEW
NOTIFICATION OF INPATIENT ADMISSION   AUTHORIZATION REQUEST   SERVICING FACILITY:   [place] [address]  Tax ID: [id number]  NPI: [id number]   ATTENDING PROVIDER:  Attending Name and NPI#: Eber Rios Md [[id number]]  Address: [address] [place] [address]  Phone: [phone]     ADMISSION INFORMATION:  Place of Service: Inpatient [facility]  Place of Service Code: 21  Inpatient Admission Date/Time: 6/18/24  8:46 PM  Discharge Date/Time: No discharge date for patient encounter.  Admitting Diagnosis Code/Description:  Alcohol withdrawal (HCC) [F10.939]  Hypomagnesemia [E83.42]  Shakes [R25.1]     UTILIZATION REVIEW CONTACT:  Alyce Vieira Utilization   Network Utilization Review Department  Phone: [phone]  Fax: [phone]  Email: [email or web address]  Contact for approvals/pending authorizations, clinical reviews, and discharge.     PHYSICIAN ADVISORY SERVICES:  Medical Necessity Denial & S[facility] Review  Phone: [phone]  Fax: [phone]  Email: [email or web address]     DISCHARGE SUPPORT TEAM:  For Patients Discharge Needs & Updates  Phone: [phone] opt. 2 Fax: [phone]  Email: [email or web address]

## 2024-06-19 NOTE — UTILIZATION REVIEW
Initial Clinical Review    Admission: Date/Time/Statement:   Admission Orders (From admission, onward)       Ordered        06/18/24 2046  Inpatient Admission  Once                          Orders Placed This Encounter   Procedures    Inpatient Admission     Standing Status:   Standing     Number of Occurrences:   1     Order Specific Question:   Level of Care     Answer:   Level 1 Stepdown [13]     Order Specific Question:   Estimated length of stay     Answer:   More than 2 Midnights     Order Specific Question:   Certification     Answer:   I certify that inpatient services are medically necessary for this patient for a duration of greater than two midnights. See H&P and MD Progress Notes for additional information about the patient's course of treatment.     ED Arrival Information       Expected   -    Arrival   6/18/2024 17:12    Acuity   Emergent              Means of arrival   Ambulance    Escorted by   [facility]    Service   Critical Care/ICU    Admission type   Emergency              Arrival complaint   uncontrollable shakes             Chief Complaint   Patient presents with    Detox Evaluation     Patient arrived via EMS from home due to alcohol withdraw. Last drink was at 9pm, drinks several bottles of wine and bourbon every day.       Initial Presentation: 58 y.o. male  to ED via EMS from home.    Admitted to inpatient with Dx: alcohol use disorder, severe dependence/hypertension.  Presented to ED with alcohol withdrawal,  recently relapsed due to stress with last drink evening prior to arrival.  Has anxiety, sweating, palpitations, nausea, vomiting and abdominal pain.   Drinks 2 to 3 bottles of wine daily with additional bourbon shooters. PMHx:alcohol abuse, alcohol withdrawal and alcohol withdrawal seizures.  .On exam: acute distress.  Appears ill and diaphoretic.   Tongue fasciculations.  Tachypnea. Abdominal tenderness.  Diffuse tremors.  Anxious.  Lactic acid 2.4.  anion gap 18.   CO2 20.  Mg 1.3.  ecg sinus tachycardia and qtc is 458.    ED treatment:  given total of 30 mg Valium and 260 mg Phenobarb with improvement.    Plan includes CIWA.  Consult toxicology.  130 mg phenobarbital as needed.  IVF D5 NS, thiamine and folate, trend electrolytes and correct as needed. Replete Mg  Restart home lisinopril when able.  Monitor BP.  NPO and advance diet as tolerated.  IV PPI.      6/18/24 per toxicology - Alcohol withdrawal /Alcohol use disorder   Alcoholic ketoacidosis/Hypomagnesemia /Chronic alcoholic gastritis, with probable exacerbation in the setting of acute alcohol withdrawal.   Recommendation:   Continue to monitor CIWA score and may administer additional doses of phenobarbital 130 mg IV Q1H as needed for alcohol withdrawal symptoms, would hold for RASS -3 or lower or evidence of CNS/respiratory depression. If persistently worsening CIWA score, may administer IV bolus of phenobarbital 650 mg over 30 minutes then resume PRN dosing as above.  CIWA with diazepam.  Replete Mg.    Recommend daily thiamine, folic acid and MVI, supplement Mg.  IV Protonix, antiemetics as needed.  D5 NS for IVF hydration given evidence of AKA with elevated anion gap.      Anticipated Length of Stay/Certification Statement:  Anticipated Length of Stay is > 2 midnights     Date: 6/19/24   Day 2:  thus far given 1170 mg Phenobarb with improvement of alcohol withdrawal symptoms.   Tolerates only sips of water, unable to tolerate diet.  On exam:  tachycardia.   Abdomen rigid.   Diaphoretic.  Calm.   Continue CIWA- Total recommended dose of phenobarbital is 2 g prior to any adjunctive treatment, such as dexmedetomidine or ketamine. , thiamine increased. Continue folate, PPI    ED Triage Vitals   Temperature Pulse Respirations Blood Pressure SpO2 Pain Score   06/18/24 1800 06/18/24 1725 06/18/24 1725 06/18/24 1725 06/18/24 1725 06/18/24 1725   100 °F (37.8 °C) (!) 124 (!) 24 (!) 196/92 95 % No Pain     Weight (last 2  days)       Date/Time Weight    06/18/24 2113 101 (222.44)    06/18/24 1725 99.8 (220)            Vital Signs (last 3 days)       Date/Time Temp Pulse Resp BP MAP (mmHg) SpO2 Calculated FIO2 (%) - Nasal Cannula Nasal Cannula O2 Flow Rate (L/min) O2 Device Patient Position - Orthostatic VS Erick Coma Scale Score CIWA-Ar Total Pain    06/19/24 1100 97.7 °F (36.5 °C) -- -- 136/84 -- -- -- -- -- -- -- -- --    06/19/24 1000 -- 70 13 110/62 81 94 % -- -- -- -- -- -- --    06/19/24 0900 -- 73 14 133/84 103 92 % -- -- -- -- -- 6 --    06/19/24 0800 -- 80 -- 116/60 82 91 % -- -- -- -- 15 -- No Pain    06/19/24 0700 98.4 °F (36.9 °C) 91 18 134/61 91 94 % -- -- -- -- -- -- --    06/19/24 0600 -- 79 15 143/83 106 91 % -- -- -- -- -- -- --    06/19/24 0500 -- 84 16 160/94 120 91 % -- -- -- -- -- 26 --    06/19/24 0400 -- 87 13 158/88 117 90 % -- -- -- -- 15 -- No Pain    06/19/24 0343 98.9 °F (37.2 °C) -- -- -- -- -- -- -- -- -- -- -- --    06/19/24 0300 -- 86 14 149/86 112 90 % -- -- -- -- -- -- --    06/19/24 0200 -- 89 15 159/92 118 91 % -- -- -- -- -- -- --    06/19/24 0100 -- 96 16 147/89 112 91 % -- -- -- -- -- 21 --    06/19/24 0000 -- 100 20 150/95 118 94 % -- -- -- -- 15 -- No Pain    06/18/24 2300 -- 104 11 138/75 101 92 % -- -- -- -- -- -- --    06/18/24 2200 -- 111 22 155/93 119 93 % -- -- -- -- -- -- --    06/18/24 2115 -- -- -- -- -- -- -- -- -- -- 15 -- No Pain    06/18/24 2113 99.1 °F (37.3 °C) 122 23 172/98 127 96 % -- -- None (Room air) Lying -- 24 --    06/18/24 2030 -- 112 19 164/103 128 95 % 28 2 L/min Nasal cannula Sitting -- -- --    06/18/24 2013 -- -- -- -- -- -- -- -- -- -- 15 -- --    06/18/24 2000 -- 128 21 148/100 119 96 % 28 2 L/min Nasal cannula Sitting -- 26 --    06/18/24 1900 -- 127 18 143/94 114 93 % 28 2 L/min  Nasal cannula  Sitting -- -- --    06/18/24 1845 -- 135 20 157/100 122 89 %  -- -- None (Room air)  Sitting -- -- --    06/18/24 1830 -- 134 20 159/98 121 91 % -- -- None (Room air)  Sitting -- -- --    06/18/24 1815 -- 131 18 156/94 118 92 % -- -- None (Room air) Sitting -- -- --    06/18/24 1800 100 °F (37.8 °C) 123 20 160/89 114 92 % -- -- None (Room air) Sitting -- -- --    06/18/24 1734 -- -- -- -- -- -- -- -- None (Room air) -- -- -- --    06/18/24 1733 -- 124 -- 196/92 -- -- -- -- -- -- -- 28 --    06/18/24 1725 -- 124 24 196/92 132 95 % -- -- None (Room air) Lying -- -- No Pain           CIWA-Ar Score       Row Name 06/19/24 0900 06/19/24 0500 06/19/24 0100       CIWA-Ar    /84 160/94 --    Pulse 73 84 --    Nausea and Vomiting 1 4 4    Tactile Disturbances 0 0 1    Tremor 0 4 4    Auditory Disturbances 0 0 1    Paroxysmal Sweats 4 4 3    Visual Disturbances 0 0 0    Anxiety 1 5 4    Headache, Fullness in Head 0 5 0    Agitation 0 4 4    Orientation and Clouding of Sensorium 0 0 0    CIWA-Ar Total 6 26 21      Row Name 06/18/24 2113 06/18/24 2000 06/18/24 1733       CIWA-Ar    BP -- -- 196/92    Pulse -- -- 124    Nausea and Vomiting 4 3 3    Tactile Disturbances 1 1 2    Tremor 7 7 7    Auditory Disturbances 0 1 1    Paroxysmal Sweats 4 4 4    Visual Disturbances 1 1 1    Anxiety 4 4 4    Headache, Fullness in Head 0 0 1    Agitation 2 4 4    Orientation and Clouding of Sensorium 1 1 1    CIWA-Ar Total 24 26 28                    Pertinent Labs/Diagnostic Test Results:   Cardiology:  Date/Time: 6/18/2024 5:32 PM   Indications / Diagnosis:  Alcohol withdrawal   ECG reviewed by the ED Provider: yes    Patient location:  ED   Previous ECG:     Previous ECG:  Compared to current     Comparison ECG info:  4/27/2024     Similarity:  Changes noted     Comparison to cardiac monitor: Yes    Interpretation:     Interpretation: non-specific    Quality:     Tracing quality:  Limited by artifact   Rate:     ECG rate:  123     ECG rate assessment: tachycardic    Rhythm:     Rhythm: sinus tachycardia    Ectopy:     Ectopy: none    QRS:     QRS axis:  Normal     QRS intervals:  Normal    Conduction:     Conduction: normal    ST segments:     ST segments:  Non-specific   T waves:     T waves: non-specific    Comments:      QTc 458         Results from last 7 days   Lab Units 06/19/24 0522 06/18/24 2119 06/18/24  1744   WBC Thousand/uL 5.96  --  6.95   HEMOGLOBIN g/dL 13.0  --  14.6   HEMATOCRIT % 37.7  --  41.2   PLATELETS Thousands/uL 127* 174 172   TOTAL NEUT ABS Thousands/µL  --   --  5.95     Results from last 7 days   Lab Units 06/19/24  0522 06/18/24  1744   SODIUM mmol/L 138 137   POTASSIUM mmol/L 3.7 4.3   CHLORIDE mmol/L 103 99   CO2 mmol/L 28 20*   ANION GAP mmol/L 7 18*   BUN mg/dL 11 10   CREATININE mg/dL 0.98 0.89   EGFR ml/min/1.73sq m 85 94   CALCIUM mg/dL 8.4 9.1   CALCIUM, IONIZED mmol/L 1.04*  --    MAGNESIUM mg/dL 2.4 1.3*   PHOSPHORUS mg/dL 3.0 2.9     Results from last 7 days   Lab Units 06/19/24 0522 06/18/24  1744   AST U/L 39 77*   ALT U/L 24 35   ALK PHOS U/L 79 93   TOTAL PROTEIN g/dL 6.3* 7.6   ALBUMIN g/dL 3.8 4.4   TOTAL BILIRUBIN mg/dL 0.96 0.77     Results from last 7 days   Lab Units 06/19/24 0522 06/18/24  1744   GLUCOSE RANDOM mg/dL 132 95     BETA-HYDROXYBUTYRATE   Date Value Ref Range Status   10/16/2023 2.9 (H) <0.6 mmol/L Final   10/01/2023 0.8 (H) <0.6 mmol/L Final     Results from last 7 days   Lab Units 06/18/24 2319 06/18/24 2119   HS TNI 0HR ng/L  --  3   HS TNI 2HR ng/L 4  --    HSTNI D2 ng/L 1  --      Results from last 7 days   Lab Units 06/18/24  1744   PROTIME seconds 13.9   INR  1.01   PTT seconds 30     Results from last 7 days   Lab Units 06/18/24  1744   PROCALCITONIN ng/ml <0.05     Results from last 7 days   Lab Units 06/18/24  2319 06/18/24  2119   LACTIC ACID mmol/L 1.6 2.4*       Results from last 7 days   Lab Units 06/18/24  1744   LIPASE u/L 6*       ED Treatment-Medication Administration from 06/18/2024 1712 to 06/18/2024 2113         Date/Time Order Dose Route Action     06/18/2024 1736 diazepam (VALIUM) injection 10 mg 10 mg  Intravenous Given     06/18/2024 1736 ondansetron (ZOFRAN) injection 4 mg 4 mg Intravenous Given     06/18/2024 1739 sodium chloride 0.9 % bolus 500 mL 500 mL Intravenous New Bag     06/18/2024 1810 thiamine (VITAMIN B1) 500 mg in sodium chloride 0.9 % 100 mL IVPB 500 mg Intravenous New Bag     06/18/2024 1835 folic acid 1 mg in sodium chloride 0.9 % 50 mL IVPB 1 mg Intravenous New Bag     06/18/2024 1809 pantoprazole (PROTONIX) injection 40 mg 40 mg Intravenous Given     06/18/2024 1840 diazepam (VALIUM) injection 10 mg 10 mg Intravenous Given     06/18/2024 2028 magnesium sulfate 2 g/50 mL IVPB (premix) 2 g 2 g Intravenous New Bag     06/18/2024 1958 PHENobarbital 260 mg in sodium chloride 0.9 % 100 mL IVPB 260 mg Intravenous New Bag     06/18/2024 1954 diazepam (VALIUM) injection 10 mg 10 mg Intravenous Given     06/18/2024 1954 ondansetron (ZOFRAN) injection 4 mg 4 mg Intravenous Given            Past Medical History:   Diagnosis Date    Alcohol use disorder, severe, dependence (HCC) 04/02/2023    Alcohol withdrawal seizure (HCC)     Alcoholic ketoacidosis 10/16/2023    Anxiety     AR (allergic rhinitis)     Chronic alcoholic gastritis 04/02/2023    Depression     GERD (gastroesophageal reflux disease)     Hepatic steatosis 04/02/2023    Hyperlipidemia     Hypertension     IBS (irritable bowel syndrome)     Obesity     Rosacea     Vitamin B12 deficiency 02/14/2024    Vitamin D deficiency 02/14/2024     Present on Admission:   Alcohol use disorder, severe, dependence (HCC)   Chronic alcoholic gastritis   Hypertension   Hypomagnesemia   Anxiety      Admitting Diagnosis: Alcohol withdrawal (HCC) [F10.939]  Hypomagnesemia [E83.42]  Shakes [R25.1]  Age/Sex: 58 y.o. male  Admission Orders:  Scheduled Medications:  chlorhexidine, 15 mL, Mouth/Throat, Q12H COLBY  enoxaparin, 40 mg, Subcutaneous, Daily  escitalopram, 20 mg, Oral, Daily  folic acid 1 mg in sodium chloride 0.9 % 50 mL IVPB, 1 mg, Intravenous,  Daily  lisinopril, 40 mg, Oral, Daily  melatonin, 6 mg, Oral, HS  multivitamin-minerals, 1 tablet, Oral, Daily  pantoprazole, 40 mg, Intravenous, Q24H COLBY  polyethylene glycol, 17 g, Oral, Daily  thiamine, 500 mg, Intravenous, TID    acetaminophen (Ofirmev) injection 1,000 mg  Dose: 1,000 mg  Freq: Once Route: IV  Last Dose: Stopped (06/19/24 0541)  Start: 06/19/24 0515 End: 06/19/24 0541   magnesium sulfate 2 g/50 mL IVPB (premix) 2 g  Dose: 2 g  Freq: Once Route: IV  Last Dose: Stopped (06/18/24 2341)  Start: 06/18/24 2100 End: 06/18/24 2341  magnesium sulfate 2 g/50 mL IVPB (premix) 2 g  Dose: 2 g  Freq: Once Route: IV  Last Dose: Stopped (06/18/24 2151)  Start: 06/18/24 1945 End: 06/18/24 2151  thiamine (VITAMIN B1) 100 mg in sodium chloride 0.9 % 50 mL IVPB  Dose: 100 mg  Freq: Daily Route: IV  Last Dose: Stopped (06/19/24 1000)  Start: 06/19/24 0900 End: 06/19/24 0922    PHENobarbital 650 mg in sodium chloride 0.9 % 100 mL IVPB  Dose: 650 mg  Freq: Once Route: IV  Last Dose: Stopped (06/19/24 0700)  Start: 06/19/24 0515 End: 06/19/24 0700  PHENobarbital injection 130 mg  Dose: 130 mg  Freq: Once Route: IV  Start: 06/19/24 0115 End: 06/19/24 0114   PHENobarbital injection 130 mg  Dose: 130 mg  Freq: Once Route: IV  Start: 06/18/24 2230 End: 06/18/24 2218      Continuous IV Infusions:  dextrose 5 % and sodium chloride 0.9 %, 125 mL/hr, Intravenous, Continuous      PRN Meds:  hydrOXYzine HCL, 25 mg, Oral, Q6H PRN  ondansetron, 4 mg, Intravenous, Q6H PRN x 2 6/18    Cardio pulmonary monitoring  CIWA  Continuous pulse oximetry  NPO; sips of clears    IP CONSULT TO TOXICOLOGY    Network Utilization Review Department  ATTENTION: Please call with any questions or concerns to [phone] and carefully listen to the prompts so that you are directed to the right person. All voicemails are confidential.   For Discharge needs, contact Care Management DC Support Team at [phone] opt. 2  Send all requests for admission  clinical reviews, approved or denied determinations and any other requests to dedicated fax number below belonging to the campus where the patient is receiving treatment. List of dedicated fax numbers for the Facilities:  FACILITY NAME UR FAX NUMBER   ADMISSION DENIALS (Administrative/Medical Necessity) [phone]   DISCHARGE SUPPORT TEAM (NETWORK) [phone]   PARENT CHILD HEALTH (Maternity/NICU/Pediatrics) [phone]   [facility] [phone]   [facility] [phone]   [facility] [phone]   [facility] [phone]   [facility] [phone]   [facility] [phone]   [facility] [phone]   [facility] [phone]   [facility] [phone]   [facility] [phone]   [facility] [phone]   [facility] [phone]

## 2024-06-20 PROBLEM — R19.7 DIARRHEA: Status: ACTIVE | Noted: 2024-06-20

## 2024-06-20 LAB
ALBUMIN SERPL BCG-MCNC: 3.6 G/DL (ref 3.5–5)
ALP SERPL-CCNC: 68 U/L (ref 34–104)
ALT SERPL W P-5'-P-CCNC: 17 U/L (ref 7–52)
ANION GAP SERPL CALCULATED.3IONS-SCNC: 5 MMOL/L (ref 4–13)
AST SERPL W P-5'-P-CCNC: 23 U/L (ref 13–39)
ATRIAL RATE: 123 BPM
BILIRUB SERPL-MCNC: 0.78 MG/DL (ref 0.2–1)
BUN SERPL-MCNC: 10 MG/DL (ref 5–25)
CA-I BLD-SCNC: 1.11 MMOL/L (ref 1.12–1.32)
CALCIUM SERPL-MCNC: 8.5 MG/DL (ref 8.4–10.2)
CHLORIDE SERPL-SCNC: 105 MMOL/L (ref 96–108)
CO2 SERPL-SCNC: 27 MMOL/L (ref 21–32)
CREAT SERPL-MCNC: 1 MG/DL (ref 0.6–1.3)
ERYTHROCYTE [DISTWIDTH] IN BLOOD BY AUTOMATED COUNT: 14.2 % (ref 11.6–15.1)
GFR SERPL CREATININE-BSD FRML MDRD: 83 ML/MIN/1.73SQ M
GLUCOSE SERPL-MCNC: 89 MG/DL (ref 65–140)
HCT VFR BLD AUTO: 35.2 % (ref 36.5–49.3)
HGB BLD-MCNC: 11.9 G/DL (ref 12–17)
MAGNESIUM SERPL-MCNC: 1.9 MG/DL (ref 1.9–2.7)
MCH RBC QN AUTO: 32.2 PG (ref 26.8–34.3)
MCHC RBC AUTO-ENTMCNC: 33.8 G/DL (ref 31.4–37.4)
MCV RBC AUTO: 95 FL (ref 82–98)
P AXIS: 72 DEGREES
PHOSPHATE SERPL-MCNC: 3.4 MG/DL (ref 2.7–4.5)
PLATELET # BLD AUTO: 134 THOUSANDS/UL (ref 149–390)
PMV BLD AUTO: 8.2 FL (ref 8.9–12.7)
POTASSIUM SERPL-SCNC: 3.7 MMOL/L (ref 3.5–5.3)
PR INTERVAL: 148 MS
PROT SERPL-MCNC: 6.2 G/DL (ref 6.4–8.4)
QRS AXIS: 79 DEGREES
QRSD INTERVAL: 88 MS
QT INTERVAL: 320 MS
QTC INTERVAL: 458 MS
RBC # BLD AUTO: 3.7 MILLION/UL (ref 3.88–5.62)
SODIUM SERPL-SCNC: 137 MMOL/L (ref 135–147)
T WAVE AXIS: 67 DEGREES
VENTRICULAR RATE: 123 BPM
WBC # BLD AUTO: 4.17 THOUSAND/UL (ref 4.31–10.16)

## 2024-06-20 PROCEDURE — 82330 ASSAY OF CALCIUM: CPT | Performed by: EMERGENCY MEDICINE

## 2024-06-20 PROCEDURE — 80053 COMPREHEN METABOLIC PANEL: CPT | Performed by: EMERGENCY MEDICINE

## 2024-06-20 PROCEDURE — 87505 NFCT AGENT DETECTION GI: CPT | Performed by: INTERNAL MEDICINE

## 2024-06-20 PROCEDURE — 84100 ASSAY OF PHOSPHORUS: CPT | Performed by: EMERGENCY MEDICINE

## 2024-06-20 PROCEDURE — 87493 C DIFF AMPLIFIED PROBE: CPT | Performed by: INTERNAL MEDICINE

## 2024-06-20 PROCEDURE — 93010 ELECTROCARDIOGRAM REPORT: CPT | Performed by: INTERNAL MEDICINE

## 2024-06-20 PROCEDURE — 85027 COMPLETE CBC AUTOMATED: CPT | Performed by: EMERGENCY MEDICINE

## 2024-06-20 PROCEDURE — 83735 ASSAY OF MAGNESIUM: CPT | Performed by: EMERGENCY MEDICINE

## 2024-06-20 PROCEDURE — 99232 SBSQ HOSP IP/OBS MODERATE 35: CPT | Performed by: INTERNAL MEDICINE

## 2024-06-20 PROCEDURE — C9113 INJ PANTOPRAZOLE SODIUM, VIA: HCPCS | Performed by: EMERGENCY MEDICINE

## 2024-06-20 RX ORDER — LANOLIN ALCOHOL/MO/W.PET/CERES
100 CREAM (GRAM) TOPICAL DAILY
Status: DISCONTINUED | OUTPATIENT
Start: 2024-06-20 | End: 2024-06-21 | Stop reason: HOSPADM

## 2024-06-20 RX ORDER — FOLIC ACID 1 MG/1
1 TABLET ORAL DAILY
Status: DISCONTINUED | OUTPATIENT
Start: 2024-06-20 | End: 2024-06-21 | Stop reason: HOSPADM

## 2024-06-20 RX ADMIN — CHLORHEXIDINE GLUCONATE 15 ML: 1.2 RINSE ORAL at 08:21

## 2024-06-20 RX ADMIN — CHLORHEXIDINE GLUCONATE 15 ML: 1.2 RINSE ORAL at 21:29

## 2024-06-20 RX ADMIN — MULTIPLE VITAMINS W/ MINERALS TAB 1 TABLET: TAB ORAL at 08:21

## 2024-06-20 RX ADMIN — FOLIC ACID 1 MG: 1 TABLET ORAL at 08:32

## 2024-06-20 RX ADMIN — ATORVASTATIN CALCIUM 40 MG: 40 TABLET, FILM COATED ORAL at 18:23

## 2024-06-20 RX ADMIN — ESCITALOPRAM OXALATE 20 MG: 20 TABLET ORAL at 08:20

## 2024-06-20 RX ADMIN — PANTOPRAZOLE SODIUM 40 MG: 40 INJECTION, POWDER, FOR SOLUTION INTRAVENOUS at 08:20

## 2024-06-20 RX ADMIN — ENOXAPARIN SODIUM 40 MG: 40 INJECTION SUBCUTANEOUS at 08:20

## 2024-06-20 RX ADMIN — HYDROXYZINE HYDROCHLORIDE 25 MG: 25 TABLET ORAL at 18:23

## 2024-06-20 RX ADMIN — LISINOPRIL 40 MG: 20 TABLET ORAL at 08:20

## 2024-06-20 RX ADMIN — Medication 100 MG: at 08:32

## 2024-06-20 RX ADMIN — Medication 6 MG: at 21:29

## 2024-06-20 NOTE — ASSESSMENT & PLAN NOTE
History of alcohol use disorder  Declining detoxification at this time  Reported relapse due to stress in life, former vodka drinker now drinks 2-3 bottles wine with additional bourbon shooters  Last drink 9 PM on 6/17/2024  Presented to ED on 6/18 tremulous, nauseous, vomiting, diaphoretic, agitated  Admitted to the intensive care unit initially for delirium tremens with severe alcohol withdrawal.  Seen by toxicology and started on IV phenobarbital.    Received total 117 0 mg of phenobarbital.  Subsequently CIWA score has been around 0.  Patient not interested in inpatient alcohol rehab and wants to pursue outpatient alcohol rehab upon discharge.  Continue with multivitamin, thiamine and folic acid supplementation.

## 2024-06-20 NOTE — ASSESSMENT & PLAN NOTE
History of, now with acute nausea and vomiting in setting of alcohol withdrawal  No reported hematemesis  Zofran prn  Currently tolerating regular house diet.  Continue home Protonix 40 mg QD  Outpatient follow-up with GI for endoscopy as he is overdue for that and has had prior history of Hoff's esophagus.

## 2024-06-20 NOTE — PLAN OF CARE
Problem: Potential for Falls  Goal: Patient will remain free of falls  Description: INTERVENTIONS:  - Educate patient/family on patient safety including physical limitations  - Instruct patient to call for assistance with activity   - Consult OT/PT to assist with strengthening/mobility   - Keep Call bell within reach  - Keep bed low and locked with side rails adjusted as appropriate  - Keep care items and personal belongings within reach  - Initiate and maintain comfort rounds  - Make Fall Risk Sign visible to staff  - Apply yellow socks and bracelet for high fall risk patients  - Consider moving patient to room near nurses station  Outcome: Progressing     Problem: Prexisting or High Potential for Compromised Skin Integrity  Goal: Skin integrity is maintained or improved  Description: INTERVENTIONS:  - Identify patients at risk for skin breakdown  - Assess and monitor skin integrity  - Assess and monitor nutrition and hydration status  - Monitor labs   - Assess for incontinence   - Turn and reposition patient  - Assist with mobility/ambulation  - Relieve pressure over bony prominences  - Avoid friction and shearing  - Provide appropriate hygiene as needed including keeping skin clean and dry  - Evaluate need for skin moisturizer/barrier cream  - Collaborate with interdisciplinary team   - Patient/family teaching  - Consider wound care consult   Outcome: Progressing     Problem: PAIN - ADULT  Goal: Verbalizes/displays adequate comfort level or baseline comfort level  Description: Interventions:  - Encourage patient to monitor pain and request assistance  - Assess pain using appropriate pain scale  - Administer analgesics based on type and severity of pain and evaluate response  - Implement non-pharmacological measures as appropriate and evaluate response  - Consider cultural and social influences on pain and pain management  - Notify physician/advanced practitioner if interventions unsuccessful or patient reports  new pain  Outcome: Progressing     Problem: INFECTION - ADULT  Goal: Absence or prevention of progression during hospitalization  Description: INTERVENTIONS:  - Assess and monitor for signs and symptoms of infection  - Monitor lab/diagnostic results  - Monitor all insertion sites, i.e. indwelling lines, tubes, and drains  - Monitor endotracheal if appropriate and nasal secretions for changes in amount and color  - [place] appropriate cooling/warming therapies per order  - Administer medications as ordered  - Instruct and encourage patient and family to use good hand hygiene technique  - Identify and instruct in appropriate isolation precautions for identified infection/condition  Outcome: Progressing  Goal: Absence of fever/infection during neutropenic period  Description: INTERVENTIONS:  - Monitor WBC    Outcome: Progressing     Problem: SAFETY ADULT  Goal: Patient will remain free of falls  Description: INTERVENTIONS:  - Educate patient/family on patient safety including physical limitations  - Instruct patient to call for assistance with activity   - Consult OT/PT to assist with strengthening/mobility   - Keep Call bell within reach  - Keep bed low and locked with side rails adjusted as appropriate  - Keep care items and personal belongings within reach  - Initiate and maintain comfort rounds  - Apply yellow socks and bracelet for high fall risk patients  - Consider moving patient to room near nurses station  Outcome: Progressing  Goal: Maintain or return to baseline ADL function  Description: INTERVENTIONS:  -  Assess patient's ability to carry out ADLs; assess patient's baseline for ADL function and identify physical deficits which impact ability to perform ADLs (bathing, care of mouth/teeth, toileting, grooming, dressing, etc.)  - Assess/evaluate cause of self-care deficits   - Assess range of motion  - Assess patient's mobility; develop plan if impaired  - Assess patient's need for assistive devices and  provide as appropriate  - Encourage maximum independence but intervene and supervise when necessary  - Involve family in performance of ADLs  - Assess for home care needs following discharge   - Consider OT consult to assist with ADL evaluation and planning for discharge  - Provide patient education as appropriate  Outcome: Progressing  Goal: Maintains/Returns to pre admission functional level  Description: INTERVENTIONS:  - Perform AM-PAC 6 Click Basic Mobility/ Daily Activity assessment daily.  - Set and communicate daily mobility goal to care team and patient/family/caregiver.   - Collaborate with rehabilitation services on mobility goals if consulted  - Reposition patient every 2 hours.  - Ambulate patient 3 times a day  - Out of bed to chair 3 times a day   - Out of bed for meals 3 times a day  - Out of bed for toileting  - Record patient progress and toleration of activity level   Outcome: Progressing     Problem: DISCHARGE PLANNING  Goal: Discharge to home or other facility with appropriate resources  Description: INTERVENTIONS:  - Identify barriers to discharge w/patient and caregiver  - Arrange for needed discharge resources and transportation as appropriate  - Identify discharge learning needs (meds, wound care, etc.)  - Arrange for interpretive services to assist at discharge as needed  - Refer to Case Management Department for coordinating discharge planning if the patient needs post-hospital services based on physician/advanced practitioner order or complex needs related to functional status, cognitive ability, or social support system  Outcome: Progressing     Problem: Knowledge Deficit  Goal: Patient/family/caregiver demonstrates understanding of disease process, treatment plan, medications, and discharge instructions  Description: Complete learning assessment and assess knowledge base.  Interventions:  - Provide teaching at level of understanding  - Provide teaching via preferred learning  methods  Outcome: Progressing

## 2024-06-20 NOTE — CASE MANAGEMENT
Case Management Progress Note    Patient name Diogo Travis  Location ICU 14/ICU 14 MRN [id number]  : 1965 Date 2024       LOS (days): 2  Geometric Mean LOS (GMLOS) (days):   Days to GMLOS:        OBJECTIVE:        Current admission status: Inpatient  Preferred Pharmacy:   [facility] [address] [address]  Phone: [phone] Fax: [phone]    Primary Care Provider: Enid Altman DO    Primary Insurance: [employer]  Secondary Insurance:     PROGRESS NOTE:      CM received consult for TONIA/OUD. CM contacted Certified  to refer patient and provided minimal necessary information. CRS to meet with patient and follow up with CM to provide update on plan of care following patient connection.

## 2024-06-20 NOTE — DISCHARGE SUMMARY
[facility]  Progress Note  Name: Diogo Travis I  MRN: [id number]  Unit/Bed#: ICU 14 I Date of Admission: 6/18/2024   Date of Service: 6/20/2024 I Hospital Day: 2    Assessment & Plan   * Alcohol use disorder, severe, dependence (HCC)  Assessment & Plan  History of alcohol use disorder  Declining detoxification at this time  Reported relapse due to stress in life, former vodka drinker now drinks 2-3 bottles wine with additional bourbon shooters  Last drink 9 PM on 6/17/2024  Presented to ED on 6/18 tremulous, nauseous, vomiting, diaphoretic, agitated  Admitted to the intensive care unit initially for delirium tremens with severe alcohol withdrawal.  Seen by toxicology and started on IV phenobarbital.    Received total 117 0 mg of phenobarbital.  Subsequently CIWA score has been around 0.  Patient not interested in inpatient alcohol rehab and wants to pursue outpatient alcohol rehab upon discharge.  Continue with multivitamin, thiamine and folic acid supplementation.    Diarrhea  Assessment & Plan  Continues to complain of multiple episodes of loose watery bowel movements.  Denies any bright red bleeding per rectum or maroon/tarry stools.  Has not received any stool softeners or laxatives with 48 hours.  Follow-up on stool C. difficile toxin and stool enteric pathogen panel.  If negative, Imodium can be administered.  Continue to monitor electrolytes closely.    Anxiety  Assessment & Plan  Continue home Lexapro and PRN Atarax when able to tolerate PO     Hypertension  Assessment & Plan  Restarted on home lisinopril 40 mg daily however does have history of borderline low normal blood pressure.  Will decrease dose to 20 mg daily.    Chronic alcoholic gastritis  Assessment & Plan  History of, now with acute nausea and vomiting in setting of alcohol withdrawal  No reported hematemesis  Zofran prn  Currently tolerating regular house diet.  Continue home Protonix 40 mg QD  Outpatient follow-up  with GI for endoscopy as he is overdue for that and has had prior history of Hoff's esophagus.    Hypomagnesemia  Assessment & Plan  Resolved.  Continue to monitor electrolytes closely in the setting of ongoing diarrhea.             Medical Problems       Resolved Problems  Date Reviewed: 6/19/2024   None       Discharging Physician / Practitioner: Bijal Avila MD  PCP: Enid Altman DO  Admission Date:   Admission Orders (From admission, onward)       Ordered        06/18/24 2046  Inpatient Admission  Once                          Discharge Date: 6/1/24    Consultations During Hospital Stay:  NA    Procedures Performed:   NA    Significant Findings / Test Results:   NA    Incidental Findings:   NA       Test Results Pending at Discharge (will require follow up):   NA     Outpatient Tests Requested:  NA    Complications:  None     Reason for Admission: Alcohol withdrawal    Hospital Course:   Diogo Travis is a 58 y.o. male patient who originally presented to the hospital on 6/18/2024 due to severe alcohol withdrawal.  Treated in the intensive care unit with IV phenobarbital.  Subsequently CIWA score has been 0.  Patient complaining of diarrhea, states that he has IBS.  Stool C. difficile toxin was negative.  Diarrhea subsequently resolved.  Electrolytes have stabilized.  Stable for discharge.  Recommended to report to the emergency room if he has ongoing worsening diarrhea nausea or vomiting.  Refused inpatient alcohol rehab and wants outpatient rehab.  Resources provided by case management.  Recommended outpatient GI follow-up for repeat endoscopy for follow-up of Hoff's esophagus.  Continue with PPI daily in the interim.            Please see above list of diagnoses and related plan for additional information.     Condition at Discharge: stable    Discharge Day Visit / Exam:   Subjective: Seen and examined at bedside.  Diarrhea has resolved but no bowel movement since last night.  Remains  hemodynamically stable with no further withdrawal symptoms.  Vitals:   Vitals:    06/21/24 1105   BP: 122/80   Pulse:    Resp:    Temp: 98.2 °F (36.8 °C)   SpO2:     Exam:   Physical Exam  Constitutional:       General: He is not in acute distress.     Appearance: He is obese.   HENT:      Head: Normocephalic and atraumatic.      Nose: Nose normal.      Mouth/Throat:      Mouth: Mucous membranes are moist.   Eyes:      Extraocular Movements: Extraocular movements intact.      Pupils: Pupils are equal, round, and reactive to light.   Cardiovascular:      Rate and Rhythm: Normal rate and regular rhythm.   Pulmonary:      Effort: Pulmonary effort is normal.      Breath sounds: Normal breath sounds.   Abdominal:      General: Abdomen is flat. Bowel sounds are normal.      Palpations: Abdomen is soft.   Musculoskeletal:      Cervical back: Neck supple.      Right lower leg: No edema.      Left lower leg: No edema.   Skin:     General: Skin is warm.   Neurological:      General: No focal deficit present.      Mental Status: He is alert and oriented to person, place, and time. Mental status is at baseline.          Discussion with Family: Patient declined call to .     Discharge instructions/Information to patient and family:   See after visit summary for information provided to patient and family.      Provisions for Follow-Up Care:  See after visit summary for information related to follow-up care and any pertinent home health orders.      Mobility at time of Discharge:   Basic Mobility Inpatient Raw Score: 24  JH-HLM Goal: 8: Walk 250 feet or more  JH-HLM Achieved: 6: Walk 10 steps or more  HLM Goal achieved. Continue to encourage appropriate mobility.     Disposition:   Home    Planned Readmission: No     Discharge Statement:  I spent 30 minutes discharging the patient. This time was spent on the day of discharge. I had direct contact with the patient on the day of discharge. Greater than 50% of the total  time was spent examining patient, answering all patient questions, arranging and discussing plan of care with patient as well as directly providing post-discharge instructions.  Additional time then spent on discharge activities.    Discharge Medications:  See after visit summary for reconciled discharge medications provided to patient and/or family.      **Please Note: This note may have been constructed using a voice recognition system**

## 2024-06-20 NOTE — PROGRESS NOTES
[facility]  Progress Note  Name: Diogo Travis I  MRN: [id number]  Unit/Bed#: ICU 14 I Date of Admission: 6/18/2024   Date of Service: 6/20/2024 I Hospital Day: 2    Assessment & Plan   * Alcohol use disorder, severe, dependence (HCC)  Assessment & Plan  History of alcohol use disorder  Declining detoxification at this time  Reported relapse due to stress in life, former vodka drinker now drinks 2-3 bottles wine with additional bourbon shooters  Last drink 9 PM on 6/17/2024  Presented to ED on 6/18 tremulous, nauseous, vomiting, diaphoretic, agitated  Admitted to the intensive care unit initially for delirium tremens with severe alcohol withdrawal.  Seen by toxicology and started on IV phenobarbital.    Received total 117 0 mg of phenobarbital.  Subsequently CIWA score has been around 0.  Patient not interested in inpatient alcohol rehab and wants to pursue outpatient alcohol rehab upon discharge.  Continue with multivitamin, thiamine and folic acid supplementation.    Diarrhea  Assessment & Plan  Continues to complain of multiple episodes of loose watery bowel movements.  Denies any bright red bleeding per rectum or maroon/tarry stools.  Has not received any stool softeners or laxatives with 48 hours.  Follow-up on stool C. difficile toxin and stool enteric pathogen panel.  If negative, Imodium can be administered.  Continue to monitor electrolytes closely.    Anxiety  Assessment & Plan  Continue home Lexapro and PRN Atarax when able to tolerate PO     Hypertension  Assessment & Plan  Restarted on home lisinopril 40 mg daily however does have history of borderline low normal blood pressure.  Will decrease dose to 20 mg daily.    Chronic alcoholic gastritis  Assessment & Plan  History of, now with acute nausea and vomiting in setting of alcohol withdrawal  No reported hematemesis  Zofran prn  Currently tolerating regular house diet.  Continue home Protonix 40 mg QD  Outpatient follow-up  with GI for endoscopy as he is overdue for that and has had prior history of Hoff's esophagus.    Hypomagnesemia  Assessment & Plan  Resolved.  Continue to monitor electrolytes closely in the setting of ongoing diarrhea.               VTE Pharmacologic Prophylaxis:   Moderate Risk (Score 3-4) - Pharmacological DVT Prophylaxis Ordered: enoxaparin (Lovenox).    Mobility:   Basic Mobility Inpatient Raw Score: 24  JH-HLM Goal: 8: Walk 250 feet or more  JH-HLM Achieved: 6: Walk 10 steps or more  JH-HLM Goal achieved. Continue to encourage appropriate mobility.    Patient Centered Rounds: I performed bedside rounds with nursing staff today.   Discussions with Specialists or Other Care Team Provider: Discussed with  and nursing    Education and Discussions with Family / Patient: Patient declined call to .     Total Time Spent on Date of Encounter in care of patient: 20 mins. This time was spent on one or more of the following: performing physical exam; counseling and coordination of care; obtaining or reviewing history; documenting in the medical record; reviewing/ordering tests, medications or procedures; communicating with other healthcare professionals and discussing with patient's family/caregivers.    Current Length of Stay: 2 day(s)  Current Patient Status: Inpatient   Certification Statement: The patient will continue to require additional inpatient hospital stay due to ongoing workup for diarrhea  Discharge Plan: Anticipate discharge tomorrow to home.    Code Status: Level 1 - Full Code    Subjective:   Patient seen and examined at bedside.  Continues to complain of severe diarrhea with liquid stools without any meaningful stools or albaro bleeding per rectum.  Denies any nausea vomiting.  Withdrawal symptoms have improved.    Objective:     Vitals:   Temp (24hrs), Av.5 °F (36.9 °C), Min:98.4 °F (36.9 °C), Max:98.5 °F (36.9 °C)    Temp:  [98.4 °F (36.9 °C)-98.5 °F (36.9 °C)] 98.5 °F  (36.9 °C)  HR:  [66-98] 74  Resp:  [15-18] 18  BP: (103-151)/(59-89) 103/59  SpO2:  [93 %] 93 %  Body mass index is 33.82 kg/m².     Input and Output Summary (last 24 hours):     Intake/Output Summary (Last 24 hours) at 6/20/2024 1340  Last data filed at 6/19/2024 2248  Gross per 24 hour   Intake 660 ml   Output 500 ml   Net 160 ml       Physical Exam:   Physical Exam  Constitutional:       Appearance: He is ill-appearing.   HENT:      Head: Normocephalic.      Nose: Nose normal.      Mouth/Throat:      Mouth: Mucous membranes are moist.   Eyes:      Extraocular Movements: Extraocular movements intact.      Pupils: Pupils are equal, round, and reactive to light.   Cardiovascular:      Rate and Rhythm: Normal rate and regular rhythm.      Pulses: Normal pulses.   Pulmonary:      Effort: Pulmonary effort is normal.   Abdominal:      General: Abdomen is flat. Bowel sounds are normal. There is no distension.      Palpations: Abdomen is soft.      Tenderness: There is no abdominal tenderness.   Musculoskeletal:      Right lower leg: No edema.      Left lower leg: No edema.   Skin:     General: Skin is warm.   Neurological:      General: No focal deficit present.      Mental Status: He is alert and oriented to person, place, and time. Mental status is at baseline.   Psychiatric:      Comments: Appears anxious.          Additional Data:     Labs:  Results from last 7 days   Lab Units 06/20/24  0433 06/18/24  2119 06/18/24  1744   WBC Thousand/uL 4.17*   < > 6.95   HEMOGLOBIN g/dL 11.9*   < > 14.6   HEMATOCRIT % 35.2*   < > 41.2   PLATELETS Thousands/uL 134*   < > 172   SEGS PCT %  --   --  85*   LYMPHO PCT %  --   --  8*   MONO PCT %  --   --  6   EOS PCT %  --   --  0    < > = values in this interval not displayed.     Results from last 7 days   Lab Units 06/20/24  0433   SODIUM mmol/L 137   POTASSIUM mmol/L 3.7   CHLORIDE mmol/L 105   CO2 mmol/L 27   BUN mg/dL 10   CREATININE mg/dL 1.00   ANION GAP mmol/L 5   CALCIUM  mg/dL 8.5   ALBUMIN g/dL 3.6   TOTAL BILIRUBIN mg/dL 0.78   ALK PHOS U/L 68   ALT U/L 17   AST U/L 23   GLUCOSE RANDOM mg/dL 89     Results from last 7 days   Lab Units 06/18/24  1744   INR  1.01             Results from last 7 days   Lab Units 06/18/24  2319 06/18/24  2119 06/18/24  1744   LACTIC ACID mmol/L 1.6 2.4*  --    PROCALCITONIN ng/ml  --   --  <0.05       Lines/Drains:  Invasive Devices       Peripheral Intravenous Line  Duration             Peripheral IV 06/18/24 Dorsal (posterior);Right Forearm 1 day                          Imaging: No pertinent imaging reviewed.    Recent Cultures (last 7 days):         Last 24 Hours Medication List:   Current Facility-Administered Medications   Medication Dose Route Frequency Provider Last Rate    atorvastatin  40 mg Oral Daily With Dinner Dequan Amador MD      chlorhexidine  15 mL Mouth/Throat Q12H COLBY Donald Singleton MD      enoxaparin  40 mg Subcutaneous Daily Donald Singleton MD      escitalopram  20 mg Oral Daily Donald Singleton MD      folic acid  1 mg Oral Daily Bijal Avila MD      hydrOXYzine HCL  25 mg Oral Q6H PRN Donald Singleton MD      lisinopril  40 mg Oral Daily Donald Singleton MD      melatonin  6 mg Oral HS Donald Singleton MD      multivitamin-minerals  1 tablet Oral Daily Donald Singleton MD      ondansetron  4 mg Intravenous Q6H PRN Donald Singleton MD      pantoprazole  40 mg Intravenous Q24H COLBY Donald Singleton MD      thiamine  100 mg Oral Daily Bijal Avila MD          Today, Patient Was Seen By: Bijal Avila MD    **Please Note: This note may have been constructed using a voice recognition system.**

## 2024-06-20 NOTE — ASSESSMENT & PLAN NOTE
Continues to complain of multiple episodes of loose watery bowel movements.  Denies any bright red bleeding per rectum or maroon/tarry stools.  Has not received any stool softeners or laxatives with 48 hours.  Follow-up on stool C. difficile toxin and stool enteric pathogen panel.  If negative, Imodium can be administered.  Continue to monitor electrolytes closely.

## 2024-06-20 NOTE — ASSESSMENT & PLAN NOTE
Restarted on home lisinopril 40 mg daily however does have history of borderline low normal blood pressure.  Will decrease dose to 20 mg daily.

## 2024-06-20 NOTE — PLAN OF CARE
Problem: Potential for Falls  Goal: Patient will remain free of falls  Description: INTERVENTIONS:  - Educate patient/family on patient safety including physical limitations  - Instruct patient to call for assistance with activity   - Consult OT/PT to assist with strengthening/mobility   - Keep Call bell within reach  - Keep bed low and locked with side rails adjusted as appropriate  - Keep care items and personal belongings within reach  - Initiate and maintain comfort rounds  - Make Fall Risk Sign visible to staff  - Apply yellow socks and bracelet for high fall risk patients  - Consider moving patient to room near nurses station  Outcome: Progressing     Problem: Prexisting or High Potential for Compromised Skin Integrity  Goal: Skin integrity is maintained or improved  Description: INTERVENTIONS:  - Identify patients at risk for skin breakdown  - Assess and monitor skin integrity  - Assess and monitor nutrition and hydration status  - Monitor labs   - Assess for incontinence   - Turn and reposition patient  - Assist with mobility/ambulation  - Relieve pressure over bony prominences  - Avoid friction and shearing  - Provide appropriate hygiene as needed including keeping skin clean and dry  - Evaluate need for skin moisturizer/barrier cream  - Collaborate with interdisciplinary team   - Patient/family teaching  - Consider wound care consult   Outcome: Progressing     Problem: PAIN - ADULT  Goal: Verbalizes/displays adequate comfort level or baseline comfort level  Description: Interventions:  - Encourage patient to monitor pain and request assistance  - Assess pain using appropriate pain scale  - Administer analgesics based on type and severity of pain and evaluate response  - Implement non-pharmacological measures as appropriate and evaluate response  - Consider cultural and social influences on pain and pain management  - Notify physician/advanced practitioner if interventions unsuccessful or patient reports  new pain  Outcome: Progressing     Problem: INFECTION - ADULT  Goal: Absence or prevention of progression during hospitalization  Description: INTERVENTIONS:  - Assess and monitor for signs and symptoms of infection  - Monitor lab/diagnostic results  - Monitor all insertion sites, i.e. indwelling lines, tubes, and drains  - Monitor endotracheal if appropriate and nasal secretions for changes in amount and color  - [place] appropriate cooling/warming therapies per order  - Administer medications as ordered  - Instruct and encourage patient and family to use good hand hygiene technique  - Identify and instruct in appropriate isolation precautions for identified infection/condition  Outcome: Progressing  Goal: Absence of fever/infection during neutropenic period  Description: INTERVENTIONS:  - Monitor WBC    Outcome: Progressing     Problem: SAFETY ADULT  Goal: Patient will remain free of falls  Description: INTERVENTIONS:  - Educate patient/family on patient safety including physical limitations  - Instruct patient to call for assistance with activity   - Consult OT/PT to assist with strengthening/mobility   - Keep Call bell within reach  - Keep bed low and locked with side rails adjusted as appropriate  - Keep care items and personal belongings within reach  - Initiate and maintain comfort rounds  - Make Fall Risk Sign visible to staff  - Apply yellow socks and bracelet for high fall risk patients  - Consider moving patient to room near nurses station  Outcome: Progressing  Goal: Maintain or return to baseline ADL function  Description: INTERVENTIONS:  -  Assess patient's ability to carry out ADLs; assess patient's baseline for ADL function and identify physical deficits which impact ability to perform ADLs (bathing, care of mouth/teeth, toileting, grooming, dressing, etc.)  - Assess/evaluate cause of self-care deficits   - Assess range of motion  - Assess patient's mobility; develop plan if impaired  - Assess  patient's need for assistive devices and provide as appropriate  - Encourage maximum independence but intervene and supervise when necessary  - Involve family in performance of ADLs  - Assess for home care needs following discharge   - Consider OT consult to assist with ADL evaluation and planning for discharge  - Provide patient education as appropriate  Outcome: Progressing  Goal: Maintains/Returns to pre admission functional level  Description: INTERVENTIONS:  - Perform AM-PAC 6 Click Basic Mobility/ Daily Activity assessment daily.  - Set and communicate daily mobility goal to care team and patient/family/caregiver.   - Collaborate with rehabilitation services on mobility goals if consulted  - Out of bed for toileting  - Record patient progress and toleration of activity level   Outcome: Progressing     Problem: DISCHARGE PLANNING  Goal: Discharge to home or other facility with appropriate resources  Description: INTERVENTIONS:  - Identify barriers to discharge w/patient and caregiver  - Arrange for needed discharge resources and transportation as appropriate  - Identify discharge learning needs (meds, wound care, etc.)  - Arrange for interpretive services to assist at discharge as needed  - Refer to Case Management Department for coordinating discharge planning if the patient needs post-hospital services based on physician/advanced practitioner order or complex needs related to functional status, cognitive ability, or social support system  Outcome: Progressing     Problem: Knowledge Deficit  Goal: Patient/family/caregiver demonstrates understanding of disease process, treatment plan, medications, and discharge instructions  Description: Complete learning assessment and assess knowledge base.  Interventions:  - Provide teaching at level of understanding  - Provide teaching via preferred learning methods  Outcome: Progressing

## 2024-06-21 VITALS
BODY MASS INDEX: 34.98 KG/M2 | OXYGEN SATURATION: 93 % | SYSTOLIC BLOOD PRESSURE: 122 MMHG | RESPIRATION RATE: 19 BRPM | HEIGHT: 68 IN | DIASTOLIC BLOOD PRESSURE: 80 MMHG | TEMPERATURE: 98.2 F | HEART RATE: 80 BPM | WEIGHT: 230.82 LBS

## 2024-06-21 LAB
ALBUMIN SERPL BCG-MCNC: 3.6 G/DL (ref 3.5–5)
ALP SERPL-CCNC: 66 U/L (ref 34–104)
ALT SERPL W P-5'-P-CCNC: 15 U/L (ref 7–52)
ANION GAP SERPL CALCULATED.3IONS-SCNC: 6 MMOL/L (ref 4–13)
AST SERPL W P-5'-P-CCNC: 29 U/L (ref 13–39)
BASOPHILS # BLD AUTO: 0.03 THOUSANDS/ÂΜL (ref 0–0.1)
BASOPHILS NFR BLD AUTO: 1 % (ref 0–1)
BILIRUB SERPL-MCNC: 0.45 MG/DL (ref 0.2–1)
BUN SERPL-MCNC: 11 MG/DL (ref 5–25)
C COLI+JEJUNI TUF STL QL NAA+PROBE: NEGATIVE
C DIFF TOX GENS STL QL NAA+PROBE: NEGATIVE
CA-I BLD-SCNC: 1.13 MMOL/L (ref 1.12–1.32)
CALCIUM SERPL-MCNC: 8.4 MG/DL (ref 8.4–10.2)
CHLORIDE SERPL-SCNC: 104 MMOL/L (ref 96–108)
CO2 SERPL-SCNC: 29 MMOL/L (ref 21–32)
CREAT SERPL-MCNC: 0.91 MG/DL (ref 0.6–1.3)
EC STX1+STX2 GENES STL QL NAA+PROBE: NEGATIVE
EOSINOPHIL # BLD AUTO: 0.1 THOUSAND/ÂΜL (ref 0–0.61)
EOSINOPHIL NFR BLD AUTO: 2 % (ref 0–6)
ERYTHROCYTE [DISTWIDTH] IN BLOOD BY AUTOMATED COUNT: 14.2 % (ref 11.6–15.1)
GFR SERPL CREATININE-BSD FRML MDRD: 93 ML/MIN/1.73SQ M
GLUCOSE SERPL-MCNC: 92 MG/DL (ref 65–140)
HCT VFR BLD AUTO: 34.4 % (ref 36.5–49.3)
HGB BLD-MCNC: 11.8 G/DL (ref 12–17)
IMM GRANULOCYTES # BLD AUTO: 0.01 THOUSAND/UL (ref 0–0.2)
IMM GRANULOCYTES NFR BLD AUTO: 0 % (ref 0–2)
LYMPHOCYTES # BLD AUTO: 1.37 THOUSANDS/ÂΜL (ref 0.6–4.47)
LYMPHOCYTES NFR BLD AUTO: 32 % (ref 14–44)
MAGNESIUM SERPL-MCNC: 1.7 MG/DL (ref 1.9–2.7)
MCH RBC QN AUTO: 32.5 PG (ref 26.8–34.3)
MCHC RBC AUTO-ENTMCNC: 34.3 G/DL (ref 31.4–37.4)
MCV RBC AUTO: 95 FL (ref 82–98)
MONOCYTES # BLD AUTO: 0.43 THOUSAND/ÂΜL (ref 0.17–1.22)
MONOCYTES NFR BLD AUTO: 10 % (ref 4–12)
NEUTROPHILS # BLD AUTO: 2.36 THOUSANDS/ÂΜL (ref 1.85–7.62)
NEUTS SEG NFR BLD AUTO: 55 % (ref 43–75)
NRBC BLD AUTO-RTO: 0 /100 WBCS
PHOSPHATE SERPL-MCNC: 4.3 MG/DL (ref 2.7–4.5)
PLATELET # BLD AUTO: 127 THOUSANDS/UL (ref 149–390)
PMV BLD AUTO: 8.5 FL (ref 8.9–12.7)
POTASSIUM SERPL-SCNC: 3.5 MMOL/L (ref 3.5–5.3)
PROT SERPL-MCNC: 6 G/DL (ref 6.4–8.4)
RBC # BLD AUTO: 3.63 MILLION/UL (ref 3.88–5.62)
SALMONELLA SP SPAO STL QL NAA+PROBE: NEGATIVE
SHIGELLA SP+EIEC IPAH STL QL NAA+PROBE: NEGATIVE
SODIUM SERPL-SCNC: 139 MMOL/L (ref 135–147)
WBC # BLD AUTO: 4.3 THOUSAND/UL (ref 4.31–10.16)

## 2024-06-21 PROCEDURE — 99239 HOSP IP/OBS DSCHRG MGMT >30: CPT | Performed by: INTERNAL MEDICINE

## 2024-06-21 PROCEDURE — 80053 COMPREHEN METABOLIC PANEL: CPT | Performed by: EMERGENCY MEDICINE

## 2024-06-21 PROCEDURE — 83735 ASSAY OF MAGNESIUM: CPT | Performed by: EMERGENCY MEDICINE

## 2024-06-21 PROCEDURE — 82330 ASSAY OF CALCIUM: CPT | Performed by: EMERGENCY MEDICINE

## 2024-06-21 PROCEDURE — C9113 INJ PANTOPRAZOLE SODIUM, VIA: HCPCS

## 2024-06-21 PROCEDURE — 84100 ASSAY OF PHOSPHORUS: CPT | Performed by: EMERGENCY MEDICINE

## 2024-06-21 PROCEDURE — 85025 COMPLETE CBC W/AUTO DIFF WBC: CPT | Performed by: INTERNAL MEDICINE

## 2024-06-21 RX ORDER — PANTOPRAZOLE SODIUM 40 MG/1
40 TABLET, DELAYED RELEASE ORAL
Qty: 30 TABLET | Refills: 0 | Status: SHIPPED | OUTPATIENT
Start: 2024-06-21 | End: 2024-07-21

## 2024-06-21 RX ORDER — LANOLIN ALCOHOL/MO/W.PET/CERES
6 CREAM (GRAM) TOPICAL
Qty: 30 TABLET | Refills: 0 | Status: SHIPPED | OUTPATIENT
Start: 2024-06-21

## 2024-06-21 RX ORDER — ATORVASTATIN CALCIUM 40 MG/1
40 TABLET, FILM COATED ORAL
Qty: 30 TABLET | Refills: 0 | Status: SHIPPED | OUTPATIENT
Start: 2024-06-21

## 2024-06-21 RX ORDER — MAGNESIUM SULFATE HEPTAHYDRATE 40 MG/ML
2 INJECTION, SOLUTION INTRAVENOUS ONCE
Status: COMPLETED | OUTPATIENT
Start: 2024-06-21 | End: 2024-06-21

## 2024-06-21 RX ADMIN — MAGNESIUM SULFATE HEPTAHYDRATE 2 G: 40 INJECTION, SOLUTION INTRAVENOUS at 09:20

## 2024-06-21 RX ADMIN — ENOXAPARIN SODIUM 40 MG: 40 INJECTION SUBCUTANEOUS at 09:22

## 2024-06-21 RX ADMIN — MULTIPLE VITAMINS W/ MINERALS TAB 1 TABLET: TAB ORAL at 09:21

## 2024-06-21 RX ADMIN — FOLIC ACID 1 MG: 1 TABLET ORAL at 09:21

## 2024-06-21 RX ADMIN — Medication 100 MG: at 09:21

## 2024-06-21 RX ADMIN — ESCITALOPRAM OXALATE 20 MG: 20 TABLET ORAL at 09:21

## 2024-06-21 RX ADMIN — LISINOPRIL 40 MG: 20 TABLET ORAL at 09:21

## 2024-06-21 RX ADMIN — HYDROXYZINE HYDROCHLORIDE 25 MG: 25 TABLET ORAL at 00:01

## 2024-06-21 RX ADMIN — PANTOPRAZOLE SODIUM 40 MG: 40 INJECTION, POWDER, FOR SOLUTION INTRAVENOUS at 09:20

## 2024-06-21 NOTE — NURSING NOTE
Notified by PCA that pt refused vitals this AM. Will f/u with pt on med pass. Pt is due for BP medications w/ hold parameters.

## 2024-06-21 NOTE — PLAN OF CARE
Problem: Potential for Falls  Goal: Patient will remain free of falls  Description: INTERVENTIONS:  - Educate patient/family on patient safety including physical limitations  - Instruct patient to call for assistance with activity   - Consult OT/PT to assist with strengthening/mobility   - Keep Call bell within reach  - Keep bed low and locked with side rails adjusted as appropriate  - Keep care items and personal belongings within reach  - Initiate and maintain comfort rounds  - Make Fall Risk Sign visible to staff  - Offer Toileting every 2 Hours, in advance of need  - Initiate/Maintain bed alarm  - Obtain necessary fall risk management equipment  - Apply yellow socks and bracelet for high fall risk patients  - Consider moving patient to room near nurses station  Outcome: Progressing     Problem: Prexisting or High Potential for Compromised Skin Integrity  Goal: Skin integrity is maintained or improved  Description: INTERVENTIONS:  - Identify patients at risk for skin breakdown  - Assess and monitor skin integrity  - Assess and monitor nutrition and hydration status  - Monitor labs   - Assess for incontinence   - Turn and reposition patient  - Assist with mobility/ambulation  - Relieve pressure over bony prominences  - Avoid friction and shearing  - Provide appropriate hygiene as needed including keeping skin clean and dry  - Evaluate need for skin moisturizer/barrier cream  - Collaborate with interdisciplinary team   - Patient/family teaching  - Consider wound care consult   Outcome: Progressing     Problem: PAIN - ADULT  Goal: Verbalizes/displays adequate comfort level or baseline comfort level  Description: Interventions:  - Encourage patient to monitor pain and request assistance  - Assess pain using appropriate pain scale  - Administer analgesics based on type and severity of pain and evaluate response  - Implement non-pharmacological measures as appropriate and evaluate response  - Consider cultural and  social influences on pain and pain management  - Notify physician/advanced practitioner if interventions unsuccessful or patient reports new pain  Outcome: Progressing     Problem: INFECTION - ADULT  Goal: Absence or prevention of progression during hospitalization  Description: INTERVENTIONS:  - Assess and monitor for signs and symptoms of infection  - Monitor lab/diagnostic results  - Monitor all insertion sites, i.e. indwelling lines, tubes, and drains  - Monitor endotracheal if appropriate and nasal secretions for changes in amount and color  - [place] appropriate cooling/warming therapies per order  - Administer medications as ordered  - Instruct and encourage patient and family to use good hand hygiene technique  - Identify and instruct in appropriate isolation precautions for identified infection/condition  Outcome: Progressing  Goal: Absence of fever/infection during neutropenic period  Description: INTERVENTIONS:  - Monitor WBC    Outcome: Progressing     Problem: SAFETY ADULT  Goal: Patient will remain free of falls  Description: INTERVENTIONS:  - Educate patient/family on patient safety including physical limitations  - Instruct patient to call for assistance with activity   - Consult OT/PT to assist with strengthening/mobility   - Keep Call bell within reach  - Keep bed low and locked with side rails adjusted as appropriate  - Keep care items and personal belongings within reach  - Initiate and maintain comfort rounds  - Make Fall Risk Sign visible to staff  - Offer Toileting every 2 Hours, in advance of need  - Initiate/Maintain bed alarm  - Obtain necessary fall risk management equipment  - Apply yellow socks and bracelet for high fall risk patients  - Consider moving patient to room near nurses station  Outcome: Progressing  Goal: Maintain or return to baseline ADL function  Description: INTERVENTIONS:  -  Assess patient's ability to carry out ADLs; assess patient's baseline for ADL function and  identify physical deficits which impact ability to perform ADLs (bathing, care of mouth/teeth, toileting, grooming, dressing, etc.)  - Assess/evaluate cause of self-care deficits   - Assess range of motion  - Assess patient's mobility; develop plan if impaired  - Assess patient's need for assistive devices and provide as appropriate  - Encourage maximum independence but intervene and supervise when necessary  - Involve family in performance of ADLs  - Assess for home care needs following discharge   - Consider OT consult to assist with ADL evaluation and planning for discharge  - Provide patient education as appropriate  Outcome: Progressing  Goal: Maintains/Returns to pre admission functional level  Description: INTERVENTIONS:  - Perform AM-PAC 6 Click Basic Mobility/ Daily Activity assessment daily.  - Set and communicate daily mobility goal to care team and patient/family/caregiver.   - Collaborate with rehabilitation services on mobility goals if consulted  - Perform Range of Motion 3 times a day.  - Reposition patient every 2 hours.  - Dangle patient 3 times a day  - Stand patient 3 times a day  - Ambulate patient 3 times a day  - Out of bed to chair 3 times a day   - Out of bed for meals 3 times a day  - Out of bed for toileting  - Record patient progress and toleration of activity level   Outcome: Progressing     Problem: DISCHARGE PLANNING  Goal: Discharge to home or other facility with appropriate resources  Description: INTERVENTIONS:  - Identify barriers to discharge w/patient and caregiver  - Arrange for needed discharge resources and transportation as appropriate  - Identify discharge learning needs (meds, wound care, etc.)  - Arrange for interpretive services to assist at discharge as needed  - Refer to Case Management Department for coordinating discharge planning if the patient needs post-hospital services based on physician/advanced practitioner order or complex needs related to functional status,  cognitive ability, or social support system  Outcome: Progressing     Problem: Knowledge Deficit  Goal: Patient/family/caregiver demonstrates understanding of disease process, treatment plan, medications, and discharge instructions  Description: Complete learning assessment and assess knowledge base.  Interventions:  - Provide teaching at level of understanding  - Provide teaching via preferred learning methods  Outcome: Progressing

## 2024-06-24 DIAGNOSIS — F10.20 ALCOHOL USE DISORDER, SEVERE, DEPENDENCE (HCC): ICD-10-CM

## 2024-06-24 DIAGNOSIS — Z78.9 NEED FOR FOLLOW-UP BY SOCIAL WORKER: Primary | ICD-10-CM

## 2024-06-24 DIAGNOSIS — Z59.819 HOUSING INSTABILITY: ICD-10-CM

## 2024-06-24 SDOH — ECONOMIC STABILITY - HOUSING INSECURITY: HOUSING INSTABILITY UNSPECIFIED: Z59.819

## 2024-06-24 NOTE — UTILIZATION REVIEW
NOTIFICATION OF ADMISSION DISCHARGE   This is a Notification of Discharge from [facility]. Please be advised that this patient has been discharge from our facility. Below you will find the admission and discharge date and time including the patient’s disposition.   UTILIZATION REVIEW CONTACT:  Alyce Vieira  Utilization   Network Utilization Review Department  Phone: [phone] carefully listen to the prompts. All voicemails are confidential.  Email: [email or web address]     ADMISSION INFORMATION  PRESENTATION DATE: 6/18/2024  5:12 PM  OBERVATION ADMISSION DATE:   INPATIENT ADMISSION DATE: 6/18/24  8:46 PM   DISCHARGE DATE: 6/21/2024  4:30 PM   DISPOSITION:Home/Self Care    Network Utilization Review Department  ATTENTION: Please call with any questions or concerns to [phone] and carefully listen to the prompts so that you are directed to the right person. All voicemails are confidential.   For Discharge needs, contact Care Management DC Support Team at [phone] opt. [phone]  Send all requests for admission clinical reviews, approved or denied determinations and any other requests to dedicated fax number below belonging to the campus where the patient is receiving treatment. List of dedicated fax numbers for the Facilities:  FACILITY NAME UR FAX NUMBER   ADMISSION DENIALS (Administrative/Medical Necessity) [phone]   DISCHARGE SUPPORT TEAM ([facility]) [phone]   PARENT CHILD HEALTH (Maternity/NICU/Pediatrics) [phone]   [facility] [phone]   General acute hospital [phone]   [facility] [phone]   [facility] [phone]   [facility] [phone]   [facility] [phone]   [facility] [phone]   [facility] [phone]    [facility] [phone]   [facility] [phone]   [facility] [phone]   [facility] [phone]

## 2024-06-25 ENCOUNTER — TELEPHONE (OUTPATIENT)
Dept: FAMILY MEDICINE CLINIC | Facility: CLINIC | Age: 59
End: 2024-06-25

## 2024-06-25 DIAGNOSIS — F32.A ANXIETY AND DEPRESSION: ICD-10-CM

## 2024-06-25 DIAGNOSIS — F41.9 ANXIETY: ICD-10-CM

## 2024-06-25 DIAGNOSIS — F41.9 ANXIETY AND DEPRESSION: ICD-10-CM

## 2024-06-25 DIAGNOSIS — F32.1 CURRENT MODERATE EPISODE OF MAJOR DEPRESSIVE DISORDER, UNSPECIFIED WHETHER RECURRENT (HCC): Primary | ICD-10-CM

## 2024-06-25 RX ORDER — HYDROXYZINE HYDROCHLORIDE 25 MG/1
25 TABLET, FILM COATED ORAL EVERY 6 HOURS PRN
Qty: 30 TABLET | Refills: 0 | Status: SHIPPED | OUTPATIENT
Start: 2024-06-25

## 2024-06-25 RX ORDER — ESCITALOPRAM OXALATE 20 MG/1
20 TABLET ORAL DAILY
Qty: 30 TABLET | Refills: 0 | Status: SHIPPED | OUTPATIENT
Start: 2024-06-25

## 2024-06-25 NOTE — TELEPHONE ENCOUNTER
Pt has appt c me 7/9/24.  #30, no refills of each Rx sent.      Return in about 6 weeks (around 3/27/2024) for F/U HTN, HL, Anx/Dep, ETOH, GERD, Labs.

## 2024-06-25 NOTE — TELEPHONE ENCOUNTER
Patient scheduled for TCM 7/9 (first available with anyone). Please contact patient to finish tcm questions.

## 2024-06-25 NOTE — TELEPHONE ENCOUNTER
Patient called requesting two refills on his medication, he was recently discharge from the hospital and notice he does not have any more refills on it. It is for his anxiety Lexapro and Atarax to his [facility] on [address] in [place].     I was only able to queue Lexapro, I was not able to locate Atarax on his med list but it was mention in his hospital discharge.

## 2024-06-26 ENCOUNTER — PATIENT OUTREACH (OUTPATIENT)
Dept: FAMILY MEDICINE CLINIC | Facility: CLINIC | Age: 59
End: 2024-06-26

## 2024-06-26 NOTE — TELEPHONE ENCOUNTER
Patient unhappy with medication only 30 days supply . Refusing to  until PCP sends as 90 days supply. Please advise.

## 2024-06-26 NOTE — TELEPHONE ENCOUNTER
Pt called in stating he would like to speak with a nurse in the office. He didn't want to tell me the reason. Please contact pt.

## 2024-06-26 NOTE — PROGRESS NOTES
OPCM SW received HRR referral for patient.  OPCM SW reviewed patient's chart and noted history of TONIA.  Spenser referred patient to CRS for additional TONIA support.  OPCM SW reached out and left a voicemail

## 2024-07-01 ENCOUNTER — TRANSITIONAL CARE MANAGEMENT (OUTPATIENT)
Dept: FAMILY MEDICINE CLINIC | Facility: CLINIC | Age: 59
End: 2024-07-01

## 2024-07-01 ENCOUNTER — PATIENT OUTREACH (OUTPATIENT)
Dept: FAMILY MEDICINE CLINIC | Facility: CLINIC | Age: 59
End: 2024-07-01

## 2024-07-01 NOTE — PROGRESS NOTES
OPCM SW attempted second outreach to patient offering resources for TONIA.  Voicemail left and UTR letter sent to his [place]

## 2024-07-08 NOTE — PROGRESS NOTES
Assessment/Plan:  Problem List Items Addressed This Visit        Cardiovascular and Mediastinum    Hypertension     Stable.  Check blood pressure outside of office.  Recommend lifestyle modifications.  ETOH cessation advised.           Relevant Medications    lisinopril (ZESTRIL) 40 mg tablet    Coronary artery calcification of native artery     Pending Lipitor 40mg QHS.  Pending Cardio consult.           Relevant Orders    Ambulatory Referral to Cardiology       Digestive    Hepatic steatosis     Pending Hepatology consult.  ETOH cessation advised.  Recommend lifestyle modifications.         Relevant Orders    Ambulatory Referral to Hepatology    Ambulatory Referral to Gastroenterology    Ambulatory Referral to Gastroenterology    Comprehensive metabolic panel    Chronic alcoholic gastritis - Primary     Management per GI.  Continue Protonix 40mg QD.  ETOH Cessation advised.           Relevant Medications    pantoprazole (PROTONIX) 40 mg tablet    Other Relevant Orders    Ambulatory Referral to Hepatology    Ambulatory Referral to Gastroenterology    Ambulatory Referral to Gastroenterology    Comprehensive metabolic panel    GERD (gastroesophageal reflux disease)     Management per GI - Overdue for appt.  Continue Protonix 40mg QD.  ETOH cessation advised.  Watch GERD diet.           Relevant Medications    pantoprazole (PROTONIX) 40 mg tablet    Other Relevant Orders    Ambulatory Referral to Hepatology    Ambulatory Referral to Gastroenterology    Ambulatory Referral to Gastroenterology    Magnesium    IBS (irritable bowel syndrome)     Management per GI.  Overdue for colonoscopy.           Relevant Orders    Ambulatory Referral to Gastroenterology    Ambulatory Referral to Gastroenterology    TSH, 3rd generation with Free T4 reflex       Behavioral Health    Anxiety     Stable.  Continue Lexapro 20mg QD.  Start Atarax 25mg q6 hours PRN.  Patient declines starting Buspar 7.5mg TID PRN, but may reconsider in  future.           Relevant Medications    escitalopram (LEXAPRO) 20 mg tablet    Depression     Stable.  Continue Lexapro 20mg QD.  Start Atarax 25mg q6 hours PRN.  Patient declines starting Buspar 7.5mg TID PRN, but may reconsider in future.           Relevant Medications    escitalopram (LEXAPRO) 20 mg tablet       Other    Alcohol use disorder, severe, dependence (HCC)     In remission / sober for 22 days.  Patient is still working on outpatient ETOH Rehab options - has resources provided today.  Continue Naltrexone.  ETOH cessation advised.           Relevant Medications    folic acid (FOLVITE) 400 mcg tablet    naltrexone (REVIA) 50 mg tablet    Thiamine Mononitrate (VITAMIN B1) 100 mg tablet    Other Relevant Orders    Ambulatory Referral to Hepatology    Ambulatory Referral to Gastroenterology    Ambulatory Referral to Gastroenterology    Comprehensive metabolic panel    Obesity     Improved.  Recommend lifestyle modifications.           Hyperlipidemia     Pending labs s/p recent initiation of Lipitor 40mg QHS.  Recommend lifestyle modifications.           Relevant Medications    atorvastatin (LIPITOR) 40 mg tablet    Other Relevant Orders    Comprehensive metabolic panel    Lipid panel    TSH, 3rd generation with Free T4 reflex    LDL cholesterol, direct    Vitamin D deficiency     New.      Vitamin D Deficiency - Recommend start multivitamin and prescription vitamin D (Drisdol).  When 12 weeks of Drisdol completed, continue multivitamin and start over-the-counter Vitamin D3 1,000-3,000 International Units daily.  Check vitamin D level 1 week after completing Drisdol.           Relevant Medications    ergocalciferol (ERGOCALCIFEROL) 1.25 MG (11458 UT) capsule    Other Relevant Orders    Vitamin D 25 hydroxy    Vitamin B12 deficiency     New.      Vitamin B12 Deficiency - Schedule for nurse visits for injections of Vitamin B12 1000mcg IM/SQ daily x 1 week, then weekly x 1 month, then start over-the-counter  vitamin B12 1000mcg daily - take on an empty stomach.            Relevant Medications    cyanocobalamin injection 1,000 mcg    Rosacea     Uncontrolled.  Pending Derm consult.  Restart Metronidazole 0.75% cream BID.  ETOH cessation advised.           Relevant Medications    metroNIDAZOLE (METROCREAM) 0.75 % cream    Diarrhea     Resolved.           Relevant Orders    Ambulatory Referral to Gastroenterology    Ambulatory Referral to Gastroenterology    TSH, 3rd generation with Free T4 reflex   Other Visit Diagnoses     SOB (shortness of breath)        Relevant Orders    Ambulatory Referral to Cardiology    Alcohol withdrawal (HCC)        Insomnia, unspecified type        Relevant Medications    Melatonin 10 MG TABS    Encounter for immunization        Relevant Orders    HEPATITIS A VACCINE ADULT IM (Completed)    HEPATITIS A VACCINE ADULT IM    Pneumococcal Conjugate Vaccine 20-valent (Pcv20)    Screening for colorectal cancer        Relevant Orders    Ambulatory Referral to Gastroenterology    Ambulatory Referral to Gastroenterology           Return in about 6 weeks (around 8/20/2024) for F/U HTN, HL, Anx/Dep, ETOH, GERD, Vit B12/D Def, Labs; 6mo - Hep A#2; PRN [id number].      Future Appointments   Date Time Provider Department Center   9/5/2024 11:40 AM Enid Altman DO [facility]          Subjective:     Diogo is a 59 y.o. male who presents today for a hospital follow-up visit.      TCM Call     Date and time call was made  7/5/2024 11:30 AM    Hospital care reviewed  Records reviewed    Patient was hospitialized at  [facility]    Date of Admission  06/18/24    Date of discharge  06/21/24    Diagnosis  alcohol intoxication    Disposition  Home      TCM Call     None            HPI:  Chief Complaint   Patient presents with   • Transition of Care Management     D/c 6/21/24 for alcohol withdrawal. No problems since d/c.      -- Above per clinical staff and reviewed.  --    HPI        Today:      Return in about 6 weeks (around 3/27/2024) for F/U HTN, HL, Anx/Dep, ETOH, GERD, Labs.     F/U Hospitalization [facility] 6/18/24 - 6/21/24    Alcohol use disorder-patient was admitted to the ICU due to severe alcohol withdrawal and delirium tremens.  He was seen in consult by toxicology and he received IV phenobarbital in the ICU.  He declined detoxification as well as inpatient alcohol rehab, as he wants to pursue outpatient alcohol rehab upon discharge.  Resources were provided by case management.  He had not made any arrangements yet, but plans to do in the next 8 days as he is currently 22 days sober.  He is considering 3 facilities.      2.  Diarrhea-patient states he has a history of irritable bowel syndrome.  Stool C. difficile toxin was negative.  His diarrhea resolved.    3.  Anxiety-continue Lexapro, and Atarax as needed.    4.  Hypertension-due to concern of low normal blood pressure, patient was continued on lisinopril 20 mg daily while inpatient, and then discharged on original home dose of lisinopril 40 mg daily.  He is now exercising again.  BP check outside of office 130/82.      5.  Chronic alcoholic gastritis-continue home Protonix 40 mg daily.  Patient is overdue for GI follow-up for EGD due to history of Hoff's esophagus.    6.  Hypomagnesemia-resolved.    7.  Hyperlipidemia - I/P team started Lipitor 40mg QHS.      Since being home from the hospital, he states he has been sober for 22 days.  +Exercise - Walking and lifting for 30 minutes, 3 times per week.  He is not attending AA and does not have a sponsor.  He is taking Naltrexone 50mg QD, with benefit.   He has been unemployed for 1 year, despite sending 60-80 job applications.  He went to shelter because he could not pay his alimony to his ex-wife . He declines  consult.      His facial rash has been worse in the past 1 month.  +Itching.  Not using OTC meds.      The following portions of  the patient's history were reviewed and updated as appropriate: allergies, current medications, past family history, past medical history, past social history, past surgical history and problem list.      Review of Systems   Constitutional:  Negative for appetite change, chills, diaphoresis, fatigue and fever.   Respiratory:  Negative for chest tightness and shortness of breath.    Cardiovascular:  Negative for chest pain.   Gastrointestinal:  Negative for abdominal pain, blood in stool, diarrhea, nausea and vomiting.   Genitourinary:  Negative for dysuria.        Current Outpatient Medications   Medication Sig Dispense Refill   • atorvastatin (LIPITOR) 40 mg tablet Take 1 tablet (40 mg total) by mouth daily with dinner 30 tablet 1   • ergocalciferol (ERGOCALCIFEROL) 1.25 MG (69922 UT) capsule Take 1 capsule (50,000 Units total) by mouth once a week for 12 doses 12 capsule 0   • escitalopram (LEXAPRO) 20 mg tablet Take 1 tablet (20 mg total) by mouth daily 30 tablet 1   • folic acid (FOLVITE) 400 mcg tablet Take 1 tablet (400 mcg total) by mouth daily 30 tablet 1   • hydrOXYzine HCL (ATARAX) 25 mg tablet Take 1 tablet (25 mg total) by mouth every 6 (six) hours as needed for itching 30 tablet 0   • lisinopril (ZESTRIL) 40 mg tablet Take 1 tablet (40 mg total) by mouth daily 30 tablet 1   • Magnesium 400 MG CAPS Take 1 capsule by mouth in the morning     • Melatonin 10 MG TABS Take 1 tablet (10 mg total) by mouth daily at bedtime as needed (Insomnia) 30 tablet 1   • metroNIDAZOLE (METROCREAM) 0.75 % cream Apply topically 2 (two) times a day 45 g 0   • naltrexone (REVIA) 50 mg tablet Take 1 tablet (50 mg total) by mouth daily 30 tablet 0   • Omega-3 Fatty Acids (fish oil) 1,000 mg Take 2,000 mg by mouth daily     • pantoprazole (PROTONIX) 40 mg tablet Take 1 tablet (40 mg total) by mouth daily in the early morning 30 tablet 0   • Thiamine Mononitrate (VITAMIN B1) 100 mg tablet Take 1 tablet (100 mg total) by mouth  "daily 30 tablet 1     Current Facility-Administered Medications   Medication Dose Route Frequency Provider Last Rate Last Admin   • cyanocobalamin injection 1,000 mcg  1,000 mcg Intramuscular Daily    1,000 mcg at 07/09/24 1454       Objective:  /78   Pulse 102   Temp 97.7 °F (36.5 °C)   Resp 16   Ht 5' 8\" (1.727 m)   Wt 101 kg (223 lb 3.2 oz)   SpO2 97%   BMI 33.94 kg/m²    Wt Readings from Last 3 Encounters:   07/09/24 101 kg (223 lb 3.2 oz)   06/21/24 105 kg (230 lb 13.2 oz)   02/14/24 103 kg (227 lb 9.6 oz)      BP Readings from Last 3 Encounters:   07/09/24 126/78   06/21/24 122/80   04/27/24 117/60          Physical Exam  Vitals and nursing note reviewed.   Constitutional:       Appearance: Normal appearance. He is well-developed. He is obese.   HENT:      Head: Normocephalic and atraumatic.   Eyes:      Conjunctiva/sclera: Conjunctivae normal.   Neck:      Thyroid: No thyromegaly.   Cardiovascular:      Rate and Rhythm: Normal rate and regular rhythm.      Pulses: Normal pulses.      Heart sounds: Normal heart sounds.   Pulmonary:      Effort: Pulmonary effort is normal.      Breath sounds: Normal breath sounds.   Abdominal:      General: Bowel sounds are normal. There is no distension.      Palpations: Abdomen is soft. There is no mass.      Tenderness: There is no abdominal tenderness. There is no guarding or rebound.   Musculoskeletal:         General: No swelling or tenderness.      Cervical back: Neck supple.      Right lower leg: No edema.      Left lower leg: No edema.   Lymphadenopathy:      Cervical: No cervical adenopathy.   Skin:     Findings: Erythema (Generalized on face c papules) present.   Neurological:      General: No focal deficit present.      Mental Status: He is alert and oriented to person, place, and time.   Psychiatric:         Mood and Affect: Mood normal.         Behavior: Behavior normal.         Thought Content: Thought content normal.         Judgment: Judgment " "normal.         Lab Results:      Lab Results   Component Value Date    WBC 4.30 (L) 06/21/2024    HGB 11.8 (L) 06/21/2024    HCT 34.4 (L) 06/21/2024     (L) 06/21/2024    TRIG 152 (H) 02/14/2024    HDL 46 02/14/2024    LDLDIRECT 252 (H) 02/14/2024    ALT 15 06/21/2024    AST 29 06/21/2024    K 3.5 06/21/2024     06/21/2024    CREATININE 0.91 06/21/2024    BUN 11 06/21/2024    CO2 29 06/21/2024    TSH 1.73 11/12/2019    PSA 0.51 02/14/2024    INR 1.01 06/18/2024    GLUF 61 (L) 02/14/2024    HGBA1C 4.9 02/14/2024     No results found for: \"URICACID\"  Invalid input(s): \"BASENAME\" Vitamin D    No results found.     POCT Labs          5 minutes spent on chart prep, 35 minutes spent with patient counseling/educating on their diagnoses, tests completed and any new tests ordered, any referrals placed, treatment options, and documentation of above today.   In prescribing new medications, or changing doses, we reviewed the risks and benefits and side effects of these medications along with other treatment options if appropriate.       "

## 2024-07-09 ENCOUNTER — OFFICE VISIT (OUTPATIENT)
Dept: FAMILY MEDICINE CLINIC | Facility: CLINIC | Age: 59
End: 2024-07-09
Payer: COMMERCIAL

## 2024-07-09 VITALS
HEIGHT: 68 IN | BODY MASS INDEX: 33.83 KG/M2 | HEART RATE: 102 BPM | OXYGEN SATURATION: 97 % | WEIGHT: 223.2 LBS | TEMPERATURE: 97.7 F | DIASTOLIC BLOOD PRESSURE: 78 MMHG | RESPIRATION RATE: 16 BRPM | SYSTOLIC BLOOD PRESSURE: 126 MMHG

## 2024-07-09 DIAGNOSIS — I25.10 CORONARY ARTERY CALCIFICATION OF NATIVE ARTERY: ICD-10-CM

## 2024-07-09 DIAGNOSIS — K29.20 CHRONIC ALCOHOLIC GASTRITIS WITHOUT HEMORRHAGE: Primary | ICD-10-CM

## 2024-07-09 DIAGNOSIS — F41.9 ANXIETY: ICD-10-CM

## 2024-07-09 DIAGNOSIS — K21.9 GASTROESOPHAGEAL REFLUX DISEASE, UNSPECIFIED WHETHER ESOPHAGITIS PRESENT: ICD-10-CM

## 2024-07-09 DIAGNOSIS — R19.7 DIARRHEA, UNSPECIFIED TYPE: ICD-10-CM

## 2024-07-09 DIAGNOSIS — F10.939 ALCOHOL WITHDRAWAL (HCC): ICD-10-CM

## 2024-07-09 DIAGNOSIS — Z23 ENCOUNTER FOR IMMUNIZATION: ICD-10-CM

## 2024-07-09 DIAGNOSIS — K58.0 IRRITABLE BOWEL SYNDROME WITH DIARRHEA: ICD-10-CM

## 2024-07-09 DIAGNOSIS — E66.9 CLASS 1 OBESITY WITH SERIOUS COMORBIDITY AND BODY MASS INDEX (BMI) OF 33.0 TO 33.9 IN ADULT, UNSPECIFIED OBESITY TYPE: ICD-10-CM

## 2024-07-09 DIAGNOSIS — Z12.11 SCREENING FOR COLORECTAL CANCER: ICD-10-CM

## 2024-07-09 DIAGNOSIS — E55.9 VITAMIN D DEFICIENCY: ICD-10-CM

## 2024-07-09 DIAGNOSIS — R06.02 SOB (SHORTNESS OF BREATH): ICD-10-CM

## 2024-07-09 DIAGNOSIS — I25.84 CORONARY ARTERY CALCIFICATION OF NATIVE ARTERY: ICD-10-CM

## 2024-07-09 DIAGNOSIS — E53.8 VITAMIN B12 DEFICIENCY: ICD-10-CM

## 2024-07-09 DIAGNOSIS — L71.9 ROSACEA: ICD-10-CM

## 2024-07-09 DIAGNOSIS — F10.20 ALCOHOL USE DISORDER, SEVERE, DEPENDENCE (HCC): ICD-10-CM

## 2024-07-09 DIAGNOSIS — Z12.12 SCREENING FOR COLORECTAL CANCER: ICD-10-CM

## 2024-07-09 DIAGNOSIS — E78.5 HYPERLIPIDEMIA, UNSPECIFIED HYPERLIPIDEMIA TYPE: ICD-10-CM

## 2024-07-09 DIAGNOSIS — F32.1 CURRENT MODERATE EPISODE OF MAJOR DEPRESSIVE DISORDER, UNSPECIFIED WHETHER RECURRENT (HCC): ICD-10-CM

## 2024-07-09 DIAGNOSIS — I10 PRIMARY HYPERTENSION: ICD-10-CM

## 2024-07-09 DIAGNOSIS — K76.0 HEPATIC STEATOSIS: ICD-10-CM

## 2024-07-09 DIAGNOSIS — G47.00 INSOMNIA, UNSPECIFIED TYPE: ICD-10-CM

## 2024-07-09 PROCEDURE — 90632 HEPA VACCINE ADULT IM: CPT

## 2024-07-09 PROCEDURE — 90471 IMMUNIZATION ADMIN: CPT

## 2024-07-09 PROCEDURE — 96372 THER/PROPH/DIAG INJ SC/IM: CPT | Performed by: FAMILY MEDICINE

## 2024-07-09 PROCEDURE — 99215 OFFICE O/P EST HI 40 MIN: CPT | Performed by: FAMILY MEDICINE

## 2024-07-09 RX ORDER — ERGOCALCIFEROL 1.25 MG/1
50000 CAPSULE ORAL WEEKLY
Qty: 12 CAPSULE | Refills: 0 | Status: SHIPPED | OUTPATIENT
Start: 2024-07-09 | End: 2024-09-25

## 2024-07-09 RX ORDER — LISINOPRIL 40 MG/1
40 TABLET ORAL DAILY
Qty: 30 TABLET | Refills: 1 | Status: SHIPPED | OUTPATIENT
Start: 2024-07-09

## 2024-07-09 RX ORDER — NALTREXONE HYDROCHLORIDE 50 MG/1
50 TABLET, FILM COATED ORAL DAILY
Qty: 30 TABLET | Refills: 0 | Status: SHIPPED | OUTPATIENT
Start: 2024-07-09

## 2024-07-09 RX ORDER — PHENOL 1.4 %
1 AEROSOL, SPRAY (ML) MUCOUS MEMBRANE
COMMUNITY
End: 2024-07-09 | Stop reason: SDUPTHER

## 2024-07-09 RX ORDER — ESCITALOPRAM OXALATE 20 MG/1
20 TABLET ORAL DAILY
Qty: 30 TABLET | Refills: 1 | Status: SHIPPED | OUTPATIENT
Start: 2024-07-09

## 2024-07-09 RX ORDER — ATORVASTATIN CALCIUM 40 MG/1
40 TABLET, FILM COATED ORAL
Qty: 30 TABLET | Refills: 1 | Status: SHIPPED | OUTPATIENT
Start: 2024-07-09

## 2024-07-09 RX ORDER — CYANOCOBALAMIN 1000 UG/ML
1000 INJECTION, SOLUTION INTRAMUSCULAR; SUBCUTANEOUS DAILY
Status: SHIPPED | OUTPATIENT
Start: 2024-07-09 | End: 2024-07-14

## 2024-07-09 RX ORDER — LANOLIN ALCOHOL/MO/W.PET/CERES
400 CREAM (GRAM) TOPICAL DAILY
Qty: 30 TABLET | Refills: 1 | Status: SHIPPED | OUTPATIENT
Start: 2024-07-09

## 2024-07-09 RX ORDER — PHENOL 1.4 %
1 AEROSOL, SPRAY (ML) MUCOUS MEMBRANE
Qty: 30 TABLET | Refills: 1 | Status: SHIPPED | OUTPATIENT
Start: 2024-07-09

## 2024-07-09 RX ORDER — PANTOPRAZOLE SODIUM 40 MG/1
40 TABLET, DELAYED RELEASE ORAL
Qty: 30 TABLET | Refills: 0 | Status: SHIPPED | OUTPATIENT
Start: 2024-07-09 | End: 2024-08-08

## 2024-07-09 RX ORDER — AZELAIC ACID 0.15 G/G
1 GEL TOPICAL 2 TIMES DAILY
Qty: 50 G | Refills: 0 | Status: SHIPPED | OUTPATIENT
Start: 2024-07-09 | End: 2024-07-09

## 2024-07-09 RX ORDER — GAUZE BANDAGE 2" X 2"
100 BANDAGE TOPICAL DAILY
Qty: 30 TABLET | Refills: 1 | Status: SHIPPED | OUTPATIENT
Start: 2024-07-09

## 2024-07-09 RX ADMIN — CYANOCOBALAMIN 1000 MCG: 1000 INJECTION, SOLUTION INTRAMUSCULAR; SUBCUTANEOUS at 14:54

## 2024-07-09 NOTE — ASSESSMENT & PLAN NOTE
Stable.  Check blood pressure outside of office.  Recommend lifestyle modifications.  ETOH cessation advised.

## 2024-07-09 NOTE — PATIENT INSTRUCTIONS
Vitamin D Deficiency - Recommend start multivitamin and prescription vitamin D (Drisdol).  When 12 weeks of Drisdol completed, continue multivitamin and start over-the-counter Vitamin D3 1,000-3,000 International Units daily.  Check vitamin D level 1 week after completing Drisdol.      Vitamin B12 Deficiency - Schedule for nurse visits for injections of Vitamin B12 1000mcg IM/SQ daily x 1 week, then weekly x 1 month, then start over-the-counter vitamin B12 1000mcg daily - take on an empty stomach.

## 2024-07-09 NOTE — ASSESSMENT & PLAN NOTE
In remission / sober for 22 days.  Patient is still working on outpatient ETOH Rehab options - has resources provided today.  Continue Naltrexone.  ETOH cessation advised.

## 2024-07-09 NOTE — ASSESSMENT & PLAN NOTE
New.      Vitamin D Deficiency - Recommend start multivitamin and prescription vitamin D (Drisdol).  When 12 weeks of Drisdol completed, continue multivitamin and start over-the-counter Vitamin D3 1,000-3,000 International Units daily.  Check vitamin D level 1 week after completing Drisdol.

## 2024-07-09 NOTE — ASSESSMENT & PLAN NOTE
Uncontrolled.  Pending Derm consult.  Restart Metronidazole 0.75% cream BID.  ETOH cessation advised.

## 2024-07-19 DIAGNOSIS — F41.9 ANXIETY: ICD-10-CM

## 2024-07-19 DIAGNOSIS — F32.1 CURRENT MODERATE EPISODE OF MAJOR DEPRESSIVE DISORDER, UNSPECIFIED WHETHER RECURRENT (HCC): ICD-10-CM

## 2024-07-19 RX ORDER — HYDROXYZINE HYDROCHLORIDE 25 MG/1
25 TABLET, FILM COATED ORAL EVERY 6 HOURS PRN
Qty: 30 TABLET | Refills: 2 | Status: SHIPPED | OUTPATIENT
Start: 2024-07-19

## 2024-07-19 RX ORDER — ESCITALOPRAM OXALATE 20 MG/1
20 TABLET ORAL DAILY
Qty: 30 TABLET | Refills: 0 | OUTPATIENT
Start: 2024-07-19

## 2024-07-19 NOTE — TELEPHONE ENCOUNTER
Reason for call:   [x] Refill   [] Prior Auth  [] Other:     Office:   [x] PCP/Provider -   [] Specialty/Provider -     Medication: escitalopram (LEXAPRO) 20 mg tablet Take 1 tablet (20 mg total) by mouth daily     hydrOXYzine HCL (ATARAX) 25 mg tablet Take 1 tablet (25 mg total) by mouth every 6 (six) hours as needed for itching       Pharmacy: [address] [facility] [address]      Does the patient have enough for 3 days?   [] Yes   [x] No - Send as HP to POD

## 2024-08-27 ENCOUNTER — TELEPHONE (OUTPATIENT)
Age: 59
End: 2024-08-27

## 2024-08-27 NOTE — TELEPHONE ENCOUNTER
Patient calling was upset he did not have his medication refilled and it's been over 24 hrs and he is having heart burn pain. Advised for future refill requests to please give a call 3 days prior to running out as med refills can take up to 48 hours to be pushed through.

## 2024-08-28 NOTE — TELEPHONE ENCOUNTER
Pt called again as he has not heard back from our office. He called [email or web address] who told him we have not sent his refill over yet. I confirmed with they that they wer refill yesterday at 11/21 am to [facility]. He will confirm with them. Pt is upset and states he would still like a call from the  about this refill issue. Please advise.

## 2024-10-02 ENCOUNTER — TELEPHONE (OUTPATIENT)
Dept: FAMILY MEDICINE CLINIC | Facility: CLINIC | Age: 59
End: 2024-10-02

## 2024-10-02 DIAGNOSIS — F41.9 ANXIETY: ICD-10-CM

## 2024-10-02 DIAGNOSIS — F32.1 CURRENT MODERATE EPISODE OF MAJOR DEPRESSIVE DISORDER, UNSPECIFIED WHETHER RECURRENT (HCC): ICD-10-CM

## 2024-10-02 RX ORDER — ESCITALOPRAM OXALATE 20 MG/1
20 TABLET ORAL DAILY
Qty: 90 TABLET | Refills: 1 | Status: SHIPPED | OUTPATIENT
Start: 2024-10-02

## 2024-10-02 NOTE — TELEPHONE ENCOUNTER
Patient called in regards to medication hydrOXYzine HCL (ATARAX) 25 mg tablet . Patient wanted to refill medication. Informed patient that there are refills remaining on script and to contact the pharmacy to fill medication. Patient verbalized understanding.

## 2024-10-02 NOTE — TELEPHONE ENCOUNTER
Reason for call: Not a duplicate. Patient stated that medicare will only cover this medication for a 90 day supply.   [x] Refill   [] Prior Auth  [] Other:     Office: [facility]  [x] PCP/Provider - Enid Altman   [] Specialty/Provider -     Medication: escitalopram     Dose/Frequency: 20 mg/ daily     Quantity: 90 day supply     Pharmacy: [facility]     Does the patient have enough for 3 days?   [] Yes   [x] No - Send as HP to POD

## 2024-10-10 ENCOUNTER — HOSPITAL ENCOUNTER (INPATIENT)
Facility: HOSPITAL | Age: 59
LOS: 1 days | Discharge: LEFT BEFORE DISCHARGE | DRG: 241 | End: 2024-10-11
Attending: EMERGENCY MEDICINE | Admitting: STUDENT IN AN ORGANIZED HEALTH CARE EDUCATION/TRAINING PROGRAM
Payer: COMMERCIAL

## 2024-10-10 DIAGNOSIS — F10.939 ALCOHOL WITHDRAWAL (HCC): Primary | ICD-10-CM

## 2024-10-10 LAB
ALBUMIN SERPL BCG-MCNC: 4.4 G/DL (ref 3.5–5)
ALP SERPL-CCNC: 93 U/L (ref 34–104)
ALT SERPL W P-5'-P-CCNC: 33 U/L (ref 7–52)
ANION GAP SERPL CALCULATED.3IONS-SCNC: 15 MMOL/L (ref 4–13)
APAP SERPL-MCNC: <2 UG/ML (ref 10–20)
APTT PPP: 27 SECONDS (ref 23–34)
AST SERPL W P-5'-P-CCNC: 60 U/L (ref 13–39)
ATRIAL RATE: 118 BPM
BASE EX.OXY STD BLDV CALC-SCNC: 94.4 % (ref 60–80)
BASE EXCESS BLDV CALC-SCNC: -2.2 MMOL/L
BASOPHILS # BLD AUTO: 0.06 THOUSANDS/ΜL (ref 0–0.1)
BASOPHILS NFR BLD AUTO: 1 % (ref 0–1)
BILIRUB SERPL-MCNC: 0.77 MG/DL (ref 0.2–1)
BUN SERPL-MCNC: 9 MG/DL (ref 5–25)
CALCIUM SERPL-MCNC: 9.3 MG/DL (ref 8.4–10.2)
CARDIAC TROPONIN I PNL SERPL HS: 4 NG/L
CHLORIDE SERPL-SCNC: 106 MMOL/L (ref 96–108)
CO2 SERPL-SCNC: 22 MMOL/L (ref 21–32)
CREAT SERPL-MCNC: 0.87 MG/DL (ref 0.6–1.3)
EOSINOPHIL # BLD AUTO: 0.04 THOUSAND/ΜL (ref 0–0.61)
EOSINOPHIL NFR BLD AUTO: 1 % (ref 0–6)
ERYTHROCYTE [DISTWIDTH] IN BLOOD BY AUTOMATED COUNT: 13.3 % (ref 11.6–15.1)
ETHANOL SERPL-MCNC: <10 MG/DL
FLUAV AG UPPER RESP QL IA.RAPID: NEGATIVE
FLUBV AG UPPER RESP QL IA.RAPID: NEGATIVE
GFR SERPL CREATININE-BSD FRML MDRD: 95 ML/MIN/1.73SQ M
GLUCOSE SERPL-MCNC: 103 MG/DL (ref 65–140)
GLUCOSE SERPL-MCNC: 105 MG/DL (ref 65–140)
HCO3 BLDV-SCNC: 21 MMOL/L (ref 24–30)
HCT VFR BLD AUTO: 40.7 % (ref 36.5–49.3)
HGB BLD-MCNC: 14.4 G/DL (ref 12–17)
IMM GRANULOCYTES # BLD AUTO: 0.06 THOUSAND/UL (ref 0–0.2)
IMM GRANULOCYTES NFR BLD AUTO: 1 % (ref 0–2)
INR PPP: 1 (ref 0.85–1.19)
LACTATE SERPL-SCNC: 1.1 MMOL/L (ref 0.5–2)
LIPASE SERPL-CCNC: 15 U/L (ref 11–82)
LYMPHOCYTES # BLD AUTO: 1.5 THOUSANDS/ΜL (ref 0.6–4.47)
LYMPHOCYTES NFR BLD AUTO: 18 % (ref 14–44)
MAGNESIUM SERPL-MCNC: 1.1 MG/DL (ref 1.9–2.7)
MAGNESIUM SERPL-MCNC: 2.5 MG/DL (ref 1.9–2.7)
MCH RBC QN AUTO: 32.5 PG (ref 26.8–34.3)
MCHC RBC AUTO-ENTMCNC: 35.4 G/DL (ref 31.4–37.4)
MCV RBC AUTO: 92 FL (ref 82–98)
MONOCYTES # BLD AUTO: 0.5 THOUSAND/ΜL (ref 0.17–1.22)
MONOCYTES NFR BLD AUTO: 6 % (ref 4–12)
NEUTROPHILS # BLD AUTO: 6.4 THOUSANDS/ΜL (ref 1.85–7.62)
NEUTS SEG NFR BLD AUTO: 73 % (ref 43–75)
NRBC BLD AUTO-RTO: 0 /100 WBCS
O2 CT BLDV-SCNC: 19 ML/DL
P AXIS: 66 DEGREES
PCO2 BLDV: 31.8 MM HG (ref 42–50)
PH BLDV: 7.44 [PH] (ref 7.3–7.4)
PHOSPHATE SERPL-MCNC: 2.7 MG/DL (ref 2.7–4.5)
PLATELET # BLD AUTO: 274 THOUSANDS/UL (ref 149–390)
PMV BLD AUTO: 8.4 FL (ref 8.9–12.7)
PO2 BLDV: 72.6 MM HG (ref 35–45)
POTASSIUM SERPL-SCNC: 4 MMOL/L (ref 3.5–5.3)
PR INTERVAL: 142 MS
PROT SERPL-MCNC: 7.1 G/DL (ref 6.4–8.4)
PROTHROMBIN TIME: 13.9 SECONDS (ref 12.3–15)
QRS AXIS: 61 DEGREES
QRSD INTERVAL: 86 MS
QT INTERVAL: 338 MS
QTC INTERVAL: 473 MS
RBC # BLD AUTO: 4.43 MILLION/UL (ref 3.88–5.62)
SALICYLATES SERPL-MCNC: <5 MG/DL (ref 3–20)
SARS-COV+SARS-COV-2 AG RESP QL IA.RAPID: NEGATIVE
SODIUM SERPL-SCNC: 143 MMOL/L (ref 135–147)
T WAVE AXIS: 41 DEGREES
VENTRICULAR RATE: 118 BPM
WBC # BLD AUTO: 8.56 THOUSAND/UL (ref 4.31–10.16)

## 2024-10-10 PROCEDURE — 93005 ELECTROCARDIOGRAM TRACING: CPT

## 2024-10-10 PROCEDURE — 99223 1ST HOSP IP/OBS HIGH 75: CPT | Performed by: STUDENT IN AN ORGANIZED HEALTH CARE EDUCATION/TRAINING PROGRAM

## 2024-10-10 PROCEDURE — 83690 ASSAY OF LIPASE: CPT | Performed by: EMERGENCY MEDICINE

## 2024-10-10 PROCEDURE — 80053 COMPREHEN METABOLIC PANEL: CPT | Performed by: EMERGENCY MEDICINE

## 2024-10-10 PROCEDURE — 87811 SARS-COV-2 COVID19 W/OPTIC: CPT | Performed by: EMERGENCY MEDICINE

## 2024-10-10 PROCEDURE — 82077 ASSAY SPEC XCP UR&BREATH IA: CPT | Performed by: EMERGENCY MEDICINE

## 2024-10-10 PROCEDURE — 36415 COLL VENOUS BLD VENIPUNCTURE: CPT | Performed by: EMERGENCY MEDICINE

## 2024-10-10 PROCEDURE — 85610 PROTHROMBIN TIME: CPT | Performed by: EMERGENCY MEDICINE

## 2024-10-10 PROCEDURE — 96365 THER/PROPH/DIAG IV INF INIT: CPT

## 2024-10-10 PROCEDURE — 96366 THER/PROPH/DIAG IV INF ADDON: CPT

## 2024-10-10 PROCEDURE — 85730 THROMBOPLASTIN TIME PARTIAL: CPT | Performed by: EMERGENCY MEDICINE

## 2024-10-10 PROCEDURE — 84100 ASSAY OF PHOSPHORUS: CPT | Performed by: NURSE PRACTITIONER

## 2024-10-10 PROCEDURE — 93010 ELECTROCARDIOGRAM REPORT: CPT | Performed by: INTERNAL MEDICINE

## 2024-10-10 PROCEDURE — 85025 COMPLETE CBC W/AUTO DIFF WBC: CPT | Performed by: EMERGENCY MEDICINE

## 2024-10-10 PROCEDURE — 96376 TX/PRO/DX INJ SAME DRUG ADON: CPT

## 2024-10-10 PROCEDURE — 80179 DRUG ASSAY SALICYLATE: CPT | Performed by: EMERGENCY MEDICINE

## 2024-10-10 PROCEDURE — NC001 PR NO CHARGE: Performed by: STUDENT IN AN ORGANIZED HEALTH CARE EDUCATION/TRAINING PROGRAM

## 2024-10-10 PROCEDURE — 80143 DRUG ASSAY ACETAMINOPHEN: CPT | Performed by: EMERGENCY MEDICINE

## 2024-10-10 PROCEDURE — 99285 EMERGENCY DEPT VISIT HI MDM: CPT

## 2024-10-10 PROCEDURE — 82948 REAGENT STRIP/BLOOD GLUCOSE: CPT

## 2024-10-10 PROCEDURE — 83735 ASSAY OF MAGNESIUM: CPT | Performed by: NURSE PRACTITIONER

## 2024-10-10 PROCEDURE — 84484 ASSAY OF TROPONIN QUANT: CPT | Performed by: EMERGENCY MEDICINE

## 2024-10-10 PROCEDURE — 82805 BLOOD GASES W/O2 SATURATION: CPT | Performed by: EMERGENCY MEDICINE

## 2024-10-10 PROCEDURE — 99291 CRITICAL CARE FIRST HOUR: CPT | Performed by: EMERGENCY MEDICINE

## 2024-10-10 PROCEDURE — 87804 INFLUENZA ASSAY W/OPTIC: CPT | Performed by: EMERGENCY MEDICINE

## 2024-10-10 PROCEDURE — 96375 TX/PRO/DX INJ NEW DRUG ADDON: CPT

## 2024-10-10 PROCEDURE — 83605 ASSAY OF LACTIC ACID: CPT | Performed by: EMERGENCY MEDICINE

## 2024-10-10 RX ORDER — LORAZEPAM 2 MG/ML
2 INJECTION INTRAMUSCULAR ONCE
Status: COMPLETED | OUTPATIENT
Start: 2024-10-10 | End: 2024-10-10

## 2024-10-10 RX ORDER — PHENOBARBITAL SODIUM 65 MG/ML
130 INJECTION, SOLUTION INTRAMUSCULAR; INTRAVENOUS ONCE
Status: COMPLETED | OUTPATIENT
Start: 2024-10-10 | End: 2024-10-10

## 2024-10-10 RX ORDER — FOLIC ACID 1 MG/1
1 TABLET ORAL DAILY
Status: DISCONTINUED | OUTPATIENT
Start: 2024-10-10 | End: 2024-10-11 | Stop reason: HOSPADM

## 2024-10-10 RX ORDER — HYDRALAZINE HYDROCHLORIDE 20 MG/ML
10 INJECTION INTRAMUSCULAR; INTRAVENOUS EVERY 4 HOURS PRN
Status: DISCONTINUED | OUTPATIENT
Start: 2024-10-10 | End: 2024-10-11 | Stop reason: HOSPADM

## 2024-10-10 RX ORDER — DEXTROSE MONOHYDRATE AND SODIUM CHLORIDE 5; .9 G/100ML; G/100ML
125 INJECTION, SOLUTION INTRAVENOUS CONTINUOUS
Status: DISCONTINUED | OUTPATIENT
Start: 2024-10-10 | End: 2024-10-10

## 2024-10-10 RX ORDER — LORAZEPAM 2 MG/ML
1 INJECTION INTRAMUSCULAR ONCE
Status: COMPLETED | OUTPATIENT
Start: 2024-10-10 | End: 2024-10-10

## 2024-10-10 RX ORDER — PANTOPRAZOLE SODIUM 40 MG/1
40 TABLET, DELAYED RELEASE ORAL
Status: DISCONTINUED | OUTPATIENT
Start: 2024-10-10 | End: 2024-10-11 | Stop reason: HOSPADM

## 2024-10-10 RX ORDER — MAGNESIUM SULFATE HEPTAHYDRATE 40 MG/ML
4 INJECTION, SOLUTION INTRAVENOUS ONCE
Status: COMPLETED | OUTPATIENT
Start: 2024-10-10 | End: 2024-10-10

## 2024-10-10 RX ORDER — ENOXAPARIN SODIUM 100 MG/ML
40 INJECTION SUBCUTANEOUS DAILY
Status: DISCONTINUED | OUTPATIENT
Start: 2024-10-10 | End: 2024-10-11 | Stop reason: HOSPADM

## 2024-10-10 RX ORDER — LISINOPRIL 10 MG/1
40 TABLET ORAL ONCE
Status: COMPLETED | OUTPATIENT
Start: 2024-10-10 | End: 2024-10-10

## 2024-10-10 RX ORDER — ATORVASTATIN CALCIUM 40 MG/1
40 TABLET, FILM COATED ORAL
Status: DISCONTINUED | OUTPATIENT
Start: 2024-10-10 | End: 2024-10-11 | Stop reason: HOSPADM

## 2024-10-10 RX ORDER — CHLORHEXIDINE GLUCONATE ORAL RINSE 1.2 MG/ML
15 SOLUTION DENTAL EVERY 12 HOURS SCHEDULED
Status: DISCONTINUED | OUTPATIENT
Start: 2024-10-10 | End: 2024-10-11 | Stop reason: HOSPADM

## 2024-10-10 RX ORDER — LANOLIN ALCOHOL/MO/W.PET/CERES
100 CREAM (GRAM) TOPICAL DAILY
Status: DISCONTINUED | OUTPATIENT
Start: 2024-10-10 | End: 2024-10-11 | Stop reason: HOSPADM

## 2024-10-10 RX ORDER — ONDANSETRON 2 MG/ML
4 INJECTION INTRAMUSCULAR; INTRAVENOUS ONCE
Status: COMPLETED | OUTPATIENT
Start: 2024-10-10 | End: 2024-10-10

## 2024-10-10 RX ORDER — SODIUM CHLORIDE, SODIUM GLUCONATE, SODIUM ACETATE, POTASSIUM CHLORIDE, MAGNESIUM CHLORIDE, SODIUM PHOSPHATE, DIBASIC, AND POTASSIUM PHOSPHATE .53; .5; .37; .037; .03; .012; .00082 G/100ML; G/100ML; G/100ML; G/100ML; G/100ML; G/100ML; G/100ML
125 INJECTION, SOLUTION INTRAVENOUS CONTINUOUS
Status: DISCONTINUED | OUTPATIENT
Start: 2024-10-10 | End: 2024-10-11

## 2024-10-10 RX ORDER — ESCITALOPRAM OXALATE 20 MG/1
20 TABLET ORAL DAILY
Status: DISCONTINUED | OUTPATIENT
Start: 2024-10-10 | End: 2024-10-11 | Stop reason: HOSPADM

## 2024-10-10 RX ORDER — CHLORAL HYDRATE 500 MG
2000 CAPSULE ORAL DAILY
Status: DISCONTINUED | OUTPATIENT
Start: 2024-10-10 | End: 2024-10-11 | Stop reason: HOSPADM

## 2024-10-10 RX ORDER — LISINOPRIL 20 MG/1
40 TABLET ORAL DAILY
Status: DISCONTINUED | OUTPATIENT
Start: 2024-10-11 | End: 2024-10-11 | Stop reason: HOSPADM

## 2024-10-10 RX ADMIN — FOLIC ACID 1 MG: 1 TABLET ORAL at 11:28

## 2024-10-10 RX ADMIN — PANTOPRAZOLE SODIUM 40 MG: 40 TABLET, DELAYED RELEASE ORAL at 11:28

## 2024-10-10 RX ADMIN — ATORVASTATIN CALCIUM 40 MG: 40 TABLET, FILM COATED ORAL at 15:44

## 2024-10-10 RX ADMIN — MAGNESIUM SULFATE HEPTAHYDRATE 4 G: 40 INJECTION, SOLUTION INTRAVENOUS at 15:59

## 2024-10-10 RX ADMIN — Medication 100 MG: at 11:28

## 2024-10-10 RX ADMIN — DEXTROSE AND SODIUM CHLORIDE 125 ML/HR: 5; .9 INJECTION, SOLUTION INTRAVENOUS at 08:08

## 2024-10-10 RX ADMIN — SODIUM CHLORIDE, SODIUM GLUCONATE, SODIUM ACETATE, POTASSIUM CHLORIDE, MAGNESIUM CHLORIDE, SODIUM PHOSPHATE, DIBASIC, AND POTASSIUM PHOSPHATE 125 ML/HR: .53; .5; .37; .037; .03; .012; .00082 INJECTION, SOLUTION INTRAVENOUS at 11:29

## 2024-10-10 RX ADMIN — LISINOPRIL 40 MG: 10 TABLET ORAL at 09:09

## 2024-10-10 RX ADMIN — ENOXAPARIN SODIUM 40 MG: 40 INJECTION SUBCUTANEOUS at 11:34

## 2024-10-10 RX ADMIN — LORAZEPAM 2 MG: 2 INJECTION INTRAMUSCULAR; INTRAVENOUS at 10:26

## 2024-10-10 RX ADMIN — PHENOBARBITAL SODIUM 130 MG: 65 INJECTION INTRAMUSCULAR; INTRAVENOUS at 13:29

## 2024-10-10 RX ADMIN — ESCITALOPRAM OXALATE 20 MG: 20 TABLET ORAL at 12:09

## 2024-10-10 RX ADMIN — PHENOBARBITAL SODIUM 130 MG: 65 INJECTION INTRAMUSCULAR; INTRAVENOUS at 19:59

## 2024-10-10 RX ADMIN — LORAZEPAM 1 MG: 2 INJECTION INTRAMUSCULAR; INTRAVENOUS at 08:27

## 2024-10-10 RX ADMIN — MULTIPLE VITAMINS W/ MINERALS TAB 1 TABLET: TAB ORAL at 11:28

## 2024-10-10 RX ADMIN — SODIUM CHLORIDE 1000 ML: 0.9 INJECTION, SOLUTION INTRAVENOUS at 07:55

## 2024-10-10 RX ADMIN — LORAZEPAM 1 MG: 2 INJECTION INTRAMUSCULAR; INTRAVENOUS at 07:52

## 2024-10-10 RX ADMIN — ONDANSETRON 4 MG: 2 INJECTION INTRAMUSCULAR; INTRAVENOUS at 07:50

## 2024-10-10 NOTE — H&P
H&P - Critical Care/ICU   Name: Diogo Travis 59 y.o. male I MRN: [id number]  Unit/Bed#: ED-24 I Date of Admission: 10/10/2024   Date of Service: 10/10/2024 I Hospital Day: 0       Assessment & Plan  Alcohol withdrawal (HCC)  Patient presented to the ER with complaints of alcohol withdrawal.  He reports he recently relapsed due to a life stressor and has been drinking 3 bottles of bourbon a week.  His last bourbon drink with this 2 days ago, his last alcoholic drink (2 beers) was yesterday.  He reports that 2300 last night he started with severe shakes, diaphoretic, nauseous/vomiting/diarrhea and continued with full body shaking all night long which prompted him to come to the ER this morning.  Denies hematemesis, is unsure what his stool color was  Reports decreased oral intake over the last 48 hours  In the ER patient was found to be very tremulous and was given a total of 2 mg Ativan with treatment  He has a history of DTs with alcohol seizures  Patient refusing to go to [place] for withdrawal management  Admit to stepdown 1 under critical care  Give another 2 mg Ativan now due to tremors increasing  CIWA protocol  Started on folic acid and thiamine supplement  Monitor electrolytes and maintain high normal  Plan for phenobarbital trial if symptoms continue  Routine neurochecks  N.p.o. except for sips  Delirium precautions  Alcohol use disorder, severe, dependence (HCC)  Pt reports recent relapse with ETOH use due to a life stressor.   Continue to encourage cessation of ETOH  CM consult for O/P needs   Further details in plan above  Hepatic steatosis  Monitor LFTs  Chronic alcoholic gastritis  Cont home PPI  Hypertension  Cont home lisinopril  Obesity  NPO for now  Encourage live style changes when appropriate  Anxiety  Continue home lexapro  Pt reports he hasn't had it for the last 5 days PTA due to ran out  Depression  Continue home lexapro  Pt ran out of it 5 days PTA but wants to  continue  Hyperlipidemia  Cont home statin  GERD (gastroesophageal reflux disease)  Hx of Alcoholic gastritis   Cont home PPI  Diarrhea    Disposition: Stepdown Level 1    History of Present Illness   Diogo Travis is a 59 y.o. who presents with complaints of alcohol withdrawal.  He reports he recently relapsed due to a life stressor and has been drinking 3 bottles of bourbon a week.  His last bourbon drink with this 2 days ago, his last alcoholic drink (2 beers) was yesterday.  He reports that 2300 last night he started with severe shakes, diaphoretic, nauseous/vomiting/diarrhea and continued with full body shaking all night long which prompted him to come to the ER this morning. In the ED, noted to be very tremulous and tachycardic to 120s, he given a total of 2 mg ativan IV and 1 L IVFs. Initially hypertensive to 170s but hadn't had his home lisinopril prior to coming in. BP improved after ativan and lisinopril. Pt with Hx of DTs with withdraw seizures. Admit to SD1 for further management.     History obtained from chart review and the patient.  Review of Systems: See HPI for Review of Systems    Historical Information   Past Medical History:  04/02/2023: Alcohol use disorder, severe, dependence (HCC)  No date: Alcohol withdrawal seizure (HCC)  10/16/2023: Alcoholic ketoacidosis  No date: Anxiety  No date: AR (allergic rhinitis)  04/02/2023: Chronic alcoholic gastritis  No date: Depression  No date: GERD (gastroesophageal reflux disease)  04/02/2023: Hepatic steatosis  No date: Hyperlipidemia  No date: Hypertension  No date: IBS (irritable bowel syndrome)  No date: Obesity  No date: Rosacea  02/14/2024: Vitamin B12 deficiency  02/14/2024: Vitamin D deficiency Past Surgical History:  No date: ANTERIOR CRUCIATE LIGAMENT REPAIR; Left  No date: WISDOM TOOTH EXTRACTION   Current Outpatient Medications   Medication Instructions    atorvastatin (LIPITOR) 40 mg, Oral, Daily with dinner    ergocalciferol (ERGOCALCIFEROL)  50,000 Units, Oral, Weekly    escitalopram (LEXAPRO) 20 mg, Oral, Daily    fish oil 2,000 mg, Oral, Daily    folic acid (FOLVITE) 400 mcg, Oral, Daily    hydrOXYzine HCL (ATARAX) 25 mg, Oral, Every 6 hours PRN    lisinopril (ZESTRIL) 40 mg, Oral, Daily    Magnesium 400 MG CAPS 1 capsule, Oral, Daily    Melatonin 10 mg, Oral, Daily at bedtime PRN    metroNIDAZOLE (METROCREAM) 0.75 % cream Topical, 2 times daily    naltrexone (REVIA) 50 mg, Oral, Daily    pantoprazole (PROTONIX) 40 mg, Oral, Daily (early morning)    Thiamine Mononitrate (VITAMIN B1) 100 mg, Oral, Daily    Allergies   Allergen Reactions    Cefdinir Rash      Social History     Tobacco Use    Smoking status: Some Days     Types: Cigarettes, Cigars     Start date: 1/1/2014     Passive exposure: Past (Father)    Smokeless tobacco: Never    Tobacco comments:     Smokes cigars 2-3 times per year   Vaping Use    Vaping status: Never Used   Substance Use Topics    Alcohol use: Yes     Comment: Last ETOH 6/17/24    Drug use: Never    Family History   Problem Relation Age of Onset    Cancer Mother 78        Type unknown    Hypertension Father     Coronary artery disease Father 60    Cancer Father 82        Bladder; + Smoker    No Known Problems Sister     No Known Problems Sister     No Known Problems Sister     No Known Problems Son     No Known Problems Son     Heart attack Paternal Grandfather 60    Coronary artery disease Paternal Grandfather 60          Objective :                   Vitals I/O      Most Recent Min/Max in 24hrs   Temp 98.7 °F (37.1 °C) Temp  Min: 98.7 °F (37.1 °C)  Max: 98.7 °F (37.1 °C)   Pulse 94 Pulse  Min: 94  Max: 124   Resp 20 Resp  Min: 20  Max: 24   /88 BP  Min: 139/88  Max: 172/107   O2 Sat 94 % SpO2  Min: 92 %  Max: 96 %    No intake or output data in the 24 hours ending 10/10/24 1139    Diet NPO; Sips of clear liquids    Invasive Monitoring           Physical Exam   Physical Exam  Vitals and nursing note reviewed.   Eyes:       General: Vision grossly intact.      Extraocular Movements: Extraocular movements intact.      Conjunctiva/sclera: Conjunctivae normal.      Pupils: Pupils are equal, round, and reactive to light.   Skin:     General: Skin is warm and dry.   HENT:      Head: Normocephalic and atraumatic.      Mouth/Throat:      Mouth: Mucous membranes are dry.   Cardiovascular:      Rate and Rhythm: Normal rate and regular rhythm.      Pulses: Normal pulses.      Heart sounds: Normal heart sounds.   Musculoskeletal:         General: Normal range of motion.      Right lower leg: No edema.      Left lower leg: No edema.   Abdominal: General: Bowel sounds are normal. There is distension.     Palpations: Abdomen is soft.      Tenderness: There is no abdominal tenderness.   Constitutional:       Appearance: He is well-developed and well-nourished.   Pulmonary:      Effort: Pulmonary effort is normal. No respiratory distress.      Breath sounds: Normal breath sounds.      Comments: ON RA with Spo2 100%. LS CTA B/L  Psychiatric:         Attention and Perception: He does not perceive auditory or visual hallucinations.         Mood and Affect: Mood is anxious.      Comments: Tremulous    Neurological:      General: No focal deficit present.      Mental Status: He is alert and oriented to person, place and time. Mental status is at baseline.          Diagnostic Studies        Lab Results: I have reviewed the following results:     Medications:  Scheduled PRN   atorvastatin, 40 mg, Daily With Dinner  chlorhexidine, 15 mL, Q12H COLBY  enoxaparin, 40 mg, Daily  escitalopram, 20 mg, Daily  fish oil, 2,000 mg, Daily  folic acid, 1 mg, Daily  [START ON 10/11/2024] lisinopril, 40 mg, Daily  multivitamin-minerals, 1 tablet, Daily  pantoprazole, 40 mg, Early Morning  thiamine, 100 mg, Daily          Continuous    multi-electrolyte, 125 mL/hr, Last Rate: 125 mL/hr (10/10/24 1129)         Labs:   CBC    Recent Labs     10/10/24  0756   WBC 8.56   HGB  14.4   HCT 40.7        BMP    Recent Labs     10/10/24  0756   SODIUM 143   K 4.0      CO2 22   AGAP 15*   BUN 9   CREATININE 0.87   CALCIUM 9.3       Coags    Recent Labs     10/10/24  0756   INR 1.00   PTT 27        Additional Electrolytes  No recent results       Blood Gas    No recent results  Recent Labs     10/10/24  0905   PHVEN 7.437*   [id number] 31.8*   PO2VEN 72.6*   YBQ7LAW 21.0*   BEVEN -2.2   [id number] 94.4*    LFTs  Recent Labs     10/10/24  0756   ALT 33   AST 60*   ALKPHOS 93   ALB 4.4   TBILI 0.77       Infectious  No recent results  Glucose  Recent Labs     10/10/24  0756   GLUC 103

## 2024-10-10 NOTE — ED PROVIDER NOTES
Final diagnoses:   Alcohol withdrawal (HCC)     ED Disposition       ED Disposition   Admit    Condition   Stable    Date/Time   Thu Oct 10, 2024 10:23 AM    Comment   Case was discussed with MARCK Rodriguez and the patient's admission status was agreed to be Admission Status: inpatient status to the service of Dr. Rios .               Assessment & Plan       Medical Decision Making  Amount and/or Complexity of Data Reviewed  Labs: ordered.    Risk  Prescription drug management.  Decision regarding hospitalization.        ED Course as of 10/10/24 2234   Thu Oct 10, 2024   0752 Patient ill in appearance with history of alcohol abuse and current withdrawal.  Tremulous and mildly diaphoretic.  Obtaining glucose in consideration of possible hypoglycemia.  Lorazepam given for withdrawal.  Hydration and antiemetic additionally ordered.  Will reassess.    Uncertain whether all symptoms are related to alcohol withdrawal versus concomitant viral syndrome, pancreatitis or other abdominal pathology such as ulcer.    Do have concern for possible alcoholic ketoacidosis.   0809 Nausea has improved.  Heart rate has lowered from 120s down to the low 100s.  He remains quite tremulous.  Additional lorazepam will be given.   0939 Tremulousness has resolved.  Patient relates that his stomach feels much more settled as well although remains mildly distended.  Will reach out to medicine team regarding hospital stay.   1022 Patient accepted to stepdown 1 following critical care evaluation.       Medications   chlorhexidine (PERIDEX) 0.12 % oral rinse 15 mL (has no administration in time range)   multi-electrolyte (PLASMALYTE-A/ISOLYTE-S PH 7.4) IV solution (125 mL/hr Intravenous New Bag 10/10/24 1129)   enoxaparin (LOVENOX) subcutaneous injection 40 mg (40 mg Subcutaneous Given 10/10/24 1134)   thiamine tablet 100 mg (100 mg Oral Given 10/10/24 1128)   folic acid (FOLVITE) tablet 1 mg (1 mg Oral Given 10/10/24 1128)    multivitamin-minerals (CENTRUM) tablet 1 tablet (1 tablet Oral Given 10/10/24 1128)   lisinopril (ZESTRIL) tablet 40 mg (has no administration in time range)   atorvastatin (LIPITOR) tablet 40 mg (has no administration in time range)   escitalopram (LEXAPRO) tablet 20 mg (has no administration in time range)   fish oil capsule 2,000 mg (has no administration in time range)   pantoprazole (PROTONIX) EC tablet 40 mg (40 mg Oral Given 10/10/24 1128)   sodium chloride 0.9 % bolus 1,000 mL (0 mL Intravenous Stopped 10/10/24 0825)   ondansetron (ZOFRAN) injection 4 mg (4 mg Intravenous Given 10/10/24 0750)   LORazepam (ATIVAN) injection 1 mg (1 mg Intravenous Given 10/10/24 0752)   lisinopril (ZESTRIL) tablet 40 mg (40 mg Oral Given 10/10/24 0909)   LORazepam (ATIVAN) injection 1 mg (1 mg Intravenous Given 10/10/24 0827)   LORazepam (ATIVAN) injection 2 mg (2 mg Intravenous Given 10/10/24 1026)       ED Risk Strat Scores                CIWA-Ar Score       Row Name 10/10/24 1900 10/10/24 1300 10/10/24 1030       CIWA-Ar    Blood Pressure -- 158/88 --    Pulse -- 95 --    Nausea and Vomiting 1 1 0    Tactile Disturbances 0 0 0    Tremor 4 4 3    Auditory Disturbances 0 0 0    Paroxysmal Sweats 1 1 1    Visual Disturbances 1 2 0    Anxiety 7 4 1    Headache, Fullness in Head 0 0 0    Agitation 1 4 0    Orientation and Clouding of Sensorium 0 0 0    CIWA-Ar Total 15 16 5      Row Name 10/10/24 0745             CIWA-Ar    Nausea and Vomiting 3      Tactile Disturbances 0      Tremor 5      Auditory Disturbances 0      Paroxysmal Sweats 5      Visual Disturbances 0      Anxiety 3      Headache, Fullness in Head 0      Agitation 2      Orientation and Clouding of Sensorium 0      CIWA-Ar Total 18                                                  History of Present Illness       Chief Complaint   Patient presents with    Withdrawal - Alcohol     Pt reports shaking/vomiting/tremors since 11pm lastnight. Last drink was beer around  noon yesterday. Had 3 bottles of bourbon last week.        Past Medical History:   Diagnosis Date    Alcohol use disorder, severe, dependence (HCC) 04/02/2023    Alcohol withdrawal seizure (HCC)     Alcoholic ketoacidosis 10/16/2023    Anxiety     AR (allergic rhinitis)     Chronic alcoholic gastritis 04/02/2023    Depression     GERD (gastroesophageal reflux disease)     Hepatic steatosis 04/02/2023    Hyperlipidemia     Hypertension     IBS (irritable bowel syndrome)     Obesity     Rosacea     Vitamin B12 deficiency 02/14/2024    Vitamin D deficiency 02/14/2024      Past Surgical History:   Procedure Laterality Date    ANTERIOR CRUCIATE LIGAMENT REPAIR Left     WISDOM TOOTH EXTRACTION        Family History   Problem Relation Age of Onset    Cancer Mother 78        Type unknown    Hypertension Father     Coronary artery disease Father 60    Cancer Father 82        Bladder; + Smoker    No Known Problems Sister     No Known Problems Sister     No Known Problems Sister     No Known Problems Son     No Known Problems Son     Heart attack Paternal Grandfather 60    Coronary artery disease Paternal Grandfather 60      Social History     Tobacco Use    Smoking status: Some Days     Types: Cigarettes, Cigars     Start date: 1/1/2014     Passive exposure: Past (Father)    Smokeless tobacco: Never    Tobacco comments:     Smokes cigars 2-3 times per year   Vaping Use    Vaping status: Never Used   Substance Use Topics    Alcohol use: Yes     Comment: Last ETOH 6/17/24    Drug use: Never      E-Cigarette/Vaping    E-Cigarette Use Never User       E-Cigarette/Vaping Substances    Nicotine No     THC No     CBD No     Flavoring No     Other No     Unknown No       I have reviewed and agree with the history as documented.     Patient is a 59-year-old male who presents to the emergency department tremulous with alcohol withdrawal.  He relates that he has experienced the same symptoms previously.  Last week upon feeling  depressed, not securing a job he had hoped for, he consumed 3 bottles of bourbon.  Aware of prior withdrawal experiences he attempted weaning using combination of beer and wine.  Last night he reports abruptly becoming similarly tremulous.  He has had both emesis and loose stools with inability to consume fluids over the last several hours.  He feels lightheaded.  He relates that his abdomen feels very distended and gassy.  No chest pain nor dyspnea.  No fevers.  History of prior admissions for alcohol withdrawal.  He relays that 1 of these was to [place] and the most recent one was to this [place].  He very much prefers staying at this campus if possible.  He reports uncertainty as to whether he has truly had alcohol withdrawal seizures but has had similar tremulousness previously.  He expresses feeling embarrassed over recurrent situation.        Review of Systems   Hematological:         Has scrapes and bruises over extremities.  He reports having worked in the yard yesterday cutting down a tree.  Denies any head injury.   Psychiatric/Behavioral:  Negative for suicidal ideas.    All other systems reviewed and are negative.          Objective       ED Triage Vitals   Temperature Pulse Blood Pressure Respirations SpO2 Patient Position - Orthostatic VS   10/10/24 1000 10/10/24 0730 10/10/24 0731 10/10/24 0730 10/10/24 0730 10/10/24 0731   98.7 °F (37.1 °C) (!) 124 (!) 172/107 (!) 24 96 % Sitting      Temp Source Heart Rate Source BP Location FiO2 (%) Pain Score    10/10/24 1000 10/10/24 0730 10/10/24 0731 -- 10/10/24 1100    Oral Monitor Right arm  No Pain      Vitals      Date and Time Temp Pulse SpO2 Resp BP Pain Score FACES Pain Rating User   10/10/24 1900 98.6 °F (37 °C) 79 95 % 21 151/86 -- -- AS   10/10/24 1500 98.6 °F (37 °C) 70 95 % 19 169/94 -- -- AS   10/10/24 1300 -- 95 -- -- 158/88 -- -- PN   10/10/24 1210 -- 104 96 % 25 147/86 -- -- PN   10/10/24 1100 -- 94 94 % 20 139/88 -- -- BS   10/10/24 1100 --  -- -- -- -- No Pain -- PN   10/10/24 1030 -- 99 95 % 20 151/91 -- -- BS   10/10/24 1000 98.7 °F (37.1 °C) 95 95 % 20 155/86 -- -- BS   10/10/24 0930 -- 98 94 % 20 156/87 -- -- BS   10/10/24 0909 -- -- -- -- 151/88 -- -- BS   10/10/24 0900 -- 100 92 % 20 151/88 -- -- BS   10/10/24 0831 -- 101 96 % 22 146/95 -- -- LAB   10/10/24 0731 -- -- -- -- 172/107 -- -- AD   10/10/24 0730 -- 124 96 % 24 -- -- -- AD            Physical Exam  Vitals and nursing note reviewed.   Constitutional:       General: He is in acute distress.      Appearance: He is ill-appearing and diaphoretic.   HENT:      Head: Normocephalic.      Comments: Alert and with clear speech.     Mouth/Throat:      Mouth: Mucous membranes are dry.   Eyes:      General: No scleral icterus.     Extraocular Movements: Extraocular movements intact.      Conjunctiva/sclera: Conjunctivae normal.   Cardiovascular:      Rate and Rhythm: Regular rhythm. Tachycardia present.   Pulmonary:      Effort: Pulmonary effort is normal.      Breath sounds: Normal breath sounds.   Abdominal:      General: There is distension (Tympanic).      Palpations: Abdomen is soft.      Tenderness: There is no right CVA tenderness, left CVA tenderness or guarding.   Musculoskeletal:         General: Normal range of motion.   Skin:     Comments: Cool, diaphoretic.  Small areas of abrasion and ecchymosis over extremities-left anterior calf, right dorsal forearm   Neurological:      Mental Status: He is alert and oriented to person, place, and time.      Cranial Nerves: Cranial nerves 2-12 are intact.      Sensory: Sensation is intact.      Motor: Motor function is intact.         Results Reviewed       Procedure Component Value Units Date/Time    Magnesium [[id number]]  (Abnormal) Collected: 10/10/24 0756    Lab Status: Final result Specimen: Blood from Arm, Right Updated: 10/10/24 1234     Magnesium 1.1 mg/dL     Phosphorus [[id number]]  (Normal) Collected: 10/10/24 0756    Lab Status: Final  result Specimen: Blood from Arm, Right Updated: 10/10/24 1234     Phosphorus 2.7 mg/dL     Lactic acid, plasma (w/reflex if result > 2.0) [[id number]]  (Normal) Collected: 10/10/24 1005    Lab Status: Final result Specimen: Blood from Arm, Right Updated: 10/10/24 1029     LACTIC ACID 1.1 mmol/L     Narrative:      Result may be elevated if tourniquet was used during collection.    Blood gas, venous [[id number]]  (Abnormal) Collected: 10/10/24 0905    Lab Status: Final result Specimen: Blood from Arm, Right Updated: 10/10/24 0913     pH, Cy 7.437     pCO2, Cy 31.8 mm Hg      pO2, Cy 72.6 mm Hg      HCO3, Cy 21.0 mmol/L      Base Excess, Cy -2.2 mmol/L      O2 Content, Cy 19.0 ml/dL      O2 HGB, VENOUS 94.4 %     Lipase [[id number]]  (Normal) Collected: 10/10/24 0756    Lab Status: Final result Specimen: Blood from Arm, Right Updated: 10/10/24 0856     Lipase 15 u/L     Acetaminophen level-If concentration is detectable, please discuss with medical  on call. [[id number]]  (Abnormal) Collected: 10/10/24 0756    Lab Status: Final result Specimen: Blood from Arm, Right Updated: 10/10/24 0856     Acetaminophen Level <2 ug/mL     Comprehensive metabolic panel [[id number]]  (Abnormal) Collected: 10/10/24 0756    Lab Status: Final result Specimen: Blood from Arm, Right Updated: 10/10/24 0856     Sodium 143 mmol/L      Potassium 4.0 mmol/L      Chloride 106 mmol/L      CO2 22 mmol/L      ANION GAP 15 mmol/L      BUN 9 mg/dL      Creatinine 0.87 mg/dL      Glucose 103 mg/dL      Calcium 9.3 mg/dL      AST 60 U/L      ALT 33 U/L      Alkaline Phosphatase 93 U/L      Total Protein 7.1 g/dL      Albumin 4.4 g/dL      Total Bilirubin 0.77 mg/dL      eGFR 95 ml/min/1.73sq m     Narrative:      National Kidney Disease Foundation guidelines for Chronic Kidney Disease (CKD):     Stage 1 with normal or high GFR (GFR > 90 mL/min/1.73 square meters)    Stage 2 Mild CKD (GFR = 60-89 mL/min/1.73 square meters)    Stage 3A  Moderate CKD (GFR = 45-59 mL/min/1.73 square meters)    Stage 3B Moderate CKD (GFR = 30-44 mL/min/1.73 square meters)    Stage 4 Severe CKD (GFR = 15-29 mL/min/1.73 square meters)    Stage 5 End Stage CKD (GFR <15 mL/min/1.73 square meters)  Note: GFR calculation is accurate only with a steady state creatinine    Salicylate level [[id number]]  (Normal) Collected: 10/10/24 0756    Lab Status: Final result Specimen: Blood from Arm, Right Updated: 10/10/24 0856     Salicylate Lvl <5 mg/dL     Protime-INR [[id number]]  (Normal) Collected: 10/10/24 0756    Lab Status: Final result Specimen: Blood from Arm, Right Updated: 10/10/24 0838     Protime 13.9 seconds      INR 1.00    Narrative:      INR Therapeutic Range    Indication                                             INR Range      Atrial Fibrillation                                               2.0-3.0  Hypercoagulable State                                    2.0.2.3  Left Ventricular Asist Device                            2.0-3.0  Mechanical Heart Valve                                  -    Aortic(with afib, MI, embolism, HF, LA enlargement,    and/or coagulopathy)                                     2.0-3.0 (2.5-3.5)     Mitral                                                             2.5-3.5  Prosthetic/Bioprosthetic Heart Valve               2.0-3.0  Venous thromboembolism (VTE: VT, PE        2.0-3.0    APTT [[id number]]  (Normal) Collected: 10/10/24 0756    Lab Status: Final result Specimen: Blood from Arm, Right Updated: 10/10/24 0838     PTT 27 seconds     Ethanol [[id number]]  (Normal) Collected: 10/10/24 0756    Lab Status: Final result Specimen: Blood from Arm, Right Updated: 10/10/24 0835     Ethanol Lvl <10 mg/dL     HS Troponin 0hr (reflex protocol) [[id number]]  (Normal) Collected: 10/10/24 0756    Lab Status: Final result Specimen: Blood from Arm, Right Updated: 10/10/24 0833     hs TnI 0hr 4 ng/L     FLU/COVID Rapid Antigen (30 min. TAT) - Preferred  screening test in ED [[id number]]  (Normal) Collected: 10/10/24 0730    Lab Status: Final result Specimen: Nares from Nose Updated: 10/10/24 0832     SARS COV Rapid Antigen Negative     Influenza A Rapid Antigen Negative     Influenza B Rapid Antigen Negative    Narrative:      This test has been performed using the Your[employer] Isa 2 FLU+SARS Antigen test under the Emergency Use Authorization (EUA). This test has been validated by the  and verified by the performing laboratory. The Isa uses lateral flow immunofluorescent sandwich assay to detect SARS-COV, Influenza A and Influenza B Antigen.     The [facility] Isa 2 SARS Antigen test does not differentiate between SARS-CoV and SARS-CoV-2.     Negative results are presumptive and may be confirmed with a molecular assay, if necessary, for patient management. Negative results do not rule out SARS-CoV-2 or influenza infection and should not be used as the sole basis for treatment or patient management decisions. A negative test result may occur if the level of antigen in a sample is below the limit of detection of this test.     Positive results are indicative of the presence of viral antigens, but do not rule out bacterial infection or co-infection with other viruses.     All test results should be used as an adjunct to clinical observations and other information available to the provider.    FOR PEDIATRIC PATIENTS - copy/paste COVID Guidelines URL to browser: [email or web address]    Fingerstick Glucose (POCT) [[id number]]  (Normal) Collected: 10/10/24 0819    Lab Status: Final result Specimen: Blood Updated: 10/10/24 0821     POC Glucose 105 mg/dl     CBC and differential [[id number]]  (Abnormal) Collected: 10/10/24 0756    Lab Status: Final result Specimen: Blood from Arm, Right Updated: 10/10/24 0813     WBC 8.56 Thousand/uL      RBC 4.43 Million/uL      Hemoglobin 14.4 g/dL      Hematocrit 40.7 %      MCV  92 fL      MCH 32.5 pg      MCHC 35.4 g/dL      RDW 13.3 %      MPV 8.4 fL      Platelets 274 Thousands/uL      nRBC 0 /100 WBCs      Segmented % 73 %      Immature Grans % 1 %      Lymphocytes % 18 %      Monocytes % 6 %      Eosinophils Relative 1 %      Basophils Relative 1 %      Absolute Neutrophils 6.40 Thousands/µL      Absolute Immature Grans 0.06 Thousand/uL      Absolute Lymphocytes 1.50 Thousands/µL      Absolute Monocytes 0.50 Thousand/µL      Eosinophils Absolute 0.04 Thousand/µL      Basophils Absolute 0.06 Thousands/µL     Rapid drug screen, urine [[id number]]     Lab Status: No result Specimen: Urine             No orders to display       CriticalCare Time    Date/Time: 10/10/2024 10:00 AM    Performed by: Flakita Wynn MD  Authorized by: Flakita Wynn MD    Critical care provider statement:     Critical care time (minutes):  30    Critical care time was exclusive of:  Separately billable procedures and treating other patients and teaching time    Critical care was necessary to treat or prevent imminent or life-threatening deterioration of the following conditions:  Dehydration and metabolic crisis    Critical care was time spent personally by me on the following activities:  Obtaining history from patient or surrogate, examination of patient, review of old charts, ordering and performing treatments and interventions, ordering and review of laboratory studies, development of treatment plan with patient or surrogate, evaluation of patient's response to treatment, re-evaluation of patient's condition and discussions with consultants  ECG 12 Lead Documentation Only    Date/Time: 10/10/2024 8:28 AM    Performed by: Flakita Wynn MD  Authorized by: Flakita Wynn MD    ECG reviewed by me, the ED Provider: yes    Patient location:  ED  Previous ECG:     Previous ECG:  Compared to current    Comparison ECG info:  6/18/2024    Similarity:  No  change  Rate:     ECG rate:  118    ECG rate assessment: tachycardic    Rhythm:     Rhythm: sinus tachycardia    QRS:     QRS axis:  Normal    QRS intervals:  Normal  Conduction:     Conduction: normal    ST segments:     ST segments:  Normal  T waves:     T waves: non-specific        ED Medication and Procedure Management   Prior to Admission Medications   Prescriptions Last Dose Informant Patient Reported? Taking?   Magnesium 400 MG CAPS   Yes No   Sig: Take 1 capsule by mouth in the morning   Melatonin 10 MG TABS   No No   Sig: Take 1 tablet (10 mg total) by mouth daily at bedtime as needed (Insomnia)   Omega-3 Fatty Acids (fish oil) 1,000 mg   Yes No   Sig: Take 2,000 mg by mouth daily   Thiamine Mononitrate (VITAMIN B1) 100 mg tablet   No No   Sig: Take 1 tablet (100 mg total) by mouth daily   atorvastatin (LIPITOR) 40 mg tablet   No No   Sig: Take 1 tablet (40 mg total) by mouth daily with dinner   ergocalciferol (ERGOCALCIFEROL) 1.25 MG (57827 UT) capsule   No No   Sig: Take 1 capsule (50,000 Units total) by mouth once a week for 12 doses   escitalopram (LEXAPRO) 20 mg tablet   No No   Sig: Take 1 tablet (20 mg total) by mouth daily   folic acid (FOLVITE) 400 mcg tablet   No No   Sig: Take 1 tablet (400 mcg total) by mouth daily   hydrOXYzine HCL (ATARAX) 25 mg tablet   No No   Sig: Take 1 tablet (25 mg total) by mouth every 6 (six) hours as needed for itching   lisinopril (ZESTRIL) 40 mg tablet   No No   Sig: Take 1 tablet (40 mg total) by mouth daily   metroNIDAZOLE (METROCREAM) 0.75 % cream   No No   Sig: Apply topically 2 (two) times a day   naltrexone (REVIA) 50 mg tablet   No No   Sig: Take 1 tablet (50 mg total) by mouth daily   pantoprazole (PROTONIX) 40 mg tablet   No No   Sig: Take 1 tablet (40 mg total) by mouth daily in the early morning      Facility-Administered Medications: None     Current Discharge Medication List        CONTINUE these medications which have NOT CHANGED    Details    atorvastatin (LIPITOR) 40 mg tablet Take 1 tablet (40 mg total) by mouth daily with dinner  Qty: 30 tablet, Refills: 5    Associated Diagnoses: Hyperlipidemia, unspecified hyperlipidemia type      ergocalciferol (ERGOCALCIFEROL) 1.25 MG (63542 UT) capsule Take 1 capsule (50,000 Units total) by mouth once a week for 12 doses  Qty: 12 capsule, Refills: 0    Associated Diagnoses: Vitamin D deficiency      escitalopram (LEXAPRO) 20 mg tablet Take 1 tablet (20 mg total) by mouth daily  Qty: 90 tablet, Refills: 1    Associated Diagnoses: Anxiety; Current moderate episode of major depressive disorder, unspecified whether recurrent (HCC)      folic acid (FOLVITE) 400 mcg tablet Take 1 tablet (400 mcg total) by mouth daily  Qty: 30 tablet, Refills: 1    Associated Diagnoses: Alcohol use disorder, severe, dependence ([facility])      hydrOXYzine HCL (ATARAX) 25 mg tablet Take 1 tablet (25 mg total) by mouth every 6 (six) hours as needed for itching  Qty: 30 tablet, Refills: 5    Associated Diagnoses: Anxiety      lisinopril (ZESTRIL) 40 mg tablet Take 1 tablet (40 mg total) by mouth daily  Qty: 30 tablet, Refills: 1    Associated Diagnoses: Primary hypertension      Magnesium 400 MG CAPS Take 1 capsule by mouth in the morning      Melatonin 10 MG TABS Take 1 tablet (10 mg total) by mouth daily at bedtime as needed (Insomnia)  Qty: 30 tablet, Refills: 1    Associated Diagnoses: Insomnia, unspecified type      metroNIDAZOLE (METROCREAM) 0.75 % cream Apply topically 2 (two) times a day  Qty: 45 g, Refills: 0    Associated Diagnoses: Rosacea      naltrexone (REVIA) 50 mg tablet Take 1 tablet (50 mg total) by mouth daily  Qty: 30 tablet, Refills: 0    Associated Diagnoses: Alcohol use disorder, severe, dependence (HCC)      Omega-3 Fatty Acids (fish oil) 1,000 mg Take 2,000 mg by mouth daily      pantoprazole (PROTONIX) 40 mg tablet Take 1 tablet (40 mg total) by mouth daily in the early morning  Qty: 30 tablet, Refills: 5     Associated Diagnoses: Chronic alcoholic gastritis without hemorrhage; Gastroesophageal reflux disease, unspecified whether esophagitis present      Thiamine Mononitrate (VITAMIN B1) 100 mg tablet Take 1 tablet (100 mg total) by mouth daily  Qty: 30 tablet, Refills: 1    Associated Diagnoses: Alcohol use disorder, severe, dependence (HCC)           No discharge procedures on file.  ED SEPSIS DOCUMENTATION   Time reflects when diagnosis was documented in both MDM as applicable and the Disposition within this note       Time User Action Codes Description Comment    10/10/2024 10:23 AM Flakita Wynn Add [F10.939] Alcohol withdrawal (HCC)                  Flakita Wynn MD  10/10/24 3420

## 2024-10-10 NOTE — ASSESSMENT & PLAN NOTE
Patient presented to the ER with complaints of alcohol withdrawal.  He reports he recently relapsed due to a life stressor and has been drinking 3 bottles of bourbon a week.  His last bourbon drink with this 2 days ago, his last alcoholic drink (2 beers) was yesterday.  He reports that 2300 last night he started with severe shakes, diaphoretic, nauseous/vomiting/diarrhea and continued with full body shaking all night long which prompted him to come to the ER this morning.  Denies hematemesis, is unsure what his stool color was  Reports decreased oral intake over the last 48 hours  In the ER patient was found to be very tremulous and was given a total of 2 mg Ativan with treatment  He has a history of DTs with alcohol seizures  Patient refusing to go to [place] for withdrawal management  Admit to stepdown 1 under critical care  Give another 2 mg Ativan now due to tremors increasing  CIWA protocol  Started on folic acid and thiamine supplement  Monitor electrolytes and maintain high normal  Plan for phenobarbital trial if symptoms continue  Routine neurochecks  N.p.o. except for sips  Delirium precautions

## 2024-10-10 NOTE — PROGRESS NOTES
Critical Care Attending Note; Eber Rios   Note Date: 10/10/24  Note Time: 4:57 PM    Patient: Diogo Travis  Age, : 59 y.o., 1965 MRN: [id number] Code Status: Level 1 - Full Code Patient Location: ICU 15/ICU 15   Hospital LOS:0 days  ]   Patient seen and examined, medical record reviewed, discussed with house staff and nursing staff.     HPI   CC: EtOH withdrawal   59M with a PMH of hepatic steatosis, EtOH Abuse(seizure hx?), HTN and HLD who present in EtOH withdrawal    Main ICU Plans:       #Neuro  EtOH withdrawal - 10/9 vs 10/7  - [facility] Protocol  - Thiamine/Folate  - Phenobarbital     #Renal  Hypomagnesemia  - Replete    #DVT/GI ppx  Lovenox    #Lines/Tubes/Drains:   Invasive Devices       Peripheral Intravenous Line  Duration             Peripheral IV 10/10/24 Distal;Dorsal (posterior);Right Forearm <1 day                    #Nutrition:   Diet NPO; Sips of clear liquids        #Code Status:   Level 1 - Full Code    #Dispo:   ICU        Eber Rios MD  Pulmonary, Critical Care    Critical care time, excluding procedures, teaching, family meetings, and excludes any prior time recorded by the AP/resident, 35 minutes. Upon my evaluation, this patient has a high probability of imminent or life-threatening deterioration due to above problems which required my direct attention, intervention, and personal management.   Impression/Active Problems:    EtOH Withdrawal       Physical Exam:     Vital Signs:   Weight: 103 kg (227 lb 4.7 oz)  IBW: Ideal body weight: 68.4 kg (150 lb 12.7 oz)  Adjusted ideal body weight: 82.3 kg (181 lb 6.3 oz)  Temp:  [98.6 °F (37 °C)-98.7 °F (37.1 °C)] 98.6 °F (37 °C)  HR:  [] 70  BP: (139-172)/() 169/94  Resp:  [19-25] 19  SpO2:  [92 %-96 %] 95 %  O2 Device: None (Room air)  General: NAD  Neuro: AxO 3  Heart: RRR  Lungs: CTAB  Abdomen: Soft NT   Extremities: Min edema                Ventilator Settings:         Results from last 7 days   Lab Units  "10/10/24  0905   PO2 FANG mm Hg 72.6*     Radiologic Images Reviewed:    CT Abd  IMPRESSION:     1. Stomach is largely collapsed, assessment limited. Otherwise, no acute intra-abdominal abnormality.     2. Enlarged fatty liver.  Input / Output:   No intake or output data in the 24 hours ending 10/10/24 1657         Infusions:  multi-electrolyte, 125 mL/hr, Last Rate: 125 mL/hr (10/10/24 1129)      Scheduled Medications:  Current Facility-Administered Medications   Medication Dose Route Frequency Provider Last Rate    atorvastatin  40 mg Oral Daily With Dinner MARCK Neal      chlorhexidine  15 mL Mouth/Throat Q12H COLBY MARCK Neal      enoxaparin  40 mg Subcutaneous Daily MARCK Neal      escitalopram  20 mg Oral Daily MARCK Neal      fish oil  2,000 mg Oral Daily MARCK Neal      folic acid  1 mg Oral Daily MARCK Neal      hydrALAZINE  10 mg Intravenous Q4H PRN MARCK Neal      [START ON 10/11/2024] lisinopril  40 mg Oral Daily MARCK Neal      magnesium sulfate  4 g Intravenous Once Francia Ibarra MD 4 g (10/10/24 1559)    multi-electrolyte  125 mL/hr Intravenous Continuous MARCK Neal 125 mL/hr (10/10/24 1129)    multivitamin-minerals  1 tablet Oral Daily MARCK Neal      pantoprazole  40 mg Oral Early Morning MARCK Neal      thiamine  100 mg Oral Daily MARCK Neal         PRN Medications:    hydrALAZINE    Labs Reviewed:  Results from last 7 days   Lab Units 10/10/24  0756   WBC Thousand/uL 8.56   HEMOGLOBIN g/dL 14.4   HEMATOCRIT % 40.7   PLATELETS Thousands/uL 274      Results from last 7 days   Lab Units 10/10/24  0756   SODIUM mmol/L 143   CO2 mmol/L 22   BUN mg/dL 9   CALCIUM mg/dL 9.3   MAGNESIUM mg/dL 1.1*   PHOSPHORUS mg/dL 2.7         Invalid input(s): \"ASTSGOT\", \"ALTSGPT\"LABRCNTIP@ ,alkphos:3,tbilirubin:3,dbilirubin:3)@  Results from last 7 days " "  Lab Units 10/10/24  0756   INR  1.00           Invalid input(s): \"TROPT\", \"PBNP\"             I have personally seen and examined the patient on (10/10/24 between 5693-0084). I discussed the patient with the AP/resident including, but not limited to, verifying findings; reviewing labs and x-rays; discussing with consultants; developing the plan of care with the bedside nurse; and discussing treatment plan with patient or surrogate.  I have reviewed the note and assessment performed by the AP/resident and agree with the AP/resident’s documented findings and plan of care with the above additions/exceptions. Please see my comments for details and adjustments.                 "

## 2024-10-10 NOTE — ASSESSMENT & PLAN NOTE
Pt reports recent relapse with ETOH use due to a life stressor.   Continue to encourage cessation of ETOH  CM consult for O/P needs   Further details in plan above

## 2024-10-10 NOTE — CASE MANAGEMENT
Case Management Progress Note    Patient name Diogo Travis  Location ICU 15/ICU 15 MRN [id number]  : 1965 Date 10/10/2024       LOS (days): 0  Geometric Mean LOS (GMLOS) (days):   Days to GMLOS:        OBJECTIVE:        Current admission status: Inpatient  Preferred Pharmacy:   [facility] [place] - [address] [address]  Phone: [phone] Fax: [phone]    Primary Care Provider: Enid Altman DO    Primary Insurance: [employer]  Secondary Insurance:     PROGRESS NOTE:    CC confirmed with CATCH representative that patient was seen by her, and treatment options were presented. As of today, patient is refusing treatment. CATCH to follow up with patient tomorrow.     CM received consult for TONIA/OUD. CM contacted [facility]  to refer patient and provided minimal necessary information. CRS to meet with patient and follow up with CM to provide update on plan of care following patient connection.

## 2024-10-10 NOTE — PLAN OF CARE
Problem: PAIN - ADULT  Goal: Verbalizes/displays adequate comfort level or baseline comfort level  Description: Interventions:  - Encourage patient to monitor pain and request assistance  - Assess pain using appropriate pain scale  - Administer analgesics based on type and severity of pain and evaluate response  - Implement non-pharmacological measures as appropriate and evaluate response  - Consider cultural and social influences on pain and pain management  - Notify physician/advanced practitioner if interventions unsuccessful or patient reports new pain  Outcome: Progressing     Problem: INFECTION - ADULT  Goal: Absence or prevention of progression during hospitalization  Description: INTERVENTIONS:  - Assess and monitor for signs and symptoms of infection  - Monitor lab/diagnostic results  - Monitor all insertion sites, i.e. indwelling lines, tubes, and drains  - Monitor endotracheal if appropriate and nasal secretions for changes in amount and color  - [place] appropriate cooling/warming therapies per order  - Administer medications as ordered  - Instruct and encourage patient and family to use good hand hygiene technique  - Identify and instruct in appropriate isolation precautions for identified infection/condition  Outcome: Progressing  Goal: Absence of fever/infection during neutropenic period  Description: INTERVENTIONS:  - Monitor WBC    Outcome: Progressing     Problem: DISCHARGE PLANNING  Goal: Discharge to home or other facility with appropriate resources  Description: INTERVENTIONS:  - Identify barriers to discharge w/patient and caregiver  - Arrange for needed discharge resources and transportation as appropriate  - Identify discharge learning needs (meds, wound care, etc.)  - Arrange for interpretive services to assist at discharge as needed  - Refer to Case Management Department for coordinating discharge planning if the patient needs post-hospital services based on physician/advanced  practitioner order or complex needs related to functional status, cognitive ability, or social support system  Outcome: Progressing     Problem: Knowledge Deficit  Goal: Patient/family/caregiver demonstrates understanding of disease process, treatment plan, medications, and discharge instructions  Description: Complete learning assessment and assess knowledge base.  Interventions:  - Provide teaching at level of understanding  - Provide teaching via preferred learning methods  Outcome: Progressing

## 2024-10-11 VITALS
TEMPERATURE: 98.1 F | WEIGHT: 227.29 LBS | OXYGEN SATURATION: 96 % | BODY MASS INDEX: 34.45 KG/M2 | DIASTOLIC BLOOD PRESSURE: 93 MMHG | RESPIRATION RATE: 22 BRPM | HEIGHT: 68 IN | HEART RATE: 69 BPM | SYSTOLIC BLOOD PRESSURE: 165 MMHG

## 2024-10-11 LAB
ALBUMIN SERPL BCG-MCNC: 3.5 G/DL (ref 3.5–5)
ALP SERPL-CCNC: 68 U/L (ref 34–104)
ALT SERPL W P-5'-P-CCNC: 19 U/L (ref 7–52)
ANION GAP SERPL CALCULATED.3IONS-SCNC: 5 MMOL/L (ref 4–13)
AST SERPL W P-5'-P-CCNC: 30 U/L (ref 13–39)
BASOPHILS # BLD AUTO: 0.03 THOUSANDS/ΜL (ref 0–0.1)
BASOPHILS NFR BLD AUTO: 1 % (ref 0–1)
BILIRUB SERPL-MCNC: 1.13 MG/DL (ref 0.2–1)
BUN SERPL-MCNC: 8 MG/DL (ref 5–25)
CA-I BLD-SCNC: 1.07 MMOL/L (ref 1.12–1.32)
CALCIUM SERPL-MCNC: 7.9 MG/DL (ref 8.4–10.2)
CHLORIDE SERPL-SCNC: 103 MMOL/L (ref 96–108)
CO2 SERPL-SCNC: 30 MMOL/L (ref 21–32)
CREAT SERPL-MCNC: 0.77 MG/DL (ref 0.6–1.3)
EOSINOPHIL # BLD AUTO: 0.09 THOUSAND/ΜL (ref 0–0.61)
EOSINOPHIL NFR BLD AUTO: 2 % (ref 0–6)
ERYTHROCYTE [DISTWIDTH] IN BLOOD BY AUTOMATED COUNT: 13 % (ref 11.6–15.1)
GFR SERPL CREATININE-BSD FRML MDRD: 100 ML/MIN/1.73SQ M
GLUCOSE SERPL-MCNC: 97 MG/DL (ref 65–140)
HCT VFR BLD AUTO: 34.1 % (ref 36.5–49.3)
HGB BLD-MCNC: 11.9 G/DL (ref 12–17)
IMM GRANULOCYTES # BLD AUTO: 0.02 THOUSAND/UL (ref 0–0.2)
IMM GRANULOCYTES NFR BLD AUTO: 0 % (ref 0–2)
LYMPHOCYTES # BLD AUTO: 1.08 THOUSANDS/ΜL (ref 0.6–4.47)
LYMPHOCYTES NFR BLD AUTO: 22 % (ref 14–44)
MAGNESIUM SERPL-MCNC: 2.2 MG/DL (ref 1.9–2.7)
MCH RBC QN AUTO: 32.7 PG (ref 26.8–34.3)
MCHC RBC AUTO-ENTMCNC: 34.9 G/DL (ref 31.4–37.4)
MCV RBC AUTO: 94 FL (ref 82–98)
MONOCYTES # BLD AUTO: 0.5 THOUSAND/ΜL (ref 0.17–1.22)
MONOCYTES NFR BLD AUTO: 10 % (ref 4–12)
NEUTROPHILS # BLD AUTO: 3.16 THOUSANDS/ΜL (ref 1.85–7.62)
NEUTS SEG NFR BLD AUTO: 65 % (ref 43–75)
NRBC BLD AUTO-RTO: 0 /100 WBCS
PHOSPHATE SERPL-MCNC: 3.2 MG/DL (ref 2.7–4.5)
PLATELET # BLD AUTO: 204 THOUSANDS/UL (ref 149–390)
PMV BLD AUTO: 8.5 FL (ref 8.9–12.7)
POTASSIUM SERPL-SCNC: 3.2 MMOL/L (ref 3.5–5.3)
PROT SERPL-MCNC: 5.9 G/DL (ref 6.4–8.4)
RBC # BLD AUTO: 3.64 MILLION/UL (ref 3.88–5.62)
SODIUM SERPL-SCNC: 138 MMOL/L (ref 135–147)
WBC # BLD AUTO: 4.88 THOUSAND/UL (ref 4.31–10.16)

## 2024-10-11 PROCEDURE — 84100 ASSAY OF PHOSPHORUS: CPT | Performed by: NURSE PRACTITIONER

## 2024-10-11 PROCEDURE — 82330 ASSAY OF CALCIUM: CPT | Performed by: NURSE PRACTITIONER

## 2024-10-11 PROCEDURE — NC001 PR NO CHARGE: Performed by: STUDENT IN AN ORGANIZED HEALTH CARE EDUCATION/TRAINING PROGRAM

## 2024-10-11 PROCEDURE — 85025 COMPLETE CBC W/AUTO DIFF WBC: CPT | Performed by: NURSE PRACTITIONER

## 2024-10-11 PROCEDURE — 99232 SBSQ HOSP IP/OBS MODERATE 35: CPT | Performed by: STUDENT IN AN ORGANIZED HEALTH CARE EDUCATION/TRAINING PROGRAM

## 2024-10-11 PROCEDURE — 83735 ASSAY OF MAGNESIUM: CPT | Performed by: NURSE PRACTITIONER

## 2024-10-11 PROCEDURE — 80053 COMPREHEN METABOLIC PANEL: CPT | Performed by: NURSE PRACTITIONER

## 2024-10-11 RX ORDER — FOLIC ACID 1 MG/1
1 TABLET ORAL DAILY
Qty: 30 TABLET | Refills: 0 | Status: SHIPPED | OUTPATIENT
Start: 2024-10-12 | End: 2024-11-11

## 2024-10-11 RX ORDER — POTASSIUM CHLORIDE 1500 MG/1
40 TABLET, EXTENDED RELEASE ORAL ONCE
Status: COMPLETED | OUTPATIENT
Start: 2024-10-11 | End: 2024-10-11

## 2024-10-11 RX ORDER — CALCIUM GLUCONATE 20 MG/ML
2 INJECTION, SOLUTION INTRAVENOUS ONCE
Status: COMPLETED | OUTPATIENT
Start: 2024-10-11 | End: 2024-10-11

## 2024-10-11 RX ADMIN — SODIUM CHLORIDE, SODIUM GLUCONATE, SODIUM ACETATE, POTASSIUM CHLORIDE, MAGNESIUM CHLORIDE, SODIUM PHOSPHATE, DIBASIC, AND POTASSIUM PHOSPHATE 125 ML/HR: .53; .5; .37; .037; .03; .012; .00082 INJECTION, SOLUTION INTRAVENOUS at 00:01

## 2024-10-11 RX ADMIN — FOLIC ACID 1 MG: 1 TABLET ORAL at 08:56

## 2024-10-11 RX ADMIN — ESCITALOPRAM OXALATE 20 MG: 20 TABLET ORAL at 08:57

## 2024-10-11 RX ADMIN — SODIUM CHLORIDE, SODIUM GLUCONATE, SODIUM ACETATE, POTASSIUM CHLORIDE, MAGNESIUM CHLORIDE, SODIUM PHOSPHATE, DIBASIC, AND POTASSIUM PHOSPHATE 125 ML/HR: .53; .5; .37; .037; .03; .012; .00082 INJECTION, SOLUTION INTRAVENOUS at 07:23

## 2024-10-11 RX ADMIN — PANTOPRAZOLE SODIUM 40 MG: 40 TABLET, DELAYED RELEASE ORAL at 06:20

## 2024-10-11 RX ADMIN — POTASSIUM CHLORIDE 40 MEQ: 1500 TABLET, EXTENDED RELEASE ORAL at 07:23

## 2024-10-11 RX ADMIN — Medication 100 MG: at 08:57

## 2024-10-11 RX ADMIN — LISINOPRIL 40 MG: 20 TABLET ORAL at 08:57

## 2024-10-11 RX ADMIN — OMEGA-3 FATTY ACIDS CAP 1000 MG 2000 MG: 1000 CAP at 08:57

## 2024-10-11 RX ADMIN — MULTIPLE VITAMINS W/ MINERALS TAB 1 TABLET: TAB ORAL at 08:57

## 2024-10-11 RX ADMIN — POTASSIUM CHLORIDE 40 MEQ: 1500 TABLET, EXTENDED RELEASE ORAL at 08:57

## 2024-10-11 RX ADMIN — CHLORHEXIDINE GLUCONATE 15 ML: 1.2 RINSE ORAL at 08:55

## 2024-10-11 RX ADMIN — CALCIUM GLUCONATE 2 G: 20 INJECTION, SOLUTION INTRAVENOUS at 07:23

## 2024-10-11 NOTE — ED NOTES
Patient was medically admitted.  Will arthur ED warm hand off consult as complete.  CATCH to follow-up with patient as needed on medical floor.

## 2024-10-11 NOTE — NURSING NOTE
Patient asking to sign out AMA. Pt AAOx4. Dequan Miller, DO at bedside explaining risks to signing out AMA. Patient verbalized understanding. Patient was educated on S/S of when he should seek care. Answered all questions and concerns. IV removed. Patient walked independently out with all belongings.

## 2024-10-11 NOTE — DISCHARGE SUMMARY
Discharge Summary - Critical Care/ICU   Name: Diogo Travis 59 y.o. male I MRN: [id number]  Unit/Bed#: ICU 15 I Date of Admission: 10/10/2024   Date of Service: 10/11/2024 I Hospital Day: 1    Admission Date: 10/10/2024 0726  Discharge Date: 10/11/24  Admitting Diagnosis: Alcohol withdrawal (HCC) [F10.939]  Withdrawal symptoms, alcohol (HCC) [F10.939]  Discharge Diagnosis:   Medical Problems       Resolved Problems  Date Reviewed: 7/9/2024   None         HPI: Diogo Travis is a 59 y.o. who presents with complaints of alcohol withdrawal.  He reports he recently relapsed due to a life stressor and has been drinking 3 bottles of bourbon a week.  His last bourbon drink with this 2 days ago, his last alcoholic drink (2 beers) was yesterday.  He reports that 2300 last night he started with severe shakes, diaphoretic, nauseous/vomiting/diarrhea and continued with full body shaking all night long which prompted him to come to the ER this morning. In the ED, noted to be very tremulous and tachycardic to 120s, he given a total of 2 mg ativan IV and 1 L IVFs. Initially hypertensive to 170s but hadn't had his home lisinopril prior to coming in. BP improved after ativan and lisinopril. Pt with Hx of DTs with withdraw seizures.     Procedures Performed:   Orders Placed This Encounter   Procedures    Critical Care    ED ECG Documentation Only       Summary of Hospital Course:   He has received total of 2X doses of phenobarbitol 130 mg with the most recent at 1945 on 10/10. Today, he has a very mild tremor with SIWA of 2. He is resting comfortably in a chair. He has good support at home with fiance.    Significant Findings, Care, Treatment and Services Provided: Alcohol withdrawal treatment including frequent neurochecks, CIWA nursing protocol, symptomatic alcohol withdrawal reduction including a total of 2 mg of Ativan and 260 mg of phenobarbital.  We also treated blood pressure with lisinopril and also improved after Ativan  dosages.    Complications: Recommendations to continue monitoring on medsurg, but despite discussion and warnings about seizures, AMS, harm to self/others or death, the patient left AMA.    Condition at Discharge: good       Discharge instructions/Information to patient and family:   See After Visit Summary (AVS) for information provided to patient and family.      Provisions for Follow-Up Care:  See after visit summary for information related to follow-up care and any pertinent home health orders.      PCP: Enid Altman DO    Disposition: Left against medical advice    Planned Readmission: No     Discharge Medications:  See after visit summary for reconciled discharge medications provided to patient and family.      Discharge Statement:  I have spent a total time of 35 minutes in caring for this patient on the day of the visit/encounter. >30 minutes of time was spent on: Diagnostic results, Prognosis, Risks and benefits of tx options, Instructions for management, Patient and family education, Importance of tx compliance, Risk factor reductions, Impressions, Counseling / Coordination of care, Documenting in the medical record, and Reviewing / ordering tests, medicine, procedures  .

## 2024-10-11 NOTE — ED CARE HANDOFF
[facility] Warm Handoff Outcome Note    Patient name Diogo Travis  Location ICU 15/ICU 15 MRN [id number]  Age: 59 y.o.          Plan Type:  Warm Handoff                                                                                    Plan Date: 10/10/2024  Service:  ED Warm Handoff      Substance Use History:  ETOH    Warm Handoff Update:  Pt admitted to floor  Warm Handoff Outcome: Residential  Inpatient

## 2024-10-11 NOTE — UTILIZATION REVIEW
NOTIFICATION OF INPATIENT ADMISSION   AUTHORIZATION REQUEST   SERVICING FACILITY:   [place] [address]  Tax ID: [id number]  NPI: [id number]   ATTENDING PROVIDER:  Attending Name and NPI#: Eber Rios Md [[id number]]  Address: [address] [place] [address]  Phone: [phone]     ADMISSION INFORMATION:  Place of Service: Inpatient [facility]  Place of Service Code: 21  Inpatient Admission Date/Time: 10/10/24 10:24 AM  Discharge Date/Time: No discharge date for patient encounter.  Admitting Diagnosis Code/Description:  Alcohol withdrawal (HCC) [F10.939]  Withdrawal symptoms, alcohol (HCC) [F10.939]     UTILIZATION REVIEW CONTACT:  Alyce Vieira Utilization   Network Utilization Review Department  Phone: [phone]  Fax: [phone]  Email: [email or web address]  Contact for approvals/pending authorizations, clinical reviews, and discharge.     PHYSICIAN ADVISORY SERVICES:  Medical Necessity Denial & Lkty-nt-Nkeu Review  Phone: [phone]  Fax: [phone]  Email: [email or web address]     DISCHARGE SUPPORT TEAM:  For Patients Discharge Needs & Updates  Phone: [phone] opt. 2 Fax: [phone]  Email: [email or web address]

## 2024-10-11 NOTE — PLAN OF CARE
Problem: PAIN - ADULT  Goal: Verbalizes/displays adequate comfort level or baseline comfort level  Description: Interventions:  - Encourage patient to monitor pain and request assistance  - Assess pain using appropriate pain scale  - Administer analgesics based on type and severity of pain and evaluate response  - Implement non-pharmacological measures as appropriate and evaluate response  - Consider cultural and social influences on pain and pain management  - Notify physician/advanced practitioner if interventions unsuccessful or patient reports new pain  Outcome: Progressing     Problem: INFECTION - ADULT  Goal: Absence or prevention of progression during hospitalization  Description: INTERVENTIONS:  - Assess and monitor for signs and symptoms of infection  - Monitor lab/diagnostic results  - Monitor all insertion sites, i.e. indwelling lines, tubes, and drains  - Monitor endotracheal if appropriate and nasal secretions for changes in amount and color  - [place] appropriate cooling/warming therapies per order  - Administer medications as ordered  - Instruct and encourage patient and family to use good hand hygiene technique  - Identify and instruct in appropriate isolation precautions for identified infection/condition  Outcome: Progressing  Goal: Absence of fever/infection during neutropenic period  Description: INTERVENTIONS:  - Monitor WBC    Outcome: Progressing     Problem: SAFETY ADULT  Goal: Patient will remain free of falls  Description: INTERVENTIONS:  - Educate patient/family on patient safety including physical limitations  - Instruct patient to call for assistance with activity   - Consult OT/PT to assist with strengthening/mobility   - Keep Call bell within reach  - Keep bed low and locked with side rails adjusted as appropriate  - Keep care items and personal belongings within reach  - Initiate and maintain comfort rounds  - Make Fall Risk Sign visible to staff  - Offer Toileting every  Hours,  in advance of need  - Initiate/Maintain alarm  - Obtain necessary fall risk management equipment:   - Apply yellow socks and bracelet for high fall risk patients  - Consider moving patient to room near nurses station  Outcome: Progressing  Goal: Maintain or return to baseline ADL function  Description: INTERVENTIONS:  -  Assess patient's ability to carry out ADLs; assess patient's baseline for ADL function and identify physical deficits which impact ability to perform ADLs (bathing, care of mouth/teeth, toileting, grooming, dressing, etc.)  - Assess/evaluate cause of self-care deficits   - Assess range of motion  - Assess patient's mobility; develop plan if impaired  - Assess patient's need for assistive devices and provide as appropriate  - Encourage maximum independence but intervene and supervise when necessary  - Involve family in performance of ADLs  - Assess for home care needs following discharge   - Consider OT consult to assist with ADL evaluation and planning for discharge  - Provide patient education as appropriate  Outcome: Progressing  Goal: Maintains/Returns to pre admission functional level  Description: INTERVENTIONS:  - Perform AM-PAC 6 Click Basic Mobility/ Daily Activity assessment daily.  - Set and communicate daily mobility goal to care team and patient/family/caregiver.   - Collaborate with rehabilitation services on mobility goals if consulted  - Perform Range of Motion  times a day.  - Reposition patient every  hours.  - Dangle patient  times a day  - Stand patient  times a day  - Ambulate patient  times a day  - Out of bed to chair  times a day   - Out of bed for meals  times a day  - Out of bed for toileting  - Record patient progress and toleration of activity level   Outcome: Progressing     Problem: DISCHARGE PLANNING  Goal: Discharge to home or other facility with appropriate resources  Description: INTERVENTIONS:  - Identify barriers to discharge w/patient and caregiver  - Arrange for  needed discharge resources and transportation as appropriate  - Identify discharge learning needs (meds, wound care, etc.)  - Arrange for interpretive services to assist at discharge as needed  - Refer to Case Management Department for coordinating discharge planning if the patient needs post-hospital services based on physician/advanced practitioner order or complex needs related to functional status, cognitive ability, or social support system  Outcome: Progressing     Problem: Knowledge Deficit  Goal: Patient/family/caregiver demonstrates understanding of disease process, treatment plan, medications, and discharge instructions  Description: Complete learning assessment and assess knowledge base.  Interventions:  - Provide teaching at level of understanding  - Provide teaching via preferred learning methods  Outcome: Progressing

## 2024-10-11 NOTE — DISCHARGE INSTR - AVS FIRST PAGE
Dear Diogo Travis,     It was our pleasure to care for you here at [facility].  It is our hope that we were always able to exceed the expected standards for your care during your stay.  You were hospitalized due to alcohol withdrawal.  You were cared for on the ICU floor by Dequan Miller DO under the service of Lida Weston MD with the [facility] Hospitalist Group who covers for your primary care physician (PCP), Enid Altman DO, while you were hospitalized.  If you have any questions or concerns related to this hospitalization, you may contact us at .  For follow up as well as any medication refills, we recommend that you follow up with your primary care physician.  A registered nurse will reach out to you by phone within a few days after your discharge to answer any additional questions that you may have after going home.  However, at this time we provide for you here, the most important instructions / recommendations at discharge:     Notable Medication Adjustments -   None to home medications  Recommendation to continue multivitamin, folate 1 mg once per day, thiamine 100 mg once per day.  Testing Required after Discharge -   None  ** Please contact your PCP to request testing orders for any of the testing recommended here **  Important follow up information -   Follow up with primary care provider within 1 week.  Please follow up with AA group as soon as possible.  Other Instructions -   Please return to the ED if you notice symptoms of nausea, tremor, chest pain, chills, hallucinations, sweats or fevers.  Please review this entire after visit summary as additional general instructions including medication list, appointments, activity, diet, any pertinent wound care, and other additional recommendations from your care team that may be provided for you.      Sincerely,     Dequan Miller DO

## 2024-10-11 NOTE — UTILIZATION REVIEW
Initial Clinical Review    Admission: Date/Time/Statement:   Admission Orders (From admission, onward)       Ordered        10/10/24 1024  INPATIENT ADMISSION  Once                          Orders Placed This Encounter   Procedures    INPATIENT ADMISSION     Standing Status:   Standing     Number of Occurrences:   1     Order Specific Question:   Level of Care     Answer:   Level 1 Stepdown [13]     Order Specific Question:   Estimated length of stay     Answer:   More than 2 Midnights     Order Specific Question:   Certification     Answer:   I certify that inpatient services are medically necessary for this patient for a duration of greater than two midnights. See H&P and MD Progress Notes for additional information about the patient's course of treatment.     ED Arrival Information       Expected   -    Arrival   10/10/2024 07:24    Acuity   Emergent              Means of arrival   Walk-In    Escorted by   Self    Service   Critical Care/ICU    Admission type   Emergency              Arrival complaint   Alcohol withdrawal/Shaking&vomiting             Chief Complaint   Patient presents with    Withdrawal - Alcohol     Pt reports shaking/vomiting/tremors since 11pm lastnight. Last drink was beer around noon yesterday. Had 3 bottles of bourbon last week.        Initial Presentation: 59 y.o. male  to ED via walk in from home.    Admitted to inpatient with Dx: Alcohol Withdrawal/alcohol use disorder, severe dependence.  Presented to ED with alcohol withdrawal starting night prior to arrival with shaking, vomiting and tremors. lightheaded  Last drink of beer about noon yesterday as trying to stop withdrawal and was weaning alcohol use with beer and wine,  starting last week increased depression and consumed 3 bottles of [place]. Intake poor.   PMHx:alcohol withdrawal, GERD, Anxiety, hypertension, hyperlipidemia. On exam: scrapes and bruises on extremities after yard work.  Tachycardia.  hypertension.  Appears ill and  diaphoretic.   Mucous membranes dry.  Abdominal distention and tympanic.  Mg 1.1.  anion gap 15.   Ethanol < 10.  ED treatment:  1 liter IVF bolus, Zofran, Ativan IV, x 3.    Plan includes:  Cardio pulmonary monitoring,  CIWA, start folic acid and thiamine, continuous pulse oximetry .  Trend electrolytes and replete as needed to high normal.  Neuro checks every 4 hours.  .  Delirium precautions.   Trend LFTs.  Continue home PPI, Lisinopril and monitor BP, Lexapro resumed as ran out 5 days ago, .   Does not want to go to Detox    Date: 10/11/24   Day 2: overnight with increasing CIWA and tremulousness and given Phenobarb.  Today feels improved.  On exam hypertensive. Calm.  K 3.2.   continue CIWA, thiamine, folic acid.   Replete electrolytes.  Delirium precautions.       ED Treatment-Medication Administration from 10/10/2024 0724 to 10/10/2024 1153         Date/Time Order Dose Route Action     10/10/2024 0755 sodium chloride 0.9 % bolus 1,000 mL 1,000 mL Intravenous New Bag     10/10/2024 0750 ondansetron (ZOFRAN) injection 4 mg 4 mg Intravenous Given     10/10/2024 0752 LORazepam (ATIVAN) injection 1 mg 1 mg Intravenous Given     10/10/2024 0808 dextrose 5 % and sodium chloride 0.9 % infusion 125 mL/hr Intravenous New Bag     10/10/2024 0909 lisinopril (ZESTRIL) tablet 40 mg 40 mg Oral Given     10/10/2024 0827 LORazepam (ATIVAN) injection 1 mg 1 mg Intravenous Given     10/10/2024 1026 LORazepam (ATIVAN) injection 2 mg 2 mg Intravenous Given     10/10/2024 1129 multi-electrolyte (PLASMALYTE-A/ISOLYTE-S PH 7.4) IV solution 125 mL/hr Intravenous New Bag     10/10/2024 1134 enoxaparin (LOVENOX) subcutaneous injection 40 mg 40 mg Subcutaneous Given     10/10/2024 1128 thiamine tablet 100 mg 100 mg Oral Given     10/10/2024 1128 folic acid (FOLVITE) tablet 1 mg 1 mg Oral Given     10/10/2024 1128 multivitamin-minerals (CENTRUM) tablet 1 tablet 1 tablet Oral Given     10/10/2024 1128 pantoprazole (PROTONIX) EC tablet 40  mg 40 mg Oral Given            Scheduled Medications:  atorvastatin, 40 mg, Oral, Daily With Dinner  calcium gluconate, 2 g, Intravenous, Once 0723 10/11  chlorhexidine, 15 mL, Mouth/Throat, Q12H COLBY  enoxaparin, 40 mg, Subcutaneous, Daily  escitalopram, 20 mg, Oral, Daily  fish oil, 2,000 mg, Oral, Daily  folic acid, 1 mg, Oral, Daily  lisinopril, 40 mg, Oral, Daily  multivitamin-minerals, 1 tablet, Oral, Daily  pantoprazole, 40 mg, Oral, Early Morning  potassium chloride, 40 mEq, Oral, Once at 0723 and 0915 10/11  thiamine, 100 mg, Oral, Daily    magnesium sulfate 4 g/100 mL IVPB (premix) 4 g  Dose: 4 g  Freq: Once Route: IV  Last Dose: 4 g (10/10/24 1559)  Start: 10/10/24 1545 End: 10/10/24 1959  PHENobarbital injection 130 mg  Dose: 130 mg  Freq: Once Route: IV  Start: 10/10/24 1945 End: 10/10/24 1959  PHENobarbital injection 130 mg  Dose: 130 mg  Freq: Once Route: IV  Start: 10/10/24 1330 End: 10/10/24 1329      Continuous IV Infusions:  multi-electrolyte, 125 mL/hr, Intravenous, Continuous    dextrose 5 % and sodium chloride 0.9 % infusion  Rate: 125 mL/hr Dose: 125 mL/hr  Freq: Continuous Route: IV  Last Dose: Stopped (10/10/24 1132)  Start: 10/10/24 0745 End: 10/10/24 1055    PRN Meds: not used   hydrALAZINE, 10 mg, Intravenous, Q4H PRN      ED Triage Vitals   Temperature Pulse Respirations Blood Pressure SpO2 Pain Score   10/10/24 1000 10/10/24 0730 10/10/24 0730 10/10/24 0731 10/10/24 0730 10/10/24 1100   98.7 °F (37.1 °C) (!) 124 (!) 24 (!) 172/107 96 % No Pain     Weight (last 2 days)       Date/Time Weight    10/10/24 1527 103 (227.29)            Vital Signs (last 3 days)       Date/Time Temp Pulse Resp BP MAP (mmHg) SpO2 O2 Device Patient Position - Orthostatic VS Bean Coma Scale Score CIWA-Ar Total Pain    10/11/24 0700 -- -- -- -- -- -- -- -- -- 1 --    10/11/24 0630 -- 70 15 161/89 118 96 % -- -- -- -- --    10/11/24 0600 -- 66 17 -- -- 95 % -- -- -- -- --    10/11/24 0500 -- 58 14 -- -- 94 % --  -- -- -- --    10/11/24 0400 -- 58 14 -- -- 95 % None (Room air) -- 15 -- No Pain    10/11/24 0300 -- 65 14 -- -- 94 % -- -- -- -- --    10/11/24 0200 -- 64 15 -- -- 95 % -- -- -- -- --    10/11/24 0100 -- 69 16 -- -- 95 % -- -- -- -- --    10/11/24 0000 -- 79 27 -- -- 95 % None (Room air) -- 15 -- No Pain    10/10/24 2300 -- 67 14 -- -- 94 % -- -- -- -- --    10/10/24 2200 -- 66 15 -- -- 94 % -- -- -- -- --    10/10/24 2100 -- 85 21 -- -- 96 % -- -- -- -- --    10/10/24 2000 -- 86 21 -- -- 96 % None (Room air) -- 15 -- No Pain    10/10/24 1900 98.6 °F (37 °C) 79 21 151/86 112 95 % -- Lying -- 15 --    10/10/24 1600 -- -- -- -- -- -- -- -- 15 -- --    10/10/24 1500 98.6 °F (37 °C) 70 19 169/94 127 95 % -- Lying -- -- --    10/10/24 1300 -- 95 -- 158/88 -- -- -- -- -- 16 --    10/10/24 1210 -- 104 25 147/86 110 96 % -- -- -- -- --    10/10/24 1200 -- -- -- -- -- -- None (Room air) -- 15 -- --    10/10/24 1100 -- 94 20 139/88 110 94 % None (Room air) Lying -- -- No Pain    10/10/24 1030 -- 99 20 151/91 113 95 % None (Room air) Lying -- 5 --    10/10/24 1000 98.7 °F (37.1 °C) 95 20 155/86 111 95 % None (Room air) Lying -- -- --    10/10/24 0930 -- 98 20 156/87 115 94 % None (Room air) Lying -- -- --    10/10/24 0909 -- -- -- 151/88 -- -- -- -- -- -- --    10/10/24 0900 -- 100 20 151/88 113 92 % None (Room air) Lying -- -- --    10/10/24 0831 -- 101 22 146/95 -- 96 % -- Lying -- -- --    10/10/24 0745 -- -- -- -- -- -- -- -- 15 18 --    10/10/24 0731 -- -- -- 172/107 -- -- -- Sitting -- -- --    10/10/24 0730 -- 124 24 -- -- 96 % None (Room air) -- -- -- --           CIWA-Ar Score       Row Name 10/11/24 0700 10/10/24 1900 10/10/24 1300       CIWA-Ar    BP -- -- 158/88    Pulse -- -- 95    Nausea and Vomiting 0 1 1    Tactile Disturbances 0 0 0    Tremor 1 4 4    Auditory Disturbances 0 0 0    Paroxysmal Sweats 0 1 1    Visual Disturbances 0 1 2    Anxiety 0 7 4    Headache, Fullness in Head 0 0 0    Agitation 0 1 4     Orientation and Clouding of Sensorium 0 0 0    CIWA-Ar Total 1 15 16      Row Name 10/10/24 1030 10/10/24 0745          CIWA-Ar    Nausea and Vomiting 0 3     Tactile Disturbances 0 0     Tremor 3 5     Auditory Disturbances 0 0     Paroxysmal Sweats 1 5     Visual Disturbances 0 0     Anxiety 1 3     Headache, Fullness in Head 0 0     Agitation 0 2     Orientation and Clouding of Sensorium 0 0     CIWA-Ar Total 5 18                     Pertinent Labs/Diagnostic Test Results:   Radiology:  No orders to display     Cardiology:  ECG 12 lead   Final Result by Liang Maddox MD (10/10 1819)   Sinus tachycardia   Nonspecific ST and T wave abnormality   Abnormal ECG   When compared with ECG of 18-JUN-2024 17:25,   No significant change was found   Confirmed by Liang Maddox ([id number]) on 10/10/2024 6:19:29 PM        GI:  No orders to display       Results from last 7 days   Lab Units 10/11/24  0510 10/10/24  0756   WBC Thousand/uL 4.88 8.56   HEMOGLOBIN g/dL 11.9* 14.4   HEMATOCRIT % 34.1* 40.7   PLATELETS Thousands/uL 204 274   TOTAL NEUT ABS Thousands/µL 3.16 6.40     Results from last 7 days   Lab Units 10/11/24  0510 10/10/24  2103 10/10/24  0756   SODIUM mmol/L 138  --  143   POTASSIUM mmol/L 3.2*  --  4.0   CHLORIDE mmol/L 103  --  106   CO2 mmol/L 30  --  22   ANION GAP mmol/L 5  --  15*   BUN mg/dL 8  --  9   CREATININE mg/dL 0.77  --  0.87   EGFR ml/min/1.73sq m 100  --  95   CALCIUM mg/dL 7.9*  --  9.3   CALCIUM, IONIZED mmol/L 1.07*  --   --    MAGNESIUM mg/dL 2.2 2.5 1.1*   PHOSPHORUS mg/dL 3.2  --  2.7     Results from last 7 days   Lab Units 10/11/24  0510 10/10/24  0756   AST U/L 30 60*   ALT U/L 19 33   ALK PHOS U/L 68 93   TOTAL PROTEIN g/dL 5.9* 7.1   ALBUMIN g/dL 3.5 4.4   TOTAL BILIRUBIN mg/dL 1.13* 0.77     Results from last 7 days   Lab Units 10/10/24  0819   POC GLUCOSE mg/dl 105     Results from last 7 days   Lab Units 10/11/24  0510 10/10/24  0756   GLUCOSE RANDOM mg/dL 97 103     BETA-HYDROXYBUTYRATE    Date Value Ref Range Status   10/16/2023 2.9 (H) <0.6 mmol/L Final   10/01/2023 0.8 (H) <0.6 mmol/L Final      Results from last 7 days   Lab Units 10/10/24  0905   PH FANG  7.437*   PCO2 FANG mm Hg 31.8*   PO2 FANG mm Hg 72.6*   HCO3 FANG mmol/L 21.0*   BASE EXC FANG mmol/L -2.2   O2 CONTENT FANG ml/dL 19.0   O2 HGB, VENOUS % 94.4*     Results from last 7 days   Lab Units 10/10/24  0756   HS TNI 0HR ng/L 4     Results from last 7 days   Lab Units 10/10/24  0756   PROTIME seconds 13.9   INR  1.00   PTT seconds 27     Results from last 7 days   Lab Units 10/10/24  1005   LACTIC ACID mmol/L 1.1     Results from last 7 days   Lab Units 10/10/24  0756   LIPASE u/L 15     Results from last 7 days   Lab Units 10/10/24  0756   ETHANOL LVL mg/dL <10   ACETAMINOPHEN LVL ug/mL <2*   SALICYLATE LVL mg/dL <5         Past Medical History:   Diagnosis Date    Alcohol use disorder, severe, dependence (HCC) 04/02/2023    Alcohol withdrawal seizure (HCC)     Alcoholic ketoacidosis 10/16/2023    Anxiety     AR (allergic rhinitis)     Chronic alcoholic gastritis 04/02/2023    Depression     GERD (gastroesophageal reflux disease)     Hepatic steatosis 04/02/2023    Hyperlipidemia     Hypertension     IBS (irritable bowel syndrome)     Obesity     Rosacea     Vitamin B12 deficiency 02/14/2024    Vitamin D deficiency 02/14/2024     Present on Admission:   Hepatic steatosis   Chronic alcoholic gastritis   Hypertension   Obesity   Anxiety   Depression   Hyperlipidemia   GERD (gastroesophageal reflux disease)   Diarrhea   Alcohol use disorder, severe, dependence (HCC)   Alcohol withdrawal (HCC)      Admitting Diagnosis: Alcohol withdrawal (HCC) [F10.939]  Withdrawal symptoms, alcohol (HCC) [F10.939]  Age/Sex: 59 y.o. male    Network Utilization Review Department  ATTENTION: Please call with any questions or concerns to [phone] and carefully listen to the prompts so that you are directed to the right person. All voicemails are confidential.    For Discharge needs, contact Care Management DC Support Team at [phone] opt. [phone]  Send all requests for admission clinical reviews, approved or denied determinations and any other requests to dedicated fax number below belonging to the campus where the patient is receiving treatment. List of dedicated fax numbers for the Facilities:  FACILITY NAME UR FAX NUMBER   ADMISSION DENIALS (Administrative/Medical Necessity) [phone]   DISCHARGE SUPPORT TEAM (NETWORK) [phone]   PARENT CHILD HEALTH (Maternity/NICU/Pediatrics) [phone]   [facility] [phone]   [facility] [phone]   [facility] [phone]   [facility] [phone]   [facility] [phone]   [facility] [phone]   [facility] [phone]   [facility] [phone]   [facility] [phone]   [facility] [phone]   [facility] [phone]   [facility] [phone]

## 2024-10-11 NOTE — PROGRESS NOTES
Progress Note - Critical Care/ICU   Name: Diogo Travis 59 y.o. male I MRN: [id number]  Unit/Bed#: ICU 15 I Date of Admission: 10/10/2024   Date of Service: 10/11/2024 I Hospital Day: 1      Assessment & Plan  Alcohol withdrawal (HCC)  Patient presented to the ER with complaints of alcohol withdrawal.  He reports he recently relapsed due to a life stressor and has been drinking 3 bottles of bourbon a week.  His last bourbon drink with this 2 days ago, his last alcoholic drink (2 beers) was yesterday.  He reports that 2300 last night he started with severe shakes, diaphoretic, nauseous/vomiting/diarrhea and continued with full body shaking all night long which prompted him to come to the ER this morning. Denies hematemesis, is unsure what his stool color was. Reports decreased oral intake over the last 48 hour. In the ER patient was found to be very tremulous and was given a total of 2 mg Ativan with treatment. He has a history of DTs with alcohol seizures. Patient refusing to go to [place] for withdrawal management. Admit to stepdown 1 under critical care.   Give another 2 mg Ativan now due to tremors increasing  CIWA protocol  Started on folic acid and thiamine supplement  Monitor electrolytes and maintain high normal  Phenobarbital 130 mg was given overnight which improved symptoms.  Routine neurochecks  N.p.o. except for sips  Delirium precautions  CIWA 0 today. Potential to discharge home.  Alcohol use disorder, severe, dependence (HCC)  Pt reports recent relapse with ETOH use due to a life stressor.   Continue to encourage cessation of ETOH  CM consult for O/P needs   Further details in plan above  Hepatic steatosis  Monitor LFTs  Chronic alcoholic gastritis  Cont home PPI  Hypertension  Cont home lisinopril  Obesity  NPO for now  Encourage live style changes when appropriate  Anxiety  Continue home lexapro  Pt reports he hasn't had it for the last 5 days PTA due to ran out  Depression  Continue home  lexapro  Pt ran out of it 5 days PTA but wants to continue  Hyperlipidemia  Cont home statin  GERD (gastroesophageal reflux disease)  Hx of Alcoholic gastritis   Cont home PPI  Diarrhea    Disposition: Critical care    ICU Core Measures     A: Assess, Prevent, and Manage Pain Has pain been assessed? Yes  Need for changes to pain regimen? No   B: Both SAT/SAT  N/A   C: Choice of Sedation RASS Goal: N/A patient not on sedation  Need for changes to sedation or analgesia regimen? No   D: Delirium CAM-ICU: Negative   E: Early Mobility  Plan for early mobility? Yes   F: Family Engagement Plan for family engagement today? Yes         Prophylaxis:  VTE VTE covered by:  enoxaparin, Subcutaneous, 40 mg at 10/10/24 1134       Stress Ulcer  covered bypantoprazole (PROTONIX) 40 mg tablet [[id number]] (Long-Term Med), pantoprazole (PROTONIX) EC tablet 40 mg [[id number]]         24 Hour Events : Overnight, the patient had increasing CIWA score and tremulousness.  Was given phenobarbital 130 mg at 1945 on 10/10 and has slept peacefully all night.  Today, the patient states that he is feeling well enough to go home.  His CIWA score is 0 today.  He notes that he has resources including Alcoholics Anonymous and his [employer] for support at home.  Subjective   Review of Systems: See HPI for Review of Systems    Objective :                   Vitals I/O      Most Recent Min/Max in 24hrs   Temp 98.1 °F (36.7 °C) Temp  Min: 98.1 °F (36.7 °C)  Max: 98.7 °F (37.1 °C)   Pulse 70 Pulse  Min: 58  Max: 104   Resp 15 Resp  Min: 14  Max: 27   BP (!) 163/49 BP  Min: 139/88  Max: 169/94   O2 Sat 96 % SpO2  Min: 92 %  Max: 96 %      Intake/Output Summary (Last 24 hours) at 10/11/2024 0809  Last data filed at 10/11/2024 0601  Gross per 24 hour   Intake 2416.66 ml   Output --   Net 2416.66 ml       Diet Regular; Regular House    Invasive Monitoring           Physical Exam   Physical Exam  Vitals reviewed.   Eyes:      General: Vision grossly intact. No  scleral icterus.        Right eye: No discharge.         Left eye: No discharge.      Extraocular Movements: Extraocular movements intact.   Skin:     General: Skin is warm and dry.   HENT:      Head: Normocephalic and atraumatic.      Nose: No rhinorrhea or epistaxis.      Mouth/Throat:      Mouth: Mucous membranes are moist.   Neck:   no midline tenderness Cardiovascular:      Rate and Rhythm: Normal rate and regular rhythm.      Pulses: Normal pulses.      Heart sounds: Normal heart sounds.   Musculoskeletal:      Right lower leg: No edema.      Left lower leg: No edema.   Abdominal: General: Bowel sounds are normal.      Palpations: Abdomen is soft.   Constitutional:       General: He is not in acute distress.     Appearance: He is well-developed and well-nourished. He is not ill-appearing or toxic-appearing.   Pulmonary:      Effort: Pulmonary effort is normal.      Breath sounds: Normal breath sounds.   Neurological:      General: No focal deficit present.      Mental Status: He is alert and oriented to person, place and time. Mental status is at baseline. He is calm.      Sensory: Sensation is intact.      Motor: gross motor function is at baseline for patient. Strength full and intact in all extremities.          Diagnostic Studies        Lab Results: I have reviewed the following results:     Medications:  Scheduled PRN   atorvastatin, 40 mg, Daily With Dinner  calcium gluconate, 2 g, Once  chlorhexidine, 15 mL, Q12H COLBY  enoxaparin, 40 mg, Daily  escitalopram, 20 mg, Daily  fish oil, 2,000 mg, Daily  folic acid, 1 mg, Daily  lisinopril, 40 mg, Daily  multivitamin-minerals, 1 tablet, Daily  pantoprazole, 40 mg, Early Morning  potassium chloride, 40 mEq, Once  thiamine, 100 mg, Daily      hydrALAZINE, 10 mg, Q4H PRN       Continuous    multi-electrolyte, 125 mL/hr, Last Rate: 125 mL/hr (10/11/24 0723)         Labs:   CBC    Recent Labs     10/10/24  0756 10/11/24  0510   WBC 8.56 4.88   HGB 14.4 11.9*   HCT  40.7 34.1*    204     BMP    Recent Labs     10/10/24  0756 10/11/24  0510   SODIUM 143 138   K 4.0 3.2*    103   CO2 22 30   AGAP 15* 5   BUN 9 8   CREATININE 0.87 0.77   CALCIUM 9.3 7.9*       Coags    Recent Labs     10/10/24  0756   INR 1.00   PTT 27        Additional Electrolytes  Recent Labs     10/10/24  0756 10/10/24  2103 10/11/24  0510   MG 1.1* 2.5 2.2   PHOS 2.7  --  3.2   CAIONIZED  --   --  1.07*          Blood Gas    No recent results  Recent Labs     10/10/24  0905   PHVEN 7.437*   VAL4AIH 31.8*   PO2VEN 72.6*   GSI7YVH 21.0*   BEVEN -2.2   A9PNBHO 94.4*    LFTs  Recent Labs     10/10/24  0756 10/11/24  0510   ALT 33 19   AST 60* 30   ALKPHOS 93 68   ALB 4.4 3.5   TBILI 0.77 1.13*       Infectious  No recent results  Glucose  Recent Labs     10/10/24  0756 10/11/24  0510   GLUC 103 97

## 2024-10-11 NOTE — ASSESSMENT & PLAN NOTE
Patient presented to the ER with complaints of alcohol withdrawal.  He reports he recently relapsed due to a life stressor and has been drinking 3 bottles of bourbon a week.  His last bourbon drink with this 2 days ago, his last alcoholic drink (2 beers) was yesterday.  He reports that 2300 last night he started with severe shakes, diaphoretic, nauseous/vomiting/diarrhea and continued with full body shaking all night long which prompted him to come to the ER this morning. Denies hematemesis, is unsure what his stool color was. Reports decreased oral intake over the last 48 hour. In the ER patient was found to be very tremulous and was given a total of 2 mg Ativan with treatment. He has a history of DTs with alcohol seizures. Patient refusing to go to [place] for withdrawal management. Admit to stepdown 1 under critical care.   Give another 2 mg Ativan now due to tremors increasing  CIWA protocol  Started on folic acid and thiamine supplement  Monitor electrolytes and maintain high normal  Phenobarbital 130 mg was given overnight which improved symptoms.  Routine neurochecks  N.p.o. except for sips  Delirium precautions  CIWA 0 today. Potential to discharge home.

## 2024-10-11 NOTE — PROGRESS NOTES
Critical Care Attending Note; Eber Rios   Note Date: 10/11/24  Note Time: 10:33 AM    Patient: Diogo Travis  Age, : 59 y.o., 1965 MRN: [id number] Code Status: Level 1 - Full Code Patient Location: ICU 15/ICU 15   Hospital LOS:1 days  ]   Patient seen and examined, medical record reviewed, discussed with house staff and nursing staff.     HPI   CC: EtOH withdrawal   59M with a PMH of hepatic steatosis, EtOH Abuse(seizure hx?), HTN and HLD who present in EtOH withdrawal    Main ICU Plans:       #Neuro  EtOH withdrawal - 10/9 vs 10/7 Last drink - Patient with continued symptoms of EtOH withdrawal, recommend continued monitoring   - CIWA Protocol  - Thiamine/Folate  - Phenobarbital     #Renal  Hypomagnesemia/Kalemia  - Replete    #DVT/GI ppx  Lovenox    #Lines/Tubes/Drains:   Invasive Devices       Peripheral Intravenous Line  Duration             Peripheral IV 10/10/24 Distal;Dorsal (posterior);Right Forearm 1 day                    #Nutrition:   Diet Regular; Regular House        #Code Status:   Level 1 - Full Code    #Dispo:   Floors        Eber Rios MD  Pulmonary, Critical Care    Critical care time, excluding procedures, teaching, family meetings, and excludes any prior time recorded by the AP/resident, 35 minutes. Upon my evaluation, this patient has a high probability of imminent or life-threatening deterioration due to above problems which required my direct attention, intervention, and personal management.   Impression/Active Problems:    EtOH Withdrawal       Physical Exam:     Vital Signs:   Weight: 103 kg (227 lb 4.7 oz)  IBW: Ideal body weight: 68.4 kg (150 lb 12.7 oz)  Adjusted ideal body weight: 82.3 kg (181 lb 6.3 oz)  Temp:  [98.1 °F (36.7 °C)-98.6 °F (37 °C)] 98.1 °F (36.7 °C)  HR:  [] 66  BP: (139-169)/(49-94) 165/93  Resp:  [14-34] 34  SpO2:  [94 %-96 %] 96 %  O2 Device: None (Room air)  General: NAD  Neuro: AxO 3, + Tremor  Heart: RRR  Lungs: CTAB  Abdomen: Soft NT    Extremities: Min edema                Ventilator Settings:         Results from last 7 days   Lab Units 10/10/24  0905   PO2 FANG mm Hg 72.6*     Radiologic Images Reviewed:    CT Abd  IMPRESSION:     1. Stomach is largely collapsed, assessment limited. Otherwise, no acute intra-abdominal abnormality.     2. Enlarged fatty liver.  Input / Output:     Intake/Output Summary (Last 24 hours) at 10/11/2024 1033  Last data filed at 10/11/2024 0901  Gross per 24 hour   Intake 2891.66 ml   Output --   Net 2891.66 ml            Infusions:  multi-electrolyte, 125 mL/hr, Last Rate: 125 mL/hr (10/11/24 0723)      Scheduled Medications:  Current Facility-Administered Medications   Medication Dose Route Frequency Provider Last Rate    atorvastatin  40 mg Oral Daily With Dinner Erna Rodriguez, MARCK      chlorhexidine  15 mL Mouth/Throat Q12H COLBY Erna Rodriguez, MARCK      enoxaparin  40 mg Subcutaneous Daily Erna Rodriguez, MARCK      escitalopram  20 mg Oral Daily Erna Rodriguez, MARCK      fish oil  2,000 mg Oral Daily Erna Rodriguez, DASIANP      folic acid  1 mg Oral Daily Erna Rodriguez, MARCK      hydrALAZINE  10 mg Intravenous Q4H PRN Erna Rodriguez, MARCK      lisinopril  40 mg Oral Daily MARCK Neal      multi-electrolyte  125 mL/hr Intravenous Continuous DASIA NealNP 125 mL/hr (10/11/24 0723)    multivitamin-minerals  1 tablet Oral Daily Erna Rodriguez, MARCK      pantoprazole  40 mg Oral Early Morning Erna Rodriguez, MARCK      thiamine  100 mg Oral Daily Erna Rodriguez, DASIANP         PRN Medications:    hydrALAZINE    Labs Reviewed:  Results from last 7 days   Lab Units 10/11/24  0510 10/10/24  0756   WBC Thousand/uL 4.88 8.56   HEMOGLOBIN g/dL 11.9* 14.4   HEMATOCRIT % 34.1* 40.7   PLATELETS Thousands/uL 204 274      Results from last 7 days   Lab Units 10/11/24  0510 10/10/24  2103 10/10/24  0756   SODIUM mmol/L 138  --  143   CO2 mmol/L 30  --  22   BUN mg/dL 8  --  9  "  CALCIUM mg/dL 7.9*  --  9.3   MAGNESIUM mg/dL 2.2 2.5 1.1*   PHOSPHORUS mg/dL 3.2  --  2.7         Invalid input(s): \"ASTSGOT\", \"ALTSGPT\"LABRCNTIP@ ,alkphos:3,tbilirubin:3,dbilirubin:3)@  Results from last 7 days   Lab Units 10/10/24  0756   INR  1.00           Invalid input(s): \"TROPT\", \"PBNP\"             I have personally seen and examined the patient on (10/11/24 between [id number]). I discussed the patient with the AP/resident including, but not limited to, verifying findings; reviewing labs and x-rays; discussing with consultants; developing the plan of care with the bedside nurse; and discussing treatment plan with patient or surrogate.  I have reviewed the note and assessment performed by the AP/resident and agree with the AP/resident’s documented findings and plan of care with the above additions/exceptions. Please see my comments for details and adjustments.                 "

## 2024-10-11 NOTE — PLAN OF CARE
Problem: PAIN - ADULT  Goal: Verbalizes/displays adequate comfort level or baseline comfort level  Description: Interventions:  - Encourage patient to monitor pain and request assistance  - Assess pain using appropriate pain scale  - Administer analgesics based on type and severity of pain and evaluate response  - Implement non-pharmacological measures as appropriate and evaluate response  - Consider cultural and social influences on pain and pain management  - Notify physician/advanced practitioner if interventions unsuccessful or patient reports new pain  Outcome: Progressing     Problem: INFECTION - ADULT  Goal: Absence or prevention of progression during hospitalization  Description: INTERVENTIONS:  - Assess and monitor for signs and symptoms of infection  - Monitor lab/diagnostic results  - Monitor all insertion sites, i.e. indwelling lines, tubes, and drains  - Monitor endotracheal if appropriate and nasal secretions for changes in amount and color  - [place] appropriate cooling/warming therapies per order  - Administer medications as ordered  - Instruct and encourage patient and family to use good hand hygiene technique  - Identify and instruct in appropriate isolation precautions for identified infection/condition  Outcome: Progressing  Goal: Absence of fever/infection during neutropenic period  Description: INTERVENTIONS:  - Monitor WBC    Outcome: Progressing     Problem: SAFETY ADULT  Goal: Patient will remain free of falls  Description: INTERVENTIONS:  - Educate patient/family on patient safety including physical limitations  - Instruct patient to call for assistance with activity   - Consult OT/PT to assist with strengthening/mobility   - Keep Call bell within reach  - Keep bed low and locked with side rails adjusted as appropriate  - Keep care items and personal belongings within reach  - Initiate and maintain comfort rounds  - Make Fall Risk Sign visible to staff  - Offer Toileting every 2 Hours,  in advance of need  - Initiate/Maintain bed alarm  - Obtain necessary fall risk management equipment:   - Apply yellow socks and bracelet for high fall risk patients  - Consider moving patient to room near nurses station  Outcome: Progressing  Goal: Maintain or return to baseline ADL function  Description: INTERVENTIONS:  -  Assess patient's ability to carry out ADLs; assess patient's baseline for ADL function and identify physical deficits which impact ability to perform ADLs (bathing, care of mouth/teeth, toileting, grooming, dressing, etc.)  - Assess/evaluate cause of self-care deficits   - Assess range of motion  - Assess patient's mobility; develop plan if impaired  - Assess patient's need for assistive devices and provide as appropriate  - Encourage maximum independence but intervene and supervise when necessary  - Involve family in performance of ADLs  - Assess for home care needs following discharge   - Consider OT consult to assist with ADL evaluation and planning for discharge  - Provide patient education as appropriate  Outcome: Progressing  Goal: Maintains/Returns to pre admission functional level  Description: INTERVENTIONS:  - Perform AM-PAC 6 Click Basic Mobility/ Daily Activity assessment daily.  - Set and communicate daily mobility goal to care team and patient/family/caregiver.   - Collaborate with rehabilitation services on mobility goals if consulted  - Perform Range of Motion 12 times a day.  - Reposition patient every 2 hours.  - Dangle patient 3 times a day  - Stand patient 3 times a day  - Ambulate patient 3 times a day  - Out of bed to chair 3 times a day   - Out of bed for meals 3 times a day  - Out of bed for toileting  - Record patient progress and toleration of activity level   Outcome: Progressing     Problem: DISCHARGE PLANNING  Goal: Discharge to home or other facility with appropriate resources  Description: INTERVENTIONS:  - Identify barriers to discharge w/patient and caregiver  -  Arrange for needed discharge resources and transportation as appropriate  - Identify discharge learning needs (meds, wound care, etc.)  - Arrange for interpretive services to assist at discharge as needed  - Refer to Case Management Department for coordinating discharge planning if the patient needs post-hospital services based on physician/advanced practitioner order or complex needs related to functional status, cognitive ability, or social support system  Outcome: Progressing     Problem: Knowledge Deficit  Goal: Patient/family/caregiver demonstrates understanding of disease process, treatment plan, medications, and discharge instructions  Description: Complete learning assessment and assess knowledge base.  Interventions:  - Provide teaching at level of understanding  - Provide teaching via preferred learning methods  Outcome: Progressing

## 2024-10-11 NOTE — CASE MANAGEMENT
Case Management Progress Note    Patient name Diogo Travis  Location ICU 15/ICU 15 MRN [id number]  : 1965 Date 10/11/2024       LOS (days): 1  Geometric Mean LOS (GMLOS) (days):   Days to GMLOS:        OBJECTIVE:        Current admission status: Inpatient  Preferred Pharmacy:   [facility] [address] [address]  Phone: [phone] Fax: [phone]    Primary Care Provider: Enid Altman DO    Primary Insurance: [employer]  Secondary Insurance:     PROGRESS NOTE:      CATCH f/u with pt. Currently not interested in IP treatment. OP follow up.

## 2024-10-11 NOTE — PROGRESS NOTES
Progress Note - Critical Care/ICU   Name: Diogo Travis 59 y.o. male I MRN: [id number]  Unit/Bed#: ICU 15 I Date of Admission: 10/10/2024   Date of Service: 10/11/2024 I Hospital Day: 1       Critical Care Interval Transfer Note:    Brief Hospital Summary: He is a 59 Y M pmhx alcohol dependance, HTN, HLD, Depression, Anxiety, hepatic steatosis and EtOH gastritis who presents alcohol withrawal with recent relapse due to life stressors. He has been drinking 3 bottles of bourbon a week with his last alcohol drink on 10/09. At 2300 on 10/09, he started having severe shakiness, diaphoretic, nausea vomiting diarrhea and continues with full body shaking all night long which prompted him to come to the ER on morning of 10/10. In the ED, noted to be very tremulous and tachycardic to 120s, he given a total of 2 mg ativan IV and 1 L IVFs. Initially hypertensive to 170s but hadn't had his home lisinopril prior to coming in. BP improved after ativan and lisinopril. Pt with Hx of DTs with withdraw seizures. He has received total of 2X doses of phenobarbitol 130 mg with the most recent at 1945 on 10/10. Today, he has a very mild tremor with SIWA of 2. He is resting comfortably in a chair. He has good support at home.     Barriers to discharge:   Monitoring for least 48 hours after last drink with preferentially to 4 to 5 days.  CIWA protocol     Consults: IP CONSULT TO ED CRISIS WORKER  IP CONSULT TO CASE MANAGEMENT    Recommended to review admission imaging for incidental findings and document in discharge navigator: Chart reviewed, no known incidental findings noted at this time.      Discharge Plan: Anticipate discharge in 24-48 hrs to home.       Patient seen and evaluated by Critical Care today and deemed to be appropriate for transfer to Med Surg. Spoke to Dr. Weston from Hospitalist to accept transfer. Critical care can be contacted via SecureChat with any questions or concerns.

## 2024-10-14 ENCOUNTER — PATIENT OUTREACH (OUTPATIENT)
Dept: CASE MANAGEMENT | Facility: OTHER | Age: 59
End: 2024-10-14

## 2024-10-14 DIAGNOSIS — Z71.89 COMPLEX CARE COORDINATION: Primary | ICD-10-CM

## 2024-10-14 NOTE — UTILIZATION REVIEW
NOTIFICATION OF ADMISSION DISCHARGE   This is a Notification of Discharge from [facility]. Please be advised that this patient has been discharge from our facility. Below you will find the admission and discharge date and time including the patient’s disposition.   UTILIZATION REVIEW CONTACT:  Alyce Vieira  Utilization   Network Utilization Review Department  Phone: [phone] x carefully listen to the prompts. All voicemails are confidential.  Email: [email or web address]     ADMISSION INFORMATION  PRESENTATION DATE: 10/10/2024  7:26 AM  OBERVATION ADMISSION DATE: N/A  INPATIENT ADMISSION DATE: 10/10/24 10:24 AM   DISCHARGE DATE: 10/11/2024 12:51 PM   DISPOSITION:Left against medical advice or discontinued care    Network Utilization Review Department  ATTENTION: Please call with any questions or concerns to [phone] and carefully listen to the prompts so that you are directed to the right person. All voicemails are confidential.   For Discharge needs, contact Care Management DC Support Team at [phone] opt. 2  Send all requests for admission clinical reviews, approved or denied determinations and any other requests to dedicated fax number below belonging to the campus where the patient is receiving treatment. List of dedicated fax numbers for the Facilities:  FACILITY NAME UR FAX NUMBER   ADMISSION DENIALS (Administrative/Medical Necessity) [phone]   DISCHARGE SUPPORT TEAM ([facility]) [phone]   PARENT CHILD HEALTH (Maternity/NICU/Pediatrics) [phone]   [facility] [phone]   [facility] [phone]   [facility] [phone]   [facility] [phone]   [facility] [phone]   [facility] [phone]   [facility] [phone]   [facility]  [facility] [phone]   [facility] [phone]   Pending sale to [facility] [phone]   [facility] [phone]   [facility] [phone]          none

## 2024-10-14 NOTE — PROGRESS NOTES
In basket message received with a referral from the HRR report. Chart reviewed.  Patient was admitted to [facility] with alcohol withdrawal. He signed out AMA on 10/11.  Message left on patient's voicemail with my contact information.

## 2024-10-15 ENCOUNTER — PATIENT OUTREACH (OUTPATIENT)
Dept: CASE MANAGEMENT | Facility: OTHER | Age: 59
End: 2024-10-15

## 2024-10-15 NOTE — PROGRESS NOTES
2nd attempt to reach patient via telephone. Left message on machine with RN RESHMA Han's contact information.   Will send unable to reach letter via [employer]ified[employer] message.

## 2024-10-15 NOTE — LETTER
Date: 10/15/24    Dear Diogo Travis,   My name is Glenis, part of a team of  RN Care Managers.  I have not been able to reach you and would like to set a time that I can talk with you over the phone or in-person.  My work is to help patients that have complex medical conditions get the care they need. This includes patients who may have been in the hospital or emergency room.    I have enclosed information for you. Please call me with any questions you may have. I look forward to speaking with you.  Sincerely,  Glenis Aquino RN  [phone]  Outpatient Care Manager

## 2024-11-05 ENCOUNTER — PATIENT OUTREACH (OUTPATIENT)
Dept: CASE MANAGEMENT | Facility: OTHER | Age: 59
End: 2024-11-05

## 2024-11-11 ENCOUNTER — APPOINTMENT (EMERGENCY)
Dept: RADIOLOGY | Facility: HOSPITAL | Age: 59
End: 2024-11-11
Payer: COMMERCIAL

## 2024-11-11 ENCOUNTER — HOSPITAL ENCOUNTER (INPATIENT)
Facility: HOSPITAL | Age: 59
LOS: 2 days | Discharge: HOME | End: 2024-11-13
Attending: EMERGENCY MEDICINE | Admitting: INTERNAL MEDICINE
Payer: COMMERCIAL

## 2024-11-11 DIAGNOSIS — R79.89 ELEVATED LACTIC ACID LEVEL: ICD-10-CM

## 2024-11-11 DIAGNOSIS — F33.1 MAJOR DEPRESSIVE DISORDER, RECURRENT, MODERATE (HCC): ICD-10-CM

## 2024-11-11 DIAGNOSIS — F32.1 CURRENT MODERATE EPISODE OF MAJOR DEPRESSIVE DISORDER, UNSPECIFIED WHETHER RECURRENT (HCC): ICD-10-CM

## 2024-11-11 DIAGNOSIS — R11.2 NAUSEA & VOMITING: ICD-10-CM

## 2024-11-11 DIAGNOSIS — F10.939 ALCOHOL WITHDRAWAL (HCC): Primary | ICD-10-CM

## 2024-11-11 DIAGNOSIS — F41.9 ANXIETY: ICD-10-CM

## 2024-11-11 DIAGNOSIS — E83.42 HYPOMAGNESEMIA: ICD-10-CM

## 2024-11-11 DIAGNOSIS — F10.20 ALCOHOL USE DISORDER, SEVERE, DEPENDENCE (HCC): ICD-10-CM

## 2024-11-11 DIAGNOSIS — R19.7 DIARRHEA: ICD-10-CM

## 2024-11-11 PROBLEM — F10.930 ALCOHOL WITHDRAWAL SYNDROME WITHOUT COMPLICATION (HCC): Status: ACTIVE | Noted: 2024-10-10

## 2024-11-11 PROBLEM — R65.10 SIRS (SYSTEMIC INFLAMMATORY RESPONSE SYNDROME) (HCC): Status: ACTIVE | Noted: 2024-11-11

## 2024-11-11 LAB
ALBUMIN SERPL BCG-MCNC: 5.1 G/DL (ref 3.5–5)
ALP SERPL-CCNC: 132 U/L (ref 34–104)
ALT SERPL W P-5'-P-CCNC: 48 U/L (ref 7–52)
ANION GAP SERPL CALCULATED.3IONS-SCNC: 16 MMOL/L (ref 4–13)
APAP SERPL-MCNC: <2 UG/ML (ref 10–20)
AST SERPL W P-5'-P-CCNC: 99 U/L (ref 13–39)
BASOPHILS # BLD AUTO: 0.05 THOUSANDS/ÂΜL (ref 0–0.1)
BASOPHILS NFR BLD AUTO: 0 % (ref 0–1)
BILIRUB SERPL-MCNC: 1.21 MG/DL (ref 0.2–1)
BUN SERPL-MCNC: 10 MG/DL (ref 5–25)
CALCIUM SERPL-MCNC: 10 MG/DL (ref 8.4–10.2)
CARDIAC TROPONIN I PNL SERPL HS: 3 NG/L
CHLORIDE SERPL-SCNC: 101 MMOL/L (ref 96–108)
CO2 SERPL-SCNC: 22 MMOL/L (ref 21–32)
CREAT SERPL-MCNC: 1.18 MG/DL (ref 0.6–1.3)
EOSINOPHIL # BLD AUTO: 0.03 THOUSAND/ÂΜL (ref 0–0.61)
EOSINOPHIL NFR BLD AUTO: 0 % (ref 0–6)
ERYTHROCYTE [DISTWIDTH] IN BLOOD BY AUTOMATED COUNT: 12.1 % (ref 11.6–15.1)
ETHANOL SERPL-MCNC: <10 MG/DL
GFR SERPL CREATININE-BSD FRML MDRD: 67 ML/MIN/1.73SQ M
GLUCOSE SERPL-MCNC: 119 MG/DL (ref 65–140)
HCT VFR BLD AUTO: 46.3 % (ref 36.5–49.3)
HGB BLD-MCNC: 16.1 G/DL (ref 12–17)
IMM GRANULOCYTES # BLD AUTO: 0.06 THOUSAND/UL (ref 0–0.2)
IMM GRANULOCYTES NFR BLD AUTO: 1 % (ref 0–2)
LACTATE SERPL-SCNC: 0.8 MMOL/L (ref 0.5–2)
LACTATE SERPL-SCNC: 2.8 MMOL/L (ref 0.5–2)
LIPASE SERPL-CCNC: 27 U/L (ref 11–82)
LYMPHOCYTES # BLD AUTO: 1.39 THOUSANDS/ÂΜL (ref 0.6–4.47)
LYMPHOCYTES NFR BLD AUTO: 11 % (ref 14–44)
MAGNESIUM SERPL-MCNC: 1.3 MG/DL (ref 1.9–2.7)
MCH RBC QN AUTO: 32.4 PG (ref 26.8–34.3)
MCHC RBC AUTO-ENTMCNC: 34.8 G/DL (ref 31.4–37.4)
MCV RBC AUTO: 93 FL (ref 82–98)
MONOCYTES # BLD AUTO: 0.79 THOUSAND/ÂΜL (ref 0.17–1.22)
MONOCYTES NFR BLD AUTO: 7 % (ref 4–12)
NEUTROPHILS # BLD AUTO: 9.82 THOUSANDS/ÂΜL (ref 1.85–7.62)
NEUTS SEG NFR BLD AUTO: 81 % (ref 43–75)
NRBC BLD AUTO-RTO: 0 /100 WBCS
PLATELET # BLD AUTO: 125 THOUSANDS/UL (ref 149–390)
PLATELET # BLD AUTO: 194 THOUSANDS/UL (ref 149–390)
PMV BLD AUTO: 8.5 FL (ref 8.9–12.7)
PMV BLD AUTO: 8.5 FL (ref 8.9–12.7)
POTASSIUM SERPL-SCNC: 4.8 MMOL/L (ref 3.5–5.3)
PROT SERPL-MCNC: 8.3 G/DL (ref 6.4–8.4)
RBC # BLD AUTO: 4.97 MILLION/UL (ref 3.88–5.62)
SALICYLATES SERPL-MCNC: <5 MG/DL (ref 3–20)
SODIUM SERPL-SCNC: 139 MMOL/L (ref 135–147)
WBC # BLD AUTO: 12.14 THOUSAND/UL (ref 4.31–10.16)

## 2024-11-11 PROCEDURE — 80179 DRUG ASSAY SALICYLATE: CPT | Performed by: EMERGENCY MEDICINE

## 2024-11-11 PROCEDURE — 80053 COMPREHEN METABOLIC PANEL: CPT | Performed by: EMERGENCY MEDICINE

## 2024-11-11 PROCEDURE — 99291 CRITICAL CARE FIRST HOUR: CPT | Performed by: EMERGENCY MEDICINE

## 2024-11-11 PROCEDURE — 82077 ASSAY SPEC XCP UR&BREATH IA: CPT | Performed by: EMERGENCY MEDICINE

## 2024-11-11 PROCEDURE — 83605 ASSAY OF LACTIC ACID: CPT | Performed by: EMERGENCY MEDICINE

## 2024-11-11 PROCEDURE — 99447 NTRPROF PH1/NTRNET/EHR 11-20: CPT | Performed by: EMERGENCY MEDICINE

## 2024-11-11 PROCEDURE — 99223 1ST HOSP IP/OBS HIGH 75: CPT | Performed by: INTERNAL MEDICINE

## 2024-11-11 PROCEDURE — 96375 TX/PRO/DX INJ NEW DRUG ADDON: CPT

## 2024-11-11 PROCEDURE — 93005 ELECTROCARDIOGRAM TRACING: CPT

## 2024-11-11 PROCEDURE — 99284 EMERGENCY DEPT VISIT MOD MDM: CPT

## 2024-11-11 PROCEDURE — 84484 ASSAY OF TROPONIN QUANT: CPT | Performed by: EMERGENCY MEDICINE

## 2024-11-11 PROCEDURE — 96365 THER/PROPH/DIAG IV INF INIT: CPT

## 2024-11-11 PROCEDURE — 83690 ASSAY OF LIPASE: CPT | Performed by: EMERGENCY MEDICINE

## 2024-11-11 PROCEDURE — 85025 COMPLETE CBC W/AUTO DIFF WBC: CPT | Performed by: EMERGENCY MEDICINE

## 2024-11-11 PROCEDURE — 96366 THER/PROPH/DIAG IV INF ADDON: CPT

## 2024-11-11 PROCEDURE — 71045 X-RAY EXAM CHEST 1 VIEW: CPT

## 2024-11-11 PROCEDURE — 85049 AUTOMATED PLATELET COUNT: CPT

## 2024-11-11 PROCEDURE — 80143 DRUG ASSAY ACETAMINOPHEN: CPT | Performed by: EMERGENCY MEDICINE

## 2024-11-11 PROCEDURE — 83735 ASSAY OF MAGNESIUM: CPT | Performed by: EMERGENCY MEDICINE

## 2024-11-11 PROCEDURE — 36415 COLL VENOUS BLD VENIPUNCTURE: CPT | Performed by: EMERGENCY MEDICINE

## 2024-11-11 RX ORDER — FOLIC ACID 1 MG/1
1 TABLET ORAL DAILY
Status: DISCONTINUED | OUTPATIENT
Start: 2024-11-12 | End: 2024-11-11

## 2024-11-11 RX ORDER — ACETAMINOPHEN 325 MG/1
650 TABLET ORAL EVERY 6 HOURS PRN
Status: DISCONTINUED | OUTPATIENT
Start: 2024-11-11 | End: 2024-11-13 | Stop reason: HOSPADM

## 2024-11-11 RX ORDER — SODIUM CHLORIDE, SODIUM GLUCONATE, SODIUM ACETATE, POTASSIUM CHLORIDE, MAGNESIUM CHLORIDE, SODIUM PHOSPHATE, DIBASIC, AND POTASSIUM PHOSPHATE .53; .5; .37; .037; .03; .012; .00082 G/100ML; G/100ML; G/100ML; G/100ML; G/100ML; G/100ML; G/100ML
125 INJECTION, SOLUTION INTRAVENOUS CONTINUOUS
Status: DISCONTINUED | OUTPATIENT
Start: 2024-11-11 | End: 2024-11-12

## 2024-11-11 RX ORDER — LISINOPRIL 20 MG/1
40 TABLET ORAL DAILY
Status: DISCONTINUED | OUTPATIENT
Start: 2024-11-12 | End: 2024-11-13 | Stop reason: HOSPADM

## 2024-11-11 RX ORDER — ESCITALOPRAM OXALATE 20 MG/1
20 TABLET ORAL DAILY
Status: DISCONTINUED | OUTPATIENT
Start: 2024-11-12 | End: 2024-11-13 | Stop reason: HOSPADM

## 2024-11-11 RX ORDER — HYDROXYZINE HYDROCHLORIDE 25 MG/1
25 TABLET, FILM COATED ORAL EVERY 6 HOURS PRN
Status: DISCONTINUED | OUTPATIENT
Start: 2024-11-11 | End: 2024-11-13 | Stop reason: HOSPADM

## 2024-11-11 RX ORDER — MAGNESIUM SULFATE HEPTAHYDRATE 40 MG/ML
2 INJECTION, SOLUTION INTRAVENOUS ONCE
Status: COMPLETED | OUTPATIENT
Start: 2024-11-11 | End: 2024-11-11

## 2024-11-11 RX ORDER — ERGOCALCIFEROL 1.25 MG/1
50000 CAPSULE, LIQUID FILLED ORAL WEEKLY
Status: DISCONTINUED | OUTPATIENT
Start: 2024-11-11 | End: 2024-11-13 | Stop reason: HOSPADM

## 2024-11-11 RX ORDER — ONDANSETRON 2 MG/ML
4 INJECTION INTRAMUSCULAR; INTRAVENOUS ONCE
Status: COMPLETED | OUTPATIENT
Start: 2024-11-11 | End: 2024-11-11

## 2024-11-11 RX ORDER — CHLORAL HYDRATE 500 MG
2000 CAPSULE ORAL DAILY
Status: DISCONTINUED | OUTPATIENT
Start: 2024-11-12 | End: 2024-11-13 | Stop reason: HOSPADM

## 2024-11-11 RX ORDER — LORAZEPAM 2 MG/ML
1 INJECTION INTRAMUSCULAR ONCE
Status: COMPLETED | OUTPATIENT
Start: 2024-11-11 | End: 2024-11-11

## 2024-11-11 RX ORDER — ATORVASTATIN CALCIUM 40 MG/1
40 TABLET, FILM COATED ORAL
Status: DISCONTINUED | OUTPATIENT
Start: 2024-11-11 | End: 2024-11-13 | Stop reason: HOSPADM

## 2024-11-11 RX ORDER — ONDANSETRON 2 MG/ML
4 INJECTION INTRAMUSCULAR; INTRAVENOUS EVERY 6 HOURS PRN
Status: DISCONTINUED | OUTPATIENT
Start: 2024-11-11 | End: 2024-11-13 | Stop reason: HOSPADM

## 2024-11-11 RX ORDER — LORAZEPAM 2 MG/ML
4 INJECTION INTRAMUSCULAR ONCE
Status: COMPLETED | OUTPATIENT
Start: 2024-11-11 | End: 2024-11-11

## 2024-11-11 RX ORDER — PANTOPRAZOLE SODIUM 40 MG/1
40 TABLET, DELAYED RELEASE ORAL
Status: DISCONTINUED | OUTPATIENT
Start: 2024-11-12 | End: 2024-11-11

## 2024-11-11 RX ORDER — ENOXAPARIN SODIUM 100 MG/ML
40 INJECTION SUBCUTANEOUS DAILY
Status: DISCONTINUED | OUTPATIENT
Start: 2024-11-12 | End: 2024-11-13 | Stop reason: HOSPADM

## 2024-11-11 RX ORDER — LABETALOL HYDROCHLORIDE 5 MG/ML
10 INJECTION, SOLUTION INTRAVENOUS EVERY 6 HOURS PRN
Status: DISCONTINUED | OUTPATIENT
Start: 2024-11-11 | End: 2024-11-13 | Stop reason: HOSPADM

## 2024-11-11 RX ORDER — PANTOPRAZOLE SODIUM 40 MG/10ML
40 INJECTION, POWDER, LYOPHILIZED, FOR SOLUTION INTRAVENOUS
Status: DISCONTINUED | OUTPATIENT
Start: 2024-11-12 | End: 2024-11-13 | Stop reason: HOSPADM

## 2024-11-11 RX ADMIN — ERGOCALCIFEROL 50000 UNITS: 1.25 CAPSULE, LIQUID FILLED ORAL at 20:13

## 2024-11-11 RX ADMIN — ACETAMINOPHEN 650 MG: 325 TABLET, FILM COATED ORAL at 20:32

## 2024-11-11 RX ADMIN — SODIUM CHLORIDE 1000 ML: 0.9 INJECTION, SOLUTION INTRAVENOUS at 15:24

## 2024-11-11 RX ADMIN — SODIUM CHLORIDE 1000 ML: 0.9 INJECTION, SOLUTION INTRAVENOUS at 14:24

## 2024-11-11 RX ADMIN — LORAZEPAM 4 MG: 2 INJECTION INTRAMUSCULAR; INTRAVENOUS at 20:52

## 2024-11-11 RX ADMIN — MAGNESIUM SULFATE HEPTAHYDRATE 2 G: 40 INJECTION, SOLUTION INTRAVENOUS at 15:17

## 2024-11-11 RX ADMIN — SODIUM CHLORIDE, SODIUM GLUCONATE, SODIUM ACETATE, POTASSIUM CHLORIDE, MAGNESIUM CHLORIDE, SODIUM PHOSPHATE, DIBASIC, AND POTASSIUM PHOSPHATE 125 ML/HR: .53; .5; .37; .037; .03; .012; .00082 INJECTION, SOLUTION INTRAVENOUS at 20:13

## 2024-11-11 RX ADMIN — MAGNESIUM SULFATE HEPTAHYDRATE 2 G: 40 INJECTION, SOLUTION INTRAVENOUS at 20:13

## 2024-11-11 RX ADMIN — ONDANSETRON 4 MG: 2 INJECTION INTRAMUSCULAR; INTRAVENOUS at 14:29

## 2024-11-11 RX ADMIN — LORAZEPAM 1 MG: 2 INJECTION INTRAMUSCULAR; INTRAVENOUS at 14:29

## 2024-11-11 RX ADMIN — PHENOBARBITAL SODIUM 650 MG: 130 INJECTION INTRAMUSCULAR; INTRAVENOUS at 20:16

## 2024-11-11 RX ADMIN — ATORVASTATIN CALCIUM 40 MG: 40 TABLET, FILM COATED ORAL at 20:13

## 2024-11-11 NOTE — ED PROVIDER NOTES
Time reflects when diagnosis was documented in both MDM as applicable and the Disposition within this note       Time User Action Codes Description Comment    11/11/2024  4:54 PM KingsleyPhoenixFlakita ELIAS Add [F10.939] Alcohol withdrawal (HCC)     11/11/2024  4:54 PM PeralrichardAurora Flakita ELIAS Add [R11.2] Nausea & vomiting     11/11/2024  4:54 PM Flakita Wynn Add [R19.7] Diarrhea     11/11/2024  4:55 PM PearlrichardFlakita Simon Add [R79.89] Elevated lactic acid level     11/11/2024  4:55 PM Flakita Wynn Add [E83.42] Hypomagnesemia     11/11/2024  9:26 PM CoriWongno B Add [F32.1] Current moderate episode of major depressive disorder, unspecified whether recurrent (HCC)     11/11/2024  9:27 PM CoriThainiano B Add [F10.20] Alcohol use disorder, severe, dependence (HCC)           ED Disposition       ED Disposition   Admit    Condition   Stable    Date/Time   Mon Nov 11, 2024  4:54 PM    Comment   Case was discussed with Dr. Lyons and the patient's admission status was agreed to be Admission Status: inpatient status to the service of Dr. Lyons .               Assessment & Plan       Medical Decision Making  Amount and/or Complexity of Data Reviewed  Labs: ordered.  Radiology: ordered.    Risk  Prescription drug management.  Decision regarding hospitalization.        ED Course as of 11/12/24 0437   Mon Nov 11, 2024   6658 Patient with history of alcohol abuse and prior withdrawal here feeling poorly since approximately 2 AM.  He has not since tremulousness and has had repeated episodes of nonbloody emesis and loose stools.  Appreciates abdominal distention though not pain and has had marked lightheadedness.  Has not been able to tolerate anything p.o. today.  Recently has been consuming 8-9 small bottles of etoh (larger than single shooters) daily.    Patient identifies symptoms as similar to that which have occurred on multiple prior occasions.  Strong suspicion that  symptoms are all secondary to withdrawal/dehydration.  Evaluating and consideration for concomitant possible ACS, pancreatitis, alcoholic ketoacidosis.   1512 Labs starting to return.  Mild leukocytosis is present.  Strongly suspect that this is reactive to vomiting.    Creatinine is mildly elevated.  Baseline appears to be approximately 0.8.  LFTs mildly elevated-slightly more so than on prior occasions.  Anion gap is minimally elevated.  Magnesium low at 1.3.  Will replete.   1534 Patient relates that he is feeling much better at this time.  Nausea, abdominal bloating and tremor all feel well-controlled.  Heart rate remains elevated in the upper 120s.  Fluids continue infusing.  Reviewed study results which have returned thus far.  Emphasized need for further hospital care to assist with withdrawal.  Patient voices interest in abstaining from alcohol going forward but also expresses concern that he did not get to any of the outpatient programs he was intending on participating in.    I strongly encouraged that he consider admission longer than 1 night and potentially care within an inpatient rehab given his body his dependence upon alcohol.  He was receptive to the 1 night stay but hesitant to anything longer in the hospital and indicated that he would not be able to attend an inpatient facility.  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  Upon questioning regarding his needs being met tonight he did not have concern for this but indicated that he was intending to do yard work tomorrow.  Patient additionally expressed concern that he has a fractured tooth and will need to see a dentist at some point soon.  Declined coverage with antibiotic   1653 Patient remains greatly improved.  Does feel able to try ice chips and sips of ginger ale which have been provided.    Patient history/presentation have been reviewed with critical care team and [facility].  At this time he does not  require ICU or stepdown care.  He is agreeable to admission and will come into [facility] telemetry.       Medications   atorvastatin (LIPITOR) tablet 40 mg (has no administration in time range)   ergocalciferol (VITAMIN D2) capsule 50,000 Units (has no administration in time range)   escitalopram (LEXAPRO) tablet 20 mg (has no administration in time range)   hydrOXYzine HCL (ATARAX) tablet 25 mg (has no administration in time range)   lisinopril (ZESTRIL) tablet 40 mg (has no administration in time range)   melatonin tablet 9 mg (has no administration in time range)   fish oil capsule 2,000 mg (has no administration in time range)   multi-electrolyte (PLASMALYTE-A/ISOLYTE-S PH 7.4) IV solution (has no administration in time range)   enoxaparin (LOVENOX) subcutaneous injection 40 mg (has no administration in time range)   acetaminophen (TYLENOL) tablet 650 mg (has no administration in time range)   ondansetron (ZOFRAN) injection 4 mg (has no administration in time range)   folic acid 1 mg, thiamine (VITAMIN B1) 100 mg in sodium chloride 0.9 % 100 mL IV piggyback (has no administration in time range)   sodium chloride 0.9 % bolus 1,000 mL (0 mL Intravenous Stopped 11/11/24 1450)   ondansetron (ZOFRAN) injection 4 mg (4 mg Intravenous Given 11/11/24 1429)   LORazepam (ATIVAN) injection 1 mg (1 mg Intravenous Given 11/11/24 1429)   magnesium sulfate 2 g/50 mL IVPB (premix) 2 g (0 g Intravenous Stopped 11/11/24 1712)   sodium chloride 0.9 % bolus 1,000 mL (0 mL Intravenous Stopped 11/11/24 1636)   magnesium sulfate 2 g/50 mL IVPB (premix) 2 g (has no administration in time range)       ED Risk Strat Scores                [facility] Score       Row Name 11/12/24 0300 11/11/24 2259 11/11/24 2140       [facility]    Blood Pressure -- 143/93 133/89    Pulse 82 102 104    Nausea and Vomiting 0 0 0    Tactile Disturbances 0 0 0    Tremor 2 2 2    Auditory Disturbances 0 0 0    Paroxysmal Sweats 0 0 1    Visual Disturbances 1 1 1     Anxiety 0 2 4    Headache, Fullness in Head 0 0 3    Agitation 0 0 1    Orientation and Clouding of Sensorium 0 0 0    CIWA-Ar Total 3 5 12      Row Name 11/11/24 2040 11/11/24 1900 11/11/24 1415       CIWA-Ar    Blood Pressure 159/102 -- 176/113    Pulse 105 -- 142    Nausea and Vomiting 0 0 1    Tactile Disturbances 0 0 3    Tremor 2 3 6    Auditory Disturbances 0 0 1    Paroxysmal Sweats 2 1 4    Visual Disturbances 1 0 3    Anxiety 5 4 3    Headache, Fullness in Head 5 5 1    Agitation 1 3 3    Orientation and Clouding of Sensorium 0 0 0    CIWA-Ar Total 16 16 25                                                History of Present Illness       Chief Complaint   Patient presents with    Alcohol Problem     Pt reports he has nausea and lightheaded and it is related to drinking. Pt reports he last drank at 0530 this am 2 oz.. but he drank more the night before       Past Medical History:   Diagnosis Date    Alcohol use disorder, severe, dependence (HCC) 04/02/2023    Alcohol withdrawal seizure (HCC)     Alcoholic ketoacidosis 10/16/2023    Anxiety     AR (allergic rhinitis)     Chronic alcoholic gastritis 04/02/2023    Depression     GERD (gastroesophageal reflux disease)     Hepatic steatosis 04/02/2023    Hyperlipidemia     Hypertension     IBS (irritable bowel syndrome)     Obesity     Rosacea     Vitamin B12 deficiency 02/14/2024    Vitamin D deficiency 02/14/2024      Past Surgical History:   Procedure Laterality Date    ANTERIOR CRUCIATE LIGAMENT REPAIR Left     WISDOM TOOTH EXTRACTION        Family History   Problem Relation Age of Onset    Cancer Mother 78        Type unknown    Hypertension Father     Coronary artery disease Father 60    Cancer Father 82        Bladder; + Smoker    No Known Problems Sister     No Known Problems Sister     No Known Problems Sister     No Known Problems Son     No Known Problems Son     Heart attack Paternal Grandfather 60    Coronary artery disease Paternal Grandfather 60       Social History     Tobacco Use    Smoking status: Some Days     Types: Cigarettes, Cigars     Start date: 1/1/2014     Passive exposure: Past (Father)    Smokeless tobacco: Never    Tobacco comments:     Smokes cigars 2-3 times per year   Vaping Use    Vaping status: Never Used   Substance Use Topics    Alcohol use: Yes     Comment: Last ETOH 6/17/24    Drug use: Never      E-Cigarette/Vaping    E-Cigarette Use Never User       E-Cigarette/Vaping Substances    Nicotine No     THC No     CBD No     Flavoring No     Other No     Unknown No       I have reviewed and agree with the history as documented.     Patient is a 59-year-old male who presents to the emergency department explaining that he has felt this way before.  He is an alcoholic and has had significant tremor and lightheadedness since this morning.  He has had repeated episodes of emesis.  He relates that at this point contents are bilious.  He has not had hematemesis.  He appreciates significant abdominal distention and has had multiple varied appearing loose stools.  He does not believe blood has been in these.  He endorses having consumed his typical amounts of alcohol through yesterday and feeling poorly around 2 AM.  He consumed a small amount of alcohol at that time though did not find it provided him with any relief.  No known sick contacts or bad food ingestions.  He denies use of any other substances aside from alcohol intended for consumption.  No chest pain, dyspnea or palpitations.  He regularly takes medication for hypertension (lisinopril) and is on a statin as well as anxiety medication.  He took all medications this morning and is uncertain whether he kept these down.    He has had prior hospitalizations for alcohol withdrawal.  The most recent one was a month ago on October 10 through 11.  He declined transfer to [facility]ology unit, stayed overnight at the [facility] in the ICU, had improvement of symptoms and left the  following afternoon AMA.  He has declined follow-up treatment on a number of occasions.  Visitor who accompanied him to bedside emphasized to him the need to stay.  He had been suggesting after a bit of medication he might do okay & go home.        Review of Systems   Constitutional:         Intermittent sweats.  He is uncertain what if he has had a fever.   Respiratory:  Negative for chest tightness and shortness of breath.    Cardiovascular:  Negative for chest pain.   Gastrointestinal:  Positive for abdominal distention. Negative for abdominal pain.   Genitourinary:  Positive for decreased urine volume (Did void today.  Reports dark yellow urine.). Negative for dysuria, frequency and hematuria.   Skin:  Negative for rash.   Neurological:  Positive for light-headedness. Negative for facial asymmetry and headaches.        Denies LOC, fall, head injury.   Psychiatric/Behavioral:  Negative for confusion.    All other systems reviewed and are negative.          Objective       ED Triage Vitals   Temperature Pulse Blood Pressure Respirations SpO2 Patient Position - Orthostatic VS   11/11/24 1341 11/11/24 1339 11/11/24 1339 11/11/24 1339 11/11/24 1339 11/11/24 1339   98.4 °F (36.9 °C) (!) 153 146/99 22 96 % Sitting      Temp Source Heart Rate Source BP Location FiO2 (%) Pain Score    11/11/24 1341 11/11/24 1450 11/11/24 1339 -- 11/11/24 1430    Oral Monitor Left arm  2      Vitals      Date and Time Temp Pulse SpO2 Resp BP Pain Score FACES Pain Rating User   11/12/24 0300 -- 82 -- -- -- -- -- RK   11/12/24 0257 -- 81 95 % -- 125/89 -- -- DII   11/11/24 2255 -- 102 -- -- 143/93 -- -- RK   11/11/24 2144 -- 104 -- -- 133/89 -- -- RK   11/11/24 2100 99.6 °F (37.6 °C) 109 93 % 19 135/89 -- -- DII   11/11/24 2046 -- 107 93 % -- 159/102 -- -- DII   11/11/24 2045 -- -- -- -- -- 8 -- RK   11/11/24 2040 -- 105 -- -- 159/102 -- -- RK   11/11/24 2032 -- -- -- -- -- 9 -- LEODAN   11/11/24 1933 -- -- -- 22 -- -- --    11/11/24 1933  99.5 °F (37.5 °C) 112 95 % -- 144/98 -- -- DII   11/11/24 1800 -- 121 95 % 22 149/84 -- -- KO   11/11/24 1630 -- 117 93 % 22 141/85 -- -- KO   11/11/24 1459 -- 123 93 % -- 132/79 -- -- KO   11/11/24 1450 -- 121 93 % 22 167/84 -- -- KO   11/11/24 1430 -- -- -- -- -- 2 -- KO   11/11/24 1421 -- 135 97 % -- 152/89 -- -- KO   11/11/24 1415 -- 142 -- -- 176/113 -- -- KO   11/11/24 1341 98.4 °F (36.9 °C) -- -- -- -- -- -- RLN   11/11/24 1339 -- 153 96 % 22 146/99 -- -- RLN            Physical Exam  Vitals and nursing note reviewed.   Constitutional:       General: He is in acute distress.      Appearance: He is ill-appearing and diaphoretic.      Comments: Tremulous at rest.  Mildly diaphoretic.  Mucous membranes slightly dry.  Slowly transitioned from wheelchair to stretcher - maintaining  on chair to do so.       HENT:      Mouth/Throat:      Mouth: Mucous membranes are dry.   Eyes:      General: No scleral icterus.     Extraocular Movements: Extraocular movements intact.      Conjunctiva/sclera: Conjunctivae normal.   Cardiovascular:      Rate and Rhythm: Regular rhythm. Tachycardia present.      Heart sounds: Normal heart sounds.   Pulmonary:      Effort: Pulmonary effort is normal.      Breath sounds: Normal breath sounds.   Abdominal:      General: Bowel sounds are normal.      Palpations: Abdomen is soft.   Musculoskeletal:         General: Normal range of motion.   Skin:     General: Skin is warm.   Neurological:      Mental Status: He is alert.   Psychiatric:         Mood and Affect: Mood normal.         Behavior: Behavior normal.         Results Reviewed       Procedure Component Value Units Date/Time    Comprehensive metabolic panel [[id number]]     Lab Status: No result Specimen: Blood     Magnesium [[id number]]     Lab Status: No result Specimen: Blood     CBC and differential [[id number]]     Lab Status: No result Specimen: Blood     Platelet count [[id number]]  (Abnormal) Collected: 11/11/24 1950    Lab  Status: Final result Specimen: Blood from Arm, Right Updated: 11/11/24 2034     Platelets 125 Thousands/uL      MPV 8.5 fL     Lactic acid 2 Hours [[id number]]  (Normal) Collected: 11/11/24 1652    Lab Status: Final result Specimen: Blood from Arm, Left Updated: 11/11/24 1717     LACTIC ACID 0.8 mmol/L     Narrative:      Result may be elevated if tourniquet was used during collection.    Lipase [[id number]]  (Normal) Collected: 11/11/24 1425    Lab Status: Final result Specimen: Blood from Arm, Left Updated: 11/11/24 1606     Lipase 27 u/L     Comprehensive metabolic panel [[id number]]  (Abnormal) Collected: 11/11/24 1425    Lab Status: Final result Specimen: Blood from Arm, Left Updated: 11/11/24 1508     Sodium 139 mmol/L      Potassium 4.8 mmol/L      Chloride 101 mmol/L      CO2 22 mmol/L      ANION GAP 16 mmol/L      BUN 10 mg/dL      Creatinine 1.18 mg/dL      Glucose 119 mg/dL      Calcium 10.0 mg/dL      AST 99 U/L      ALT 48 U/L      Alkaline Phosphatase 132 U/L      Total Protein 8.3 g/dL      Albumin 5.1 g/dL      Total Bilirubin 1.21 mg/dL      eGFR 67 ml/min/1.73sq m     Narrative:      [employer] Kid[employer] guidelines for Chronic Kidney Disease (CKD):     Stage 1 with normal or high GFR (GFR > 90 mL/min/1.73 square meters)    Stage 2 Mild CKD (GFR = 60-89 mL/min/1.73 square meters)    Stage 3A Moderate CKD (GFR = 45-59 mL/min/1.73 square meters)    Stage 3B Moderate CKD (GFR = 30-44 mL/min/1.73 square meters)    Stage 4 Severe CKD (GFR = 15-29 mL/min/1.73 square meters)    Stage 5 End Stage CKD (GFR <15 mL/min/1.73 square meters)  Note: GFR calculation is accurate only with a steady state creatinine    Magnesium [[id number]]  (Abnormal) Collected: 11/11/24 1425    Lab Status: Final result Specimen: Blood from Arm, Left Updated: 11/11/24 1508     Magnesium 1.3 mg/dL     Salicylate level [[id number]]  (Normal) Collected: 11/11/24 1425    Lab Status: Final result Specimen: Blood from Arm, Left  Updated: 11/11/24 1508     Salicylate Lvl <5 mg/dL     Acetaminophen level-If concentration is detectable, please discuss with medical  on call. [[id number]]  (Abnormal) Collected: 11/11/24 1425    Lab Status: Final result Specimen: Blood from Arm, Left Updated: 11/11/24 1508     Acetaminophen Level <2 ug/mL     Ethanol [[id number]]  (Normal) Collected: 11/11/24 1425    Lab Status: Final result Specimen: Blood from Arm, Left Updated: 11/11/24 1501     Ethanol Lvl <10 mg/dL     HS Troponin 0hr (reflex protocol) [[id number]]  (Normal) Collected: 11/11/24 1425    Lab Status: Final result Specimen: Blood from Arm, Left Updated: 11/11/24 1457     hs TnI 0hr 3 ng/L     Lactic acid, plasma (w/reflex if result > 2.0) [[id number]]  (Abnormal) Collected: 11/11/24 1425    Lab Status: Final result Specimen: Blood from Arm, Left Updated: 11/11/24 1452     LACTIC ACID 2.8 mmol/L     Narrative:      Result may be elevated if tourniquet was used during collection.    CBC and differential [[id number]]  (Abnormal) Collected: 11/11/24 1425    Lab Status: Final result Specimen: Blood from Arm, Left Updated: 11/11/24 1434     WBC 12.14 Thousand/uL      RBC 4.97 Million/uL      Hemoglobin 16.1 g/dL      Hematocrit 46.3 %      MCV 93 fL      MCH 32.4 pg      MCHC 34.8 g/dL      RDW 12.1 %      MPV 8.5 fL      Platelets 194 Thousands/uL      nRBC 0 /100 WBCs      Segmented % 81 %      Immature Grans % 1 %      Lymphocytes % 11 %      Monocytes % 7 %      Eosinophils Relative 0 %      Basophils Relative 0 %      Absolute Neutrophils 9.82 Thousands/µL      Absolute Immature Grans 0.06 Thousand/uL      Absolute Lymphocytes 1.39 Thousands/µL      Absolute Monocytes 0.79 Thousand/µL      Eosinophils Absolute 0.03 Thousand/µL      Basophils Absolute 0.05 Thousands/µL     Rapid drug screen, urine [[id number]]     Lab Status: No result Specimen: Urine             XR chest 1 view portable   Final Interpretation by Diego Olson MD (11/11  1557)      No acute cardiopulmonary disease on this examination, which is somewhat limited secondary to low lung volumes.            Resident: Alf Real I, the attending radiologist, have reviewed the images and agree with the final report above.      Workstation performed: [id number]             ECG 12 Lead Documentation Only    Date/Time: 11/11/2024 2:41 PM    Performed by: Flakita Wynn MD  Authorized by: Flakita Wynn MD    ECG reviewed by me, the ED Provider: yes    Patient location:  ED  Previous ECG:     Previous ECG:  Compared to current    Comparison ECG info:  10/10/2024    Comparison to previous ECG: increased artifact and more rapid rate.  Otherwise unchanged.  Rate:     ECG rate:  142    ECG rate assessment: tachycardic    Rhythm:     Rhythm: sinus tachycardia    Ectopy:     Ectopy: none    QRS:     QRS axis:  Normal  Conduction:     Conduction: normal    ST segments:     ST segments:  Non-specific  T waves:     T waves: non-specific    CriticalCare Time    Date/Time: 11/11/2024 4:00 PM    Performed by: Flakita Wynn MD  Authorized by: Flakita Wynn MD    Critical care provider statement:     Critical care time (minutes):  30    Critical care time was exclusive of:  Separately billable procedures and treating other patients and teaching time    Critical care was necessary to treat or prevent imminent or life-threatening deterioration of the following conditions:  Dehydration (alcohol withdrawal)    Critical care was time spent personally by me on the following activities:  Obtaining history from patient or surrogate, examination of patient, review of old charts, ordering and performing treatments and interventions, ordering and review of laboratory studies, ordering and review of radiographic studies, re-evaluation of patient's condition, evaluation of patient's response to treatment, development of treatment plan with  patient or surrogate and discussions with consultants      ED Medication and Procedure Management   Prior to Admission Medications   Prescriptions Last Dose Informant Patient Reported? Taking?   Magnesium 400 MG CAPS   Yes No   Sig: Take 1 capsule by mouth in the morning   Melatonin 10 MG TABS   No No   Sig: Take 1 tablet (10 mg total) by mouth daily at bedtime as needed (Insomnia)   Omega-3 Fatty Acids (fish oil) 1,000 mg   Yes No   Sig: Take 2,000 mg by mouth daily   Thiamine Mononitrate (VITAMIN B1) 100 mg tablet   No No   Sig: Take 1 tablet (100 mg total) by mouth daily   atorvastatin (LIPITOR) 40 mg tablet   No No   Sig: Take 1 tablet (40 mg total) by mouth daily with dinner   ergocalciferol (ERGOCALCIFEROL) 1.25 MG (01760 UT) capsule   No No   Sig: Take 1 capsule (50,000 Units total) by mouth once a week for 12 doses   escitalopram (LEXAPRO) 20 mg tablet   No No   Sig: Take 1 tablet (20 mg total) by mouth daily   folic acid (FOLVITE) 1 mg tablet   No No   Sig: Take 1 tablet (1 mg total) by mouth daily   hydrOXYzine HCL (ATARAX) 25 mg tablet   No No   Sig: Take 1 tablet (25 mg total) by mouth every 6 (six) hours as needed for itching   lisinopril (ZESTRIL) 40 mg tablet   No No   Sig: Take 1 tablet (40 mg total) by mouth daily   metroNIDAZOLE (METROCREAM) 0.75 % cream   No No   Sig: Apply topically 2 (two) times a day   naltrexone (REVIA) 50 mg tablet   No No   Sig: Take 1 tablet (50 mg total) by mouth daily   pantoprazole (PROTONIX) 40 mg tablet   No No   Sig: Take 1 tablet (40 mg total) by mouth daily in the early morning      Facility-Administered Medications: None     Current Discharge Medication List        CONTINUE these medications which have NOT CHANGED    Details   atorvastatin (LIPITOR) 40 mg tablet Take 1 tablet (40 mg total) by mouth daily with dinner  Qty: 30 tablet, Refills: 5    Associated Diagnoses: Hyperlipidemia, unspecified hyperlipidemia type      ergocalciferol (ERGOCALCIFEROL) 1.25 MG  (01116 UT) capsule Take 1 capsule (50,000 Units total) by mouth once a week for 12 doses  Qty: 12 capsule, Refills: 0    Associated Diagnoses: Vitamin D deficiency      escitalopram (LEXAPRO) 20 mg tablet Take 1 tablet (20 mg total) by mouth daily  Qty: 90 tablet, Refills: 1    Associated Diagnoses: Anxiety; Current moderate episode of major depressive disorder, unspecified whether recurrent ([facility])      folic acid (FOLVITE) 1 mg tablet Take 1 tablet (1 mg total) by mouth daily  Qty: 30 tablet, Refills: 0    Associated Diagnoses: Alcohol withdrawal ([facility])      hydrOXYzine HCL (ATARAX) 25 mg tablet Take 1 tablet (25 mg total) by mouth every 6 (six) hours as needed for itching  Qty: 30 tablet, Refills: 5    Associated Diagnoses: Anxiety      lisinopril (ZESTRIL) 40 mg tablet Take 1 tablet (40 mg total) by mouth daily  Qty: 30 tablet, Refills: 1    Associated Diagnoses: Primary hypertension      Magnesium 400 MG CAPS Take 1 capsule by mouth in the morning      Melatonin 10 MG TABS Take 1 tablet (10 mg total) by mouth daily at bedtime as needed (Insomnia)  Qty: 30 tablet, Refills: 1    Associated Diagnoses: Insomnia, unspecified type      metroNIDAZOLE (METROCREAM) 0.75 % cream Apply topically 2 (two) times a day  Qty: 45 g, Refills: 0    Associated Diagnoses: Rosacea      naltrexone (REVIA) 50 mg tablet Take 1 tablet (50 mg total) by mouth daily  Qty: 30 tablet, Refills: 0    Associated Diagnoses: Alcohol use disorder, severe, dependence ([facility])      Omega-3 Fatty Acids (fish oil) 1,000 mg Take 2,000 mg by mouth daily      pantoprazole (PROTONIX) 40 mg tablet Take 1 tablet (40 mg total) by mouth daily in the early morning  Qty: 30 tablet, Refills: 5    Associated Diagnoses: Chronic alcoholic gastritis without hemorrhage; Gastroesophageal reflux disease, unspecified whether esophagitis present      Thiamine Mononitrate (VITAMIN B1) 100 mg tablet Take 1 tablet (100 mg total) by mouth daily  Qty: 30 tablet, Refills: 1     Associated Diagnoses: Alcohol use disorder, severe, dependence (HCC)           No discharge procedures on file.  ED SEPSIS DOCUMENTATION   Time reflects when diagnosis was documented in both MDM as applicable and the Disposition within this note       Time User Action Codes Description Comment    11/11/2024  4:54 PM Flakita Wynn Add [F10.939] Alcohol withdrawal (HCC)     11/11/2024  4:54 PM Flakita Wynn Add [R11.2] Nausea & vomiting     11/11/2024  4:54 PM Flakita Wynn Add [R19.7] Diarrhea     11/11/2024  4:55 PM Flakita Wynn Add [R79.89] Elevated lactic acid level     11/11/2024  4:55 PM Flakita Wynn Add [E83.42] Hypomagnesemia     11/11/2024  9:26 PM Cori, Thainiano B Add [F32.1] Current moderate episode of major depressive disorder, unspecified whether recurrent (HCC)     11/11/2024  9:27 PM Cori, Alpiniano B Add [F10.20] Alcohol use disorder, severe, dependence (HCC)                  Flakita Wynn MD  11/12/24 0434       Flakita Wynn MD  11/12/24 0437

## 2024-11-12 PROBLEM — F33.1 MAJOR DEPRESSIVE DISORDER, RECURRENT, MODERATE (HCC): Status: ACTIVE | Noted: 2024-11-12

## 2024-11-12 LAB
ALBUMIN SERPL BCG-MCNC: 3.6 G/DL (ref 3.5–5)
ALP SERPL-CCNC: 80 U/L (ref 34–104)
ALT SERPL W P-5'-P-CCNC: 27 U/L (ref 7–52)
AMPHETAMINES SERPL QL SCN: NEGATIVE
ANION GAP SERPL CALCULATED.3IONS-SCNC: 7 MMOL/L (ref 4–13)
AST SERPL W P-5'-P-CCNC: 45 U/L (ref 13–39)
ATRIAL RATE: 142 BPM
BARBITURATES UR QL: POSITIVE
BASOPHILS # BLD AUTO: 0.02 THOUSANDS/ÂΜL (ref 0–0.1)
BASOPHILS NFR BLD AUTO: 0 % (ref 0–1)
BENZODIAZ UR QL: NEGATIVE
BILIRUB SERPL-MCNC: 1.1 MG/DL (ref 0.2–1)
BUN SERPL-MCNC: 12 MG/DL (ref 5–25)
CALCIUM SERPL-MCNC: 8.3 MG/DL (ref 8.4–10.2)
CHLORIDE SERPL-SCNC: 106 MMOL/L (ref 96–108)
CO2 SERPL-SCNC: 25 MMOL/L (ref 21–32)
COCAINE UR QL: NEGATIVE
CREAT SERPL-MCNC: 0.95 MG/DL (ref 0.6–1.3)
EOSINOPHIL # BLD AUTO: 0.07 THOUSAND/ÂΜL (ref 0–0.61)
EOSINOPHIL NFR BLD AUTO: 1 % (ref 0–6)
ERYTHROCYTE [DISTWIDTH] IN BLOOD BY AUTOMATED COUNT: 12 % (ref 11.6–15.1)
FENTANYL UR QL SCN: NEGATIVE
GFR SERPL CREATININE-BSD FRML MDRD: 87 ML/MIN/1.73SQ M
GLUCOSE SERPL-MCNC: 98 MG/DL (ref 65–140)
HCT VFR BLD AUTO: 35.7 % (ref 36.5–49.3)
HGB BLD-MCNC: 12.3 G/DL (ref 12–17)
HYDROCODONE UR QL SCN: NEGATIVE
IMM GRANULOCYTES # BLD AUTO: 0.01 THOUSAND/UL (ref 0–0.2)
IMM GRANULOCYTES NFR BLD AUTO: 0 % (ref 0–2)
LYMPHOCYTES # BLD AUTO: 0.99 THOUSANDS/ÂΜL (ref 0.6–4.47)
LYMPHOCYTES NFR BLD AUTO: 21 % (ref 14–44)
MAGNESIUM SERPL-MCNC: 2 MG/DL (ref 1.9–2.7)
MCH RBC QN AUTO: 32.7 PG (ref 26.8–34.3)
MCHC RBC AUTO-ENTMCNC: 34.2 G/DL (ref 31.4–37.4)
MCV RBC AUTO: 96 FL (ref 82–98)
METHADONE UR QL: NEGATIVE
MONOCYTES # BLD AUTO: 0.39 THOUSAND/ÂΜL (ref 0.17–1.22)
MONOCYTES NFR BLD AUTO: 8 % (ref 4–12)
NEUTROPHILS # BLD AUTO: 3.35 THOUSANDS/ÂΜL (ref 1.85–7.62)
NEUTS SEG NFR BLD AUTO: 70 % (ref 43–75)
NRBC BLD AUTO-RTO: 0 /100 WBCS
OPIATES UR QL SCN: NEGATIVE
OXYCODONE+OXYMORPHONE UR QL SCN: NEGATIVE
P AXIS: 52 DEGREES
PCP UR QL: NEGATIVE
PLATELET # BLD AUTO: 119 THOUSANDS/UL (ref 149–390)
PLATELET BLD QL SMEAR: ABNORMAL
PMV BLD AUTO: 8.8 FL (ref 8.9–12.7)
POTASSIUM SERPL-SCNC: 3.9 MMOL/L (ref 3.5–5.3)
PR INTERVAL: 130 MS
PROT SERPL-MCNC: 6.1 G/DL (ref 6.4–8.4)
QRS AXIS: 80 DEGREES
QRSD INTERVAL: 88 MS
QT INTERVAL: 284 MS
QTC INTERVAL: 436 MS
RBC # BLD AUTO: 3.73 MILLION/UL (ref 3.88–5.62)
RBC MORPH BLD: NORMAL
SODIUM SERPL-SCNC: 138 MMOL/L (ref 135–147)
T WAVE AXIS: 40 DEGREES
THC UR QL: NEGATIVE
VENTRICULAR RATE: 142 BPM
WBC # BLD AUTO: 4.83 THOUSAND/UL (ref 4.31–10.16)

## 2024-11-12 PROCEDURE — 83735 ASSAY OF MAGNESIUM: CPT

## 2024-11-12 PROCEDURE — 80307 DRUG TEST PRSMV CHEM ANLYZR: CPT

## 2024-11-12 PROCEDURE — 80053 COMPREHEN METABOLIC PANEL: CPT

## 2024-11-12 PROCEDURE — 93010 ELECTROCARDIOGRAM REPORT: CPT | Performed by: INTERNAL MEDICINE

## 2024-11-12 PROCEDURE — 99232 SBSQ HOSP IP/OBS MODERATE 35: CPT | Performed by: INTERNAL MEDICINE

## 2024-11-12 PROCEDURE — 85025 COMPLETE CBC W/AUTO DIFF WBC: CPT

## 2024-11-12 PROCEDURE — 99254 IP/OBS CNSLTJ NEW/EST MOD 60: CPT | Performed by: STUDENT IN AN ORGANIZED HEALTH CARE EDUCATION/TRAINING PROGRAM

## 2024-11-12 RX ORDER — LORAZEPAM 1 MG/1
2 TABLET ORAL ONCE
Status: COMPLETED | OUTPATIENT
Start: 2024-11-12 | End: 2024-11-12

## 2024-11-12 RX ORDER — FOLIC ACID 1 MG/1
1 TABLET ORAL DAILY
Status: DISCONTINUED | OUTPATIENT
Start: 2024-11-12 | End: 2024-11-13 | Stop reason: HOSPADM

## 2024-11-12 RX ORDER — LANOLIN ALCOHOL/MO/W.PET/CERES
100 CREAM (GRAM) TOPICAL DAILY
Status: DISCONTINUED | OUTPATIENT
Start: 2024-11-12 | End: 2024-11-13 | Stop reason: HOSPADM

## 2024-11-12 RX ORDER — MIRTAZAPINE 15 MG/1
15 TABLET, FILM COATED ORAL
Status: DISCONTINUED | OUTPATIENT
Start: 2024-11-12 | End: 2024-11-13 | Stop reason: HOSPADM

## 2024-11-12 RX ADMIN — ATORVASTATIN CALCIUM 40 MG: 40 TABLET, FILM COATED ORAL at 17:10

## 2024-11-12 RX ADMIN — PANTOPRAZOLE SODIUM 40 MG: 40 INJECTION, POWDER, FOR SOLUTION INTRAVENOUS at 09:23

## 2024-11-12 RX ADMIN — ESCITALOPRAM OXALATE 20 MG: 20 TABLET ORAL at 09:23

## 2024-11-12 RX ADMIN — OMEGA-3 FATTY ACIDS CAP 1000 MG 2000 MG: 1000 CAP at 09:22

## 2024-11-12 RX ADMIN — LORAZEPAM 2 MG: 1 TABLET ORAL at 20:27

## 2024-11-12 RX ADMIN — LISINOPRIL 40 MG: 20 TABLET ORAL at 09:22

## 2024-11-12 RX ADMIN — ENOXAPARIN SODIUM 40 MG: 40 INJECTION SUBCUTANEOUS at 09:22

## 2024-11-12 RX ADMIN — MIRTAZAPINE 15 MG: 15 TABLET, FILM COATED ORAL at 22:01

## 2024-11-12 RX ADMIN — SODIUM CHLORIDE, SODIUM GLUCONATE, SODIUM ACETATE, POTASSIUM CHLORIDE, MAGNESIUM CHLORIDE, SODIUM PHOSPHATE, DIBASIC, AND POTASSIUM PHOSPHATE 125 ML/HR: .53; .5; .37; .037; .03; .012; .00082 INJECTION, SOLUTION INTRAVENOUS at 05:52

## 2024-11-12 RX ADMIN — B-COMPLEX W/ C & FOLIC ACID TAB 1 TABLET: TAB at 09:39

## 2024-11-12 RX ADMIN — FOLIC ACID: 5 INJECTION, SOLUTION INTRAMUSCULAR; INTRAVENOUS; SUBCUTANEOUS at 10:42

## 2024-11-12 RX ADMIN — LORAZEPAM 2 MG: 1 TABLET ORAL at 18:14

## 2024-11-12 NOTE — PLAN OF CARE
Problem: PAIN - ADULT  Goal: Verbalizes/displays adequate comfort level or baseline comfort level  Description: Interventions:  - Encourage patient to monitor pain and request assistance  - Assess pain using appropriate pain scale  - Administer analgesics based on type and severity of pain and evaluate response  - Implement non-pharmacological measures as appropriate and evaluate response  - Consider cultural and social influences on pain and pain management  - Notify physician/advanced practitioner if interventions unsuccessful or patient reports new pain  Outcome: Progressing     Problem: INFECTION - ADULT  Goal: Absence or prevention of progression during hospitalization  Description: INTERVENTIONS:  - Assess and monitor for signs and symptoms of infection  - Monitor lab/diagnostic results  - Monitor all insertion sites, i.e. indwelling lines, tubes, and drains  - Monitor endotracheal if appropriate and nasal secretions for changes in amount and color  - Hayden appropriate cooling/warming therapies per order  - Administer medications as ordered  - Instruct and encourage patient and family to use good hand hygiene technique  - Identify and instruct in appropriate isolation precautions for identified infection/condition  Outcome: Progressing  Goal: Absence of fever/infection during neutropenic period  Description: INTERVENTIONS:  - Monitor WBC    Outcome: Progressing     Problem: SAFETY ADULT  Goal: Patient will remain free of falls  Description: INTERVENTIONS:  - Educate patient/family on patient safety including physical limitations  - Instruct patient to call for assistance with activity   - Consult OT/PT to assist with strengthening/mobility   - Keep Call bell within reach  - Keep bed low and locked with side rails adjusted as appropriate  - Keep care items and personal belongings within reach  - Initiate and maintain comfort rounds  - Make Fall Risk Sign visible to staff  - Offer Toileting every 2 Hours,  in advance of need  - Initiate/Maintain alarm  - Obtain necessary fall risk management equipment:   - Apply yellow socks and bracelet for high fall risk patients  - Consider moving patient to room near nurses station  Outcome: Progressing  Goal: Maintain or return to baseline ADL function  Description: INTERVENTIONS:  -  Assess patient's ability to carry out ADLs; assess patient's baseline for ADL function and identify physical deficits which impact ability to perform ADLs (bathing, care of mouth/teeth, toileting, grooming, dressing, etc.)  - Assess/evaluate cause of self-care deficits   - Assess range of motion  - Assess patient's mobility; develop plan if impaired  - Assess patient's need for assistive devices and provide as appropriate  - Encourage maximum independence but intervene and supervise when necessary  - Involve family in performance of ADLs  - Assess for home care needs following discharge   - Consider OT consult to assist with ADL evaluation and planning for discharge  - Provide patient education as appropriate  Outcome: Progressing  Goal: Maintains/Returns to pre admission functional level  Description: INTERVENTIONS:  - Perform AM-PAC 6 Click Basic Mobility/ Daily Activity assessment daily.  - Set and communicate daily mobility goal to care team and patient/family/caregiver.   - Collaborate with rehabilitation services on mobility goals if consulted  - Perform Range of Motion 3 times a day.  - Reposition patient every 2 hours.  - Dangle patient 3 times a day  - Stand patient 3 times a day  - Ambulate patient 3 times a day  - Out of bed to chair 3 times a day   - Out of bed for meals 3 times a day  - Out of bed for toileting  - Record patient progress and toleration of activity level   Outcome: Progressing     Problem: DISCHARGE PLANNING  Goal: Discharge to home or other facility with appropriate resources  Description: INTERVENTIONS:  - Identify barriers to discharge w/patient and caregiver  -  Arrange for needed discharge resources and transportation as appropriate  - Identify discharge learning needs (meds, wound care, etc.)  - Arrange for interpretive services to assist at discharge as needed  - Refer to Case Management Department for coordinating discharge planning if the patient needs post-hospital services based on physician/advanced practitioner order or complex needs related to functional status, cognitive ability, or social support system  Outcome: Progressing     Problem: Knowledge Deficit  Goal: Patient/family/caregiver demonstrates understanding of disease process, treatment plan, medications, and discharge instructions  Description: Complete learning assessment and assess knowledge base.  Interventions:  - Provide teaching at level of understanding  - Provide teaching via preferred learning methods  Outcome: Progressing     Problem: Potential for Falls  Goal: Patient will remain free of falls  Description: INTERVENTIONS:  - Educate patient/family on patient safety including physical limitations  - Instruct patient to call for assistance with activity   - Consult OT/PT to assist with strengthening/mobility   - Keep Call bell within reach  - Keep bed low and locked with side rails adjusted as appropriate  - Keep care items and personal belongings within reach  - Initiate and maintain comfort rounds  - Make Fall Risk Sign visible to staff  - Offer Toileting every 2 Hours, in advance of need  - Initiate/Maintain alarm  - Obtain necessary fall risk management equipment:   - Apply yellow socks and bracelet for high fall risk patients  - Consider moving patient to room near nurses station  Outcome: Progressing

## 2024-11-12 NOTE — CONSULTS
e-Consult (IP[facility])  - Medical Toxicology   Name: Diogo Travis 59 y.o. male I MRN: [id number]  Unit/Bed#: S -[facility] I Date of Admission: 11/11/2024   Date of Service: 11/11/2024 I Hospital Day: 0  Inpatient consult to Toxicology  Consult performed by: Priscilla Burden MD  Consult ordered by: Radha Plunkett MD        Physician Requesting Evaluation: Aram Jimenez MD   Reason for Evaluation / Principal Problem: alcohol withdrawal    Assessment & Plan  Alcohol withdrawal syndrome without complication (HCC)  Give loading dose of phenobarbital 10 mg/kg of ideal body weight, max 650 mg  Continue CIWA/benzodiazepines or can consider continuing phenobarbital.  Please see below for more specific dosing of diazepam versus phenobarbital.  Supportive care including IV fluids, antiemetics, not on opioid analgesics, and antidiarrheals as needed  Thiamine, folic acid, multivitamin  If sedative drip is required as adjunct, recommend ketamine over Precedex due to its NMDA receptor antagonism, which is directly responsible for the pathophysiology of alcohol withdrawal.    The dose is 0.3 mg/kg/h and can be reduced to 0.15 mg/kg/h after 24 hours.  If Precedex is used as an adjunct, scheduled CLAUDIA agonist such as benzodiazepines or barbiturates should be given as Precedex is a CNS depressant only and has no effect on the withdrawal pathway  Alcohol use disorder, severe, dependence (HCC)  Withdrawal treatment as above  Encourage cessation  Appreciate case management recommendations in regards to disposition planning and resources  If patient is interested in naltrexone (IM Vivitrol versus PO Revia) to help minimize cravings and the feeling of reward after drinking alcohol, please let us know    Please see additional teaching note below:     Medical Toxicology recommendations for CIWA monitoring using diazepam for treatment:     CIWA score & Treatment      Monitoring:   Modified CIWA Score   Telemetry  Continuous pulse oximetry   Initiate  "RASS with dosing and hold any sedatives for RASS less than -2  Request provider re-evaluation after every three doses.  Step down for CIWA > 7  Contact Medical Toxicology/Addiction \"[facility] On Call\" via Tiger Text for worsening CIWA, persistent tachycardia or encephalopathy.         0  No intervention  Reassess q4 hours.   Consider discontinuing CIWA 24 hours after ethanol concentration of zero, with a score of zero     1-7  Diazepam 10 mg PO Q4hr PRN score 1-7  Reassess q4hr     8-14  Diazepam 10mg IV Q1hr PRN score 8-14  Reassess q1hr  Contact Medical Toxicology/Addiction \"[facility] On Call\" via Tiger Text to discuss transfer to detox unit, step down or critical care per Detox [facility].     15-19  Diazepam 20mg IV Q1hr PRN score 15-19  Reassess q1hr  Contact Medical Toxicology/Addiction \"[facility] On Call\" via Tiger Text to discuss transfer to detox unit, step down or critical care per Detox [facility] and further treatment recommendations.     >20  Diazepam 40mg IV Q1hr PRN score > 20  Contact Medical Toxicology/Addiction \"[facility] On Call\" via Tiger Text for additional treatment recommendations.      If the patient's withdrawal symptoms are not well controlled with diazepam, phenobarbital can be considered.     Once the patient's withdrawal is effectively managed, the patient can be placed on a diazepam taper, if needed.    Diazepam can be decreased by 1/2 of the last dose every hour until the patient is not needing more than 10 mg at a time; at which point diazepam 5mg PO  may be given Q6 hours PRN for 24 hours. Prior to discontinuation.      Our favored dosing for phenobarbital in most ETOH withdrawal is as follows, however this should be adjusted based on age, liver dysfunction, level of CLAUDIA agonist resistance:     Mild symptoms: 65 mg IV phenobarbital bolus  Moderate symptoms:  130 mg IV phenobarbital bolus  Moderate to severe symptoms:  260 mg IV phenobarbital bolus  Severe symptoms:  650 " mg IV phenobarbital bolus  Extremely severe, requiring sedation or potentially intubation:  1 g IV phenobarbital bolus     Re-evaluate the patient every 60-90 min minutes.  Additional phenobarbital can be administered based on new exam.     Hold for RASS -4 or -5     I have also included a reference for SEWS scoring before which is commonly used with phenobarbital for reference only.  Please do not instruct nursing to calculate this.   This must be done by primary medical provider only.     Sedation using ketamine or Precedex can also be a good tool in terms of adjunct medications, however these are not CLAUDIA agonist and do not treat underlying cause.  The patient did still be receiving CLAUDIA agonist while sedation is being administered.     Generally speaking, after initial control of ETOH withdrawal symptoms have been aggressively controlled phenobarbital, additional re-dosing of phenobarbital is not needed as phenobarbital has a long half life and active metabolites leading to a long duration of action.  Phenobarbital also does not need to be tapered for these reasons.     Once the patient has passed 8 hour since last phenobarbital dosing without any further administration of CLAUDIA agonist, they can be cleared from an ETOH withdrawal standpoint    Ethanol Withdrawal  Department of Medical Toxicology  [facility]    Updated June 2019    Ethanol withdrawal can be precipitated by sudden discontinuation of chronic ethanol use. Symptoms of ethanol withdrawal syndrome include tremor, anxiety, headache, palpitations, insomnia, nausea and vomiting, diaphoresis, tachycardia and hypertension. In severe cases, seizures may also occur. Seizures are usually brief and generalized, and often occur within 6-12 hours after cessation of ethanol use. Each time someone goes through ethanol withdrawal, it is generally worse secondary to the Kindling Effect.     Sympathetic nervous system overactivity may  progress to delirium tremens.  Delirium tremens is a life-threatening condition that is characterized by tachycardia, diaphoresis, hyperthermia, delirium and hallucinations.  It usually occurs about 48-72 hours after discontinuation of heavy ethanol use.  If not treated appropriately, significant morbidity and mortality can be associated.    The pathophysiology in ethanol dependence is associated with downregulation of GABAa receptors and upregulation of NMDA receptors. This is secondary to ethanol's continuous CLAUDIA agonism and NDMA antagonism. When ethanol use is discontinued, this resulting state leads to the hyperexcitation associated with the withdrawal syndrome. Treatment is primarily targeted at decreasing the excitatory state with sedatives, such as benzodiazepines and phenobarbital.  Adjunctive treatments may include dexmedetomidine or ketamine. Propofol may also be utilized in cases where the airway is protected.      Other complications to consider in the setting of ethanol dependence include hypoglycemia, alcoholic ketoacidosis, Wernicke's Encephalopahty and Wernicke-Korsakoff Syndrome. It is important to routinely provide nutrition, hydration and often thiamine.       Treatment regimen utilized by Department of Medical Toxicology involves application of the Severity of Ethanol Withdrawal Scale:      PHENOBARBITAL FOR ALCOHOL WITHDRAWAL:    MILD SEWS SCORE (1-6)  Administer diazepam 10 mg PO X1 (unless unable to take PO)  Administer phenobarbital 65 mg IV x1 and then another 65 mg IV q60 min PRN (max 5 doses total)  * Document SEWS with each dose and/or minimum of q2h.  HOLD any further phenobarbital dosing for RASS -4 or -5    MODERATE SEWS SCORE (7-12)  Administer diazepam 10 mg PO X1 (unless unable to take PO)  Administer phenobarbital 260 mg IV x1 dose  Administer phenobarbital 130 mg IV q30 min PRN (max 5 doses)  * Document SEWS with each dose and/or minimum of q1h.  HOLD any further phenobarbital  dosing for RASS -4 or -5    SEVERE SEWS SCORE (13 or higher)  Administer diazepam 10 mg PO X1 (unless unable to take PO)  If this is the INITIAL SEWS SCORE administer phenobarbital 650 mg IVPB over 15 minutes x1.  If this is NOT INITIAL SEWS SCORE administer phenobarbital 260 mg IV, and then another 260 mg IV q15 min PRN (max 3 doses total)  Administer phenobarbital 130 mg IV q30 min PRN (max 5 doses)  * Document SEWS with each dose and/or minimum of q30 min.  HOLD any further phenobarbital for RASS -4 or -5.     For further questions, please contact the medical  on call via SecureChat between 8am and 9pm. If between 9pm and 8am, please reach out to the [facility] at [phone].     Hx and PE limited by the dynamics of a phone consultation. I have not personally interviewed or evaluated the patient, but only advised based on the information provided to me. Primary provider is responsible for all clinical decisions.     History of Present Illness   Diogo Travis is a 59 y.o. year old male who presents with alcohol withdrawal.  Past medical history significant for alcohol use disorder, alcohol withdrawal, hypertension, alcoholic gastritis, hyperlipidemia, anxiety/depression.   Last drink 6 AM on 11/11 with heavy drinking the night prior until 2 AM.  Patient reports tremors, anxiety, dizziness, nausea and vomiting that started in the morning.  Noted to be tachycardic, diaphoretic, tremulous in the emergency department.  Received 1 mg lorazepam in the ED.    Historical Information   I have reviewed the patient's PMH, PSH, Social History, Family History, Meds, and Allergies  Social History     Tobacco Use    Smoking status: Some Days     Types: Cigarettes, Cigars     Start date: 1/1/2014     Passive exposure: Past (Father)    Smokeless tobacco: Never    Tobacco comments:     Smokes cigars 2-3 times per year   Vaping Use    Vaping status: Never Used   Substance and Sexual Activity    Alcohol use: Yes      Comment: Last ETOH 6/17/24    Drug use: Never    Sexual activity: Yes     Partners: Female     Birth control/protection: Post-menopausal     Family History   Problem Relation Age of Onset    Cancer Mother 78        Type unknown    Hypertension Father     Coronary artery disease Father 60    Cancer Father 82        Bladder; + Smoker    No Known Problems Sister     No Known Problems Sister     No Known Problems Sister     No Known Problems Son     No Known Problems Son     Heart attack Paternal Grandfather 60    Coronary artery disease Paternal Grandfather 60       Meds/Allergies   Prior to Admission medications    Medication Sig Start Date End Date Taking? Authorizing Provider   atorvastatin (LIPITOR) 40 mg tablet Take 1 tablet (40 mg total) by mouth daily with dinner 8/27/24   Enid Altman DO   ergocalciferol (ERGOCALCIFEROL) 1.25 MG (41100 UT) capsule Take 1 capsule (50,000 Units total) by mouth once a week for 12 doses 7/9/24 9/25/24  Enid Altman DO   escitalopram (LEXAPRO) 20 mg tablet Take 1 tablet (20 mg total) by mouth daily 10/2/24   Enid Altman DO   folic acid (FOLVITE) 1 mg tablet Take 1 tablet (1 mg total) by mouth daily 10/12/24 11/11/24  Dequan Miller DO   hydrOXYzine HCL (ATARAX) 25 mg tablet Take 1 tablet (25 mg total) by mouth every 6 (six) hours as needed for itching 8/27/24   Enid Altman DO   lisinopril (ZESTRIL) 40 mg tablet Take 1 tablet (40 mg total) by mouth daily 7/9/24   Enid Altman DO   Magnesium 400 MG CAPS Take 1 capsule by mouth in the morning    Historical Provider, MD   Melatonin 10 MG TABS Take 1 tablet (10 mg total) by mouth daily at bedtime as needed (Insomnia) 7/9/24   Enid Altman DO   metroNIDAZOLE (METROCREAM) 0.75 % cream Apply topically 2 (two) times a day 7/9/24   Enid Altman DO   naltrexone (REVIA) 50 mg tablet Take 1 tablet (50 mg total) by mouth daily 7/9/24   Enid Altman DO   Omega-3 Fatty Acids (fish oil) 1,000 mg Take 2,000  mg by mouth daily    Historical Provider, MD   pantoprazole (PROTONIX) 40 mg tablet Take 1 tablet (40 mg total) by mouth daily in the early morning 8/27/24 2/23/25  Enid Altman DO   Thiamine Mononitrate (VITAMIN B1) 100 mg tablet Take 1 tablet (100 mg total) by mouth daily 7/9/24   Enid Altman DO     Current Facility-Administered Medications:     acetaminophen (TYLENOL) tablet 650 mg, 650 mg, Oral, Q6H PRN, Radha Plunkett MD, 650 mg at 11/11/24 2032    atorvastatin (LIPITOR) tablet 40 mg, 40 mg, Oral, Daily With Dinner, Radha Plunkett MD, 40 mg at 11/11/24 2013    [START ON 11/12/2024] enoxaparin (LOVENOX) subcutaneous injection 40 mg, 40 mg, Subcutaneous, Daily, Radha Plunkett MD    ergocalciferol (VITAMIN D2) capsule 50,000 Units, 50,000 Units, Oral, Weekly, Radha Plunkett MD, 50,000 Units at 11/11/24 2013    [START ON 11/12/2024] escitalopram (LEXAPRO) tablet 20 mg, 20 mg, Oral, Daily, Radha Plunkett MD    [START ON 11/12/2024] fish oil capsule 2,000 mg, 2,000 mg, Oral, Daily, Radha Plunkett MD    [START ON 11/12/2024] folic acid 1 mg, thiamine (VITAMIN B1) 100 mg in sodium chloride 0.9 % 100 mL IV piggyback, , Intravenous, Daily, Radha Plunkett MD    hydrOXYzine HCL (ATARAX) tablet 25 mg, 25 mg, Oral, Q6H PRN, Radha Plunkett MD    labetalol (NORMODYNE) injection 10 mg, 10 mg, Intravenous, Q6H PRN, Radha Plunkett MD    [START ON 11/12/2024] lisinopril (ZESTRIL) tablet 40 mg, 40 mg, Oral, Daily, Radha Plunkett MD    magnesium sulfate 2 g/50 mL IVPB (premix) 2 g, 2 g, Intravenous, Once, Radha Plunkett MD, Last Rate: 25 mL/hr at 11/11/24 2013, 2 g at 11/11/24 2013    melatonin tablet 9 mg, 9 mg, Oral, HS PRN, Radha Plunkett MD    multi-electrolyte (PLASMALYTE-A/ISOLYTE-S PH 7.4) IV solution, 125 mL/hr, Intravenous, Continuous, Radha Plunkett MD, Last Rate: 125 mL/hr at 11/11/24 2013, 125 mL/hr at 11/11/24 2013    [START ON 11/12/2024] multivitamin stress formula tablet 1 tablet, 1 tablet, Oral, Daily, Cayetano Persaud MD     ondansetron (ZOFRAN) injection 4 mg, 4 mg, Intravenous, Q6H PRN, Radha Plunkett MD    [START ON 11/12/2024] pantoprazole (PROTONIX) injection 40 mg, 40 mg, Intravenous, Q24H COLBY, Radha Plunkett MD   Allergies   Allergen Reactions    Cefdinir Rash       Objective :  Temp:  [98.4 °F (36.9 °C)-99.6 °F (37.6 °C)] 99.6 °F (37.6 °C)  HR:  [105-153] 109  BP: (132-176)/() 135/89  Resp:  [19-22] 19  SpO2:  [93 %-97 %] 93 %  O2 Device: None (Room air)      Intake/Output Summary (Last 24 hours) at 11/11/2024 2129  Last data filed at 11/11/2024 1712  Gross per 24 hour   Intake 2050 ml   Output --   Net 2050 ml       Physical exam not performed.      Lab Results: I have reviewed the following results:  Results from last 7 days   Lab Units 11/11/24  1950 11/11/24  1425   WBC Thousand/uL  --  12.14*   HEMOGLOBIN g/dL  --  16.1   HEMATOCRIT %  --  46.3   PLATELETS Thousands/uL 125* 194   SEGS PCT %  --  81*   LYMPHO PCT %  --  11*   MONO PCT %  --  7   EOS PCT %  --  0      Results from last 7 days   Lab Units 11/11/24  1425   POTASSIUM mmol/L 4.8   CHLORIDE mmol/L 101   CO2 mmol/L 22   BUN mg/dL 10   CREATININE mg/dL 1.18   CALCIUM mg/dL 10.0   ALBUMIN g/dL 5.1*   ALK PHOS U/L 132*   ALT U/L 48   AST U/L 99*   MAGNESIUM mg/dL 1.3*          Results from last 7 days   Lab Units 11/11/24  1652 11/11/24  1425   LACTIC ACID mmol/L 0.8 2.8*     Results from last 7 days   Lab Units 11/11/24  1425   HS TNI 0HR ng/L 3          Results from last 7 days   Lab Units 11/11/24  1425   ACETAMINOPHEN LVL ug/mL <2*   ETHANOL LVL mg/dL <10   SALICYLATE LVL mg/dL <5     Imaging Results Review: I reviewed radiology reports from this admission including: chest xray.  Other Study Results Review: EKG was personally reviewed and my interpretation is: Sinus tachycardia 142, QRS 88, QTc 436..    Administrative Statements   11-20 minutes, >50% of the total time devoted to medical consultative verbal/EMR discussion between providers. Written report will be  generated in the EMR.    Patient or appropriate family member was verbally informed by general medicine of this consultative service on their behalf to provide more timely access to specialty care in lieu of an in person consultation. Verbal consent was obtained.

## 2024-11-12 NOTE — CONSULTS
Consultation - Behavioral Health   Diogo Travis 59 y.o. male MRN: [id number]  Unit/Bed#: S -01 Encounter: [id number]    Assessment & Plan  Major depressive disorder, recurrent, moderate (HCC)  Emergency department and primary team work-up, including labs, imaging, and other diagnostic testing was reviewed.   Diogo does not endorse suicidal or homicidal ideation and does not currently pose a danger to himself or others. He demonstrated future oriented and forward thinking attitude throughout his assessment. As such, he does not meet criteria for involuntary psychiatric inpatient admission.   Diogo endorses depression, anxiety, and insomnia not fully alleviated by his current regimen of Lexapro 20 mg QD. Recommend adding Remeron 15 mg HS to address his insomnia and depressive symptoms  Discussed chronic insomnia at length with various options for treatment, including addition of magnesium glycinate to address chronically depleted magnesium levels with potential benefit of improving sleep as seen in observational studies. Recommend magnesium glycinate dosed between 200-400 mg in the evening if patient wishes to obtain through prescription vs over the counter medication once discharged. Discussed melatonin use; in the past, patient has had limited benefit with 2-4 hours of sleep before reawakening several hours earlier than desired; recommend melatonin extended release to lengthen the benefit from melatonin.   Medical management per primary team  Psychiatry will continue to follow as needed. Please contact our service via [employer] with any additional questions or concerns. If contacting after hours, please call or TigerText the on-call team ([facility]: [phone]) with any questions or concerns.          Risks, benefits and possible side effects of Medications:   Risks, benefits, and possible side effects of medications explained to patient and patient verbalizes understanding.      History of Present Illness  "  Physician Requesting Consult: Aram Jimenez MD  Reason for Consult / Principal Problem: Depression     Chief Complaint: \"I can't stop drinking\"    Diogo Travis is a 59 y.o. male with past medical history of HTN, hyperlipidemia, gastritis, alcohol use disorder, depression, and anxiety who presented to [facility] ED on 11/11/24 with tremor, lightheadedness, and repeated episodes of non-bloody emesis that felt similar to past episodes of alcohol withdrawal. Work-up in the ED was relevant for elevated LFTs, slightly elevated anion gap of 16, hypomagnesia, mild leukocytosis, and elevated lactic acid at 2.8. He was given 2L IVF NS, magnesium repletion, Ativan 5 mg total, and phenobarbital 650 mg. He was subsequently admitted for further treatment of alcohol withdrawal. Psychiatry was consulted due to depression in the context of social stressors.     Upon evaluation, Diogo is calm and cooperative. He is forthcoming regarding multiple social stressors including unemployment and acting is the primary caretaker for his father who has cancer. Additionally, anniversary of his mother's death is in November and is a trigger for his drinking. He endorses depression partially relieved by current regimen of Lexapro, insomnia, decreased concentration, lack of motivation, and anxiety with racing thoughts. He denies suicidal and homicidal ideation, auditory and visual hallucinations, and symptoms of paranoia. He denies history of manic symptoms. He shows future oriented and forward thinking attitude throughout his interview, naming his fiancée as a primary support.    Diogo admits to alcohol use disorder and acknowledges several past admissions for excessive alcohol use leading to subsequent withdrawal. His last drink was at 6 AM on 11/11/2024. His drinking for started roughly 9 years ago and significantly increased throughout the COVID-19 pandemic. He has never been to inpatient rehabilitation for alcohol use, " outpatient alcohol use treatment, or attending groups such as Alcoholics Anonymous despite multiple hospitalizations for alcohol withdrawal, two of which were ICU admissions. A long discussion was held regarding different avenues of treatment for his alcohol use; a major barrier to inpatient rehabilitation is that Diogo acts as the primary caregiver for his father. Diogo admits to fear of being recognized at a group therapy or AA meeting as the primary reason he has not engaged in outpatient treatment options. He has been prescribed naltrexone in the past but states that he has never taken it. Several questions were answered regarding treatment options, including naltrexone and vivitrol; Diogo is concerned about the cost of his hospital stay and ongoing medications due to possible lapse in insurance coverage.     Psychiatric Review Of Systems:  sleep: yes, insomnia  appetite changes: yes, decreased  weight changes: no  energy/anergy: yes, decreased  interest/pleasure/anhedonia: yes  somatic symptoms: no  anxiety/panic: yes  vania: no  guilty/hopeless: yes  self injurious behavior/risky behavior: yes, excessive alcohol use    Historical Information   Past Psychiatric History:   IP: none  OP: none  Past Psychiatric medication trials: Lexapro  Past Suicide attempts: none  Self harming behaviors: none  Past Violent behavior: none    Substance Abuse History:  Tobacco: denies  Alcohol: drinks 750 mL bottle of bourbon a day   Marijuana: denies   Denies other substances including LSD, PCP, K2, opioids including heroin, meth, cocaine, and recreational benzodiazepines.     I have assessed this patient for substance use within the past 12 months    History of IP/OP rehabilitation program: denies    Family Psychiatric History:   Psychiatric diagnoses: denies    Social History:  Education: college graduate  Marital history: , currently engaged  Living arrangement: The patient lives with father, acts as primary  caretaker.  Occupational History: unemployed  Functioning Relationships: fiance and .  Access to weapons: denies    Medical History:  History of seizures: unclear history; patient denies but withdrawal seizures listed in chart  History of head injuries: yes, has hit head while intoxicated requiring stitches    Past Medical History:   Diagnosis Date    Alcohol use disorder, severe, dependence (HCC) 04/02/2023    Alcohol withdrawal seizure (HCC)     Alcoholic ketoacidosis 10/16/2023    Anxiety     AR (allergic rhinitis)     Chronic alcoholic gastritis 04/02/2023    Depression     GERD (gastroesophageal reflux disease)     Hepatic steatosis 04/02/2023    Hyperlipidemia     Hypertension     IBS (irritable bowel syndrome)     Obesity     Rosacea     Vitamin B12 deficiency 02/14/2024    Vitamin D deficiency 02/14/2024       Medical Review Of Systems:  Review of Systems - General ROS: positive for  - chills and sleep disturbance  Psychological ROS: positive for - anxiety  Gastrointestinal ROS: positive for - diarrhea  Neurological ROS: positive for - tremors    Meds/Allergies   all current active meds have been reviewed and current meds:   Current Facility-Administered Medications:     acetaminophen (TYLENOL) tablet 650 mg, Q6H PRN    atorvastatin (LIPITOR) tablet 40 mg, Daily With Dinner    enoxaparin (LOVENOX) subcutaneous injection 40 mg, Daily    ergocalciferol (VITAMIN D2) capsule 50,000 Units, Weekly    escitalopram (LEXAPRO) tablet 20 mg, Daily    fish oil capsule 2,000 mg, Daily    folic acid 1 mg, thiamine (VITAMIN B1) 100 mg in sodium chloride 0.9 % 100 mL IV piggyback, Daily    hydrOXYzine HCL (ATARAX) tablet 25 mg, Q6H PRN    labetalol (NORMODYNE) injection 10 mg, Q6H PRN    lisinopril (ZESTRIL) tablet 40 mg, Daily    melatonin tablet 9 mg, HS PRN    multi-electrolyte (PLASMALYTE-A/ISOLYTE-S PH 7.4) IV solution, Continuous, Last Rate: 125 mL/hr (11/12/24 1321)    multivitamin stress formula tablet 1 tablet,  Daily    ondansetron (ZOFRAN) injection 4 mg, Q6H PRN    pantoprazole (PROTONIX) injection 40 mg, Q24H COLBY    Allergies   Allergen Reactions    Cefdinir Rash       Objective   Vital signs in last 24 hours:  Temp:  [98.2 °F (36.8 °C)-99.6 °F (37.6 °C)] 98.2 °F (36.8 °C)  HR:  [] 85  BP: (125-176)/() 143/97  Resp:  [18-22] 18  SpO2:  [93 %-97 %] 96 %  O2 Device: None (Room air)      Intake/Output Summary (Last 24 hours) at 11/12/2024 0912  Last data filed at 11/12/2024 0500  Gross per 24 hour   Intake 2050 ml   Output 0 ml   Net 2050 ml       Mental Status Evaluation:  Appearance:  appears stated age, laying in bed, and dressed in hospital attire   Behavior:  calm and cooperative   Speech:  normal rate and normal volume   Mood:  depressed   Affect:  Mood congruent   Language: naming objects   Thought Process:  goal directed and organized   Associations: intact associations   Thought Content:  no delusions elicited   Perceptual Disturbances: Denies auditory or visual hallucinations and Does not appear to be responding to internal stimuli   Risk Potential: Suicidal Ideations none and Homicidal Ideations none   Sensorium:  person, place, time/date, and situation   Memory:  recent and remote memory grossly intact   Cognition:  recent and remote memory grossly intact   Consciousness:  alert    Attention: attention span and concentration were age appropriate   Intellect: within normal limits   Fund of Knowledge: awareness of current events: yes   Insight:  fair   Judgment: fair   Muscle Strength and Tone: Not tested   Gait/Station: not assessed, patient in bed   Motor Activity: no abnormal movements     Suicide/Homicide Risk Assessment:    Risk of Harm to Self:   The following ratings are based on assessment at the time of the interview  Nursing Suicide Risk Assessment Last 24 hours:    Demographic risk factors include: ,  status, male, age: over 50 or older  Historical Risk Factors include:  chronic depression, alcohol use  Current Specific Risk Factors include: diagnosis of mood disorder, alcohol use  Protective Factors: ability to make plans for the future, stable housing, responsibilities and duties to others, safe and stable living environment, strong relationships  Weapons/Firearms: none and no firearms. The following steps have been taken to ensure weapons are properly secured: not applicable  Based on today's assessment, Diogo presents the following risk of harm to self: minimal    Risk of Harm to Others:  The following ratings are based on assessment at the time of the interview  Nursing Homicide Risk Assessment:    Demographic Risk Factors include: male, unemployed.  Historical Risk Factors include: alcohol abuse.  Current Specific Risk Factors include: diagnosis of mood disorder, recent substance use  Protective Factors: no current homicidal ideation, no prior history of violence, stable living environment, strong relationships, responsibilities and duties to others, restricted access to lethal means  Based on today's assessment, Diogo presents the following risk of harm to others: minimal      -----------------------------------    Lab Results:  I have personally reviewed all pertinent laboratory/tests results.  Most Recent Labs:   Lab Results   Component Value Date    WBC 4.83 11/12/2024    RBC 3.73 (L) 11/12/2024    HGB 12.3 11/12/2024    HCT 35.7 (L) 11/12/2024     (L) 11/12/2024    RDW 12.0 11/12/2024    NEUTROABS 3.35 11/12/2024    SODIUM 138 11/12/2024    K 3.9 11/12/2024     11/12/2024    CO2 25 11/12/2024    BUN 12 11/12/2024    CREATININE 0.95 11/12/2024    GLUC 98 11/12/2024    GLUF 61 (L) 02/14/2024    CALCIUM 8.3 (L) 11/12/2024    AST 45 (H) 11/12/2024    ALT 27 11/12/2024    ALKPHOS 80 11/12/2024    TP 6.1 (L) 11/12/2024    ALB 3.6 11/12/2024    TBILI 1.10 (H) 11/12/2024    CHOLESTEROL 303 (H) 02/14/2024    HDL 46 02/14/2024    TRIG 152 (H) 02/14/2024    LDLCALC 227  (H) 02/14/2024    NONHDLC 257 02/14/2024    AMMONIA 62 12/15/2023    OGV0MEAQHWOE 1.351 02/14/2024    HGBA1C 4.9 02/14/2024    EAG 94 02/14/2024       Code Status: Level 1 - Full Code    Bonny Humphries MD  PGY-2

## 2024-11-12 NOTE — ASSESSMENT & PLAN NOTE
Patient presented the ED on 11/11 due to alcohol withdrawal.  Patient reports last drink was 6 AM on 11/11 but was reported having heavy drinking the night prior till 2 AM.   Patient presented with significant tremor, lightheadedness that started earlier this morning along with episodes of nausea and vomiting nonbilious in nature.  But no hematemesis.   Denies having any sick contacts, bad food ingestion, chest pain, shortness of breath, palpitations  Of note patient was recently hospitalized alcohol withdrawal a month ago and was in the ICU and recommended transfer to [facility] which the patient declined and left AMA.   Vitals patient tachycardic 120s, WBC 12.14, anion gap 16, AST 99/ALT 48, alk phos 132, T. bili 1.21  Lactic acid 0.8<2.8  Coma panel neg  EKG sinus tachycardia heart rate 142, QTc 436  Troponins negative  Chest x-ray with no acute cardiopulmonary disease    In the ED patient received 2 L fluid bolus, Zofran, magnesium and Ativan 1 mg    Plan:  Toxicology was consulted recommended loading dose of phenobarbital 650 mg  Continue IV fluids hydration given elevated anion gap with isolated 125 cc/hour  N.p.o.-advance diet as tolerated  Initiate  mg thiamine/folate daily-transition to p.o. when able to tolerate  CIWA protocol  Could consider additional doses of phenobarbital 130 mg IV Q1H as needed for alcohol withdrawal symptoms  If symptoms worsen and withdrawal not under control consider stepdown versus ICU care  Toxicology on board-recommendation appreciated

## 2024-11-12 NOTE — QUICK NOTE
Medical toxicology brief update    Patient is improved per discussion with primary team  Received any medications for alcohol withdrawal since yesterday evening  Primary team reports patient is interested in naltrexone  Please discuss with patient if he has used any recreational opioids in the past week.  Do not see any opioids administered in the hospital on review of the MAR.  If the patient has used opioids in the past week, would hold administration about naltrexone for 1 week.  There are 2 therapeutic options for naltrexone either the IM injection versus oral naltrexone.  Patient is interested in oral naltrexone, can start at 25 mg for the first day followed by 50 mg daily.  Please consult case management for assistance with outpatient resources and follow-up.

## 2024-11-12 NOTE — H&P
H&P - Hospitalist   Name: Diogo Travis 59 y.o. male I MRN: [id number]  Unit/Bed#: S -01 I Date of Admission: 11/11/2024   Date of Service: 11/11/2024 I Hospital Day: 0     Assessment & Plan  Alcohol use disorder, severe, dependence (HCC)  Patient presented the ED on 11/11 due to alcohol withdrawal.  Patient reports last drink was 6 AM on 11/11 but was reported having heavy drinking the night prior till 2 AM.   Patient presented with significant tremor, lightheadedness that started earlier this morning along with episodes of nausea and vomiting nonbilious in nature.  But no hematemesis.   Denies having any sick contacts, bad food ingestion, chest pain, shortness of breath, palpitations  Of note patient was recently hospitalized alcohol withdrawal a month ago and was in the ICU and recommended transfer to [facility] which the patient declined and left AMA.   Vitals patient tachycardic 120s, WBC 12.14, anion gap 16, AST 99/ALT 48, alk phos 132, T. bili 1.21  Lactic acid 0.8<2.8  Coma panel neg  EKG sinus tachycardia heart rate 142, QTc 436  Troponins negative  Chest x-ray with no acute cardiopulmonary disease    In the ED patient received 2 L fluid bolus, Zofran, magnesium and Ativan 1 mg    Plan:  Toxicology was consulted recommended loading dose of phenobarbital 650 mg  Continue IV fluids hydration given elevated anion gap with isolated 125 cc/hour  N.p.o.-advance diet as tolerated  Initiate  mg thiamine/folate daily-transition to p.o. when able to tolerate  CIWA protocol  Could consider additional doses of phenobarbital 130 mg IV Q1H as needed for alcohol withdrawal symptoms  If symptoms worsen and withdrawal not under control consider stepdown versus ICU care  Toxicology on board-recommendation appreciated  Chronic alcoholic gastritis  Continue with IV Protonix  Zofran as needed  Hypertension  PTA lisinopril 40 mg daily    Will add labetalol as needed given n.p.o.  Anxiety  PTA on Lexapro 20  mg daily  As needed Atarax 25 mg every 6 hours  SIRS (systemic inflammatory response syndrome) (HCC)  Patient with tachycardia, tachypnea elevated WBC in the setting of alcohol withdrawal  See above for plan      VTE Pharmacologic Prophylaxis: VTE Score: 4 Moderate Risk (Score 3-4) - Pharmacological DVT Prophylaxis Ordered: enoxaparin (Lovenox).  Code Status: Level 1 - Full Code patient  Discussion with family: Updated  (significant other) at bedside.    Anticipated Length of Stay:     History of Present Illness   Chief Complaint: Alcohol withdrawal    Diogo Travis is a 59 y.o. male with a PMH hypertension, alcoholic gastritis, hyperlipidemia, anxiety/depression and severe alcohol dependence presents on 11/11 due to tremors, nausea/vomiting, lightheadedness that started earlier today morning on 11/11.  Patient reported having heavy night of binge drinking the night before.  Patient denies having any hematemesis, sick contacts, bad food ingestion, chest pain/shortness of breath, palpitations or any other acute symptoms.  Of note patient was recently hospitalized for alcohol withdrawal a month ago and was the ICU and recommended transfer to [facility] which the patient refused and left AMA.  In the ED patient was noted to have SIRS with tachycardia, tachypnea with 11 WBC with possible tremors although awake and oriented.  Patient received 2 L fluid bolus, Zofran, magnesium and Ativan 1 mg  Was further admitted to [facility] for management of his alcohol withdrawal.     Review of Systems   Constitutional:  Positive for diaphoresis. Negative for chills and fever.   HENT:  Negative for ear pain and sore throat.    Eyes:  Negative for pain and visual disturbance.   Respiratory:  Negative for cough and shortness of breath.    Cardiovascular:  Positive for palpitations. Negative for chest pain.   Gastrointestinal:  Negative for abdominal pain and vomiting.   Genitourinary:  Negative for dysuria and  hematuria.   Musculoskeletal:  Negative for arthralgias and back pain.   Skin:  Negative for color change and rash.   Neurological:  Positive for tremors and headaches. Negative for seizures and syncope.   Psychiatric/Behavioral:  Positive for agitation. Negative for suicidal ideas. The patient is nervous/anxious and is hyperactive.    All other systems reviewed and are negative.      Historical Information   Past Medical History:   Diagnosis Date    Alcohol use disorder, severe, dependence (HCC) 04/02/2023    Alcohol withdrawal seizure (HCC)     Alcoholic ketoacidosis 10/16/2023    Anxiety     AR (allergic rhinitis)     Chronic alcoholic gastritis 04/02/2023    Depression     GERD (gastroesophageal reflux disease)     Hepatic steatosis 04/02/2023    Hyperlipidemia     Hypertension     IBS (irritable bowel syndrome)     Obesity     Rosacea     Vitamin B12 deficiency 02/14/2024    Vitamin D deficiency 02/14/2024     Past Surgical History:   Procedure Laterality Date    ANTERIOR CRUCIATE LIGAMENT REPAIR Left     WISDOM TOOTH EXTRACTION       Social History     Tobacco Use    Smoking status: Some Days     Types: Cigarettes, Cigars     Start date: 1/1/2014     Passive exposure: Past (Father)    Smokeless tobacco: Never    Tobacco comments:     Smokes cigars 2-3 times per year   Vaping Use    Vaping status: Never Used   Substance and Sexual Activity    Alcohol use: Yes     Comment: Last ETOH 6/17/24    Drug use: Never    Sexual activity: Yes     Partners: Female     Birth control/protection: Post-menopausal     E-Cigarette/Vaping    E-Cigarette Use Never User      E-Cigarette/Vaping Substances    Nicotine No     THC No     CBD No     Flavoring No     Other No     Unknown No        Social History:  Marital Status: Single   Occupation:   Patient Pre-hospital Living Situation:   Patient Pre-hospital Level of Mobility:   Patient Pre-hospital Diet Restrictions:     Meds/Allergies   I have reviewed home medications with  patient personally.  Prior to Admission medications    Medication Sig Start Date End Date Taking? Authorizing Provider   atorvastatin (LIPITOR) 40 mg tablet Take 1 tablet (40 mg total) by mouth daily with dinner 8/27/24   Enid Altman DO   ergocalciferol (ERGOCALCIFEROL) 1.25 MG (25266 UT) capsule Take 1 capsule (50,000 Units total) by mouth once a week for 12 doses 7/9/24 9/25/24  Enid Altman DO   escitalopram (LEXAPRO) 20 mg tablet Take 1 tablet (20 mg total) by mouth daily 10/2/24   Enid Altman DO   folic acid (FOLVITE) 1 mg tablet Take 1 tablet (1 mg total) by mouth daily 10/12/24 11/11/24  Dequan Miller DO   hydrOXYzine HCL (ATARAX) 25 mg tablet Take 1 tablet (25 mg total) by mouth every 6 (six) hours as needed for itching 8/27/24   Enid Altman DO   lisinopril (ZESTRIL) 40 mg tablet Take 1 tablet (40 mg total) by mouth daily 7/9/24   Enid Altman DO   Magnesium 400 MG CAPS Take 1 capsule by mouth in the morning    Historical Provider, MD   Melatonin 10 MG TABS Take 1 tablet (10 mg total) by mouth daily at bedtime as needed (Insomnia) 7/9/24   Enid Altman DO   metroNIDAZOLE (METROCREAM) 0.75 % cream Apply topically 2 (two) times a day 7/9/24   Enid Altman DO   naltrexone (REVIA) 50 mg tablet Take 1 tablet (50 mg total) by mouth daily 7/9/24   Enid Altman DO   Omega-3 Fatty Acids (fish oil) 1,000 mg Take 2,000 mg by mouth daily    Historical Provider, MD   pantoprazole (PROTONIX) 40 mg tablet Take 1 tablet (40 mg total) by mouth daily in the early morning 8/27/24 2/23/25  Enid Altman DO   Thiamine Mononitrate (VITAMIN B1) 100 mg tablet Take 1 tablet (100 mg total) by mouth daily 7/9/24   Enid Altman DO     Allergies   Allergen Reactions    Cefdinir Rash       Objective :  Temp:  [98.4 °F (36.9 °C)-99.5 °F (37.5 °C)] 99.5 °F (37.5 °C)  HR:  [112-153] 112  BP: (132-176)/() 144/98  Resp:  [22] 22  SpO2:  [93 %-97 %] 95 %  O2 Device: None (Room  air)    Physical Exam  Vitals and nursing note reviewed.   Constitutional:       General: He is not in acute distress.     Appearance: He is well-developed. He is diaphoretic.   HENT:      Head: Normocephalic and atraumatic.   Eyes:      Conjunctiva/sclera: Conjunctivae normal.   Cardiovascular:      Rate and Rhythm: Regular rhythm. Tachycardia present.      Heart sounds: No murmur heard.  Pulmonary:      Effort: Pulmonary effort is normal. No respiratory distress.      Breath sounds: Normal breath sounds.   Abdominal:      Palpations: Abdomen is soft.      Tenderness: There is no abdominal tenderness.   Musculoskeletal:         General: No swelling.      Cervical back: Neck supple.   Skin:     General: Skin is warm.   Neurological:      Mental Status: He is alert.   Psychiatric:         Mood and Affect: Mood normal.      Comments: Very anxious appearing with tremors          Lines/Drains:            Lab Results: I have reviewed the following results:  Results from last 7 days   Lab Units 11/11/24  1425   WBC Thousand/uL 12.14*   HEMOGLOBIN g/dL 16.1   HEMATOCRIT % 46.3   PLATELETS Thousands/uL 194   SEGS PCT % 81*   LYMPHO PCT % 11*   MONO PCT % 7   EOS PCT % 0     Results from last 7 days   Lab Units 11/11/24  1425   SODIUM mmol/L 139   POTASSIUM mmol/L 4.8   CHLORIDE mmol/L 101   CO2 mmol/L 22   BUN mg/dL 10   CREATININE mg/dL 1.18   ANION GAP mmol/L 16*   CALCIUM mg/dL 10.0   ALBUMIN g/dL 5.1*   TOTAL BILIRUBIN mg/dL 1.21*   ALK PHOS U/L 132*   ALT U/L 48   AST U/L 99*   GLUCOSE RANDOM mg/dL 119             Lab Results   Component Value Date    HGBA1C 4.9 02/14/2024    HGBA1C 5.0 11/12/2019     Results from last 7 days   Lab Units 11/11/24  1652 11/11/24  1425   LACTIC ACID mmol/L 0.8 2.8*             Administrative Statements       ** Please Note: This note has been constructed using a voice recognition system. **

## 2024-11-12 NOTE — ASSESSMENT & PLAN NOTE
Withdrawal treatment as above  Encourage cessation  Appreciate case management recommendations in regards to disposition planning and resources  If patient is interested in naltrexone (IM Vivitrol versus PO Revia) to help minimize cravings and the feeling of reward after drinking alcohol, please let us know

## 2024-11-12 NOTE — ASSESSMENT & PLAN NOTE
-PTA on Lexapro 20 mg daily  -As needed Atarax 25 mg every 6 hours  -Psychiatry consulted, appreciate recommendations

## 2024-11-12 NOTE — ASSESSMENT & PLAN NOTE
Emergency department and primary team work-up, including labs, imaging, and other diagnostic testing was reviewed.   Diogo does not endorse suicidal or homicidal ideation and does not currently pose a danger to himself or others. He demonstrated future oriented and forward thinking attitude throughout his assessment. As such, he does not meet criteria for involuntary psychiatric inpatient admission.   Diogo endorses depression, anxiety, and insomnia not fully alleviated by his current regimen of Lexapro 20 mg QD. Recommend adding Remeron 15 mg HS to address his insomnia and depressive symptoms  Discussed chronic insomnia at length with various options for treatment, including addition of magnesium glycinate to address chronically depleted magnesium levels with potential benefit of improving sleep as seen in observational studies. Recommend magnesium glycinate dosed between 200-400 mg in the evening if patient wishes to obtain through prescription vs over the counter medication once discharged. Discussed melatonin use; in the past, patient has had limited benefit with 2-4 hours of sleep before reawakening several hours earlier than desired; recommend melatonin extended release to lengthen the benefit from melatonin.   Medical management per primary team  Psychiatry will continue to follow as needed. Please contact our service via [employer]ce with any additional questions or concerns. If contacting after hours, please call or [employer] the on-call team ([facility]: [phone]) with any questions or concerns.

## 2024-11-12 NOTE — PROGRESS NOTES
Progress Note - Hospitalist   Name: Diogo Travis 59 y.o. male I MRN: [id number]  Unit/Bed#: S -01 I Date of Admission: 11/11/2024   Date of Service: 11/12/2024 I Hospital Day: 1    Assessment & Plan  Alcohol use disorder, severe, dependence (HCC)  Patient presented the ED on 11/11 due to alcohol withdrawal.  Patient reports last drink was 6 AM on 11/11 but was reported having heavy drinking the night prior till 2 AM.   Patient presented with significant tremor, lightheadedness that started earlier this morning along with episodes of nausea and vomiting nonbilious in nature.  But no hematemesis.   Denies having any sick contacts, bad food ingestion, chest pain, shortness of breath, palpitations  Of note patient was recently hospitalized alcohol withdrawal a month ago and was in the ICU and recommended transfer to [facility] which the patient declined and left AMA.   Vitals patient tachycardic 120s, WBC 12.14, anion gap 16, AST 99/ALT 48, alk phos 132, T. bili 1.21  Lactic acid 0.8<2.8  Coma panel neg  EKG sinus tachycardia heart rate 142, QTc 436  Troponins negative  Chest x-ray with no acute cardiopulmonary disease    In the ED patient received 2 L fluid bolus, Zofran, magnesium and Ativan 1 mg    Plan:  Toxicology was consulted   Received loading dose of phenobarbital 650 mg  Could consider additional doses of phenobarbital 130 mg IV Q1H as needed for alcohol withdrawal symptoms  Patient amendable to naltrexone therapy  Patient amenable to to follow-up with medical toxicology outpatient  As per request of med tox physician I reached out to case management to arrange this outpatient med tox follow-up  Would need to start with IM naltrexone or p.o. 25 mg day 1 followed by 50 mg daily.  Patient cannot have received opiates in the last week prior to administration of naltrexone  UDS ordered  Currently regular diet, thus d/cd IVF  Transitioned to oral thiamine and folate  CIWA protocol  If symptoms  worsen and withdrawal not under control consider stepdown versus ICU care  Chronic alcoholic gastritis  Continue with IV Protonix  Zofran as needed  Hypertension  PTA lisinopril 40 mg daily  Anxiety  -PTA on Lexapro 20 mg daily  -As needed Atarax 25 mg every 6 hours  -Psychiatry consulted, appreciate recommendations  SIRS (systemic inflammatory response syndrome) (HCC)  Patient with tachycardia, tachypnea elevated WBC in the setting of alcohol withdrawal  See above for plan  Alcohol withdrawal syndrome without complication (HCC)  -Medical toxicology consulted appreciate recommendation  Major depressive disorder, recurrent, moderate (HCC)  -PTA on Lexapro 20 mg daily  -Psychiatry consulted, appreciate recommendations    VTE Pharmacologic Prophylaxis: VTE Score: 4 Moderate Risk (Score 3-4) - Pharmacological DVT Prophylaxis Ordered: enoxaparin (Lovenox).    Mobility:   Basic Mobility Inpatient Raw Score: 23  JH-HLM Goal: 7: Walk 25 feet or more  JH-HLM Achieved: 7: Walk 25 feet or more  JH-HLM Goal achieved. Continue to encourage appropriate mobility.    Patient Centered Rounds: I performed bedside rounds with nursing staff today.   Discussions with Specialists or Other Care Team Provider: Yes    Education and Discussions with Family / Patient: Updated  (fiancé) at bedside.    Current Length of Stay: 1 day(s)  Current Patient Status: Inpatient   Certification Statement: The patient will continue to require additional inpatient hospital stay due to alcohol detoxification  Discharge Plan: Anticipate discharge in 24-48 hrs to discharge location to be determined pending rehab evaluations.    Code Status: Level 1 - Full Code    Subjective   Patient was resting comfortably in bed at the time of examination.  He states that he had presented with lightheadedness that has since resolved.  While patient initially wanted to leave, after further discussion on rounds patient is amendable to stay and interested in  pursuing naltrexone therapy.  He does complain of a dry cough that has been stable the past few days.  He denies fever/chills, tremors, nausea, vomiting, chest pain, cough, shortness of breath, abdominal pain, changes in bowel or bladder habits.    Objective :  Temp:  [98.2 °F (36.8 °C)-99.6 °F (37.6 °C)] 98.2 °F (36.8 °C)  HR:  [] 98  BP: (125-176)/() 155/98  Resp:  [18-22] 18  SpO2:  [93 %-97 %] 94 %  O2 Device: None (Room air)    Body mass index is 33.86 kg/m².     Input and Output Summary (last 24 hours):     Intake/Output Summary (Last 24 hours) at 11/12/2024 1347  Last data filed at 11/12/2024 1301  Gross per 24 hour   Intake 2410 ml   Output 0 ml   Net 2410 ml       Physical Exam  Constitutional:       General: He is not in acute distress.     Appearance: He is not ill-appearing or toxic-appearing.   Cardiovascular:      Rate and Rhythm: Normal rate.      Heart sounds: Normal heart sounds. No murmur heard.  Pulmonary:      Effort: No respiratory distress.      Breath sounds: Normal breath sounds. No wheezing or rales.   Abdominal:      General: There is no distension.      Palpations: Abdomen is soft.      Tenderness: There is no abdominal tenderness. There is no guarding.   Musculoskeletal:      Right lower leg: No edema.      Left lower leg: No edema.   Neurological:      Mental Status: He is alert.      Coordination: Coordination normal.      Comments: No tremors in the bilateral upper extremities.           Lines/Drains:        Telemetry:  Telemetry Orders (From admission, onward)               24 Hour Telemetry Monitoring  (ED Bridging Orders Panel)  Continuous x 24 Hours (Telem)        Expiring   Question:  Reason for 24 Hour Telemetry  Answer:  Alcohol withdrawal and CIWA >7, electrolyte abnormalities, abnormal ECG and/or heart disease                     Telemetry Reviewed: Normal Sinus Rhythm  Indication for Continued Telemetry Use: Drug overdose known to cause cardiac arrhthymias                Lab Results: I have reviewed the following results:   Results from last 7 days   Lab Units 11/12/24  0537   WBC Thousand/uL 4.83   HEMOGLOBIN g/dL 12.3   HEMATOCRIT % 35.7*   PLATELETS Thousands/uL 119*   SEGS PCT % 70   LYMPHO PCT % 21   MONO PCT % 8   EOS PCT % 1     Results from last 7 days   Lab Units 11/12/24  0537   SODIUM mmol/L 138   POTASSIUM mmol/L 3.9   CHLORIDE mmol/L 106   CO2 mmol/L 25   BUN mg/dL 12   CREATININE mg/dL 0.95   ANION GAP mmol/L 7   CALCIUM mg/dL 8.3*   ALBUMIN g/dL 3.6   TOTAL BILIRUBIN mg/dL 1.10*   ALK PHOS U/L 80   ALT U/L 27   AST U/L 45*   GLUCOSE RANDOM mg/dL 98                 Results from last 7 days   Lab Units 11/11/24  1652 11/11/24  1425   LACTIC ACID mmol/L 0.8 2.8*       Recent Cultures (last 7 days):         Imaging Results Review: I reviewed radiology reports from this admission including: chest xray.  Other Study Results Review: EKG was reviewed.     Last 24 Hours Medication List:     Current Facility-Administered Medications:     acetaminophen (TYLENOL) tablet 650 mg, Q6H PRN    atorvastatin (LIPITOR) tablet 40 mg, Daily With Dinner    enoxaparin (LOVENOX) subcutaneous injection 40 mg, Daily    ergocalciferol (VITAMIN D2) capsule 50,000 Units, Weekly    escitalopram (LEXAPRO) tablet 20 mg, Daily    fish oil capsule 2,000 mg, Daily    folic acid 1 mg, thiamine (VITAMIN B1) 100 mg in sodium chloride 0.9 % 100 mL IV piggyback, Daily    hydrOXYzine HCL (ATARAX) tablet 25 mg, Q6H PRN    labetalol (NORMODYNE) injection 10 mg, Q6H PRN    lisinopril (ZESTRIL) tablet 40 mg, Daily    melatonin tablet 9 mg, HS PRN    multi-electrolyte (PLASMALYTE-A/ISOLYTE-S PH 7.4) IV solution, Continuous, Last Rate: 125 mL/hr (11/12/24 0552)    multivitamin stress formula tablet 1 tablet, Daily    ondansetron (ZOFRAN) injection 4 mg, Q6H PRN    pantoprazole (PROTONIX) injection 40 mg, Q24H COLBY    Administrative Statements   Today, Patient Was Seen By: Christine Roberts MD      **Please Note:  This note may have been constructed using a voice recognition system.**

## 2024-11-12 NOTE — ASSESSMENT & PLAN NOTE
Patient with tachycardia, tachypnea elevated WBC in the setting of alcohol withdrawal  See above for plan

## 2024-11-12 NOTE — CASE MANAGEMENT
Case Management Discharge Planning Note    Patient name iDogo Travis  Location S /S -01 MRN [id number]  : 1965 Date 2024       Current Admission Date: 2024  Current Admission Diagnosis:Alcohol use disorder, severe, dependence (HCC)   Patient Active Problem List    Diagnosis Date Noted Date Diagnosed    Major depressive disorder, recurrent, moderate (HCC) 2024     SIRS (systemic inflammatory response syndrome) (HCC) 2024     Alcohol withdrawal syndrome without complication ([facility]) 10/10/2024     Diarrhea 2024     Obesity 2024     Anxiety 2024     Depression 2024     Hyperlipidemia 2024     GERD (gastroesophageal reflux disease) 2024     IBS (irritable bowel syndrome) 2024     AR (allergic rhinitis) 2024     Rosacea 2024     Vitamin D deficiency 2024     Vitamin B12 deficiency 2024     Rash and nonspecific skin eruption 2024     Sinus tachycardia 2023     Chest tightness 2023     Elevated lactic acid level 10/01/2023     Hypokalemia 2023     Thrombocytopenia (HCC) 2023     Alcohol use disorder, severe, dependence (HCC) 2023     Hypomagnesemia 2023     Hepatic steatosis 2023     Chronic alcoholic gastritis 2023     Hypertension 2023     Tinea corporis 2023     Coronary artery calcification of native artery 2014       LOS (days): 1  Geometric Mean LOS (GMLOS) (days):   Days to GMLOS:     OBJECTIVE:  Risk of Unplanned Readmission Score: 24.74         Current admission status: Inpatient   Preferred Pharmacy:   [id number]  [address] [address]  Phone: [phone] Fax: [phone]    Primary Care Provider: Enid Altman DO    Primary Insurance: [employer]REE FORBES PENDING  Secondary Insurance:     DISCHARGE DETAILS:      Other Referral/Resources/Interventions Provided:  Referral  Comments: CATCH liaison met w/ Pt at bedside. Pt is not intersted in IP rehab at this time, but was agreeable to OP AA resources.

## 2024-11-12 NOTE — UTILIZATION REVIEW
Initial Clinical Review    Admission: Date/Time/Statement:   Admission Orders (From admission, onward)       Ordered        11/11/24 1656  INPATIENT ADMISSION  Once                          Orders Placed This Encounter   Procedures    INPATIENT ADMISSION     Standing Status:   Standing     Number of Occurrences:   1     Order Specific Question:   Level of Care     Answer:   Med Surg [16]     Order Specific Question:   Estimated length of stay     Answer:   More than 2 Midnights     Order Specific Question:   Certification     Answer:   I certify that inpatient services are medically necessary for this patient for a duration of greater than two midnights. See H&P and MD Progress Notes for additional information about the patient's course of treatment.     ED Arrival Information       Expected   -    Arrival   11/11/2024 13:11    Acuity   Emergent              Means of arrival   Walk-In    Escorted by   Self    Service   Hospitalist    Admission type   Emergency              Arrival complaint   dizziness and lightheaded             Chief Complaint   Patient presents with    Alcohol Problem     Pt reports he has nausea and lightheaded and it is related to drinking. Pt reports he last drank at 0530 this am 2 oz.. but he drank more the night before       Initial Presentation: 59 y.o. male presents to ed from home for evaluation and treatment of dizziness associated with diaphoresis, nausea,  and diarrhea.   Last alcoholic drink at 0530. PMHX:  ALCOHOL ABUSE/ PRIOR WITHDRAWAL, HTN, IBS.  Clinical assessment significant for Ciwa =25, tachycardia, hypertension, pain 8-9/10. Platelets 125, Alk Phos 132, T bili 1.21, Mag 1.3, LA 12.14. Initially treated with iv .9% ns bolus 2L, iv zofran x1, iv ativan x1, iv Mag x 1.  Admit to inpatient med surg for alcohol use disorder, chronic alcoholic gastritis.  Plan includes : consult toxicology, iv hydration, ciwa protocol, iv thiamine / folate/ multivitamins, iv protonix, CIWA  assessments, trend CMP, CBC/ DIFF.      Toxicology consult: give loading dose of phenobarbital. Continue iv fluids, iv thiamine/ folic acid/ multi vitamin.  If sedative gtt needed recommend Ketamine over precedex.  Monitor telemetry. Of note patient was recently hospitalized alcohol withdrawal a month ago and was in the ICU and recommended transfer to [facility] which the patient declined and left AMA.       Date: 11- 12-24    Day 2: inpatient med surg     Platelet 119.  Ciwa : 3>2>1  continue iv folic acid, iv thiamine, po multi vitamin with iv protonix q24 hr and iv multi electrolyte 125/hr.  Patient discussed with Toxicology about starting po naltrexone.  Patient is interested in oral naltrexone at 35 mg for first day followed by 50 mg daily.       ED Treatment-Medication Administration from 11/11/2024 1311 to 11/11/2024 1934         Date/Time Order Dose Route Action     11/11/2024 1424 sodium chloride 0.9 % bolus 1,000 mL 1,000 mL Intravenous New Bag     11/11/2024 1429 ondansetron (ZOFRAN) injection 4 mg 4 mg Intravenous Given     11/11/2024 1429 LORazepam (ATIVAN) injection 1 mg 1 mg Intravenous Given     11/11/2024 1517 magnesium sulfate 2 g/50 mL IVPB (premix) 2 g 2 g Intravenous New Bag     11/11/2024 1524 sodium chloride 0.9 % bolus 1,000 mL 1,000 mL Intravenous New Bag         Scheduled Medications:  atorvastatin, 40 mg, Oral, Daily With Dinner  enoxaparin, 40 mg, Subcutaneous, Daily  ergocalciferol, 50,000 Units, Oral, Weekly  escitalopram, 20 mg, Oral, Daily  fish oil, 2,000 mg, Oral, Daily  folic acid 1 mg, thiamine (VITAMIN B1) 100 mg in sodium chloride 0.9 % 100 mL IV piggyback, , Intravenous, Daily  lisinopril, 40 mg, Oral, Daily  multivitamin stress formula, 1 tablet, Oral, Daily  pantoprazole, 40 mg, Intravenous, Q24H COLBY      Continuous IV Infusions:  multi-electrolyte, 125 mL/hr, Intravenous, Continuous      PRN Meds:  acetaminophen, 650 mg, Oral, Q6H PRN  hydrOXYzine HCL, 25 mg, Oral,  Q6H PRN  labetalol, 10 mg, Intravenous, Q6H PRN  melatonin, 9 mg, Oral, HS PRN  ondansetron, 4 mg, Intravenous, Q6H PRN      ED Triage Vitals   Temperature Pulse Respirations Blood Pressure SpO2 Pain Score   11/11/24 1341 11/11/24 1339 11/11/24 1339 11/11/24 1339 11/11/24 1339 11/11/24 1430   98.4 °F (36.9 °C) (!) 153 22 146/99 96 % 2     Weight (last 2 days)       Date/Time Weight    11/11/24 19:33:38 101 (222.66)            Vital Signs (last 3 days)       Date/Time Temp Pulse Resp BP MAP (mmHg) SpO2 O2 Device Bean Coma Scale Score CIWA-Ar Total Pain    11/12/24 09:17:55 -- 98 -- 155/98 117 94 % -- -- -- --    11/12/24 0900 -- -- -- -- -- -- -- -- 1 --    11/12/24 0734 98.2 °F (36.8 °C) 85 18 143/97 112 96 % None (Room air) -- -- --    11/12/24 0700 -- -- -- -- -- -- -- -- 2 --    11/12/24 0300 -- 82 -- -- -- -- -- -- 3 --    11/12/24 02:57:38 -- 81 -- 125/89 101 95 % -- -- -- --    11/11/24 2255 -- 102 -- 143/93 -- -- -- -- 5 --    11/11/24 2144 -- 104 -- 133/89 -- -- -- -- 12 --    11/11/24 21:00:43 99.6 °F (37.6 °C) 109 19 135/89 104 93 % -- -- -- --    11/11/24 2100 -- -- -- -- -- -- None (Room air) 15 -- --    11/11/24 20:46:34 -- 107 -- 159/102 121 93 % -- -- -- --    11/11/24 2045 -- -- -- -- -- -- -- -- -- 8    11/11/24 2040 -- 105 -- 159/102 -- -- -- -- 16 --    11/11/24 2032 -- -- -- -- -- -- -- -- -- 9    11/11/24 19:33:38 99.5 °F (37.5 °C) 112 22 144/98 113 95 % None (Room air) -- -- --    11/11/24 1900 -- -- -- -- -- -- -- -- 16 --    11/11/24 1800 -- 121 22 149/84 110 95 % None (Room air) -- -- --    11/11/24 1630 -- 117 22 141/85 108 93 % None (Room air) -- -- --    11/11/24 1459 -- 123 -- 132/79 102 93 % -- -- -- --    11/11/24 1450 -- 121 22 167/84 116 93 % None (Room air) -- -- --    11/11/24 1430 -- -- -- -- -- -- -- 15 -- 2    11/11/24 1421 -- 135 -- 152/89 116 97 % -- -- -- --    11/11/24 1415 -- 142 -- 176/113 -- -- -- -- 25 --    11/11/24 1341 98.4 °F (36.9 °C) -- -- -- -- -- -- -- -- --     11/11/24 1339 -- 153 22 146/99 -- 96 % None (Room air) -- -- --        CIWA-Ar Score       Row Name 11/12/24 0900 11/12/24 0700 11/12/24 0300       CIWA-Ar    Pulse -- -- 82    Nausea and Vomiting 0 0 0    Tactile Disturbances 0 0 0    Tremor 0 1 2    Auditory Disturbances 0 0 0    Paroxysmal Sweats 0 0 0    Visual Disturbances 0 0 1    Anxiety 1 1 0    Headache, Fullness in Head 0 0 0    Agitation 0 0 0    Orientation and Clouding of Sensorium 0 0 0    CIWA-Ar Total 1 2 3      Row Name 11/11/24 2255 11/11/24 2144 11/11/24 2040       CIWA-Ar    /93 133/89 159/102    Pulse 102 104 105    Nausea and Vomiting 0 0 0    Tactile Disturbances 0 0 0    Tremor 2 2 2    Auditory Disturbances 0 0 0    Paroxysmal Sweats 0 1 2    Visual Disturbances 1 1 1    Anxiety 2 4 5    Headache, Fullness in Head 0 3 5    Agitation 0 1 1    Orientation and Clouding of Sensorium 0 0 0    CIWA-Ar Total 5 12 16      Row Name 11/11/24 1900 11/11/24 1415          CIWA-Ar    BP -- 176/113     Pulse -- 142     Nausea and Vomiting 0 1     Tactile Disturbances 0 3     Tremor 3 6     Auditory Disturbances 0 1     Paroxysmal Sweats 1 4     Visual Disturbances 0 3     Anxiety 4 3     Headache, Fullness in Head 5 1     Agitation 3 3     Orientation and Clouding of Sensorium 0 0     CIWA-Ar Total 16 25                  Pertinent Labs/Diagnostic Test Results:   Radiology:  XR chest 1 view portable   Final   (11/11 1557)      No acute cardiopulmonary disease on this examination, which is somewhat limited secondary to low lung volumes.        Cardiology:  No orders to display     GI:  No orders to display           Results from last 7 days   Lab Units 11/12/24  0537 11/11/24  1950 11/11/24  1425   WBC Thousand/uL 4.83  --  12.14*   HEMOGLOBIN g/dL 12.3  --  16.1   HEMATOCRIT % 35.7*  --  46.3   PLATELETS Thousands/uL 119* 125* 194   TOTAL NEUT ABS Thousands/µL 3.35  --  9.82*         Results from last 7 days   Lab Units 11/12/24  0537  11/11/24  1425   SODIUM mmol/L 138 139   POTASSIUM mmol/L 3.9 4.8   CHLORIDE mmol/L 106 101   CO2 mmol/L 25 22   ANION GAP mmol/L 7 16*   BUN mg/dL 12 10   CREATININE mg/dL 0.95 1.18   EGFR ml/min/1.73sq m 87 67   CALCIUM mg/dL 8.3* 10.0   MAGNESIUM mg/dL 2.0 1.3*     Results from last 7 days   Lab Units 11/12/24  0537 11/11/24  1425   AST U/L 45* 99*   ALT U/L 27 48   ALK PHOS U/L 80 132*   TOTAL PROTEIN g/dL 6.1* 8.3   ALBUMIN g/dL 3.6 5.1*   TOTAL BILIRUBIN mg/dL 1.10* 1.21*         Results from last 7 days   Lab Units 11/12/24  0537 11/11/24  1425   GLUCOSE RANDOM mg/dL 98 119             BETA-HYDROXYBUTYRATE   Date Value Ref Range Status   10/16/2023 2.9 (H) <0.6 mmol/L Final   10/01/2023 0.8 (H) <0.6 mmol/L Final     Results from last 7 days   Lab Units 11/11/24  1425   HS TNI 0HR ng/L 3     Results from last 7 days   Lab Units 11/11/24  1652 11/11/24  1425   LACTIC ACID mmol/L 0.8 2.8*     Results from last 7 days   Lab Units 11/11/24  1425   LIPASE u/L 27     Results from last 7 days   Lab Units 11/11/24  1425   ETHANOL LVL mg/dL <10   ACETAMINOPHEN LVL ug/mL <2*   SALICYLATE LVL mg/dL <5     Past Medical History:   Diagnosis Date    Alcohol use disorder, severe, dependence (HCC) 04/02/2023    Alcohol withdrawal seizure (HCC)     Alcoholic ketoacidosis 10/16/2023    Anxiety     AR (allergic rhinitis)     Chronic alcoholic gastritis 04/02/2023    Depression     GERD (gastroesophageal reflux disease)     Hepatic steatosis 04/02/2023    Hyperlipidemia     Hypertension     IBS (irritable bowel syndrome)     Obesity     Rosacea     Vitamin B12 deficiency 02/14/2024    Vitamin D deficiency 02/14/2024     Present on Admission:   Alcohol use disorder, severe, dependence (HCC)   Chronic alcoholic gastritis   Hypertension   Anxiety   Alcohol withdrawal syndrome without complication (HCC)      Admitting Diagnosis:     Alcohol withdrawal (HCC) [F10.939]  Diarrhea [R19.7]  Hypomagnesemia [E83.42]  Nausea & vomiting  [R11.2]  Elevated lactic acid level [R79.89]    Age/Sex: 59 y.o. male    Network Utilization Review Department  ATTENTION: Please call with any questions or concerns to [phone] and carefully listen to the prompts so that you are directed to the right person. All voicemails are confidential.   For Discharge needs, contact Care Management DC Support Team at [phone] opt. [phone]  Send all requests for admission clinical reviews, approved or denied determinations and any other requests to dedicated fax number below belonging to the campus where the patient is receiving treatment. List of dedicated fax numbers for the Facilities:  FACILITY NAME UR FAX NUMBER   ADMISSION DENIALS (Administrative/Medical Necessity) [phone]   DISCHARGE SUPPORT TEAM (NETWORK) [phone]   PARENT CHILD HEALTH (Maternity/NICU/Pediatrics) [phone]   [facility] [phone]   [facility] [phone]   formerly [facility] [phone]   [facility] [phone]   [facility] [phone]   [facility] [phone]   [facility] [phone]   [facility] [phone]   [facility] [phone]   [facility] [phone]   [facility] [phone]   [facility] [phone]

## 2024-11-12 NOTE — ASSESSMENT & PLAN NOTE
Patient presented the ED on 11/11 due to alcohol withdrawal.  Patient reports last drink was 6 AM on 11/11 but was reported having heavy drinking the night prior till 2 AM.   Patient presented with significant tremor, lightheadedness that started earlier this morning along with episodes of nausea and vomiting nonbilious in nature.  But no hematemesis.   Denies having any sick contacts, bad food ingestion, chest pain, shortness of breath, palpitations  Of note patient was recently hospitalized alcohol withdrawal a month ago and was in the ICU and recommended transfer to [facility] which the patient declined and left AMA.   Vitals patient tachycardic 120s, WBC 12.14, anion gap 16, AST 99/ALT 48, alk phos 132, T. bili 1.21  Lactic acid 0.8<2.8  Coma panel neg  EKG sinus tachycardia heart rate 142, QTc 436  Troponins negative  Chest x-ray with no acute cardiopulmonary disease    In the ED patient received 2 L fluid bolus, Zofran, magnesium and Ativan 1 mg    Plan:  Toxicology was consulted   Received loading dose of phenobarbital 650 mg  Could consider additional doses of phenobarbital 130 mg IV Q1H as needed for alcohol withdrawal symptoms  Patient amendable to naltrexone therapy  Patient amenable to to follow-up with medical toxicology outpatient  As per request of med tox physician I reached out to case management to arrange this outpatient med tox follow-up  Would need to start with IM naltrexone or p.o. 25 mg day 1 followed by 50 mg daily.  Patient cannot have received opiates in the last week prior to administration of naltrexone  UDS ordered  Currently regular diet, thus d/cd IVF  Transitioned to oral thiamine and folate  CIWA protocol  If symptoms worsen and withdrawal not under control consider stepdown versus ICU care

## 2024-11-12 NOTE — PLAN OF CARE
Problem: PAIN - ADULT  Goal: Verbalizes/displays adequate comfort level or baseline comfort level  Description: Interventions:  - Encourage patient to monitor pain and request assistance  - Assess pain using appropriate pain scale  - Administer analgesics based on type and severity of pain and evaluate response  - Implement non-pharmacological measures as appropriate and evaluate response  - Consider cultural and social influences on pain and pain management  - Notify physician/advanced practitioner if interventions unsuccessful or patient reports new pain  Outcome: Progressing     Problem: INFECTION - ADULT  Goal: Absence or prevention of progression during hospitalization  Description: INTERVENTIONS:  - Assess and monitor for signs and symptoms of infection  - Monitor lab/diagnostic results  - Monitor all insertion sites, i.e. indwelling lines, tubes, and drains  - Monitor endotracheal if appropriate and nasal secretions for changes in amount and color  - [place] appropriate cooling/warming therapies per order  - Administer medications as ordered  - Instruct and encourage patient and family to use good hand hygiene technique  - Identify and instruct in appropriate isolation precautions for identified infection/condition  Outcome: Progressing  Goal: Absence of fever/infection during neutropenic period  Description: INTERVENTIONS:  - Monitor WBC    Outcome: Progressing     Problem: SAFETY ADULT  Goal: Patient will remain free of falls  Description: INTERVENTIONS:  - Educate patient/family on patient safety including physical limitations  - Instruct patient to call for assistance with activity   - Consult OT/PT to assist with strengthening/mobility   - Keep Call bell within reach  - Keep bed low and locked with side rails adjusted as appropriate  - Keep care items and personal belongings within reach  - Initiate and maintain comfort rounds  - Make Fall Risk Sign visible to staff  - Offer Toileting every 2 Hours,  in advance of need  - Initiate/Maintain alarm  - Obtain necessary fall risk management equipment:   - Apply yellow socks and bracelet for high fall risk patients  - Consider moving patient to room near nurses station  Outcome: Progressing  Goal: Maintain or return to baseline ADL function  Description: INTERVENTIONS:  -  Assess patient's ability to carry out ADLs; assess patient's baseline for ADL function and identify physical deficits which impact ability to perform ADLs (bathing, care of mouth/teeth, toileting, grooming, dressing, etc.)  - Assess/evaluate cause of self-care deficits   - Assess range of motion  - Assess patient's mobility; develop plan if impaired  - Assess patient's need for assistive devices and provide as appropriate  - Encourage maximum independence but intervene and supervise when necessary  - Involve family in performance of ADLs  - Assess for home care needs following discharge   - Consider OT consult to assist with ADL evaluation and planning for discharge  - Provide patient education as appropriate  Outcome: Progressing  Goal: Maintains/Returns to pre admission functional level  Description: INTERVENTIONS:  - Perform AM-PAC 6 Click Basic Mobility/ Daily Activity assessment daily.  - Set and communicate daily mobility goal to care team and patient/family/caregiver.   - Collaborate with rehabilitation services on mobility goals if consulted  - Perform Range of Motion 2 times a day.  - Reposition patient every 2 hours.  - Dangle patient 2 times a day  - Stand patient 2 times a day  - Ambulate patient 2 times a day  - Out of bed to chair 2 times a day   - Out of bed for meals 2 times a day  - Out of bed for toileting  - Record patient progress and toleration of activity level   Outcome: Progressing     Problem: DISCHARGE PLANNING  Goal: Discharge to home or other facility with appropriate resources  Description: INTERVENTIONS:  - Identify barriers to discharge w/patient and caregiver  -  Arrange for needed discharge resources and transportation as appropriate  - Identify discharge learning needs (meds, wound care, etc.)  - Arrange for interpretive services to assist at discharge as needed  - Refer to Case Management Department for coordinating discharge planning if the patient needs post-hospital services based on physician/advanced practitioner order or complex needs related to functional status, cognitive ability, or social support system  Outcome: Progressing     Problem: Knowledge Deficit  Goal: Patient/family/caregiver demonstrates understanding of disease process, treatment plan, medications, and discharge instructions  Description: Complete learning assessment and assess knowledge base.  Interventions:  - Provide teaching at level of understanding  - Provide teaching via preferred learning methods  Outcome: Progressing     Problem: Potential for Falls  Goal: Patient will remain free of falls  Description: INTERVENTIONS:  - Educate patient/family on patient safety including physical limitations  - Instruct patient to call for assistance with activity   - Consult OT/PT to assist with strengthening/mobility   - Keep Call bell within reach  - Keep bed low and locked with side rails adjusted as appropriate  - Keep care items and personal belongings within reach  - Initiate and maintain comfort rounds  - Make Fall Risk Sign visible to staff  - Offer Toileting every 2 Hours, in advance of need  - Initiate/Maintain alarm  - Obtain necessary fall risk management equipment:   - Apply yellow socks and bracelet for high fall risk patients  - Consider moving patient to room near nurses station  Outcome: Progressing

## 2024-11-12 NOTE — ASSESSMENT & PLAN NOTE
Give loading dose of phenobarbital 10 mg/kg of ideal body weight, max 650 mg  Continue CIWA/benzodiazepines or can consider continuing phenobarbital.  Please see below for more specific dosing of diazepam versus phenobarbital.  Supportive care including IV fluids, antiemetics, not on opioid analgesics, and antidiarrheals as needed  Thiamine, folic acid, multivitamin  If sedative drip is required as adjunct, recommend ketamine over Precedex due to its NMDA receptor antagonism, which is directly responsible for the pathophysiology of alcohol withdrawal.    The dose is 0.3 mg/kg/h and can be reduced to 0.15 mg/kg/h after 24 hours.  If Precedex is used as an adjunct, scheduled CLAUDIA agonist such as benzodiazepines or barbiturates should be given as Precedex is a CNS depressant only and has no effect on the withdrawal pathway

## 2024-11-13 VITALS
HEART RATE: 78 BPM | OXYGEN SATURATION: 94 % | SYSTOLIC BLOOD PRESSURE: 147 MMHG | WEIGHT: 222.66 LBS | DIASTOLIC BLOOD PRESSURE: 89 MMHG | RESPIRATION RATE: 18 BRPM | TEMPERATURE: 97.7 F | BODY MASS INDEX: 33.86 KG/M2

## 2024-11-13 PROBLEM — R65.10 SIRS (SYSTEMIC INFLAMMATORY RESPONSE SYNDROME) (HCC): Status: RESOLVED | Noted: 2024-11-11 | Resolved: 2024-11-13

## 2024-11-13 LAB
ALBUMIN SERPL BCG-MCNC: 3.6 G/DL (ref 3.5–5)
ALP SERPL-CCNC: 80 U/L (ref 34–104)
ALT SERPL W P-5'-P-CCNC: 21 U/L (ref 7–52)
ANION GAP SERPL CALCULATED.3IONS-SCNC: 6 MMOL/L (ref 4–13)
AST SERPL W P-5'-P-CCNC: 32 U/L (ref 13–39)
BILIRUB SERPL-MCNC: 0.76 MG/DL (ref 0.2–1)
BUN SERPL-MCNC: 12 MG/DL (ref 5–25)
CALCIUM SERPL-MCNC: 8.7 MG/DL (ref 8.4–10.2)
CHLORIDE SERPL-SCNC: 107 MMOL/L (ref 96–108)
CO2 SERPL-SCNC: 27 MMOL/L (ref 21–32)
CREAT SERPL-MCNC: 0.87 MG/DL (ref 0.6–1.3)
ERYTHROCYTE [DISTWIDTH] IN BLOOD BY AUTOMATED COUNT: 11.9 % (ref 11.6–15.1)
GFR SERPL CREATININE-BSD FRML MDRD: 95 ML/MIN/1.73SQ M
GLUCOSE SERPL-MCNC: 88 MG/DL (ref 65–140)
HCT VFR BLD AUTO: 34.7 % (ref 36.5–49.3)
HGB BLD-MCNC: 12 G/DL (ref 12–17)
MAGNESIUM SERPL-MCNC: 1.6 MG/DL (ref 1.9–2.7)
MCH RBC QN AUTO: 32.9 PG (ref 26.8–34.3)
MCHC RBC AUTO-ENTMCNC: 34.6 G/DL (ref 31.4–37.4)
MCV RBC AUTO: 95 FL (ref 82–98)
PLATELET # BLD AUTO: 111 THOUSANDS/UL (ref 149–390)
PMV BLD AUTO: 8.9 FL (ref 8.9–12.7)
POTASSIUM SERPL-SCNC: 3.7 MMOL/L (ref 3.5–5.3)
PROT SERPL-MCNC: 6.2 G/DL (ref 6.4–8.4)
RBC # BLD AUTO: 3.65 MILLION/UL (ref 3.88–5.62)
SODIUM SERPL-SCNC: 140 MMOL/L (ref 135–147)
WBC # BLD AUTO: 3.88 THOUSAND/UL (ref 4.31–10.16)

## 2024-11-13 PROCEDURE — NC001 PR NO CHARGE: Performed by: PSYCHIATRY & NEUROLOGY

## 2024-11-13 PROCEDURE — 83735 ASSAY OF MAGNESIUM: CPT

## 2024-11-13 PROCEDURE — 85027 COMPLETE CBC AUTOMATED: CPT

## 2024-11-13 PROCEDURE — 99239 HOSP IP/OBS DSCHRG MGMT >30: CPT | Performed by: INTERNAL MEDICINE

## 2024-11-13 PROCEDURE — 80053 COMPREHEN METABOLIC PANEL: CPT

## 2024-11-13 RX ORDER — MAGNESIUM SULFATE HEPTAHYDRATE 40 MG/ML
4 INJECTION, SOLUTION INTRAVENOUS ONCE
Status: COMPLETED | OUTPATIENT
Start: 2024-11-13 | End: 2024-11-13

## 2024-11-13 RX ORDER — MIRTAZAPINE 15 MG/1
15 TABLET, FILM COATED ORAL
Qty: 30 TABLET | Refills: 0 | Status: SHIPPED | OUTPATIENT
Start: 2024-11-13

## 2024-11-13 RX ORDER — NALTREXONE HYDROCHLORIDE 50 MG/1
50 TABLET, FILM COATED ORAL DAILY
Qty: 30 TABLET | Refills: 0 | Status: SHIPPED | OUTPATIENT
Start: 2024-11-13

## 2024-11-13 RX ORDER — NALTREXONE HYDROCHLORIDE 50 MG/1
25 TABLET, FILM COATED ORAL ONCE
Status: COMPLETED | OUTPATIENT
Start: 2024-11-13 | End: 2024-11-13

## 2024-11-13 RX ADMIN — Medication 100 MG: at 09:07

## 2024-11-13 RX ADMIN — LISINOPRIL 40 MG: 20 TABLET ORAL at 09:06

## 2024-11-13 RX ADMIN — B-COMPLEX W/ C & FOLIC ACID TAB 1 TABLET: TAB at 09:07

## 2024-11-13 RX ADMIN — ESCITALOPRAM OXALATE 20 MG: 20 TABLET ORAL at 09:07

## 2024-11-13 RX ADMIN — MAGNESIUM SULFATE HEPTAHYDRATE 4 G: 40 INJECTION, SOLUTION INTRAVENOUS at 09:07

## 2024-11-13 RX ADMIN — ENOXAPARIN SODIUM 40 MG: 40 INJECTION SUBCUTANEOUS at 09:06

## 2024-11-13 RX ADMIN — OMEGA-3 FATTY ACIDS CAP 1000 MG 2000 MG: 1000 CAP at 09:06

## 2024-11-13 RX ADMIN — PANTOPRAZOLE SODIUM 40 MG: 40 INJECTION, POWDER, FOR SOLUTION INTRAVENOUS at 09:06

## 2024-11-13 RX ADMIN — NALTREXONE HYDROCHLORIDE 25 MG: 50 TABLET, FILM COATED ORAL at 12:23

## 2024-11-13 RX ADMIN — FOLIC ACID 1 MG: 1 TABLET ORAL at 09:07

## 2024-11-13 NOTE — ASSESSMENT & PLAN NOTE
Patient required 2 mg Ativan at roughly 6 PM and 8:30 PM 11/12 for anxiety    Plan:   naltrexone 50 mg daily upon discharge  Patient provided with [facility] outpatient phone number for outpatient follow-up.

## 2024-11-13 NOTE — PLAN OF CARE
Problem: PAIN - ADULT  Goal: Verbalizes/displays adequate comfort level or baseline comfort level  Description: Interventions:  - Encourage patient to monitor pain and request assistance  - Assess pain using appropriate pain scale  - Administer analgesics based on type and severity of pain and evaluate response  - Implement non-pharmacological measures as appropriate and evaluate response  - Consider cultural and social influences on pain and pain management  - Notify physician/advanced practitioner if interventions unsuccessful or patient reports new pain  Outcome: Progressing     Problem: INFECTION - ADULT  Goal: Absence or prevention of progression during hospitalization  Description: INTERVENTIONS:  - Assess and monitor for signs and symptoms of infection  - Monitor lab/diagnostic results  - Monitor all insertion sites, i.e. indwelling lines, tubes, and drains  - Monitor endotracheal if appropriate and nasal secretions for changes in amount and color  - [place] appropriate cooling/warming therapies per order  - Administer medications as ordered  - Instruct and encourage patient and family to use good hand hygiene technique  - Identify and instruct in appropriate isolation precautions for identified infection/condition  Outcome: Progressing  Goal: Absence of fever/infection during neutropenic period  Description: INTERVENTIONS:  - Monitor WBC    Outcome: Progressing     Problem: SAFETY ADULT  Goal: Patient will remain free of falls  Description: INTERVENTIONS:  - Educate patient/family on patient safety including physical limitations  - Instruct patient to call for assistance with activity   - Consult OT/PT to assist with strengthening/mobility   - Keep Call bell within reach  - Keep bed low and locked with side rails adjusted as appropriate  - Keep care items and personal belongings within reach  - Initiate and maintain comfort rounds  - Make Fall Risk Sign visible to staff    - Apply yellow socks and  bracelet for high fall risk patients  - Consider moving patient to room near nurses station  Outcome: Progressing  Goal: Maintain or return to baseline ADL function  Description: INTERVENTIONS:  -  Assess patient's ability to carry out ADLs; assess patient's baseline for ADL function and identify physical deficits which impact ability to perform ADLs (bathing, care of mouth/teeth, toileting, grooming, dressing, etc.)  - Assess/evaluate cause of self-care deficits   - Assess range of motion  - Assess patient's mobility; develop plan if impaired  - Assess patient's need for assistive devices and provide as appropriate  - Encourage maximum independence but intervene and supervise when necessary  - Involve family in performance of ADLs  - Assess for home care needs following discharge   - Consider OT consult to assist with ADL evaluation and planning for discharge  - Provide patient education as appropriate  Outcome: Progressing  Goal: Maintains/Returns to pre admission functional level  Description: INTERVENTIONS:  - Perform AM-PAC 6 Click Basic Mobility/ Daily Activity assessment daily.  - Set and communicate daily mobility goal to care team and patient/family/caregiver.   - Collaborate with rehabilitation services on mobility goals if consulted  - Perform Range of Motion 2 times a day.  - Reposition patient every 2 hours.  - Dangle patient 2 times a day  - Stand patient 2 times a day  - Ambulate patient 2 times a day  - Out of bed to chair 2 times a day   - Out of bed for meals 2 times a day  - Out of bed for toileting  - Record patient progress and toleration of activity level   Outcome: Progressing     Problem: DISCHARGE PLANNING  Goal: Discharge to home or other facility with appropriate resources  Description: INTERVENTIONS:  - Identify barriers to discharge w/patient and caregiver  - Arrange for needed discharge resources and transportation as appropriate  - Identify discharge learning needs (meds, wound care,  etc.)  - Arrange for interpretive services to assist at discharge as needed  - Refer to Case Management Department for coordinating discharge planning if the patient needs post-hospital services based on physician/advanced practitioner order or complex needs related to functional status, cognitive ability, or social support system  Outcome: Progressing     Problem: Knowledge Deficit  Goal: Patient/family/caregiver demonstrates understanding of disease process, treatment plan, medications, and discharge instructions  Description: Complete learning assessment and assess knowledge base.  Interventions:  - Provide teaching at level of understanding  - Provide teaching via preferred learning methods  Outcome: Progressing     Problem: Potential for Falls  Goal: Patient will remain free of falls  Description: INTERVENTIONS:  - Educate patient/family on patient safety including physical limitations  - Instruct patient to call for assistance with activity   - Consult OT/PT to assist with strengthening/mobility   - Keep Call bell within reach  - Keep bed low and locked with side rails adjusted as appropriate  - Keep care items and personal belongings within reach  - Initiate and maintain comfort rounds  - Make Fall Risk Sign visible to staff  - Offer Toileting every 2 Hours, in advance of need    - Apply yellow socks and bracelet for high fall risk patients  - Consider moving patient to room near nurses station  Outcome: Progressing

## 2024-11-13 NOTE — PROGRESS NOTES
Progress Note - Behavioral Health   Name: Diogo Travis 59 y.o. male I MRN: [id number]  Unit/Bed#: S -01 I Date of Admission: 11/11/2024   Date of Service: 11/13/2024 I Hospital Day: 2     Assessment & Plan  Major depressive disorder, recurrent, moderate (HCC)  Emergency department and primary team work-up, including labs, imaging, and other diagnostic testing was reviewed.   Diogo does not endorse suicidal or homicidal ideation and does not currently pose a danger to himself or others. He demonstrated future oriented and forward thinking attitude throughout his assessment. As such, he does not meet criteria for involuntary psychiatric inpatient admission.   Diogo endorsed depression, anxiety, and insomnia not fully alleviated by his current regimen of Lexapro 20 mg QD on initial assessment. Remeron 15 mg HS was added to address his insomnia and depressive symptoms with positive impact on sleep and no reported side effects. Agreeable to follow-up with outpatient provider regarding additional titration and continuation of anti-depressant medication  Discussed chronic insomnia at length with various options for treatment, including addition of magnesium glycinate to address chronically depleted magnesium levels with potential benefit of improving sleep as seen in observational studies. Recommend magnesium glycinate dosed between 200-400 mg in the evening if patient wishes to obtain through over the counter supplamentation once discharged. Discussed melatonin use; in the past, patient has had limited benefit with 2-4 hours of sleep before reawakening several hours earlier than desired; recommend melatonin extended release to lengthen the benefit from melatonin.   Medical management per primary team  Psychiatry will sign off. Please contact our service via [employer] with any additional questions or concerns. If contacting after hours, please call or [employer] the on-call team (YOGESH: [phone]) with any  questions or concerns.       ------------------------------------------------------------    Subjective:     Patient was seen and evaluated for continuity of care. Diogo reported improved sleep overnight but was unsure if this was due to the addition of Remeron or the additional Ativan that was received for alcohol withdrawal symptoms. No side effects reported. Denies SI/HI/AVH. He states that he is more open to outpatient management of his alcohol use disorder.       Psychiatric Review of Systems:  Behavior over the last 24 hours: unchanged  Sleep: improving  Appetite: adequate  Medication side effects: none verbalized  ROS: fatigue, otherwise negative except as noted above    Vital signs in last 24 hours:  Temp:  [97.7 °F (36.5 °C)-99 °F (37.2 °C)] 97.7 °F (36.5 °C)  HR:  [84-98] 98  BP: (140-154)/(86-99) 147/89  Resp:  [18] 18  SpO2:  [94 %-95 %] 94 %    Mental Status Exam:  Appearance:  alert, good eye contact, appears stated age, casually dressed, and appropriate grooming and hygiene   Behavior:  calm and cooperative   Motor: no abnormal movements and normal gait and balance   Speech:  spontaneous and coherent   Mood:  Slightly anxious, improved from prior assessment   Affect:  appropriate range   Thought Process:  Organized, logical, goal-directed   Thought Content: no verbalized delusions or overt paranoia   Perceptual disturbances: no reported hallucinations and does not appear to be responding to internal stimuli at this time   Risk Potential: No active or passive suicidal or homicidal ideation was verbalized during interview   Cognition: oriented to self and situation, appears to be of average intelligence, and cognition not formally tested   Insight:  Fair   Judgment: Fair     Current Medications:  All current medications have been reviewed.  Current Facility-Administered Medications   Medication Dose Route Frequency Provider Last Rate    acetaminophen  650 mg Oral Q6H PRN Radha Plunkett MD      atorvastatin   40 mg Oral Daily With Dinner Radha Plunkett MD      enoxaparin  40 mg Subcutaneous Daily Radha Plunkett MD      ergocalciferol  50,000 Units Oral Weekly Radha Plunkett MD      escitalopram  20 mg Oral Daily Radha Plunkett MD      fish oil  2,000 mg Oral Daily Radha Plunkett MD      folic acid  1 mg Oral Daily Christine Roberts MD      hydrOXYzine HCL  25 mg Oral Q6H PRN Radha Plunkett MD      labetalol  10 mg Intravenous Q6H PRN Radha Plunkett MD      lisinopril  40 mg Oral Daily Radha Plunkett MD      magnesium sulfate  4 g Intravenous Once Christine Roberts MD 4 g (11/13/24 0907)    melatonin  9 mg Oral HS PRN Radha Plunkett MD      mirtazapine  15 mg Oral HS Bonny Humphries MD      multivitamin stress formula  1 tablet Oral Daily Cayetano Persaud MD      naltrexone  25 mg Oral Once Christine Roberts MD      ondansetron  4 mg Intravenous Q6H PRN Radha Plunkett MD      pantoprazole  40 mg Intravenous Q24H COLBY Radha Plunkett MD      thiamine  100 mg Oral Daily Christine Roberts MD         Laboratory results:  I have personally reviewed all pertinent laboratory/tests results.  Recent Results (from the past 48 hours)   ECG 12 lead    Collection Time: 11/11/24  2:16 PM   Result Value Ref Range    Ventricular Rate 142 BPM    Atrial Rate 142 BPM    MO Interval 130 ms    QRSD Interval 88 ms    QT Interval 284 ms    QTC Interval 436 ms    P Axis 52 degrees    QRS Axis 80 degrees    T Wave Axis 40 degrees   CBC and differential    Collection Time: 11/11/24  2:25 PM   Result Value Ref Range    WBC 12.14 (H) 4.31 - 10.16 Thousand/uL    RBC 4.97 3.88 - 5.62 Million/uL    Hemoglobin 16.1 12.0 - 17.0 g/dL    Hematocrit 46.3 36.5 - 49.3 %    MCV 93 82 - 98 fL    MCH 32.4 26.8 - 34.3 pg    MCHC 34.8 31.4 - 37.4 g/dL    RDW 12.1 11.6 - 15.1 %    MPV 8.5 (L) 8.9 - 12.7 fL    Platelets 194 149 - 390 Thousands/uL    nRBC 0 /100 WBCs    Segmented % 81 (H) 43 - 75 %    Immature Grans % 1 0 - 2 %    Lymphocytes % 11 (L) 14 - 44 %    Monocytes % 7 4 - 12 %    Eosinophils  "Relative 0 0 - 6 %    Basophils Relative 0 0 - 1 %    Absolute Neutrophils 9.82 (H) 1.85 - 7.62 Thousands/µL    Absolute Immature Grans 0.06 0.00 - 0.20 Thousand/uL    Absolute Lymphocytes 1.39 0.60 - 4.47 Thousands/µL    Absolute Monocytes 0.79 0.17 - 1.22 Thousand/µL    Eosinophils Absolute 0.03 0.00 - 0.61 Thousand/µL    Basophils Absolute 0.05 0.00 - 0.10 Thousands/µL   Comprehensive metabolic panel    Collection Time: 11/11/24  2:25 PM   Result Value Ref Range    Sodium 139 135 - 147 mmol/L    Potassium 4.8 3.5 - 5.3 mmol/L    Chloride 101 96 - 108 mmol/L    CO2 22 21 - 32 mmol/L    ANION GAP 16 (H) 4 - 13 mmol/L    BUN 10 5 - 25 mg/dL    Creatinine 1.18 0.60 - 1.30 mg/dL    Glucose 119 65 - 140 mg/dL    Calcium 10.0 8.4 - 10.2 mg/dL    AST 99 (H) 13 - 39 U/L    ALT 48 7 - 52 U/L    Alkaline Phosphatase 132 (H) 34 - 104 U/L    Total Protein 8.3 6.4 - 8.4 g/dL    Albumin 5.1 (H) 3.5 - 5.0 g/dL    Total Bilirubin 1.21 (H) 0.20 - 1.00 mg/dL    eGFR 67 ml/min/1.73sq m   HS Troponin 0hr (reflex protocol)    Collection Time: 11/11/24  2:25 PM   Result Value Ref Range    hs TnI 0hr 3 \"Refer to ACS Flowchart\"- see link ng/L   Lactic acid, plasma (w/reflex if result > 2.0)    Collection Time: 11/11/24  2:25 PM   Result Value Ref Range    LACTIC ACID 2.8 (H) 0.5 - 2.0 mmol/L   Magnesium    Collection Time: 11/11/24  2:25 PM   Result Value Ref Range    Magnesium 1.3 (L) 1.9 - 2.7 mg/dL   Ethanol    Collection Time: 11/11/24  2:25 PM   Result Value Ref Range    Ethanol Lvl <10 <10 mg/dL   Salicylate level    Collection Time: 11/11/24  2:25 PM   Result Value Ref Range    Salicylate Lvl <5 3 - 20 mg/dL   Acetaminophen level-If concentration is detectable, please discuss with medical  on call.    Collection Time: 11/11/24  2:25 PM   Result Value Ref Range    Acetaminophen Level <2 (L) 10 - 20 ug/mL   Lipase    Collection Time: 11/11/24  2:25 PM   Result Value Ref Range    Lipase 27 11 - 82 u/L   Lactic acid 2 " Hours    Collection Time: 11/11/24  4:52 PM   Result Value Ref Range    LACTIC ACID 0.8 0.5 - 2.0 mmol/L   Platelet count    Collection Time: 11/11/24  7:50 PM   Result Value Ref Range    Platelets 125 (L) 149 - 390 Thousands/uL    MPV 8.5 (L) 8.9 - 12.7 fL   Comprehensive metabolic panel    Collection Time: 11/12/24  5:37 AM   Result Value Ref Range    Sodium 138 135 - 147 mmol/L    Potassium 3.9 3.5 - 5.3 mmol/L    Chloride 106 96 - 108 mmol/L    CO2 25 21 - 32 mmol/L    ANION GAP 7 4 - 13 mmol/L    BUN 12 5 - 25 mg/dL    Creatinine 0.95 0.60 - 1.30 mg/dL    Glucose 98 65 - 140 mg/dL    Calcium 8.3 (L) 8.4 - 10.2 mg/dL    AST 45 (H) 13 - 39 U/L    ALT 27 7 - 52 U/L    Alkaline Phosphatase 80 34 - 104 U/L    Total Protein 6.1 (L) 6.4 - 8.4 g/dL    Albumin 3.6 3.5 - 5.0 g/dL    Total Bilirubin 1.10 (H) 0.20 - 1.00 mg/dL    eGFR 87 ml/min/1.73sq m   Magnesium    Collection Time: 11/12/24  5:37 AM   Result Value Ref Range    Magnesium 2.0 1.9 - 2.7 mg/dL   CBC and differential    Collection Time: 11/12/24  5:37 AM   Result Value Ref Range    WBC 4.83 4.31 - 10.16 Thousand/uL    RBC 3.73 (L) 3.88 - 5.62 Million/uL    Hemoglobin 12.3 12.0 - 17.0 g/dL    Hematocrit 35.7 (L) 36.5 - 49.3 %    MCV 96 82 - 98 fL    MCH 32.7 26.8 - 34.3 pg    MCHC 34.2 31.4 - 37.4 g/dL    RDW 12.0 11.6 - 15.1 %    MPV 8.8 (L) 8.9 - 12.7 fL    Platelets 119 (L) 149 - 390 Thousands/uL    nRBC 0 /100 WBCs    Segmented % 70 43 - 75 %    Immature Grans % 0 0 - 2 %    Lymphocytes % 21 14 - 44 %    Monocytes % 8 4 - 12 %    Eosinophils Relative 1 0 - 6 %    Basophils Relative 0 0 - 1 %    Absolute Neutrophils 3.35 1.85 - 7.62 Thousands/µL    Absolute Immature Grans 0.01 0.00 - 0.20 Thousand/uL    Absolute Lymphocytes 0.99 0.60 - 4.47 Thousands/µL    Absolute Monocytes 0.39 0.17 - 1.22 Thousand/µL    Eosinophils Absolute 0.07 0.00 - 0.61 Thousand/µL    Basophils Absolute 0.02 0.00 - 0.10 Thousands/µL   Smear Review(Phlebs Do Not Order)    Collection  Time: 11/12/24  5:37 AM   Result Value Ref Range    RBC Morphology Normal     Platelet Estimate Borderline (A) Adequate   Rapid drug screen, urine    Collection Time: 11/12/24 10:07 PM   Result Value Ref Range    Amph/Meth UR Negative Negative    Barbiturate Ur Positive (A) Negative    Benzodiazepine Urine Negative Negative    Cocaine Urine Negative Negative    Methadone Urine Negative Negative    Opiate Urine Negative Negative    PCP Ur Negative Negative    THC Urine Negative Negative    Oxycodone Urine Negative Negative    Fentanyl Urine Negative Negative    HYDROCODONE URINE Negative Negative   Comprehensive metabolic panel    Collection Time: 11/13/24  5:24 AM   Result Value Ref Range    Sodium 140 135 - 147 mmol/L    Potassium 3.7 3.5 - 5.3 mmol/L    Chloride 107 96 - 108 mmol/L    CO2 27 21 - 32 mmol/L    ANION GAP 6 4 - 13 mmol/L    BUN 12 5 - 25 mg/dL    Creatinine 0.87 0.60 - 1.30 mg/dL    Glucose 88 65 - 140 mg/dL    Calcium 8.7 8.4 - 10.2 mg/dL    AST 32 13 - 39 U/L    ALT 21 7 - 52 U/L    Alkaline Phosphatase 80 34 - 104 U/L    Total Protein 6.2 (L) 6.4 - 8.4 g/dL    Albumin 3.6 3.5 - 5.0 g/dL    Total Bilirubin 0.76 0.20 - 1.00 mg/dL    eGFR 95 ml/min/1.73sq m   CBC and Platelet    Collection Time: 11/13/24  5:24 AM   Result Value Ref Range    WBC 3.88 (L) 4.31 - 10.16 Thousand/uL    RBC 3.65 (L) 3.88 - 5.62 Million/uL    Hemoglobin 12.0 12.0 - 17.0 g/dL    Hematocrit 34.7 (L) 36.5 - 49.3 %    MCV 95 82 - 98 fL    MCH 32.9 26.8 - 34.3 pg    MCHC 34.6 31.4 - 37.4 g/dL    RDW 11.9 11.6 - 15.1 %    Platelets 111 (L) 149 - 390 Thousands/uL    MPV 8.9 8.9 - 12.7 fL   Magnesium    Collection Time: 11/13/24  5:24 AM   Result Value Ref Range    Magnesium 1.6 (L) 1.9 - 2.7 mg/dL        Bonny Humphries MD  PGY-2

## 2024-11-13 NOTE — ASSESSMENT & PLAN NOTE
-Psychiatry started patient on mirtazapine 15 mg in addition to prior to admission Lexapro.    Plan:  -Cont PTA on Lexapro 20 mg daily upon d/c  -Cont PTA as needed Atarax 25 mg upon d/c  -Recommend follow-up with psychiatrist outpatient, referral has been placed.  -Mirtazapine 15 mg daily upon discharge  -magnesium glycinate over-the-counter upon discharge as psychiatry recommended this may help with insomnia and anxiety given this was depleted with alcohol which.

## 2024-11-13 NOTE — ASSESSMENT & PLAN NOTE
-Psychiatry started patient on mirtazapine 15 mg in addition to prior to admission Lexapro.    Plan:  -Cont PTA on Lexapro 20 mg daily upon d/c  -Cont PTA as needed Atarax 25 mg upon d/c  -Recommend follow-up with psychiatrist outpatient, referral has been placed.  -Mirtazapine 15 mg daily upon discharge

## 2024-11-13 NOTE — DISCHARGE SUMMARY
Discharge Summary - Hospitalist   Name: Diogo Travis 59 y.o. male I MRN: [id number]  Unit/Bed#: S -01 I Date of Admission: 11/11/2024   Date of Service: 11/13/2024 I Hospital Day: 2     Assessment & Plan  Alcohol use disorder, severe, dependence (HCC)  Patient presented the ED on 11/11 due to alcohol withdrawal.  Patient reports last drink was 6 AM on 11/11 but was reported having heavy drinking the night prior till 2 AM.   Patient presented with significant tremor, lightheadedness that started earlier this morning along with episodes of nausea and vomiting nonbilious in nature.  But no hematemesis.   Denies having any sick contacts, bad food ingestion, chest pain, shortness of breath, palpitations  Of note patient was recently hospitalized alcohol withdrawal a month ago and was in the ICU and recommended transfer to [facility] which the patient declined and left AMA.   Vitals patient tachycardic 120s, WBC 12.14, anion gap 16, AST 99/ALT 48, alk phos 132, T. bili 1.21  Lactic acid 0.8<2.8  Coma panel neg  EKG sinus tachycardia heart rate 142, QTc 436  Troponins negative  Chest x-ray with no acute cardiopulmonary disease    In the ED patient received 2 L fluid bolus, Zofran, magnesium and Ativan 1 mg    Plan:  naltrexone 50 mg daily upon discharge  Patient provided with [email or web address] outpatient phone number for outpatient follow-up.  Recommend follow-up with PCP within 1 week with repeat CMP.  Chronic alcoholic gastritis  Continue PPI on discharge.  Hypertension  PTA lisinopril 40 mg daily  Anxiety  -Psychiatry started patient on mirtazapine 15 mg in addition to prior to admission Lexapro.    Plan:  -Cont PTA on Lexapro 20 mg daily upon d/c  -Cont PTA as needed Atarax 25 mg upon d/c  -Recommend follow-up with psychiatrist outpatient, referral has been placed.  -Mirtazapine 15 mg daily upon discharge  -magnesium glycinate over-the-counter upon discharge as psychiatry recommended this may help with insomnia  and anxiety given this was depleted with alcohol which.  SIRS (systemic inflammatory response syndrome) (HCC) (Resolved: 11/13/2024)  Patient with tachycardia, tachypnea elevated WBC in the setting of alcohol withdrawal  See above for plan  Alcohol withdrawal syndrome without complication (HCC)  Patient required 2 mg Ativan at roughly 6 PM and 8:30 PM 11/12 for anxiety    Plan:   naltrexone 50 mg daily upon discharge  Patient provided with [facility] outpatient phone number for outpatient follow-up.  Major depressive disorder, recurrent, moderate (HCC)  -Psychiatry started patient on mirtazapine 15 mg in addition to prior to admission Lexapro.    Plan:  -Cont PTA on Lexapro 20 mg daily upon d/c  -Cont PTA as needed Atarax 25 mg upon d/c  -Recommend follow-up with psychiatrist outpatient, referral has been placed.  -Mirtazapine 15 mg daily upon discharge     Medical Problems       Resolved Problems  Date Reviewed: 7/9/2024   None       Discharging Physician / Practitioner: Christine Roberts MD  PCP: Enid Altman DO  Admission Date:   Admission Orders (From admission, onward)       Ordered        11/11/24 1656  INPATIENT ADMISSION  Once                          Discharge Date: 11/13/24    Consultations During Hospital Stay:  Medical toxicology  Psychiatry    Procedures Performed:   None    Significant Findings / Test Results:   None    Incidental Findings:   None     Test Results Pending at Discharge (will require follow up):   None     Outpatient Tests Requested:  None    Complications:  None    Reason for Admission: tremors, n/v 2/2 etoh.     Hospital Course:   Diogo Travis is a 59 y.o. male patient who originally presented to the hospital on 11/11/2024 due to tremors n/v 2/2 etoh. Patient reports heavy binge drinking the night before admission.  Upon admission patient was given IV fluid hydration, intravenous folic acid and thiamine and medical toxicology was consulted.  As per their recommendations patient  received phenobarbital loading with additional phenobarbital doses for withdrawal symptoms however the patient did not require this throughout admission.  Patient was initially made n.p.o. and further advance diet to regular house diet once patient was deemed to not be a risk for aspiration.  Patient was maintained on CIWA protocol while admitted, pt required ativan 2mg twice the evening of 11/12 for anxiety, no tremors were noted.  Due to the patient's history of anxiety and depression psychiatry was consulted and recommended adding daily mirtazapine to patient's regimen with referral to outpatient psychiatric services provided, to which pt was agreeable to.  UDS was obtained and only positive for barbiturates, for which patient received as mentioned above in the hospital.  Because there were no opiates in his urine medical toxicology recommended initiation of naltrexone therapy to which patient was agreeable to and given 25mg the day of d/c, then 50mg daily thereafter. Pt was deemed medically stable for dc, and recommended to follow-up with PCP obtaining a CMP within 1 week, following up outpatient medical toxicology to which patient received information for scheduling, and referral placed to establish outpatient psychiatric care.      Please see above list of diagnoses and related plan for additional information.     Condition at Discharge: stable    Discharge Day Visit / Exam:   Subjective: Patient was resting comfortably in bed at time of examination.  Patient is amendable to naltrexone and mirtazapine therapy at the time of discharge.  Patient does not endorse any tremors nausea or vomiting.  He endorses a stable dry cough.  He also denies fever/chills, shortness of breath, chest pain, abdominal pain, changes in bowel or bladder habits.  We discussed at length his the effects of alcohol can have on overall health specifically blood pressure for which patient was interested in getting better under control.  We  discussed the importance of alcohol cessation and following up with PCP, medical toxicology for management of naltrexone, and psychiatry to which patient was agreeable to.    Vitals: Blood Pressure: 145/98 (11/12/24 2057)  Pulse: 84 (11/12/24 2057)  Temperature: 99 °F (37.2 °C) (11/12/24 2057)  Temp Source: Oral (11/12/24 1500)  Respirations: 18 (11/12/24 1500)  Weight - Scale: 101 kg (222 lb 10.6 oz) (11/11/24 1933)  SpO2: 94 % (11/12/24 2057)  Physical Exam  Constitutional:       General: He is not in acute distress.     Appearance: He is not ill-appearing or toxic-appearing.   Cardiovascular:      Rate and Rhythm: Normal rate.      Heart sounds: Normal heart sounds. No murmur heard.  Pulmonary:      Effort: No respiratory distress.      Breath sounds: Normal breath sounds. No wheezing or rales.   Abdominal:      General: Bowel sounds are normal. There is no distension.      Tenderness: There is no abdominal tenderness. There is no guarding.   Musculoskeletal:      Right lower leg: No edema.      Left lower leg: No edema.   Neurological:      Mental Status: He is alert.      Comments: No tremors.        Discussion with Family: Patient declined call to .     Discharge instructions/Information to patient and family:   See after visit summary for information provided to patient and family.      Provisions for Follow-Up Care:  See after visit summary for information related to follow-up care and any pertinent home health orders.      Mobility at time of Discharge:   Basic Mobility Inpatient Raw Score: 24  JH-HLM Goal: 8: Walk 250 feet or more  JH-HLM Achieved: 7: Walk 25 feet or more  HLM Goal achieved. Continue to encourage appropriate mobility.     Disposition:   Home    Planned Readmission: No    Discharge Medications:  See after visit summary for reconciled discharge medications provided to patient and/or family.      Administrative Statements   Discharge Statement:  I have spent a total time of 25  minutes in caring for this patient on the day of the visit/encounter.    **Please Note: This note may have been constructed using a voice recognition system**

## 2024-11-13 NOTE — DISCHARGE INSTR - AVS FIRST PAGE
Dear Diogo Travis,     It was our pleasure to care for you here at [facility].  It is our hope that we were always able to exceed the expected standards for your care during your stay.  You were hospitalized due to tremors, nausea and vomiting.  You were cared for on the third floor by Christine Roberts MD under the service of Aram Jimenez MD with the [facility] who covers for your primary care physician (PCP), Enid Altman DO, while you were hospitalized.  If you have any questions or concerns related to this hospitalization, you may contact us at .  For follow up as well as any medication refills, we recommend that you follow up with your primary care physician.  A registered nurse will reach out to you by phone within a few days after your discharge to answer any additional questions that you may have after going home.  However, at this time we provide for you here, the most important instructions / recommendations at discharge:     Notable Medication Adjustments -   Starting 11/14/2024 please take naltrexone 1 tablet (50 mg) daily.  Starting 11/13/2024 please take mirtazapine 1 tablet (15 mg) daily at bedtime  Please feel free to purchase magnesium glycinate, as this is over-the-counter and has shown some evidence to improve anxiety and insomnia, and is chronically depleted in those that use alcohol.  Testing Required after Discharge -   Please obtain complete metabolic panel  ** Please contact your PCP to request testing orders for any of the testing recommended here **  Important follow up information -   Please follow-up with your primary care physician within 1 week.  Please call the medical toxicology office at [phone] to schedule an appointment at their outpatient office for continued monitoring of your naltrexone.  Please establish care with outpatient psychiatry, referral has been placed.  Other Instructions -   In  the event that you develop any severe nausea/vomiting, tremors or seizures please seek care in the emergency department.  Please review this entire after visit summary as additional general instructions including medication list, appointments, activity, diet, any pertinent wound care, and other additional recommendations from your care team that may be provided for you.      Sincerely,     Christine Roberts MD

## 2024-11-13 NOTE — ASSESSMENT & PLAN NOTE
Emergency department and primary team work-up, including labs, imaging, and other diagnostic testing was reviewed.   Diogo does not endorse suicidal or homicidal ideation and does not currently pose a danger to himself or others. He demonstrated future oriented and forward thinking attitude throughout his assessment. As such, he does not meet criteria for involuntary psychiatric inpatient admission.   Diogo endorsed depression, anxiety, and insomnia not fully alleviated by his current regimen of Lexapro 20 mg QD on initial assessment. Remeron 15 mg HS was added to address his insomnia and depressive symptoms with positive impact on sleep and no reported side effects. Agreeable to follow-up with outpatient provider regarding additional titration and continuation of anti-depressant medication  Discussed chronic insomnia at length with various options for treatment, including addition of magnesium glycinate to address chronically depleted magnesium levels with potential benefit of improving sleep as seen in observational studies. Recommend magnesium glycinate dosed between 200-400 mg in the evening if patient wishes to obtain through over the counter supplamentation once discharged. Discussed melatonin use; in the past, patient has had limited benefit with 2-4 hours of sleep before reawakening several hours earlier than desired; recommend melatonin extended release to lengthen the benefit from melatonin.   Medical management per primary team  Psychiatry will sign off. Please contact our service via [employer] with any additional questions or concerns. If contacting after hours, please call or TigerText the on-call team (YOGESH: [phone]) with any questions or concerns.

## 2024-11-13 NOTE — ASSESSMENT & PLAN NOTE
Patient presented the ED on 11/11 due to alcohol withdrawal.  Patient reports last drink was 6 AM on 11/11 but was reported having heavy drinking the night prior till 2 AM.   Patient presented with significant tremor, lightheadedness that started earlier this morning along with episodes of nausea and vomiting nonbilious in nature.  But no hematemesis.   Denies having any sick contacts, bad food ingestion, chest pain, shortness of breath, palpitations  Of note patient was recently hospitalized alcohol withdrawal a month ago and was in the ICU and recommended transfer to [facility] which the patient declined and left AMA.   Vitals patient tachycardic 120s, WBC 12.14, anion gap 16, AST 99/ALT 48, alk phos 132, T. bili 1.21  Lactic acid 0.8<2.8  Coma panel neg  EKG sinus tachycardia heart rate 142, QTc 436  Troponins negative  Chest x-ray with no acute cardiopulmonary disease    In the ED patient received 2 L fluid bolus, Zofran, magnesium and Ativan 1 mg    Plan:  naltrexone 50 mg daily upon discharge  Patient provided with [employer] outpatient phone number for outpatient follow-up.  Recommend follow-up with PCP within 1 week with repeat CMP.

## 2024-11-15 ENCOUNTER — TRANSITIONAL CARE MANAGEMENT (OUTPATIENT)
Dept: FAMILY MEDICINE CLINIC | Facility: CLINIC | Age: 59
End: 2024-11-15

## 2024-11-19 ENCOUNTER — NURSE TRIAGE (OUTPATIENT)
Age: 59
End: 2024-11-19

## 2024-11-19 DIAGNOSIS — F41.9 ANXIETY: ICD-10-CM

## 2024-11-19 RX ORDER — HYDROXYZINE HYDROCHLORIDE 25 MG/1
25 TABLET, FILM COATED ORAL EVERY 6 HOURS PRN
Qty: 30 TABLET | Refills: 5 | Status: SHIPPED | OUTPATIENT
Start: 2024-11-19

## 2024-11-19 NOTE — TELEPHONE ENCOUNTER
"Please advise  Patient is having a tooth extraction Friday. Self pay. Dentist wants him to take Valium 5mg the night prior and one Valium 5 mg prior to procedure, charging him 300 dollars just for the 2 pills. Patient is asking first will  taking his Hydroxyzine as well as Valium be too much sedation and also would PCP consider calling to the two pills because Dentist office will take off the 300 dollars if she can .   Reason for Disposition  • Caller has NON-URGENT medicine question about med that PCP or specialist prescribed and triager unable to answer question    Answer Assessment - Initial Assessment Questions  1. NAME of MEDICINE: \"What medicine(s) are you calling about?\"      Atarax and valium  2. QUESTION: \"What is your question?\" (e.g., double dose of medicine, side effect)      Dentist charging 300 dollars for 2 valium 5 mg pills   3. PRESCRIBER: \"Who prescribed the medicine?\" Reason: if prescribed by specialist, call should be referred to that group.      dentist  4. SYMPTOMS: \"Do you have any symptoms?\" If Yes, ask: \"What symptoms are you having?\"  \"How bad are the symptoms (e.g., mild, moderate, severe)      N/a  5. PREGNANCY:  \"Is there any chance that you are pregnant?\" \"When was your last menstrual period?\"      N/a    Protocols used: Medication Question Call-Adult-[place]    "

## 2024-11-19 NOTE — TELEPHONE ENCOUNTER
Reason for call: out of medication - Patient state the pharmacy does not have any refills for him and they asked him for they always ask for the referral. He asked for the script to be sent as soon as possible.     [x] Refill   [] Prior Auth  [] Other:     Office:   [x] PCP/Provider -   [] Specialty/Provider -     Medication: hydroxyzine    Dose/Frequency: 25 mg Q6H PRN     Quantity: 30    Pharmacy: [facility] [address] [facility]     Does the patient have enough for 3 days?   [] Yes   [x] No - Send as HP to POD

## 2024-11-19 NOTE — TELEPHONE ENCOUNTER
Regarding: Tooth Extraction Questions  ----- Message from Nat DIAL sent at 11/19/2024  2:37 PM EST -----  Patient called the RX Refill Line to request refill of hydroxyzine sent to pod in a separate encounter.  Patient asked for a nurse to contact him today, state he is having a tooth extraction on Friday 11.22.2024 and have a few questions about valium that he would like answered.  Contact number verified [phone]

## 2024-11-20 NOTE — TELEPHONE ENCOUNTER
"Called pt and let him know that he would need to have his dentist send in the rx. Pt states if PCP does not sen d in the rx, he will have to pay $300 extra at the dentist and he does not have $300 extra at this time. Pt is asking PCP to consider sending in the medication as he is very afraid of the dentist and does not have the money to get it from the dentist.    Spoke with  who states pt is overdue for a f/u, has been in the ER and hospital multiple times. She does not know what meds he is on at this time and who is managing his medications. Refused request for valium at this time.    Called pt back and let him know that  is not sending in the valium for him due to the above reasons. Pt is very upset and states he wants to speak directly to  at this time and \"maybe she can give me the $300.\" State he needs to hear back from someone today as the procedure is supposed to be tomorrow. Let pt know message would be sent to management team and provider. Please address with pt today.      "

## 2024-11-20 NOTE — TELEPHONE ENCOUNTER
Would advise patient to follow plan of care per Dentist.  Dentist may be able to call medication in to a retail pharmacy, which should be cheaper than $300.      Patient last seen by me 7/9/24.    He is overdue for FU appt -     Return in about 6 weeks (around 8/20/2024) for F/U HTN, HL, Anx/Dep, ETOH, GERD, Vit B12/D Def, Labs; 6mo - Hep A#2; PRN [id number].     He had multiple admissions 10/11/24 and 11/11/24 without PCP follow-up / TCM.    If he is self-pay, he may be able to receive more affordable care through [facility] [phone] as he will be needing medication refills and a visit to be seen prior.

## 2024-11-20 NOTE — TELEPHONE ENCOUNTER
Left voicemail reiterating that Dr. Altman will not prescribe Valium and that it should come from the dentist if they feel it is appropriate. Advised he can asked to me speak if needed.

## 2024-11-20 NOTE — TELEPHONE ENCOUNTER
to please contact patient.  His dental team can send Valium to a retail pharmacy if they think it is appropriate.  I will not prescribe Valium.

## 2024-12-05 ENCOUNTER — TELEPHONE (OUTPATIENT)
Age: 59
End: 2024-12-05

## 2024-12-05 NOTE — TELEPHONE ENCOUNTER
Contacted patient in regards to Routine Referral in attempts to verify patient's needs of services and add patient to proper wait list. LVM for patient to contact intake dept  in regards to referral.    2nd attempt, referral closed.

## 2024-12-07 ENCOUNTER — APPOINTMENT (EMERGENCY)
Dept: RADIOLOGY | Facility: HOSPITAL | Age: 59
DRG: 422 | End: 2024-12-07
Payer: COMMERCIAL

## 2024-12-07 ENCOUNTER — HOSPITAL ENCOUNTER (INPATIENT)
Facility: HOSPITAL | Age: 59
LOS: 1 days | Discharge: HOME | DRG: 422 | End: 2024-12-08
Attending: EMERGENCY MEDICINE | Admitting: INTERNAL MEDICINE
Payer: COMMERCIAL

## 2024-12-07 ENCOUNTER — APPOINTMENT (EMERGENCY)
Dept: CT IMAGING | Facility: HOSPITAL | Age: 59
DRG: 422 | End: 2024-12-07
Payer: COMMERCIAL

## 2024-12-07 DIAGNOSIS — F10.939 ALCOHOL WITHDRAWAL (HCC): ICD-10-CM

## 2024-12-07 DIAGNOSIS — F10.90 ALCOHOL USE DISORDER: ICD-10-CM

## 2024-12-07 DIAGNOSIS — E87.29 ALCOHOLIC KETOACIDOSIS: ICD-10-CM

## 2024-12-07 DIAGNOSIS — F10.930 ALCOHOL WITHDRAWAL SYNDROME WITHOUT COMPLICATION (HCC): Primary | ICD-10-CM

## 2024-12-07 DIAGNOSIS — E83.42 HYPOMAGNESEMIA: ICD-10-CM

## 2024-12-07 LAB
ALBUMIN SERPL BCG-MCNC: 4.8 G/DL (ref 3.5–5)
ALP SERPL-CCNC: 136 U/L (ref 34–104)
ALT SERPL W P-5'-P-CCNC: 40 U/L (ref 7–52)
ANION GAP SERPL CALCULATED.3IONS-SCNC: 10 MMOL/L (ref 4–13)
ANION GAP SERPL CALCULATED.3IONS-SCNC: 23 MMOL/L (ref 4–13)
APAP SERPL-MCNC: <5 UG/ML (ref 10–20)
APTT PPP: 28 SECONDS (ref 23–34)
AST SERPL W P-5'-P-CCNC: 66 U/L (ref 13–39)
ATRIAL RATE: 140 BPM
BACTERIA UR QL AUTO: ABNORMAL /HPF
BASOPHILS # BLD AUTO: 0.06 THOUSANDS/ÂΜL (ref 0–0.1)
BASOPHILS NFR BLD AUTO: 0 % (ref 0–1)
BILIRUB DIRECT SERPL-MCNC: 0.37 MG/DL (ref 0–0.2)
BILIRUB SERPL-MCNC: 2.1 MG/DL (ref 0.2–1)
BILIRUB UR QL STRIP: NEGATIVE
BUN SERPL-MCNC: 17 MG/DL (ref 5–25)
BUN SERPL-MCNC: 19 MG/DL (ref 5–25)
CALCIUM SERPL-MCNC: 8.5 MG/DL (ref 8.4–10.2)
CALCIUM SERPL-MCNC: 9.6 MG/DL (ref 8.4–10.2)
CHLORIDE SERPL-SCNC: 102 MMOL/L (ref 96–108)
CHLORIDE SERPL-SCNC: 97 MMOL/L (ref 96–108)
CLARITY UR: CLEAR
CO2 SERPL-SCNC: 19 MMOL/L (ref 21–32)
CO2 SERPL-SCNC: 24 MMOL/L (ref 21–32)
COLOR UR: ABNORMAL
CREAT SERPL-MCNC: 0.96 MG/DL (ref 0.6–1.3)
CREAT SERPL-MCNC: 1.11 MG/DL (ref 0.6–1.3)
EOSINOPHIL # BLD AUTO: 0.01 THOUSAND/ÂΜL (ref 0–0.61)
EOSINOPHIL NFR BLD AUTO: 0 % (ref 0–6)
ERYTHROCYTE [DISTWIDTH] IN BLOOD BY AUTOMATED COUNT: 12.2 % (ref 11.6–15.1)
ETHANOL SERPL-MCNC: <10 MG/DL
GFR SERPL CREATININE-BSD FRML MDRD: 72 ML/MIN/1.73SQ M
GFR SERPL CREATININE-BSD FRML MDRD: 86 ML/MIN/1.73SQ M
GLUCOSE SERPL-MCNC: 131 MG/DL (ref 65–140)
GLUCOSE SERPL-MCNC: 182 MG/DL (ref 65–140)
GLUCOSE UR STRIP-MCNC: NEGATIVE MG/DL
HCT VFR BLD AUTO: 45.5 % (ref 36.5–49.3)
HGB BLD-MCNC: 16.4 G/DL (ref 12–17)
HGB UR QL STRIP.AUTO: ABNORMAL
HYALINE CASTS #/AREA URNS LPF: ABNORMAL /LPF
IMM GRANULOCYTES # BLD AUTO: 0.06 THOUSAND/UL (ref 0–0.2)
IMM GRANULOCYTES NFR BLD AUTO: 0 % (ref 0–2)
INR PPP: 1.03 (ref 0.85–1.19)
KETONES UR STRIP-MCNC: ABNORMAL MG/DL
LACTATE SERPL-SCNC: 1.4 MMOL/L (ref 0.5–2)
LEUKOCYTE ESTERASE UR QL STRIP: NEGATIVE
LIPASE SERPL-CCNC: 12 U/L (ref 11–82)
LYMPHOCYTES # BLD AUTO: 1.14 THOUSANDS/ÂΜL (ref 0.6–4.47)
LYMPHOCYTES NFR BLD AUTO: 7 % (ref 14–44)
MAGNESIUM SERPL-MCNC: 1.4 MG/DL (ref 1.9–2.7)
MCH RBC QN AUTO: 32.7 PG (ref 26.8–34.3)
MCHC RBC AUTO-ENTMCNC: 36 G/DL (ref 31.4–37.4)
MCV RBC AUTO: 91 FL (ref 82–98)
MONOCYTES # BLD AUTO: 0.6 THOUSAND/ÂΜL (ref 0.17–1.22)
MONOCYTES NFR BLD AUTO: 4 % (ref 4–12)
NEUTROPHILS # BLD AUTO: 13.52 THOUSANDS/ÂΜL (ref 1.85–7.62)
NEUTS SEG NFR BLD AUTO: 89 % (ref 43–75)
NITRITE UR QL STRIP: NEGATIVE
NON-SQ EPI CELLS URNS QL MICRO: ABNORMAL /HPF
NRBC BLD AUTO-RTO: 0 /100 WBCS
P AXIS: 66 DEGREES
PH UR STRIP.AUTO: 5.5 [PH]
PLATELET # BLD AUTO: 310 THOUSANDS/UL (ref 149–390)
PMV BLD AUTO: 8.7 FL (ref 8.9–12.7)
POTASSIUM SERPL-SCNC: 3.9 MMOL/L (ref 3.5–5.3)
POTASSIUM SERPL-SCNC: 4.3 MMOL/L (ref 3.5–5.3)
PR INTERVAL: 136 MS
PROT SERPL-MCNC: 7.8 G/DL (ref 6.4–8.4)
PROT UR STRIP-MCNC: ABNORMAL MG/DL
PROTHROMBIN TIME: 14.2 SECONDS (ref 12.3–15)
QRS AXIS: 92 DEGREES
QRSD INTERVAL: 88 MS
QT INTERVAL: 286 MS
QTC INTERVAL: 436 MS
RBC # BLD AUTO: 5.02 MILLION/UL (ref 3.88–5.62)
RBC #/AREA URNS AUTO: ABNORMAL /HPF
SALICYLATES SERPL-MCNC: <5 MG/DL (ref 3–20)
SODIUM SERPL-SCNC: 136 MMOL/L (ref 135–147)
SODIUM SERPL-SCNC: 139 MMOL/L (ref 135–147)
SP GR UR STRIP.AUTO: >=1.05 (ref 1–1.03)
T WAVE AXIS: 55 DEGREES
UROBILINOGEN UR STRIP-ACNC: <2 MG/DL
VENTRICULAR RATE: 140 BPM
WBC # BLD AUTO: 15.39 THOUSAND/UL (ref 4.31–10.16)
WBC #/AREA URNS AUTO: ABNORMAL /HPF

## 2024-12-07 PROCEDURE — 96366 THER/PROPH/DIAG IV INF ADDON: CPT

## 2024-12-07 PROCEDURE — 80048 BASIC METABOLIC PNL TOTAL CA: CPT

## 2024-12-07 PROCEDURE — 96365 THER/PROPH/DIAG IV INF INIT: CPT

## 2024-12-07 PROCEDURE — 93010 ELECTROCARDIOGRAM REPORT: CPT | Performed by: INTERNAL MEDICINE

## 2024-12-07 PROCEDURE — 74177 CT ABD & PELVIS W/CONTRAST: CPT

## 2024-12-07 PROCEDURE — 85730 THROMBOPLASTIN TIME PARTIAL: CPT

## 2024-12-07 PROCEDURE — 99449 NTRPROF PH1/NTRNET/EHR 31/>: CPT | Performed by: EMERGENCY MEDICINE

## 2024-12-07 PROCEDURE — 36415 COLL VENOUS BLD VENIPUNCTURE: CPT

## 2024-12-07 PROCEDURE — 99285 EMERGENCY DEPT VISIT HI MDM: CPT | Performed by: EMERGENCY MEDICINE

## 2024-12-07 PROCEDURE — 96361 HYDRATE IV INFUSION ADD-ON: CPT

## 2024-12-07 PROCEDURE — 71045 X-RAY EXAM CHEST 1 VIEW: CPT

## 2024-12-07 PROCEDURE — 83690 ASSAY OF LIPASE: CPT

## 2024-12-07 PROCEDURE — 85610 PROTHROMBIN TIME: CPT

## 2024-12-07 PROCEDURE — 85025 COMPLETE CBC W/AUTO DIFF WBC: CPT

## 2024-12-07 PROCEDURE — 80179 DRUG ASSAY SALICYLATE: CPT

## 2024-12-07 PROCEDURE — 83605 ASSAY OF LACTIC ACID: CPT

## 2024-12-07 PROCEDURE — 81001 URINALYSIS AUTO W/SCOPE: CPT

## 2024-12-07 PROCEDURE — 99284 EMERGENCY DEPT VISIT MOD MDM: CPT

## 2024-12-07 PROCEDURE — 80143 DRUG ASSAY ACETAMINOPHEN: CPT

## 2024-12-07 PROCEDURE — 83735 ASSAY OF MAGNESIUM: CPT

## 2024-12-07 PROCEDURE — 96375 TX/PRO/DX INJ NEW DRUG ADDON: CPT

## 2024-12-07 PROCEDURE — 99223 1ST HOSP IP/OBS HIGH 75: CPT | Performed by: INTERNAL MEDICINE

## 2024-12-07 PROCEDURE — 82077 ASSAY SPEC XCP UR&BREATH IA: CPT

## 2024-12-07 PROCEDURE — 80076 HEPATIC FUNCTION PANEL: CPT

## 2024-12-07 PROCEDURE — 96367 TX/PROPH/DG ADDL SEQ IV INF: CPT

## 2024-12-07 PROCEDURE — 87040 BLOOD CULTURE FOR BACTERIA: CPT

## 2024-12-07 PROCEDURE — 93005 ELECTROCARDIOGRAM TRACING: CPT

## 2024-12-07 RX ORDER — ATORVASTATIN CALCIUM 40 MG/1
40 TABLET, FILM COATED ORAL
Status: DISCONTINUED | OUTPATIENT
Start: 2024-12-07 | End: 2024-12-08 | Stop reason: HOSPADM

## 2024-12-07 RX ORDER — ONDANSETRON 2 MG/ML
4 INJECTION INTRAMUSCULAR; INTRAVENOUS EVERY 6 HOURS PRN
Status: DISCONTINUED | OUTPATIENT
Start: 2024-12-07 | End: 2024-12-08 | Stop reason: HOSPADM

## 2024-12-07 RX ORDER — SODIUM CHLORIDE, SODIUM GLUCONATE, SODIUM ACETATE, POTASSIUM CHLORIDE, MAGNESIUM CHLORIDE, SODIUM PHOSPHATE, DIBASIC, AND POTASSIUM PHOSPHATE .53; .5; .37; .037; .03; .012; .00082 G/100ML; G/100ML; G/100ML; G/100ML; G/100ML; G/100ML; G/100ML
100 INJECTION, SOLUTION INTRAVENOUS CONTINUOUS
Status: DISCONTINUED | OUTPATIENT
Start: 2024-12-07 | End: 2024-12-08 | Stop reason: HOSPADM

## 2024-12-07 RX ORDER — ONDANSETRON 2 MG/ML
1 INJECTION INTRAMUSCULAR; INTRAVENOUS ONCE
Status: COMPLETED | OUTPATIENT
Start: 2024-12-07 | End: 2024-12-07

## 2024-12-07 RX ORDER — FAMOTIDINE 10 MG/ML
20 INJECTION, SOLUTION INTRAVENOUS ONCE
Status: COMPLETED | OUTPATIENT
Start: 2024-12-07 | End: 2024-12-07

## 2024-12-07 RX ORDER — FOLIC ACID 1 MG/1
1 TABLET ORAL DAILY
Status: DISCONTINUED | OUTPATIENT
Start: 2024-12-08 | End: 2024-12-08 | Stop reason: HOSPADM

## 2024-12-07 RX ORDER — DIAZEPAM 10 MG/2ML
10 INJECTION, SOLUTION INTRAMUSCULAR; INTRAVENOUS ONCE
Status: COMPLETED | OUTPATIENT
Start: 2024-12-07 | End: 2024-12-07

## 2024-12-07 RX ORDER — KETOROLAC TROMETHAMINE 30 MG/ML
15 INJECTION, SOLUTION INTRAMUSCULAR; INTRAVENOUS ONCE
Status: COMPLETED | OUTPATIENT
Start: 2024-12-07 | End: 2024-12-07

## 2024-12-07 RX ORDER — LABETALOL HYDROCHLORIDE 5 MG/ML
10 INJECTION, SOLUTION INTRAVENOUS EVERY 6 HOURS PRN
Status: DISCONTINUED | OUTPATIENT
Start: 2024-12-07 | End: 2024-12-08 | Stop reason: HOSPADM

## 2024-12-07 RX ORDER — DEXTROSE MONOHYDRATE AND SODIUM CHLORIDE 5; .9 G/100ML; G/100ML
100 INJECTION, SOLUTION INTRAVENOUS CONTINUOUS
Status: DISCONTINUED | OUTPATIENT
Start: 2024-12-07 | End: 2024-12-07

## 2024-12-07 RX ORDER — PANTOPRAZOLE SODIUM 40 MG/10ML
40 INJECTION, POWDER, LYOPHILIZED, FOR SOLUTION INTRAVENOUS ONCE
Status: COMPLETED | OUTPATIENT
Start: 2024-12-07 | End: 2024-12-07

## 2024-12-07 RX ORDER — ESCITALOPRAM OXALATE 20 MG/1
20 TABLET ORAL DAILY
Status: DISCONTINUED | OUTPATIENT
Start: 2024-12-08 | End: 2024-12-08 | Stop reason: HOSPADM

## 2024-12-07 RX ORDER — LANOLIN ALCOHOL/MO/W.PET/CERES
100 CREAM (GRAM) TOPICAL DAILY
Status: DISCONTINUED | OUTPATIENT
Start: 2024-12-08 | End: 2024-12-08 | Stop reason: HOSPADM

## 2024-12-07 RX ORDER — LABETALOL HYDROCHLORIDE 5 MG/ML
10 INJECTION, SOLUTION INTRAVENOUS ONCE
Status: COMPLETED | OUTPATIENT
Start: 2024-12-07 | End: 2024-12-07

## 2024-12-07 RX ORDER — MAGNESIUM SULFATE HEPTAHYDRATE 40 MG/ML
2 INJECTION, SOLUTION INTRAVENOUS ONCE
Status: COMPLETED | OUTPATIENT
Start: 2024-12-07 | End: 2024-12-07

## 2024-12-07 RX ORDER — CHLORDIAZEPOXIDE HYDROCHLORIDE 25 MG/1
50 CAPSULE, GELATIN COATED ORAL ONCE
Status: DISCONTINUED | OUTPATIENT
Start: 2024-12-07 | End: 2024-12-08

## 2024-12-07 RX ORDER — ACETAMINOPHEN 325 MG/1
650 TABLET ORAL EVERY 6 HOURS PRN
Status: DISCONTINUED | OUTPATIENT
Start: 2024-12-07 | End: 2024-12-08 | Stop reason: HOSPADM

## 2024-12-07 RX ORDER — PANTOPRAZOLE SODIUM 40 MG/1
40 TABLET, DELAYED RELEASE ORAL
Status: DISCONTINUED | OUTPATIENT
Start: 2024-12-08 | End: 2024-12-08 | Stop reason: HOSPADM

## 2024-12-07 RX ORDER — FAMOTIDINE 20 MG/1
20 TABLET, FILM COATED ORAL ONCE
Status: DISCONTINUED | OUTPATIENT
Start: 2024-12-07 | End: 2024-12-07

## 2024-12-07 RX ORDER — MIRTAZAPINE 15 MG/1
15 TABLET, FILM COATED ORAL
Status: DISCONTINUED | OUTPATIENT
Start: 2024-12-07 | End: 2024-12-08 | Stop reason: HOSPADM

## 2024-12-07 RX ORDER — ONDANSETRON 2 MG/ML
4 INJECTION INTRAMUSCULAR; INTRAVENOUS ONCE
Status: COMPLETED | OUTPATIENT
Start: 2024-12-07 | End: 2024-12-07

## 2024-12-07 RX ORDER — LISINOPRIL 20 MG/1
40 TABLET ORAL DAILY
Status: DISCONTINUED | OUTPATIENT
Start: 2024-12-08 | End: 2024-12-08 | Stop reason: HOSPADM

## 2024-12-07 RX ADMIN — FOLIC ACID 1 MG: 5 INJECTION, SOLUTION INTRAMUSCULAR; INTRAVENOUS; SUBCUTANEOUS at 12:51

## 2024-12-07 RX ADMIN — DIAZEPAM 10 MG: 10 INJECTION, SOLUTION INTRAMUSCULAR; INTRAVENOUS at 12:39

## 2024-12-07 RX ADMIN — LABETALOL HYDROCHLORIDE 10 MG: 5 INJECTION, SOLUTION INTRAVENOUS at 18:32

## 2024-12-07 RX ADMIN — KETOROLAC TROMETHAMINE 15 MG: 30 INJECTION, SOLUTION INTRAMUSCULAR; INTRAVENOUS at 16:59

## 2024-12-07 RX ADMIN — SODIUM CHLORIDE, SODIUM GLUCONATE, SODIUM ACETATE, POTASSIUM CHLORIDE, MAGNESIUM CHLORIDE, SODIUM PHOSPHATE, DIBASIC, AND POTASSIUM PHOSPHATE 100 ML/HR: .53; .5; .37; .037; .03; .012; .00082 INJECTION, SOLUTION INTRAVENOUS at 20:20

## 2024-12-07 RX ADMIN — DIAZEPAM 10 MG: 10 INJECTION, SOLUTION INTRAMUSCULAR; INTRAVENOUS at 12:13

## 2024-12-07 RX ADMIN — PHENOBARBITAL SODIUM 680 MG: 130 INJECTION INTRAMUSCULAR; INTRAVENOUS at 17:01

## 2024-12-07 RX ADMIN — THIAMINE HYDROCHLORIDE 100 MG: 100 INJECTION, SOLUTION INTRAMUSCULAR; INTRAVENOUS at 12:44

## 2024-12-07 RX ADMIN — SODIUM CHLORIDE 1000 ML: 0.9 INJECTION, SOLUTION INTRAVENOUS at 13:17

## 2024-12-07 RX ADMIN — ONDANSETRON 4 MG: 2 INJECTION INTRAMUSCULAR; INTRAVENOUS at 12:14

## 2024-12-07 RX ADMIN — PIPERACILLIN AND TAZOBACTAM 4.5 G: 36; 4.5 INJECTION, POWDER, FOR SOLUTION INTRAVENOUS at 13:53

## 2024-12-07 RX ADMIN — DIAZEPAM 10 MG: 10 INJECTION, SOLUTION INTRAMUSCULAR; INTRAVENOUS at 14:19

## 2024-12-07 RX ADMIN — DEXTROSE AND SODIUM CHLORIDE 100 ML/HR: 5; .9 INJECTION, SOLUTION INTRAVENOUS at 17:28

## 2024-12-07 RX ADMIN — PANTOPRAZOLE SODIUM 40 MG: 40 INJECTION, POWDER, FOR SOLUTION INTRAVENOUS at 12:39

## 2024-12-07 RX ADMIN — ATORVASTATIN CALCIUM 40 MG: 40 TABLET, FILM COATED ORAL at 18:32

## 2024-12-07 RX ADMIN — MAGNESIUM SULFATE HEPTAHYDRATE 2 G: 40 INJECTION, SOLUTION INTRAVENOUS at 13:56

## 2024-12-07 RX ADMIN — FAMOTIDINE 20 MG: 10 INJECTION INTRAVENOUS at 13:14

## 2024-12-07 RX ADMIN — MAGNESIUM SULFATE HEPTAHYDRATE 2 G: 40 INJECTION, SOLUTION INTRAVENOUS at 18:32

## 2024-12-07 RX ADMIN — MIRTAZAPINE 15 MG: 15 TABLET, FILM COATED ORAL at 21:07

## 2024-12-07 RX ADMIN — SODIUM CHLORIDE 1000 ML: 0.9 INJECTION, SOLUTION INTRAVENOUS at 13:47

## 2024-12-07 RX ADMIN — IOHEXOL 91 ML: 350 INJECTION, SOLUTION INTRAVENOUS at 14:56

## 2024-12-07 NOTE — H&P
H&P - Hospitalist   Name: Diogo Travis 59 y.o. male I MRN: [id number]  Unit/Bed#: [facility] I Date of Admission: 12/7/2024   Date of Service: 12/7/2024 I Hospital Day: 0     Assessment & Plan  Alcohol use disorder, severe, dependence (HCC)  Has been drinking over past 20 years  And bourbon and wine yesterday, last drink at midnight.  Presented with tremors, tongue fasciculations, diaphoresis, nausea and vomiting.  Was noted to be tachycardic and hypertensive.  Received diazepam and phenobarbital in the ED.    Plan:  Toxicology consulted patient: Recommend CIWA protocol with use of phenobarbital or diazepam.  Phenobarbital: 130 mg IV for mild to moderate symptoms and 260 mg IV for moderate to severe symptoms.  Max dose of 2 g 24 hours if patient is still exhibiting withdrawal symptoms adjunct ketamine infusion is recommended.  Please see medical toxicology note for further details.  Thiamine and folate IV given continue supplementation p.o. starting tomorrow  IV hydration Isolyte 100 cc/hr  Increased anion gap metabolic acidosis  In the setting of alcohol use.    Plan:  CIWA protocol   IV hydration 100 cc/h for 24 hours  Monitor BMP  Hypertension  Blood pressure elevated on admission  Patient was not able to take his lisinopril this morning due to nausea and dry heaving    Plan:  Labetalol as needed for now for SBP >170 and DBP >100  Resume lisinopril 40 mg tomorrow  Hypomagnesemia  1.4 on admission  Received 4 mg of mag  Is on magnesium supplementation at home but has not taken for the past week.    Plan:  Monitor and replete as indicated  GERD (gastroesophageal reflux disease)  Ran out of Protonix last week, otherwise has been compliant.  Received 1 dose IV Protonix in the ED    Plan:  Continue Protonix p.o. starting tomorrow.      VTE Pharmacologic Prophylaxis: VTE Score: 2 Low Risk (Score 0-2) - Encourage Ambulation.  Code Status: Level 1 - Full Code patient  Discussion with family: Patient declined call to contact  person.     Anticipated Length of Stay: Patient will be admitted on an inpatient basis with an anticipated length of stay of greater than 2 midnights secondary to alcohol withdrawal.    History of Present Illness   Chief Complaint: Nausea, vomiting and tremors    Diogo Travis is a 59 y.o. male with a PMH of severe HTN, alcoholic gastritis, anxiety, depression, HLD and severe alcohol dependence who presents with nausea, vomiting, tremors and light headed ness that started earlier this morning. He reports drinking wine and bourbon yesterday and last drink was a glass of wine around midnight and started feeling nauseous and had vomiting shortly later. This is his third admission with the same presentation.    On presentation patient had tremors, tongue fasciculations and diaphoresis along with nausea and vomiting and tachycardia and hypertension he was given 30 mg of diazepam and toxicology was consulted.  He was given 10 mg/kg of phenobarbital.   Reported abdominal pain and fullness, received 2 L of IV fluids, Protonix and Pepcid.  CT abdomen pelvis negative for any acute pathology.    At the time of my evaluation patient appears comfortable, he reports some sore throat but denies any nausea, abdominal pain or shortness of breath at this time.  He is open to outpatient counseling referral cessation at this time.    Review of Systems   Constitutional:  Negative for chills and fever.   HENT:  Positive for sore throat (Secondary to vomiting and dry heaving). Negative for postnasal drip, rhinorrhea and trouble swallowing.    Eyes:  Negative for visual disturbance.   Respiratory:  Negative for cough, choking, shortness of breath and wheezing.    Cardiovascular:  Positive for palpitations. Negative for chest pain and leg swelling.   Gastrointestinal:  Positive for abdominal pain, nausea and vomiting. Negative for constipation.   Genitourinary:  Negative for dysuria, frequency and hematuria.   Musculoskeletal:  Negative  for arthralgias and back pain.   Skin:  Negative for color change and rash.   Neurological:  Positive for light-headedness. Negative for syncope and headaches.       Historical Information   Past Medical History:   Diagnosis Date    Alcohol use disorder, severe, dependence (HCC) 04/02/2023    Alcohol withdrawal seizure (HCC)     Alcoholic ketoacidosis 10/16/2023    Anxiety     AR (allergic rhinitis)     Chronic alcoholic gastritis 04/02/2023    Depression     GERD (gastroesophageal reflux disease)     Hepatic steatosis 04/02/2023    Hyperlipidemia     Hypertension     IBS (irritable bowel syndrome)     Obesity     Rosacea     Vitamin B12 deficiency 02/14/2024    Vitamin D deficiency 02/14/2024     Past Surgical History:   Procedure Laterality Date    ANTERIOR CRUCIATE LIGAMENT REPAIR Left     WISDOM TOOTH EXTRACTION       Social History     Tobacco Use    Smoking status: Some Days     Types: Cigarettes, Cigars     Start date: 1/1/2014     Passive exposure: Past (Father)    Smokeless tobacco: Never    Tobacco comments:     Smokes cigars 2-3 times per year   Vaping Use    Vaping status: Never Used   Substance and Sexual Activity    Alcohol use: Yes     Comment: ETOH 12/7/24    Drug use: Never    Sexual activity: Yes     Partners: Female     Birth control/protection: Post-menopausal     E-Cigarette/Vaping    E-Cigarette Use Never User      E-Cigarette/Vaping Substances    Nicotine No     THC No     CBD No     Flavoring No     Other No     Unknown No      Family History   Problem Relation Age of Onset    Cancer Mother 78        Type unknown    Hypertension Father     Coronary artery disease Father 60    Cancer Father 82        Bladder; + Smoker    No Known Problems Sister     No Known Problems Sister     No Known Problems Sister     No Known Problems Son     No Known Problems Son     Heart attack Paternal Grandfather 60    Coronary artery disease Paternal Grandfather 60     Social History:  Marital Status: Single    Occupation: Unemployed currently  Patient Pre-hospital Living Situation: Home  Patient Pre-hospital Level of Mobility: walks  Patient Pre-hospital Diet Restrictions: None    Meds/Allergies   I have reviewed home medications with patient personally.  Prior to Admission medications    Medication Sig Start Date End Date Taking? Authorizing Provider   atorvastatin (LIPITOR) 40 mg tablet Take 1 tablet (40 mg total) by mouth daily with dinner 8/27/24   Enid Altman DO   ergocalciferol (ERGOCALCIFEROL) 1.25 MG (02868 UT) capsule Take 1 capsule (50,000 Units total) by mouth once a week for 12 doses 7/9/24 9/25/24  Enid Altman DO   escitalopram (LEXAPRO) 20 mg tablet Take 1 tablet (20 mg total) by mouth daily 10/2/24   Enid Altman DO   folic acid (FOLVITE) 1 mg tablet Take 1 tablet (1 mg total) by mouth daily 10/12/24 11/11/24  Dequan Miller DO   hydrOXYzine HCL (ATARAX) 25 mg tablet Take 1 tablet (25 mg total) by mouth every 6 (six) hours as needed for itching 11/19/24   Enid Altman DO   lisinopril (ZESTRIL) 40 mg tablet Take 1 tablet (40 mg total) by mouth daily 7/9/24   Enid Altman DO   Magnesium 400 MG CAPS Take 1 capsule by mouth in the morning    Historical Provider, MD   Melatonin 10 MG TABS Take 1 tablet (10 mg total) by mouth daily at bedtime as needed (Insomnia) 7/9/24   Enid Altman DO   metroNIDAZOLE (METROCREAM) 0.75 % cream Apply topically 2 (two) times a day 7/9/24   Enid Altman DO   mirtazapine (REMERON) 15 mg tablet Take 1 tablet (15 mg total) by mouth daily at bedtime 11/13/24   Christine Roberts MD   naltrexone (REVIA) 50 mg tablet Take 1 tablet (50 mg total) by mouth daily 11/13/24   Christine Roberts MD   Omega-3 Fatty Acids (fish oil) 1,000 mg Take 2,000 mg by mouth daily    Historical Provider, MD   pantoprazole (PROTONIX) 40 mg tablet Take 1 tablet (40 mg total) by mouth daily in the early morning 8/27/24 2/23/25  Enid Altman DO   Thiamine Mononitrate (VITAMIN B1)  100 mg tablet Take 1 tablet (100 mg total) by mouth daily 7/9/24   Enid Altman DO     Allergies   Allergen Reactions    Cefdinir Rash       Objective :  Temp:  [97.5 °F (36.4 °C)] 97.5 °F (36.4 °C)  HR:  [116-141] 118  BP: (142-177)/() 172/105  Resp:  [18-24] 18  SpO2:  [91 %-98 %] 94 %  O2 Device: None (Room air)    Physical Exam  Vitals reviewed.   Constitutional:       General: He is not in acute distress.     Appearance: He is well-developed.   HENT:      Head: Normocephalic and atraumatic.      Mouth/Throat:      Mouth: Mucous membranes are dry.      Pharynx: Oropharynx is clear.   Eyes:      General: No scleral icterus.     Extraocular Movements: Extraocular movements intact.      Conjunctiva/sclera: Conjunctivae normal.   Cardiovascular:      Rate and Rhythm: Regular rhythm. Tachycardia present.      Heart sounds: No murmur heard.  Pulmonary:      Effort: Pulmonary effort is normal. No respiratory distress.      Breath sounds: Normal breath sounds. No stridor. No wheezing or rhonchi.   Abdominal:      General: Bowel sounds are normal. There is no distension.      Palpations: Abdomen is soft. There is no mass.      Tenderness: There is no abdominal tenderness.   Musculoskeletal:         General: No swelling or tenderness.      Cervical back: Neck supple.      Right lower leg: No edema.      Left lower leg: No edema.   Skin:     General: Skin is warm and dry.      Capillary Refill: Capillary refill takes less than 2 seconds.   Neurological:      Mental Status: He is alert and oriented to person, place, and time.   Psychiatric:         Mood and Affect: Mood normal.         Behavior: Behavior normal.          Lines/Drains:            Lab Results: I have reviewed the following results:  Results from last 7 days   Lab Units 12/07/24  1224   WBC Thousand/uL 15.39*   HEMOGLOBIN g/dL 16.4   HEMATOCRIT % 45.5   PLATELETS Thousands/uL 310   SEGS PCT % 89*   LYMPHO PCT % 7*   MONO PCT % 4   EOS PCT % 0      Results from last 7 days   Lab Units 12/07/24  1224   SODIUM mmol/L 139   POTASSIUM mmol/L 4.3   CHLORIDE mmol/L 97   CO2 mmol/L 19*   BUN mg/dL 19   CREATININE mg/dL 0.96   ANION GAP mmol/L 23*   CALCIUM mg/dL 9.6   ALBUMIN g/dL 4.8   TOTAL BILIRUBIN mg/dL 2.10*   ALK PHOS U/L 136*   ALT U/L 40   AST U/L 66*   GLUCOSE RANDOM mg/dL 131     Results from last 7 days   Lab Units 12/07/24  1337   INR  1.03         Lab Results   Component Value Date    HGBA1C 4.9 02/14/2024    HGBA1C 5.0 11/12/2019     Results from last 7 days   Lab Units 12/07/24  1344   LACTIC ACID mmol/L 1.4       Imaging Results Review: I reviewed radiology reports from this admission including: chest xray and CT abdomen/pelvis.  Other Study Results Review: EKG was reviewed.     Administrative Statements       ** Please Note: This note has been constructed using a voice recognition system. **

## 2024-12-07 NOTE — ED PROVIDER NOTES
Time reflects when diagnosis was documented in both MDM as applicable and the Disposition within this note       Time User Action Codes Description Comment    12/7/2024  4:29 PM Kalen Jose Juan JHONATHAN Moss [F10.930] Alcohol withdrawal syndrome without complication (HCC)     12/7/2024  4:55 PM Kalen Jose Juan JHONATHAN Moss [E83.42] Hypomagnesemia     12/7/2024  5:20 PM Lavelle Cruz [F10.90] Alcohol use disorder     12/7/2024  5:20 PM Lavelle Cruz [E87.29] Alcoholic ketoacidosis           ED Disposition       ED Disposition   Admit    Condition   Stable    Date/Time   Sat Dec 7, 2024  4:55 PM    Comment                  Assessment & Plan       Medical Decision Making  Please see ED course    Amount and/or Complexity of Data Reviewed  Labs: ordered. Decision-making details documented in ED Course.  Radiology: ordered. Decision-making details documented in ED Course.    Risk  Prescription drug management.  Decision regarding hospitalization.        ED Course as of 12/07/24 1945   Sat Dec 07, 2024   1236 WBC(!): 15.39   1341 ANION GAP(!): 23   1343 Will do sepsis workup and an CT abdomen pelvis due to leukocytosis and elevated anion gap with no clear cause.  Patient also states he is having some abdominal pain, no tenderness on exam.  Pain is in the right abdomen.   1630 LACTIC ACID: 1.4   1647 CT abdomen pelvis with contrast  No evidence of acute abdominopelvic process.     Hepatomegaly and hepatic steatosis.     Additional chronic findings and negatives as above.     1647 Discussed patient with toxicology, recommended 10 mg/kg ideal body weight of phenobarbital.  CT abdomen pelvis unremarkable.  Will admit for management of alcohol withdrawal.    59-year-old male presented due to alcohol withdrawal, patient treated with 3 doses valium 10 mg. Attempted librium but pt unable to tolerate PO.  Work up remarkable for elevated Tbili and alk phos. AG- 23.  Magnesium repleted.  During ED stay pt remained tachycardic.  Unclear  etiology whether this is kash to alcohol withdrawal but no other clear cause appreciated at this time.         Medications   chlordiazePOXIDE (LIBRIUM) capsule 50 mg (50 mg Oral Not Given 12/7/24 1439)   atorvastatin (LIPITOR) tablet 40 mg (has no administration in time range)   escitalopram (LEXAPRO) tablet 20 mg (has no administration in time range)   folic acid (FOLVITE) tablet 1 mg (has no administration in time range)   lisinopril (ZESTRIL) tablet 40 mg (has no administration in time range)   mirtazapine (REMERON) tablet 15 mg (has no administration in time range)   pantoprazole (PROTONIX) EC tablet 40 mg (has no administration in time range)   thiamine tablet 100 mg (has no administration in time range)   magnesium sulfate 2 g/50 mL IVPB (premix) 2 g (has no administration in time range)   ondansetron (FOR EMS ONLY) (ZOFRAN) 4 mg/2 mL injection 4 mg (0 mg Does not apply Given to EMS 12/7/24 1213)   diazepam (VALIUM) injection 10 mg (10 mg Intravenous Given 12/7/24 1213)   folic acid 1 mg in sodium chloride 0.9 % 50 mL IVPB (0 mg Intravenous Stopped 12/7/24 1319)   ondansetron (ZOFRAN) injection 4 mg (4 mg Intravenous Given 12/7/24 1214)   pantoprazole (PROTONIX) injection 40 mg (40 mg Intravenous Given 12/7/24 1239)   diazepam (VALIUM) injection 10 mg (10 mg Intravenous Given 12/7/24 1239)   Famotidine (PF) (PEPCID) injection 20 mg (20 mg Intravenous Given 12/7/24 1314)   sodium chloride 0.9 % bolus 1,000 mL (0 mL Intravenous Stopped 12/7/24 1417)   sodium chloride 0.9 % bolus 1,000 mL (0 mL Intravenous Stopped 12/7/24 1607)   piperacillin-tazobactam (ZOSYN) IVPB 4.5 g (0 g Intravenous Stopped 12/7/24 1425)   magnesium sulfate 2 g/50 mL IVPB (premix) 2 g (0 g Intravenous Stopped 12/7/24 1607)   diazepam (VALIUM) injection 10 mg (10 mg Intravenous Given 12/7/24 1419)   iohexol (OMNIPAQUE) 350 MG/ML injection (MULTI-DOSE) 91 mL (91 mL Intravenous Given 12/7/24 1451)   PHENobarbital 680 mg in sodium chloride 0.9 %  100 mL IVPB (0 mg Intravenous Stopped 12/7/24 1741)   ketorolac (TORADOL) injection 15 mg (15 mg Intravenous Given 12/7/24 1659)   labetalol (NORMODYNE) injection 10 mg (has no administration in time range)       ED Risk Strat Scores                CIWA-Ar Score       Row Name 12/07/24 1700 12/07/24 1600 12/07/24 1500       CIWA-Ar    Nausea and Vomiting 0 0 1    Tactile Disturbances 0 0 0    Tremor 1 1 2    Auditory Disturbances 0 0 0    Paroxysmal Sweats 0 0 0    Visual Disturbances 0 0 0    Anxiety 1 1 2    Headache, Fullness in Head 0 0 0    Agitation 0 0 1    Orientation and Clouding of Sensorium 0 0 0    CIWA-Ar Total 2 2 6      Row Name 12/07/24 1400 12/07/24 1300 12/07/24 1230       CIWA-Ar    Nausea and Vomiting 2 2 3    Tactile Disturbances 0 0 1    Tremor 3 1 3    Auditory Disturbances 0 0 0    Paroxysmal Sweats 1 1 3    Visual Disturbances 0 0 0    Anxiety 2 2 2    Headache, Fullness in Head 0 0 0    Agitation 1 0 0    Orientation and Clouding of Sensorium 0 0 0    CIWA-Ar Total 9 6 12      Row Name 12/07/24 1157             CIWA-Ar    Nausea and Vomiting 5      Tactile Disturbances 1      Tremor 5      Auditory Disturbances 0      Paroxysmal Sweats 5      Visual Disturbances 0      Anxiety 3      Headache, Fullness in Head 0      Agitation 0      Orientation and Clouding of Sensorium 0      CIWA-Ar Total 19                              SBIRT 20yo+      Flowsheet Row Most Recent Value   Initial Alcohol Screen: US AUDIT-C     1. How often do you have a drink containing alcohol? 6 Filed at: 12/07/2024 1205   2. How many drinks containing alcohol do you have on a typical day you are drinking?  6 Filed at: 12/07/2024 1205   3a. Male UNDER 65: How often do you have five or more drinks on one occasion? 6 Filed at: 12/07/2024 1205   Audit-C Score 18 Filed at: 12/07/2024 1205   Full Alcohol Screen: US AUDIT    4. How often during the last year have you found that you were not able to stop drinking once you had  started? 4 Filed at: 12/07/2024 1205   5. How often during past year have you failed to do what was normally expected of you because of drinking?  1 Filed at: 12/07/2024 1205   6. How often in past year have you needed a first drink in the morning to get yourself going after a heavy drinking session?  4 Filed at: 12/07/2024 1205   7. How often in past year have you had feeling of guilt or remorse after drinking?  4 Filed at: 12/07/2024 1205   8. How often in past year have you been unable to remember what happened night before because you had been drinking?  1 Filed at: 12/07/2024 1205   9. Have you or someone else been injured as a result of your drinking?  0 Filed at: 12/07/2024 1205   10. Has a relative, friend, doctor or other health worker been concerned about your drinking and suggested you cut down?  4 Filed at: 12/07/2024 1205   AUDIT Total Score 36 Filed at: 12/07/2024 1205   MILDRED: How many times in the past year have you...    Used an illegal drug or used a prescription medication for non-medical reasons? Never Filed at: 12/07/2024 1205                            History of Present Illness       Chief Complaint   Patient presents with    Shaking     Patient hx of alcohol use. Last drink was last night at 0000. Drank 2 bottles of wine and a bottle of bourbon. Also c/o chest heaviness.        Past Medical History:   Diagnosis Date    Alcohol use disorder, severe, dependence (HCC) 04/02/2023    Alcohol withdrawal seizure (HCC)     Alcoholic ketoacidosis 10/16/2023    Anxiety     AR (allergic rhinitis)     Chronic alcoholic gastritis 04/02/2023    Depression     GERD (gastroesophageal reflux disease)     Hepatic steatosis 04/02/2023    Hyperlipidemia     Hypertension     IBS (irritable bowel syndrome)     Obesity     Rosacea     Vitamin B12 deficiency 02/14/2024    Vitamin D deficiency 02/14/2024      Past Surgical History:   Procedure Laterality Date    ANTERIOR CRUCIATE LIGAMENT REPAIR Left     WISDOM TOOTH  EXTRACTION        Family History   Problem Relation Age of Onset    Cancer Mother 78        Type unknown    Hypertension Father     Coronary artery disease Father 60    Cancer Father 82        Bladder; + Smoker    No Known Problems Sister     No Known Problems Sister     No Known Problems Sister     No Known Problems Son     No Known Problems Son     Heart attack Paternal Grandfather 60    Coronary artery disease Paternal Grandfather 60      Social History     Tobacco Use    Smoking status: Some Days     Types: Cigarettes, Cigars     Start date: 1/1/2014     Passive exposure: Past (Father)    Smokeless tobacco: Never    Tobacco comments:     Smokes cigars 2-3 times per year   Vaping Use    Vaping status: Never Used   Substance Use Topics    Alcohol use: Yes     Comment: ETOH 12/7/24    Drug use: Never      E-Cigarette/Vaping    E-Cigarette Use Never User       E-Cigarette/Vaping Substances    Nicotine No     THC No     CBD No     Flavoring No     Other No     Unknown No       I have reviewed and agree with the history as documented.     59-year-old history of alcohol use disorder, hypertension, hepatic steatosis presenting due to alcohol withdrawal.  Patient states he has been drinking heavily for 20 years, typically at least a bottle a day, last drink was last night at 12 midnight.  Today patient has been having nausea, vomiting, tremors, abdominal pain and sweating.  Patient has been in withdrawal multiple times before and has been hospitalized multiple times as well.  Patient denies any seizures and has never required intubation in the past.  Normal state of health last night prior to stopping his drinking.        Review of Systems   Constitutional:  Positive for diaphoresis.   Gastrointestinal:  Positive for abdominal pain, nausea and vomiting.   Neurological:  Positive for tremors.           Objective       ED Triage Vitals   Temperature Pulse Blood Pressure Respirations SpO2 Patient Position - Orthostatic VS    12/07/24 1159 12/07/24 1157 12/07/24 1157 12/07/24 1157 12/07/24 1157 12/07/24 1157   97.5 °F (36.4 °C) (!) 141 (!) 177/108 (!) 24 98 % Sitting      Temp Source Heart Rate Source BP Location FiO2 (%) Pain Score    12/07/24 1159 12/07/24 1157 12/07/24 1157 -- 12/07/24 1400    Oral Monitor Right arm  7      Vitals      Date and Time Temp Pulse SpO2 Resp BP Pain Score FACES Pain Rating User   12/07/24 1840 99.1 °F (37.3 °C) 113 93 % -- 163/105 -- -- DII   12/07/24 1800 -- 118 94 % 18 172/105 -- -- SE   12/07/24 1700 -- 116 95 % 18 159/94 -- -- SE   12/07/24 1659 -- -- -- -- -- 7 -- SE   12/07/24 1630 -- 118 93 % 18 154/92 -- -- SE   12/07/24 1600 -- 126 94 % 18 156/96 -- -- SE   12/07/24 1530 -- 126 94 % 20 171/95 7 -- SE   12/07/24 1500 -- 122 95 % 20 171/100 -- -- SE   12/07/24 1400 -- 120 93 % 20 166/98 7 -- SE   12/07/24 1300 -- 135 92 % 22 142/96 -- -- SE   12/07/24 1230 -- 138 91 % 24 170/92 -- -- SE   12/07/24 1159 97.5 °F (36.4 °C) -- -- -- -- -- -- SE   12/07/24 1157 -- 141 98 % 24 177/108 -- -- SE            Physical Exam  Vitals and nursing note reviewed.   Constitutional:       General: He is in acute distress.      Appearance: He is well-developed. He is diaphoretic.   HENT:      Head: Normocephalic and atraumatic.      Nose: Congestion present.      Mouth/Throat:      Pharynx: Oropharynx is clear.   Eyes:      Extraocular Movements: Extraocular movements intact.      Conjunctiva/sclera: Conjunctivae normal.      Pupils: Pupils are equal, round, and reactive to light.   Cardiovascular:      Rate and Rhythm: Regular rhythm. Tachycardia present.      Pulses: Normal pulses.      Heart sounds: Normal heart sounds. No murmur heard.  Pulmonary:      Effort: Pulmonary effort is normal. No respiratory distress.      Breath sounds: Normal breath sounds.   Chest:      Chest wall: No tenderness.   Abdominal:      General: Abdomen is flat. Bowel sounds are normal. There is no distension.      Palpations: Abdomen is  soft.      Tenderness: There is no abdominal tenderness. There is no right CVA tenderness or left CVA tenderness.   Musculoskeletal:         General: No deformity or signs of injury. Normal range of motion.      Cervical back: Normal range of motion and neck supple. No rigidity or tenderness.      Right lower leg: No edema.      Left lower leg: No edema.      Comments: Tremors in upper extremities.   Skin:     General: Skin is warm.      Findings: No bruising, lesion or rash.   Neurological:      General: No focal deficit present.      Mental Status: He is alert and oriented to person, place, and time.      Cranial Nerves: No cranial nerve deficit.      Sensory: No sensory deficit.         Results Reviewed       Procedure Component Value Units Date/Time    UA w Reflex to Microscopic w Reflex to Culture [[id number]]  (Abnormal) Collected: 12/07/24 1840    Lab Status: Final result Specimen: Urine, Clean Catch Updated: 12/07/24 1907     Color, UA Light Yellow     Clarity, UA Clear     Specific Gravity, UA >=1.050     pH, UA 5.5     Leukocytes, UA Negative     Nitrite, UA Negative     Protein, UA 50 (1+) mg/dl      Glucose, UA Negative mg/dl      Ketones, UA 80 (3+) mg/dl      Urobilinogen, UA <2.0 mg/dl      Bilirubin, UA Negative     Occult Blood, UA Small    Protime-INR [[id number]]  (Normal) Collected: 12/07/24 1337    Lab Status: Final result Specimen: Blood from Arm, Right Updated: 12/07/24 1431     Protime 14.2 seconds      INR 1.03    Narrative:      INR Therapeutic Range    Indication                                             INR Range      Atrial Fibrillation                                               2.0-3.0  Hypercoagulable State                                    2.0.2.3  Left Ventricular Asist Device                            2.0-3.0  Mechanical Heart Valve                                  -    Aortic(with afib, MI, embolism, HF, LA enlargement,    and/or coagulopathy)                                      2.0-3.0 (2.5-3.5)     Mitral                                                             2.5-3.5  Prosthetic/Bioprosthetic Heart Valve               2.0-3.0  Venous thromboembolism (VTE: VT, PE        2.0-3.0    APTT [[id number]]  (Normal) Collected: 12/07/24 1337    Lab Status: Final result Specimen: Blood from Arm, Right Updated: 12/07/24 1431     PTT 28 seconds     Lactic acid, plasma (w/reflex if result > 2.0) [[id number]]  (Normal) Collected: 12/07/24 1344    Lab Status: Final result Specimen: Blood from Hand, Right Updated: 12/07/24 1423     LACTIC ACID 1.4 mmol/L     Narrative:      Result may be elevated if tourniquet was used during collection.    Blood culture #1 [[id number]] Collected: 12/07/24 1337    Lab Status: In process Specimen: Blood from Arm, Right Updated: 12/07/24 1352    Blood culture #2 [[id number]] Collected: 12/07/24 1344    Lab Status: In process Specimen: Blood from Hand, Right Updated: 12/07/24 1352    Magnesium [[id number]]  (Abnormal) Collected: 12/07/24 1224    Lab Status: Final result Specimen: Blood from Arm, Left Updated: 12/07/24 1313     Magnesium 1.4 mg/dL     Lipase [[id number]]  (Normal) Collected: 12/07/24 1224    Lab Status: Final result Specimen: Blood from Arm, Left Updated: 12/07/24 1313     Lipase 12 u/L     Basic metabolic panel [[id number]]  (Abnormal) Collected: 12/07/24 1224    Lab Status: Final result Specimen: Blood from Arm, Left Updated: 12/07/24 1313     Sodium 139 mmol/L      Potassium 4.3 mmol/L      Chloride 97 mmol/L      CO2 19 mmol/L      ANION GAP 23 mmol/L      BUN 19 mg/dL      Creatinine 0.96 mg/dL      Glucose 131 mg/dL      Calcium 9.6 mg/dL      eGFR 86 ml/min/1.73sq m     Narrative:      [employer] guidelines for Chronic Kidney Disease (CKD):     Stage 1 with normal or high GFR (GFR > 90 mL/min/1.73 square meters)    Stage 2 Mild CKD (GFR = 60-89 mL/min/1.73 square meters)    Stage 3A Moderate CKD (GFR = 45-59 mL/min/1.73 square  meters)    Stage 3B Moderate CKD (GFR = 30-44 mL/min/1.73 square meters)    Stage 4 Severe CKD (GFR = 15-29 mL/min/1.73 square meters)    Stage 5 End Stage CKD (GFR <15 mL/min/1.73 square meters)  Note: GFR calculation is accurate only with a steady state creatinine    Hepatic function panel [[id number]]  (Abnormal) Collected: 12/07/24 1224    Lab Status: Final result Specimen: Blood from Arm, Left Updated: 12/07/24 1313     Total Bilirubin 2.10 mg/dL      Bilirubin, Direct 0.37 mg/dL      Alkaline Phosphatase 136 U/L      AST 66 U/L      ALT 40 U/L      Total Protein 7.8 g/dL      Albumin 4.8 g/dL     Salicylate level [[id number]]  (Normal) Collected: 12/07/24 1224    Lab Status: Final result Specimen: Blood from Arm, Left Updated: 12/07/24 1313     Salicylate Lvl <5 mg/dL     Acetaminophen level-If concentration is detectable, please discuss with medical  on call. [[id number]]  (Abnormal) Collected: 12/07/24 1224    Lab Status: Final result Specimen: Blood from Arm, Left Updated: 12/07/24 1313     Acetaminophen Level <5 ug/mL     Ethanol [[id number]]  (Normal) Collected: 12/07/24 1224    Lab Status: Final result Specimen: Blood from Arm, Left Updated: 12/07/24 1253     Ethanol Lvl <10 mg/dL     CBC and differential [[id number]]  (Abnormal) Collected: 12/07/24 1224    Lab Status: Final result Specimen: Blood from Arm, Left Updated: 12/07/24 1234     WBC 15.39 Thousand/uL      RBC 5.02 Million/uL      Hemoglobin 16.4 g/dL      Hematocrit 45.5 %      MCV 91 fL      MCH 32.7 pg      MCHC 36.0 g/dL      RDW 12.2 %      MPV 8.7 fL      Platelets 310 Thousands/uL      nRBC 0 /100 WBCs      Segmented % 89 %      Immature Grans % 0 %      Lymphocytes % 7 %      Monocytes % 4 %      Eosinophils Relative 0 %      Basophils Relative 0 %      Absolute Neutrophils 13.52 Thousands/µL      Absolute Immature Grans 0.06 Thousand/uL      Absolute Lymphocytes 1.14 Thousands/µL      Absolute Monocytes 0.60 Thousand/µL       Eosinophils Absolute 0.01 Thousand/µL      Basophils Absolute 0.06 Thousands/µL             CT abdomen pelvis with contrast   Final Interpretation by Dima Rascon MD (12/07 1642)      No evidence of acute abdominopelvic process.      Hepatomegaly and hepatic steatosis.      Additional chronic findings and negatives as above.         Workstation performed: [id number]         XR chest 1 view portable    (Results Pending)       ECG 12 Lead Documentation Only    Date/Time: 12/7/2024 7:43 PM    Performed by: Jose Juan Higuera MD  Authorized by: Jose Juan Higuera MD    Patient location:  ED  Previous ECG:     Previous ECG:  Compared to current    Similarity:  No change  Interpretation:     Interpretation: abnormal    Rate:     ECG rate:  140    ECG rate assessment: tachycardic    Rhythm:     Rhythm: sinus tachycardia    Ectopy:     Ectopy: none    QRS:     QRS axis:  Right    QRS intervals:  Normal  Conduction:     Conduction: normal    ST segments:     ST segments:  Normal  T waves:     T waves: normal    Other findings:     Other findings: poor R wave progression        ED Medication and Procedure Management   Prior to Admission Medications   Prescriptions Last Dose Informant Patient Reported? Taking?   Magnesium 400 MG CAPS Past Month  Yes Yes   Sig: Take 1 capsule by mouth in the morning   Omega-3 Fatty Acids (fish oil) 1,000 mg Past Month  Yes Yes   Sig: Take 2,000 mg by mouth daily   Thiamine Mononitrate (VITAMIN B1) 100 mg tablet Past Month  No Yes   Sig: Take 1 tablet (100 mg total) by mouth daily   atorvastatin (LIPITOR) 40 mg tablet Past Week  No Yes   Sig: Take 1 tablet (40 mg total) by mouth daily with dinner   ergocalciferol (ERGOCALCIFEROL) 1.25 MG (67759 UT) capsule   No No   Sig: Take 1 capsule (50,000 Units total) by mouth once a week for 12 doses   escitalopram (LEXAPRO) 20 mg tablet 12/6/2024 Morning  No Yes   Sig: Take 1 tablet (20 mg total) by mouth daily   folic acid (FOLVITE) 1 mg tablet  Past Month  No Yes   Sig: Take 1 tablet (1 mg total) by mouth daily   hydrOXYzine HCL (ATARAX) 25 mg tablet Past Month  No Yes   Sig: Take 1 tablet (25 mg total) by mouth every 6 (six) hours as needed for itching   lisinopril (ZESTRIL) 40 mg tablet 12/6/2024 Morning  No Yes   Sig: Take 1 tablet (40 mg total) by mouth daily   metroNIDAZOLE (METROCREAM) 0.75 % cream More than a month  No No   Sig: Apply topically 2 (two) times a day   mirtazapine (REMERON) 15 mg tablet Unknown  No No   Sig: Take 1 tablet (15 mg total) by mouth daily at bedtime   naltrexone (REVIA) 50 mg tablet More than a month  No No   Sig: Take 1 tablet (50 mg total) by mouth daily   pantoprazole (PROTONIX) 40 mg tablet Past Week  No Yes   Sig: Take 1 tablet (40 mg total) by mouth daily in the early morning      Facility-Administered Medications: None     Current Discharge Medication List        CONTINUE these medications which have NOT CHANGED    Details   atorvastatin (LIPITOR) 40 mg tablet Take 1 tablet (40 mg total) by mouth daily with dinner  Qty: 30 tablet, Refills: 5    Associated Diagnoses: Hyperlipidemia, unspecified hyperlipidemia type      escitalopram (LEXAPRO) 20 mg tablet Take 1 tablet (20 mg total) by mouth daily  Qty: 90 tablet, Refills: 1    Associated Diagnoses: Anxiety; Current moderate episode of major depressive disorder, unspecified whether recurrent (HCC)      folic acid (FOLVITE) 1 mg tablet Take 1 tablet (1 mg total) by mouth daily  Qty: 30 tablet, Refills: 0    Associated Diagnoses: Alcohol withdrawal (HCC)      hydrOXYzine HCL (ATARAX) 25 mg tablet Take 1 tablet (25 mg total) by mouth every 6 (six) hours as needed for itching  Qty: 30 tablet, Refills: 5    Associated Diagnoses: Anxiety      lisinopril (ZESTRIL) 40 mg tablet Take 1 tablet (40 mg total) by mouth daily  Qty: 30 tablet, Refills: 1    Associated Diagnoses: Primary hypertension      Magnesium 400 MG CAPS Take 1 capsule by mouth in the morning      Omega-3 Fatty  Acids (fish oil) 1,000 mg Take 2,000 mg by mouth daily      pantoprazole (PROTONIX) 40 mg tablet Take 1 tablet (40 mg total) by mouth daily in the early morning  Qty: 30 tablet, Refills: 5    Associated Diagnoses: Chronic alcoholic gastritis without hemorrhage; Gastroesophageal reflux disease, unspecified whether esophagitis present      Thiamine Mononitrate (VITAMIN B1) 100 mg tablet Take 1 tablet (100 mg total) by mouth daily  Qty: 30 tablet, Refills: 1    Associated Diagnoses: Alcohol use disorder, severe, dependence (HCC)      ergocalciferol (ERGOCALCIFEROL) 1.25 MG (26309 UT) capsule Take 1 capsule (50,000 Units total) by mouth once a week for 12 doses  Qty: 12 capsule, Refills: 0    Associated Diagnoses: Vitamin D deficiency      metroNIDAZOLE (METROCREAM) 0.75 % cream Apply topically 2 (two) times a day  Qty: 45 g, Refills: 0    Associated Diagnoses: Rosacea      mirtazapine (REMERON) 15 mg tablet Take 1 tablet (15 mg total) by mouth daily at bedtime  Qty: 30 tablet, Refills: 0    Associated Diagnoses: Major depressive disorder, recurrent, moderate (HCC)      naltrexone (REVIA) 50 mg tablet Take 1 tablet (50 mg total) by mouth daily  Qty: 30 tablet, Refills: 0    Associated Diagnoses: Alcohol use disorder, severe, dependence (HCC)           No discharge procedures on file.  ED SEPSIS DOCUMENTATION   Time reflects when diagnosis was documented in both MDM as applicable and the Disposition within this note       Time User Action Codes Description Comment    12/7/2024  4:29 PM Jose Juan Higuera [F10.930] Alcohol withdrawal syndrome without complication (HCC)     12/7/2024  4:55 PM Jose Juan Higuera [E83.42] Hypomagnesemia     12/7/2024  5:20 PM Lavelle Cruz [F10.90] Alcohol use disorder     12/7/2024  5:20 PM Lavelle Cruz [E87.29] Alcoholic ketoacidosis                  Jose Juan Higuera MD  12/07/24 1945

## 2024-12-07 NOTE — ASSESSMENT & PLAN NOTE
In the setting of alcohol use.    Plan:  CIWA protocol   IV hydration 100 cc/h for 24 hours  Monitor BMP

## 2024-12-07 NOTE — ASSESSMENT & PLAN NOTE
Ran out of Protonix last week, otherwise has been compliant.  Received 1 dose IV Protonix in the ED    Plan:  Continue Protonix p.o. starting tomorrow.

## 2024-12-07 NOTE — ASSESSMENT & PLAN NOTE
Blood pressure elevated on admission  Patient was not able to take his lisinopril this morning due to nausea and dry heaving    Plan:  Labetalol as needed for now for SBP >170 and DBP >100  Resume lisinopril 40 mg tomorrow

## 2024-12-07 NOTE — ASSESSMENT & PLAN NOTE
History of chronic daily alcohol use  Last drink 12/7/24 around midnight 1 glass of wine.  Had wine and bourbon yesterday.

## 2024-12-07 NOTE — ASSESSMENT & PLAN NOTE
1.4 on admission  Received 4 mg of mag  Is on magnesium supplementation at home but has not taken for the past week.    Plan:  Monitor and replete as indicated

## 2024-12-07 NOTE — TELEMEDICINE
e-Consult (IPC)  - Medical Toxicology   Name: Diogo Travis 59 y.o. male I MRN: [id number]  Unit/Bed#: ED-25 I Date of Admission: 12/7/2024   Date of Service: 12/7/2024 I Hospital Day: 0  Inpatient consult to Toxicology  Consult performed by: Lavelle Cruz DO  Consult ordered by: Beba Church MD        Physician Requesting Evaluation: Cayetano Ratliff*   Reason for Evaluation / Principal Problem: AUD/EMILIA    Assessment & Plan  Alcohol withdrawal syndrome without complication (HCC)  Patient with a history of chronic daily alcohol use  Serum alcohol 0 in the ED  Received total of 30mg diazepam in the ED prior to reaching out to toxicology  Signs and symptoms of withdrawal included tremor, tongue fasciculations, diaphoresis, nausea, vomiting, tachycardia, and hypertension. Discussed with Dr. Higuera of the ED and I recommended loading with 10mg/kg phenobarbital based on ideal body weight as an initial dose  Continue monitoring under protocol and administer phenobarbital as indicated with a maximum of 2g  Recommend continued symptom-triggered management via CIWA with either phenobarbital or diazepam. Please see teaching note below for diazepam dosing.    For phenobarbital dosing, can use:   130 mg IV for mild to moderate symptoms and phenobarbital 260 mg IV for moderate to severe symptoms.  Titrate to RASS -1 and hold for significant sedation  Maximum total dose of phenobarbital is 2 grams.  if patient is approaching 2 grams of phenobarbital with continued withdrawal symptoms:  Recommend escalation of care for adjunctive use of dexmedetomidine or ketamine infusions  Can start ketamine infusion at 0.3 mg/kg/hr for 24 hours. Beyond this please use 0.15 mg/kg/hr until clinical improvement  If using dexmedetomidine infusion, please use adjunctive benzodiazepines as dexmedetomidine has no direct effect at the CLAUDIA receptor  Continuous pulse ox and telemetry monitoring    Alcohol use disorder    Daily  "vitamin supplementation with thiamine, folic acid, and multivitamin  Appreciate certified  and case management consultations for recommendations regarding aftercare resources, recovery support and disposition planning  Declined transfer to   In 11/24 pt was Rx'd naltrexone for AUD. Once he is improved from a withdrawal standpoint, if he wants to resume this can start at 25 mg on day 1 followed by 50 mg daily  If he wants to restart this, please inquire if he has been using any opioid substances or Kratom. If yes, would wait 7 days prior to restarting this.    Hypomagnesemia  Replacement per primary  Alcoholic ketoacidosis  AGAP 23, bicarb 19  Recommend dextrose containing IVF  Trend until resolution    Please see additional teaching note below:   Medical Toxicology recommendations for CIWA monitoring using diazepam for treatment:    CIWA score & Treatment     Monitoring:   Modified CIWA Score   Telemetry  Continuous pulse oximetry   Initiate RASS with dosing and hold any sedatives for RASS less than -2  Request provider re-evaluation after every three doses.  Step down for CIWA > 7  Contact Medical Toxicology/Addiction \"[facility] On Call\" via Tiger Text for worsening CIWA, persistent tachycardia or encephalopathy.       0  No intervention  Reassess q4 hours.   Consider discontinuing CIWA 24 hours after ethanol concentration of zero, with a score of zero    1-7  Diazepam 10 mg PO Q4hr PRN score 1-7  Reassess q4hr    8-14  Diazepam 10mg IV Q1hr PRN score 8-14  Reassess q1hr  Contact Medical Toxicology/Addiction \"[facility] On Call\" via Tiger Text to discuss transfer to detox unit, step down or critical care per Detox AP.    15-19  Diazepam 20mg IV Q1hr PRN score 15-19  Reassess q1hr  Contact Medical Toxicology/Addiction \"[facility] On Call\" via Tiger Text to discuss transfer to detox unit, step down or critical care per Detox AP and further treatment " "recommendations.    >20  Diazepam 40mg IV Q1hr PRN score > 20  Contact Medical Toxicology/Addiction \"[facility] On Call\" via Tiger Text for additional treatment recommendations.       Once the patient's withdrawal is effectively managed, the patient can be placed on a diazepam taper, if needed.    Diazepam can be decreased by 1/2 of the last dose every hour until the patient is not needing more than 10 mg at a time; at which point diazepam 5mg PO  may be given Q6 hours PRN for 24 hours. Prior to discontinuation.     Please reach out to Medical Toxicology/Addiction \"[facility] On Call\" via Tiger Text with any additional concerns.       For further questions, please contact the medical  on call via SecureChat between 8am and 9pm. If between 9pm and 8am, please reach out to the Poison [facility] at [phone].     Hx and PE limited by the dynamics of a phone consultation. I have not personally interviewed or evaluated the patient, but only advised based on the information provided to me. Primary provider is responsible for all clinical decisions.     History of Present Illness   Diogo Travis is a 59 y.o. year old male who presents with alcohol withdrawal symptoms. ED reports drinking 1-2 bottles of liquor on a daily basis. Denies any hx of intubation or withdrawal seizures. Presented with tremor, tongue fasiculations, tachycardia, diaphoresis, n/v. Symptoms improved with 3 doses of diazepam, but recurred. No altered mental status reported. ED reports that he deferred transfer to [facility] and wanted to stay at [facility].  Admission in November for AUD/EMILIA and was prescribed naltrexone.    Historical Information   I have reviewed the patient's PMH, PSH, Social History, Family History, Meds, and Allergies  Social History     Tobacco Use    Smoking status: Some Days     Types: Cigarettes, Cigars     Start date: 1/1/2014     Passive exposure: Past (Father)    Smokeless tobacco: Never    Tobacco " comments:     Smokes cigars 2-3 times per year   Vaping Use    Vaping status: Never Used   Substance and Sexual Activity    Alcohol use: Yes     Comment: ETOH 12/7/24    Drug use: Never    Sexual activity: Yes     Partners: Female     Birth control/protection: Post-menopausal     Family History   Problem Relation Age of Onset    Cancer Mother 78        Type unknown    Hypertension Father     Coronary artery disease Father 60    Cancer Father 82        Bladder; + Smoker    No Known Problems Sister     No Known Problems Sister     No Known Problems Sister     No Known Problems Son     No Known Problems Son     Heart attack Paternal Grandfather 60    Coronary artery disease Paternal Grandfather 60       Meds/Allergies   Prior to Admission medications    Medication Sig Start Date End Date Taking? Authorizing Provider   atorvastatin (LIPITOR) 40 mg tablet Take 1 tablet (40 mg total) by mouth daily with dinner 8/27/24   Enid Altman DO   ergocalciferol (ERGOCALCIFEROL) 1.25 MG (82293 UT) capsule Take 1 capsule (50,000 Units total) by mouth once a week for 12 doses 7/9/24 9/25/24  Enid Altman DO   escitalopram (LEXAPRO) 20 mg tablet Take 1 tablet (20 mg total) by mouth daily 10/2/24   Enid Altman DO   folic acid (FOLVITE) 1 mg tablet Take 1 tablet (1 mg total) by mouth daily 10/12/24 11/11/24  Dequan Miller DO   hydrOXYzine HCL (ATARAX) 25 mg tablet Take 1 tablet (25 mg total) by mouth every 6 (six) hours as needed for itching 11/19/24   Enid Altman DO   lisinopril (ZESTRIL) 40 mg tablet Take 1 tablet (40 mg total) by mouth daily 7/9/24   Enid Altman DO   Magnesium 400 MG CAPS Take 1 capsule by mouth in the morning    Historical Provider, MD   Melatonin 10 MG TABS Take 1 tablet (10 mg total) by mouth daily at bedtime as needed (Insomnia) 7/9/24   Enid Altman DO   metroNIDAZOLE (METROCREAM) 0.75 % cream Apply topically 2 (two) times a day 7/9/24   Enid Altman DO   mirtazapine (REMERON)  15 mg tablet Take 1 tablet (15 mg total) by mouth daily at bedtime 11/13/24   Christine Roberts MD   naltrexone (REVIA) 50 mg tablet Take 1 tablet (50 mg total) by mouth daily 11/13/24   Christine Roberts MD   Omega-3 Fatty Acids (fish oil) 1,000 mg Take 2,000 mg by mouth daily    Historical Provider, MD   pantoprazole (PROTONIX) 40 mg tablet Take 1 tablet (40 mg total) by mouth daily in the early morning 8/27/24 2/23/25  Enid Altman DO   Thiamine Mononitrate (VITAMIN B1) 100 mg tablet Take 1 tablet (100 mg total) by mouth daily 7/9/24   Enid Altman DO     Current Facility-Administered Medications:     chlordiazePOXIDE (LIBRIUM) capsule 50 mg, 50 mg, Oral, Once, Jose Juan Higuera MD    dextrose 5 % and sodium chloride 0.9 % infusion, 100 mL/hr, Intravenous, Continuous, Jose Juan Higuera MD    PHENobarbital 680 mg in sodium chloride 0.9 % 100 mL IVPB, 680 mg, Intravenous, Once, Jose Juan Higuera MD, Last Rate: 200 mL/hr at 12/07/24 1701, 680 mg at 12/07/24 1701    thiamine (VITAMIN B1) 100 mg in sodium chloride 0.9 % 50 mL IVPB, 100 mg, Intravenous, Daily, Jose Juan Higuera MD, Stopped at 12/07/24 1319    Current Outpatient Medications:     atorvastatin (LIPITOR) 40 mg tablet, Take 1 tablet (40 mg total) by mouth daily with dinner, Disp: 30 tablet, Rfl: 5    ergocalciferol (ERGOCALCIFEROL) 1.25 MG (68532 UT) capsule, Take 1 capsule (50,000 Units total) by mouth once a week for 12 doses, Disp: 12 capsule, Rfl: 0    escitalopram (LEXAPRO) 20 mg tablet, Take 1 tablet (20 mg total) by mouth daily, Disp: 90 tablet, Rfl: 1    folic acid (FOLVITE) 1 mg tablet, Take 1 tablet (1 mg total) by mouth daily, Disp: 30 tablet, Rfl: 0    hydrOXYzine HCL (ATARAX) 25 mg tablet, Take 1 tablet (25 mg total) by mouth every 6 (six) hours as needed for itching, Disp: 30 tablet, Rfl: 5    lisinopril (ZESTRIL) 40 mg tablet, Take 1 tablet (40 mg total) by mouth daily, Disp: 30 tablet, Rfl: 1    Magnesium 400 MG CAPS, Take 1 capsule by mouth in the  morning, Disp: , Rfl:     Melatonin 10 MG TABS, Take 1 tablet (10 mg total) by mouth daily at bedtime as needed (Insomnia), Disp: 30 tablet, Rfl: 1    metroNIDAZOLE (METROCREAM) 0.75 % cream, Apply topically 2 (two) times a day, Disp: 45 g, Rfl: 0    mirtazapine (REMERON) 15 mg tablet, Take 1 tablet (15 mg total) by mouth daily at bedtime, Disp: 30 tablet, Rfl: 0    naltrexone (REVIA) 50 mg tablet, Take 1 tablet (50 mg total) by mouth daily, Disp: 30 tablet, Rfl: 0    Omega-3 Fatty Acids (fish oil) 1,000 mg, Take 2,000 mg by mouth daily, Disp: , Rfl:     pantoprazole (PROTONIX) 40 mg tablet, Take 1 tablet (40 mg total) by mouth daily in the early morning, Disp: 30 tablet, Rfl: 5    Thiamine Mononitrate (VITAMIN B1) 100 mg tablet, Take 1 tablet (100 mg total) by mouth daily, Disp: 30 tablet, Rfl: 1   Allergies   Allergen Reactions    Cefdinir Rash       Objective :  Temp:  [97.5 °F (36.4 °C)] 97.5 °F (36.4 °C)  HR:  [116-141] 116  BP: (142-177)/() 159/94  Resp:  [18-24] 18  SpO2:  [91 %-98 %] 95 %  O2 Device: None (Room air)      Intake/Output Summary (Last 24 hours) at 12/7/2024 1707  Last data filed at 12/7/2024 1607  Gross per 24 hour   Intake 2250 ml   Output --   Net 2250 ml       Physical exam not performed.      Lab Results: I have reviewed the following results:  Results from last 7 days   Lab Units 12/07/24  1224   WBC Thousand/uL 15.39*   HEMOGLOBIN g/dL 16.4   HEMATOCRIT % 45.5   PLATELETS Thousands/uL 310   SEGS PCT % 89*   LYMPHO PCT % 7*   MONO PCT % 4   EOS PCT % 0      Results from last 7 days   Lab Units 12/07/24  1224   POTASSIUM mmol/L 4.3   CHLORIDE mmol/L 97   CO2 mmol/L 19*   BUN mg/dL 19   CREATININE mg/dL 0.96   CALCIUM mg/dL 9.6   ALBUMIN g/dL 4.8   ALK PHOS U/L 136*   ALT U/L 40   AST U/L 66*   MAGNESIUM mg/dL 1.4*      Results from last 7 days   Lab Units 12/07/24  1337   INR  1.03   PTT seconds 28     Results from last 7 days   Lab Units 12/07/24  1344   LACTIC ACID mmol/L 1.4               Results from last 7 days   Lab Units 12/07/24  1224   ACETAMINOPHEN LVL ug/mL <5*   ETHANOL LVL mg/dL <10   SALICYLATE LVL mg/dL <5     Imaging Results Review: I reviewed radiology reports from this admission including: CT abdomen/pelvis.  Other Study Results Review: EKG was personally reviewed and my interpretation is: Sinus Tachycardia. . Intervals normal. Slight rightward axis. No ENRIQUE..    Administrative Statements   31 + minutes, >50% of the total time devoted to medical consultative verbal/EMR discussion between providers. Written report will be generated in the EMR.    Patient or appropriate family member was verbally informed by Dr. Higuera of this consultative service on their behalf to provide more timely access to specialty care in lieu of an in person consultation. Verbal consent was obtained.

## 2024-12-07 NOTE — ASSESSMENT & PLAN NOTE
Patient with a history of chronic daily alcohol use  Serum alcohol 0 in the ED  Received total of 30mg diazepam in the ED prior to reaching out to toxicology  Signs and symptoms of withdrawal included tremor, tongue fasciculations, diaphoresis, nausea, vomiting, tachycardia, and hypertension. Discussed with Dr. Higuera of the ED and I recommended loading with 10mg/kg phenobarbital based on ideal body weight as an initial dose  Continue monitoring under protocol and administer phenobarbital as indicated with a maximum of 2g  Recommend continued symptom-triggered management via CIWA with either phenobarbital or diazepam. Please see teaching note below for diazepam dosing.    For phenobarbital dosing, can use:   130 mg IV for mild to moderate symptoms and phenobarbital 260 mg IV for moderate to severe symptoms.  Titrate to RASS -1 and hold for significant sedation  Maximum total dose of phenobarbital is 2 grams.  if patient is approaching 2 grams of phenobarbital with continued withdrawal symptoms:  Recommend escalation of care for adjunctive use of dexmedetomidine or ketamine infusions  Can start ketamine infusion at 0.3 mg/kg/hr for 24 hours. Beyond this please use 0.15 mg/kg/hr until clinical improvement  If using dexmedetomidine infusion, please use adjunctive benzodiazepines as dexmedetomidine has no direct effect at the CLAUDIA receptor  Continuous pulse ox and telemetry monitoring

## 2024-12-08 VITALS
TEMPERATURE: 97.6 F | HEART RATE: 84 BPM | RESPIRATION RATE: 18 BRPM | DIASTOLIC BLOOD PRESSURE: 89 MMHG | SYSTOLIC BLOOD PRESSURE: 146 MMHG | OXYGEN SATURATION: 95 %

## 2024-12-08 LAB
ALBUMIN SERPL BCG-MCNC: 3.8 G/DL (ref 3.5–5)
ALP SERPL-CCNC: 92 U/L (ref 34–104)
ALT SERPL W P-5'-P-CCNC: 24 U/L (ref 7–52)
ANION GAP SERPL CALCULATED.3IONS-SCNC: 7 MMOL/L (ref 4–13)
AST SERPL W P-5'-P-CCNC: 33 U/L (ref 13–39)
BASOPHILS # BLD AUTO: 0.02 THOUSANDS/ÂΜL (ref 0–0.1)
BASOPHILS NFR BLD AUTO: 1 % (ref 0–1)
BILIRUB SERPL-MCNC: 1.21 MG/DL (ref 0.2–1)
BUN SERPL-MCNC: 16 MG/DL (ref 5–25)
CALCIUM SERPL-MCNC: 8.3 MG/DL (ref 8.4–10.2)
CHLORIDE SERPL-SCNC: 103 MMOL/L (ref 96–108)
CO2 SERPL-SCNC: 28 MMOL/L (ref 21–32)
CREAT SERPL-MCNC: 0.9 MG/DL (ref 0.6–1.3)
EOSINOPHIL # BLD AUTO: 0.05 THOUSAND/ÂΜL (ref 0–0.61)
EOSINOPHIL NFR BLD AUTO: 1 % (ref 0–6)
ERYTHROCYTE [DISTWIDTH] IN BLOOD BY AUTOMATED COUNT: 12.2 % (ref 11.6–15.1)
GFR SERPL CREATININE-BSD FRML MDRD: 93 ML/MIN/1.73SQ M
GLUCOSE SERPL-MCNC: 99 MG/DL (ref 65–140)
HCT VFR BLD AUTO: 36 % (ref 36.5–49.3)
HGB BLD-MCNC: 12.4 G/DL (ref 12–17)
IMM GRANULOCYTES # BLD AUTO: 0.01 THOUSAND/UL (ref 0–0.2)
IMM GRANULOCYTES NFR BLD AUTO: 0 % (ref 0–2)
LYMPHOCYTES # BLD AUTO: 1.1 THOUSANDS/ÂΜL (ref 0.6–4.47)
LYMPHOCYTES NFR BLD AUTO: 28 % (ref 14–44)
MAGNESIUM SERPL-MCNC: 2.4 MG/DL (ref 1.9–2.7)
MCH RBC QN AUTO: 31.9 PG (ref 26.8–34.3)
MCHC RBC AUTO-ENTMCNC: 34.2 G/DL (ref 31.4–37.4)
MCV RBC AUTO: 93 FL (ref 82–98)
MONOCYTES # BLD AUTO: 0.36 THOUSAND/ÂΜL (ref 0.17–1.22)
MONOCYTES NFR BLD AUTO: 9 % (ref 4–12)
NEUTROPHILS # BLD AUTO: 2.46 THOUSANDS/ÂΜL (ref 1.85–7.62)
NEUTS SEG NFR BLD AUTO: 61 % (ref 43–75)
NRBC BLD AUTO-RTO: 0 /100 WBCS
PLATELET # BLD AUTO: 125 THOUSANDS/UL (ref 149–390)
PMV BLD AUTO: 8.7 FL (ref 8.9–12.7)
POTASSIUM SERPL-SCNC: 3.5 MMOL/L (ref 3.5–5.3)
PROT SERPL-MCNC: 6.3 G/DL (ref 6.4–8.4)
RBC # BLD AUTO: 3.86 MILLION/UL (ref 3.88–5.62)
SODIUM SERPL-SCNC: 138 MMOL/L (ref 135–147)
WBC # BLD AUTO: 4 THOUSAND/UL (ref 4.31–10.16)

## 2024-12-08 PROCEDURE — 80053 COMPREHEN METABOLIC PANEL: CPT

## 2024-12-08 PROCEDURE — 83735 ASSAY OF MAGNESIUM: CPT

## 2024-12-08 PROCEDURE — 85025 COMPLETE CBC W/AUTO DIFF WBC: CPT

## 2024-12-08 PROCEDURE — 99239 HOSP IP/OBS DSCHRG MGMT >30: CPT | Performed by: INTERNAL MEDICINE

## 2024-12-08 RX ORDER — FOLIC ACID 1 MG/1
1 TABLET ORAL DAILY
Qty: 30 TABLET | Refills: 0 | Status: SHIPPED | OUTPATIENT
Start: 2024-12-08 | End: 2025-01-07

## 2024-12-08 RX ORDER — MULTIVITAMIN
1 TABLET ORAL DAILY
Qty: 30 TABLET | Refills: 0 | Status: SHIPPED | OUTPATIENT
Start: 2024-12-08

## 2024-12-08 RX ORDER — POTASSIUM CHLORIDE 1500 MG/1
40 TABLET, EXTENDED RELEASE ORAL ONCE
Status: COMPLETED | OUTPATIENT
Start: 2024-12-08 | End: 2024-12-08

## 2024-12-08 RX ADMIN — LISINOPRIL 40 MG: 20 TABLET ORAL at 09:28

## 2024-12-08 RX ADMIN — POTASSIUM CHLORIDE 40 MEQ: 1500 TABLET, EXTENDED RELEASE ORAL at 09:28

## 2024-12-08 RX ADMIN — FOLIC ACID 1 MG: 1 TABLET ORAL at 09:28

## 2024-12-08 RX ADMIN — PANTOPRAZOLE SODIUM 40 MG: 40 TABLET, DELAYED RELEASE ORAL at 06:10

## 2024-12-08 RX ADMIN — Medication 100 MG: at 09:28

## 2024-12-08 RX ADMIN — ESCITALOPRAM OXALATE 20 MG: 20 TABLET ORAL at 09:28

## 2024-12-08 RX ADMIN — B-COMPLEX W/ C & FOLIC ACID TAB 1 TABLET: TAB at 09:28

## 2024-12-08 NOTE — CASE MANAGEMENT
Case Management Discharge Planning Note    Patient name Diogo Travis  Location S /S -01 MRN [id number]  : 1965 Date 2024       Current Admission Date: 2024  Current Admission Diagnosis:Alcohol use disorder, severe, dependence (HCC)   Patient Active Problem List    Diagnosis Date Noted Date Diagnosed    Major depressive disorder, recurrent, moderate (HCC) 2024     Alcohol withdrawal syndrome without complication (HCC) 10/10/2024     Diarrhea 2024     Obesity 2024     Anxiety 2024     Depression 2024     Hyperlipidemia 2024     GERD (gastroesophageal reflux disease) 2024     IBS (irritable bowel syndrome) 2024     AR (allergic rhinitis) 2024     Rosacea 2024     Vitamin D deficiency 2024     Vitamin B12 deficiency 2024     Rash and nonspecific skin eruption 2024     Sinus tachycardia 2023     Chest tightness 2023     Increased anion gap metabolic acidosis 10/16/2023     Elevated lactic acid level 10/01/2023     Hypokalemia 2023     Thrombocytopenia (HCC) 2023     Alcohol use disorder, severe, dependence (HCC) 2023     Hypomagnesemia 2023     Hepatic steatosis 2023     Chronic alcoholic gastritis 2023     Hypertension 2023     Tinea corporis 2023     Coronary artery calcification of native artery 2014       LOS (days): 1  Geometric Mean LOS (GMLOS) (days):   Days to GMLOS:     OBJECTIVE:  Risk of Unplanned Readmission Score: 19.27         Current admission status: Inpatient   Preferred Pharmacy:   [id number]  [facility] [address] [address]  Phone: [phone] Fax: [phone]    Primary Care Provider: Enid Altman DO    Primary Insurance:   Secondary Insurance:     DISCHARGE DETAILS:  Contacted Venus with [facility] . Introduced self and role. Discussed with Venus  patient is interested in Outpatient Alcohol Resources. Venus stated she is familiar with patient. Jose has been trying for a year to get patient to participate in Outpatient Alcohol therapy. Per Venus, patient has her contact number and can reach her with any needs. Venus will follow up tomorrow.     Spoke with patient on the phone. Introduced self and role. Patient updated CM reached out to JOSE and spoke with Venus. Patient confirmed he has Venus contact information.Patient updated she will follow up with him tomorrow. Patient also stating he will contact her.     All questions/concerns answered at this time.     SLIM updated.

## 2024-12-08 NOTE — ED ATTENDING ATTESTATION
12/7/2024  I, Beba Church MD, saw and evaluated the patient. I have discussed the patient with the resident/non-physician practitioner and agree with the resident's/non-physician practitioner's findings, Plan of Care, and MDM as documented in the resident's/non-physician practitioner's note, except where noted. All available labs and Radiology studies were reviewed.  I was present for key portions of any procedure(s) performed by the resident/non-physician practitioner and I was immediately available to provide assistance.       At this point I agree with the current assessment done in the Emergency Department.  I have conducted an independent evaluation of this patient a history and physical is as follows:    59-year-old male presents to the ER with anxiety, palpitations, diaphoresis, nausea, feeling unwell.  Patient is not alcoholic.  Drinks heavily.  Feels like he is going through withdrawal.  No chest pain or trouble breathing.  Has generalized abdominal pain worse in upper area.    Agree with treatment for alcohol withdrawal, check electrolytes, abdominal labs, CT, hydration, folate thiamine      ED Course         Critical Care Time  Procedures

## 2024-12-08 NOTE — UTILIZATION REVIEW
Initial Clinical Review    Admission: Date/Time/Statement:   Admission Orders (From admission, onward)       Ordered        12/07/24 1656  INPATIENT ADMISSION  Once                          Orders Placed This Encounter   Procedures    INPATIENT ADMISSION     Standing Status:   Standing     Number of Occurrences:   1     Level of Care:   Level 2 Stepdown / HOT [14]     Estimated length of stay:   More than 2 Midnights     Certification:   I certify that inpatient services are medically necessary for this patient for a duration of greater than two midnights. See H&P and MD Progress Notes for additional information about the patient's course of treatment.     ED Arrival Information       Expected   -    Arrival   12/7/2024 11:52    Acuity   Emergent              Means of arrival   Ambulance    Escorted by   [facility]    Service   Hospitalist    Admission type   Emergency              Arrival complaint   Shaking             Chief Complaint   Patient presents with    Shaking     Patient hx of alcohol use. Last drink was last night at 0000. Drank 2 bottles of wine and a bottle of bourbon. Also c/o chest heaviness.        Initial Presentation: 59 y.o. male Diogo Travis is a 59 y.o. male with a PMH of severe HTN, alcoholic gastritis, anxiety, depression, HLD and severe alcohol dependence who presents with nausea, vomiting, tremors and light headed ness that started earlier this morning. He reports drinking wine and bourbon yesterday and last drink was a glass of wine around midnight and started feeling nauseous and had vomiting shortly later. This is his third admission with the same presentation. On presentation patient had tremors, tongue fasciculations and diaphoresis along with nausea and vomiting and tachycardia and hypertension he was given 30 mg of diazepam and toxicology was consulted. He was given 10 mg/kg of phenobarbital. Plan: Inpatient admission for evaluation and treatment of alcohol use  "disorder, increased anion gap metabolic acidosis, HTN, hypomagnesemia, GERD: Toxicology consult, CIWA protocol, Phenobarb, IV thiamine and folate, IV fluids, monitor BMP, resume lisinopril tomorrow, replete Mg and monitor, continue Protonix.     Toxicology consult: Continue monitoring under protocol and administer phenobarbital as indicated with a maximum of 2g. Recommend continued symptom-triggered management via CIWA with either phenobarbital or diazepam. Phenobarbital 130 mg IV for mild to moderate symptoms and phenobarbital 260 mg IV for moderate to severe symptoms. Continuous pulse ox and telemetry monitoring. Patient declined transfer to . Appreciate certified  and case management consultations for recommendations regarding aftercare resources, recovery support and disposition planning. 11/24 pt was Rx'd naltrexone for AUD. Once he is improved from a withdrawal standpoint, if he wants to resume this can start at 25 mg on day 1 followed by 50 mg daily.    Anticipated Length of Stay/Certification Statement: Patient will be admitted on an inpatient basis with an anticipated length of stay of greater than 2 midnights secondary to alcohol withdrawal.     Date: 12/8   Day 2:     Internal medicine: In the ED, a telemedicine consult was placed to toxicology, who recommended administration of a dose of Phenobarbital, placement on CIWA protocol, and daily supplemental vitamin/folate and multivitamin supplementation.  Today, patient reports feeling \"much better\" and was seen ambulating comfortably without gait instability or discomfort by nursing.  He will be discharged home, and is agreeable to the outpatient alcohol rehab, which is to be coordinated with case management, prior to discharge.     ED Treatment-Medication Administration from 12/07/2024 1152 to 12/07/2024 1822         Date/Time Order Dose Route Action     12/07/2024 1213 ondansetron (FOR EMS ONLY) (ZOFRAN) 4 mg/2 mL injection 4 mg 0 mg " Does not apply Given to EMS     12/07/2024 1213 diazepam (VALIUM) injection 10 mg 10 mg Intravenous Given     12/07/2024 1251 folic acid 1 mg in sodium chloride 0.9 % 50 mL IVPB 1 mg Intravenous New Bag     12/07/2024 1244 thiamine (VITAMIN B1) 100 mg in sodium chloride 0.9 % 50 mL IVPB 100 mg Intravenous New Bag     12/07/2024 1214 ondansetron (ZOFRAN) injection 4 mg 4 mg Intravenous Given     12/07/2024 1239 pantoprazole (PROTONIX) injection 40 mg 40 mg Intravenous Given     12/07/2024 1239 diazepam (VALIUM) injection 10 mg 10 mg Intravenous Given     12/07/2024 1314 Famotidine (PF) (PEPCID) injection 20 mg 20 mg Intravenous Given     12/07/2024 1317 sodium chloride 0.9 % bolus 1,000 mL 1,000 mL Intravenous New Bag     12/07/2024 1347 sodium chloride 0.9 % bolus 1,000 mL 1,000 mL Intravenous New Bag     12/07/2024 1353 piperacillin-tazobactam (ZOSYN) IVPB 4.5 g 4.5 g Intravenous New Bag     12/07/2024 1356 magnesium sulfate 2 g/50 mL IVPB (premix) 2 g 2 g Intravenous New Bag     12/07/2024 1419 diazepam (VALIUM) injection 10 mg 10 mg Intravenous Given     12/07/2024 1456 iohexol (OMNIPAQUE) 350 MG/ML injection (MULTI-DOSE) 91 mL 91 mL Intravenous Given     12/07/2024 1701 PHENobarbital 680 mg in sodium chloride 0.9 % 100 mL IVPB 680 mg Intravenous New Bag     12/07/2024 1659 ketorolac (TORADOL) injection 15 mg 15 mg Intravenous Given     12/07/2024 1728 dextrose 5 % and sodium chloride 0.9 % infusion 100 mL/hr Intravenous New Bag            Scheduled Medications:  atorvastatin, 40 mg, Oral, Daily With Dinner  escitalopram, 20 mg, Oral, Daily  folic acid, 1 mg, Oral, Daily  lisinopril, 40 mg, Oral, Daily  mirtazapine, 15 mg, Oral, HS  multivitamin stress formula, 1 tablet, Oral, Daily  pantoprazole, 40 mg, Oral, Early Morning  Thiamine Mononitrate, 100 mg, Oral, Daily      Continuous IV Infusions:  multi-electrolyte, 100 mL/hr, Intravenous, Continuous      PRN Meds:  acetaminophen, 650 mg, Oral, Q6H PRN  labetalol,  10 mg, Intravenous, Q6H PRN  ondansetron, 4 mg, Intravenous, Q6H PRN      ED Triage Vitals   Temperature Pulse Respirations Blood Pressure SpO2 Pain Score   12/07/24 1159 12/07/24 1157 12/07/24 1157 12/07/24 1157 12/07/24 1157 12/07/24 1400   97.5 °F (36.4 °C) (!) 141 (!) 24 (!) 177/108 98 % 7     Weight (last 2 days)       None            Vital Signs (last 3 days)       Date/Time Temp Pulse Resp BP MAP (mmHg) SpO2 O2 Device Patient Position - Orthostatic VS [place] Coma Scale Score CIWA-Ar Total Pain    12/08/24 0800 -- -- -- -- -- -- None (Room air) -- 15 -- No Pain    12/08/24 07:20:53 97.6 °F (36.4 °C) 84 -- 146/89 108 95 % -- -- -- 0 --    12/08/24 0400 -- -- -- -- -- -- -- -- 15 -- --    12/08/24 02:16:39 97.8 °F (36.6 °C) 82 -- 138/89 105 97 % -- -- -- 0 --    12/08/24 0000 -- -- -- -- -- -- -- -- 15 -- --    12/07/24 22:23:51 98 °F (36.7 °C) 98 -- 130/89 103 92 % -- -- -- 0 --    12/07/24 2021 -- -- -- -- -- -- -- -- -- -- No Pain    12/07/24 2000 -- -- -- -- -- -- -- -- 15 -- No Pain    12/07/24 18:40:27 99.1 °F (37.3 °C) 113 -- 163/105 124 93 % -- -- -- -- --    12/07/24 1800 -- 118 18 172/105 132 94 % None (Room air) Lying -- -- --    12/07/24 1700 -- 116 18 159/94 121 95 % None (Room air) Lying -- 2 --    12/07/24 1659 -- -- -- -- -- -- -- -- -- -- 7 12/07/24 1630 -- 118 18 154/92 116 93 % None (Room air) Lying -- -- --    12/07/24 1600 -- 126 18 156/96 118 94 % None (Room air) -- -- 2 --    12/07/24 1530 -- 126 20 171/95 124 94 % None (Room air) Lying -- -- 7 12/07/24 1500 -- 122 20 171/100 124 95 % None (Room air) Lying -- 6 --    12/07/24 1414 -- -- -- -- -- -- -- -- 15 -- --    12/07/24 1400 -- 120 20 166/98 124 93 % None (Room air) Lying -- 9 7    12/07/24 1300 -- 135 22 142/96 114 92 % None (Room air) Lying -- 6 --    12/07/24 1230 -- 138 24 170/92 120 91 % None (Room air) Sitting -- 12 --    12/07/24 1200 -- -- -- -- -- -- -- -- 15 -- --    12/07/24 1159 97.5 °F (36.4 °C) -- -- -- -- -- --  -- -- -- --    12/07/24 1157 -- 141 24 177/108 -- 98 % None (Room air) Sitting -- 19 --           CIWA-Ar Score       Row Name 12/08/24 07:20:53 12/08/24 02:16:39 12/07/24 22:23:51       CIWA-Ar    Nausea and Vomiting 0 0 0    Tactile Disturbances 0 0 0    Tremor 0 0 0    Auditory Disturbances 0 0 0    Paroxysmal Sweats 0 0 0    Visual Disturbances 0 0 0    Anxiety 0 0 0    Headache, Fullness in Head 0 0 0    Agitation 0 0 0    Orientation and Clouding of Sensorium 0 0 0    CIWA-Ar Total 0 0 0      Row Name 12/07/24 1700 12/07/24 1600 12/07/24 1500       CIWA-Ar    Nausea and Vomiting 0 0 1    Tactile Disturbances 0 0 0    Tremor 1 1 2    Auditory Disturbances 0 0 0    Paroxysmal Sweats 0 0 0    Visual Disturbances 0 0 0    Anxiety 1 1 2    Headache, Fullness in Head 0 0 0    Agitation 0 0 1    Orientation and Clouding of Sensorium 0 0 0    CIWA-Ar Total 2 2 6      Row Name 12/07/24 1400 12/07/24 1300 12/07/24 1230       CIWA-Ar    Nausea and Vomiting 2 2 3    Tactile Disturbances 0 0 1    Tremor 3 1 3    Auditory Disturbances 0 0 0    Paroxysmal Sweats 1 1 3    Visual Disturbances 0 0 0    Anxiety 2 2 2    Headache, Fullness in Head 0 0 0    Agitation 1 0 0    Orientation and Clouding of Sensorium 0 0 0    CIWA-Ar Total 9 6 12      Row Name 12/07/24 1157             CIWA-Ar    Nausea and Vomiting 5      Tactile Disturbances 1      Tremor 5      Auditory Disturbances 0      Paroxysmal Sweats 5      Visual Disturbances 0      Anxiety 3      Headache, Fullness in Head 0      Agitation 0      Orientation and Clouding of Sensorium 0      CIWA-Ar Total 19                      Pertinent Labs/Diagnostic Test Results:   Radiology:  CT abdomen pelvis with contrast   Final Interpretation by Dima Rascon MD (12/07 1642)      No evidence of acute abdominopelvic process.      Hepatomegaly and hepatic steatosis.      Additional chronic findings and negatives as above.         Workstation performed: [id number]          XR chest 1 view portable   Final Interpretation by Emerald Ramos MD (12/08 0658)      No acute cardiopulmonary disease.            Workstation performed: [id number]           Cardiology:  No orders to display     GI:  No orders to display           Results from last 7 days   Lab Units 12/08/24  0517 12/07/24  1224   WBC Thousand/uL 4.00* 15.39*   HEMOGLOBIN g/dL 12.4 16.4   HEMATOCRIT % 36.0* 45.5   PLATELETS Thousands/uL 125* 310   TOTAL NEUT ABS Thousands/µL 2.46 13.52*         Results from last 7 days   Lab Units 12/08/24  0517 12/07/24 2012 12/07/24  1224   SODIUM mmol/L 138 136 139   POTASSIUM mmol/L 3.5 3.9 4.3   CHLORIDE mmol/L 103 102 97   CO2 mmol/L 28 24 19*   ANION GAP mmol/L 7 10 23*   BUN mg/dL 16 17 19   CREATININE mg/dL 0.90 1.11 0.96   EGFR ml/min/1.73sq m 93 72 86   CALCIUM mg/dL 8.3* 8.5 9.6   MAGNESIUM mg/dL 2.4  --  1.4*     Results from last 7 days   Lab Units 12/08/24  0517 12/07/24  1224   AST U/L 33 66*   ALT U/L 24 40   ALK PHOS U/L 92 136*   TOTAL PROTEIN g/dL 6.3* 7.8   ALBUMIN g/dL 3.8 4.8   TOTAL BILIRUBIN mg/dL 1.21* 2.10*   BILIRUBIN DIRECT mg/dL  --  0.37*         Results from last 7 days   Lab Units 12/08/24  0517 12/07/24 2012 12/07/24  1224   GLUCOSE RANDOM mg/dL 99 182* 131             BETA-HYDROXYBUTYRATE   Date Value Ref Range Status   10/16/2023 2.9 (H) <0.6 mmol/L Final   10/01/2023 0.8 (H) <0.6 mmol/L Final            Results from last 7 days   Lab Units 12/07/24  1337   PROTIME seconds 14.2   INR  1.03   PTT seconds 28             Results from last 7 days   Lab Units 12/07/24  1344   LACTIC ACID mmol/L 1.4         Results from last 7 days   Lab Units 12/07/24  1224   LIPASE u/L 12                 Results from last 7 days   Lab Units 12/07/24  1840   CLARITY UA  Clear   COLOR UA  Light Yellow   SPEC GRAV UA  >=1.050*   PH UA  5.5   GLUCOSE UA mg/dl Negative   KETONES UA mg/dl 80 (3+)*   BLOOD UA  Small*   PROTEIN UA mg/dl 50 (1+)*   NITRITE UA  Negative    BILIRUBIN UA  Negative   UROBILINOGEN UA (BE) mg/dl <2.0   LEUKOCYTES UA  Negative   WBC UA /hpf 2-4*   RBC UA /hpf 1-2   BACTERIA UA /hpf None Seen   EPITHELIAL CELLS WET PREP /hpf Occasional           Results from last 7 days   Lab Units 12/07/24  1224   ETHANOL LVL mg/dL <10   ACETAMINOPHEN LVL ug/mL <5*   SALICYLATE LVL mg/dL <5           Results from last 7 days   Lab Units 12/07/24  1344 12/07/24  1337   BLOOD CULTURE  Received in Microbiology Lab. Culture in Progress. Received in Microbiology Lab. Culture in Progress.         Past Medical History:   Diagnosis Date    Alcohol use disorder, severe, dependence (HCC) 04/02/2023    Alcohol withdrawal seizure (HCC)     Alcoholic ketoacidosis 10/16/2023    Anxiety     AR (allergic rhinitis)     Chronic alcoholic gastritis 04/02/2023    Depression     GERD (gastroesophageal reflux disease)     Hepatic steatosis 04/02/2023    Hyperlipidemia     Hypertension     IBS (irritable bowel syndrome)     Obesity     Rosacea     Vitamin B12 deficiency 02/14/2024    Vitamin D deficiency 02/14/2024     Present on Admission:   Alcohol use disorder, severe, dependence (HCC)   Hypomagnesemia   Hypertension   GERD (gastroesophageal reflux disease)   Increased anion gap metabolic acidosis      Admitting Diagnosis: Alcoholic ketoacidosis [E87.29]  Hypomagnesemia [E83.42]  Shaking [R25.1]  Alcohol withdrawal syndrome without complication (HCC) [F10.930]  Alcohol use disorder [F10.90]  Age/Sex: 59 y.o. male    Network Utilization Review Department  ATTENTION: Please call with any questions or concerns to [phone] and carefully listen to the prompts so that you are directed to the right person. All voicemails are confidential.   For Discharge needs, contact Care Management DC Support Team at [phone] opt. [phone]  Send all requests for admission clinical reviews, approved or denied determinations and any other requests to dedicated fax number below belonging to the campus where the  patient is receiving treatment. List of dedicated fax numbers for the Facilities:  FACILITY NAME UR FAX NUMBER   ADMISSION DENIALS (Administrative/Medical Necessity) [phone]   DISCHARGE SUPPORT TEAM (NETWORK) [phone]   PARENT CHILD HEALTH (Maternity/NICU/Pediatrics) [phone]   [facility] [phone]   [facility] [phone]   [facility] [phone]   [facility] [phone]   [facility] [phone]   [facility] [phone]   [facility] [phone]   [facility] [phone]   [facility] [phone]   [facility] [phone]   [facility] [phone]   [facility] [phone]

## 2024-12-08 NOTE — DISCHARGE SUMMARY
"      Discharge Summary - [facility] Internal Medicine  Patient: Diogo Travis 59 y.o. male   MRN: [id number]  PCP: Enid Altman DO  Unit/Bed#: S MS Dia-01 Encounter: [id number]            Discharging Physician / Practitioner: Lisa Andrews MD  PCP: Enid Altman DO  Admission Date:   Admission Orders (From admission, onward)       Ordered        12/07/24 1656  INPATIENT ADMISSION  Once                          Discharge Date: 12/08/24      Reason for Admission:  Nausea/vomiting with tremors      Discharge Diagnoses:     Principal Problem:    Alcohol use disorder, severe, dependence (HCC)    Active Problems:    Essential hypertension    Increased anion gap metabolic acidosis    GERD (gastroesophageal reflux disease)    Thrombocytopenia    Resolved Problems:    Hypomagnesemia    Leukocytosis      Consultations During Hospital Stay:  Toxicology      Hospital Course:     Diogo Travis is a 59 y.o. male patient who originally presented to the hospital on 12/7/2024 due to complaints of intermittent nausea/vomiting with tremors in the setting of relapsing to alcohol consumption.  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  In the ED, a telemedicine consult was placed to toxicology, who recommended administration of a dose of Phenobarbital, placement on CIWA protocol, and daily supplemental vitamin/folate and multivitamin supplementation.  Today, patient reports feeling \"much better\" and was seen ambulating comfortably without gait instability or discomfort by nursing.  He will be discharged home, and is agreeable to the outpatient alcohol rehab, which is to be coordinated with case management, prior to discharge.       Condition at Discharge: fair       Discharge Day Visit / Exam:     Vitals:  Blood Pressure: 146/89 (12/08/24 0720)  Pulse: 84 (12/08/24 0720)  Temperature: 97.6 " °F (36.4 °C) (12/08/24 0720)  Temp Source: Oral (12/07/24 1159)  Respirations: 18 (12/07/24 1800)  SpO2: 95 % (12/08/24 0720)      Physical exam:  I had a face-to-face encounter with the patient on day of discharge.      Discussion with Patient and/or Family:  The patient has been advised to return to the ER immediately if any symptoms recur or worsen.       Discharge instructions/Information to Patient and/or Family:   See after visit summary for information provided to patient and/or family.        Provisions for Follow-Up Care:  See after visit summary for information related to follow-up care and any pertinent home health orders.        Disposition:   Home       Discharge Medications:  See after visit summary for reconciled discharge medications provided to patient and/or family.        Discharge Statement:  I spent 38 minutes discharging the patient. This time was spent on the day of discharge. I had direct contact with the patient on the day of discharge. Greater than 50% of the total time was spent examining patient, answering all patient questions, arranging and discussing plan of care with patient as well as directly providing post-discharge instructions.  Additional time then spent on discharge activities.           KRISTINA GUIDRY MD   Hospitalist - [facility] Internal Medicine        ** Please Note: This note is constructed using a voice recognition dictation system.  An occasional wrong word/phrase or “sound-a-like” substitution may have been picked up by dictation device due to the inherent limitations of voice recognition software.  Read the chart carefully and recognize, using reasonable context, where substitutions may have occurred.**

## 2024-12-09 ENCOUNTER — TELEPHONE (OUTPATIENT)
Dept: FAMILY MEDICINE CLINIC | Facility: CLINIC | Age: 59
End: 2024-12-09

## 2024-12-09 DIAGNOSIS — F33.1 MAJOR DEPRESSIVE DISORDER, RECURRENT, MODERATE (HCC): ICD-10-CM

## 2024-12-09 DIAGNOSIS — F41.9 ANXIETY: ICD-10-CM

## 2024-12-09 DIAGNOSIS — E78.5 HYPERLIPIDEMIA, UNSPECIFIED HYPERLIPIDEMIA TYPE: ICD-10-CM

## 2024-12-09 DIAGNOSIS — K21.9 GASTROESOPHAGEAL REFLUX DISEASE, UNSPECIFIED WHETHER ESOPHAGITIS PRESENT: ICD-10-CM

## 2024-12-09 DIAGNOSIS — I10 PRIMARY HYPERTENSION: ICD-10-CM

## 2024-12-09 DIAGNOSIS — K29.20 CHRONIC ALCOHOLIC GASTRITIS WITHOUT HEMORRHAGE: ICD-10-CM

## 2024-12-09 RX ORDER — LISINOPRIL 40 MG/1
40 TABLET ORAL DAILY
Qty: 90 TABLET | Refills: 1 | Status: SHIPPED | OUTPATIENT
Start: 2024-12-09

## 2024-12-09 RX ORDER — ATORVASTATIN CALCIUM 40 MG/1
40 TABLET, FILM COATED ORAL
Qty: 90 TABLET | Refills: 1 | Status: SHIPPED | OUTPATIENT
Start: 2024-12-09

## 2024-12-09 RX ORDER — MIRTAZAPINE 15 MG/1
15 TABLET, FILM COATED ORAL
Qty: 90 TABLET | Refills: 1 | OUTPATIENT
Start: 2024-12-09

## 2024-12-09 RX ORDER — PANTOPRAZOLE SODIUM 40 MG/1
40 TABLET, DELAYED RELEASE ORAL
Qty: 90 TABLET | Refills: 1 | Status: SHIPPED | OUTPATIENT
Start: 2024-12-09 | End: 2025-06-07

## 2024-12-09 RX ORDER — HYDROXYZINE HYDROCHLORIDE 25 MG/1
25 TABLET, FILM COATED ORAL EVERY 6 HOURS PRN
Qty: 360 TABLET | Refills: 1 | Status: SHIPPED | OUTPATIENT
Start: 2024-12-09

## 2024-12-09 NOTE — TELEPHONE ENCOUNTER
Patient called the RX Refill Line. Message is being forwarded to the office.     Patient called stating that he had a alcohol relapse and was advised in the ED to stop taking the naltrexone. Patient is concerned because the medication is to help stop the craving for alcohol. Patient is unsure why they would have him stop the medication. Please review and contact the patient.     Please contact patient at [phone]

## 2024-12-09 NOTE — TELEPHONE ENCOUNTER
Per my note 11/19/24 -     Note      Would advise patient to follow plan of care per Dentist.  Dentist may be able to call medication in to a retail pharmacy, which should be cheaper than $300.       Patient last seen by me 7/9/24.     He is overdue for FU appt -      Return in about 6 weeks (around 8/20/2024) for F/U HTN, HL, Anx/Dep, ETOH, GERD, Vit B12/D Def, Labs; 6mo - Hep A#2; PRN [id number].      He had multiple admissions 10/11/24 and 11/11/24 without PCP follow-up / TCM.     If he is self-pay, he may be able to receive more affordable care through [phone] ([phone]) as he will be needing medication refills and a visit to be seen prior.          Sent to  to address and to address [facility]..  We should not be continuing to refill medications, which is happening repeatedly, for 6 months of medications.  He has not scheduled a follow-up appt.  He was again hospitalized 12/7/24 for alcohol withdrawal syndrome.  He was planning to attend outpatient alcohol rehab per discharge summary.  He was asked to asked to schedule an appointment prior to running out of medications.  Remeron refill is declined.

## 2024-12-09 NOTE — TELEPHONE ENCOUNTER
Reason for call: Atorvastatin, Hydroxyzine and pantoprazole NOT duplicates. Patient is requesting a 90 days supply. Mitazapine prescibed by ED  [x] Refill   [] Prior Auth  [] Other:     Office:   [x] PCP/Provider - Enid Altman DO / [facility]   [] Specialty/Provider -     atorvastatin (LIPITOR) 40 mg tablet / Take 1 tablet (40 mg total) by mouth daily with dinner /Qty 90    hydrOXYzine HCL (ATARAX) 25 mg tablet / Take 1 tablet (25 mg total) by mouth every 6 (six) hours as needed for itching / qty 360    lisinopril (ZESTRIL) 40 mg tablet/ Take 1 tablet (40 mg total) by mouth daily / qty 90    mirtazapine (REMERON) 15 mg tablet/  Take 1 tablet (15 mg total) by mouth daily at bedtime / Qty 90    pantoprazole (PROTONIX) 40 mg tablet /  Take 1 tablet (40 mg total) by mouth daily in the early morning / qty 90        Pharmacy: [address] [facility]  [address]     Does the patient have enough for 3 days?   [] Yes   [x] No - Send as HP to POD     Detail Level: Zone Initiate Treatment: EF qhs as chance\\ndoxycycline 100 mg bid\\nGentle cleanser qd-bid \\nFacial moisturizer PRN Discontinue Regimen: Differin

## 2024-12-10 NOTE — TELEPHONE ENCOUNTER
He cannot take Naltrexone while drinking alcohol.  ED reported that patient was drinking 1-2 bottles of liquor on a daily basis.   I would recommend that patient discuss restarting Naltrexone therapy with outpatient alcohol rehab as referred upon last discharge.  Naltrexone therapy must be used as part of a rehab program.    Per Dr. [facility] Toxicology consult note -     Alcohol use disorder     Daily vitamin supplementation with thiamine, folic acid, and multivitamin  Appreciate certified  and case management consultations for recommendations regarding aftercare resources, recovery support and disposition planning  Declined transfer to   In 11/24 pt was Rx'd naltrexone for AUD. Once he is improved from a withdrawal standpoint, if he wants to resume this can start at 25 mg on day 1 followed by 50 mg daily  If he wants to restart this, please inquire if he has been using any opioid substances or Kratom. If yes, would wait 7 days prior to restarting this.

## 2024-12-11 ENCOUNTER — TRANSITIONAL CARE MANAGEMENT (OUTPATIENT)
Dept: FAMILY MEDICINE CLINIC | Facility: CLINIC | Age: 59
End: 2024-12-11

## 2024-12-11 NOTE — TELEPHONE ENCOUNTER
Patient returned call and was advised of  message.Patient stated he will start the Naltrexone again. Patient stated he hasn't drank alcohol in ten days.

## 2024-12-12 LAB
BACTERIA BLD CULT: NORMAL
BACTERIA BLD CULT: NORMAL

## 2024-12-13 NOTE — TELEPHONE ENCOUNTER
"This needs to be prescribed and managed by Addiction Medicine / Alcohol Rehab.  I will not prescribe Naltrexone.  See previous note -     \" I would recommend that patient discuss restarting Naltrexone therapy with outpatient alcohol rehab as referred upon last discharge.  Naltrexone therapy must be used as part of a rehab program. \"  "

## 2024-12-13 NOTE — TELEPHONE ENCOUNTER
LVM to patient providing information below. Provided patient with office phone number in case he has any questions or concerns.

## 2024-12-24 ENCOUNTER — APPOINTMENT (EMERGENCY)
Dept: CT IMAGING | Facility: HOSPITAL | Age: 59
DRG: 135 | End: 2024-12-24
Payer: COMMERCIAL

## 2024-12-24 ENCOUNTER — HOSPITAL ENCOUNTER (INPATIENT)
Facility: HOSPITAL | Age: 59
LOS: 4 days | Discharge: HOME | DRG: 135 | End: 2024-12-29
Attending: EMERGENCY MEDICINE | Admitting: SURGERY
Payer: COMMERCIAL

## 2024-12-24 ENCOUNTER — APPOINTMENT (EMERGENCY)
Dept: RADIOLOGY | Facility: HOSPITAL | Age: 59
DRG: 135 | End: 2024-12-24
Payer: COMMERCIAL

## 2024-12-24 DIAGNOSIS — F10.939 ALCOHOL WITHDRAWAL (HCC): ICD-10-CM

## 2024-12-24 DIAGNOSIS — S22.41XA CLOSED FRACTURE OF MULTIPLE RIBS OF RIGHT SIDE, INITIAL ENCOUNTER: Primary | ICD-10-CM

## 2024-12-24 PROBLEM — S22.49XA MULTIPLE RIB FRACTURES: Status: ACTIVE | Noted: 2024-12-24

## 2024-12-24 PROBLEM — F10.10 CHRONIC ALCOHOL ABUSE: Status: ACTIVE | Noted: 2024-12-24
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
## 2024-12-24 PROBLEM — F10.90 ALCOHOL USE DISORDER: Status: ACTIVE | Noted: 2024-12-24

## 2024-12-24 PROBLEM — G89.11 ACUTE PAIN DUE TO TRAUMA: Status: ACTIVE | Noted: 2024-12-24

## 2024-12-24 LAB
ABO GROUP BLD: NORMAL
ALBUMIN SERPL BCG-MCNC: 4.5 G/DL (ref 3.5–5)
ALP SERPL-CCNC: 102 U/L (ref 34–104)
ALT SERPL W P-5'-P-CCNC: 24 U/L (ref 7–52)
ANION GAP SERPL CALCULATED.3IONS-SCNC: 16 MMOL/L (ref 4–13)
AST SERPL W P-5'-P-CCNC: 46 U/L (ref 13–39)
ATRIAL RATE: 108 BPM
BASOPHILS # BLD AUTO: 0.04 THOUSANDS/ÂΜL (ref 0–0.1)
BASOPHILS NFR BLD AUTO: 1 % (ref 0–1)
BILIRUB SERPL-MCNC: 0.56 MG/DL (ref 0.2–1)
BLD GP AB SCN SERPL QL: NEGATIVE
BUN SERPL-MCNC: 12 MG/DL (ref 5–25)
CALCIUM SERPL-MCNC: 9.1 MG/DL (ref 8.4–10.2)
CHLORIDE SERPL-SCNC: 99 MMOL/L (ref 96–108)
CO2 SERPL-SCNC: 24 MMOL/L (ref 21–32)
CREAT SERPL-MCNC: 0.87 MG/DL (ref 0.6–1.3)
EOSINOPHIL # BLD AUTO: 0.03 THOUSAND/ÂΜL (ref 0–0.61)
EOSINOPHIL NFR BLD AUTO: 0 % (ref 0–6)
ERYTHROCYTE [DISTWIDTH] IN BLOOD BY AUTOMATED COUNT: 12.5 % (ref 11.6–15.1)
ETHANOL SERPL-MCNC: 150 MG/DL
GFR SERPL CREATININE-BSD FRML MDRD: 95 ML/MIN/1.73SQ M
GLUCOSE SERPL-MCNC: 91 MG/DL (ref 65–140)
HCT VFR BLD AUTO: 41.7 % (ref 36.5–49.3)
HGB BLD-MCNC: 14.5 G/DL (ref 12–17)
IMM GRANULOCYTES # BLD AUTO: 0.02 THOUSAND/UL (ref 0–0.2)
IMM GRANULOCYTES NFR BLD AUTO: 0 % (ref 0–2)
LIPASE SERPL-CCNC: 23 U/L (ref 11–82)
LYMPHOCYTES # BLD AUTO: 1.27 THOUSANDS/ÂΜL (ref 0.6–4.47)
LYMPHOCYTES NFR BLD AUTO: 18 % (ref 14–44)
MCH RBC QN AUTO: 32.2 PG (ref 26.8–34.3)
MCHC RBC AUTO-ENTMCNC: 34.8 G/DL (ref 31.4–37.4)
MCV RBC AUTO: 93 FL (ref 82–98)
MONOCYTES # BLD AUTO: 0.44 THOUSAND/ÂΜL (ref 0.17–1.22)
MONOCYTES NFR BLD AUTO: 6 % (ref 4–12)
NEUTROPHILS # BLD AUTO: 5.41 THOUSANDS/ÂΜL (ref 1.85–7.62)
NEUTS SEG NFR BLD AUTO: 75 % (ref 43–75)
NRBC BLD AUTO-RTO: 0 /100 WBCS
P AXIS: 58 DEGREES
PLATELET # BLD AUTO: 203 THOUSANDS/UL (ref 149–390)
PLATELET # BLD AUTO: 248 THOUSANDS/UL (ref 149–390)
PMV BLD AUTO: 8.6 FL (ref 8.9–12.7)
PMV BLD AUTO: 8.7 FL (ref 8.9–12.7)
POTASSIUM SERPL-SCNC: 3.9 MMOL/L (ref 3.5–5.3)
PR INTERVAL: 158 MS
PROT SERPL-MCNC: 7.6 G/DL (ref 6.4–8.4)
QRS AXIS: 22 DEGREES
QRSD INTERVAL: 92 MS
QT INTERVAL: 360 MS
QTC INTERVAL: 482 MS
RBC # BLD AUTO: 4.5 MILLION/UL (ref 3.88–5.62)
RH BLD: POSITIVE
SODIUM SERPL-SCNC: 139 MMOL/L (ref 135–147)
SPECIMEN EXPIRATION DATE: NORMAL
T WAVE AXIS: 12 DEGREES
VENTRICULAR RATE: 108 BPM
WBC # BLD AUTO: 7.21 THOUSAND/UL (ref 4.31–10.16)

## 2024-12-24 PROCEDURE — 96361 HYDRATE IV INFUSION ADD-ON: CPT

## 2024-12-24 PROCEDURE — 85025 COMPLETE CBC W/AUTO DIFF WBC: CPT

## 2024-12-24 PROCEDURE — 96374 THER/PROPH/DIAG INJ IV PUSH: CPT

## 2024-12-24 PROCEDURE — 99255 IP/OBS CONSLTJ NEW/EST HI 80: CPT

## 2024-12-24 PROCEDURE — 86850 RBC ANTIBODY SCREEN: CPT | Performed by: PHYSICIAN ASSISTANT

## 2024-12-24 PROCEDURE — 96375 TX/PRO/DX INJ NEW DRUG ADDON: CPT

## 2024-12-24 PROCEDURE — 86900 BLOOD TYPING SEROLOGIC ABO: CPT | Performed by: PHYSICIAN ASSISTANT

## 2024-12-24 PROCEDURE — 99223 1ST HOSP IP/OBS HIGH 75: CPT | Performed by: SURGERY

## 2024-12-24 PROCEDURE — 93005 ELECTROCARDIOGRAM TRACING: CPT

## 2024-12-24 PROCEDURE — NC001 PR NO CHARGE: Performed by: EMERGENCY MEDICINE

## 2024-12-24 PROCEDURE — 71045 X-RAY EXAM CHEST 1 VIEW: CPT

## 2024-12-24 PROCEDURE — 80053 COMPREHEN METABOLIC PANEL: CPT

## 2024-12-24 PROCEDURE — 71275 CT ANGIOGRAPHY CHEST: CPT

## 2024-12-24 PROCEDURE — 85049 AUTOMATED PLATELET COUNT: CPT | Performed by: PHYSICIAN ASSISTANT

## 2024-12-24 PROCEDURE — 99285 EMERGENCY DEPT VISIT HI MDM: CPT

## 2024-12-24 PROCEDURE — 83690 ASSAY OF LIPASE: CPT

## 2024-12-24 PROCEDURE — 99285 EMERGENCY DEPT VISIT HI MDM: CPT | Performed by: EMERGENCY MEDICINE

## 2024-12-24 PROCEDURE — 36415 COLL VENOUS BLD VENIPUNCTURE: CPT

## 2024-12-24 PROCEDURE — 86901 BLOOD TYPING SEROLOGIC RH(D): CPT | Performed by: PHYSICIAN ASSISTANT

## 2024-12-24 PROCEDURE — 82077 ASSAY SPEC XCP UR&BREATH IA: CPT

## 2024-12-24 RX ORDER — ONDANSETRON 2 MG/ML
4 INJECTION INTRAMUSCULAR; INTRAVENOUS ONCE
Status: COMPLETED | OUTPATIENT
Start: 2024-12-24 | End: 2024-12-24

## 2024-12-24 RX ORDER — KETOROLAC TROMETHAMINE 30 MG/ML
15 INJECTION, SOLUTION INTRAMUSCULAR; INTRAVENOUS EVERY 6 HOURS SCHEDULED
Status: CANCELLED | OUTPATIENT
Start: 2024-12-24 | End: 2024-12-26

## 2024-12-24 RX ORDER — GLYCOPYRROLATE 0.2 MG/ML
0.2 INJECTION INTRAMUSCULAR; INTRAVENOUS EVERY 4 HOURS PRN
Status: DISCONTINUED | OUTPATIENT
Start: 2024-12-24 | End: 2024-12-25

## 2024-12-24 RX ORDER — LORAZEPAM 2 MG/ML
1 INJECTION INTRAMUSCULAR
Status: DISCONTINUED | OUTPATIENT
Start: 2024-12-24 | End: 2024-12-25

## 2024-12-24 RX ORDER — HYDROMORPHONE HCL/PF 1 MG/ML
0.5 SYRINGE (ML) INJECTION EVERY 2 HOUR PRN
Refills: 0 | Status: DISCONTINUED | OUTPATIENT
Start: 2024-12-24 | End: 2024-12-29

## 2024-12-24 RX ORDER — HALOPERIDOL 5 MG/ML
2 INJECTION INTRAMUSCULAR
Status: DISCONTINUED | OUTPATIENT
Start: 2024-12-24 | End: 2024-12-25

## 2024-12-24 RX ORDER — POLYETHYLENE GLYCOL 3350 17 G/17G
17 POWDER, FOR SOLUTION ORAL DAILY
Status: DISCONTINUED | OUTPATIENT
Start: 2024-12-24 | End: 2024-12-27

## 2024-12-24 RX ORDER — PHENOBARBITAL SODIUM 65 MG/ML
130 INJECTION, SOLUTION INTRAMUSCULAR; INTRAVENOUS ONCE
Status: COMPLETED | OUTPATIENT
Start: 2024-12-24 | End: 2024-12-24

## 2024-12-24 RX ORDER — MIRTAZAPINE 15 MG/1
15 TABLET, FILM COATED ORAL
Status: DISCONTINUED | OUTPATIENT
Start: 2024-12-24 | End: 2024-12-29 | Stop reason: HOSPADM

## 2024-12-24 RX ORDER — HYDROMORPHONE HCL/PF 1 MG/ML
0.5 SYRINGE (ML) INJECTION ONCE
Refills: 0 | Status: COMPLETED | OUTPATIENT
Start: 2024-12-24 | End: 2024-12-24

## 2024-12-24 RX ORDER — HEPARIN SODIUM 5000 [USP'U]/ML
5000 INJECTION, SOLUTION INTRAVENOUS; SUBCUTANEOUS EVERY 8 HOURS SCHEDULED
Status: DISCONTINUED | OUTPATIENT
Start: 2024-12-24 | End: 2024-12-24

## 2024-12-24 RX ORDER — OXYCODONE HYDROCHLORIDE 10 MG/1
10 TABLET ORAL EVERY 4 HOURS PRN
Refills: 0 | Status: DISCONTINUED | OUTPATIENT
Start: 2024-12-24 | End: 2024-12-28

## 2024-12-24 RX ORDER — LISINOPRIL 20 MG/1
40 TABLET ORAL DAILY
Status: DISCONTINUED | OUTPATIENT
Start: 2024-12-24 | End: 2024-12-29 | Stop reason: HOSPADM

## 2024-12-24 RX ORDER — DIAZEPAM 10 MG/2ML
5 INJECTION, SOLUTION INTRAMUSCULAR; INTRAVENOUS ONCE
Status: COMPLETED | OUTPATIENT
Start: 2024-12-24 | End: 2024-12-24

## 2024-12-24 RX ORDER — ACETAMINOPHEN 325 MG/1
975 TABLET ORAL EVERY 8 HOURS SCHEDULED
Status: DISCONTINUED | OUTPATIENT
Start: 2024-12-24 | End: 2024-12-29 | Stop reason: HOSPADM

## 2024-12-24 RX ORDER — FOLIC ACID 1 MG/1
1 TABLET ORAL DAILY
Status: DISCONTINUED | OUTPATIENT
Start: 2024-12-24 | End: 2024-12-29 | Stop reason: HOSPADM

## 2024-12-24 RX ORDER — OXYCODONE HYDROCHLORIDE 5 MG/1
5 TABLET ORAL EVERY 4 HOURS PRN
Refills: 0 | Status: CANCELLED | OUTPATIENT
Start: 2024-12-24

## 2024-12-24 RX ORDER — HEPARIN SODIUM 5000 [USP'U]/ML
5000 INJECTION, SOLUTION INTRAVENOUS; SUBCUTANEOUS EVERY 8 HOURS SCHEDULED
Status: COMPLETED | OUTPATIENT
Start: 2024-12-24 | End: 2024-12-24

## 2024-12-24 RX ORDER — GABAPENTIN 300 MG/1
300 CAPSULE ORAL 3 TIMES DAILY
Status: DISCONTINUED | OUTPATIENT
Start: 2024-12-24 | End: 2024-12-29 | Stop reason: HOSPADM

## 2024-12-24 RX ORDER — LIDOCAINE 50 MG/G
1 PATCH TOPICAL ONCE
Status: COMPLETED | OUTPATIENT
Start: 2024-12-24 | End: 2024-12-24

## 2024-12-24 RX ORDER — LANOLIN ALCOHOL/MO/W.PET/CERES
100 CREAM (GRAM) TOPICAL DAILY
Status: DISCONTINUED | OUTPATIENT
Start: 2024-12-24 | End: 2024-12-29 | Stop reason: HOSPADM

## 2024-12-24 RX ORDER — KETOROLAC TROMETHAMINE 30 MG/ML
15 INJECTION, SOLUTION INTRAMUSCULAR; INTRAVENOUS EVERY 6 HOURS SCHEDULED
Status: COMPLETED | OUTPATIENT
Start: 2024-12-24 | End: 2024-12-26

## 2024-12-24 RX ORDER — CHLORAL HYDRATE 500 MG
2000 CAPSULE ORAL DAILY
Status: DISCONTINUED | OUTPATIENT
Start: 2024-12-24 | End: 2024-12-27

## 2024-12-24 RX ORDER — OXYCODONE HYDROCHLORIDE 10 MG/1
10 TABLET ORAL EVERY 4 HOURS PRN
Refills: 0 | Status: CANCELLED | OUTPATIENT
Start: 2024-12-24

## 2024-12-24 RX ORDER — ONDANSETRON 2 MG/ML
4 INJECTION INTRAMUSCULAR; INTRAVENOUS EVERY 4 HOURS PRN
Status: DISCONTINUED | OUTPATIENT
Start: 2024-12-24 | End: 2024-12-29 | Stop reason: HOSPADM

## 2024-12-24 RX ORDER — KETOROLAC TROMETHAMINE 30 MG/ML
15 INJECTION, SOLUTION INTRAMUSCULAR; INTRAVENOUS ONCE
Status: COMPLETED | OUTPATIENT
Start: 2024-12-24 | End: 2024-12-24

## 2024-12-24 RX ORDER — DIAZEPAM 5 MG/1
5 TABLET ORAL EVERY 6 HOURS
Status: DISCONTINUED | OUTPATIENT
Start: 2024-12-24 | End: 2024-12-25

## 2024-12-24 RX ORDER — ESCITALOPRAM OXALATE 20 MG/1
20 TABLET ORAL DAILY
Status: DISCONTINUED | OUTPATIENT
Start: 2024-12-24 | End: 2024-12-29 | Stop reason: HOSPADM

## 2024-12-24 RX ORDER — PANTOPRAZOLE SODIUM 40 MG/1
40 TABLET, DELAYED RELEASE ORAL
Status: DISCONTINUED | OUTPATIENT
Start: 2024-12-24 | End: 2024-12-29 | Stop reason: HOSPADM

## 2024-12-24 RX ORDER — NICOTINE 21 MG/24HR
1 PATCH, TRANSDERMAL 24 HOURS TRANSDERMAL DAILY
Status: DISCONTINUED | OUTPATIENT
Start: 2024-12-24 | End: 2024-12-29 | Stop reason: HOSPADM

## 2024-12-24 RX ORDER — LORAZEPAM 1 MG/1
2 TABLET ORAL ONCE
Status: COMPLETED | OUTPATIENT
Start: 2024-12-24 | End: 2024-12-24

## 2024-12-24 RX ORDER — METHOCARBAMOL 500 MG/1
750 TABLET, FILM COATED ORAL ONCE
Status: COMPLETED | OUTPATIENT
Start: 2024-12-24 | End: 2024-12-24

## 2024-12-24 RX ORDER — METHOCARBAMOL 500 MG/1
1000 TABLET, FILM COATED ORAL EVERY 8 HOURS
Status: DISCONTINUED | OUTPATIENT
Start: 2024-12-24 | End: 2024-12-29 | Stop reason: HOSPADM

## 2024-12-24 RX ORDER — OXYCODONE HYDROCHLORIDE 5 MG/1
5 TABLET ORAL EVERY 4 HOURS PRN
Refills: 0 | Status: DISCONTINUED | OUTPATIENT
Start: 2024-12-24 | End: 2024-12-28

## 2024-12-24 RX ORDER — ATORVASTATIN CALCIUM 40 MG/1
40 TABLET, FILM COATED ORAL
Status: DISCONTINUED | OUTPATIENT
Start: 2024-12-24 | End: 2024-12-29 | Stop reason: HOSPADM

## 2024-12-24 RX ORDER — AMOXICILLIN 250 MG
1 CAPSULE ORAL
Status: DISCONTINUED | OUTPATIENT
Start: 2024-12-24 | End: 2024-12-29 | Stop reason: HOSPADM

## 2024-12-24 RX ADMIN — DIAZEPAM 5 MG: 5 TABLET ORAL at 09:15

## 2024-12-24 RX ADMIN — KETOROLAC TROMETHAMINE 15 MG: 30 INJECTION, SOLUTION INTRAMUSCULAR; INTRAVENOUS at 17:36

## 2024-12-24 RX ADMIN — MULTIPLE VITAMINS W/ MINERALS TAB 1 TABLET: TAB ORAL at 09:15

## 2024-12-24 RX ADMIN — GABAPENTIN 300 MG: 300 CAPSULE ORAL at 20:18

## 2024-12-24 RX ADMIN — OMEGA-3 FATTY ACIDS CAP 1000 MG 2000 MG: 1000 CAP at 12:06

## 2024-12-24 RX ADMIN — KETOROLAC TROMETHAMINE 15 MG: 30 INJECTION, SOLUTION INTRAMUSCULAR; INTRAVENOUS at 12:46

## 2024-12-24 RX ADMIN — HYDROMORPHONE HYDROCHLORIDE 0.5 MG: 1 INJECTION, SOLUTION INTRAMUSCULAR; INTRAVENOUS; SUBCUTANEOUS at 05:06

## 2024-12-24 RX ADMIN — ATORVASTATIN CALCIUM 40 MG: 40 TABLET, FILM COATED ORAL at 16:47

## 2024-12-24 RX ADMIN — OXYCODONE HYDROCHLORIDE 5 MG: 5 TABLET ORAL at 19:34

## 2024-12-24 RX ADMIN — HYDROMORPHONE HYDROCHLORIDE 0.5 MG: 1 INJECTION, SOLUTION INTRAMUSCULAR; INTRAVENOUS; SUBCUTANEOUS at 06:33

## 2024-12-24 RX ADMIN — HEPARIN SODIUM 5000 UNITS: 5000 INJECTION INTRAVENOUS; SUBCUTANEOUS at 12:06

## 2024-12-24 RX ADMIN — DIAZEPAM 5 MG: 10 INJECTION, SOLUTION INTRAMUSCULAR; INTRAVENOUS at 11:37

## 2024-12-24 RX ADMIN — SENNOSIDES, DOCUSATE SODIUM 1 TABLET: 8.6; 5 TABLET ORAL at 22:07

## 2024-12-24 RX ADMIN — SODIUM CHLORIDE 0.1 MG/KG/HR: 0.9 INJECTION, SOLUTION INTRAVENOUS at 14:50

## 2024-12-24 RX ADMIN — ACETAMINOPHEN 975 MG: 325 TABLET, FILM COATED ORAL at 14:12

## 2024-12-24 RX ADMIN — METHOCARBAMOL TABLETS 1000 MG: 500 TABLET, COATED ORAL at 14:50

## 2024-12-24 RX ADMIN — ACETAMINOPHEN 975 MG: 325 TABLET, FILM COATED ORAL at 09:15

## 2024-12-24 RX ADMIN — DIAZEPAM 5 MG: 5 TABLET ORAL at 20:18

## 2024-12-24 RX ADMIN — HEPARIN SODIUM 5000 UNITS: 5000 INJECTION, SOLUTION INTRAVENOUS; SUBCUTANEOUS at 22:07

## 2024-12-24 RX ADMIN — IOHEXOL 85 ML: 350 INJECTION, SOLUTION INTRAVENOUS at 06:12

## 2024-12-24 RX ADMIN — SODIUM CHLORIDE 1000 ML: 0.9 INJECTION, SOLUTION INTRAVENOUS at 05:10

## 2024-12-24 RX ADMIN — DIAZEPAM 5 MG: 5 TABLET ORAL at 14:13

## 2024-12-24 RX ADMIN — FOLIC ACID 1 MG: 1 TABLET ORAL at 09:15

## 2024-12-24 RX ADMIN — LISINOPRIL 40 MG: 20 TABLET ORAL at 12:04

## 2024-12-24 RX ADMIN — PHENOBARBITAL SODIUM 130 MG: 65 INJECTION INTRAMUSCULAR; INTRAVENOUS at 20:17

## 2024-12-24 RX ADMIN — ACETAMINOPHEN 975 MG: 325 TABLET, FILM COATED ORAL at 22:06

## 2024-12-24 RX ADMIN — SODIUM CHLORIDE 500 ML: 0.9 INJECTION, SOLUTION INTRAVENOUS at 06:37

## 2024-12-24 RX ADMIN — ESCITALOPRAM OXALATE 20 MG: 20 TABLET ORAL at 12:03

## 2024-12-24 RX ADMIN — LIDOCAINE 1 PATCH: 700 PATCH TOPICAL at 05:07

## 2024-12-24 RX ADMIN — LORAZEPAM 2 MG: 1 TABLET ORAL at 16:47

## 2024-12-24 RX ADMIN — METHOCARBAMOL TABLETS 750 MG: 500 TABLET, COATED ORAL at 07:05

## 2024-12-24 RX ADMIN — PANTOPRAZOLE SODIUM 40 MG: 40 TABLET, DELAYED RELEASE ORAL at 12:05

## 2024-12-24 RX ADMIN — ONDANSETRON 4 MG: 2 INJECTION INTRAMUSCULAR; INTRAVENOUS at 05:06

## 2024-12-24 RX ADMIN — KETOROLAC TROMETHAMINE 15 MG: 30 INJECTION, SOLUTION INTRAMUSCULAR; INTRAVENOUS at 07:05

## 2024-12-24 RX ADMIN — Medication 100 MG: at 09:15

## 2024-12-24 RX ADMIN — GABAPENTIN 300 MG: 300 CAPSULE ORAL at 16:47

## 2024-12-24 RX ADMIN — OXYCODONE HYDROCHLORIDE 5 MG: 5 TABLET ORAL at 09:19

## 2024-12-24 RX ADMIN — PHENOBARBITAL SODIUM 130 MG: 65 INJECTION INTRAMUSCULAR; INTRAVENOUS at 08:47

## 2024-12-24 RX ADMIN — MIRTAZAPINE 15 MG: 15 TABLET, FILM COATED ORAL at 22:07

## 2024-12-24 RX ADMIN — METHOCARBAMOL TABLETS 1000 MG: 500 TABLET, COATED ORAL at 22:07

## 2024-12-24 NOTE — ED PROVIDER NOTES
Time reflects when diagnosis was documented in both MDM as applicable and the Disposition within this note       Time User Action Codes Description Comment    12/24/2024  8:55 AM Jyothi Guadalupe Add [S22.41XA] Closed fracture of multiple ribs of right side, initial encounter     12/24/2024  9:13 AM Jose Juan Higuera Modify [S22.41XA] Closed fracture of multiple ribs of right side, initial encounter     12/24/2024  9:13 AM Kalen Jose Juan RING Add [F10.939] Alcohol withdrawal (HCC)           ED Disposition       ED Disposition   Admit    Condition   Stable    Date/Time   Tue Dec 24, 2024  2:19 PM    Comment   Case was discussed with Trauma Dr Lew and the patient's admission status was agreed to be Admission Status: inpatient status to the service of Dr. Hackett .               Assessment & Plan       Medical Decision Making  DDx including but not limited to: EtOH withdrawal, muscle spasm, rib fractures, PE, intrathoracic injury, strain, sprain, contusion. Doubt Dissection.    Pt is a 60 y/o male presenting with right upper back pain in the thoracic region. States he cannot recall falling but awoke on the cough in a great deal of pain. Painful deep respirations. Currently intoxicated. Heavy etoh use. Has had several instances of  withdrawal in the past. Denies any other symptoms.     On CIWA, pain not well controlled. D/W Familia Harper for trauma eval/admission secondary to rib fractures and intractable pain. Pt is agreeable to plan. Pt care signed out to Dr. Cody pending final trauma admission orders at shift change.    Amount and/or Complexity of Data Reviewed  Labs: ordered.  Radiology: ordered and independent interpretation performed.    Risk  Prescription drug management.  Decision regarding hospitalization.        ED Course as of 12/25/24 2009   Tue Dec 24, 2024   0656 Suboptimal contrast opacification of the pulmonary arteries. No central or definitive peripheral pulmonary emboli.  Chronic prominent central  pulmonary arteries and borderline elevated RV/LV ratio suggestive of chronic right heart disease.     Acute to subacute minimally displaced fractures of the right fourth through sixth ribs and nondisplaced fracture of the right seventh rib.  Mild bibasilar subsegmental atelectasis. No pneumothorax or hemothorax.  Additional chronic findings and negatives as above.     0731 CT PE: Suboptimal contrast opacification of the pulmonary arteries. No central or definitive peripheral pulmonary emboli., Chronic prominent central pulmonary arteries and borderline elevated RV/LV ratio suggestive of chronic right heart disease., Acute to subacute minimally displaced fractures of the right fourth through sixth ribs and nondisplaced fracture of the right seventh rib., Mild bibasilar subsegmental atelectasis. No pneumothorax or hemothorax., Additional chronic findings and negatives as above         Medications   nicotine (NICODERM CQ) 21 mg/24 hr TD 24 hr patch 1 patch (1 patch Transdermal Not Given 12/24/24 0916)   acetaminophen (TYLENOL) tablet 975 mg (975 mg Oral Given 12/24/24 0915)   oxyCODONE (ROXICODONE) IR tablet 5 mg (5 mg Oral Given 12/24/24 0919)     Or   oxyCODONE (ROXICODONE) immediate release tablet 10 mg ( Oral See Alternative 12/24/24 0919)   HYDROmorphone (DILAUDID) injection 0.5 mg (has no administration in time range)   ondansetron (ZOFRAN) injection 4 mg (has no administration in time range)   senna-docusate sodium (SENOKOT S) 8.6-50 mg per tablet 1 tablet (has no administration in time range)   polyethylene glycol (MIRALAX) packet 17 g (17 g Oral Not Given 12/24/24 0916)   thiamine tablet 100 mg (100 mg Oral Given 12/24/24 0915)   folic acid (FOLVITE) tablet 1 mg (1 mg Oral Given 12/24/24 0915)   multivitamin-minerals (CENTRUM) tablet 1 tablet (1 tablet Oral Given 12/24/24 0915)   atorvastatin (LIPITOR) tablet 40 mg (has no administration in time range)   escitalopram (LEXAPRO) tablet 20 mg (20 mg Oral Given  12/24/24 1203)   lisinopril (ZESTRIL) tablet 40 mg (40 mg Oral Given 12/24/24 1204)   mirtazapine (REMERON) tablet 15 mg (has no administration in time range)   fish oil capsule 2,000 mg (2,000 mg Oral Given 12/24/24 1206)   pantoprazole (PROTONIX) EC tablet 40 mg (40 mg Oral Given 12/24/24 1205)   ketorolac (TORADOL) injection 15 mg (15 mg Intravenous Given 12/24/24 1246)   naloxone (NARCAN) 0.04 mg/mL syringe 0.04 mg (has no administration in time range)   sodium chloride 0.9 % bolus 1,000 mL (0 mL Intravenous Stopped 12/24/24 0634)   lidocaine (LIDODERM) 5 % patch 1 patch (1 patch Topical Medication Applied 12/24/24 0507)   HYDROmorphone (DILAUDID) injection 0.5 mg (0.5 mg Intravenous Given 12/24/24 0506)   ondansetron (ZOFRAN) injection 4 mg (4 mg Intravenous Given 12/24/24 0506)   iohexol (OMNIPAQUE) 350 MG/ML injection (MULTI-DOSE) 85 mL (85 mL Intravenous Given 12/24/24 0612)   sodium chloride 0.9 % bolus 500 mL (0 mL Intravenous Stopped 12/24/24 0824)   HYDROmorphone (DILAUDID) injection 0.5 mg (0.5 mg Intravenous Given 12/24/24 0633)   ketorolac (TORADOL) injection 15 mg (15 mg Intravenous Given 12/24/24 0705)   methocarbamol (ROBAXIN) tablet 750 mg (750 mg Oral Given 12/24/24 0705)   PHENobarbital injection 130 mg (130 mg Intravenous Given 12/24/24 0847)   diazepam (VALIUM) injection 5 mg (5 mg Intravenous Given 12/24/24 1137)       ED Risk Strat Scores               [facility] Score       Row Name 12/25/24 1300 12/25/24 0900 12/25/24 0500       [facility]    Blood Pressure -- -- 111/72    Pulse -- -- 85    Nausea and Vomiting 0 0 0    Tactile Disturbances 0 0 0    Tremor 0 0 0    Auditory Disturbances 0 0 0    Paroxysmal Sweats 0 0 0    Visual Disturbances 0 0 0    Anxiety 0 0 0    Headache, Fullness in Head 0 0 0    Agitation 0 0 0    Orientation and Clouding of Sensorium 0 0 0    CIWA-Ar Total 0 0 0      Row Name 12/25/24 0400 12/24/24 2300 12/24/24 2100       CIWA-Ar    Blood Pressure 114/78 -- --    Pulse  91 -- --    Nausea and Vomiting 0 0 0    Tactile Disturbances 0 0 0    Tremor 6 0 1    Auditory Disturbances 0 0 0    Paroxysmal Sweats 1 0 0    Visual Disturbances 0 0 0    Anxiety 4 0 1    Headache, Fullness in Head 0 0 0    Agitation 1 0 0    Orientation and Clouding of Sensorium 0 0 0    CIWA-Ar Total 12 0 2      Row Name 12/24/24 2000 12/24/24 1700 12/24/24 1638       CIWA-Ar    Blood Pressure -- -- 125/88    Nausea and Vomiting 0 1 0    Tactile Disturbances 0 0 0    Tremor 4 4 6    Auditory Disturbances 0 0 0    Paroxysmal Sweats 0 0 1    Visual Disturbances 0 0 0    Anxiety 4 2 5    Headache, Fullness in Head 0 0 0    Agitation 0 0 0    Orientation and Clouding of Sensorium 0 0 0    CIWA-Ar Total 8 7 12      Row Name 12/24/24 1600 12/24/24 1130 12/24/24 0845       CIWA-Ar    Blood Pressure -- -- 150/111    Pulse -- -- 100    Nausea and Vomiting -- 0 0    Tactile Disturbances -- 0 0    Tremor -- 3 3    Auditory Disturbances -- 0 0    Paroxysmal Sweats -- 1 1    Visual Disturbances -- 0 0    Anxiety -- 4 5    Headache, Fullness in Head -- 0 0    Agitation -- 0 0    Orientation and Clouding of Sensorium -- 0 0    CIWA-Ar Total -- 8 9      Row Name 12/24/24 0516             CIWA-Ar    Blood Pressure 134/92      Pulse 107      Nausea and Vomiting 0      Tactile Disturbances 0      Tremor 2      Auditory Disturbances 0      Paroxysmal Sweats 2      Visual Disturbances 0      Anxiety 1      Headache, Fullness in Head 2      Agitation 0      Orientation and Clouding of Sensorium 0      CIWA-Ar Total 7                              SBIRT 22yo+      Flowsheet Row Most Recent Value   Initial Alcohol Screen: US AUDIT-C     1. How often do you have a drink containing alcohol? 6 Filed at: 12/24/2024 5511   2. How many drinks containing alcohol do you have on a typical day you are drinking?  5 Filed at: 12/24/2024 0431   3b. FEMALE Any Age, or MALE 65+: How often do you have 4 or more drinks on one occassion? 6 Filed at:  12/24/2024 0431   Audit-C Score 17 Filed at: 12/24/2024 0431   Full Alcohol Screen: US AUDIT    4. How often during the last year have you found that you were not able to stop drinking once you had started? 4 Filed at: 12/24/2024 0431   5. How often during past year have you failed to do what was normally expected of you because of drinking?  4 Filed at: 12/24/2024 0431   6. How often in past year have you needed a first drink in the morning to get yourself going after a heavy drinking session?  4 Filed at: 12/24/2024 0431   7. How often in past year have you had feeling of guilt or remorse after drinking?  4 Filed at: 12/24/2024 0431   8. How often in past year have you been unable to remember what happened night before because you had been drinking?  4 Filed at: 12/24/2024 0431   9. Have you or someone else been injured as a result of your drinking?  4 Filed at: 12/24/2024 0431   10. Has a relative, friend, doctor or other health worker been concerned about your drinking and suggested you cut down?  4 Filed at: 12/24/2024 0431   AUDIT Total Score 45 Filed at: 12/24/2024 0431   MILDRED: How many times in the past year have you...    Used an illegal drug or used a prescription medication for non-medical reasons? Never Filed at: 12/24/2024 0431                            History of Present Illness       Chief Complaint   Patient presents with    Back Pain     Patient arrives to the ER from home with complaints of right lower back pain. + ETOH. +back spasms. A&OX4. Pt states he drank alcohol to numb the pain. Pt states movement makes the pain worse.        Past Medical History:   Diagnosis Date    Alcohol use disorder, severe, dependence (HCC) 04/02/2023    Alcohol withdrawal seizure (HCC)     Alcoholic ketoacidosis 10/16/2023    Anxiety     AR (allergic rhinitis)     Chronic alcoholic gastritis 04/02/2023    Depression     GERD (gastroesophageal reflux disease)     Hepatic steatosis 04/02/2023    Hyperlipidemia      Hypertension     IBS (irritable bowel syndrome)     Obesity     Rosacea     Vitamin B12 deficiency 02/14/2024    Vitamin D deficiency 02/14/2024      Past Surgical History:   Procedure Laterality Date    ANTERIOR CRUCIATE LIGAMENT REPAIR Left     WISDOM TOOTH EXTRACTION        Family History   Problem Relation Age of Onset    Cancer Mother 78        Type unknown    Hypertension Father     Coronary artery disease Father 60    Cancer Father 82        Bladder; + Smoker    No Known Problems Sister     No Known Problems Sister     No Known Problems Sister     No Known Problems Son     No Known Problems Son     Heart attack Paternal Grandfather 60    Coronary artery disease Paternal Grandfather 60      Social History     Tobacco Use    Smoking status: Some Days     Types: Cigarettes, Cigars     Start date: 1/1/2014     Passive exposure: Past (Father)    Smokeless tobacco: Never    Tobacco comments:     Smokes cigars 2-3 times per year   Vaping Use    Vaping status: Never Used   Substance Use Topics    Alcohol use: Yes     Comment: ETOH 12/7/24    Drug use: Never      E-Cigarette/Vaping    E-Cigarette Use Never User       E-Cigarette/Vaping Substances    Nicotine No     THC No     CBD No     Flavoring No     Other No     Unknown No       I have reviewed and agree with the history as documented.     Pt is a 58 y/o male presenting with right upper back pain in the thoracic region. States he cannot recall falling but awoke on the cough in a great deal of pain. Painful deep respirations. Currently intoxicated. Heavy etoh use. Has had several instances of  withdrawal in the past. Denies any other symptoms.           Review of Systems   Musculoskeletal:  Positive for back pain.   Psychiatric/Behavioral:  The patient is nervous/anxious.    All other systems reviewed and are negative.          Objective       ED Triage Vitals   Temperature Pulse Blood Pressure Respirations SpO2 Patient Position - Orthostatic VS   12/24/24 6659  12/24/24 0425 12/24/24 0425 12/24/24 0425 12/24/24 0425 12/24/24 0425   98.6 °F (37 °C) (!) 108 157/96 20 92 % Lying      Temp Source Heart Rate Source BP Location FiO2 (%) Pain Score    12/24/24 0425 12/24/24 0425 12/24/24 0425 -- 12/24/24 0506    Oral Monitor Right arm  10 - Worst Possible Pain      Vitals      Date and Time Temp Pulse SpO2 Resp BP Pain Score FACES Pain Rating User   12/25/24 1900 99.2 °F (37.3 °C) -- -- -- -- -- -- EW   12/25/24 1857 -- -- -- -- -- 2 -- CM   12/25/24 1800 -- 86 92 % -- 105/57 -- -- CM   12/25/24 1740 -- -- -- -- -- 2 -- CM   12/25/24 1730 -- 70 91 % 12 86/54 -- -- CM   12/25/24 1600 -- 107 92 % -- 119/77 2 -- CM   12/25/24 1500 -- 85 91 % -- 118/68 -- -- CM   12/25/24 1500 98.9 °F (37.2 °C) -- -- -- -- -- -- EW   12/25/24 1435 -- 62 91 % 21 98/61 -- -- CM   12/25/24 1432 -- 83 92 % 25 78/52 -- -- CM   12/25/24 1415 -- 76 88 % 10 83/50 -- -- CM   12/25/24 1400 -- 67 88 % 9 82/51 ccap aware -- -- CM   12/25/24 1330 -- 85 92 % -- 113/72 -- -- CM   12/25/24 1311 -- -- -- -- -- 2 -- CM   12/25/24 1306 -- 95 92 % -- 123/74 3 -- CM   12/25/24 1300 -- 115 91 % -- 144/80 -- -- CM   12/25/24 1245 -- 125 92 % 18 150/93 -- -- CM   12/25/24 1241 -- 120 93 % 18 141/87 -- -- CM   12/25/24 1238 -- 118 94 % 18 154/93 -- -- CM   12/25/24 1235 -- 117 94 % 22 184/103 -- -- CM   12/25/24 1227 -- -- -- -- -- 8 -- CM   12/25/24 1200 -- 84 91 % 11 124/82 8 -- CM   12/25/24 1150 98.9 °F (37.2 °C) -- -- -- 124/80 -- -- CD   12/25/24 1144 -- -- 97 % -- -- -- -- TA   12/25/24 1111 -- -- 91 % -- -- -- -- CM   12/25/24 1100 -- 95 94 % 17 186/95 -- -- CM   12/25/24 0913 -- -- -- -- -- 8 -- CM   12/25/24 0800 -- -- -- -- -- 5 -- CM   12/25/24 0718 98.7 °F (37.1 °C) -- -- -- 105/69 -- -- CD   12/25/24 0639 -- -- -- -- -- 4 -- JT   12/25/24 0600 -- 89 92 % 15 110/73 -- -- JT   12/25/24 0500 -- 85 91 % 11 111/72 -- -- JT   12/25/24 0400 -- 91 93 % 18 114/78 7 -- JT   12/25/24 0300 -- 58 93 % 11 103/64 -- -- JT    12/25/24 0200 -- 57 94 % 11 110/70 -- -- JT   12/25/24 0100 -- 69 94 % 11 106/66 -- -- J   12/25/24 0000 -- 79 92 % 12 105/62 No Pain --    12/24/24 2300 -- 85 90 % 13 118/68 -- -- J   12/24/24 2206 -- -- -- -- -- 5 --    12/24/24 2200 -- 90 94 % -- 135/83 -- -- JT   12/24/24 2100 -- 95 94 % 18 132/81 -- -- JT   12/24/24 2000 -- 92 94 % 16 132/83 8 --    12/24/24 1943 100.4 °F (38 °C) -- -- -- -- -- -- DR   12/24/24 1934 -- -- -- -- -- 8 -- JT   12/24/24 1900 -- 98 94 % 18 138/79 -- -- DR   12/24/24 1800 -- 98 93 % 17 136/81 -- -- DR   12/24/24 1736 -- -- -- -- -- Med Not Given for Pain - for MAR use only -- DR   12/24/24 1700 -- 96 93 % 17 144/87 -- -- DR   12/24/24 1638 -- -- -- -- 125/88 -- -- DR   12/24/24 1600 -- -- -- -- -- 8 -- DR   12/24/24 1437 -- -- 92 % -- -- -- -- DR   12/24/24 1431 -- -- -- -- -- 9 -- DR   12/24/24 1419 98.6 °F (37 °C) 121 -- 15 145/92 9 -- DR   12/24/24 1412 -- -- -- -- -- Med Not Given for Pain - for MAR use only -- DR   12/24/24 1330 -- 105 94 % 18 168/92 -- -- LA   12/24/24 1300 -- 104 93 % 18 173/93 -- -- LA   12/24/24 1230 -- 99 94 % 18 164/88 -- -- LA   12/24/24 1130 -- 99 93 % 18 155/98 -- -- LA   12/24/24 1030 -- 98 92 % -- 145/90 -- -- NP   12/24/24 0900 -- 98 -- 18 154/98 -- -- AS   12/24/24 0845 -- 100 93 % 18 150/111 -- -- DB   12/24/24 0715 -- 103 95 % -- -- -- -- KB   12/24/24 0700 -- 105 95 % 20 141/92 -- -- KB   12/24/24 0645 -- 107 95 % 20 -- -- --    12/24/24 0633 -- -- -- -- -- 8 --    12/24/24 0630 -- 102 -- 20 131/81 -- --    12/24/24 0615 -- 97 94 % 20 -- -- --    12/24/24 0600 -- 98 95 % 20 134/82 -- --    12/24/24 0530 -- 102 90 % 20 132/80 -- --    12/24/24 0516 -- 107 -- -- 134/92 -- --    12/24/24 0506 -- -- -- -- -- 10 - Worst Possible Pain --    12/24/24 0425 98.6 °F (37 °C) 108 92 % 20 157/96 -- -- AM            Physical Exam  Vitals and nursing note reviewed.   Constitutional:       General: He is in acute distress.       Appearance: He is not ill-appearing, toxic-appearing or diaphoretic.   HENT:      Head: Normocephalic and atraumatic.      Mouth/Throat:      Mouth: Mucous membranes are moist.      Pharynx: Oropharynx is clear.   Eyes:      Extraocular Movements: Extraocular movements intact.      Conjunctiva/sclera: Conjunctivae normal.      Pupils: Pupils are equal, round, and reactive to light.   Cardiovascular:      Rate and Rhythm: Regular rhythm. Tachycardia present.      Pulses: Normal pulses.      Heart sounds: Normal heart sounds.   Pulmonary:      Effort: Pulmonary effort is normal. No respiratory distress.      Breath sounds: Normal breath sounds. No stridor. No wheezing, rhonchi or rales.   Chest:      Chest wall: No tenderness.   Abdominal:      General: Abdomen is flat. There is no distension.      Palpations: Abdomen is soft.      Tenderness: There is no abdominal tenderness. There is no right CVA tenderness, left CVA tenderness, guarding or rebound.   Musculoskeletal:         General: Tenderness (right mid-thoracic region) present. No swelling, deformity or signs of injury. Normal range of motion.      Cervical back: Normal range of motion and neck supple. No rigidity or tenderness.      Right lower leg: No edema.      Left lower leg: No edema.   Skin:     General: Skin is warm and dry.      Coloration: Skin is not jaundiced or pale.      Findings: No bruising, erythema, lesion or rash.   Neurological:      General: No focal deficit present.      Mental Status: He is alert and oriented to person, place, and time.      Cranial Nerves: No cranial nerve deficit.      Sensory: No sensory deficit.      Motor: No weakness.      Coordination: Coordination normal.   Psychiatric:         Behavior: Behavior normal.      Comments: anxious         Results Reviewed       Procedure Component Value Units Date/Time    Protime-INR [[id number]]  (Normal) Collected: 12/25/24 0425    Lab Status: Final result Specimen: Blood from Arm,  Right Updated: 12/25/24 0447     Protime 13.9 seconds      INR 1.00    Narrative:      INR Therapeutic Range    Indication                                             INR Range      Atrial Fibrillation                                               2.0-3.0  Hypercoagulable State                                    2.0.2.3  Left Ventricular Asist Device                            2.0-3.0  Mechanical Heart Valve                                  -    Aortic(with afib, MI, embolism, HF, LA enlargement,    and/or coagulopathy)                                     2.0-3.0 (2.5-3.5)     Mitral                                                             2.5-3.5  Prosthetic/Bioprosthetic Heart Valve               2.0-3.0  Venous thromboembolism (VTE: VT, PE        2.0-3.0    APTT [[id number]]  (Normal) Collected: 12/25/24 0425    Lab Status: Final result Specimen: Blood from Arm, Right Updated: 12/25/24 0447     PTT 30 seconds     CBC and Platelet [[id number]]  (Abnormal) Collected: 12/25/24 0425    Lab Status: Final result Specimen: Blood from Arm, Right Updated: 12/25/24 0439     WBC 5.05 Thousand/uL      RBC 3.91 Million/uL      Hemoglobin 12.7 g/dL      Hematocrit 37.5 %      MCV 96 fL      MCH 32.5 pg      MCHC 33.9 g/dL      RDW 12.6 %      Platelets 153 Thousands/uL      MPV 8.8 fL     Platelet count [[id number]]  (Abnormal) Collected: 12/24/24 0928    Lab Status: Final result Specimen: Blood from Arm, Left Updated: 12/24/24 0953     Platelets 203 Thousands/uL      MPV 8.7 fL     Lipase [[id number]]  (Normal) Collected: 12/24/24 0507    Lab Status: Final result Specimen: Blood from Arm, Left Updated: 12/24/24 0558     Lipase 23 u/L     Comprehensive metabolic panel [[id number]]  (Abnormal) Collected: 12/24/24 0507    Lab Status: Final result Specimen: Blood from Arm, Left Updated: 12/24/24 0557     Sodium 139 mmol/L      Potassium 3.9 mmol/L      Chloride 99 mmol/L      CO2 24 mmol/L      ANION GAP 16 mmol/L      BUN 12 mg/dL       Creatinine 0.87 mg/dL      Glucose 91 mg/dL      Calcium 9.1 mg/dL      AST 46 U/L      ALT 24 U/L      Alkaline Phosphatase 102 U/L      Total Protein 7.6 g/dL      Albumin 4.5 g/dL      Total Bilirubin 0.56 mg/dL      eGFR 95 ml/min/1.73sq m     Narrative:      [employer] guidelines for Chronic Kidney Disease (CKD):     Stage 1 with normal or high GFR (GFR > 90 mL/min/1.73 square meters)    Stage 2 Mild CKD (GFR = 60-89 mL/min/1.73 square meters)    Stage 3A Moderate CKD (GFR = 45-59 mL/min/1.73 square meters)    Stage 3B Moderate CKD (GFR = 30-44 mL/min/1.73 square meters)    Stage 4 Severe CKD (GFR = 15-29 mL/min/1.73 square meters)    Stage 5 End Stage CKD (GFR <15 mL/min/1.73 square meters)  Note: GFR calculation is accurate only with a steady state creatinine    Ethanol [[id number]]  (Abnormal) Collected: 12/24/24 0507    Lab Status: Final result Specimen: Blood from Arm, Left Updated: 12/24/24 0555     Ethanol Lvl 150 mg/dL     CBC and differential [[id number]]  (Abnormal) Collected: 12/24/24 0507    Lab Status: Final result Specimen: Blood from Arm, Left Updated: 12/24/24 0529     WBC 7.21 Thousand/uL      RBC 4.50 Million/uL      Hemoglobin 14.5 g/dL      Hematocrit 41.7 %      MCV 93 fL      MCH 32.2 pg      MCHC 34.8 g/dL      RDW 12.5 %      MPV 8.6 fL      Platelets 248 Thousands/uL      nRBC 0 /100 WBCs      Segmented % 75 %      Immature Grans % 0 %      Lymphocytes % 18 %      Monocytes % 6 %      Eosinophils Relative 0 %      Basophils Relative 1 %      Absolute Neutrophils 5.41 Thousands/µL      Absolute Immature Grans 0.02 Thousand/uL      Absolute Lymphocytes 1.27 Thousands/µL      Absolute Monocytes 0.44 Thousand/µL      Eosinophils Absolute 0.03 Thousand/µL      Basophils Absolute 0.04 Thousands/µL             CTA chest pe study   Final Interpretation by Dima Rascon MD (12/24 0651)      Suboptimal contrast opacification of the pulmonary arteries. No  central or definitive peripheral pulmonary emboli.      Chronic prominent central pulmonary arteries and borderline elevated RV/LV ratio suggestive of chronic right heart disease.      Acute to subacute minimally displaced fractures of the right fourth through sixth ribs and nondisplaced fracture of the right seventh rib.      Mild bibasilar subsegmental atelectasis. No pneumothorax or hemothorax.      Additional chronic findings and negatives as above.      The study was marked in EPIC for immediate notification.            Workstation performed: [id number]         XR chest 1 view portable   ED Interpretation by Jose Juan Higuera MD (12/24 6601)   My personal interpretation-no acute cardiopulmonary disease appreciated.  Concern for multiple right-sided rib fractures.      Final Interpretation by Gigi Lopez MD (12/24 4827)      No acute cardiopulmonary disease. Refer to follow-up CT chest for rib fractures.            Workstation performed: [id number]             Procedures    ED Medication and Procedure Management   Prior to Admission Medications   Prescriptions Last Dose Informant Patient Reported? Taking?   Magnesium 400 MG CAPS   Yes No   Sig: Take 1 capsule by mouth in the morning   Multiple Vitamin (multivitamin) tablet   No No   Sig: Take 1 tablet by mouth daily   Omega-3 Fatty Acids (fish oil) 1,000 mg   Yes No   Sig: Take 2,000 mg by mouth daily   Thiamine Mononitrate (VITAMIN B1) 100 mg tablet   No No   Sig: Take 1 tablet (100 mg total) by mouth daily   atorvastatin (LIPITOR) 40 mg tablet   No No   Sig: Take 1 tablet (40 mg total) by mouth daily with dinner   escitalopram (LEXAPRO) 20 mg tablet   No No   Sig: Take 1 tablet (20 mg total) by mouth daily   folic acid (FOLVITE) 1 mg tablet   No No   Sig: Take 1 tablet (1 mg total) by mouth daily   hydrOXYzine HCL (ATARAX) 25 mg tablet   No No   Sig: Take 1 tablet (25 mg total) by mouth every 6 (six) hours as needed for itching   lisinopril (ZESTRIL) 40 mg tablet    No No   Sig: Take 1 tablet (40 mg total) by mouth daily   metroNIDAZOLE (METROCREAM) 0.75 % cream   No No   Sig: Apply topically 2 (two) times a day   mirtazapine (REMERON) 15 mg tablet   No No   Sig: Take 1 tablet (15 mg total) by mouth daily at bedtime   pantoprazole (PROTONIX) 40 mg tablet   No No   Sig: Take 1 tablet (40 mg total) by mouth daily in the early morning      Facility-Administered Medications: None     Current Discharge Medication List        CONTINUE these medications which have NOT CHANGED    Details   atorvastatin (LIPITOR) 40 mg tablet Take 1 tablet (40 mg total) by mouth daily with dinner  Qty: 90 tablet, Refills: 1    Associated Diagnoses: Hyperlipidemia, unspecified hyperlipidemia type      escitalopram (LEXAPRO) 20 mg tablet Take 1 tablet (20 mg total) by mouth daily  Qty: 90 tablet, Refills: 1    Associated Diagnoses: Anxiety; Current moderate episode of major depressive disorder, unspecified whether recurrent (HCC)      folic acid (FOLVITE) 1 mg tablet Take 1 tablet (1 mg total) by mouth daily  Qty: 30 tablet, Refills: 0    Associated Diagnoses: Alcohol withdrawal (HCC)      hydrOXYzine HCL (ATARAX) 25 mg tablet Take 1 tablet (25 mg total) by mouth every 6 (six) hours as needed for itching  Qty: 360 tablet, Refills: 1    Associated Diagnoses: Anxiety      lisinopril (ZESTRIL) 40 mg tablet Take 1 tablet (40 mg total) by mouth daily  Qty: 90 tablet, Refills: 1    Associated Diagnoses: Primary hypertension      Magnesium 400 MG CAPS Take 1 capsule by mouth in the morning      metroNIDAZOLE (METROCREAM) 0.75 % cream Apply topically 2 (two) times a day  Qty: 45 g, Refills: 0    Associated Diagnoses: Rosacea      mirtazapine (REMERON) 15 mg tablet Take 1 tablet (15 mg total) by mouth daily at bedtime  Qty: 30 tablet, Refills: 0    Associated Diagnoses: Major depressive disorder, recurrent, moderate (HCC)      Multiple Vitamin (multivitamin) tablet Take 1 tablet by mouth daily  Qty: 30 tablet,  Refills: 0    Associated Diagnoses: Alcohol use disorder      Omega-3 Fatty Acids (fish oil) 1,000 mg Take 2,000 mg by mouth daily      pantoprazole (PROTONIX) 40 mg tablet Take 1 tablet (40 mg total) by mouth daily in the early morning  Qty: 90 tablet, Refills: 1    Associated Diagnoses: Chronic alcoholic gastritis without hemorrhage; Gastroesophageal reflux disease, unspecified whether esophagitis present      Thiamine Mononitrate (VITAMIN B1) 100 mg tablet Take 1 tablet (100 mg total) by mouth daily  Qty: 30 tablet, Refills: 1    Associated Diagnoses: Alcohol use disorder, severe, dependence (HCC)           No discharge procedures on file.  ED SEPSIS DOCUMENTATION   Time reflects when diagnosis was documented in both MDM as applicable and the Disposition within this note       Time User Action Codes Description Comment    12/24/2024  8:55 AM Jyothi Guadalupe [S22.41XA] Closed fracture of multiple ribs of right side, initial encounter     12/24/2024  9:13 AM Jose Juan Higuera Modify [S22.41XA] Closed fracture of multiple ribs of right side, initial encounter     12/24/2024  9:13 AM Jose Juan Higuera [F10.939] Alcohol withdrawal (HCC)                  Neto Cannon DO  12/25/24 2009

## 2024-12-24 NOTE — H&P
H&P - Trauma   Name: Diogo Travis 59 y.o. male I MRN: [id number]  Unit/Bed#: ED-07 I Date of Admission: 12/24/2024   Date of Service: 12/24/2024 I Hospital Day: 0     Assessment & Plan  Fall  - uncertain of timing of fall, heavy drinker and poor historian with regards to details of timing  - sustained below stated injuries  - PT/OT evaluations  - CM for dispo planning  Multiple rib fractures  - Multiple right-sided rib fractures (4th-7th), present on admission.  - Continue rib fracture protocol.  - Continue to encourage incentive spirometer use and adequate pulmonary hygiene.  Currently pulling 1750 mL on I.S.  - Appreciate APS evaluation and recommendations.  - Continue multimodal analgesic regimen.  - Supplemental oxygen via nasal cannula as needed to maintain saturations greater than or equal to 94%.  - PT and OT evaluation and treatment as indicated.  - Outpatient follow-up in the trauma clinic for re-evaluation in approximately 2 weeks.    Acute pain due to trauma  - Acute pain secondary to traumatic injuries.  - Continue multimodal analgesic regimen.  - Appreciate APS evaluation and recommendations. Possible EDC in AM on 12/25 - will hold SQH in AM. Schedule Valium for spasm.   - Bowel regimen as long as using opioids.  - Continue to monitor pain and adjust regimen as indicated.    Depression  - resume home lexapro and remeron  Alcohol use disorder  - heavy daily drinker  - h/o alcohol withdrawal on multiple occurrences  - noted to have anxiety and tremors, mild tachycardia as well in the low 100s  - given 130 phenobarbital and placed on CIWA protocol  - MVD/Thiamine/Folic Acid  - CM for SBIRT  Alcohol withdrawal (HCC)  - heavy daily drinker  - h/o alcohol withdrawal on multiple occurrences  - noted to have anxiety and tremors, mild tachycardia as well in the low 100s  - given 130 phenobarbital and placed on CIWA protocol  - MVD/Thiamine/Folic Acid  - CM for SBIRT    Trauma Alert: Evaluation; trauma team  "notified at 0740 via text   Model of Arrival: Ambulance    Trauma Team: Attending Lew and CAROLYN Guadalupe  Consultants:     Other: {routine consult; Epic consult order placed;     History of Present Illness   Chief Complaint: \"Rib pain\"  Mechanism:Fall     Diogo Travis is a 59 y.o. male who presents s/p fall, he believes three days ago when he was working on his garage door. He didn't think much of it at the time. He developed pain in his side yesterday morning and was unable to move off the couch all day due to the pain so called the ambulance to bring him to the ED last night. .    Review of Systems   Constitutional: Negative.    HENT: Negative.     Eyes: Negative.    Respiratory: Negative.  Negative for shortness of breath.    Cardiovascular: Negative.    Gastrointestinal: Negative.  Negative for abdominal pain, nausea and vomiting.   Genitourinary: Negative.    Musculoskeletal:         + R sided back pain   Skin: Negative.    Neurological: Negative.  Negative for dizziness, weakness, light-headedness and numbness.   Psychiatric/Behavioral: Negative.       I have reviewed the patient's PMH, PSH, Social History, Family History, Meds, and Allergies  Immunization History   Administered Date(s) Administered    COVID-19 MODERNA VACC 0.5 ML IM 02/07/2022, 03/04/2022    Hep A, adult 07/09/2024    INFLUENZA 01/13/2014, 12/17/2014, 12/01/2015    Tdap 03/06/2009, 05/15/2020     Last Tetanus: 2020       Objective :  Temp:  [98.6 °F (37 °C)] 98.6 °F (37 °C)  HR:  [] 98  BP: (131-157)/() 154/98  Resp:  [18-20] 18  SpO2:  [90 %-95 %] 93 %  O2 Device: Nasal cannula  Nasal Cannula O2 Flow Rate (L/min):  [3 L/min] 3 L/min    Initial Vitals:   Temperature: 98.6 °F (37 °C) (12/24/24 0425)  Pulse: (!) 108 (12/24/24 0425)  Respirations: 20 (12/24/24 0425)  Blood Pressure: 157/96 (12/24/24 0425)    Primary Survey:   Airway:        Status: patent;        Pre-hospital Interventions: none        Hospital Interventions: " none  Breathing:        Pre-hospital Interventions: none       Effort: normal       Right breath sounds: normal       Left breath sounds: normal  Circulation:        Rhythm: regular       Rate: regular   Right Pulses Left Pulses    R radial: 2+  R femoral: 2+  R pedal: 2+  R carotid: 2+  R popliteal: 2+ L radial: 2+  L femoral: 2+  L pedal: 2+  L carotid: 2+  L popliteal: 2+   Disability:        GCS: Eye: 4; Verbal: 5 Motor: 6 Total: 15       Right Pupil: round;  reactive         Left Pupil:  round;  reactive      R Motor Strength L Motor Strength    R : 5/5  R dorsiflex: 5/5  R plantarflex: 5/5 L : 5/5  L dorsiflex: 5/5  L plantarflex: 5/5        Sensory:  No sensory deficit  Exposure:       Completed: Yes      Secondary Survey:  Physical Exam  Constitutional:       Appearance: Normal appearance.   HENT:      Head: Normocephalic.      Nose: Nose normal.      Mouth/Throat:      Mouth: Mucous membranes are moist.   Eyes:      Pupils: Pupils are equal, round, and reactive to light.   Cardiovascular:      Rate and Rhythm: Normal rate and regular rhythm.      Pulses: Normal pulses.   Pulmonary:      Effort: Pulmonary effort is normal. No respiratory distress.      Breath sounds: Normal breath sounds.   Abdominal:      General: Abdomen is flat. There is no distension.      Palpations: Abdomen is soft.      Tenderness: There is no abdominal tenderness.   Musculoskeletal:         General: Tenderness present. No swelling or deformity.      Cervical back: Normal range of motion and neck supple. No tenderness.      Comments: + R sided chest wall tenderness   Skin:     General: Skin is warm and dry.      Capillary Refill: Capillary refill takes less than 2 seconds.   Neurological:      General: No focal deficit present.      Mental Status: He is alert and oriented to person, place, and time.      Sensory: No sensory deficit.      Motor: No weakness.   Psychiatric:         Behavior: Behavior normal.             Lab  Results: I have reviewed the following results:  Recent Labs     12/24/24  0507 12/24/24  0928   WBC 7.21  --    HGB 14.5  --    HCT 41.7  --     203   SODIUM 139  --    K 3.9  --    CL 99  --    CO2 24  --    BUN 12  --    CREATININE 0.87  --    GLUC 91  --    AST 46*  --    ALT 24  --    ALB 4.5  --    TBILI 0.56  --    ALKPHOS 102  --        Imaging Results: I have personally reviewed pertinent images saved in PACS. CT scan findings (and other pertinent positive findings on images) were discussed with radiology. My interpretation of the images/reports are as follows:  XR chest 1 view portable  Result Date: 12/24/2024  Impression: No acute cardiopulmonary disease. Refer to follow-up CT chest for rib fractures. Workstation performed: [id number]     CTA chest pe study  Result Date: 12/24/2024  Impression: Suboptimal contrast opacification of the pulmonary arteries. No central or definitive peripheral pulmonary emboli. Chronic prominent central pulmonary arteries and borderline elevated RV/LV ratio suggestive of chronic right heart disease. Acute to subacute minimally displaced fractures of the right fourth through sixth ribs and nondisplaced fracture of the right seventh rib. Mild bibasilar subsegmental atelectasis. No pneumothorax or hemothorax. Additional chronic findings and negatives as above. The study was marked in EPIC for immediate notification. Workstation performed: [id number]       Other Studies: Other Study Results Review: No additional pertinent studies reviewed.

## 2024-12-24 NOTE — ASSESSMENT & PLAN NOTE
- uncertain of timing of fall, heavy drinker and poor historian with regards to details of timing  - sustained below stated injuries  - PT/OT evaluations  - CM for dispo planning

## 2024-12-24 NOTE — ED PROVIDER NOTES
Time reflects when diagnosis was documented in both MDM as applicable and the Disposition within this note       Time User Action Codes Description Comment    12/24/2024  8:55 AM SamanthaJyothi reed Add [S22.41XA] Closed fracture of multiple ribs of right side, initial encounter     12/24/2024  9:13 AM Jose Juan Higuera Modify [S22.41XA] Closed fracture of multiple ribs of right side, initial encounter     12/24/2024  9:13 AM Jose Juan Higuera Add [F10.939] Alcohol withdrawal (HCC)           ED Disposition       ED Disposition   Admit    Condition   Stable    Date/Time   Tue Dec 24, 2024  9:13 AM    Comment                  Assessment & Plan       Medical Decision Making  Amount and/or Complexity of Data Reviewed  Labs: ordered. Decision-making details documented in ED Course.  Radiology: ordered and independent interpretation performed. Decision-making details documented in ED Course.    Risk  Prescription drug management.  Decision regarding hospitalization.        ED Course as of 12/24/24 0915   Tue Dec 24, 2024   0749 ETHANOL(!): 150  Patient on CIWA.   0750 CTA chest pe study  Suboptimal contrast opacification of the pulmonary arteries. No central or definitive peripheral pulmonary emboli.     Chronic prominent central pulmonary arteries and borderline elevated RV/LV ratio suggestive of chronic right heart disease.     Acute to subacute minimally displaced fractures of the right fourth through sixth ribs and nondisplaced fracture of the right seventh rib.     Mild bibasilar subsegmental atelectasis. No pneumothorax or hemothorax.     Additional chronic findings and negatives as above.     0751 XR chest 1 view portable  My personal interpretation-no acute cardiopulmonary disease appreciated.  Concern for multiple right-sided rib fractures.   0752 Blood Pressure: 141/92   0752 Pulse: 103   0752 Respirations: 20   0752 SpO2: 95 %   0752 Comprehensive metabolic panel(!)  Mild elevation in AST and anion gap   0752 CBC  and differential(!)  Unremarkable   0752 LIPASE: 23  WNL   0912 Contacted by nurse, patient scoring 9 on CIWA, phenobarb ordered.  Patient admitted to the trauma service.       Medications   lidocaine (LIDODERM) 5 % patch 1 patch (1 patch Topical Medication Applied 12/24/24 0507)   nicotine (NICODERM CQ) 21 mg/24 hr TD 24 hr patch 1 patch (has no administration in time range)   heparin (porcine) subcutaneous injection 5,000 Units (has no administration in time range)   diazepam (VALIUM) tablet 5 mg (has no administration in time range)   acetaminophen (TYLENOL) tablet 975 mg (has no administration in time range)   oxyCODONE (ROXICODONE) IR tablet 5 mg (has no administration in time range)     Or   oxyCODONE (ROXICODONE) immediate release tablet 10 mg (has no administration in time range)   HYDROmorphone (DILAUDID) injection 0.5 mg (has no administration in time range)   ondansetron (ZOFRAN) injection 4 mg (has no administration in time range)   senna-docusate sodium (SENOKOT S) 8.6-50 mg per tablet 1 tablet (has no administration in time range)   polyethylene glycol (MIRALAX) packet 17 g (has no administration in time range)   thiamine tablet 100 mg (has no administration in time range)   folic acid (FOLVITE) tablet 1 mg (has no administration in time range)   multivitamin-minerals (CENTRUM) tablet 1 tablet (has no administration in time range)   sodium chloride 0.9 % bolus 1,000 mL (0 mL Intravenous Stopped 12/24/24 0634)   HYDROmorphone (DILAUDID) injection 0.5 mg (0.5 mg Intravenous Given 12/24/24 0506)   ondansetron (ZOFRAN) injection 4 mg (4 mg Intravenous Given 12/24/24 0506)   iohexol (OMNIPAQUE) 350 MG/ML injection (MULTI-DOSE) 85 mL (85 mL Intravenous Given 12/24/24 0612)   sodium chloride 0.9 % bolus 500 mL (0 mL Intravenous Stopped 12/24/24 0824)   HYDROmorphone (DILAUDID) injection 0.5 mg (0.5 mg Intravenous Given 12/24/24 0633)   ketorolac (TORADOL) injection 15 mg (15 mg Intravenous Given 12/24/24  0705)   methocarbamol (ROBAXIN) tablet 750 mg (750 mg Oral Given 12/24/24 0705)   PHENobarbital injection 130 mg (130 mg Intravenous Given 12/24/24 0847)       ED Risk Strat Scores               CIWA-Ar Score       Row Name 12/24/24 0845 12/24/24 0516          CIWA-Ar    Blood Pressure 150/111 134/92     Pulse 100 107     Nausea and Vomiting 0 0     Tactile Disturbances 0 0     Tremor 3 2     Auditory Disturbances 0 0     Paroxysmal Sweats 1 2     Visual Disturbances 0 0     Anxiety 5 1     Headache, Fullness in Head 0 2     Agitation 0 0     Orientation and Clouding of Sensorium 0 0     CIWA-Ar Total 9 7                             SBIRT 20yo+      Flowsheet Row Most Recent Value   Initial Alcohol Screen: US AUDIT-C     1. How often do you have a drink containing alcohol? 6 Filed at: 12/24/2024 0431   2. How many drinks containing alcohol do you have on a typical day you are drinking?  5 Filed at: 12/24/2024 0431   3b. FEMALE Any Age, or MALE 65+: How often do you have 4 or more drinks on one occassion? 6 Filed at: 12/24/2024 0431   Audit-C Score 17 Filed at: 12/24/2024 0431   Full Alcohol Screen: US AUDIT    4. How often during the last year have you found that you were not able to stop drinking once you had started? 4 Filed at: 12/24/2024 0431   5. How often during past year have you failed to do what was normally expected of you because of drinking?  4 Filed at: 12/24/2024 0431   6. How often in past year have you needed a first drink in the morning to get yourself going after a heavy drinking session?  4 Filed at: 12/24/2024 0431   7. How often in past year have you had feeling of guilt or remorse after drinking?  4 Filed at: 12/24/2024 0431   8. How often in past year have you been unable to remember what happened night before because you had been drinking?  4 Filed at: 12/24/2024 0431   9. Have you or someone else been injured as a result of your drinking?  4 Filed at: 12/24/2024 0431   10. Has a relative,  friend, doctor or other health worker been concerned about your drinking and suggested you cut down?  4 Filed at: 12/24/2024 0431   AUDIT Total Score 45 Filed at: 12/24/2024 0431   MILDRED: How many times in the past year have you...    Used an illegal drug or used a prescription medication for non-medical reasons? Never Filed at: 12/24/2024 0431                            History of Present Illness       Chief Complaint   Patient presents with    Back Pain     Patient arrives to the ER from home with complaints of right lower back pain. + ETOH. +back spasms. A&OX4. Pt states he drank alcohol to numb the pain. Pt states movement makes the pain worse.        Past Medical History:   Diagnosis Date    Alcohol use disorder, severe, dependence (HCC) 04/02/2023    Alcohol withdrawal seizure (HCC)     Alcoholic ketoacidosis 10/16/2023    Anxiety     AR (allergic rhinitis)     Chronic alcoholic gastritis 04/02/2023    Depression     GERD (gastroesophageal reflux disease)     Hepatic steatosis 04/02/2023    Hyperlipidemia     Hypertension     IBS (irritable bowel syndrome)     Obesity     Rosacea     Vitamin B12 deficiency 02/14/2024    Vitamin D deficiency 02/14/2024      Past Surgical History:   Procedure Laterality Date    ANTERIOR CRUCIATE LIGAMENT REPAIR Left     WISDOM TOOTH EXTRACTION        Family History   Problem Relation Age of Onset    Cancer Mother 78        Type unknown    Hypertension Father     Coronary artery disease Father 60    Cancer Father 82        Bladder; + Smoker    No Known Problems Sister     No Known Problems Sister     No Known Problems Sister     No Known Problems Son     No Known Problems Son     Heart attack Paternal Grandfather 60    Coronary artery disease Paternal Grandfather 60      Social History     Tobacco Use    Smoking status: Some Days     Types: Cigarettes, Cigars     Start date: 1/1/2014     Passive exposure: Past (Father)    Smokeless tobacco: Never    Tobacco comments:     Smokes  cigars 2-3 times per year   Vaping Use    Vaping status: Never Used   Substance Use Topics    Alcohol use: Yes     Comment: ETOH 12/7/24    Drug use: Never      E-Cigarette/Vaping    E-Cigarette Use Never User       E-Cigarette/Vaping Substances    Nicotine No     THC No     CBD No     Flavoring No     Other No     Unknown No       I have reviewed and agree with the history as documented.     HPI    Review of Systems        Objective       ED Triage Vitals   Temperature Pulse Blood Pressure Respirations SpO2 Patient Position - Orthostatic VS   12/24/24 0425 12/24/24 0425 12/24/24 0425 12/24/24 0425 12/24/24 0425 12/24/24 0425   98.6 °F (37 °C) (!) 108 157/96 20 92 % Lying      Temp Source Heart Rate Source BP Location FiO2 (%) Pain Score    12/24/24 0425 12/24/24 0425 12/24/24 0425 -- 12/24/24 0506    Oral Monitor Right arm  10 - Worst Possible Pain      Vitals      Date and Time Temp Pulse SpO2 Resp BP Pain Score FACES Pain Rating User   12/24/24 0845 -- 100 93 % 18 150/111 -- -- DB   12/24/24 0715 -- 103 95 % -- -- -- -- KB   12/24/24 0700 -- 105 95 % 20 141/92 -- -- KB   12/24/24 0645 -- 107 95 % 20 -- -- -- KB   12/24/24 0633 -- -- -- -- -- 8 -- KB   12/24/24 0630 -- 102 -- 20 131/81 -- -- KB   12/24/24 0615 -- 97 94 % 20 -- -- -- KB   12/24/24 0600 -- 98 95 % 20 134/82 -- -- KB   12/24/24 0530 -- 102 90 % 20 132/80 -- -- KB   12/24/24 0516 -- 107 -- -- 134/92 -- -- KB   12/24/24 0506 -- -- -- -- -- 10 - Worst Possible Pain --    12/24/24 0425 98.6 °F (37 °C) 108 92 % 20 157/96 -- -- AM            Physical Exam    Results Reviewed       Procedure Component Value Units Date/Time    Platelet count [[id number]]     Lab Status: No result Specimen: Blood     Lipase [[id number]]  (Normal) Collected: 12/24/24 0507    Lab Status: Final result Specimen: Blood from Arm, Left Updated: 12/24/24 0558     Lipase 23 u/L     Comprehensive metabolic panel [[id number]]  (Abnormal) Collected: 12/24/24 0507    Lab Status: Final  result Specimen: Blood from Arm, Left Updated: 12/24/24 0557     Sodium 139 mmol/L      Potassium 3.9 mmol/L      Chloride 99 mmol/L      CO2 24 mmol/L      ANION GAP 16 mmol/L      BUN 12 mg/dL      Creatinine 0.87 mg/dL      Glucose 91 mg/dL      Calcium 9.1 mg/dL      AST 46 U/L      ALT 24 U/L      Alkaline Phosphatase 102 U/L      Total Protein 7.6 g/dL      Albumin 4.5 g/dL      Total Bilirubin 0.56 mg/dL      eGFR 95 ml/min/1.73sq m     Narrative:      National Kid[employer] Foundation guidelines for Chronic Kidney Disease (CKD):     Stage 1 with normal or high GFR (GFR > 90 mL/min/1.73 square meters)    Stage 2 Mild CKD (GFR = 60-89 mL/min/1.73 square meters)    Stage 3A Moderate CKD (GFR = 45-59 mL/min/1.73 square meters)    Stage 3B Moderate CKD (GFR = 30-44 mL/min/1.73 square meters)    Stage 4 Severe CKD (GFR = 15-29 mL/min/1.73 square meters)    Stage 5 End Stage CKD (GFR <15 mL/min/1.73 square meters)  Note: GFR calculation is accurate only with a steady state creatinine    Ethanol [[id number]]  (Abnormal) Collected: 12/24/24 0507    Lab Status: Final result Specimen: Blood from Arm, Left Updated: 12/24/24 0555     Ethanol Lvl 150 mg/dL     CBC and differential [[id number]]  (Abnormal) Collected: 12/24/24 0507    Lab Status: Final result Specimen: Blood from Arm, Left Updated: 12/24/24 0529     WBC 7.21 Thousand/uL      RBC 4.50 Million/uL      Hemoglobin 14.5 g/dL      Hematocrit 41.7 %      MCV 93 fL      MCH 32.2 pg      MCHC 34.8 g/dL      RDW 12.5 %      MPV 8.6 fL      Platelets 248 Thousands/uL      nRBC 0 /100 WBCs      Segmented % 75 %      Immature Grans % 0 %      Lymphocytes % 18 %      Monocytes % 6 %      Eosinophils Relative 0 %      Basophils Relative 1 %      Absolute Neutrophils 5.41 Thousands/µL      Absolute Immature Grans 0.02 Thousand/uL      Absolute Lymphocytes 1.27 Thousands/µL      Absolute Monocytes 0.44 Thousand/µL      Eosinophils Absolute 0.03 Thousand/µL      Basophils  Absolute 0.04 Thousands/µL     UA w Reflex to Microscopic w Reflex to Culture [[id number]]     Lab Status: No result Specimen: Urine             CTA chest pe study   Final Interpretation by Dima Rascon MD (12/24 3844)      Suboptimal contrast opacification of the pulmonary arteries. No central or definitive peripheral pulmonary emboli.      Chronic prominent central pulmonary arteries and borderline elevated RV/LV ratio suggestive of chronic right heart disease.      Acute to subacute minimally displaced fractures of the right fourth through sixth ribs and nondisplaced fracture of the right seventh rib.      Mild bibasilar subsegmental atelectasis. No pneumothorax or hemothorax.      Additional chronic findings and negatives as above.      The study was marked in EPIC for immediate notification.            Workstation performed: [id number]         XR chest 1 view portable   ED Interpretation by Jose Juan Higuera MD (12/24 2607)   My personal interpretation-no acute cardiopulmonary disease appreciated.  Concern for multiple right-sided rib fractures.      Final Interpretation by Gigi Lopez MD (12/24 7008)      No acute cardiopulmonary disease. Refer to follow-up CT chest for rib fractures.            Workstation performed: [id number]             Procedures    ED Medication and Procedure Management   Prior to Admission Medications   Prescriptions Last Dose Informant Patient Reported? Taking?   Magnesium 400 MG CAPS   Yes No   Sig: Take 1 capsule by mouth in the morning   Multiple Vitamin (multivitamin) tablet   No No   Sig: Take 1 tablet by mouth daily   Omega-3 Fatty Acids (fish oil) 1,000 mg   Yes No   Sig: Take 2,000 mg by mouth daily   Thiamine Mononitrate (VITAMIN B1) 100 mg tablet   No No   Sig: Take 1 tablet (100 mg total) by mouth daily   atorvastatin (LIPITOR) 40 mg tablet   No No   Sig: Take 1 tablet (40 mg total) by mouth daily with dinner   escitalopram (LEXAPRO) 20 mg tablet   No No   Sig:  Take 1 tablet (20 mg total) by mouth daily   folic acid (FOLVITE) 1 mg tablet   No No   Sig: Take 1 tablet (1 mg total) by mouth daily   hydrOXYzine HCL (ATARAX) 25 mg tablet   No No   Sig: Take 1 tablet (25 mg total) by mouth every 6 (six) hours as needed for itching   lisinopril (ZESTRIL) 40 mg tablet   No No   Sig: Take 1 tablet (40 mg total) by mouth daily   metroNIDAZOLE (METROCREAM) 0.75 % cream   No No   Sig: Apply topically 2 (two) times a day   mirtazapine (REMERON) 15 mg tablet   No No   Sig: Take 1 tablet (15 mg total) by mouth daily at bedtime   pantoprazole (PROTONIX) 40 mg tablet   No No   Sig: Take 1 tablet (40 mg total) by mouth daily in the early morning      Facility-Administered Medications: None     Patient's Medications   Discharge Prescriptions    No medications on file     No discharge procedures on file.  ED SEPSIS DOCUMENTATION   Time reflects when diagnosis was documented in both MDM as applicable and the Disposition within this note       Time User Action Codes Description Comment    12/24/2024  8:55 AM Jyothi Guadalupe [S22.41XA] Closed fracture of multiple ribs of right side, initial encounter     12/24/2024  9:13 AM Jose Juan Higuera Modify [S22.41XA] Closed fracture of multiple ribs of right side, initial encounter     12/24/2024  9:13 AM Jose Juan Higuera [F10.939] Alcohol withdrawal (HCC)                  Jose Juan Higuera MD  12/24/24 7178

## 2024-12-24 NOTE — ED ATTENDING ATTESTATION
12/24/2024  I, Jose Juan Blancas MD, saw and evaluated the patient. I have discussed the patient with the resident/non-physician practitioner and agree with the resident's/non-physician practitioner's findings, Plan of Care, and MDM as documented in the resident's/non-physician practitioner's note, except where noted. All available labs and Radiology studies were reviewed.  I was present for key portions of any procedure(s) performed by the resident/non-physician practitioner and I was immediately available to provide assistance.       At this point I agree with the current assessment done in the Emergency Department.  I have conducted an independent evaluation of this patient a history and physical is as follows: Presents for what he describes as atraumatic right-sided back pain.  Worsens with breathing and movement.  Difficulty sitting up in bed.  Multimodal pain relief.  He admits to alcohol use daily and is intoxicated.    CT PE study with no evidence of PE but does reveal 4 contiguous right-sided rib fractures.  Likely secondary to trauma while intoxicated.  No headache or altered mental status.    Due to need for multimodal pain relief, 4 consecutive rib fractures, will pursue admission to trauma service.    Results Reviewed       Procedure Component Value Units Date/Time    Lipase [[id number]]  (Normal) Collected: 12/24/24 0507    Lab Status: Final result Specimen: Blood from Arm, Left Updated: 12/24/24 0558     Lipase 23 u/L     Comprehensive metabolic panel [[id number]]  (Abnormal) Collected: 12/24/24 0507    Lab Status: Final result Specimen: Blood from Arm, Left Updated: 12/24/24 0557     Sodium 139 mmol/L      Potassium 3.9 mmol/L      Chloride 99 mmol/L      CO2 24 mmol/L      ANION GAP 16 mmol/L      BUN 12 mg/dL      Creatinine 0.87 mg/dL      Glucose 91 mg/dL      Calcium 9.1 mg/dL      AST 46 U/L      ALT 24 U/L      Alkaline Phosphatase 102 U/L      Total Protein 7.6 g/dL      Albumin 4.5 g/dL       Total Bilirubin 0.56 mg/dL      eGFR 95 ml/min/1.73sq m     Narrative:      National Kidney Disease Foundation guidelines for Chronic Kidney Disease (CKD):     Stage 1 with normal or high GFR (GFR > 90 mL/min/1.73 square meters)    Stage 2 Mild CKD (GFR = 60-89 mL/min/1.73 square meters)    Stage 3A Moderate CKD (GFR = 45-59 mL/min/1.73 square meters)    Stage 3B Moderate CKD (GFR = 30-44 mL/min/1.73 square meters)    Stage 4 Severe CKD (GFR = 15-29 mL/min/1.73 square meters)    Stage 5 End Stage CKD (GFR <15 mL/min/1.73 square meters)  Note: GFR calculation is accurate only with a steady state creatinine    Ethanol [[id number]]  (Abnormal) Collected: 12/24/24 0507    Lab Status: Final result Specimen: Blood from Arm, Left Updated: 12/24/24 0555     Ethanol Lvl 150 mg/dL     CBC and differential [[id number]]  (Abnormal) Collected: 12/24/24 0507    Lab Status: Final result Specimen: Blood from Arm, Left Updated: 12/24/24 0529     WBC 7.21 Thousand/uL      RBC 4.50 Million/uL      Hemoglobin 14.5 g/dL      Hematocrit 41.7 %      MCV 93 fL      MCH 32.2 pg      MCHC 34.8 g/dL      RDW 12.5 %      MPV 8.6 fL      Platelets 248 Thousands/uL      nRBC 0 /100 WBCs      Segmented % 75 %      Immature Grans % 0 %      Lymphocytes % 18 %      Monocytes % 6 %      Eosinophils Relative 0 %      Basophils Relative 1 %      Absolute Neutrophils 5.41 Thousands/µL      Absolute Immature Grans 0.02 Thousand/uL      Absolute Lymphocytes 1.27 Thousands/µL      Absolute Monocytes 0.44 Thousand/µL      Eosinophils Absolute 0.03 Thousand/µL      Basophils Absolute 0.04 Thousands/µL     UA w Reflex to Microscopic w Reflex to Culture [[id number]]     Lab Status: No result Specimen: Urine           CTA chest pe study   Final Result by Dima Rascon MD (12/24 0651)      Suboptimal contrast opacification of the pulmonary arteries. No central or definitive peripheral pulmonary emboli.      Chronic prominent central pulmonary  arteries and borderline elevated RV/LV ratio suggestive of chronic right heart disease.      Acute to subacute minimally displaced fractures of the right fourth through sixth ribs and nondisplaced fracture of the right seventh rib.      Mild bibasilar subsegmental atelectasis. No pneumothorax or hemothorax.      Additional chronic findings and negatives as above.      The study was marked in EPIC for immediate notification.            Workstation performed: [id number]         XR chest 1 view portable    (Results Pending)         ED Course         Critical Care Time  Procedures

## 2024-12-24 NOTE — ASSESSMENT & PLAN NOTE
- Multiple right-sided rib fractures (4th-7th), present on admission.  - Continue rib fracture protocol.  - Continue to encourage incentive spirometer use and adequate pulmonary hygiene.  Currently pulling 1750 mL on I.S.  - Appreciate APS evaluation and recommendations.  - Continue multimodal analgesic regimen.  - Supplemental oxygen via nasal cannula as needed to maintain saturations greater than or equal to 94%.  - PT and OT evaluation and treatment as indicated.  - Outpatient follow-up in the trauma clinic for re-evaluation in approximately 2 weeks.

## 2024-12-24 NOTE — PLAN OF CARE
Problem: PAIN - ADULT  Goal: Verbalizes/displays adequate comfort level or baseline comfort level  Description: Interventions:  - Encourage patient to monitor pain and request assistance  - Assess pain using appropriate pain scale  - Administer analgesics based on type and severity of pain and evaluate response  - Implement non-pharmacological measures as appropriate and evaluate response  - Consider cultural and social influences on pain and pain management  - Notify physician/advanced practitioner if interventions unsuccessful or patient reports new pain  Outcome: Progressing     Problem: INFECTION - ADULT  Goal: Absence or prevention of progression during hospitalization  Description: INTERVENTIONS:  - Assess and monitor for signs and symptoms of infection  - Monitor lab/diagnostic results  - Monitor all insertion sites, i.e. indwelling lines, tubes, and drains  - Monitor endotracheal if appropriate and nasal secretions for changes in amount and color  - Cobb appropriate cooling/warming therapies per order  - Administer medications as ordered  - Instruct and encourage patient and family to use good hand hygiene technique  - Identify and instruct in appropriate isolation precautions for identified infection/condition  Outcome: Progressing  Goal: Absence of fever/infection during neutropenic period  Description: INTERVENTIONS:  - Monitor WBC    Outcome: Progressing     Problem: SAFETY ADULT  Goal: Patient will remain free of falls  Description: INTERVENTIONS:  - Educate patient/family on patient safety including physical limitations  - Instruct patient to call for assistance with activity   - Consult OT/PT to assist with strengthening/mobility   - Keep Call bell within reach  - Keep bed low and locked with side rails adjusted as appropriate  - Keep care items and personal belongings within reach  - Initiate and maintain comfort rounds  - Make Fall Risk Sign visible to staff  - Apply yellow socks and bracelet  for high fall risk patients  - Consider moving patient to room near nurses station  Outcome: Progressing  Goal: Maintain or return to baseline ADL function  Description: INTERVENTIONS:  -  Assess patient's ability to carry out ADLs; assess patient's baseline for ADL function and identify physical deficits which impact ability to perform ADLs (bathing, care of mouth/teeth, toileting, grooming, dressing, etc.)  - Assess/evaluate cause of self-care deficits   - Assess range of motion  - Assess patient's mobility; develop plan if impaired  - Assess patient's need for assistive devices and provide as appropriate  - Encourage maximum independence but intervene and supervise when necessary  - Involve family in performance of ADLs  - Assess for home care needs following discharge   - Consider OT consult to assist with ADL evaluation and planning for discharge  - Provide patient education as appropriate  Outcome: Progressing  Goal: Maintains/Returns to pre admission functional level  Description: INTERVENTIONS:  - Perform AM-PAC 6 Click Basic Mobility/ Daily Activity assessment daily.  - Set and communicate daily mobility goal to care team and patient/family/caregiver.   - Collaborate with rehabilitation services on mobility goals if consulted  - Out of bed for toileting  - Record patient progress and toleration of activity level   Outcome: Progressing     Problem: DISCHARGE PLANNING  Goal: Discharge to home or other facility with appropriate resources  Description: INTERVENTIONS:  - Identify barriers to discharge w/patient and caregiver  - Arrange for needed discharge resources and transportation as appropriate  - Identify discharge learning needs (meds, wound care, etc.)  - Arrange for interpretive services to assist at discharge as needed  - Refer to Case Management Department for coordinating discharge planning if the patient needs post-hospital services based on physician/advanced practitioner order or complex needs  related to functional status, cognitive ability, or social support system  Outcome: Progressing     Problem: Knowledge Deficit  Goal: Patient/family/caregiver demonstrates understanding of disease process, treatment plan, medications, and discharge instructions  Description: Complete learning assessment and assess knowledge base.  Interventions:  - Provide teaching at level of understanding  - Provide teaching via preferred learning methods  Outcome: Progressing

## 2024-12-24 NOTE — ASSESSMENT & PLAN NOTE
- Acute pain secondary to traumatic injuries.  - Continue multimodal analgesic regimen.  - Appreciate APS evaluation and recommendations. Possible EDC in AM on 12/25 - will hold SQH in AM. Schedule Valium for spasm.   - Bowel regimen as long as using opioids.  - Continue to monitor pain and adjust regimen as indicated.

## 2024-12-24 NOTE — ASSESSMENT & PLAN NOTE
Patient would benefit from thoracic epidural placement for rib fracture related pain  Patient received subq Heparin today at 1206 which would preclude thoracic epidural placement. Please hold morning dose of heparin 12/25 for possible epidural placement.     Multimodal Analgesia:  Tylenol 975 mg every 8 hours scheduled  Oral Valium 5 mg every 6 hours scheduled, for spasm related pain/anxiety  Gabapentin 300 mg 3 times daily  IV Toradol 15 mg every 6 hours scheduled x 2 days  Lidoderm patch 12 hours on/12 hours off to affected area  Robaxin 1000 mg every 8 hours scheduled  Oxycodone 5 mg every 4 hours as needed for moderate pain  Oxycodone 10 mg every 4 hours as needed for severe pain  IV Dilaudid 0.5 mg every 2 hours as needed for breakthrough pain  Narcan as needed for respiratory depression/opioid reversal  Continue bowel regimen while utilizing opioids to prevent opioid-induced constipation

## 2024-12-24 NOTE — ASSESSMENT & PLAN NOTE
- heavy daily drinker  - h/o alcohol withdrawal on multiple occurrences  - noted to have anxiety and tremors, mild tachycardia as well in the low 100s  - given 130 phenobarbital and placed on CIWA protocol  - MVD/Thiamine/Folic Acid  -  for SBIRT

## 2024-12-24 NOTE — ASSESSMENT & PLAN NOTE
"Patient with hx of AUD. Has had multiple hospitalizations recently for alcohol withdrawal.  Reports drinking at least \"half a bottle\" of bourbon and wine daily  Patient reports he is interested in cessation  Continue CIWA.  Recommend CM consultation for CATCH program    "

## 2024-12-24 NOTE — ASSESSMENT & PLAN NOTE
"Patient presented 12/24 s/p fall.   Patient reports he may have fallen 3 days prior and developed severe pain prompting him to seek medical attention    CT imaging 12/24: \"Acute to subacute minimally displaced fractures of the right fourth through sixth ribs and nondisplaced fracture of the right seventh rib.\"    Currently maintaining oxygen saturations on room air, denies shortness of breath and able to inspire > 2000 mL with spirometry.   Patient does complain of significant R sided chest wall pain which has been limiting his mobility  Patient would benefit from thoracic epidural placement for rib fracture related pain  Patient received subq Heparin today at 1206 which would preclude thoracic epidural placement. Please hold morning dose of heparin 12/25 for possible epidural placement.      Rib Fracture Evaluation:  Chest tube: none  Respiratory Co-morbidities: Obesity  SpO2:   SPO2 RA Rest      Flowsheet Row ED to Hosp-Admission (Current) from 12/24/2024 in [facility] Intensive Care Unit   SpO2 94 %   SpO2 Activity At Rest   O2 Device Nasal cannula   O2 Flow Rate --                                                                             Incentive Spirometer: Incentive Spirometry Achieved (mL): 1200 mL   Platelet Count:   Results from last 7 days   Lab Units 12/24/24  0928   PLATELETS Thousands/uL 203     Coags:     Home anticoagulants: none  VTE covered by:  [Held by provider] heparin (porcine), Subcutaneous, 5,000 Units at 12/24/24 1206      "

## 2024-12-24 NOTE — CONSULTS
"Consultation - Acute Pain   Name: Diogo Travis 59 y.o. male I MRN: [id number]  Unit/Bed#: ED-07 I Date of Admission: 12/24/2024   Date of Service: 12/24/2024 I Hospital Day: 0   Inpatient consult to Acute Pain Service  Consult performed by: MARCK Trejo  Consult ordered by: Jyothi Guadalupe PA-C      Physician Requesting Evaluation: Parker Hackett DO   Reason for Evaluation / Principal Problem: Evaluation for epidural/block in setting of multiple rib fractures    Assessment & Plan  Acute pain due to trauma  Patient would benefit from thoracic epidural placement for rib fracture related pain  Patient received subq Heparin today at 1206 which would preclude thoracic epidural placement. Please hold morning dose of heparin 12/25 for possible epidural placement.     Multimodal Analgesia:  Tylenol 975 mg every 8 hours scheduled  Oral Valium 5 mg every 6 hours scheduled, for spasm related pain/anxiety  Gabapentin 300 mg 3 times daily  IV Toradol 15 mg every 6 hours scheduled x 2 days  Lidoderm patch 12 hours on/12 hours off to affected area  Robaxin 1000 mg every 8 hours scheduled  Oxycodone 5 mg every 4 hours as needed for moderate pain  Oxycodone 10 mg every 4 hours as needed for severe pain  IV Dilaudid 0.5 mg every 2 hours as needed for breakthrough pain  Narcan as needed for respiratory depression/opioid reversal  Continue bowel regimen while utilizing opioids to prevent opioid-induced constipation  Multiple rib fractures  Patient presented 12/24 s/p fall.   Patient reports he may have fallen 3 days prior and developed severe pain prompting him to seek medical attention    CT imaging 12/24: \"Acute to subacute minimally displaced fractures of the right fourth through sixth ribs and nondisplaced fracture of the right seventh rib.\"    Currently maintaining oxygen saturations on room air, denies shortness of breath and able to inspire > 2000 mL with spirometry.   Patient does complain of " "significant R sided chest wall pain which has been limiting his mobility  Patient would benefit from thoracic epidural placement for rib fracture related pain  Patient received subq Heparin today at 1206 which would preclude thoracic epidural placement. Please hold morning dose of heparin 12/25 for possible epidural placement.      Rib Fracture Evaluation:  Chest tube: none  Respiratory Co-morbidities: Obesity  SpO2:   SPO2 RA Rest      Flowsheet Row ED to Hosp-Admission (Current) from 12/24/2024 in [facility] Intensive [facility] Unit   SpO2 94 %   SpO2 Activity At Rest   O2 Device Nasal cannula   O2 Flow Rate --                                                                             Incentive Spirometer: Incentive Spirometry Achieved (mL): 1200 mL   Platelet Count:   Results from last 7 days   Lab Units 12/24/24  0928   PLATELETS Thousands/uL 203     Coags:     Home anticoagulants: none  VTE covered by:  [Held by provider] heparin (porcine), Subcutaneous, 5,000 Units at 12/24/24 1206      Alcohol use disorder  Patient with hx of AUD. Has had multiple hospitalizations recently for alcohol withdrawal.  Reports drinking at least \"half a bottle\" of bourbon and wine daily  Patient reports he is interested in cessation  Continue CIWA.  Recommend CM consultation for CATCH program    Depression  Patients with depression and/or anxiety have more perceived pain on average and often require higher doses of opioid pain medication.  Treat depression and/or anxiety aggressively.          APS will continue to follow. Please contact Acute Pain Service - via SecureChat from [phone] with additional questions or concerns. See [employer] or [employer] for additional contacts and after hours information.     History of Present Illness    HPI: Diogo Travis is a 59 y.o. year old male who presents with history of alcohol use disorder.  Patient reports he sustained a fall possibly 3 days ago and was found to have multiple " right-sided rib fractures.  Of note patient has had multiple hospitalizations recently for alcohol withdrawal.  APS was consulted for evaluation for epidural/block in setting of multiple right-sided rib fractures.    Patient was seen in the emergency department this morning, he appears to be in some distress secondary to pain as well as severe anxiety.  Appears tremulous, and currently denies shortness of breath. Despite administration of bot IV/PO opioids he noted little pain relief. We discussed continuing his current analgesic regimen vs considering placement of thoracic epidural for improved pain. Initially patient declined epidural placement over concerns of the procedure being painful but ultimately agreed to go through with the procedure as his pain is significantly uncontrolled.      Current pain location(s): Pain Score: 8  Pain Location/Orientation: Orientation: Lower, Location: Back  Pain Scale:        Pain History: denies    I have reviewed the patient's controlled substance dispensing history in the Prescription Drug Monitoring Program in compliance with the [facility] regulations before prescribing any controlled substances.     Review of Systems   Constitutional:  Positive for activity change.   Cardiovascular:  Positive for chest pain (RCW).   Psychiatric/Behavioral:  The patient is nervous/anxious.    All other systems reviewed and are negative.    I have reviewed the patient's PMH, PSH, Social History, Family History, Meds, and Allergies    Objective :  Temp:  [98.6 °F (37 °C)] 98.6 °F (37 °C)  HR:  [] 98  BP: (131-157)/() 145/90  Resp:  [18-20] 18  SpO2:  [90 %-95 %] 92 %  O2 Device: Nasal cannula  Nasal Cannula O2 Flow Rate (L/min):  [3 L/min] 3 L/min    Physical Exam  Constitutional:       General: He is in acute distress.   HENT:      Head: Normocephalic and atraumatic.   Pulmonary:      Effort: No respiratory distress.   Chest:      Chest wall: Tenderness present.   Abdominal:       General: There is no distension.      Tenderness: There is no abdominal tenderness.   Musculoskeletal:         General: Normal range of motion.      Cervical back: Normal range of motion.   Skin:     General: Skin is warm and dry.   Neurological:      Mental Status: He is alert and oriented to person, place, and time. Mental status is at baseline.      GCS: GCS eye subscore is 4. GCS verbal subscore is 5. GCS motor subscore is 6.   Psychiatric:         Mood and Affect: Mood is anxious.         Behavior: Behavior normal.          Lab Results: I have reviewed the following results:  Estimated Creatinine Clearance: 106.6 mL/min (by C-G formula based on SCr of 0.87 mg/dL).  Lab Results   Component Value Date    WBC 7.21 12/24/2024    HGB 14.5 12/24/2024    HCT 41.7 12/24/2024     12/24/2024         Component Value Date/Time    K 3.9 12/24/2024 0507    K 3.7 12/27/2022 1818    CL 99 12/24/2024 0507     12/27/2022 1818    CO2 24 12/24/2024 0507    CO2 15 (L) 12/12/2023 1611    CO2 23 12/27/2022 1818    BUN 12 12/24/2024 0507    BUN 10 12/27/2022 1818    CREATININE 0.87 12/24/2024 0507    CREATININE 0.92 12/27/2022 1818         Component Value Date/Time    CALCIUM 9.1 12/24/2024 0507    CALCIUM 9.7 12/27/2022 1818    ALKPHOS 102 12/24/2024 0507    ALKPHOS 93 12/27/2022 1818    AST 46 (H) 12/24/2024 0507    AST 51 (H) 12/27/2022 1818    ALT 24 12/24/2024 0507    ALT 41 12/27/2022 1818    TP 7.6 12/24/2024 0507    TP 7.9 12/27/2022 1818    ALB 4.5 12/24/2024 0507    ALB 4.7 12/27/2022 1818       Imaging Results Review: I reviewed radiology reports from this admission including: CT chest.  Other Study Results Review: No additional pertinent studies reviewed.

## 2024-12-24 NOTE — ED CARE HANDOFF
Emergency Department Sign Out Note        Sign out and transfer of care from Dr Cannon. See Separate Emergency Department note.     The patient, Diogo Travis, was evaluated by the previous provider for back pain/rib fractures.    Workup Completed:  See ED course    ED Course / Workup Pending (followup):  Pending evaluation by trauma for admission due to multiple rib fractures.  Currently on CIWA protocol for elevated ethanol and history of AUD.                                  ED Course as of 12/24/24 1609   Tue Dec 24, 2024   0749 ETHANOL(!): 150  Patient on CIWA.   0750 CTA chest pe study  Suboptimal contrast opacification of the pulmonary arteries. No central or definitive peripheral pulmonary emboli.     Chronic prominent central pulmonary arteries and borderline elevated RV/LV ratio suggestive of chronic right heart disease.     Acute to subacute minimally displaced fractures of the right fourth through sixth ribs and nondisplaced fracture of the right seventh rib.     Mild bibasilar subsegmental atelectasis. No pneumothorax or hemothorax.     Additional chronic findings and negatives as above.     0751 XR chest 1 view portable  My personal interpretation-no acute cardiopulmonary disease appreciated.  Concern for multiple right-sided rib fractures.   0752 Blood Pressure: 141/92   0752 Pulse: 103   0752 Respirations: 20   0752 SpO2: 95 %   0752 Comprehensive metabolic panel(!)  Mild elevation in AST and anion gap   0752 CBC and differential(!)  Unremarkable   0752 LIPASE: 23  WNL   0912 Contacted by nurse, patient scoring 9 on CIWA, phenobarb ordered.  Patient admitted to the trauma service.     Procedures  Medical Decision Making  Amount and/or Complexity of Data Reviewed  Labs: ordered. Decision-making details documented in ED Course.  Radiology: ordered and independent interpretation performed. Decision-making details documented in ED Course.    Risk  Prescription drug management.  Decision regarding  hospitalization.            Disposition  Final diagnoses:   Closed fracture of multiple ribs of right side, initial encounter   Alcohol withdrawal (HCC)     Time reflects when diagnosis was documented in both MDM as applicable and the Disposition within this note       Time User Action Codes Description Comment    12/24/2024  8:55 AM CollinsJyothi BETTY Add [S22.41XA] Closed fracture of multiple ribs of right side, initial encounter     12/24/2024  9:13 AM Jose Juan Higuera Modify [S22.41XA] Closed fracture of multiple ribs of right side, initial encounter     12/24/2024  9:13 AM Jose Juan Higuera Add [F10.939] Alcohol withdrawal (HCC)           ED Disposition       ED Disposition   Admit    Condition   Stable    Date/Time   Tue Dec 24, 2024  2:19 PM    Comment   Case was discussed with Trauma Dr Hackett and the patient's admission status was agreed to be Admission Status: inpatient status to the service of Dr. Hackett .               Follow-up Information    None       Current Discharge Medication List        CONTINUE these medications which have NOT CHANGED    Details   atorvastatin (LIPITOR) 40 mg tablet Take 1 tablet (40 mg total) by mouth daily with dinner  Qty: 90 tablet, Refills: 1    Associated Diagnoses: Hyperlipidemia, unspecified hyperlipidemia type      escitalopram (LEXAPRO) 20 mg tablet Take 1 tablet (20 mg total) by mouth daily  Qty: 90 tablet, Refills: 1    Associated Diagnoses: Anxiety; Current moderate episode of major depressive disorder, unspecified whether recurrent (HCC)      folic acid (FOLVITE) 1 mg tablet Take 1 tablet (1 mg total) by mouth daily  Qty: 30 tablet, Refills: 0    Associated Diagnoses: Alcohol withdrawal (HCC)      hydrOXYzine HCL (ATARAX) 25 mg tablet Take 1 tablet (25 mg total) by mouth every 6 (six) hours as needed for itching  Qty: 360 tablet, Refills: 1    Associated Diagnoses: Anxiety      lisinopril (ZESTRIL) 40 mg tablet Take 1 tablet (40 mg total) by mouth daily  Qty: 90  tablet, Refills: 1    Associated Diagnoses: Primary hypertension      Magnesium 400 MG CAPS Take 1 capsule by mouth in the morning      metroNIDAZOLE (METROCREAM) 0.75 % cream Apply topically 2 (two) times a day  Qty: 45 g, Refills: 0    Associated Diagnoses: Rosacea      mirtazapine (REMERON) 15 mg tablet Take 1 tablet (15 mg total) by mouth daily at bedtime  Qty: 30 tablet, Refills: 0    Associated Diagnoses: Major depressive disorder, recurrent, moderate (HCC)      Multiple Vitamin (multivitamin) tablet Take 1 tablet by mouth daily  Qty: 30 tablet, Refills: 0    Associated Diagnoses: Alcohol use disorder      Omega-3 Fatty Acids (fish oil) 1,000 mg Take 2,000 mg by mouth daily      pantoprazole (PROTONIX) 40 mg tablet Take 1 tablet (40 mg total) by mouth daily in the early morning  Qty: 90 tablet, Refills: 1    Associated Diagnoses: Chronic alcoholic gastritis without hemorrhage; Gastroesophageal reflux disease, unspecified whether esophagitis present      Thiamine Mononitrate (VITAMIN B1) 100 mg tablet Take 1 tablet (100 mg total) by mouth daily  Qty: 30 tablet, Refills: 1    Associated Diagnoses: Alcohol use disorder, severe, dependence (HCC)           No discharge procedures on file.       ED Provider  Electronically Signed by     Jose Juan Higuera MD  12/24/24 3653

## 2024-12-24 NOTE — QUICK NOTE
Seen in ICU 10.     Hx etoh abuse and withdrawal. Tremor in ED. Phenobarb.  A/ox3. Mod discomfort. Tremor. On ketamine gtt. Denies hallucination.   CIWA protocol  Rib fracture protocol.  APS multimodal pain control. Possible epidural tomorrow.

## 2024-12-25 ENCOUNTER — ANESTHESIA EVENT (INPATIENT)
Dept: ANESTHESIOLOGY | Facility: HOSPITAL | Age: 59
DRG: 135 | End: 2024-12-25
Payer: COMMERCIAL

## 2024-12-25 ENCOUNTER — ANESTHESIA (INPATIENT)
Dept: ANESTHESIOLOGY | Facility: HOSPITAL | Age: 59
DRG: 135 | End: 2024-12-25
Payer: COMMERCIAL

## 2024-12-25 LAB
ANION GAP SERPL CALCULATED.3IONS-SCNC: 11 MMOL/L (ref 4–13)
APTT PPP: 30 SECONDS (ref 23–34)
BUN SERPL-MCNC: 17 MG/DL (ref 5–25)
CALCIUM SERPL-MCNC: 8.8 MG/DL (ref 8.4–10.2)
CHLORIDE SERPL-SCNC: 101 MMOL/L (ref 96–108)
CO2 SERPL-SCNC: 27 MMOL/L (ref 21–32)
CREAT SERPL-MCNC: 1.03 MG/DL (ref 0.6–1.3)
ERYTHROCYTE [DISTWIDTH] IN BLOOD BY AUTOMATED COUNT: 12.6 % (ref 11.6–15.1)
GFR SERPL CREATININE-BSD FRML MDRD: 79 ML/MIN/1.73SQ M
GLUCOSE P FAST SERPL-MCNC: 106 MG/DL (ref 65–99)
GLUCOSE SERPL-MCNC: 106 MG/DL (ref 65–140)
HCT VFR BLD AUTO: 37.5 % (ref 36.5–49.3)
HGB BLD-MCNC: 12.7 G/DL (ref 12–17)
INR PPP: 1 (ref 0.85–1.19)
MCH RBC QN AUTO: 32.5 PG (ref 26.8–34.3)
MCHC RBC AUTO-ENTMCNC: 33.9 G/DL (ref 31.4–37.4)
MCV RBC AUTO: 96 FL (ref 82–98)
PLATELET # BLD AUTO: 153 THOUSANDS/UL (ref 149–390)
PMV BLD AUTO: 8.8 FL (ref 8.9–12.7)
POTASSIUM SERPL-SCNC: 3.8 MMOL/L (ref 3.5–5.3)
PROTHROMBIN TIME: 13.9 SECONDS (ref 12.3–15)
RBC # BLD AUTO: 3.91 MILLION/UL (ref 3.88–5.62)
SODIUM SERPL-SCNC: 139 MMOL/L (ref 135–147)
WBC # BLD AUTO: 5.05 THOUSAND/UL (ref 4.31–10.16)

## 2024-12-25 PROCEDURE — 85730 THROMBOPLASTIN TIME PARTIAL: CPT | Performed by: PHYSICIAN ASSISTANT

## 2024-12-25 PROCEDURE — 80048 BASIC METABOLIC PNL TOTAL CA: CPT | Performed by: PHYSICIAN ASSISTANT

## 2024-12-25 PROCEDURE — 99232 SBSQ HOSP IP/OBS MODERATE 35: CPT | Performed by: SURGERY

## 2024-12-25 PROCEDURE — 99233 SBSQ HOSP IP/OBS HIGH 50: CPT | Performed by: STUDENT IN AN ORGANIZED HEALTH CARE EDUCATION/TRAINING PROGRAM

## 2024-12-25 PROCEDURE — 94760 N-INVAS EAR/PLS OXIMETRY 1: CPT

## 2024-12-25 PROCEDURE — 85027 COMPLETE CBC AUTOMATED: CPT | Performed by: PHYSICIAN ASSISTANT

## 2024-12-25 PROCEDURE — 3E0R3BZ INTRODUCTION OF ANESTHETIC AGENT INTO SPINAL CANAL, PERCUTANEOUS APPROACH: ICD-10-PCS | Performed by: STUDENT IN AN ORGANIZED HEALTH CARE EDUCATION/TRAINING PROGRAM

## 2024-12-25 PROCEDURE — 94664 DEMO&/EVAL PT USE INHALER: CPT

## 2024-12-25 PROCEDURE — 85610 PROTHROMBIN TIME: CPT | Performed by: PHYSICIAN ASSISTANT

## 2024-12-25 RX ORDER — HYDROMORPHONE HCL/PF 1 MG/ML
1 SYRINGE (ML) INJECTION ONCE
Status: COMPLETED | OUTPATIENT
Start: 2024-12-25 | End: 2024-12-25

## 2024-12-25 RX ORDER — LIDOCAINE HYDROCHLORIDE 20 MG/ML
JELLY TOPICAL
Status: COMPLETED
Start: 2024-12-25 | End: 2024-12-25

## 2024-12-25 RX ORDER — DIAZEPAM 5 MG/1
5 TABLET ORAL EVERY 6 HOURS
Status: DISCONTINUED | OUTPATIENT
Start: 2024-12-25 | End: 2024-12-27

## 2024-12-25 RX ORDER — LIDOCAINE HYDROCHLORIDE AND EPINEPHRINE 15; 5 MG/ML; UG/ML
INJECTION, SOLUTION EPIDURAL AS NEEDED
Status: DISCONTINUED | OUTPATIENT
Start: 2024-12-25 | End: 2024-12-25 | Stop reason: HOSPADM

## 2024-12-25 RX ORDER — ROPIVACAINE HYDROCHLORIDE 2 MG/ML
INJECTION, SOLUTION EPIDURAL; INFILTRATION; PERINEURAL AS NEEDED
Status: DISCONTINUED | OUTPATIENT
Start: 2024-12-25 | End: 2024-12-25 | Stop reason: HOSPADM

## 2024-12-25 RX ORDER — LIDOCAINE HYDROCHLORIDE 20 MG/ML
JELLY TOPICAL ONCE
Status: COMPLETED | OUTPATIENT
Start: 2024-12-25 | End: 2024-12-25

## 2024-12-25 RX ORDER — HYDROMORPHONE HCL/PF 1 MG/ML
1 SYRINGE (ML) INJECTION ONCE
Status: DISCONTINUED | OUTPATIENT
Start: 2024-12-25 | End: 2024-12-25

## 2024-12-25 RX ADMIN — METHOCARBAMOL TABLETS 1000 MG: 500 TABLET, COATED ORAL at 16:05

## 2024-12-25 RX ADMIN — GABAPENTIN 300 MG: 300 CAPSULE ORAL at 16:05

## 2024-12-25 RX ADMIN — ACETAMINOPHEN 975 MG: 325 TABLET, FILM COATED ORAL at 13:06

## 2024-12-25 RX ADMIN — LISINOPRIL 40 MG: 20 TABLET ORAL at 09:10

## 2024-12-25 RX ADMIN — ROPIVACAINE HYDROCHLORIDE 5 ML: 2 INJECTION, SOLUTION EPIDURAL; INFILTRATION at 13:00

## 2024-12-25 RX ADMIN — MIRTAZAPINE 15 MG: 15 TABLET, FILM COATED ORAL at 21:10

## 2024-12-25 RX ADMIN — ACETAMINOPHEN 975 MG: 325 TABLET, FILM COATED ORAL at 06:39

## 2024-12-25 RX ADMIN — LIDOCAINE HYDROCHLORIDE 5 APPLICATION: 20 JELLY TOPICAL at 17:40

## 2024-12-25 RX ADMIN — METHOCARBAMOL TABLETS 1000 MG: 500 TABLET, COATED ORAL at 06:39

## 2024-12-25 RX ADMIN — LIDOCAINE HYDROCHLORIDE AND EPINEPHRINE 3 ML: 15; 5 INJECTION, SOLUTION EPIDURAL at 12:55

## 2024-12-25 RX ADMIN — PANTOPRAZOLE SODIUM 40 MG: 40 TABLET, DELAYED RELEASE ORAL at 06:39

## 2024-12-25 RX ADMIN — Medication 100 MG: at 09:10

## 2024-12-25 RX ADMIN — LIDOCAINE HYDROCHLORIDE AND EPINEPHRINE 2 ML: 15; 5 INJECTION, SOLUTION EPIDURAL at 12:57

## 2024-12-25 RX ADMIN — METHOCARBAMOL TABLETS 1000 MG: 500 TABLET, COATED ORAL at 23:06

## 2024-12-25 RX ADMIN — DIAZEPAM 5 MG: 5 TABLET ORAL at 04:02

## 2024-12-25 RX ADMIN — KETOROLAC TROMETHAMINE 15 MG: 30 INJECTION, SOLUTION INTRAMUSCULAR; INTRAVENOUS at 13:06

## 2024-12-25 RX ADMIN — KETOROLAC TROMETHAMINE 15 MG: 30 INJECTION, SOLUTION INTRAMUSCULAR; INTRAVENOUS at 06:39

## 2024-12-25 RX ADMIN — KETOROLAC TROMETHAMINE 15 MG: 30 INJECTION, SOLUTION INTRAMUSCULAR; INTRAVENOUS at 23:06

## 2024-12-25 RX ADMIN — MULTIPLE VITAMINS W/ MINERALS TAB 1 TABLET: TAB ORAL at 09:10

## 2024-12-25 RX ADMIN — GABAPENTIN 300 MG: 300 CAPSULE ORAL at 21:10

## 2024-12-25 RX ADMIN — DIAZEPAM 5 MG: 5 TABLET ORAL at 16:05

## 2024-12-25 RX ADMIN — ATORVASTATIN CALCIUM 40 MG: 40 TABLET, FILM COATED ORAL at 16:05

## 2024-12-25 RX ADMIN — KETOROLAC TROMETHAMINE 15 MG: 30 INJECTION, SOLUTION INTRAMUSCULAR; INTRAVENOUS at 00:08

## 2024-12-25 RX ADMIN — OMEGA-3 FATTY ACIDS CAP 1000 MG 2000 MG: 1000 CAP at 09:10

## 2024-12-25 RX ADMIN — HYDROMORPHONE HYDROCHLORIDE 1 MG: 1 INJECTION, SOLUTION INTRAMUSCULAR; INTRAVENOUS; SUBCUTANEOUS at 12:27

## 2024-12-25 RX ADMIN — ESCITALOPRAM OXALATE 20 MG: 20 TABLET ORAL at 09:10

## 2024-12-25 RX ADMIN — FOLIC ACID 1 MG: 1 TABLET ORAL at 09:10

## 2024-12-25 RX ADMIN — GABAPENTIN 300 MG: 300 CAPSULE ORAL at 09:14

## 2024-12-25 RX ADMIN — POLYETHYLENE GLYCOL 3350 17 G: 17 POWDER, FOR SOLUTION ORAL at 09:08

## 2024-12-25 RX ADMIN — KETOROLAC TROMETHAMINE 15 MG: 30 INJECTION, SOLUTION INTRAMUSCULAR; INTRAVENOUS at 17:40

## 2024-12-25 RX ADMIN — ACETAMINOPHEN 975 MG: 325 TABLET, FILM COATED ORAL at 21:10

## 2024-12-25 RX ADMIN — DIAZEPAM 5 MG: 5 TABLET ORAL at 09:10

## 2024-12-25 RX ADMIN — SENNOSIDES, DOCUSATE SODIUM 1 TABLET: 8.6; 5 TABLET ORAL at 21:10

## 2024-12-25 RX ADMIN — OXYCODONE HYDROCHLORIDE 10 MG: 10 TABLET ORAL at 09:13

## 2024-12-25 RX ADMIN — FENTANYL CITRATE: 50 INJECTION INTRAMUSCULAR; INTRAVENOUS at 13:11

## 2024-12-25 NOTE — PLAN OF CARE
Problem: PAIN - ADULT  Goal: Verbalizes/displays adequate comfort level or baseline comfort level  Description: Interventions:  - Encourage patient to monitor pain and request assistance  - Assess pain using appropriate pain scale  - Administer analgesics based on type and severity of pain and evaluate response  - Implement non-pharmacological measures as appropriate and evaluate response  - Consider cultural and social influences on pain and pain management  - Notify physician/advanced practitioner if interventions unsuccessful or patient reports new pain  Outcome: Progressing     Problem: INFECTION - ADULT  Goal: Absence or prevention of progression during hospitalization  Description: INTERVENTIONS:  - Assess and monitor for signs and symptoms of infection  - Monitor lab/diagnostic results  - Monitor all insertion sites, i.e. indwelling lines, tubes, and drains  - Monitor endotracheal if appropriate and nasal secretions for changes in amount and color  - Arnold appropriate cooling/warming therapies per order  - Administer medications as ordered  - Instruct and encourage patient and family to use good hand hygiene technique  - Identify and instruct in appropriate isolation precautions for identified infection/condition  Outcome: Progressing  Goal: Absence of fever/infection during neutropenic period  Description: INTERVENTIONS:  - Monitor WBC    Outcome: Progressing     Problem: SAFETY ADULT  Goal: Patient will remain free of falls  Description: INTERVENTIONS:  - Educate patient/family on patient safety including physical limitations  - Instruct patient to call for assistance with activity   - Consult OT/PT to assist with strengthening/mobility   - Keep Call bell within reach  - Keep bed low and locked with side rails adjusted as appropriate  - Keep care items and personal belongings within reach  - Initiate and maintain comfort rounds  - Make Fall Risk Sign visible to staff  - Obtain necessary fall risk  management equipment  - Apply yellow socks and bracelet for high fall risk patients  - Consider moving patient to room near nurses station  Outcome: Progressing  Goal: Maintain or return to baseline ADL function  Description: INTERVENTIONS:  -  Assess patient's ability to carry out ADLs; assess patient's baseline for ADL function and identify physical deficits which impact ability to perform ADLs (bathing, care of mouth/teeth, toileting, grooming, dressing, etc.)  - Assess/evaluate cause of self-care deficits   - Assess range of motion  - Assess patient's mobility; develop plan if impaired  - Assess patient's need for assistive devices and provide as appropriate  - Encourage maximum independence but intervene and supervise when necessary  - Involve family in performance of ADLs  - Assess for home care needs following discharge   - Consider OT consult to assist with ADL evaluation and planning for discharge  - Provide patient education as appropriate  Outcome: Progressing  Goal: Maintains/Returns to pre admission functional level  Description: INTERVENTIONS:  - Perform AM-PAC 6 Click Basic Mobility/ Daily Activity assessment daily.  - Set and communicate daily mobility goal to care team and patient/family/caregiver.   - Collaborate with rehabilitation services on mobility goals if consulted  - Dangle patient  - Out of bed for toileting  - Record patient progress and toleration of activity level   Outcome: Progressing     Problem: DISCHARGE PLANNING  Goal: Discharge to home or other facility with appropriate resources  Description: INTERVENTIONS:  - Identify barriers to discharge w/patient and caregiver  - Arrange for needed discharge resources and transportation as appropriate  - Identify discharge learning needs (meds, wound care, etc.)  - Arrange for interpretive services to assist at discharge as needed  - Refer to Case Management Department for coordinating discharge planning if the patient needs post-hospital  services based on physician/advanced practitioner order or complex needs related to functional status, cognitive ability, or social support system  Outcome: Progressing     Problem: Knowledge Deficit  Goal: Patient/family/caregiver demonstrates understanding of disease process, treatment plan, medications, and discharge instructions  Description: Complete learning assessment and assess knowledge base.  Interventions:  - Provide teaching at level of understanding  - Provide teaching via preferred learning methods  Outcome: Progressing     Problem: Prexisting or High Potential for Compromised Skin Integrity  Goal: Skin integrity is maintained or improved  Description: INTERVENTIONS:  - Identify patients at risk for skin breakdown  - Assess and monitor skin integrity  - Assess and monitor nutrition and hydration status  - Monitor labs   - Assess for incontinence   - Turn and reposition patient  - Assist with mobility/ambulation  - Relieve pressure over bony prominences  - Avoid friction and shearing  - Provide appropriate hygiene as needed including keeping skin clean and dry  - Evaluate need for skin moisturizer/barrier cream  - Collaborate with interdisciplinary team   - Patient/family teaching  - Consider wound care consult   Outcome: Progressing

## 2024-12-25 NOTE — ASSESSMENT & PLAN NOTE
Daily heavy drinker  EtOH level on arrival 150  Encourage cessation  Continue CIWA scoring, though favor phenobarbital and Valium over lorazepam.

## 2024-12-25 NOTE — ASSESSMENT & PLAN NOTE
Secondary to fall  Multiple right-sided rib fractures 4th through 7th, POA  Started on rib fracture protocol  Incentive spirometry with goal volume 15 mL/kg of ideal body weight    Plan:  Continue to encourage IS  Continue multimodal pain regimen  APS consulted, appreciate recommendations  Holding DVT prophylaxis this morning for epidural  Encourage deep breath/cough

## 2024-12-25 NOTE — ANESTHESIA PROCEDURE NOTES
Epidural Block    Start time: 12/25/2024 12:55 PM  Reason for block: procedure for pain  Staffing  Performed by: Mahesh Wells MD  Authorized by: Mahesh Wells MD    Preanesthetic Checklist  Completed: patient identified, IV checked, site marked, risks and benefits discussed, surgical consent, monitors and equipment checked, pre-op evaluation and timeout performed  Epidural  Patient position: sitting  Prep: ChloraPrep  Patient monitoring: cardiac monitor, frequent blood pressure checks and continuous pulse oximetry  Approach: midline  Location: thoracic, T5-6  Injection technique: RACHEL saline  Needle  Needle type: Tuohy   Needle gauge: 17 G  Needle insertion depth: 7 cm  Catheter type: side hole  Catheter size: 19 G  Catheter at skin depth: 12 cm  Catheter securement method: stabilization device  Test dose: negative  Assessment  Sensory level: T10  Number of attempts: 2negative aspiration for CSF, negative aspiration for heme and no paresthesia on injection  patient tolerated the procedure well with no immediate complications  Additional Notes  Initial attempt @T6-7 R paramedian approach was not successful due to os. Success on second attempt midline @T5/6. Epidural confirmation with expected hypotension from epidural bolus of ropivacaine and symptomatic relief per patient.

## 2024-12-25 NOTE — ASSESSMENT & PLAN NOTE
Right 4-7 rib fractures    Upon bedside evaluation, Dm was resting in acute distress from pain. His right chest wall pain is worse with cough and deep inspiration. Pain has not improved much on ketamine infusion and MMA. After discussing risks/benefits of thoracic epidural, Dm and I decided to proceed with epidural placement. I will discontinue ketamine infusion after epidural placement and continue MMA. His respiratory mechanics are stable with ability to inspire >2L on spirometry. Denies opioid-induced side effects including nausea/vomiting/itching/constipation. Denies psychomimetic issues.     Plan:  Start thoracic epidural infusion 0.1% ropivacaine/2mcg/ml fentanyl @8/5/10/3  OK to start SQ heparin for DVT prophylaxis  Discontinue ketamine gtt  Continue MMA as below    Multimodal Analgesia:  Tylenol 975 mg every 8 hours scheduled  Oral Valium 5 mg every 6 hours scheduled, for spasm related pain/anxiety  Gabapentin 300 mg 3 times daily  IV Toradol 15 mg every 6 hours scheduled x 2 days  Lidoderm patch 12 hours on/12 hours off to affected area  Robaxin 1000 mg every 8 hours scheduled  Oxycodone 5 mg every 4 hours as needed for moderate pain  Oxycodone 10 mg every 4 hours as needed for severe pain  IV Dilaudid 0.5 mg every 2 hours as needed for breakthrough pain  Narcan as needed for respiratory depression/opioid reversal  Continue bowel regimen while utilizing opioids to prevent opioid-induced constipation

## 2024-12-25 NOTE — PROGRESS NOTES
Progress Note - Critical Care/ICU   Name: Diogo Travis 59 y.o. male I MRN: [id number]  Unit/Bed#: ICU 10 I Date of Admission: 12/24/2024   Date of Service: 12/25/2024 I Hospital Day: 0      Assessment & Plan  Multiple rib fractures  Secondary to fall  Multiple right-sided rib fractures 4th through 7th, POA  Started on rib fracture protocol  Incentive spirometry with goal volume 15 mL/kg of ideal body weight    Plan:  Continue to encourage IS  Continue multimodal pain regimen  APS consulted, appreciate recommendations  Holding DVT prophylaxis this morning for epidural  Encourage deep breath/cough  Alcohol withdrawal (HCC)  Recent Labs     12/24/24  0507   ETOH 150*     Received phenobarbital  mg and diazepam IV 5 mg in the emergency department.  He was also started on standing diazepam p.o. 5 mg every 8 hours  Since arriving to the ICU patient received additional one-time dose of phenobarbital  mg    Plan:  Continue CIWA scoring, favor phenobarbital with Valium as adjunct over lorazepam.  Continue thiamine/folate  Optimize nutrition  Encourage cessation    Depression  PTA Lexapro, Atarax, Remeron  Patient states that he has some frustration/depression with being unemployed    Plan:  Continue Lexapro  Continue CIWA scoring/treatment as above  Fall  Patient reports falling down stairs at home.  Unclear timing of fall, patient reports that he is heavy daily drinker with EtOH level 150 on admission  Injuries as below  Case management for disposition planning  PT/OT evaluation/treatment as appropriate  Acute pain due to trauma  Started on multimodal pain regimen    Plan:  Tylenol 975 mg p.o. every 8 hours  Gabapentin 300 mg p.o. 3 times daily  Toradol 15 mg IV every 6 hours  Methocarbamol 1 g p.o. every 8 hours  Ketamine gtt 0.1 mg/kg/h   Dilaudid 0.5 mg IV as needed for breakthrough pain  APS consulted, appreciate recommendations  Holding heparin this a.m. for epidural placement  Narcan as needed for  respiratory depression  Bowel regimen while on multimodal pain regimen  Alcohol use disorder  Daily heavy drinker  EtOH level on arrival 150  Encourage cessation  Continue CIWA scoring, though favor phenobarbital and Valium over lorazepam.  Chronic alcohol abuse  As above   Disposition: Stepdown Level 1    ICU Core Measures     A: Assess, Prevent, and Manage Pain Has pain been assessed? Yes  Need for changes to pain regimen? No   B: Both SAT/SAT  N/A   C: Choice of Sedation RASS Goal: N/A patient not on sedation  Need for changes to sedation or analgesia regimen? No   D: Delirium CAM-ICU: Negative   E: Early Mobility  Plan for early mobility? Yes   F: Family Engagement Plan for family engagement today? Yes         Prophylaxis:  VTE Contraindicated secondary to: holding 0600 dose for epidural placement today.  Will resume after epidural placement.   Stress Ulcer  covered bypantoprazole (PROTONIX) 40 mg tablet [[id number]] (Long-Term Med), pantoprazole (PROTONIX) EC tablet 40 mg [[id number]]         24 Hour Events : Required phenobarbital x1 dose overnight.     Subjective   Review of Systems: Review of Systems   Constitutional:  Negative for chills, fatigue and fever.   HENT: Negative.     Eyes:  Negative for visual disturbance.   Respiratory:  Negative for cough, chest tightness, shortness of breath and wheezing.         Chest wall tenderness   Cardiovascular:  Negative for chest pain, palpitations and leg swelling.   Gastrointestinal:  Negative for abdominal distention, abdominal pain, diarrhea, nausea and vomiting.   Endocrine: Negative.    Genitourinary:  Negative for difficulty urinating, dysuria, hematuria and urgency.   Musculoskeletal:  Negative for arthralgias and myalgias.   Skin:  Negative for color change, pallor, rash and wound.   Allergic/Immunologic: Negative.    Neurological:  Negative for dizziness, seizures, syncope and weakness.   Hematological: Negative.    Psychiatric/Behavioral: Negative.     All  other systems reviewed and are negative.      Objective :                   Vitals I/O      Most Recent Min/Max in 24hrs   Temp 100.4 °F (38 °C) Temp  Min: 98.6 °F (37 °C)  Max: 100.4 °F (38 °C)   Pulse 57 Pulse  Min: 57  Max: 121   Resp (!) 11 Resp  Min: 11  Max: 20   /70 BP  Min: 105/62  Max: 173/93   O2 Sat 94 % SpO2  Min: 90 %  Max: 95 %      Intake/Output Summary (Last 24 hours) at 12/25/2024 0325  Last data filed at 12/25/2024 0200  Gross per 24 hour   Intake 1482.78 ml   Output --   Net 1482.78 ml       Diet Regular; Regular House    Invasive Monitoring           Physical Exam   Physical Exam  Vitals and nursing note reviewed.   Eyes:      General: Vision grossly intact. NeglectNo scleral icterus.        Right eye: No discharge.         Left eye: No discharge.      Extraocular Movements: Extraocular movements intact.      Conjunctiva/sclera: Conjunctivae normal.      Pupils: Pupils are equal, round, and reactive to light.   Skin:     General: Skin is warm, dry and not mottled extremities.      Capillary Refill: Capillary refill takes less than 2 seconds.      Coloration: Skin is not jaundiced or pale.      Findings: No lesion or wound.   HENT:      Head: Normocephalic and atraumatic.      Right Ear: Hearing normal. No drainage.      Left Ear: Hearing normal. No drainage.      Nose: No nasal deformity, nasal tenderness, congestion, rhinorrhea, epistaxis or nasogastric tube.      Mouth/Throat:      Mouth: Mucous membranes are moist. No injury or angioedema.      Dentition: Normal dentition.      Pharynx: No oropharyngeal exudate.   Neck:      Vascular: No JVD.   no midline tenderness Cardiovascular:      Rate and Rhythm: Normal rate and regular rhythm.      Pulses: No decreased pulses.      Heart sounds: No murmur heard.     No friction rub. No gallop.   Musculoskeletal:         General: No swelling, tenderness, deformity or signs of injury. Normal range of motion.      Right lower leg: No edema.       Left lower leg: No edema.   Abdominal: General: Bowel sounds are normal. There is no distension.      Palpations: Abdomen is soft.      Tenderness: There is no abdominal tenderness.   Constitutional:       General: He is not in acute distress.     Appearance: He is well-developed and well-nourished. He is not ill-appearing or toxic-appearing.      Interventions: He is not sedated, not intubated and not restrained.  Pulmonary:      Effort: Pulmonary effort is normal. He is not intubated.      Breath sounds: Examination of the right-lower field reveals decreased breath sounds. Examination of the left-lower field reveals decreased breath sounds. Decreased breath sounds present. No wheezing, rhonchi or rales.   Chest:      Chest wall: Tenderness present. No mass, deformity or crepitus.   Psychiatric:         Mood and Affect:  mood and affect abnormal.        Speech: Speech is not no receptive aphasia or no expressive aphasia.   Neurological:      General: No focal deficit present.      Mental Status: He is alert and oriented to person, place and time. Mental status is at baseline. He is CAM ICU negative.      Cranial Nerves: No dysarthria or facial asymmetry.      Sensory: No sensory deficit.      Motor: Strength full and intact in all extremities. No pronator drift or motor deficit.          Diagnostic Studies        Lab Results: I have reviewed the following results:     Medications:  Scheduled PRN   acetaminophen, 975 mg, Q8H COLBY  atorvastatin, 40 mg, Daily With Dinner  diazepam, 5 mg, Q6H  escitalopram, 20 mg, Daily  fish oil, 2,000 mg, Daily  folic acid, 1 mg, Daily  gabapentin, 300 mg, TID  ketorolac, 15 mg, Q6H COLBY  lisinopril, 40 mg, Daily  methocarbamol, 1,000 mg, Q8H  mirtazapine, 15 mg, HS  multivitamin-minerals, 1 tablet, Daily  nicotine, 1 patch, Daily  pantoprazole, 40 mg, Daily Before Breakfast  polyethylene glycol, 17 g, Daily  senna-docusate sodium, 1 tablet, HS  thiamine, 100 mg, Daily       glycopyrrolate, 0.2 mg, Q4H PRN  haloperidol lactate, 2 mg, Q30 Min PRN  HYDROmorphone, 0.5 mg, Q2H PRN  LORazepam, 1 mg, Q1H PRN  naloxone, 0.04 mg, Q1MIN PRN  ondansetron, 4 mg, Q4H PRN  oxyCODONE, 5 mg, Q4H PRN   Or  oxyCODONE, 10 mg, Q4H PRN       Continuous    ketamine, 0.1 mg/kg/hr, Last Rate: 0.1 mg/kg/hr (12/24/24 1450)         Labs:   CBC    Recent Labs     12/24/24  0507 12/24/24  0928   WBC 7.21  --    HGB 14.5  --    HCT 41.7  --     203     BMP    Recent Labs     12/24/24  0507   SODIUM 139   K 3.9   CL 99   CO2 24   AGAP 16*   BUN 12   CREATININE 0.87   CALCIUM 9.1       Coags    No recent results     Additional Electrolytes  No recent results       Blood Gas    No recent results  No recent results LFTs  Recent Labs     12/24/24  0507   ALT 24   AST 46*   ALKPHOS 102   ALB 4.5   TBILI 0.56       Infectious  No recent results  Glucose  Recent Labs     12/24/24  0507   GLUC 91

## 2024-12-25 NOTE — ASSESSMENT & PLAN NOTE
"Patient presented 12/24 s/p fall.   Patient reports he may have fallen 3 days prior and developed severe pain prompting him to seek medical attention    CT imaging 12/24: \"Acute to subacute minimally displaced fractures of the right fourth through sixth ribs and nondisplaced fracture of the right seventh rib.\"    Currently maintaining oxygen saturations on room air, denies shortness of breath and able to inspire > 2000 mL with spirometry.   Patient does complain of significant R sided chest wall pain which has been limiting his mobility  Patient would benefit from thoracic epidural placement for rib fracture related pain  Patient received subq Heparin today at 1206 which would preclude thoracic epidural placement. Please hold morning dose of heparin 12/25 for possible epidural placement.      Rib Fracture Evaluation:  Chest tube: none  Respiratory Co-morbidities: Obesity  SpO2:   SPO2 RA Rest      Flowsheet Row ED to Hosp-Admission (Current) from 12/24/2024 in [facility] Intensive Care Unit   SpO2 94 %   SpO2 Activity At Rest   O2 Device Nasal cannula   O2 Flow Rate --                                                                             Incentive Spirometer: Incentive Spirometry Achieved (mL): 2500 mL   Platelet Count:   Results from last 7 days   Lab Units 12/25/24  0425   PLATELETS Thousands/uL 153     Coags:   Results from last 7 days   Lab Units 12/25/24  0425   INR  1.00   PROTIME seconds 13.9     Home anticoagulants: none  VTE covered by:    None      "

## 2024-12-25 NOTE — PROGRESS NOTES
"Progress Note - Acute Pain   Name: Diogo Travis 59 y.o. male I MRN: [id number]  Unit/Bed#: ICU 10 I Date of Admission: 12/24/2024   Date of Service: 12/25/2024 I Hospital Day: 0    Assessment & Plan  Acute pain due to trauma    Right 4-7 rib fractures    Upon bedside evaluation, Dm was resting in acute distress from pain. His right chest wall pain is worse with cough and deep inspiration. Pain has not improved much on ketamine infusion and MMA. After discussing risks/benefits of thoracic epidural, Dm and I decided to proceed with epidural placement. I will discontinue ketamine infusion after epidural placement and continue MMA. His respiratory mechanics are stable with ability to inspire >2L on spirometry. Denies opioid-induced side effects including nausea/vomiting/itching/constipation. Denies psychomimetic issues.     Plan:  Start thoracic epidural infusion 0.1% ropivacaine/2mcg/ml fentanyl @8/5/10/3  OK to start SQ heparin for DVT prophylaxis  Discontinue ketamine gtt  Continue MMA as below    Multimodal Analgesia:  Tylenol 975 mg every 8 hours scheduled  Oral Valium 5 mg every 6 hours scheduled, for spasm related pain/anxiety  Gabapentin 300 mg 3 times daily  IV Toradol 15 mg every 6 hours scheduled x 2 days  Lidoderm patch 12 hours on/12 hours off to affected area  Robaxin 1000 mg every 8 hours scheduled  Oxycodone 5 mg every 4 hours as needed for moderate pain  Oxycodone 10 mg every 4 hours as needed for severe pain  IV Dilaudid 0.5 mg every 2 hours as needed for breakthrough pain  Narcan as needed for respiratory depression/opioid reversal  Continue bowel regimen while utilizing opioids to prevent opioid-induced constipation  Multiple rib fractures  Patient presented 12/24 s/p fall.   Patient reports he may have fallen 3 days prior and developed severe pain prompting him to seek medical attention    CT imaging 12/24: \"Acute to subacute minimally displaced fractures of the right fourth through sixth ribs " "and nondisplaced fracture of the right seventh rib.\"    Currently maintaining oxygen saturations on room air, denies shortness of breath and able to inspire > 2000 mL with spirometry.   Patient does complain of significant R sided chest wall pain which has been limiting his mobility  Patient would benefit from thoracic epidural placement for rib fracture related pain  Patient received subq Heparin today at 1206 which would preclude thoracic epidural placement. Please hold morning dose of heparin 12/25 for possible epidural placement.      Rib Fracture Evaluation:  Chest tube: none  Respiratory Co-morbidities: Obesity  SpO2:   SPO2 RA Rest      Flowsheet Row ED to Hosp-Admission (Current) from 12/24/2024 in [facility]   SpO2 94 %   SpO2 Activity At Rest   O2 Device Nasal cannula   O2 Flow Rate --                                                                             Incentive Spirometer: Incentive Spirometry Achieved (mL): 2500 mL   Platelet Count:   Results from last 7 days   Lab Units 12/25/24  0425   PLATELETS Thousands/uL 153     Coags:   Results from last 7 days   Lab Units 12/25/24  0425   INR  1.00   PROTIME seconds 13.9     Home anticoagulants: none  VTE covered by:    None      Alcohol use disorder  Patient with hx of AUD. Has had multiple hospitalizations recently for alcohol withdrawal.  Reports drinking at least \"half a bottle\" of bourbon and wine daily  Patient reports he is interested in cessation  Continue CIWA.  Recommend CM consultation for CATCH program    Depression  Patients with depression and/or anxiety have more perceived pain on average and often require higher doses of opioid pain medication.  Treat depression and/or anxiety aggressively.    Alcohol withdrawal (HCC)    Fall    Chronic alcohol abuse      APS will continue to follow. Please contact Acute Pain Service - via SecureChat from [phone] with additional questions or concerns. See SecureChat " or Sary for additional contacts and after hours information.     Subjective   Diogo Travis is a 59 y.o. male who presents with history of alcohol use disorder.  Patient reports he sustained a fall possibly 3 days ago and was found to have 4-7 right-sided rib fractures.  Of note patient has had multiple hospitalizations recently for alcohol withdrawal.    Pain History  Current pain location(s):  Pain Score: 3  Pain Location/Orientation: Location: Chest  Pain Scale: Pain Assessment Tool: 0-10  24 hour history: See above    Opioid requirement previous 24 hours: PO oxycodone 10mg  Meds/Allergies   all current active meds have been reviewed  Allergies   Allergen Reactions    Cefdinir Rash     Objective :  Temp:  [98.6 °F (37 °C)-100.4 °F (38 °C)] 98.9 °F (37.2 °C)  HR:  [] 95  BP: (103-186)/() 123/74  Resp:  [11-22] 18  SpO2:  [90 %-97 %] 92 %  O2 Device: None (Room air)  Nasal Cannula O2 Flow Rate (L/min):  [3 L/min] 3 L/min    Physical Exam  Vitals reviewed.   HENT:      Head: Normocephalic.      Mouth/Throat:      Mouth: Mucous membranes are dry.   Eyes:      Extraocular Movements: Extraocular movements intact.   Cardiovascular:      Rate and Rhythm: Normal rate.      Pulses: Normal pulses.   Pulmonary:      Effort: Pulmonary effort is normal.   Chest:      Chest wall: Tenderness (Right) present.   Abdominal:      General: Abdomen is flat.   Musculoskeletal:         General: Normal range of motion.      Cervical back: Normal range of motion.   Skin:     General: Skin is dry.   Neurological:      General: No focal deficit present.      Mental Status: He is alert and oriented to person, place, and time.   Psychiatric:         Mood and Affect: Mood normal.            Lab Results: I have reviewed the following results:  Estimated Creatinine Clearance: 89.6 mL/min (by C-G formula based on SCr of 1.03 mg/dL).  Lab Results   Component Value Date    WBC 5.05 12/25/2024    HGB 12.7 12/25/2024    HCT 37.5  12/25/2024     12/25/2024         Component Value Date/Time    K 3.8 12/25/2024 0425    K 3.7 12/27/2022 1818     12/25/2024 0425     12/27/2022 1818    CO2 27 12/25/2024 0425    CO2 15 (L) 12/12/2023 1611    CO2 23 12/27/2022 1818    BUN 17 12/25/2024 0425    BUN 10 12/27/2022 1818    CREATININE 1.03 12/25/2024 0425    CREATININE 0.92 12/27/2022 1818         Component Value Date/Time    CALCIUM 8.8 12/25/2024 0425    CALCIUM 9.7 12/27/2022 1818    ALKPHOS 102 12/24/2024 0507    ALKPHOS 93 12/27/2022 1818    AST 46 (H) 12/24/2024 0507    AST 51 (H) 12/27/2022 1818    ALT 24 12/24/2024 0507    ALT 41 12/27/2022 1818    TP 7.6 12/24/2024 0507    TP 7.9 12/27/2022 1818    ALB 4.5 12/24/2024 0507    ALB 4.7 12/27/2022 1818       Imaging Results Review: No pertinent imaging studies reviewed.  Other Study Results Review: No additional pertinent studies reviewed.

## 2024-12-25 NOTE — ASSESSMENT & PLAN NOTE
Patient reports falling down stairs at home.  Unclear timing of fall, patient reports that he is heavy daily drinker with EtOH level 150 on admission  Injuries as below  Case management for disposition planning  PT/OT evaluation/treatment as appropriate

## 2024-12-25 NOTE — UTILIZATION REVIEW
Initial Clinical Review    Pt initially admitted as Observation on 12/24. Changed to Inpatient on 12/25. Pt requiring continued stay d/t rib fractures, on IV ketamine gtt, pending epidural placement.    Admission: Date/Time/Statement:   Admission Orders (From admission, onward)       Ordered        12/25/24 0750  INPATIENT ADMISSION  Once            12/24/24 0856  Place in Observation  Once                          Orders Placed This Encounter   Procedures    INPATIENT ADMISSION     Standing Status:   Standing     Number of Occurrences:   1     Level of Care:   Level 1 Stepdown [13]     Estimated length of stay:   More than 2 Midnights     Certification:   I certify that inpatient services are medically necessary for this patient for a duration of greater than two midnights. See H&P and MD Progress Notes for additional information about the patient's course of treatment.     ED Arrival Information       Expected   -    Arrival   12/24/2024 04:25    Acuity   Urgent              Means of arrival   Ambulance    Escorted by   [facility]    Service   Critical Care/ICU    Admission type   Emergency              Arrival complaint   EMS             Chief Complaint   Patient presents with    Back Pain     Patient arrives to the ER from home with complaints of right lower back pain. + ETOH. +back spasms. A&OX4. Pt states he drank alcohol to numb the pain. Pt states movement makes the pain worse.        Initial Presentation: 59 y.o. male who presented by EMS to [facility] ED. Admitted as Observation for evaluation and treatment of multiple rib fractures. PMHx: AUD, alcohol withdrawal seizures, GERD, HLD, HTN, IBS. Presented w/ fall approximately 3 days ago. Yesterday, developed pain in side and unable to move off of couch all day s/t pain. Pt consumes alcohol daily. Serum ETOH: 150. EXAM: R-chest wall tenderness. Imaging showed R 4-6th rib fractures displaced, nondisplaced 7th rib fracture. Incentive  Spirometry 1750 mL. PLAN: incentive spirometry, multimodal analgesics, Supplemental O2, weaned as tolerated for SpO2 >94%, bowel regimen, continue PTA meds, CIWA monitoring, thiamine/folic acid. Trend labs, replete electrolytes as needed. Continuous pulse ox, q4h neuro checks, q4h vitals.    12/25/24 Changed to Inpatient Status: Pt required phenobarbital overnight. Exam: decreased breath sounds, chest wall tenderness. Plan: encourage multimodal analgesics, hold SQ heparin for AM placement of epidural. CIWA monitoring, thiamine/folic acid, ketamine gtt, bowel regimen, Trend labs, replete electrolytes as needed. Continuous pulse ox, q4h neuro checks, q4h vitals.     Acute Pain Consult: start thoracic epidural infusion 0.1 % ropivacaine/ 2mcg/L fentanyl. Discontinue ketamine gtt. Continue multimodal pain regimen. Bowel regimen.       Date: 12/26/24   Day 2: Reports improved pain control since epidural placement. No SOB, SpO2 stable on room air. 2500 mL incentive spirometry. Exam: R chest wall tender to palpation, dry mucous membranes. Plan: continue thoracic epidural infusion; continue multimodal analgesics, bowel regimen. Continuous pulse ox, CIWA monitoring, q4h neuro checks, PT/OT evals. Monitor for urinary retention.    ED Treatment-Medication Administration from 12/24/2024 0424 to 12/24/2024 1357         Date/Time Order Dose Route Action     12/24/2024 0510 sodium chloride 0.9 % bolus 1,000 mL 1,000 mL Intravenous New Bag     12/24/2024 0507 lidocaine (LIDODERM) 5 % patch 1 patch 1 patch Topical Medication Applied     12/24/2024 0506 HYDROmorphone (DILAUDID) injection 0.5 mg 0.5 mg Intravenous Given     12/24/2024 0506 ondansetron (ZOFRAN) injection 4 mg 4 mg Intravenous Given     12/24/2024 0612 iohexol (OMNIPAQUE) 350 MG/ML injection (MULTI-DOSE) 85 mL 85 mL Intravenous Given     12/24/2024 0637 sodium chloride 0.9 % bolus 500 mL 500 mL Intravenous New Bag     12/24/2024 0633 HYDROmorphone (DILAUDID) injection  0.5 mg 0.5 mg Intravenous Given     12/24/2024 0705 ketorolac (TORADOL) injection 15 mg 15 mg Intravenous Given     12/24/2024 0705 methocarbamol (ROBAXIN) tablet 750 mg 750 mg Oral Given     12/24/2024 0847 PHENobarbital injection 130 mg 130 mg Intravenous Given     12/24/2024 1206 heparin (porcine) subcutaneous injection 5,000 Units 5,000 Units Subcutaneous Given     12/24/2024 0915 diazepam (VALIUM) tablet 5 mg 5 mg Oral Given     12/24/2024 0915 acetaminophen (TYLENOL) tablet 975 mg 975 mg Oral Given     12/24/2024 0919 oxyCODONE (ROXICODONE) IR tablet 5 mg 5 mg Oral Given     12/24/2024 0915 thiamine tablet 100 mg 100 mg Oral Given     12/24/2024 0915 folic acid (FOLVITE) tablet 1 mg 1 mg Oral Given     12/24/2024 0915 multivitamin-minerals (CENTRUM) tablet 1 tablet 1 tablet Oral Given     12/24/2024 1203 escitalopram (LEXAPRO) tablet 20 mg 20 mg Oral Given     12/24/2024 1204 lisinopril (ZESTRIL) tablet 40 mg 40 mg Oral Given     12/24/2024 1206 fish oil capsule 2,000 mg 2,000 mg Oral Given     12/24/2024 1205 pantoprazole (PROTONIX) EC tablet 40 mg 40 mg Oral Given     12/24/2024 1246 ketorolac (TORADOL) injection 15 mg 15 mg Intravenous Given     12/24/2024 1137 diazepam (VALIUM) injection 5 mg 5 mg Intravenous Given            Scheduled Medications:  acetaminophen, 975 mg, Oral, Q8H COLBY  atorvastatin, 40 mg, Oral, Daily With Dinner  diazepam, 5 mg, Oral, Q6H  escitalopram, 20 mg, Oral, Daily  fish oil, 2,000 mg, Oral, Daily  folic acid, 1 mg, Oral, Daily  gabapentin, 300 mg, Oral, TID  ketorolac, 15 mg, Intravenous, Q6H COLBY  lisinopril, 40 mg, Oral, Daily  methocarbamol, 1,000 mg, Oral, Q8H  mirtazapine, 15 mg, Oral, HS  multivitamin-minerals, 1 tablet, Oral, Daily  nicotine, 1 patch, Transdermal, Daily  pantoprazole, 40 mg, Oral, Daily Before Breakfast  polyethylene glycol, 17 g, Oral, Daily  senna-docusate sodium, 1 tablet, Oral, HS  thiamine, 100 mg, Oral, Daily    Continuous IV Infusions:  ketamine, 0.1  mg/kg/hr, Intravenous, Continuous; End: 12/25/24 1334   ropivacaine 0.1% and fentaNYL 2 mcg/mL, , Epidural, Continuous    PRN Meds:  HYDROmorphone, 0.5 mg, Intravenous, Q2H PRN  naloxone, 0.04 mg, Intravenous, Q1MIN PRN  ondansetron, 4 mg, Intravenous, Q4H PRN  oxyCODONE, 5 mg, Oral, Q4H PRN; 12/24 x1  oxyCODONE, 10 mg, Oral, Q4H PRN; 12/25 x1    heparin (porcine) subcutaneous injection 5,000 Units  Dose: 5,000 Units  Freq: Every 8 hours scheduled Route: SC  Start: 12/24/24 2200 End: 12/24/24 2207  HYDROmorphone (DILAUDID) injection 1 mg  Dose: 1 mg  Freq: Once Route: IV  Start: 12/25/24 1230 End: 12/25/24 1227   LORazepam (ATIVAN) tablet 2 mg  Dose: 2 mg  Freq: Once Route: PO  Indications of Use: ALCOHOL WITHDRAWAL SYNDROME  Start: 12/24/24 1645 End: 12/24/24 1647  magnesium sulfate 2 g/50 mL IVPB (premix) 2 g  Dose: 2 g  Freq: Once Route: IV  Last Dose: 2 g (12/26/24 0612)  Start: 12/26/24 0600 End: 12/26/24 0812    PHENobarbital injection 130 mg  Dose: 130 mg  Freq: Once Route: IV  Start: 12/24/24 2015 End: 12/24/24 2017    ED Triage Vitals   Temperature Pulse Respirations Blood Pressure SpO2 Pain Score   12/24/24 0425 12/24/24 0425 12/24/24 0425 12/24/24 0425 12/24/24 0425 12/24/24 0506   98.6 °F (37 °C) (!) 108 20 157/96 92 % 10 - Worst Possible Pain     Weight (last 2 days)       Date/Time Weight    12/24/24 1419 100 (220.68)            Vital Signs (last 3 days)       Date/Time Temp Pulse Resp BP MAP (mmHg) SpO2 Calculated FIO2 (%) - Nasal Cannula Nasal Cannula O2 Flow Rate (L/min) O2 Device Slater Coma Scale Score CIWA-Ar Total Pain    12/26/24 0830 -- -- -- -- -- 94 % -- -- None (Room air) -- -- --    12/26/24 0700 97.7 °F (36.5 °C) 69 10 108/66 82 92 % -- -- None (Room air) -- -- --    12/26/24 0656 -- -- -- -- -- -- -- -- -- -- -- 3    12/26/24 0511 98.6 °F (37 °C) -- -- -- -- -- -- -- -- -- -- No Pain    12/26/24 0510 -- -- -- -- -- -- -- -- -- -- -- No Pain    12/26/24 0500 -- 57 13 108/66 82 92 % -- --  -- -- 2 --    12/26/24 0430 -- -- -- -- -- -- -- -- -- 15 -- --    12/26/24 0347 -- -- -- 140/79 101 94 % -- -- None (Room air) -- -- --    12/26/24 0200 -- 58 14 104/62 76 93 % -- -- -- -- -- --    12/26/24 0100 -- 58 15 102/63 77 94 % -- -- -- -- -- --    12/26/24 0000 -- 61 12 101/63 77 92 % -- -- -- -- 0 --    12/25/24 2334 -- -- -- -- -- -- -- -- -- 15 -- --    12/25/24 2317 -- -- -- -- -- -- -- -- -- -- -- 2    12/25/24 2306 -- -- -- -- -- -- -- -- -- -- -- 2    12/25/24 2300 -- 66 12 98/59 74 -- -- -- -- -- -- --    12/25/24 2200 -- 60 12 101/60 76 92 % -- -- -- -- -- --    12/25/24 2110 -- -- -- -- -- -- -- -- -- -- -- 2    12/25/24 2012 -- 78 19 100/58 73 93 % -- -- None (Room air) 15 0 2    12/25/24 1900 99.2 °F (37.3 °C) -- -- -- -- -- -- -- -- -- -- --    12/25/24 1857 -- -- -- -- -- -- -- -- -- -- -- 2    12/25/24 1800 -- 86 -- 105/57 76 92 % -- -- None (Room air) -- -- --    12/25/24 1740 -- -- -- -- -- -- -- -- -- -- -- 2    12/25/24 1730 -- 70 12 86/54 66 91 % -- -- -- -- -- --    12/25/24 1600 -- 107 -- 119/77 94 92 % -- -- -- 15 -- 2    12/25/24 1500 98.9 °F (37.2 °C) 85 -- 118/68 88 91 % -- -- -- -- -- --    12/25/24 1435 -- 62 21 98/61 73 91 % -- -- None (Room air) -- -- --    12/25/24 1432 -- 83 25 78/52 59 92 % -- -- -- -- -- --    12/25/24 1415 -- 76 10 83/50 61 88 % -- -- -- -- -- --    12/25/24 1400 -- 67 9 82/51 62 88 % -- -- -- -- -- --    12/25/24 1330 -- 85 -- 113/72 87 92 % -- -- -- -- -- --    12/25/24 1311 -- -- -- -- -- -- -- -- -- -- -- 2    12/25/24 1306 -- 95 -- 123/74 93 92 % -- -- None (Room air) -- -- 3    12/25/24 1300 -- 115 -- 144/80 102 91 % -- -- -- -- 0 --    12/25/24 1245 -- 125 18 150/93 114 92 % -- -- -- -- -- --    12/25/24 1241 -- 120 18 141/87 109 93 % -- -- -- -- -- --    12/25/24 1238 -- 118 18 154/93 118 94 % -- -- -- -- -- --    12/25/24 1235 -- 117 22 184/103 133 94 % -- -- -- -- -- --    12/25/24 1227 -- -- -- -- -- -- -- -- -- -- -- 8    12/25/24 1200 -- 84  11 124/82 98 91 % -- -- -- 15 -- 8    12/25/24 1150 98.9 °F (37.2 °C) -- -- 124/80 97 -- -- -- None (Room air) -- -- --    12/25/24 1144 -- -- -- -- -- 97 % -- -- None (Room air) -- -- --    12/25/24 1111 -- -- -- -- -- 91 % -- -- None (Room air) -- -- --    12/25/24 1100 -- 95 17 186/95 128 94 % -- -- -- -- -- --    12/25/24 0913 -- -- -- -- -- -- -- -- -- -- -- 8    12/25/24 0900 -- -- -- -- -- -- -- -- -- -- 0 --    12/25/24 0800 -- -- -- -- -- -- -- -- -- 15 -- 5    12/25/24 0718 98.7 °F (37.1 °C) -- -- 105/69 81 -- -- -- None (Room air) -- -- --    12/25/24 0639 -- -- -- -- -- -- -- -- -- -- -- 4    12/25/24 0600 -- 89 15 110/73 86 92 % -- -- -- -- -- --    12/25/24 0500 -- 85 11 111/72 85 91 % -- -- -- -- 0 --    12/25/24 0400 -- 91 18 114/78 93 93 % -- -- -- 15 12 7    12/25/24 0300 -- 58 11 103/64 78 93 % -- -- -- -- -- --    12/25/24 0200 -- 57 11 110/70 85 94 % -- -- -- -- -- --    12/25/24 0100 -- 69 11 106/66 81 94 % -- -- -- -- -- --    12/25/24 0000 -- 79 12 105/62 77 92 % -- -- -- 15 -- No Pain    12/24/24 2300 -- 85 13 118/68 85 90 % -- -- -- -- 0 --    12/24/24 2206 -- -- -- -- -- -- -- -- -- -- -- 5    12/24/24 2200 -- 90 -- 135/83 104 94 % -- -- -- -- -- --    12/24/24 2100 -- 95 18 132/81 100 94 % -- -- -- -- 2 --    12/24/24 2000 -- 92 16 132/83 104 94 % -- -- -- 15 8 8    12/24/24 1943 100.4 °F (38 °C) -- -- -- -- -- -- -- -- -- -- --    12/24/24 1934 -- -- -- -- -- -- -- -- -- -- -- 8    12/24/24 1900 -- 98 18 138/79 103 94 % -- -- -- -- -- --    12/24/24 1800 -- 98 17 136/81 102 93 % -- -- -- -- -- --    12/24/24 1736 -- -- -- -- -- -- -- -- -- -- -- Med Not Given for Pain - for MAR use only    12/24/24 1700 -- 96 17 144/87 111 93 % -- -- -- -- 7 --    12/24/24 1638 -- -- -- 125/88 -- -- -- -- -- -- 12 --    12/24/24 1600 -- -- -- -- -- -- -- -- -- 15 -- 8    12/24/24 1437 -- -- -- -- -- 92 % -- -- None (Room air) -- -- --    12/24/24 1431 -- -- -- -- -- -- -- -- -- 15 -- 9    12/24/24 1419  98.6 °F (37 °C) 121 15 145/92 113 -- -- -- -- -- -- 9    12/24/24 1412 -- -- -- -- -- -- -- -- -- -- -- Med Not Given for Pain - for MAR use only    12/24/24 1330 -- 105 18 168/92 124 94 % 32 3 L/min Nasal cannula -- -- --    12/24/24 1300 -- 104 18 173/93 127 93 % 32 3 L/min -- -- -- --    12/24/24 1230 -- 99 18 164/88 119 94 % -- -- Nasal cannula -- -- --    12/24/24 1130 -- 99 18 155/98 122 93 % 32 3 L/min Nasal cannula -- 8 --    12/24/24 1030 -- 98 -- 145/90 110 92 % -- -- -- -- -- --    12/24/24 0900 -- 98 18 154/98 -- -- -- -- -- -- -- --    12/24/24 0845 -- 100 18 150/111 127 93 % 32 3 L/min Nasal cannula -- 9 --    12/24/24 0715 -- 103 -- -- -- 95 % -- -- -- -- -- --    12/24/24 0700 -- 105 20 141/92 112 95 % 32 3 L/min Nasal cannula -- -- --    12/24/24 0645 -- 107 20 -- -- 95 % 32 3 L/min Nasal cannula -- -- --    12/24/24 0633 -- -- -- -- -- -- -- -- -- -- -- 8    12/24/24 0630 -- 102 20 131/81 100 -- -- -- None (Room air) -- -- --    12/24/24 0615 -- 97 20 -- -- 94 % -- -- None (Room air) -- -- --    12/24/24 0600 -- 98 20 134/82 103 95 % -- -- None (Room air) -- -- --    12/24/24 0530 -- 102 20 132/80 100 90 % -- -- None (Room air) -- -- --    12/24/24 0519 -- -- -- -- -- -- -- -- -- 15 -- --    12/24/24 0516 -- 107 -- 134/92 -- -- -- -- -- -- 7 --    12/24/24 0506 -- -- -- -- -- -- -- -- -- -- -- 10 - Worst Possible Pain    12/24/24 0425 98.6 °F (37 °C) 108 20 157/96 119 92 % -- -- None (Room air) -- -- --           CIWA-Ar Score       Row Name 12/26/24 0500 12/26/24 0000 12/25/24 2012       CIWA-Ar    Pulse 57 -- --    Nausea and Vomiting 0 0 0    Tactile Disturbances 0 0 0    Tremor 1 0 0    Auditory Disturbances 0 0 0    Paroxysmal Sweats 1 0 0    Visual Disturbances 0 0 0    Anxiety 0 0 0    Headache, Fullness in Head 0 0 0    Agitation 0 0 0    Orientation and Clouding of Sensorium 0 0 0    CIWA-Ar Total 2 0 0      Row Name 12/25/24 1300 12/25/24 0900 12/25/24 0500       CIWA-Ar    BP -- --  111/72    Pulse -- -- 85    Nausea and Vomiting 0 0 0    Tactile Disturbances 0 0 0    Tremor 0 0 0    Auditory Disturbances 0 0 0    Paroxysmal Sweats 0 0 0    Visual Disturbances 0 0 0    Anxiety 0 0 0    Headache, Fullness in Head 0 0 0    Agitation 0 0 0    Orientation and Clouding of Sensorium 0 0 0    CIWA-Ar Total 0 0 0      Row Name 12/25/24 0400 12/24/24 2300 12/24/24 2100       CIWA-Ar    /78 -- --    Pulse 91 -- --    Nausea and Vomiting 0 0 0    Tactile Disturbances 0 0 0    Tremor 6 0 1    Auditory Disturbances 0 0 0    Paroxysmal Sweats 1 0 0    Visual Disturbances 0 0 0    Anxiety 4 0 1    Headache, Fullness in Head 0 0 0    Agitation 1 0 0    Orientation and Clouding of Sensorium 0 0 0    CIWA-Ar Total 12 0 2      Row Name 12/24/24 2000 12/24/24 1700 12/24/24 1638       CIWA-Ar    BP -- -- 125/88    Nausea and Vomiting 0 1 0    Tactile Disturbances 0 0 0    Tremor 4 4 6    Auditory Disturbances 0 0 0    Paroxysmal Sweats 0 0 1    Visual Disturbances 0 0 0    Anxiety 4 2 5    Headache, Fullness in Head 0 0 0    Agitation 0 0 0    Orientation and Clouding of Sensorium 0 0 0    CIWA-Ar Total 8 7 12      Row Name 12/24/24 1600 12/24/24 1130 12/24/24 0845       CIWA-Ar    BP -- -- 150/111    Pulse -- -- 100    Nausea and Vomiting -- 0 0    Tactile Disturbances -- 0 0    Tremor -- 3 3    Auditory Disturbances -- 0 0    Paroxysmal Sweats -- 1 1    Visual Disturbances -- 0 0    Anxiety -- 4 5    Headache, Fullness in Head -- 0 0    Agitation -- 0 0    Orientation and Clouding of Sensorium -- 0 0    CIWA-Ar Total -- 8 9      Row Name 12/24/24 0516             CIWA-Ar    /92      Pulse 107      Nausea and Vomiting 0      Tactile Disturbances 0      Tremor 2      Auditory Disturbances 0      Paroxysmal Sweats 2      Visual Disturbances 0      Anxiety 1      Headache, Fullness in Head 2      Agitation 0      Orientation and Clouding of Sensorium 0      CIWA-Ar Total 7                      Pertinent  Labs/Diagnostic Test Results:   Radiology:  CTA chest pe study   Final Interpretation by Dima Rascon MD (12/24 2251)      Suboptimal contrast opacification of the pulmonary arteries. No central or definitive peripheral pulmonary emboli.      Chronic prominent central pulmonary arteries and borderline elevated RV/LV ratio suggestive of chronic right heart disease.      Acute to subacute minimally displaced fractures of the right fourth through sixth ribs and nondisplaced fracture of the right seventh rib.      Mild bibasilar subsegmental atelectasis. No pneumothorax or hemothorax.      Additional chronic findings and negatives as above.      The study was marked in EPIC for immediate notification.            Workstation performed: [id number]         XR chest 1 view portable   ED Interpretation by Jose Juan Higuera MD (12/24 9231)   My personal interpretation-no acute cardiopulmonary disease appreciated.  Concern for multiple right-sided rib fractures.      Final Interpretation by Gigi Lopez MD (12/24 4282)      No acute cardiopulmonary disease. Refer to follow-up CT chest for rib fractures.            Workstation performed: [id number]           Cardiology:  ECG 12 lead    by Interface, Ris Results In (12/24 7506)               Results from last 7 days   Lab Units 12/25/24  0425 12/24/24  0928 12/24/24  0507   WBC Thousand/uL 5.05  --  7.21   HEMOGLOBIN g/dL 12.7  --  14.5   HEMATOCRIT % 37.5  --  41.7   PLATELETS Thousands/uL 153 203 248   TOTAL NEUT ABS Thousands/µL  --   --  5.41         Results from last 7 days   Lab Units 12/26/24  0517 12/25/24  0425 12/24/24  0507   SODIUM mmol/L 141 139 139   POTASSIUM mmol/L 4.0 3.8 3.9   CHLORIDE mmol/L 101 101 99   CO2 mmol/L 31 27 24   ANION GAP mmol/L 9 11 16*   BUN mg/dL 17 17 12   CREATININE mg/dL 0.93 1.03 0.87   EGFR ml/min/1.73sq m 90 79 95   CALCIUM mg/dL 9.1 8.8 9.1   MAGNESIUM mg/dL 1.7*  --   --    PHOSPHORUS mg/dL 3.9  --   --      Results from  last 7 days   Lab Units 12/24/24  0507   AST U/L 46*   ALT U/L 24   ALK PHOS U/L 102   TOTAL PROTEIN g/dL 7.6   ALBUMIN g/dL 4.5   TOTAL BILIRUBIN mg/dL 0.56         Results from last 7 days   Lab Units 12/26/24  0517 12/25/24  0425 12/24/24  0507   GLUCOSE RANDOM mg/dL 90 106 91      Results from last 7 days   Lab Units 12/25/24  0425   PROTIME seconds 13.9   INR  1.00   PTT seconds 30      Results from last 7 days   Lab Units 12/24/24  0507   LIPASE u/L 23      Results from last 7 days   Lab Units 12/24/24  0507   ETHANOL LVL mg/dL 150*         Past Medical History:   Diagnosis Date    Alcohol use disorder, severe, dependence (HCC) 04/02/2023    Alcohol withdrawal seizure (HCC)     Alcoholic ketoacidosis 10/16/2023    Anxiety     AR (allergic rhinitis)     Chronic alcoholic gastritis 04/02/2023    Depression     GERD (gastroesophageal reflux disease)     Hepatic steatosis 04/02/2023    Hyperlipidemia     Hypertension     IBS (irritable bowel syndrome)     Obesity     Rosacea     Vitamin B12 deficiency 02/14/2024    Vitamin D deficiency 02/14/2024     Present on Admission:   Depression   Alcohol withdrawal (HCC)      Admitting Diagnosis: Alcohol withdrawal (HCC) [F10.939]  Back pain [M54.9]  Closed fracture of multiple ribs of right side, initial encounter [S22.41XA]  Age/Sex: 59 y.o. male    Network Utilization Review Department  ATTENTION: Please call with any questions or concerns to [phone] and carefully listen to the prompts so that you are directed to the right person. All voicemails are confidential.   For Discharge needs, contact Care Management DC Support Team at [phone] opt. [phone]  Send all requests for admission clinical reviews, approved or denied determinations and any other requests to dedicated fax number below belonging to the campus where the patient is receiving treatment. List of dedicated fax numbers for the Facilities:  FACILITY NAME UR FAX NUMBER   ADMISSION DENIALS  (Administrative/Medical Necessity) [phone]   DISCHARGE SUPPORT TEAM (NETWORK) [phone]   PARENT CHILD HEALTH (Maternity/NICU/Pediatrics) [phone]   [facility] [phone]   [facility] [phone]   [facility] [phone]   [facility] [phone]   [facility] [phone]   [facility] [phone]   [facility] [phone]   [facility] [phone]   [facility] [phone]   [facility] [phone]   [facility] [phone]   [facility] [phone]

## 2024-12-25 NOTE — UTILIZATION REVIEW
NOTIFICATION OF INPATIENT ADMISSION   AUTHORIZATION REQUEST   SERVICING FACILITY:   [place] [address]  Tax ID: [id number]  NPI: [id number]   ATTENDING PROVIDER:  Attending Name and NPI#: Parker Hackett Do [[id number]]  Address: [address] [place] [address]  Phone: [phone]     ADMISSION INFORMATION:  Place of Service: Inpatient [facility]  Place of Service Code: 21  Inpatient Admission Date/Time: 12/25/24  7:50 AM  Discharge Date/Time: No discharge date for patient encounter.  Admitting Diagnosis Code/Description:  Alcohol withdrawal (HCC) [F10.939]  Back pain [M54.9]  Closed fracture of multiple ribs of right side, initial encounter [S22.41XA]     UTILIZATION REVIEW CONTACT:  Alyce Vieira Utilization   Network Utilization Review Department  Phone: [phone]  Fax: [phone]  Email: [email or web address]  Contact for approvals/pending authorizations, clinical reviews, and discharge.     PHYSICIAN ADVISORY SERVICES:  Medical Necessity Denial & Kmyi-rt-Qimp Review  Phone: [phone]  Fax: [phone]  Email: [email or web address]     DISCHARGE SUPPORT TEAM:  For Patients Discharge Needs & Updates  Phone: [phone] opt. 2 Fax: [phone]  Email: [email or web address]

## 2024-12-25 NOTE — ASSESSMENT & PLAN NOTE
Started on multimodal pain regimen    Plan:  Tylenol 975 mg p.o. every 8 hours  Gabapentin 300 mg p.o. 3 times daily  Toradol 15 mg IV every 6 hours  Methocarbamol 1 g p.o. every 8 hours  Ketamine gtt 0.1 mg/kg/h   Dilaudid 0.5 mg IV as needed for breakthrough pain  APS consulted, appreciate recommendations  Holding heparin this a.m. for epidural placement  Narcan as needed for respiratory depression  Bowel regimen while on multimodal pain regimen

## 2024-12-25 NOTE — ASSESSMENT & PLAN NOTE
Recent Labs     12/24/24  0507   ETOH 150*     Received phenobarbital  mg and diazepam IV 5 mg in the emergency department.  He was also started on standing diazepam p.o. 5 mg every 8 hours  Since arriving to the ICU patient received additional one-time dose of phenobarbital  mg    Plan:  Continue CIWA scoring, favor phenobarbital with Valium as adjunct over lorazepam.  Continue thiamine/folate  Optimize nutrition  Encourage cessation

## 2024-12-25 NOTE — ASSESSMENT & PLAN NOTE
PTA Lexapro, Atarax, Remeron  Patient states that he has some frustration/depression with being unemployed    Plan:  Continue Lexapro  Continue CIWA scoring/treatment as above

## 2024-12-25 NOTE — PLAN OF CARE
Problem: PAIN - ADULT  Goal: Verbalizes/displays adequate comfort level or baseline comfort level  Description: Interventions:  - Encourage patient to monitor pain and request assistance  - Assess pain using appropriate pain scale  - Administer analgesics based on type and severity of pain and evaluate response  - Implement non-pharmacological measures as appropriate and evaluate response  - Consider cultural and social influences on pain and pain management  - Notify physician/advanced practitioner if interventions unsuccessful or patient reports new pain  Outcome: Progressing     Problem: INFECTION - ADULT  Goal: Absence or prevention of progression during hospitalization  Description: INTERVENTIONS:  - Assess and monitor for signs and symptoms of infection  - Monitor lab/diagnostic results  - Monitor all insertion sites, i.e. indwelling lines, tubes, and drains  - Monitor endotracheal if appropriate and nasal secretions for changes in amount and color  - Owen appropriate cooling/warming therapies per order  - Administer medications as ordered  - Instruct and encourage patient and family to use good hand hygiene technique  - Identify and instruct in appropriate isolation precautions for identified infection/condition  Outcome: Progressing  Goal: Absence of fever/infection during neutropenic period  Description: INTERVENTIONS:  - Monitor WBC    Outcome: Progressing     Problem: SAFETY ADULT  Goal: Patient will remain free of falls  Description: INTERVENTIONS:  - Educate patient/family on patient safety including physical limitations  - Instruct patient to call for assistance with activity   - Consult OT/PT to assist with strengthening/mobility   - Keep Call bell within reach  - Keep bed low and locked with side rails adjusted as appropriate  - Keep care items and personal belongings within reach  - Initiate and maintain comfort rounds  - Make Fall Risk Sign visible to staff  - Offer Toileting every  Hours,  in advance of need  - Initiate/Maintain alarm  - Obtain necessary fall risk management equipment:   - Apply yellow socks and bracelet for high fall risk patients  - Consider moving patient to room near nurses station  Outcome: Progressing  Goal: Maintain or return to baseline ADL function  Description: INTERVENTIONS:  -  Assess patient's ability to carry out ADLs; assess patient's baseline for ADL function and identify physical deficits which impact ability to perform ADLs (bathing, care of mouth/teeth, toileting, grooming, dressing, etc.)  - Assess/evaluate cause of self-care deficits   - Assess range of motion  - Assess patient's mobility; develop plan if impaired  - Assess patient's need for assistive devices and provide as appropriate  - Encourage maximum independence but intervene and supervise when necessary  - Involve family in performance of ADLs  - Assess for home care needs following discharge   - Consider OT consult to assist with ADL evaluation and planning for discharge  - Provide patient education as appropriate  Outcome: Progressing  Goal: Maintains/Returns to pre admission functional level  Description: INTERVENTIONS:  - Perform AM-PAC 6 Click Basic Mobility/ Daily Activity assessment daily.  - Set and communicate daily mobility goal to care team and patient/family/caregiver.   - Collaborate with rehabilitation services on mobility goals if consulted  - Perform Range of Motion  times a day.  - Reposition patient every  hours.  - Dangle patient  times a day  - Stand patient  times a day  - Ambulate patient  times a day  - Out of bed to chair  times a day   - Out of bed for meals  times a day  - Out of bed for toileting  - Record patient progress and toleration of activity level   Outcome: Progressing     Problem: DISCHARGE PLANNING  Goal: Discharge to home or other facility with appropriate resources  Description: INTERVENTIONS:  - Identify barriers to discharge w/patient and caregiver  - Arrange for  needed discharge resources and transportation as appropriate  - Identify discharge learning needs (meds, wound care, etc.)  - Arrange for interpretive services to assist at discharge as needed  - Refer to Case Management Department for coordinating discharge planning if the patient needs post-hospital services based on physician/advanced practitioner order or complex needs related to functional status, cognitive ability, or social support system  Outcome: Progressing     Problem: Knowledge Deficit  Goal: Patient/family/caregiver demonstrates understanding of disease process, treatment plan, medications, and discharge instructions  Description: Complete learning assessment and assess knowledge base.  Interventions:  - Provide teaching at level of understanding  - Provide teaching via preferred learning methods  Outcome: Progressing

## 2024-12-26 PROBLEM — R19.7 DIARRHEA: Status: RESOLVED | Noted: 2024-06-20 | Resolved: 2024-12-26

## 2024-12-26 PROBLEM — E83.42 HYPOMAGNESEMIA: Status: RESOLVED | Noted: 2023-04-02 | Resolved: 2024-12-26

## 2024-12-26 PROBLEM — J30.9 AR (ALLERGIC RHINITIS): Status: RESOLVED | Noted: 2024-02-14 | Resolved: 2024-12-26

## 2024-12-26 PROBLEM — D69.6 THROMBOCYTOPENIA (HCC): Status: RESOLVED | Noted: 2023-04-03 | Resolved: 2024-12-26

## 2024-12-26 PROBLEM — E87.29 INCREASED ANION GAP METABOLIC ACIDOSIS: Status: RESOLVED | Noted: 2023-10-16 | Resolved: 2024-12-26

## 2024-12-26 PROBLEM — R79.89 ELEVATED LACTIC ACID LEVEL: Status: RESOLVED | Noted: 2023-10-01 | Resolved: 2024-12-26

## 2024-12-26 PROBLEM — K76.0 HEPATIC STEATOSIS: Chronic | Status: ACTIVE | Noted: 2023-04-02

## 2024-12-26 PROBLEM — K29.20 CHRONIC ALCOHOLIC GASTRITIS: Chronic | Status: ACTIVE | Noted: 2023-04-02

## 2024-12-26 PROBLEM — R21 RASH AND NONSPECIFIC SKIN ERUPTION: Status: RESOLVED | Noted: 2024-02-07 | Resolved: 2024-12-26

## 2024-12-26 PROBLEM — I10 HYPERTENSION: Chronic | Status: ACTIVE | Noted: 2023-04-02

## 2024-12-26 PROBLEM — E55.9 VITAMIN D DEFICIENCY: Chronic | Status: ACTIVE | Noted: 2024-02-14

## 2024-12-26 PROBLEM — E53.8 VITAMIN B12 DEFICIENCY: Chronic | Status: ACTIVE | Noted: 2024-02-14

## 2024-12-26 PROBLEM — R00.0 SINUS TACHYCARDIA: Status: RESOLVED | Noted: 2023-11-28 | Resolved: 2024-12-26

## 2024-12-26 PROBLEM — B35.4 TINEA CORPORIS: Status: RESOLVED | Noted: 2023-04-02 | Resolved: 2024-12-26

## 2024-12-26 PROBLEM — K21.9 GERD (GASTROESOPHAGEAL REFLUX DISEASE): Chronic | Status: ACTIVE | Noted: 2024-02-14

## 2024-12-26 PROBLEM — F10.20 ALCOHOL USE DISORDER, SEVERE, DEPENDENCE (HCC): Status: RESOLVED | Noted: 2023-04-02 | Resolved: 2024-12-26

## 2024-12-26 PROBLEM — E87.6 HYPOKALEMIA: Status: RESOLVED | Noted: 2023-04-03 | Resolved: 2024-12-26

## 2024-12-26 PROBLEM — R07.89 CHEST TIGHTNESS: Status: RESOLVED | Noted: 2023-11-28 | Resolved: 2024-12-26

## 2024-12-26 PROBLEM — F32.A DEPRESSION: Chronic | Status: ACTIVE | Noted: 2024-02-14

## 2024-12-26 PROBLEM — E78.5 HYPERLIPIDEMIA: Chronic | Status: ACTIVE | Noted: 2024-02-14

## 2024-12-26 PROBLEM — K58.9 IBS (IRRITABLE BOWEL SYNDROME): Chronic | Status: ACTIVE | Noted: 2024-02-14

## 2024-12-26 PROBLEM — F33.1 MAJOR DEPRESSIVE DISORDER, RECURRENT, MODERATE (HCC): Status: RESOLVED | Noted: 2024-11-12 | Resolved: 2024-12-26

## 2024-12-26 PROBLEM — L71.9 ROSACEA: Chronic | Status: ACTIVE | Noted: 2024-02-14

## 2024-12-26 PROBLEM — E66.9 OBESITY: Status: RESOLVED | Noted: 2024-02-14 | Resolved: 2024-12-26

## 2024-12-26 PROBLEM — F41.9 ANXIETY: Status: RESOLVED | Noted: 2024-02-14 | Resolved: 2024-12-26

## 2024-12-26 LAB
ANION GAP SERPL CALCULATED.3IONS-SCNC: 9 MMOL/L (ref 4–13)
BUN SERPL-MCNC: 17 MG/DL (ref 5–25)
CALCIUM SERPL-MCNC: 9.1 MG/DL (ref 8.4–10.2)
CHLORIDE SERPL-SCNC: 101 MMOL/L (ref 96–108)
CO2 SERPL-SCNC: 31 MMOL/L (ref 21–32)
CREAT SERPL-MCNC: 0.93 MG/DL (ref 0.6–1.3)
GFR SERPL CREATININE-BSD FRML MDRD: 90 ML/MIN/1.73SQ M
GLUCOSE SERPL-MCNC: 90 MG/DL (ref 65–140)
MAGNESIUM SERPL-MCNC: 1.7 MG/DL (ref 1.9–2.7)
PHOSPHATE SERPL-MCNC: 3.9 MG/DL (ref 2.7–4.5)
POTASSIUM SERPL-SCNC: 4 MMOL/L (ref 3.5–5.3)
SODIUM SERPL-SCNC: 141 MMOL/L (ref 135–147)

## 2024-12-26 PROCEDURE — 99232 SBSQ HOSP IP/OBS MODERATE 35: CPT | Performed by: SURGERY

## 2024-12-26 PROCEDURE — 97163 PT EVAL HIGH COMPLEX 45 MIN: CPT

## 2024-12-26 PROCEDURE — 94664 DEMO&/EVAL PT USE INHALER: CPT

## 2024-12-26 PROCEDURE — 99233 SBSQ HOSP IP/OBS HIGH 50: CPT

## 2024-12-26 PROCEDURE — 84100 ASSAY OF PHOSPHORUS: CPT | Performed by: PHYSICIAN ASSISTANT

## 2024-12-26 PROCEDURE — 83735 ASSAY OF MAGNESIUM: CPT | Performed by: PHYSICIAN ASSISTANT

## 2024-12-26 PROCEDURE — 97167 OT EVAL HIGH COMPLEX 60 MIN: CPT

## 2024-12-26 PROCEDURE — 94760 N-INVAS EAR/PLS OXIMETRY 1: CPT

## 2024-12-26 PROCEDURE — 80048 BASIC METABOLIC PNL TOTAL CA: CPT | Performed by: PHYSICIAN ASSISTANT

## 2024-12-26 RX ORDER — MAGNESIUM SULFATE HEPTAHYDRATE 40 MG/ML
2 INJECTION, SOLUTION INTRAVENOUS ONCE
Status: COMPLETED | OUTPATIENT
Start: 2024-12-26 | End: 2024-12-26

## 2024-12-26 RX ADMIN — ACETAMINOPHEN 975 MG: 325 TABLET, FILM COATED ORAL at 21:14

## 2024-12-26 RX ADMIN — OMEGA-3 FATTY ACIDS CAP 1000 MG 2000 MG: 1000 CAP at 08:56

## 2024-12-26 RX ADMIN — MULTIPLE VITAMINS W/ MINERALS TAB 1 TABLET: TAB ORAL at 08:56

## 2024-12-26 RX ADMIN — MIRTAZAPINE 15 MG: 15 TABLET, FILM COATED ORAL at 21:14

## 2024-12-26 RX ADMIN — LISINOPRIL 40 MG: 20 TABLET ORAL at 08:57

## 2024-12-26 RX ADMIN — ESCITALOPRAM OXALATE 20 MG: 20 TABLET ORAL at 08:56

## 2024-12-26 RX ADMIN — METHOCARBAMOL TABLETS 1000 MG: 500 TABLET, COATED ORAL at 23:00

## 2024-12-26 RX ADMIN — ACETAMINOPHEN 975 MG: 325 TABLET, FILM COATED ORAL at 14:56

## 2024-12-26 RX ADMIN — GABAPENTIN 300 MG: 300 CAPSULE ORAL at 08:56

## 2024-12-26 RX ADMIN — DIAZEPAM 5 MG: 5 TABLET ORAL at 21:15

## 2024-12-26 RX ADMIN — GABAPENTIN 300 MG: 300 CAPSULE ORAL at 21:14

## 2024-12-26 RX ADMIN — DIAZEPAM 5 MG: 5 TABLET ORAL at 15:00

## 2024-12-26 RX ADMIN — FENTANYL CITRATE: 50 INJECTION INTRAMUSCULAR; INTRAVENOUS at 12:24

## 2024-12-26 RX ADMIN — POLYETHYLENE GLYCOL 3350 17 G: 17 POWDER, FOR SOLUTION ORAL at 08:57

## 2024-12-26 RX ADMIN — DIAZEPAM 5 MG: 5 TABLET ORAL at 09:06

## 2024-12-26 RX ADMIN — PANTOPRAZOLE SODIUM 40 MG: 40 TABLET, DELAYED RELEASE ORAL at 05:10

## 2024-12-26 RX ADMIN — MAGNESIUM SULFATE HEPTAHYDRATE 2 G: 40 INJECTION, SOLUTION INTRAVENOUS at 06:12

## 2024-12-26 RX ADMIN — ATORVASTATIN CALCIUM 40 MG: 40 TABLET, FILM COATED ORAL at 16:35

## 2024-12-26 RX ADMIN — DIAZEPAM 5 MG: 5 TABLET ORAL at 05:10

## 2024-12-26 RX ADMIN — FOLIC ACID 1 MG: 1 TABLET ORAL at 08:56

## 2024-12-26 RX ADMIN — KETOROLAC TROMETHAMINE 15 MG: 30 INJECTION, SOLUTION INTRAMUSCULAR; INTRAVENOUS at 05:11

## 2024-12-26 RX ADMIN — Medication 100 MG: at 08:57

## 2024-12-26 RX ADMIN — ACETAMINOPHEN 975 MG: 325 TABLET, FILM COATED ORAL at 05:10

## 2024-12-26 RX ADMIN — METHOCARBAMOL TABLETS 1000 MG: 500 TABLET, COATED ORAL at 14:57

## 2024-12-26 RX ADMIN — METHOCARBAMOL TABLETS 1000 MG: 500 TABLET, COATED ORAL at 05:25

## 2024-12-26 RX ADMIN — SENNOSIDES, DOCUSATE SODIUM 1 TABLET: 8.6; 5 TABLET ORAL at 21:14

## 2024-12-26 RX ADMIN — GABAPENTIN 300 MG: 300 CAPSULE ORAL at 15:00

## 2024-12-26 NOTE — PLAN OF CARE
Problem: PAIN - ADULT  Goal: Verbalizes/displays adequate comfort level or baseline comfort level  Description: Interventions:  - Encourage patient to monitor pain and request assistance  - Assess pain using appropriate pain scale  - Administer analgesics based on type and severity of pain and evaluate response  - Implement non-pharmacological measures as appropriate and evaluate response  - Consider cultural and social influences on pain and pain management  - Notify physician/advanced practitioner if interventions unsuccessful or patient reports new pain  Outcome: Progressing     Problem: INFECTION - ADULT  Goal: Absence or prevention of progression during hospitalization  Description: INTERVENTIONS:  - Assess and monitor for signs and symptoms of infection  - Monitor lab/diagnostic results  - Monitor all insertion sites, i.e. indwelling lines, tubes, and drains  - Monitor endotracheal if appropriate and nasal secretions for changes in amount and color  - [place] appropriate cooling/warming therapies per order  - Administer medications as ordered  - Instruct and encourage patient and family to use good hand hygiene technique  - Identify and instruct in appropriate isolation precautions for identified infection/condition  Outcome: Progressing  Goal: Absence of fever/infection during neutropenic period  Description: INTERVENTIONS:  - Monitor WBC    Outcome: Progressing     Problem: SAFETY ADULT  Goal: Patient will remain free of falls  Description: INTERVENTIONS:  - Educate patient/family on patient safety including physical limitations  - Instruct patient to call for assistance with activity   - Consult OT/PT to assist with strengthening/mobility   - Keep Call bell within reach  - Keep bed low and locked with side rails adjusted as appropriate  - Keep care items and personal belongings within reach  - Initiate and maintain comfort rounds  - Make Fall Risk Sign visible to staff  - Obtain necessary fall risk  management equipment  - Apply yellow socks and bracelet for high fall risk patients  - Consider moving patient to room near nurses station  Outcome: Progressing  Goal: Maintain or return to baseline ADL function  Description: INTERVENTIONS:  -  Assess patient's ability to carry out ADLs; assess patient's baseline for ADL function and identify physical deficits which impact ability to perform ADLs (bathing, care of mouth/teeth, toileting, grooming, dressing, etc.)  - Assess/evaluate cause of self-care deficits   - Assess range of motion  - Assess patient's mobility; develop plan if impaired  - Assess patient's need for assistive devices and provide as appropriate  - Encourage maximum independence but intervene and supervise when necessary  - Involve family in performance of ADLs  - Assess for home care needs following discharge   - Consider OT consult to assist with ADL evaluation and planning for discharge  - Provide patient education as appropriate  Outcome: Progressing  Goal: Maintains/Returns to pre admission functional level  Description: INTERVENTIONS:  - Perform AM-PAC 6 Click Basic Mobility/ Daily Activity assessment daily.  - Set and communicate daily mobility goal to care team and patient/family/caregiver.   - Collaborate with rehabilitation services on mobility goals if consulted  - Stand patient   - Ambulate patient   - Out of bed to chair  - Out of bed for meals   - Out of bed for toileting  - Record patient progress and toleration of activity level   Outcome: Progressing     Problem: DISCHARGE PLANNING  Goal: Discharge to home or other facility with appropriate resources  Description: INTERVENTIONS:  - Identify barriers to discharge w/patient and caregiver  - Arrange for needed discharge resources and transportation as appropriate  - Identify discharge learning needs (meds, wound care, etc.)  - Arrange for interpretive services to assist at discharge as needed  - Refer to Case Management Department for  coordinating discharge planning if the patient needs post-hospital services based on physician/advanced practitioner order or complex needs related to functional status, cognitive ability, or social support system  Outcome: Progressing     Problem: Knowledge Deficit  Goal: Patient/family/caregiver demonstrates understanding of disease process, treatment plan, medications, and discharge instructions  Description: Complete learning assessment and assess knowledge base.  Interventions:  - Provide teaching at level of understanding  - Provide teaching via preferred learning methods  Outcome: Progressing     Problem: Prexisting or High Potential for Compromised Skin Integrity  Goal: Skin integrity is maintained or improved  Description: INTERVENTIONS:  - Identify patients at risk for skin breakdown  - Assess and monitor skin integrity  - Assess and monitor nutrition and hydration status  - Monitor labs   - Assess for incontinence   - Turn and reposition patient  - Assist with mobility/ambulation  - Relieve pressure over bony prominences  - Avoid friction and shearing  - Provide appropriate hygiene as needed including keeping skin clean and dry  - Evaluate need for skin moisturizer/barrier cream  - Collaborate with interdisciplinary team   - Patient/family teaching  - Consider wound care consult   Outcome: Progressing

## 2024-12-26 NOTE — ASSESSMENT & PLAN NOTE
Received phenobarbital  mg and diazepam IV 5 mg in the emergency department.  He was also started on standing diazepam p.o. 5 mg every 8 hours  Since arriving to the ICU patient received additional one-time dose of phenobarbital  mg    Plan:  Continue CIWA scoring, favor phenobarbital with Valium as adjunct over lorazepam.  Continue thiamine/folate  Optimize nutrition  Encourage cessation

## 2024-12-26 NOTE — ASSESSMENT & PLAN NOTE
"Patient with hx of AUD. Has had multiple hospitalizations recently for alcohol withdrawal.  Reports drinking at least \"half a bottle\" of bourbon and wine daily  Patient reports he is interested in cessation  Continue CIWA.  CM consultation for CATCH program    "

## 2024-12-26 NOTE — PHYSICAL THERAPY NOTE
Physical Therapy Evaluation    Patient's Name: Diogo Travis    Admitting Diagnosis  Alcohol withdrawal (HCC) [F10.939]  Back pain [M54.9]  Closed fracture of multiple ribs of right side, initial encounter [S22.41XA]    Problem List  Patient Active Problem List   Diagnosis    Alcohol use disorder, severe, dependence (HCC)    Hypomagnesemia    Hepatic steatosis    Chronic alcoholic gastritis    Hypertension    Tinea corporis    Hypokalemia    Thrombocytopenia (HCC)    Elevated lactic acid level    Increased anion gap metabolic acidosis    Sinus tachycardia    Chest tightness    Rash and nonspecific skin eruption    Obesity    Anxiety    Depression    Hyperlipidemia    GERD (gastroesophageal reflux disease)    IBS (irritable bowel syndrome)    AR (allergic rhinitis)    Rosacea    Vitamin D deficiency    Vitamin B12 deficiency    Diarrhea    Coronary artery calcification of native artery    Alcohol withdrawal (HCC)    Major depressive disorder, recurrent, moderate (HCC)    Fall    Multiple rib fractures    Acute pain due to trauma    Alcohol use disorder    Chronic alcohol abuse       Past Medical History  Past Medical History:   Diagnosis Date    Alcohol use disorder, severe, dependence (HCC) 04/02/2023    Alcohol withdrawal seizure (HCC)     Alcoholic ketoacidosis 10/16/2023    Anxiety     AR (allergic rhinitis)     Chronic alcoholic gastritis 04/02/2023    Depression     GERD (gastroesophageal reflux disease)     Hepatic steatosis 04/02/2023    Hyperlipidemia     Hypertension     IBS (irritable bowel syndrome)     Obesity     Rosacea     Vitamin B12 deficiency 02/14/2024    Vitamin D deficiency 02/14/2024       Past Surgical History  Past Surgical History:   Procedure Laterality Date    ANTERIOR CRUCIATE LIGAMENT REPAIR Left     WISDOM TOOTH EXTRACTION            12/26/24 1319   PT Last Visit   PT Visit Date 12/26/24   Note Type   Note type Evaluation   Pain Assessment   Pain Assessment Tool 0-10   Pain Score 2    Pain Location/Orientation Location: Rib Cage   Restrictions/Precautions   Weight Bearing Precautions Per Order No   Other Precautions Chair Alarm;Bed Alarm;Fall Risk;Pain;Multiple lines;Telemetry  (thoracic epidural, rib frx protocol)   Home Living   Type of Home House   Home Layout Two level;Bed/bath upstairs;Stairs to enter with rails  (2 ISAMAR from garage)   Bathroom Shower/Tub Walk-in shower   Bathroom Toilet Standard   Home Equipment   (none)   Prior Function   Level of [place] Independent with functional mobility;Independent with ADLs   Lives With Alone   Receives Help From Family;Friend(s)   IADLs Independent with driving;Independent with medication management;Independent with meal prep   Falls in the last 6 months 1 to 4  (x2)   Vocational Unemployed   Comments at true baseline pt is independent with all mobility   General   Family/Caregiver Present No   Cognition   Orientation Level Oriented X4   Following Commands Follows one step commands without difficulty   Comments pt ID by wristband, name and    Subjective   Subjective pt supine in bed, agreeable to PT eval   RLE Assessment   RLE Assessment X  (grossly assessed to at least 4/5)   LLE Assessment   LLE Assessment X  (grossly assessed to at least 4/5)   Coordination   Movements are Fluid and Coordinated 0   Coordination and Movement Description pt noted to have chills/shivering throughout session, notes 2/2 feeling nervous/stressed   Bed Mobility   Supine to Sit 4  Minimal assistance   Additional items Assist x 1;Increased time required  (trunk management)   Additional Comments pt OOB in recliner post, increased pain with bed mobility   Transfers   Sit to Stand 5  Supervision   Additional items Increased time required   Stand to Sit 5  Supervision   Additional items Increased time required   Additional Comments denies light headedness/dizziness with mobility   Ambulation/Elevation   Gait pattern Antalgic;Narrow MELANY;Forward Flexion;Decreased foot  clearance   Gait Assistance 5  Supervision   Additional items Verbal cues   Assistive Device   (IV pole)   Distance 20'x2   Stair Management Assistance Not tested  (pt main bed/bath are up 1 flight of stairs)   Ambulation/Elevation Additional Comments slowed gait speed 2/2 pain, denies light headedness/dizziness. does not demonstrate LOB or path deviations, use of IV pole for steadying 2/2 pain   Balance   Static Sitting Good   Dynamic Sitting Fair +   Static Standing Fair   Dynamic Standing Fair -   Ambulatory Fair -   Activity Tolerance   Activity Tolerance Patient limited by pain   Medical Staff Made Aware care coordinated with OT   Nurse Made Aware RN janice pre/post   Assessment   Prognosis Good   Problem List Decreased strength;Decreased endurance;Impaired balance;Decreased mobility;Obesity;Orthopedic restrictions;Pain   Assessment Pt is a 59 y.o. male seen for PT evaluation s/p admit to [facility] on 12/24/2024. Pt was admitted with a primary dx of: multiple rib fractures, alcohol withdrawal, back pain.  PT now consulted for assessment of mobility and d/c needs. Pt with OOB to chair orders.  Pts current comorbidities and personal factors effecting treatment include: BMI, anxiety, depression, HTN, alcohol use disorder. Pts current clinical presentation is Unstable/Unpredictable (high complexity) due to Ongoing medical management for primary dx, Increased reliance on more restrictive AD compared to baseline, Decreased activity tolerance compared to baseline, Fall risk, Increased assistance needed from caregiver at current time, Ongoing telemetry monitoring, thoracic epidural in place . Prior to admission, pt was independent without AD. Upon evaluation, pt currently is requiring [place] for bed mobility; Supervision for transfers and Supervision for ambulation 20 ft w/ IV pole. Pt presents at PT eval functioning below baseline and currently w/ overall mobility deficits 2* to: BLE weakness, impaired balance,  gait deviations, pain, decreased activity tolerance compared to baseline, decreased functional mobility tolerance compared to baseline, impaired judgement, fall risk, decreased cognition. Pt currently at a fall risk 2* to impairments listed above.  Pt will continue to benefit from skilled acute PT interventions to address stated impairments; to maximize functional mobility; for ongoing pt/ family training; and DME needs. At conclusion of PT session chair alarm engaged, all needs in reach, RN notified of session findings/recommendations, and pt returned back in recliner chair with phone and call bell within reach. Pt denies any further questions at this time. Recommend Level III (Minimum Resource Intensity)  upon hospital D/C.   Goals   Patient Goals to go home   STG Expiration Date 01/09/25   Short Term Goal #1 In 14 days pt will be able to: 1. Demonstrate ability to perform all aspects of bed mobility independently to improve functional safety.  2. Perform functional transfers with LRAD independently to facilitate safe return to previous living environment.  3.  Ambulate 150 ft with LRAD independently with stable vitals to improve safety with household distances and reduce fall risk.  4. Improve LE strength grades by 1 to increase ease of functional mobility with transfers and gait. 5. Pt will demonstrate improved balance by one grade in order to decrease risk of falls. 6. Climb at least 12 steps with unilateral HR with LRAD independently to simulate entrance to home.   PT Treatment Day 0   Plan   Treatment/Interventions Functional transfer training;Elevations;LE strengthening/ROM;Therapeutic exercise;Patient/family training;Equipment eval/education;Bed mobility;Gait training;Spoke to nursing;Spoke to case management;OT   PT Frequency 2-3x/wk   Discharge Recommendation   Rehab Resource Intensity Level, PT III (Minimum Resource Intensity)   AM-PAC Basic Mobility Inpatient   Turning in Flat Bed Without Bedrails 3    Lying on Back to Sitting on Edge of Flat Bed Without Bedrails 3   Moving Bed to Chair 4   Standing Up From Chair Using Arms 4   Walk in Room 3   Climb 3-5 Stairs With Railing 3   Basic Mobility Inpatient Raw Score 20   Basic Mobility Standardized Score 43.99   [facility] Highest Level Of Mobility   -HLM Goal 6: Walk 10 steps or more   -HLM Achieved 7: Walk 25 feet or more   End of Consult   Patient Position at End of Consult Bedside chair;Bed/Chair alarm activated;All needs within reach   The patient's AM-PAC Basic Mobility Inpatient Short Form Raw Score is 20. A Raw score of greater than 16 suggests the patient may benefit from discharge to home. Please also refer to the recommendation of the Physical Therapist for safe discharge planning.    Chandu Lynn, PT

## 2024-12-26 NOTE — RESPIRATORY THERAPY NOTE
12/26/24 0829   Incentive Spirometry Tx   IS level of assistance Assisted by respiratory care provider;Independent   Frequency q1hr W/A   Respiratory Effort Normal   Treatment Tolerance Tolerated well   Incentive Spirometry Goal (mL) 1000 mL   Incentive Spirometry Achieved (mL) 2250 mL   Chest Physiotherapy Tx   $ Demo/Eval of Kameron, NARDA, IPPB, CPT Yes     Patient on RA. Meeting goal x24 hours. Patient reminded to continue IS x10 an hour.

## 2024-12-26 NOTE — PROGRESS NOTES
"Progress Note - Acute Pain   Name: Diogo Travis 59 y.o. male I MRN: [id number]  Unit/Bed#: ICU 10 I Date of Admission: 12/24/2024   Date of Service: 12/26/2024 I Hospital Day: 1    Assessment & Plan  Acute pain due to trauma    Right 4-7 rib fractures      Plan:  Continue thoracic epidural infusion 0.1% ropivacaine/2mcg/ml fentanyl @8/5/10/3  OK to start SQ heparin for DVT prophylaxis  Discontinued ketamine gtt 12/25  Continue MMA as below    Multimodal Analgesia:  Tylenol 975 mg every 8 hours scheduled  Oral Valium 5 mg every 6 hours scheduled, for spasm related pain/anxiety  Gabapentin 300 mg 3 times daily  IV Toradol 15 mg every 6 hours scheduled x 2 days  Lidoderm patch 12 hours on/12 hours off to affected area  Robaxin 1000 mg every 8 hours scheduled  Oxycodone 5 mg every 4 hours as needed for moderate pain  Oxycodone 10 mg every 4 hours as needed for severe pain  IV Dilaudid 0.5 mg every 2 hours as needed for breakthrough pain  Narcan as needed for respiratory depression/opioid reversal  Continue bowel regimen while utilizing opioids to prevent opioid-induced constipation  Multiple rib fractures  Patient presented 12/24 s/p fall.   Patient reports he may have fallen 3 days prior and developed severe pain prompting him to seek medical attention    CT imaging 12/24: \"Acute to subacute minimally displaced fractures of the right fourth through sixth ribs and nondisplaced fracture of the right seventh rib.\"    Currently maintaining oxygen saturations on room air, denies shortness of breath and able to inspire > 2500 mL with spirometry.     Rib Fracture Evaluation:  Chest tube: none  Respiratory Co-morbidities: Obesity  SpO2:   SPO2 RA Rest      Flowsheet Row ED to Hosp-Admission (Current) from 12/24/2024 in [facility] Intensive Care Unit   SpO2 94 %   SpO2 Activity At Rest   O2 Device Nasal cannula   O2 Flow Rate --                                                                           " "  Incentive Spirometer: Incentive Spirometry Achieved (mL): 2250 mL   Platelet Count:   Results from last 7 days   Lab Units 12/25/24  0425   PLATELETS Thousands/uL 153     Coags:   Results from last 7 days   Lab Units 12/25/24  0425   INR  1.00   PROTIME seconds 13.9     Home anticoagulants: none  VTE covered by:    None      Alcohol use disorder  Patient with hx of AUD. Has had multiple hospitalizations recently for alcohol withdrawal.  Reports drinking at least \"half a bottle\" of bourbon and wine daily  Patient reports he is interested in cessation  Continue CIWA.  CM consultation for CATCH program    Depression  Patients with depression and/or anxiety have more perceived pain on average and often require higher doses of opioid pain medication.  Treat depression and/or anxiety aggressively.      APS will continue to follow. Please contact Acute Pain Service - via SecureChat from [phone] with additional questions or concerns. See [facility] or [facility] for additional contacts and after hours information.     Subjective   Diogo Travis is a 59 y.o. male who presents with history of alcohol use disorder.  Patient reports he sustained a fall possibly 3 days ago and was found to have 4-7 right-sided rib fractures.  Of note patient has had multiple hospitalizations recently for alcohol withdrawal.     Pain History  Current pain location(s):  Pain Score: 3  Pain Location/Orientation: Location: Back  Pain Scale: Pain Assessment Tool: 0-10  24 hour history: Seen at bedside this morning. Resting in bed without acute distress. Reports an overall improvement in chest wall pain following epidural placement yesterday. Denies SOB, maintaining o2 saturations on RA and inspiring 2500 ml with spirometry.     Opioid requirement previous 24 hours:   PCEA utilization: appropriate    Meds/Allergies   all current active meds have been reviewed, current meds:   Current Facility-Administered Medications:     acetaminophen (TYLENOL) " tablet 975 mg, Q8H COLBY    atorvastatin (LIPITOR) tablet 40 mg, Daily With Dinner    diazepam (VALIUM) tablet 5 mg, Q6H    escitalopram (LEXAPRO) tablet 20 mg, Daily    fish oil capsule 2,000 mg, Daily    folic acid (FOLVITE) tablet 1 mg, Daily    gabapentin (NEURONTIN) capsule 300 mg, TID    HYDROmorphone (DILAUDID) injection 0.5 mg, Q2H PRN    lisinopril (ZESTRIL) tablet 40 mg, Daily    methocarbamol (ROBAXIN) tablet 1,000 mg, Q8H    mirtazapine (REMERON) tablet 15 mg, HS    multivitamin-minerals (CENTRUM) tablet 1 tablet, Daily    naloxone (NARCAN) 0.04 mg/mL syringe 0.04 mg, Q1MIN PRN    nicotine (NICODERM CQ) 21 mg/24 hr TD 24 hr patch 1 patch, Daily    ondansetron (ZOFRAN) injection 4 mg, Q4H PRN    oxyCODONE (ROXICODONE) IR tablet 5 mg, Q4H PRN **OR** oxyCODONE (ROXICODONE) immediate release tablet 10 mg, Q4H PRN    pantoprazole (PROTONIX) EC tablet 40 mg, Daily Before Breakfast    polyethylene glycol (MIRALAX) packet 17 g, Daily    ropivacaine 0.1% and fentaNYL 2 mcg/mL PCEA, Continuous    senna-docusate sodium (SENOKOT S) 8.6-50 mg per tablet 1 tablet, HS    thiamine tablet 100 mg, Daily, and PTA meds:   Prior to Admission Medications   Prescriptions Last Dose Informant Patient Reported? Taking?   Magnesium 400 MG CAPS   Yes No   Sig: Take 1 capsule by mouth in the morning   Multiple Vitamin (multivitamin) tablet   No No   Sig: Take 1 tablet by mouth daily   Omega-3 Fatty Acids (fish oil) 1,000 mg   Yes No   Sig: Take 2,000 mg by mouth daily   Thiamine Mononitrate (VITAMIN B1) 100 mg tablet   No No   Sig: Take 1 tablet (100 mg total) by mouth daily   atorvastatin (LIPITOR) 40 mg tablet   No No   Sig: Take 1 tablet (40 mg total) by mouth daily with dinner   escitalopram (LEXAPRO) 20 mg tablet   No No   Sig: Take 1 tablet (20 mg total) by mouth daily   folic acid (FOLVITE) 1 mg tablet   No No   Sig: Take 1 tablet (1 mg total) by mouth daily   hydrOXYzine HCL (ATARAX) 25 mg tablet   No No   Sig: Take 1 tablet  (25 mg total) by mouth every 6 (six) hours as needed for itching   lisinopril (ZESTRIL) 40 mg tablet   No No   Sig: Take 1 tablet (40 mg total) by mouth daily   metroNIDAZOLE (METROCREAM) 0.75 % cream   No No   Sig: Apply topically 2 (two) times a day   mirtazapine (REMERON) 15 mg tablet   No No   Sig: Take 1 tablet (15 mg total) by mouth daily at bedtime   pantoprazole (PROTONIX) 40 mg tablet   No No   Sig: Take 1 tablet (40 mg total) by mouth daily in the early morning      Facility-Administered Medications: None     Allergies   Allergen Reactions    Cefdinir Rash     Objective :  Temp:  [97.7 °F (36.5 °C)-99.2 °F (37.3 °C)] 97.7 °F (36.5 °C)  HR:  [] 69  BP: ()/() 108/66  Resp:  [9-25] 10  SpO2:  [88 %-97 %] 94 %  O2 Device: None (Room air)    Physical Exam  Vitals reviewed.   HENT:      Head: Normocephalic.      Mouth/Throat:      Mouth: Mucous membranes are dry.   Eyes:      Extraocular Movements: Extraocular movements intact.   Cardiovascular:      Rate and Rhythm: Normal rate.      Pulses: Normal pulses.   Pulmonary:      Effort: Pulmonary effort is normal.   Chest:      Chest wall: Tenderness (Right) present.   Abdominal:      General: Abdomen is flat.   Musculoskeletal:         General: Normal range of motion.      Cervical back: Normal range of motion.   Skin:     General: Skin is dry.   Neurological:      General: No focal deficit present.      Mental Status: He is alert and oriented to person, place, and time.   Psychiatric:         Mood and Affect: Mood normal.      Epidural: Site clean/dry/intact, no surrounding erythema/edema/induration, infusion functioning appropriately        Lab Results: I have reviewed the following results:  Estimated Creatinine Clearance: 99.2 mL/min (by C-G formula based on SCr of 0.93 mg/dL).  Lab Results   Component Value Date    WBC 5.05 12/25/2024    HGB 12.7 12/25/2024    HCT 37.5 12/25/2024     12/25/2024         Component Value Date/Time    K  4.0 12/26/2024 0517    K 3.7 12/27/2022 1818     12/26/2024 0517     12/27/2022 1818    CO2 31 12/26/2024 0517    CO2 15 (L) 12/12/2023 1611    CO2 23 12/27/2022 1818    BUN 17 12/26/2024 0517    BUN 10 12/27/2022 1818    CREATININE 0.93 12/26/2024 0517    CREATININE 0.92 12/27/2022 1818         Component Value Date/Time    CALCIUM 9.1 12/26/2024 0517    CALCIUM 9.7 12/27/2022 1818    ALKPHOS 102 12/24/2024 0507    ALKPHOS 93 12/27/2022 1818    AST 46 (H) 12/24/2024 0507    AST 51 (H) 12/27/2022 1818    ALT 24 12/24/2024 0507    ALT 41 12/27/2022 1818    TP 7.6 12/24/2024 0507    TP 7.9 12/27/2022 1818    ALB 4.5 12/24/2024 0507    ALB 4.7 12/27/2022 1818       Imaging Results Review: No pertinent imaging studies reviewed.  Other Study Results Review: No additional pertinent studies reviewed.

## 2024-12-26 NOTE — OCCUPATIONAL THERAPY NOTE
Occupational Therapy Evaluation     Patient Name: Diogo Travis  Today's Date: 12/26/2024  Problem List  Principal Problem:    Multiple rib fractures  Active Problems:    Depression    Alcohol withdrawal (HCC)    Fall    Acute pain due to trauma    Alcohol use disorder    Chronic alcohol abuse    Past Medical History  Past Medical History:   Diagnosis Date    Alcohol use disorder, severe, dependence (HCC) 04/02/2023    Alcohol withdrawal seizure (HCC)     Alcoholic ketoacidosis 10/16/2023    Anxiety     AR (allergic rhinitis)     Chronic alcoholic gastritis 04/02/2023    Depression     GERD (gastroesophageal reflux disease)     Hepatic steatosis 04/02/2023    Hyperlipidemia     Hypertension     IBS (irritable bowel syndrome)     Obesity     Rosacea     Vitamin B12 deficiency 02/14/2024    Vitamin D deficiency 02/14/2024     Past Surgical History  Past Surgical History:   Procedure Laterality Date    ANTERIOR CRUCIATE LIGAMENT REPAIR Left     WISDOM TOOTH EXTRACTION           12/26/24 1331   OT Last Visit   OT Visit Date 12/26/24   Note Type   Note type Evaluation   Pain Assessment   Pain Assessment Tool 0-10   Pain Score 2   Pain Location/Orientation Location: Rib Cage   Restrictions/Precautions   Weight Bearing Precautions Per Order No   Other Precautions Chair Alarm;Bed Alarm;Multiple lines;Telemetry;Pain;Fall Risk  (thoracic epidural, rib frx protocol, CIWA)   Home Living   Type of Home House   Home Layout Two level;Bed/bath upstairs;Performs ADLs on one level  (2 ISAMAR from garage)   Bathroom Shower/Tub Walk-in shower   Bathroom Toilet Standard   Bathroom Accessibility Accessible   Home Equipment   (none)   Prior Function   Level of [place] Independent with ADLs;Independent with functional mobility;Independent with IADLS   Lives With Alone   Receives Help From Family;Friend(s)  (significant other stays with patient occasionally.)   IADLs Independent with driving;Independent with meal prep;Independent  with medication management   Falls in the last 6 months 1 to 4  (2- 1 leading to admission)   Vocational Unemployed   Lifestyle   Autonomy PTA, pt is (I) c ADLs, IADLs, no AD. Lives alone. + driving (-) working   Reciprocal Relationships fiance   Service to Others unemployed   Intrinsic Gratification fishing   General   Additional Pertinent History pt admitted s/p fall resulting in multiple rib fractures. Complicated by alcohol use disorder with withdrawal, acute pain, depression.   Family/Caregiver Present No   Subjective   Subjective Pt agreeable to OT session, Reports having anxiety   ADL   Eating Assistance 7  Independent   Grooming Assistance 6  Modified Independent   UB Bathing Assistance 5  Supervision/Setup   LB Bathing Assistance 5  Supervision/Setup   UB Dressing Assistance 5  Supervision/Setup   UB Dressing Deficit   (educated on suleman techniques d/t pain limited R shoulder ROM)   LB Dressing Assistance 5  Supervision/Setup   LB Dressing Deficit   (dons and doffs socks seated in recliner)   Toileting Assistance  5  Supervision/Setup   Functional Assistance 5  Supervision/Setup   Additional Comments increased time d/t pain.   Bed Mobility   Supine to Sit 4  Minimal assistance   Additional items Increased time required;Assist x 1;HOB elevated;Verbal cues   Additional Comments cues for breathing techniques during transitional movements   Transfers   Sit to Stand 5  Supervision   Additional items Increased time required;Verbal cues   Stand to Sit 5  Supervision   Additional items Increased time required;Verbal cues   Additional Comments (-) lightheaded/dizziness c positional changes.   Functional Mobility   Functional Mobility 5  Supervision   Additional Comments household distance within room, increased time. slight tremoring noted, self steadies on IV pole. no overt LOB. spO2 96%   Additional items Rolling walker   Balance   Static Sitting Good   Dynamic Sitting Fair +   Static Standing Fair   Dynamic  Standing Fair -   Activity Tolerance   Activity Tolerance Patient limited by pain   Medical Staff Made Aware Care coordination c OT   Nurse Made Aware RN Elena pre/post   RUE Assessment   RUE Assessment WFL  (MMT grossly 4/5 based on functional assessment)   LUE Assessment   LUE Assessment WFL  (MMT grossly 4/5 based on functional assessment)   Hand Function   Gross Motor Coordination Impaired   Fine Motor Coordination Impaired   Hand Function Comments tremoring noted- RN Elean made aware. Pt reports d/t his anxiety   Sensation   Light Touch No apparent deficits   Cognition   Overall Cognitive Status WFL   Arousal/Participation Alert;Cooperative   Attention Within functional limits   Orientation Level Oriented X4   Memory Within functional limits   Following Commands Follows one step commands without difficulty   Comments cues for safety awareness. overall flat but agreeable to OT session. denies headstrikes c falls, denies confusion, visual changes, N/V, etc   Assessment   Limitation Decreased ADL status;Decreased UE strength;Decreased Safe judgement during ADL;Decreased endurance;Decreased self-care trans;Decreased high-level ADLs  (balance, standing tolerance, pain)   Prognosis Fair   Assessment Patient is a 59 y.o. male seen for OT evaluation at [facility] following admission on 12/24/2024  s/p Multiple rib fractures. Please see above for comprehensive list of comorbidities and significant PMHx impacting functional performance.  Upon initial evaluation, pt appears to be performing below baseline functional status.   Occupational performance is affected by the following deficits: endurance ,  decreased muscular strength , decreased balance , decreased activity tolerance , and (+) pain . Personal/Environmental factors impacting D/C include: (+) Hx of falls , alcohol abuse. Supporting factors include: (I) PLOF.  Patient would benefit from OT services within the acute care setting to maximize level  of functional independence in the following areas self-care transfers, functional mobility, and ADLs.  From OT standpoint, recommendation at time of D/C would be level 4: no rehabilitation needs .   Goals   Patient Goals to go home   Plan   Treatment Interventions ADL retraining;Functional transfer training;Patient/family training;Equipment evaluation/education;Compensatory technique education   Goal Expiration Date 01/05/25   OT Treatment Day 0   OT Frequency 1-2x/wk   Discharge Recommendation   Rehab Resource Intensity Level, OT No post-acute rehabilitation needs   Additional Comments  The patient's raw score on the AM-PAC Daily Activity Inpatient Short Form is 20. A raw score of greater than or equal to 19 suggests the patient may benefit from discharge to home. Please refer to the recommendation of the Occupational Therapist for safe discharge planning.   AM-PAC Daily Activity Inpatient   Lower Body Dressing 3   Bathing 3   Toileting 3   Upper Body Dressing 3   Grooming 4   Eating 4   Daily Activity Raw Score 20   Daily Activity Standardized Score (Calc for Raw Score >=11) 42.03   AM-PAC Applied Cognition Inpatient   Following a Speech/Presentation 3   Understanding Ordinary Conversation 4   Taking Medications 3   Remembering Where Things Are Placed or Put Away 4   Remembering List of 4-5 Errands 3   Taking Care of Complicated Tasks 3   Applied Cognition Raw Score 20   Applied Cognition Standardized Score 41.76   End of Consult   Education Provided Yes;Family or social support of family present for education by provider   Patient Position at End of Consult Bed/Chair alarm activated;All needs within reach;Bedside chair   Nurse Communication Nurse aware of consult   Goals established on initial evaluation in order to achieve pt's goal of maximizing functional independence.      Pt will complete UB ADLs Mod independent   for increased ADL independence within 10 days.     Pt will complete LB ADLs Mod independent    for increased ADL independence within 10 days.     Pt will complete toileting Mod independent   with use of DME for increased ADL independence within 10 days.     Pt will demonstrate proper body mechanics to complete self-care transfers and functional mobility with Mod independent  and use of LRAD for increased safety and functional independence within 10 days.     Pt will demonstrate standing tolerance of 8 min for increased activity tolerance during ADL/IADL tasks within 10 days.     Pt will demonstrate proper body mechanics and fall prevention strategies during 100% of tx sessions for increased safety awareness during ADL/IADLs    Pt will demonstrate activity tolerance of 30 min in therapeutic tasks for increased participation in meaningful activities upon D/C.      Pt will demonstrate OOB sitting tolerance of 2-4 hr/day for increased activity tolerance and engagement in leisure activities within 10 days.   Tess Ballard, OT

## 2024-12-26 NOTE — ASSESSMENT & PLAN NOTE
Right 4-7 rib fractures      Plan:  Continue thoracic epidural infusion 0.1% ropivacaine/2mcg/ml fentanyl @8/5/10/3  OK to start SQ heparin for DVT prophylaxis  Discontinued ketamine gtt 12/25  Continue MMA as below    Multimodal Analgesia:  Tylenol 975 mg every 8 hours scheduled  Oral Valium 5 mg every 6 hours scheduled, for spasm related pain/anxiety  Gabapentin 300 mg 3 times daily  IV Toradol 15 mg every 6 hours scheduled x 2 days  Lidoderm patch 12 hours on/12 hours off to affected area  Robaxin 1000 mg every 8 hours scheduled  Oxycodone 5 mg every 4 hours as needed for moderate pain  Oxycodone 10 mg every 4 hours as needed for severe pain  IV Dilaudid 0.5 mg every 2 hours as needed for breakthrough pain  Narcan as needed for respiratory depression/opioid reversal  Continue bowel regimen while utilizing opioids to prevent opioid-induced constipation

## 2024-12-26 NOTE — ASSESSMENT & PLAN NOTE
Started on multimodal pain regimen    Plan:  Tylenol 975 mg p.o. every 8 hours  Gabapentin 300 mg p.o. 3 times daily  Toradol 15 mg IV every 6 hours  Methocarbamol 1 g p.o. every 8 hours  Dilaudid 0.5 mg IV as needed for breakthrough pain  APS consulted, appreciate recommendations  12/25 Epidural placement  Bowel regimen while on multimodal pain regimen

## 2024-12-26 NOTE — UTILIZATION REVIEW
URGENT/EMERGENT  INPATIENT/SPU AUTHORIZATION REQUEST    Date: 12/26/24            # Pages in this Request:     X New Request   Additional Information for PA#:     Office Contact Name:  Florence Dey Title: Utilization Review, Registed Nurse     Phone: [phone]  Ext.     Availability (Date/Time): M-F 8-4    Type of Review:  Inpatient Review    Late Pick-Up    For Late Pick-up Cases:  How your facility was first notified of the Late Pick-up: PATHS letter  When your facility was first notified of the Late Pick-up (date): 12/13/2024     Was the recipient a prisoner at the time of admission? no         RECIPIENT INFORMATION    Recipient ID#:  [id number] Recipient Name: Diogo Travis      YOB: 1965  59 y.o. Recipient Alias: None.     Gender:  x Male  Female Medicaid Eligibility (HCB Package):          INSURANCE INFORMATION    (All other private or governmental health insurance benefits must be utilized prior to billing the [place] Program)    Was this admission the result of an MVA, other accident, assault, injury, fall, gunshot, bite etc.?  no   If yes, provide a brief description of the incident.      Does the recipient have other insurance coverage?  no  Insurance Company Name:    Policy #:  Did that insurance pay on this claim?    Yes  No     Did that insurance deny this claim?   Yes  No     If yes, reason for denial:      Does the recipient have Medicare?  no  Did Medicare exhaust prior to this admission?    Yes  No     Did Medicare partially pay this claim?   Yes  No     Did Medicare deny this claim?   Yes  No   If yes, reason for denial:      Was the recipient a prisoner at the time of admission?  no        PROVIDER INFORMATION    [facility]: [facility] Provider ID#: [id number]     Admitting Physician: [facility]                          [facility] Provider ID#: [id number]        ADMISSION INFORMATION    Type of Admission: (please choose one)  "ED    Admission Floor or Unit Type: Med Surg      Dates/Times:        ED Date/Time: 11/11/2024  2:07 PM        Observation Date/Time: N/A        IP Admission Date/Time: 11/11/24  4:56 PM        Discharge or Transfer Date/Time: 11/13/2024  1:19 PM        DIAGNOSIS/PROCEDURE CODES    Primary Diagnosis Code/Primary Diagnosis Code description:    F10.230 - Alcohol dependence with withdrawal, uncomplicated      Additional Diagnosis Code(s) and Description(s)-(up to 3 additional codes):    R65.10 - Systemic inflammatory response syndrome (sirs) of non-infectious origin without acute organ dysfunction  K29.20 - Alcoholic gastritis without bleeding  E83.42 - Hypomagnesemia      Procedure Code (1) and description: None.         CLINICAL INFORMATION - PRIOR ADMISSION ONLY    Is there a prior admission with a discharge date within 30 days of the date of this admission?    x No (Proceed to the next section - \"Clinical Information - General Review Checklist\")      Yes (Provide the following information)     Prior admission dates:    MA Prior Authorization Number:    Review Outcome:     Diagnosis Code(s)/Description:    Procedure Code/Description:    Findings:    Treatment:    Condition on Discharge:   Vitals:    Labs:   Imaging:   Medications:    Follow-up Instructions:    Disposition:        CLINICAL INFORMATION - GENERAL REVIEW CHECKLIST    EMERGENCY DEPARTMENT: (Proceed to \"ADMISSION\" if Direct Admission)    Presenting Signs/Symptoms:  Chief Complaint   Patient presents with    Alcohol Problem     Pt reports he has nausea and lightheaded and it is related to drinking. Pt reports he last drank at 0530 this am 2 oz.. but he drank more the night before       Medication/treatment prior to arrival in the ED: None.     Past Medical History:  has a past medical history of Alcohol use disorder, severe, dependence (HCC) (04/02/2023), Alcohol withdrawal seizure (HCC), Alcoholic ketoacidosis (10/16/2023), Anxiety, AR (allergic " rhinitis), Chronic alcoholic gastritis (04/02/2023), Depression, GERD (gastroesophageal reflux disease), Hepatic steatosis (04/02/2023), Hyperlipidemia, Hypertension, IBS (irritable bowel syndrome), Obesity, Rosacea, Vitamin B12 deficiency (02/14/2024), and Vitamin D deficiency (02/14/2024).     Clinical Exam:  Alert. He is in acute distress. Tremulous at rest. Mildly diaphoretic. Mucous membranes slightly dry. Tachycardia. Slowly transitioned from wheelchair to stretcher - maintaining  on chair to do so.     Initial Vital Signs: (Temp, Pulse, Resp, and BP)   ED Triage Vitals   Temperature Pulse Respirations Blood Pressure SpO2   11/11/24 1341 11/11/24 1339 11/11/24 1339 11/11/24 1339 11/11/24 1339   98.4 °F (36.9 °C) (!) 153 22 146/99 96 %      Temp Source Heart Rate Source Patient Position - Orthostatic VS BP Location FiO2 (%)   11/11/24 1341 11/11/24 1450 11/11/24 1339 11/11/24 1339 --   Oral Monitor Sitting Left arm       Pain Score       11/11/24 1430       2           Pertinent Repeat Vital Signs: (include times they were obtained)      Date/Time Temp Pulse Resp BP MAP (mmHg) SpO2 O2 Device   11/12/24 1500 98.6 °F (37 °C) -- 18 140/86 -- -- --      98 -- 155/98 117 94 % -- -- -- --    11/12/24 0900 -- -- -- -- -- -- -- -- 1 --   11/12/24 0734 98.2 °F (36.8 °C) 85 18 143/97 112 96 % None (Room air) -- -- --   11/12/24 0700 -- -- -- -- -- -- -- -- 2 --   11/12/24 0300 -- 82 -- -- -- -- -- -- 3 --   11/12/24 02:57:38 -- 81 -- 125/89 101 95 % -- -- -- --   11/11/24 2255 -- 102 -- 143/93 -- -- -- -- 5 --   11/11/24 2144 -- 104 -- 133/89 -- -- -- -- 12 --   11/11/24 21:00:43 99.6 °F (37.6 °C) 109 19 135/89 104 93 % -- -- -- --   11/11/24 2100 -- -- -- -- -- -- None (Room air) 15 -- --   11/11/24 20:46:34 -- 107 -- 159/102 121 93 % -- -- -- --   11/11/24 2045 -- -- -- -- -- -- -- -- -- 8 11/11/24 2040 -- 105 -- 159/102 -- -- -- -- 16 --   11/11/24 2032 -- -- -- -- -- -- -- -- -- 9 11/11/24 19:33:38 99.5 °F  (37.5 °C) 112 22 144/98 113 95 % None (Room air) -- -- --   11/11/24 1900 -- -- -- -- -- -- -- -- 16 --   11/11/24 1800 -- 121 22 149/84 110 95 % None (Room air) -- -- --   11/11/24 1630 -- 117 22 141/85 108 93 % None (Room air) -- -- --   11/11/24 1459 -- 123 -- 132/79 102 93 % -- -- -- --   11/11/24 1450 -- 121 22 167/84 116 93 % None (Room air) -- -- --   11/11/24 1430 -- -- -- -- -- -- -- 15 -- 2   11/11/24 1421 -- 135 -- 152/89 116 97 % -- -- -- --   11/11/24 1415 -- 142 -- 176/113 -- -- -- -- 25 --   11/11/24 1341 98.4 °F (36.9 °C) -- -- -- -- -- -- -- -- --   11/11/24 1339 -- 153 22 146/99 -- 96 % None (Room air) -- -- --     CIWA-Ar Score         Row Name 11/12/24 0900 11/12/24 0700 11/12/24 0300             CIWA-Ar     Pulse -- -- 82     Nausea and Vomiting 0 0 0     Tactile Disturbances 0 0 0     Tremor 0 1 2     Auditory Disturbances 0 0 0     Paroxysmal Sweats 0 0 0     Visual Disturbances 0 0 1     Anxiety 1 1 0     Headache, Fullness in Head 0 0 0     Agitation 0 0 0     Orientation and Clouding of Sensorium 0 0 0     CIWA-Ar Total 1 2 3        Row Name 11/11/24 2255 11/11/24 2144 11/11/24 2040             CIWA-Ar     /93 133/89 159/102     Pulse 102 104 105     Nausea and Vomiting 0 0 0     Tactile Disturbances 0 0 0     Tremor 2 2 2     Auditory Disturbances 0 0 0     Paroxysmal Sweats 0 1 2     Visual Disturbances 1 1 1     Anxiety 2 4 5     Headache, Fullness in Head 0 3 5     Agitation 0 1 1     Orientation and Clouding of Sensorium 0 0 0     CIWA-Ar Total 5 12 16        Row Name 11/11/24 1900 11/11/24 1415                  CIWA-Ar     BP -- 176/113       Pulse -- 142       Nausea and Vomiting 0 1       Tactile Disturbances 0 3       Tremor 3 6       Auditory Disturbances 0 1       Paroxysmal Sweats 1 4       Visual Disturbances 0 3       Anxiety 4 3       Headache, Fullness in Head 5 1       Agitation 3 3       Orientation and Clouding of Sensorium 0 0       CIWA-Ar Total 16 25           Pertinent Sustained Findings: (include times they were obtained)    Weight in Kilograms:   Wt Readings from Last 1 Encounters:   12/24/24 100 kg (220 lb 10.9 oz)       Pertinent Labs (results):      Lab Units 11/12/24  0537 11/11/24  1950 11/11/24  1425   WBC Thousand/uL 4.83  --  12.14*   HEMOGLOBIN g/dL 12.3  --  16.1   HEMATOCRIT % 35.7*  --  46.3   PLATELETS Thousands/uL 119* 125* 194   TOTAL NEUT ABS Thousands/µL 3.35  --  9.82*                Results from last 7 days   Lab Units 11/12/24  0537 11/11/24  1425   SODIUM mmol/L 138 139   POTASSIUM mmol/L 3.9 4.8   CHLORIDE mmol/L 106 101   CO2 mmol/L 25 22   ANION GAP mmol/L 7 16*   BUN mg/dL 12 10   CREATININE mg/dL 0.95 1.18   EGFR ml/min/1.73sq m 87 67   CALCIUM mg/dL 8.3* 10.0   MAGNESIUM mg/dL 2.0 1.3*            Results from last 7 days   Lab Units 11/12/24  0537 11/11/24  1425   AST U/L 45* 99*   ALT U/L 27 48   ALK PHOS U/L 80 132*   TOTAL PROTEIN g/dL 6.1* 8.3   ALBUMIN g/dL 3.6 5.1*   TOTAL BILIRUBIN mg/dL 1.10* 1.21*                Results from last 7 days   Lab Units 11/12/24  0537 11/11/24  1425   GLUCOSE RANDOM mg/dL 98 119                    BETA-HYDROXYBUTYRATE   Date Value Ref Range Status   10/16/2023 2.9 (H) <0.6 mmol/L Final   10/01/2023 0.8 (H) <0.6 mmol/L Final           Results from last 7 days   Lab Units 11/11/24  1425   HS TNI 0HR ng/L 3            Results from last 7 days   Lab Units 11/11/24  1652 11/11/24  1425   LACTIC ACID mmol/L 0.8 2.8*           Results from last 7 days   Lab Units 11/11/24  1425   LIPASE u/L 27           Results from last 7 days   Lab Units 11/11/24  1425   ETHANOL LVL mg/dL <10   ACETAMINOPHEN LVL ug/mL <2*   SALICYLATE LVL mg/dL <5        Radiology (results):     XR chest 1 view portable   Final  (11/11 1557)       No acute cardiopulmonary disease on this examination, which is somewhat limited secondary to low lung volumes.       EKG (results):     Sinus tachycardia  Poor anterior R wave progression is  "noted  Nonspecific ST and T wave abnormality  Abnormal ECG  When compared with ECG of 10-Oct-2024 07:36,  No significant change was found       Other tests (results): None.     Tests pending final results: None.     Treatment in the ED:   Medication Administration from 11/11/2024 1311 to 11/11/2024 1934         Date/Time Order Dose Route Action     11/11/2024 1450 EST sodium chloride 0.9 % bolus 1,000 mL 0 mL Intravenous Stopped     11/11/2024 1424 EST sodium chloride 0.9 % bolus 1,000 mL 1,000 mL Intravenous New Bag     11/11/2024 1429 EST ondansetron (ZOFRAN) injection 4 mg 4 mg Intravenous Given     11/11/2024 1429 EST LORazepam (ATIVAN) injection 1 mg 1 mg Intravenous Given     11/11/2024 1712 EST magnesium sulfate 2 g/50 mL IVPB (premix) 2 g 0 g Intravenous Stopped     11/11/2024 1517 EST magnesium sulfate 2 g/50 mL IVPB (premix) 2 g 2 g Intravenous New Bag     11/11/2024 1636 EST sodium chloride 0.9 % bolus 1,000 mL 0 mL Intravenous Stopped     11/11/2024 1524 EST sodium chloride 0.9 % bolus 1,000 mL 1,000 mL Intravenous New Bag             Other treatments: None.       Change in condition while in the ED: None.     Response to ED Treatment: Admit to inpatient med surg for alcohol use disorder, chronic alcoholic gastritis. Plan includes : consult toxicology, iv hydration, ciwa protocol, iv thiamine / folate/ multivitamins, iv protonix, CIWA assessments, trend CMP, CBC/ DIFF.           OBSERVATION: (Proceed to \"ADMISSION\" if Direct Admission)    Orders written during the observation period  Meds Name, dose, route, time, how may doses given:  PRN Meds Name, dose, route, time, how many doses given within the first 24 hrs.:  IVs Type, rate, and total amt. ordered/given:  Labs, imaging, other:  Consults and findings:    Test Results during the observation period  Pertinent Lab tests (dates/results):  Culture results (blood, urine, spinal, wound, respiratory, etc.):  Imaging tests (dates/results):  EKG " (dates/results):  Other test (dates/results):  Tests pending (dates/results):    Surgical or Invasive Procedures during the observation period  Name of surgery/procedure:  Date & Time:  Patient Response:  Post-operative orders:  Operative Report/Findings:    Response to Treatment, Major Change in Condition, Major Charge in Treatment during the observation period          ADMISSION:    DIRECT Admissions Only:    Presenting Signs/Symptoms:     Medication/treatment prior to arrival:    Past Medical History:    Clinical Exam on admission:    Vital Signs on admission: (Temp, Pulse, Resp, and BP)    Weight in kilograms:     ========================================================================================    ALL Admissions:    Admission Orders and Other Orders written within the first 24 hrs after admission  Meds Name, dose, route, time, how may doses given:    atorvastatin, 40 mg, Oral, Daily With Dinner  enoxaparin, 40 mg, Subcutaneous, Daily  ergocalciferol, 50,000 Units, Oral, Weekly  escitalopram, 20 mg, Oral, Daily  fish oil, 2,000 mg, Oral, Daily  folic acid 1 mg, thiamine (VITAMIN B1) 100 mg in sodium chloride 0.9 % 100 mL IV piggyback, , Intravenous, Daily  lisinopril, 40 mg, Oral, Daily  multivitamin stress formula, 1 tablet, Oral, Daily  pantoprazole, 40 mg, Intravenous, Q24H COLBY    LORazepam (ATIVAN) injection 4 mg  Dose: 4 mg  Freq: Once Route: IV  Start: 11/11/24 2045 End: 11/11/24 2052    magnesium sulfate 2 g/50 mL IVPB (premix) 2 g x 1 dose 11/11 @ 2013  Dose: 2 g  Freq: Once Route: IV  Last Dose: 2 g (11/11/24 2013)  Start: 11/11/24 1915 End: 11/11/24 2213    PHENobarbital 650 mg in sodium chloride 0.9 % 100 mL IVPB x 1 dose 11/11 @ 2016  Dose: 650 mg  Freq: Once Route: IV  Last Dose: 650 mg (11/11/24 2016)  Start: 11/11/24 1930 End: 11/11/24 2046        PRN Meds Name, dose, route, time, how many doses given within the first 24 hrs.:    acetaminophen, 650 mg, Oral, Q6H PRN x 1 dose 11/11 @  2032  hydrOXYzine HCL, 25 mg, Oral, Q6H PRN  labetalol, 10 mg, Intravenous, Q6H PRN  melatonin, 9 mg, Oral, HS PRN  ondansetron, 4 mg, Intravenous, Q6H PRN    IVs Type, rate, and total amt. ordered/given:    multi-electrolyte, 125 mL/hr, Intravenous, Continuous       Labs, imaging, other:  CIWA, consult Toxicology, Psychiatry,         Consults and findings:    11/11 Internal Medicine H&P:    Attending Attestation:    I reviewed our medical resident's HPI and ROS of the patient and I agree with them.  Please see notes below. 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  Thus, patient admitted to me that he had been depressed, but on further discussions with him, he denied any intentions of hurting himself or any suicidal or any homicidal ideations or plans.  Patient was okay to be seen by our psychiatrist.  I reviewed our medical resident's physical examination of the patient and I agree with them, except that patient was not diaphoretic anymore and in addition to the abdominal examination below I appreciated soft, normoactive bowel sounds, nontender, nondistended, no rebound or guarding.  Please see notes below.    I reviewed patient's vital signs and diagnostic test results.  In addition, on my EKG reading it revealed sinus tachycardia at 142/min, with no significant changes compared to previous EKG dated 10/10/2024.  Follow-up official results.     Assessments:  Alcohol use disorder, severe, dependence; alcohol withdrawal syndrome.  SIRS, with tachycardia, tachypnea and elevated WBC, likely in the setting of alcohol withdrawal syndrome.  Chronic alcoholic gastritis.  Abnormal LFT, likely due to  alcohol.  Hypomagnesemia.  Hypertension.  Depression and anxiety.     Plans:  Toxicology was consulted and recommended a dose of phenobarbital at 650 mg.  Continue IV fluid hydration.  CIWA protocol.  IV thiamine, folate and multivitamins.   If patient's symptoms worsen and withdrawal not controlled, consider upgrading patient's care to stepdown versus ICU and may need further doses of phenobarbital.  Needs to touch base with  again for this.  Continue to monitor CMP and CBC with differential count and serum electrolytes.  IV Protonix and antiemetic on as-needed basis.  Continue patient's lisinopril.  IV labetalol on as needed basis.  Continue patient's Lexapro and as needed Atarax ordered.  Since possible root cause of patient's alcohol drinking is depression, for psychiatrist consult.  Alcohol cessation counseling was done.  From my discussion with the patient, he is okay with case management consult to discussed alcohol rehab either as an inpatient or outpatient.  Replaced serum magnesium.  Monitor serum electrolytes.  Replace as needed.    Assessment & Plan:    Alcohol use disorder, severe, dependence (HCC)  Patient presented the ED on 11/11 due to alcohol withdrawal.  Patient reports last drink was 6 AM on 11/11 but was reported having heavy drinking the night prior till 2 AM.   Patient presented with significant tremor, lightheadedness that started earlier this morning along with episodes of nausea and vomiting nonbilious in nature.  But no hematemesis.   Denies having any sick contacts, bad food ingestion, chest pain, shortness of breath, palpitations  Of note patient was recently hospitalized alcohol withdrawal a month ago and was in the ICU and recommended transfer to [facility] ER which the patient declined and left AMA.   Vitals patient tachycardic 120s, WBC 12.14, anion gap 16, AST 99/ALT 48, alk phos 132, T. bili 1.21  Lactic acid 0.8<2.8  Coma panel neg  EKG sinus tachycardia heart  rate 142, QTc 436  Troponins negative  Chest x-ray with no acute cardiopulmonary disease     In the ED patient received 2 L fluid bolus, Zofran, magnesium and Ativan 1 mg     Plan:  Toxicology was consulted recommended loading dose of phenobarbital 650 mg  Continue IV fluids hydration given elevated anion gap with isolated 125 cc/hour  N.p.o.-advance diet as tolerated  Initiate  mg thiamine/folate daily-transition to p.o. when able to tolerate  CIWA protocol  Could consider additional doses of phenobarbital 130 mg IV Q1H as needed for alcohol withdrawal symptoms  If symptoms worsen and withdrawal not under control consider stepdown versus ICU care  Toxicology on board-recommendation appreciated    Chronic alcoholic gastritis  Continue with IV Protonix  Zofran as needed    Hypertension  PTA lisinopril 40 mg daily   Will add labetalol as needed given n.p.o.    Anxiety  PTA on Lexapro 20 mg daily  As needed Atarax 25 mg every 6 hours    SIRS (systemic inflammatory response syndrome) (HCC)  Patient with tachycardia, tachypnea elevated WBC in the setting of alcohol withdrawal  See above for plan    Physical exam:  Alert. Very anxious appearing with tremors. Diaphoretic. Tachycardia.         11/11 Medical Toxicology consult:      Assessment & Plan    Alcohol withdrawal syndrome without complication (HCC)  Give loading dose of phenobarbital 10 mg/kg of ideal body weight, max 650 mg  Continue CIWA/benzodiazepines or can consider continuing phenobarbital.  Please see below for more specific dosing of diazepam versus phenobarbital.  Supportive care including IV fluids, antiemetics, not on opioid analgesics, and antidiarrheals as needed  Thiamine, folic acid, multivitamin  If sedative drip is required as adjunct, recommend ketamine over Precedex due to its NMDA receptor antagonism, which is directly responsible for the pathophysiology of alcohol withdrawal.    The dose is 0.3 mg/kg/h and can be reduced to 0.15 mg/kg/h  after 24 hours.  If Precedex is used as an adjunct, scheduled CLAUDIA agonist such as benzodiazepines or barbiturates should be given as Precedex is a CNS depressant only and has no effect on the withdrawal pathway  Alcohol use disorder, severe, dependence (HCC)  Withdrawal treatment as above  Encourage cessation  Appreciate case management recommendations in regards to disposition planning and resources  If patient is interested in naltrexone (IM Vivitrol versus PO Revia) to help minimize cravings and the feeling of reward after drinking alcohol, please let us know    HPI:  Diogo Travis is a 59 y.o. year old male who presents with alcohol withdrawal.  Past medical history significant for alcohol use disorder, alcohol withdrawal, hypertension, alcoholic gastritis, hyperlipidemia, anxiety/depression.   Last drink 6 AM on 11/11 with heavy drinking the night prior until 2 AM.  Patient reports tremors, anxiety, dizziness, nausea and vomiting that started in the morning.  Noted to be tachycardic, diaphoretic, tremulous in the emergency department.  Received 1 mg lorazepam in the ED.         [facility]    Assessment & Plan   Principal Problem:    Alcohol use disorder, severe, dependence (HCC)  Active Problems:    Chronic alcoholic gastritis    Hypertension    Anxiety    Alcohol withdrawal syndrome without complication (HCC)    SIRS (systemic inflammatory response syndrome) (HCC)    Major depressive disorder, recurrent, moderate (HCC)     Plan:      Patient currently does not report here for inpatient psychiatric hospitalization.  He denies any SI, HI, or AVH and does not demonstrate imminent danger to his safety or to the safety of others at this time.  Patient is currently safe for discharge from psychiatric standpoint.  Start Remeron 15 mg nightly.  Recommend referral for outpatient psychiatric services.      Test Results after admission  Pertinent Lab tests (dates/results):     Latest Reference  Range & Units 11/13/24 05:24   WBC 4.31 - 10.16 Thousand/uL 3.88 (L)   RBC 3.88 - 5.62 Million/uL 3.65 (L)   Hemoglobin 12.0 - 17.0 g/dL 12.0   Hematocrit 36.5 - 49.3 % 34.7 (L)   MCV 82 - 98 fL 95   MCH 26.8 - 34.3 pg 32.9   MCHC 31.4 - 37.4 g/dL 34.6   RDW 11.6 - 15.1 % 11.9   Platelet Count 149 - 390 Thousands/uL 111 (L)   MPV 8.9 - 12.7 fL 8.9   (L): Data is abnormally low     Latest Reference Range & Units 11/13/24 05:24   Sodium 135 - 147 mmol/L 140   Potassium 3.5 - 5.3 mmol/L 3.7   Chloride 96 - 108 mmol/L 107   Carbon Dioxide 21 - 32 mmol/L 27   ANION GAP 4 - 13 mmol/L 6   BUN 5 - 25 mg/dL 12   Creatinine 0.60 - 1.30 mg/dL 0.87   GLUCOSE 65 - 140 mg/dL 88   Calcium 8.4 - 10.2 mg/dL 8.7   AST 13 - 39 U/L 32   ALT 7 - 52 U/L 21   ALK PHOS 34 - 104 U/L 80   Total Protein 6.4 - 8.4 g/dL 6.2 (L)   Albumin 3.5 - 5.0 g/dL 3.6   Total Bilirubin 0.20 - 1.00 mg/dL 0.76   GFR, Calculated ml/min/1.73sq m 95   MAGNESIUM 1.9 - 2.7 mg/dL 1.6 (L)   (L): Data is abnormally low    Culture results (blood, urine, spinal, wound, respiratory, etc.):  Imaging tests (dates/results):    EKG (dates/results):  Other test (dates/results):  Tests pending (dates/results):    Surgical or Invasive Procedures - None.   Name of surgery/procedure:  Date & Time:  Patient Response:  Post-operative orders:  Operative Report/Findings:    Response to Treatment, Major Change in Condition, Major Charge in Treatment anytime during admission    Upon admission patient was given IV fluid hydration, intravenous folic acid and thiamine and medical toxicology was consulted.  As per their recommendations patient received phenobarbital loading with additional phenobarbital doses for withdrawal symptoms however the patient did not require this throughout admission.  Patient was initially made n.p.o. and further advance diet to regular house diet once patient was deemed to not be a risk for aspiration.  Patient was maintained on CIWA protocol while admitted,  pt required ativan 2mg twice the evening of 11/12 for anxiety, no tremors were noted.  Due to the patient's history of anxiety and depression psychiatry was consulted and recommended adding daily mirtazapine to patient's regimen with referral to outpatient psychiatric services provided, to which pt was agreeable to.  UDS was obtained and only positive for barbiturates, for which patient received as mentioned above in the hospital.  Because there were no opiates in his urine medical toxicology recommended initiation of naltrexone therapy to which patient was agreeable to and given 25mg the day of d/c, then 50mg daily thereafter. Pt was deemed medically stable for dc, and recommended to follow-up with PCP obtaining a CMP within 1 week, following up outpatient medical toxicology to which patient received information for scheduling, and referral placed to establish outpatient psychiatric care.       Disposition on Discharge  Home, Rehab, SNF, LTC, Shelter, etc.: Home/Self Care    Cease to Breathe (CTB)  If a patient expires during an admission, in addition to the above information, please include:    Summary/timeline of the patient's decline in condition:    Medications and treatment:    Patient response to treatment:    Date and time patient ceased to breathe:        Is there a Readmission that follows this admission? yes  If yes, provide dates: 12/7-12/8/24       InterQual Review    InterQual Criteria Met:   yes    Please include the InterQual Review, InterQual year/version used, and the criteria selected:     InterQual® Review Status: In Primary  Review Type: Admission  Criteria Status: Intermediate Met  Day of review: Episode Day 1  Condition Specific: Yes        REVIEW DETAILS     Service Date: 11/11/2024  Product: LOC:Acute Adult  Subset: Withdrawal Syndrome            [X] Select Day, One:          [X] Episode Day 1, One:              [X] INTERMEDIATE, >= One:                  [X] Alcohol withdrawal syndrome and  CIWA-Ar 15-20        Version: InterQual® 2024, Mar. 2024 Release        PLEASE SUBMIT THE COMPLETED FORM TO THE DEPARTMENT OF HUMAN SERVICES - DIVISION OF CLINICAL  REVIEW VIA FAX [phone] or VIA E-MAIL TO [email or web address]    Signature: Florence eDy Date:  12/26/24    Confidentiality Notice: The documents accompanying this telecopy may contain confidential information belonging to the sender.  The information is intended only for the use of the individual named above. If you are not the intended recipient, you are hereby notified  That any disclosure, copying, distribution or taking of any telecopy is strictly prohibited.

## 2024-12-26 NOTE — PROGRESS NOTES
Progress Note - Critical Care/ICU   Name: Diogo Travis 59 y.o. male I MRN: [id number]  Unit/Bed#: ICU 10 I Date of Admission: 12/24/2024   Date of Service: 12/26/2024 I Hospital Day: 1       Assessment & Plan  Multiple rib fractures  Secondary to fall  Multiple right-sided rib fractures 4th through 7th, POA  Started on rib fracture protocol  Incentive spirometry with goal volume 15 mL/kg of ideal body weight    Plan:  Continue to encourage IS  Continue multimodal pain regimen  APS consulted, appreciate recommendations  Holding DVT prophylaxis this morning for epidural  Encourage deep breath/cough  Alcohol withdrawal (HCC)    Received phenobarbital  mg and diazepam IV 5 mg in the emergency department.  He was also started on standing diazepam p.o. 5 mg every 8 hours  Since arriving to the ICU patient received additional one-time dose of phenobarbital  mg    Plan:  Continue CIWA scoring, favor phenobarbital with Valium as adjunct over lorazepam.  Continue thiamine/folate  Optimize nutrition  Encourage cessation    Depression  PTA Lexapro, Atarax, Remeron  Patient states that he has some frustration/depression with being unemployed    Plan:  Continue Lexapro  Continue CIWA scoring/treatment as above  Fall  Patient reports falling down stairs at home.  Unclear timing of fall, patient reports that he is heavy daily drinker with EtOH level 150 on admission  Injuries as below  Case management for disposition planning  PT/OT evaluation/treatment as appropriate  Acute pain due to trauma  Started on multimodal pain regimen    Plan:  Tylenol 975 mg p.o. every 8 hours  Gabapentin 300 mg p.o. 3 times daily  Toradol 15 mg IV every 6 hours  Methocarbamol 1 g p.o. every 8 hours  Dilaudid 0.5 mg IV as needed for breakthrough pain  APS consulted, appreciate recommendations  12/25 Epidural placement  Bowel regimen while on multimodal pain regimen  Alcohol use disorder  Daily heavy drinker  EtOH level on arrival  150  Encourage cessation  Continue CIWA scoring, though favor phenobarbital and Valium over lorazepam.  Disposition: Stepdown Level 1    ICU Core Measures     A: Assess, Prevent, and Manage Pain Has pain been assessed? Yes  Need for changes to pain regimen? No   B: Both SAT/SAT  N/A   C: Choice of Sedation RASS Goal: 0 Alert and Calm  Need for changes to sedation or analgesia regimen? No   D: Delirium CAM-ICU: Negative   E: Early Mobility  Plan for early mobility? Yes   F: Family Engagement Plan for family engagement today? NA       Prophylaxis:  VTE Contraindicated secondary to: Epidural catheter   Stress Ulcer  covered bypantoprazole (PROTONIX) 40 mg tablet [[id number]] (Long-Term Med), pantoprazole (PROTONIX) EC tablet 40 mg [[id number]]         24 Hour Events : No events overnight.  Alcohol withdrawal symptoms improved. EDC adequately treating pain.    Patient reports posterior CW pain improved however still difficult to move. Denies SOB or CP.    Subjective   Review of Systems: See HPI for Review of Systems    Objective :                   Vitals I/O      Most Recent Min/Max in 24hrs   Temp 99.1 °F (37.3 °C) Temp  Min: 97.7 °F (36.5 °C)  Max: 99.2 °F (37.3 °C)   Pulse 68 Pulse  Min: 57  Max: 86   Resp 14 Resp  Min: 9  Max: 19   /68 BP  Min: 98/59  Max: 155/83   O2 Sat 92 % SpO2  Min: 92 %  Max: 97 %      Intake/Output Summary (Last 24 hours) at 12/26/2024 1739  Last data filed at 12/26/2024 1701  Gross per 24 hour   Intake 2113.4 ml   Output 900 ml   Net 1213.4 ml       Diet Regular; Regular House    Invasive Monitoring           Physical Exam   Physical Exam  Eyes:      General: No dysconjugate gazeNo visual field deficit.     Extraocular Movements: Extraocular movements intact.      Pupils: Pupils are equal, round, and reactive to light.   Skin:     General: Skin is warm and dry.      Coloration: Skin is not jaundiced or pale.      Findings: No wound.   HENT:      Head: Normocephalic and atraumatic.       Right Ear: No drainage. No hemotympanum.      Left Ear: No drainage. No hemotympanum.      Nose: No nasal deformity or congestion.      Mouth/Throat:      Mouth: Mucous membranes are dry.      Pharynx: No oropharyngeal exudate.   Neck:      Vascular: Central line present.   Cardiovascular:      Rate and Rhythm: Normal rate and regular rhythm.      Heart sounds: Normal heart sounds.   Musculoskeletal:         General: No swelling or tenderness. Normal range of motion.      Right lower le+ Edema present.      Left lower le+ Edema present.   Abdominal: General: Bowel sounds are normal. There is no distension.      Palpations: Abdomen is soft.      Tenderness: There is no abdominal tenderness.   Constitutional:       General: He is not in acute distress.     Appearance: He is well-developed. He is not toxic-appearing.      Interventions: He is not restrained.  Pulmonary:      Effort: Pulmonary effort is normal. No respiratory distress.      Breath sounds: Normal breath sounds. No wheezing or rhonchi.      Comments: L > R posterior CW pain with palpation  Neurological:      General: No focal deficit present.      Mental Status: He is alert and oriented to person, place and time.      Cranial Nerves: No facial asymmetry.      Sensory: No sensory deficit.      Motor: Strength full and intact in all extremities. No motor deficit.          Diagnostic Studies        Lab Results: I have reviewed the following results:     Medications:  Scheduled PRN   acetaminophen, 975 mg, Q8H COLBY  atorvastatin, 40 mg, Daily With Dinner  diazepam, 5 mg, Q6H  escitalopram, 20 mg, Daily  fish oil, 2,000 mg, Daily  folic acid, 1 mg, Daily  gabapentin, 300 mg, TID  lisinopril, 40 mg, Daily  methocarbamol, 1,000 mg, Q8H  mirtazapine, 15 mg, HS  multivitamin-minerals, 1 tablet, Daily  nicotine, 1 patch, Daily  pantoprazole, 40 mg, Daily Before Breakfast  polyethylene glycol, 17 g, Daily  senna-docusate sodium, 1 tablet, HS  thiamine, 100  mg, Daily      HYDROmorphone, 0.5 mg, Q2H PRN  naloxone, 0.04 mg, Q1MIN PRN  ondansetron, 4 mg, Q4H PRN  oxyCODONE, 5 mg, Q4H PRN   Or  oxyCODONE, 10 mg, Q4H PRN       Continuous    ropivacaine 0.1% and fentaNYL 2 mcg/mL,          Labs:   CBC    Recent Labs     12/25/24 0425   WBC 5.05   HGB 12.7   HCT 37.5        BMP    Recent Labs     12/25/24 0425 12/26/24  0517   SODIUM 139 141   K 3.8 4.0    101   CO2 27 31   AGAP 11 9   BUN 17 17   CREATININE 1.03 0.93   CALCIUM 8.8 9.1       Coags    Recent Labs     12/25/24 0425   INR 1.00   PTT 30        Additional Electrolytes  Recent Labs     12/26/24  0517   MG 1.7*   PHOS 3.9          Blood Gas    No recent results  No recent results LFTs  No recent results    Infectious  No recent results  Glucose  Recent Labs     12/25/24 0425 12/26/24  0517   GLUC 106 90

## 2024-12-26 NOTE — PLAN OF CARE
Problem: OCCUPATIONAL THERAPY ADULT  Goal: Performs self-care activities at highest level of function for planned discharge setting.  See evaluation for individualized goals.  Description: Treatment Interventions: ADL retraining, Functional transfer training, Patient/family training, Equipment evaluation/education, Compensatory technique education          See flowsheet documentation for full assessment, interventions and recommendations.   Note: Limitation: Decreased ADL status, Decreased UE strength, Decreased Safe judgement during ADL, Decreased endurance, Decreased self-care trans, Decreased high-level ADLs (balance, standing tolerance, pain)  Prognosis: Fair  Assessment: Patient is a 59 y.o. male seen for OT evaluation at [facility] following admission on 12/24/2024  s/p Multiple rib fractures. Please see above for comprehensive list of comorbidities and significant PMHx impacting functional performance.  Upon initial evaluation, pt appears to be performing below baseline functional status.   Occupational performance is affected by the following deficits: endurance ,  decreased muscular strength , decreased balance , decreased activity tolerance , and (+) pain . Personal/Environmental factors impacting D/C include: (+) Hx of falls , alcohol abuse. Supporting factors include: (I) PLOF.  Patient would benefit from OT services within the acute care setting to maximize level of functional independence in the following areas self-care transfers, functional mobility, and ADLs.  From OT standpoint, recommendation at time of D/C would be level 4: no rehabilitation needs .     Rehab Resource Intensity Level, OT: No post-acute rehabilitation needs        Tess Ballard, OT

## 2024-12-26 NOTE — ASSESSMENT & PLAN NOTE
"Patient presented 12/24 s/p fall.   Patient reports he may have fallen 3 days prior and developed severe pain prompting him to seek medical attention    CT imaging 12/24: \"Acute to subacute minimally displaced fractures of the right fourth through sixth ribs and nondisplaced fracture of the right seventh rib.\"    Currently maintaining oxygen saturations on room air, denies shortness of breath and able to inspire > 2500 mL with spirometry.     Rib Fracture Evaluation:  Chest tube: none  Respiratory Co-morbidities: Obesity  SpO2:   SPO2 RA Rest      Flowsheet Row ED to Hosp-Admission (Current) from 12/24/2024 in [facility] Intensive Care Unit   SpO2 94 %   SpO2 Activity At Rest   O2 Device Nasal cannula   O2 Flow Rate --                                                                             Incentive Spirometer: Incentive Spirometry Achieved (mL): 2250 mL   Platelet Count:   Results from last 7 days   Lab Units 12/25/24  0425   PLATELETS Thousands/uL 153     Coags:   Results from last 7 days   Lab Units 12/25/24  0425   INR  1.00   PROTIME seconds 13.9     Home anticoagulants: none  VTE covered by:    None      "

## 2024-12-26 NOTE — UTILIZATION REVIEW
URGENT/EMERGENT  INPATIENT/SPU AUTHORIZATION REQUEST    Date: 12/26/24            # Pages in this Request:     x New Request   Additional Information for PA#:     Office Contact Name:  Florence Dey Title: Utilization Review, Registed Nurse     Phone: [phone]  Ext.     Availability (Date/Time): M-F 8-4    Type of Review: Inpatient Review    Late Pick-Up    For Late Pick-up Cases:  How your facility was first notified of the Late Pick-up: PATHS letter  When your facility was first notified of the Late Pick-up (date): 12/13/2024    Was the recipient a prisoner at the time of admission? no         RECIPIENT INFORMATION    Recipient ID#: [id number] Recipient Name: Diogo Travis      YOB: 1965  59 y.o. Recipient Alias:  None.     Gender:  x Male  Female Medicaid Eligibility (HCB Package):          INSURANCE INFORMATION    (All other private or governmental health insurance benefits must be utilized prior to billing the [place] Program)    Was this admission the result of an MVA, other accident, assault, injury, fall, gunshot, bite etc.?  no   If yes, provide a brief description of the incident.      Does the recipient have other insurance coverage?  no  Insurance Company Name:    Policy #:  Did that insurance pay on this claim?    Yes  No     Did that insurance deny this claim?   Yes  No     If yes, reason for denial:      Does the recipient have Medicare?  no  Did Medicare exhaust prior to this admission?    Yes  No     Did Medicare partially pay this claim?   Yes  No     Did Medicare deny this claim?   Yes  No   If yes, reason for denial:      Was the recipient a prisoner at the time of admission?  no        PROVIDER INFORMATION    [facility]: [facility] Provider ID#: [id number]     Admitting Physician: [facility]                          [facility] Provider ID#: [id number]        ADMISSION INFORMATION    Type of Admission: (please choose one)  "ED    Admission Floor or Unit Type: Level 2 Stepdown       Dates/Times:        ED Date/Time: 12/7/2024 11:52 AM        Observation Date/Time: N/A        IP Admission Date/Time: 12/7/24  4:56 PM        Discharge or Transfer Date/Time: 12/8/2024  2:47 PM        DIAGNOSIS/PROCEDURE CODES    Primary Diagnosis Code/Primary Diagnosis Code description:    F10.230 - Alcohol dependence with withdrawal, uncomplicated      Additional Diagnosis Code(s) and Description(s)-(up to 3 additional codes):    E87.29 - Other acidosis  E83.42 - Hypomagnesemia  D72.829 - Elevated white blood cell count, unspecified      Procedure Code (1) and description: None.         CLINICAL INFORMATION - PRIOR ADMISSION ONLY    Is there a prior admission with a discharge date within 30 days of the date of this admission?     No (Proceed to the next section - \"Clinical Information - General Review Checklist\")     x Yes (Provide the following information)     Prior admission dates: 11/11-11/13/24     [place] Prior Authorization Number: Pending (review for this prior admission faxed to MA on 12/26/24)    Review Outcome:  Pending    Diagnosis Code(s)/Description:    F10.230 - Alcohol dependence with withdrawal, uncomplicated   R65.10 - Systemic inflammatory response syndrome (sirs) of non-infectious origin without acute organ dysfunction  K29.20 - Alcoholic gastritis without bleeding  E83.42 - Hypomagnesemia    Procedure Code/Description: None.     Findings:     Treatment:  Diogo Travis is a 59 y.o. male patient who originally presented to the hospital on 11/11/2024 due to tremors n/v 2/2 etoh. Patient reports heavy binge drinking the night before admission. Upon admission patient was given IV fluid hydration, intravenous folic acid and thiamine and medical toxicology was consulted. As per their recommendations patient received phenobarbital loading with additional phenobarbital doses for withdrawal symptoms however the patient did not require this " "throughout admission. Patient was initially made n.p.o. and further advance diet to regular house diet once patient was deemed to not be a risk for aspiration. Patient was maintained on CIWA protocol while admitted, pt required ativan 2mg twice the evening of 11/12 for anxiety, no tremors were noted. Due to the patient's history of anxiety and depression psychiatry was consulted and recommended adding daily mirtazapine to patient's regimen with referral to outpatient psychiatric services provided, to which pt was agreeable to. UDS was obtained and only positive for barbiturates, for which patient received as mentioned above in the hospital. Because there were no opiates in his urine medical toxicology recommended initiation of naltrexone therapy to which patient was agreeable to and given 25mg the day of d/c, then 50mg daily thereafter. Pt was deemed medically stable for dc, and recommended to follow-up with PCP obtaining a CMP within 1 week, following up outpatient medical toxicology to which patient received information for scheduling, and referral placed to establish outpatient psychiatric care.        Condition on Discharge: Stable      Vitals:     Blood Pressure: 145/98 (11/12/24 2057)  Pulse: 84 (11/12/24 2057)  Temperature: 99 °F (37.2 °C) (11/12/24 2057)  Temp Source: Oral (11/12/24 1500)  Respirations: 18 (11/12/24 1500)  Weight - Scale: 101 kg (222 lb 10.6 oz) (11/11/24 1933)  SpO2: 94 % (11/12/24 2057)    Labs:  Imaging:  Medications:    Follow-up Instructions:    Disposition: Home/Self Care        CLINICAL INFORMATION - GENERAL REVIEW CHECKLIST    EMERGENCY DEPARTMENT: (Proceed to \"ADMISSION\" if Direct Admission)    Presenting Signs/Symptoms:  Chief Complaint   Patient presents with    Shaking     Patient hx of alcohol use. Last drink was last night at 0000. Drank 2 bottles of wine and a bottle of bourbon. Also c/o chest heaviness.        59-year-old history of alcohol use disorder, hypertension, hepatic " steatosis presenting due to alcohol withdrawal.  Patient states he has been drinking heavily for 20 years, typically at least a bottle a day, last drink was last night at 12 midnight.  Today patient has been having nausea, vomiting, tremors, abdominal pain and sweating.  Patient has been in withdrawal multiple times before and has been hospitalized multiple times as well.  Patient denies any seizures and has never required intubation in the past.  Normal state of health last night prior to stopping his drinking.       Medication/treatment prior to arrival in the ED:  ondansetron ((ZOFRAN) 4 mg/2 mL injection 4 mg - given by EMS    Past Medical History:  has a past medical history of Alcohol use disorder, severe, dependence (HCC) (04/02/2023), Alcohol withdrawal seizure ([facility]), Alcoholic ketoacidosis (10/16/2023), Anxiety, AR (allergic rhinitis), Chronic alcoholic gastritis (04/02/2023), Depression, GERD (gastroesophageal reflux disease), Hepatic steatosis (04/02/2023), Hyperlipidemia, Hypertension, IBS (irritable bowel syndrome), Obesity, Rosacea, Vitamin B12 deficiency (02/14/2024), and Vitamin D deficiency (02/14/2024).     Clinical Exam: He is in acute distress. Congestion. Tachycardia. Tremors in upper extremities.     Initial Vital Signs: (Temp, Pulse, Resp, and BP)   ED Triage Vitals   Temperature Pulse Respirations Blood Pressure SpO2   12/07/24 1159 12/07/24 1157 12/07/24 1157 12/07/24 1157 12/07/24 1157   97.5 °F (36.4 °C) (!) 141 (!) 24 (!) 177/108 98 %      Temp Source Heart Rate Source Patient Position - Orthostatic VS BP Location FiO2 (%)   12/07/24 1159 12/07/24 1157 12/07/24 1157 12/07/24 1157 --   Oral Monitor Sitting Right arm       Pain Score       12/07/24 1400       7           Pertinent Repeat Vital Signs: (include times they were obtained)      Date/Time Temp Pulse Resp BP MAP (mmHg) SpO2 O2 Device Tennyson Coma Scale Score CIWA-Ar Total Pain   12/08/24 0800 -- -- -- -- -- -- None (Room air) 15  -- No Pain   12/08/24 07:20:53 97.6 °F (36.4 °C) 84 -- 146/89 108 95 % -- -- 0 --   12/08/24 0400 -- -- -- -- -- -- -- 15 -- --   12/08/24 02:16:39 97.8 °F (36.6 °C) 82 -- 138/89 105 97 % -- -- 0 --   12/08/24 0000 -- -- -- -- -- -- -- 15 -- --   12/07/24 22:23:51 98 °F (36.7 °C) 98 -- 130/89 103 92 % -- -- 0 --   12/07/24 2021 -- -- -- -- -- -- -- -- -- No Pain   12/07/24 2000 -- -- -- -- -- -- -- 15 -- No Pain   12/07/24 18:40:27 99.1 °F (37.3 °C) 113 -- 163/105 124 93 % -- -- -- --   12/07/24 1800 -- 118 18 172/105 132 94 % None (Room air) -- -- --   12/07/24 1700 -- 116 18 159/94 121 95 % None (Room air) -- 2 --   12/07/24 1659 -- -- -- -- -- -- -- -- -- 7 12/07/24 1630 -- 118 18 154/92 116 93 % None (Room air) -- -- --   12/07/24 1600 -- 126 18 156/96 118 94 % None (Room air) -- 2 --   12/07/24 1530 -- 126 20 171/95 124 94 % None (Room air) -- -- 7 12/07/24 1500 -- 122 20 171/100 124 95 % None (Room air) -- 6 --   12/07/24 1414 -- -- -- -- -- -- -- 15 -- --   12/07/24 1400 -- 120 20 166/98 124 93 % None (Room air) -- 9 7   12/07/24 1300 -- 135 22 142/96 114 92 % None (Room air) -- 6 --   12/07/24 1230 -- 138 24 170/92 120 91 % None (Room air) -- 12 --   12/07/24 1200 -- -- -- -- -- -- -- 15 -- --   12/07/24 1159 97.5 °F (36.4 °C) -- -- -- -- -- -- -- -- --   12/07/24 1157 -- 141 24 177/108 -- 98 % None (Room air) -- 19 --       CIWA-Ar Score         Row Name 12/08/24 07:20:53 12/08/24 02:16:39 12/07/24 22:23:51             CIWA-Ar     Nausea and Vomiting 0 0 0     Tactile Disturbances 0 0 0     Tremor 0 0 0     Auditory Disturbances 0 0 0     Paroxysmal Sweats 0 0 0     Visual Disturbances 0 0 0     Anxiety 0 0 0     Headache, Fullness in Head 0 0 0     Agitation 0 0 0     Orientation and Clouding of Sensorium 0 0 0     CIWA-Ar Total 0 0 0        Row Name 12/07/24 1700 12/07/24 1600 12/07/24 1500             CIWA-Ar     Nausea and Vomiting 0 0 1     Tactile Disturbances 0 0 0     Tremor 1 1 2     Auditory  Disturbances 0 0 0     Paroxysmal Sweats 0 0 0     Visual Disturbances 0 0 0     Anxiety 1 1 2     Headache, Fullness in Head 0 0 0     Agitation 0 0 1     Orientation and Clouding of Sensorium 0 0 0     CIWA-Ar Total 2 2 6        Row Name 12/07/24 1400 12/07/24 1300 12/07/24 1230             CIWA-Ar     Nausea and Vomiting 2 2 3     Tactile Disturbances 0 0 1     Tremor 3 1 3     Auditory Disturbances 0 0 0     Paroxysmal Sweats 1 1 3     Visual Disturbances 0 0 0     Anxiety 2 2 2     Headache, Fullness in Head 0 0 0     Agitation 1 0 0     Orientation and Clouding of Sensorium 0 0 0     CIWA-Ar Total 9 6 12        Row Name 12/07/24 1157                       CIWA-Ar     Nausea and Vomiting 5         Tactile Disturbances 1         Tremor 5         Auditory Disturbances 0         Paroxysmal Sweats 5         Visual Disturbances 0         Anxiety 3         Headache, Fullness in Head 0         Agitation 0         Orientation and Clouding of Sensorium 0         CIWA-Ar Total 19              Pertinent Sustained Findings: (include times they were obtained)    Weight in Kilograms:   Wt Readings from Last 1 Encounters:   12/24/24 100 kg (220 lb 10.9 oz)       Pertinent Labs (results):      Lab Units 12/08/24  0517 12/07/24  1224   WBC Thousand/uL 4.00* 15.39*   HEMOGLOBIN g/dL 12.4 16.4   HEMATOCRIT % 36.0* 45.5   PLATELETS Thousands/uL 125* 310   TOTAL NEUT ABS Thousands/µL 2.46 13.52*                 Results from last 7 days   Lab Units 12/08/24  0517 12/07/24 2012 12/07/24  1224   SODIUM mmol/L 138 136 139   POTASSIUM mmol/L 3.5 3.9 4.3   CHLORIDE mmol/L 103 102 97   CO2 mmol/L 28 24 19*   ANION GAP mmol/L 7 10 23*   BUN mg/dL 16 17 19   CREATININE mg/dL 0.90 1.11 0.96   EGFR ml/min/1.73sq m 93 72 86   CALCIUM mg/dL 8.3* 8.5 9.6   MAGNESIUM mg/dL 2.4  --  1.4*            Results from last 7 days   Lab Units 12/08/24  0517 12/07/24  1224   AST U/L 33 66*   ALT U/L 24 40   ALK PHOS U/L 92 136*   TOTAL PROTEIN g/dL 6.3*  7.8   ALBUMIN g/dL 3.8 4.8   TOTAL BILIRUBIN mg/dL 1.21* 2.10*   BILIRUBIN DIRECT mg/dL  --  0.37*                 Results from last 7 days   Lab Units 12/08/24  0517 12/07/24 2012 12/07/24  1224   GLUCOSE RANDOM mg/dL 99 182* 131                    BETA-HYDROXYBUTYRATE   Date Value Ref Range Status   10/16/2023 2.9 (H) <0.6 mmol/L Final   10/01/2023 0.8 (H) <0.6 mmol/L Final                 Results from last 7 days   Lab Units 12/07/24  1337   PROTIME seconds 14.2   INR   1.03   PTT seconds 28                   Results from last 7 days   Lab Units 12/07/24  1344   LACTIC ACID mmol/L 1.4               Results from last 7 days   Lab Units 12/07/24  1224   LIPASE u/L 12                       Results from last 7 days   Lab Units 12/07/24  1840   CLARITY UA   Clear   COLOR UA   Light Yellow   SPEC GRAV UA   >=1.050*   PH UA   5.5   GLUCOSE UA mg/dl Negative   KETONES UA mg/dl 80 (3+)*   BLOOD UA   Small*   PROTEIN UA mg/dl 50 (1+)*   NITRITE UA   Negative   BILIRUBIN UA   Negative   UROBILINOGEN UA (BE) mg/dl <2.0   LEUKOCYTES UA   Negative   WBC UA /hpf 2-4*   RBC UA /hpf 1-2   BACTERIA UA /hpf None Seen   EPITHELIAL CELLS WET PREP /hpf Occasional                 Results from last 7 days   Lab Units 12/07/24  1224   ETHANOL LVL mg/dL <10   ACETAMINOPHEN LVL ug/mL <5*   SALICYLATE LVL mg/dL <5                  Results from last 7 days   Lab Units 12/07/24  1344 12/07/24  1337   BLOOD CULTURE   Received in Microbiology Lab. Culture in Progress. Received in Microbiology Lab. Culture in Progress.            Radiology (results):    Radiology:  CT abdomen pelvis with contrast   Final Interpretation by Dima Rascon MD (12/07 1642)       No evidence of acute abdominopelvic process.       Hepatomegaly and hepatic steatosis.       Additional chronic findings and negatives as above.           Workstation performed: [id number]           XR chest 1 view portable   Final Interpretation by Emerald Ramos MD  (12/08 0658)       No acute cardiopulmonary disease.                 EKG (results):     Sinus tachycardia  Rightward axis  Poor R Wave Progression  Abnormal ECG  When compared with ECG of 11-Nov-2024 14:16,  No significant change was found       Other tests (results): None.     Tests pending final results: None.     Treatment in the ED:   Medication Administration from 12/07/2024 1152 to 12/07/2024 1822         Date/Time Order Dose Route Action     12/07/2024 1213 EST ondansetron (FOR EMS ONLY) (ZOFRAN) 4 mg/2 mL injection 4 mg 0 mg Does not apply Given to EMS     12/07/2024 1213 EST diazepam (VALIUM) injection 10 mg 10 mg Intravenous Given     12/07/2024 1319 EST folic acid 1 mg in sodium chloride 0.9 % 50 mL IVPB 0 mg Intravenous Stopped     12/07/2024 1251 EST folic acid 1 mg in sodium chloride 0.9 % 50 mL IVPB 1 mg Intravenous New Bag     12/07/2024 1319 EST thiamine (VITAMIN B1) 100 mg in sodium chloride 0.9 % 50 mL IVPB 0 mg Intravenous Stopped     12/07/2024 1244 EST thiamine (VITAMIN B1) 100 mg in sodium chloride 0.9 % 50 mL IVPB 100 mg Intravenous New Bag     12/07/2024 1214 EST ondansetron (ZOFRAN) injection 4 mg 4 mg Intravenous Given     12/07/2024 1239 EST pantoprazole (PROTONIX) injection 40 mg 40 mg Intravenous Given     12/07/2024 1239 EST diazepam (VALIUM) injection 10 mg 10 mg Intravenous Given     12/07/2024 1314 EST Famotidine (PF) (PEPCID) injection 20 mg 20 mg Intravenous Given     12/07/2024 1417 EST sodium chloride 0.9 % bolus 1,000 mL 0 mL Intravenous Stopped     12/07/2024 1317 EST sodium chloride 0.9 % bolus 1,000 mL 1,000 mL Intravenous New Bag     12/07/2024 1607 EST sodium chloride 0.9 % bolus 1,000 mL 0 mL Intravenous Stopped     12/07/2024 1347 EST sodium chloride 0.9 % bolus 1,000 mL 1,000 mL Intravenous New Bag     12/07/2024 1425 EST piperacillin-tazobactam (ZOSYN) IVPB 4.5 g 0 g Intravenous Stopped     12/07/2024 1353 EST piperacillin-tazobactam (ZOSYN) IVPB 4.5 g 4.5 g  "Intravenous New Bag     12/07/2024 1607 EST magnesium sulfate 2 g/50 mL IVPB (premix) 2 g 0 g Intravenous Stopped     12/07/2024 1356 EST magnesium sulfate 2 g/50 mL IVPB (premix) 2 g 2 g Intravenous New Bag     12/07/2024 1419 EST diazepam (VALIUM) injection 10 mg 10 mg Intravenous Given     12/07/2024 1456 EST iohexol (OMNIPAQUE) 350 MG/ML injection (MULTI-DOSE) 91 mL 91 mL Intravenous Given     12/07/2024 1741 EST PHENobarbital 680 mg in sodium chloride 0.9 % 100 mL IVPB 0 mg Intravenous Stopped     12/07/2024 1701 EST PHENobarbital 680 mg in sodium chloride 0.9 % 100 mL IVPB 680 mg Intravenous New Bag     12/07/2024 1659 EST ketorolac (TORADOL) injection 15 mg 15 mg Intravenous Given     12/07/2024 1728 EST dextrose 5 % and sodium chloride 0.9 % infusion 100 mL/hr Intravenous New Bag             Other treatments: None.       Change in condition while in the ED: None.      Response to ED Treatment:  Inpatient admission for evaluation and treatment of alcohol use disorder, increased anion gap metabolic acidosis, HTN, hypomagnesemia, GERD: Toxicology consult, WA protocol, Phenobarb, IV thiamine and folate, IV fluids, monitor BMP, resume lisinopril tomorrow, replete Mg and monitor, continue Protonix.           OBSERVATION: (Proceed to \"ADMISSION\" if Direct Admission)  Orders written during the observation period  Meds Name, dose, route, time, how may doses given:  PRN Meds Name, dose, route, time, how many doses given within the first 24 hrs.:  IVs Type, rate, and total amt. ordered/given:  Labs, imaging, other:  Consults and findings:    Test Results during the observation period  Pertinent Lab tests (dates/results):  Culture results (blood, urine, spinal, wound, respiratory, etc.):  Imaging tests (dates/results):  EKG (dates/results):  Other test (dates/results):  Tests pending (dates/results):    Surgical or Invasive Procedures during the observation period  Name of surgery/procedure:  Date & Time:  Patient " Response:  Post-operative orders:  Operative Report/Findings:    Response to Treatment, Major Change in Condition, Major Charge in Treatment during the observation period          ADMISSION:    DIRECT Admissions Only:  Presenting Signs/Symptoms:     Medication/treatment prior to arrival:    Past Medical History:    Clinical Exam on admission:    Vital Signs on admission: (Temp, Pulse, Resp, and BP)    Weight in kilograms:         ====================================================================================    ALL Admissions:    Admission Orders and Other Orders written within the first 24 hrs after admission  Meds Name, dose, route, time, how may doses given:    atorvastatin, 40 mg, Oral, Daily With Dinner  escitalopram, 20 mg, Oral, Daily  folic acid, 1 mg, Oral, Daily  lisinopril, 40 mg, Oral, Daily  mirtazapine, 15 mg, Oral, HS  multivitamin stress formula, 1 tablet, Oral, Daily  pantoprazole, 40 mg, Oral, Early Morning  Thiamine Mononitrate, 100 mg, Oral, Daily      potassium chloride (Klor-Con M20) CR tablet 40 mEq  Dose: 40 mEq  Freq: Once Route: PO  Start: 12/08/24 0900 End: 12/08/24 0928    labetalol (NORMODYNE) injection 10 mg x 1 dose 12/7 @ 1832  Dose: 10 mg  Freq: Once Route: IV  Start: 12/07/24 1815 End: 12/07/24 1832    magnesium sulfate 2 g/50 mL IVPB (premix) 2 g x 1 dose 12/7 @ 1832  Dose: 2 g  Freq: Once Route: IV  Last Dose: 2 g (12/07/24 1832)  Start: 12/07/24 1800 End: 12/07/24 2032       PRN Meds Name, dose, route, time, how many doses given within the first 24 hrs.:    acetaminophen, 650 mg, Oral, Q6H PRN  labetalol, 10 mg, Intravenous, Q6H PRN  ondansetron, 4 mg, Intravenous, Q6H PRN      IVs Type, rate, and total amt. ordered/given:    multi-electrolyte, 100 mL/hr, Intravenous, Continuous         Labs, imaging, other:  CIWA, Telemetry, continuous pulse oximetry, clear liquid diet, consult Toxicology,     Consults and findings:    12/7 Medical Toxicology consult:    Assessment &  Plan  Alcohol withdrawal syndrome without complication (HCC)  Patient with a history of chronic daily alcohol use  Serum alcohol 0 in the ED  Received total of 30mg diazepam in the ED prior to reaching out to toxicology  Signs and symptoms of withdrawal included tremor, tongue fasciculations, diaphoresis, nausea, vomiting, tachycardia, and hypertension. Discussed with Dr. Higuera of the ED and I recommended loading with 10mg/kg phenobarbital based on ideal body weight as an initial dose  Continue monitoring under protocol and administer phenobarbital as indicated with a maximum of 2g  Recommend continued symptom-triggered management via CIWA with either phenobarbital or diazepam. Please see teaching note below for diazepam dosing.     For phenobarbital dosing, can use:   130 mg IV for mild to moderate symptoms and phenobarbital 260 mg IV for moderate to severe symptoms.  Titrate to RASS -1 and hold for significant sedation  Maximum total dose of phenobarbital is 2 grams.  if patient is approaching 2 grams of phenobarbital with continued withdrawal symptoms:  Recommend escalation of care for adjunctive use of dexmedetomidine or ketamine infusions  Can start ketamine infusion at 0.3 mg/kg/hr for 24 hours. Beyond this please use 0.15 mg/kg/hr until clinical improvement  If using dexmedetomidine infusion, please use adjunctive benzodiazepines as dexmedetomidine has no direct effect at the CLAUDIA receptor  Continuous pulse ox and telemetry monitoring     Alcohol use disorder     Daily vitamin supplementation with thiamine, folic acid, and multivitamin  Appreciate certified  and case management consultations for recommendations regarding aftercare resources, recovery support and disposition planning  Declined transfer to   In 11/24 pt was Rx'd naltrexone for AUD. Once he is improved from a withdrawal standpoint, if he wants to resume this can start at 25 mg on day 1 followed by 50 mg daily  If he wants to  "restart this, please inquire if he has been using any opioid substances or Kratom. If yes, would wait 7 days prior to restarting this.     Hypomagnesemia  Replacement per primary  Alcoholic ketoacidosis  AGAP 23, bicarb 19  Recommend dextrose containing IVF  Trend until resolution     Please see additional teaching note below:   Medical Toxicology recommendations for CIWA monitoring using diazepam for treatment:     CIWA score & Treatment      Monitoring:   Modified CIWA Score   Telemetry  Continuous pulse oximetry   Initiate RASS with dosing and hold any sedatives for RASS less than -2  Request provider re-evaluation after every three doses.  Step down for CIWA > 7  Contact Medical Toxicology/Addiction \"[facility]\" via [employer] [facility] for worsening CIWA, persistent tachycardia or encephalopathy.      HPI:   59 y.o. year old male who presents with alcohol withdrawal symptoms. ED reports drinking 1-2 bottles of liquor on a daily basis. Denies any hx of intubation or withdrawal seizures. Presented with tremor, tongue fasiculations, tachycardia, diaphoresis, n/v. Symptoms improved with 3 doses of diazepam, but recurred. No altered mental status reported. ED reports that he deferred transfer to [place] and wanted to stay at [place].  Admission in November for AUD/EMILIA and was prescribed naltrexone.        12/7 Internal Medicine H&P:    Attending Attestation:      I reviewed our senior medical resident's HPI and ROS of the patient and I agree with them.  Please see notes below.  At the time I saw the patient, he was doing fine and told me that his withdrawal symptoms had subsided significantly already.  He told me that he did not have any more tremors.  Patient was assessed by our critical care advanced practitioner and told me that his CIWA score was down to 2.  I reviewed our senior medical resident's physical examination findings of the patient and I agree with them.  Please see notes below.  In " addition, no tremors noted.  I reviewed patient's vital signs and diagnostic test results.     Assessments:  Alcohol use disorder, severe, dependence.  With alcohol withdrawal syndrome.  Increased anion gap metabolic acidosis, in the setting of alcohol use and poor oral intake.  Hypertension.  Hypomagnesemia.  Leukocytosis likely stress reactive secondary to alcohol withdrawal syndrome.  Abnormal urinalysis, likely due to dehydration, poor oral intake (elevated specific gravity with ketones and hyaline cast).  GERD.  According to the patient, he was diagnosed with Hoff's esophagus approximately 10 years ago.      Plans:   was consulted in the emergency room by the emergency room physician.  Recommend CIWA protocol with use of phenobarbital or diazepam.  Please see notes of the medical .  I also spoke to advanced practitioner for critical care and evaluated the patient this evening, and found patient doing better with low CIWA score.  May reach out to critical care provider, if with problems controlling patient's alcohol withdrawal syndrome.  Continue folic acid and thiamine.  Continue IV fluids.  Alcohol cessation counseling done.  Patient was okay to have outpatient alcohol rehab.  Thus for case management consult.  Monitor CMP and CBC with differential count.  Continue patient's lisinopril.  IV labetalol on as needed basis.  Monitor serum magnesium.  Replace as needed.  Continue Protonix.  From my discussion with the patient, he is long overdue for another EGD to check on his Hoff's esophagus, as last one was 10 years ago, and patient admitted to frequent heartburn/esophageal reflux due to his alcohol drinking.  In addition, he also told me that his last colonoscopy was approximately 15 years ago. Thus patient is also due for his repeat screening colonoscopy.  From our discussion, he is okay with ambulatory referral to one of our gastroenterologists on discharge.  He told me that  since he is out of job, that the gastroenterologist that we will refer him to in the outpatient, do accept patients on Medicaid.  Thus, patient should be referred in the outpatient to a gastroenterologist who accepts patients on Medicaid.    Assessment & Plan    Alcohol use disorder, severe, dependence (HCC)  Has been drinking over past 20 years  And bourbon and wine yesterday, last drink at midnight.  Presented with tremors, tongue fasciculations, diaphoresis, nausea and vomiting.  Was noted to be tachycardic and hypertensive.  Received diazepam and phenobarbital in the ED.     Plan:  Toxicology consulted patient: Recommend CIWA protocol with use of phenobarbital or diazepam.  Phenobarbital: 130 mg IV for mild to moderate symptoms and 260 mg IV for moderate to severe symptoms.  Max dose of 2 g 24 hours if patient is still exhibiting withdrawal symptoms adjunct ketamine infusion is recommended.  Please see medical toxicology note for further details.  Thiamine and folate IV given continue supplementation p.o. starting tomorrow  IV hydration Isolyte 100 cc/hr  Increased anion gap metabolic acidosis  In the setting of alcohol use.     Plan:  CIWA protocol   IV hydration 100 cc/h for 24 hours  Monitor BMP  Hypertension  Blood pressure elevated on admission  Patient was not able to take his lisinopril this morning due to nausea and dry heaving     Plan:  Labetalol as needed for now for SBP >170 and DBP >100  Resume lisinopril 40 mg tomorrow  Hypomagnesemia  1.4 on admission  Received 4 mg of mag  Is on magnesium supplementation at home but has not taken for the past week.     Plan:  Monitor and replete as indicated  GERD (gastroesophageal reflux disease)  Ran out of Protonix last week, otherwise has been compliant.  Received 1 dose IV Protonix in the ED     Plan:  Continue Protonix p.o. starting tomorrow.       Test Results after admissionu - None.   Pertinent Lab tests (dates/results):  Culture results (blood, urine,  "spinal, wound, respiratory, etc.):  Imaging tests (dates/results):  EKG (dates/results):  Other test (dates/results):  Tests pending (dates/results):    Surgical or Invasive Procedures - None.   Name of surgery/procedure:  Date & Time:  Patient Response:  Post-operative orders:  Operative Report/Findings:    Response to Treatment, Major Change in Condition, Major Charge in Treatment anytime during admission      In the ED, a telemedicine consult was placed to toxicology, who recommended administration of a dose of Phenobarbital, placement on CIWA protocol, and daily supplemental vitamin/folate and multivitamin supplementation.  Today, patient reports feeling \"much better\" and was seen ambulating comfortably without gait instability or discomfort by nursing.  He will be discharged home, and is agreeable to the outpatient alcohol rehab, which is to be coordinated with case management, prior to discharge.       Disposition on Discharge  Home, Rehab, SNF, LTC, Shelter, etc.: Home/Self Care    Cease to Breathe (CTB)  If a patient expires during an admission, in addition to the above information, please include:    Summary/timeline of the patient's decline in condition:    Medications and treatment:    Patient response to treatment:    Date and time patient ceased to breathe:        Is there a Readmission that follows this admission? yes  If yes, provide dates: 12/24/2024 - currently admitted.       InterQual Review    InterQual Criteria Met:   yes    Please include the InterQual Review, InterQual year/version used, and the criteria selected:       InterQual® Review Status: In Primary  Review Type: Admission  Criteria Status: Acute Met  Day of review: Episode Day 1  Condition Specific: Yes        REVIEW DETAILS     Product: LOC:Acute Adult  Subset: Withdrawal Syndrome            [X] Select Day, One:          [X] Episode Day 1, One:              [X] ACUTE, One:                  [X] Alcohol or anxiolytic or hypnotic or " sedative withdrawal syndrome and, Both:                      [X] Finding, >= One:                          [X] Alcohol withdrawal syndrome and, >= One:                              [X] CIWA-Ar 8-14 and blood pressure >= 150/90 mmHg                          [X] Blood pressure, >= One:                              [X] Diastolic >= 100 mmHg                          [X] Heart rate > 100/min, sustained                      [X] Intervention, >= One:                          [X] Barbiturate (includes PO)        Version: InterQual® 2024, Dec. 2024 Release          PLEASE SUBMIT THE COMPLETED FORM TO THE DEPARTMENT OF HUMAN SERVICES - DIVISION OF CLINICAL  REVIEW VIA FAX -[phone] or VIA E-MAIL TO [email or web address]    Signature: Florence Dey Date:  12/26/24    Confidentiality Notice: The documents accompanying this telecopy may contain confidential information belonging to the sender.  The information is intended only for the use of the individual named above. If you are not the intended recipient, you are hereby notified  That any disclosure, copying, distribution or taking of any telecopy is strictly prohibited.

## 2024-12-27 ENCOUNTER — APPOINTMENT (INPATIENT)
Dept: RADIOLOGY | Facility: HOSPITAL | Age: 59
DRG: 135 | End: 2024-12-27
Payer: COMMERCIAL

## 2024-12-27 LAB
ERYTHROCYTE [DISTWIDTH] IN BLOOD BY AUTOMATED COUNT: 12.7 % (ref 11.6–15.1)
HCT VFR BLD AUTO: 36.4 % (ref 36.5–49.3)
HGB BLD-MCNC: 12.5 G/DL (ref 12–17)
MCH RBC QN AUTO: 32.6 PG (ref 26.8–34.3)
MCHC RBC AUTO-ENTMCNC: 34.3 G/DL (ref 31.4–37.4)
MCV RBC AUTO: 95 FL (ref 82–98)
PLATELET # BLD AUTO: 160 THOUSANDS/UL (ref 149–390)
PMV BLD AUTO: 9.2 FL (ref 8.9–12.7)
RBC # BLD AUTO: 3.84 MILLION/UL (ref 3.88–5.62)
WBC # BLD AUTO: 6.26 THOUSAND/UL (ref 4.31–10.16)

## 2024-12-27 PROCEDURE — 85027 COMPLETE CBC AUTOMATED: CPT | Performed by: SURGERY

## 2024-12-27 PROCEDURE — NC001 PR NO CHARGE

## 2024-12-27 PROCEDURE — 99233 SBSQ HOSP IP/OBS HIGH 50: CPT

## 2024-12-27 PROCEDURE — 71045 X-RAY EXAM CHEST 1 VIEW: CPT

## 2024-12-27 PROCEDURE — 99232 SBSQ HOSP IP/OBS MODERATE 35: CPT | Performed by: SURGERY

## 2024-12-27 RX ORDER — DIAZEPAM 5 MG/1
5 TABLET ORAL EVERY 6 HOURS PRN
Status: DISCONTINUED | OUTPATIENT
Start: 2024-12-27 | End: 2024-12-29

## 2024-12-27 RX ORDER — POLYETHYLENE GLYCOL 3350 17 G/17G
17 POWDER, FOR SOLUTION ORAL DAILY PRN
Status: DISCONTINUED | OUTPATIENT
Start: 2024-12-27 | End: 2024-12-29 | Stop reason: HOSPADM

## 2024-12-27 RX ORDER — HEPARIN SODIUM 5000 [USP'U]/ML
5000 INJECTION, SOLUTION INTRAVENOUS; SUBCUTANEOUS EVERY 8 HOURS SCHEDULED
Status: DISCONTINUED | OUTPATIENT
Start: 2024-12-27 | End: 2024-12-29 | Stop reason: HOSPADM

## 2024-12-27 RX ADMIN — GABAPENTIN 300 MG: 300 CAPSULE ORAL at 15:35

## 2024-12-27 RX ADMIN — HEPARIN SODIUM 5000 UNITS: 5000 INJECTION INTRAVENOUS; SUBCUTANEOUS at 14:34

## 2024-12-27 RX ADMIN — MIRTAZAPINE 15 MG: 15 TABLET, FILM COATED ORAL at 21:26

## 2024-12-27 RX ADMIN — DIAZEPAM 5 MG: 5 TABLET ORAL at 23:24

## 2024-12-27 RX ADMIN — ATORVASTATIN CALCIUM 40 MG: 40 TABLET, FILM COATED ORAL at 15:35

## 2024-12-27 RX ADMIN — FENTANYL CITRATE: 50 INJECTION INTRAMUSCULAR; INTRAVENOUS at 14:37

## 2024-12-27 RX ADMIN — HYDROMORPHONE HYDROCHLORIDE 0.5 MG: 1 INJECTION, SOLUTION INTRAMUSCULAR; INTRAVENOUS; SUBCUTANEOUS at 21:27

## 2024-12-27 RX ADMIN — OMEGA-3 FATTY ACIDS CAP 1000 MG 2000 MG: 1000 CAP at 08:23

## 2024-12-27 RX ADMIN — MULTIPLE VITAMINS W/ MINERALS TAB 1 TABLET: TAB ORAL at 08:23

## 2024-12-27 RX ADMIN — ACETAMINOPHEN 975 MG: 325 TABLET, FILM COATED ORAL at 21:27

## 2024-12-27 RX ADMIN — OXYCODONE HYDROCHLORIDE 5 MG: 5 TABLET ORAL at 20:18

## 2024-12-27 RX ADMIN — METHOCARBAMOL TABLETS 1000 MG: 500 TABLET, COATED ORAL at 06:10

## 2024-12-27 RX ADMIN — ESCITALOPRAM OXALATE 20 MG: 20 TABLET ORAL at 08:23

## 2024-12-27 RX ADMIN — METHOCARBAMOL TABLETS 1000 MG: 500 TABLET, COATED ORAL at 21:26

## 2024-12-27 RX ADMIN — ACETAMINOPHEN 975 MG: 325 TABLET, FILM COATED ORAL at 14:34

## 2024-12-27 RX ADMIN — GABAPENTIN 300 MG: 300 CAPSULE ORAL at 08:23

## 2024-12-27 RX ADMIN — GABAPENTIN 300 MG: 300 CAPSULE ORAL at 20:18

## 2024-12-27 RX ADMIN — LISINOPRIL 40 MG: 20 TABLET ORAL at 08:23

## 2024-12-27 RX ADMIN — FOLIC ACID 1 MG: 1 TABLET ORAL at 08:23

## 2024-12-27 RX ADMIN — DIAZEPAM 5 MG: 5 TABLET ORAL at 03:15

## 2024-12-27 RX ADMIN — METHOCARBAMOL TABLETS 1000 MG: 500 TABLET, COATED ORAL at 14:34

## 2024-12-27 RX ADMIN — ACETAMINOPHEN 975 MG: 325 TABLET, FILM COATED ORAL at 06:10

## 2024-12-27 RX ADMIN — PANTOPRAZOLE SODIUM 40 MG: 40 TABLET, DELAYED RELEASE ORAL at 06:10

## 2024-12-27 RX ADMIN — Medication 100 MG: at 08:23

## 2024-12-27 NOTE — ASSESSMENT & PLAN NOTE
Started on multimodal pain regimen  Pain control much better on epidural     Plan:  Tylenol 975 mg p.o. every 8 hours  Gabapentin 300 mg p.o. 3 times daily  Toradol 15 mg IV every 6 hours  Methocarbamol 1 g p.o. every 8 hours  Dilaudid 0.5 mg IV as needed for breakthrough pain  APS consulted, appreciate recommendations  12/25 Epidural placement  Bowel regimen while on multimodal pain regimen

## 2024-12-27 NOTE — CASE MANAGEMENT
Case Management Assessment & Discharge Planning Note    Patient name Diogo Travis  Location ICU 10/ICU 10 MRN [id number]  : 1965 Date 2024       Current Admission Date: 2024  Current Admission Diagnosis:Multiple rib fractures   Patient Active Problem List    Diagnosis Date Noted Date Diagnosed    Fall 2024     Multiple rib fractures 2024     Acute pain due to trauma 2024     Alcohol use disorder 2024     Alcohol withdrawal (HCC) 10/10/2024     Depression 2024     Hyperlipidemia 2024     GERD (gastroesophageal reflux disease) 2024     IBS (irritable bowel syndrome) 2024     Rosacea 2024     Vitamin D deficiency 2024     Vitamin B12 deficiency 2024     Hepatic steatosis 2023     Chronic alcoholic gastritis 2023     Hypertension 2023     Coronary artery calcification of native artery 2014       LOS (days): 2  Geometric Mean LOS (GMLOS) (days):   Days to GMLOS:     OBJECTIVE:  PATIENT READMITTED TO HOSPITAL  Risk of Unplanned Readmission Score: 21.7         Current admission status: Inpatient       Preferred Pharmacy:   [address] [address]  Phone: [phone] Fax: [phone]    Primary Care Provider: Enid Altman DO    Primary Insurance: [employer]  Secondary Insurance:     ASSESSMENT:  Active Health Care Proxies    There are no active Health Care Proxies on file.                 Readmission Root Cause  30 Day Readmission: Yes  During your hospital stay, did someone (provider, nurse, ) explain your care to you in a way you could understand?: Yes  Did you feel medically stable to leave the hospital?: Yes  Were you able to pay for your medication at the pharmacy?: Yes  Did you have reliable transportation to take you to your appointments?: Yes  During previous admission, was a post-acute recommendation  made?: No  Patient was readmitted due to: Multiple rib fx  Action Plan: APS consult, PT/OT    Patient Information  Admitted from:: Home  Mental Status: Alert  During Assessment patient was accompanied by: Not accompanied during assessment  Assessment information provided by:: Patient  Primary Caregiver: Self  Support Systems: Self, Spouse/significant other, Parent  County of Residence: [place]  What city do you live in?: [place]  Home entry access options. Select all that apply.: Stairs, Garage  Number of steps to enter home.: 2  Type of Current Residence: 2 story home  Upon entering residence, is there a bedroom on the main floor (no further steps)?: No  A bedroom is located on the following floor levels of residence (select all that apply):: 2nd Floor  Upon entering residence, is there a bathroom on the main floor (no further steps)?: Yes (Half bath)  Number of steps to 2nd floor from main floor: One Flight  Living Arrangements: Lives Alone    Activities of Daily Living Prior to Admission  Functional Status: Independent  Completes ADLs independently?: Yes  Ambulates independently?: Yes  Does patient use assisted devices?: No  Does patient currently own DME?: No  Does patient have a history of Outpatient Therapy (PT/OT)?: Yes  Does the patient have a history of Short-Term Rehab?: No  Does patient have a history of HHC?: No  Does patient currently have HHC?: No         Patient Information Continued  Income Source: Unemployed  Does patient have prescription coverage?: Yes  Does patient receive dialysis treatments?: No  Does patient have a history of substance abuse?: Yes  Historical substance use preference: Alcohol/ETOH  History of Withdrawal Symptoms: Denies past symptoms, Seizures  Is patient currently in treatment for substance abuse?: No. Patient declined treatment information.  Does patient have a history of Mental Health Diagnosis?: Yes  Is patient receiving treatment for mental health?: Yes  Has patient  received inpatient treatment related to mental health in the last 2 years?: No         Means of Transportation  Means of Transport to Appts:: Drives Self          DISCHARGE DETAILS:    Discharge planning discussed with:: Patient  Freedom of Choice: Yes  Comments - Freedom of Choice: CM reviewed PT rec level 3. OPPT list provided.  CM contacted family/caregiver?: No- see comments (Pt A&O)  Were Treatment Team discharge recommendations reviewed with patient/caregiver?: Yes  Did patient/caregiver verbalize understanding of patient care needs?: Yes  Were patient/caregiver advised of the risks associated with not following Treatment Team discharge recommendations?: Yes    Contacts  Patient Contacts: Patient  Contact Method: In Person  Reason/Outcome: Continuity of Care, Discharge Planning    Requested Home Health Care         Is the patient interested in HHC at discharge?: No    DME Referral Provided  Referral made for DME?: No    Other Referral/Resources/Interventions Provided:  Interventions: Outpatient PT  Referral Comments: CM spoke with pt at bedside, introduced self and role with dcp. Pt reported he lives alone in a 2 story home with 2 ISAMAR through the garage. Pt reported there is a half bathroom on the main level, full bathroom and bedroom upstairs. Pt reported being fully IPTA, does not own or use DME. Pt reported a hx of OPPT. Pt reported preferred pharmacy is [facility] and PCP is Dr. Altman. Pt reported driving self at baseline. Pt reported his s/o and father are supportive and cane assist with transportation needs. CM reviewed PT rec level 3. Pt does not feel HHC or OPPT are necessary at this time. CM provided OPPT location list if pt changes his mind. No further CM needs noted.    Treatment Team Recommendation: Home, Outpatient Rehab, Home with Home Health Care  Discharge Destination Plan:: Home

## 2024-12-27 NOTE — ASSESSMENT & PLAN NOTE
Daily heavy drinker  EtOH level on arrival 150  Encourage cessation  Continue CIWA scoring, though favor phenobarbital and Valium over lorazepam

## 2024-12-27 NOTE — ASSESSMENT & PLAN NOTE
Received phenobarbital  mg and diazepam IV 5 mg in the emergency department.  He was also started on standing diazepam p.o. 5 mg every 8 hours  Last phenobarbital dose 12/24  Currently on diazepam p.o. 5 mg every 8 hours    Plan:  Continue CIWA scoring, favor phenobarbital with Valium as adjunct over lorazepam.  Continue thiamine/folate  Optimize nutrition  Encourage cessation  Will need diazepam taper

## 2024-12-27 NOTE — PLAN OF CARE
Problem: PAIN - ADULT  Goal: Verbalizes/displays adequate comfort level or baseline comfort level  Description: Interventions:  - Encourage patient to monitor pain and request assistance  - Assess pain using appropriate pain scale  - Administer analgesics based on type and severity of pain and evaluate response  - Implement non-pharmacological measures as appropriate and evaluate response  - Consider cultural and social influences on pain and pain management  - Notify physician/advanced practitioner if interventions unsuccessful or patient reports new pain  Outcome: Progressing     Problem: INFECTION - ADULT  Goal: Absence or prevention of progression during hospitalization  Description: INTERVENTIONS:  - Assess and monitor for signs and symptoms of infection  - Monitor lab/diagnostic results  - Monitor all insertion sites, i.e. indwelling lines, tubes, and drains  - Monitor endotracheal if appropriate and nasal secretions for changes in amount and color  - [place] appropriate cooling/warming therapies per order  - Administer medications as ordered  - Instruct and encourage patient and family to use good hand hygiene technique  - Identify and instruct in appropriate isolation precautions for identified infection/condition  Outcome: Progressing  Goal: Absence of fever/infection during neutropenic period  Description: INTERVENTIONS:  - Monitor WBC    Outcome: Progressing     Problem: SAFETY ADULT  Goal: Patient will remain free of falls  Description: INTERVENTIONS:  - Educate patient/family on patient safety including physical limitations  - Instruct patient to call for assistance with activity   - Consult OT/PT to assist with strengthening/mobility   - Keep Call bell within reach  - Keep bed low and locked with side rails adjusted as appropriate  - Keep care items and personal belongings within reach  - Initiate and maintain comfort rounds  - Obtain necessary fall risk management equipment  - Apply yellow socks  and bracelet for high fall risk patients  - Consider moving patient to room near nurses station  Outcome: Progressing  Goal: Maintain or return to baseline ADL function  Description: INTERVENTIONS:  -  Assess patient's ability to carry out ADLs; assess patient's baseline for ADL function and identify physical deficits which impact ability to perform ADLs (bathing, care of mouth/teeth, toileting, grooming, dressing, etc.)  - Assess/evaluate cause of self-care deficits   - Assess range of motion  - Assess patient's mobility; develop plan if impaired  - Assess patient's need for assistive devices and provide as appropriate  - Encourage maximum independence but intervene and supervise when necessary  - Involve family in performance of ADLs  - Assess for home care needs following discharge   - Consider OT consult to assist with ADL evaluation and planning for discharge  - Provide patient education as appropriate  Outcome: Progressing  Goal: Maintains/Returns to pre admission functional level  Description: INTERVENTIONS:  - Perform AM-PAC 6 Click Basic Mobility/ Daily Activity assessment daily.  - Set and communicate daily mobility goal to care team and patient/family/caregiver.   - Collaborate with rehabilitation services on mobility goals if consulted  - Stand patient   - Ambulate patient   - Out of bed to chair  - Out of bed for meals   - Out of bed for toileting  - Record patient progress and toleration of activity level   Outcome: Progressing     Problem: DISCHARGE PLANNING  Goal: Discharge to home or other facility with appropriate resources  Description: INTERVENTIONS:  - Identify barriers to discharge w/patient and caregiver  - Arrange for needed discharge resources and transportation as appropriate  - Identify discharge learning needs (meds, wound care, etc.)  - Arrange for interpretive services to assist at discharge as needed  - Refer to Case Management Department for coordinating discharge planning if the  patient needs post-hospital services based on physician/advanced practitioner order or complex needs related to functional status, cognitive ability, or social support system  Outcome: Progressing     Problem: Knowledge Deficit  Goal: Patient/family/caregiver demonstrates understanding of disease process, treatment plan, medications, and discharge instructions  Description: Complete learning assessment and assess knowledge base.  Interventions:  - Provide teaching at level of understanding  - Provide teaching via preferred learning methods  Outcome: Progressing     Problem: Prexisting or High Potential for Compromised Skin Integrity  Goal: Skin integrity is maintained or improved  Description: INTERVENTIONS:  - Identify patients at risk for skin breakdown  - Assess and monitor skin integrity  - Assess and monitor nutrition and hydration status  - Monitor labs   - Assess for incontinence   - Turn and reposition patient  - Assist with mobility/ambulation  - Relieve pressure over bony prominences  - Avoid friction and shearing  - Provide appropriate hygiene as needed including keeping skin clean and dry  - Evaluate need for skin moisturizer/barrier cream  - Collaborate with interdisciplinary team   - Patient/family teaching  - Consider wound care consult   Outcome: Progressing

## 2024-12-27 NOTE — PROGRESS NOTES
Progress Note - Critical Care/ICU   Name: Diogo Travis 59 y.o. male I MRN: [id number]  Unit/Bed#: ICU 10 I Date of Admission: 12/24/2024   Date of Service: 12/27/2024 I Hospital Day: 2      Assessment & Plan  Multiple rib fractures  Secondary to fall  Multiple right-sided rib fractures 4th through 7th, POA  Started on rib fracture protocol  Incentive spirometry with goal volume 15 mL/kg of ideal body weight    Plan:  Continue to encourage IS  Continue multimodal pain regimen  APS consulted, appreciate recommendations  Holding DVT prophylaxis this morning for epidural  Encourage deep breath/cough  OOB to chair TID, ambulate with assistance  Alcohol withdrawal (HCC)  Received phenobarbital  mg and diazepam IV 5 mg in the emergency department.  He was also started on standing diazepam p.o. 5 mg every 8 hours  Last phenobarbital dose 12/24  Currently on diazepam p.o. 5 mg every 8 hours    Plan:  Continue CIWA scoring, favor phenobarbital with Valium as adjunct over lorazepam.  Continue thiamine/folate  Optimize nutrition  Encourage cessation  Will need diazepam taper    Depression  PTA Lexapro, Atarax, Remeron  Patient states that he has some frustration/depression with being unemployed    Plan:  Continue Lexapro, Remeron  Continue CIWA scoring/treatment as above.  Fall  Patient reports falling down stairs at home.  Unclear timing of fall, patient reports that he is heavy daily drinker with EtOH level 150 on admission  Injuries as below  Case management for disposition planning  PT/OT evaluation/treatment as appropriate  Acute pain due to trauma  Started on multimodal pain regimen  Pain control much better on epidural     Plan:  Tylenol 975 mg p.o. every 8 hours  Gabapentin 300 mg p.o. 3 times daily  Toradol 15 mg IV every 6 hours  Methocarbamol 1 g p.o. every 8 hours  Dilaudid 0.5 mg IV as needed for breakthrough pain  APS consulted, appreciate recommendations  12/25 Epidural placement  Bowel regimen while on  "multimodal pain regimen  Alcohol use disorder  Daily heavy drinker  EtOH level on arrival 150  Encourage cessation  Continue CIWA scoring, though favor phenobarbital and Valium over lorazepam  Disposition: Med Surg    ICU Core Measures     A: Assess, Prevent, and Manage Pain Has pain been assessed? Yes  Need for changes to pain regimen? No   B: Both SAT/SAT  N/A   C: Choice of Sedation RASS Goal: N/A patient not on sedation  Need for changes to sedation or analgesia regimen? No   D: Delirium CAM-ICU: Negative   E: Early Mobility  Plan for early mobility? Yes   F: Family Engagement Plan for family engagement today? Yes         Prophylaxis:  VTE Contraindicated secondary to: epidural    Stress Ulcer  covered bypantoprazole (PROTONIX) 40 mg tablet [[id number]] (Long-Term Med), pantoprazole (PROTONIX) EC tablet 40 mg [[id number]]         24 Hour Events : Patient states that he was not using his PCEA because \"I thought that I would get out of the hospital sooner\".  Patient re-educated on use of PCEA.      Subjective   Review of Systems: Review of Systems   Constitutional:  Negative for chills, fatigue and fever.   HENT:  Negative for ear pain and sore throat.    Eyes:  Negative for pain and visual disturbance.   Respiratory:  Negative for cough, chest tightness, shortness of breath and wheezing.         Chest wall pain   Cardiovascular:  Negative for chest pain, palpitations and leg swelling.   Gastrointestinal:  Negative for abdominal distention, abdominal pain, diarrhea, nausea and vomiting.   Endocrine: Negative.    Genitourinary:  Negative for decreased urine volume, dysuria, frequency and hematuria.   Musculoskeletal:  Negative for arthralgias and myalgias.   Skin:  Negative for color change, pallor, rash and wound.   Allergic/Immunologic: Negative.    Neurological:  Negative for dizziness, seizures, syncope and weakness.   Hematological: Negative.    Psychiatric/Behavioral: Negative.     All other systems reviewed " and are negative.      Objective :                   Vitals I/O      Most Recent Min/Max in 24hrs   Temp 99.4 °F (37.4 °C) Temp  Min: 97.7 °F (36.5 °C)  Max: 99.4 °F (37.4 °C)   Pulse (!) 109 Pulse  Min: 57  Max: 109   Resp 14 Resp  Min: 9  Max: 17   /82 BP  Min: 100/64  Max: 155/83   O2 Sat 94 % SpO2  Min: 92 %  Max: 97 %      Intake/Output Summary (Last 24 hours) at 12/27/2024 0321  Last data filed at 12/26/2024 1854  Gross per 24 hour   Intake 1344.65 ml   Output 900 ml   Net 444.65 ml       Diet Regular; Regular House    Invasive Monitoring           Physical Exam   Physical Exam  Vitals and nursing note reviewed.   Eyes:      General: Vision grossly intact. NeglectNo scleral icterus.        Right eye: No discharge.         Left eye: No discharge.      Extraocular Movements: Extraocular movements intact.      Conjunctiva/sclera: Conjunctivae normal.      Pupils: Pupils are equal, round, and reactive to light.   Skin:     General: Skin is warm and dry.      Capillary Refill: Capillary refill takes less than 2 seconds.      Coloration: Skin is not pale.      Findings: No wound.   HENT:      Head: Normocephalic and atraumatic.      Right Ear: Hearing normal. No drainage.      Left Ear: Hearing normal. No drainage.      Nose: No nasal deformity, nasal tenderness, congestion, rhinorrhea, epistaxis or nasogastric tube.      Mouth/Throat:      Mouth: Mucous membranes are moist. No injury or angioedema.      Dentition: Normal dentition.      Pharynx: No oropharyngeal exudate.   Neck:      Vascular: No JVD.   no midline tenderness Cardiovascular:      Rate and Rhythm: Bradycardia present. Rhythm irregular.      Pulses: No decreased pulses.      Heart sounds: No murmur heard.     No friction rub. No gallop.   Musculoskeletal:         General: No swelling, tenderness, deformity or signs of injury. Normal range of motion.      Right lower leg: No edema.      Left lower leg: No edema.   Abdominal: General: Bowel sounds  "are normal. There is no distension.      Palpations: Abdomen is soft.      Tenderness: There is no abdominal tenderness.   Constitutional:       General: He is not in acute distress.     Appearance: He is well-developed and well-nourished. He is not ill-appearing or toxic-appearing.      Interventions: He is not sedated, not intubated and not restrained.  Pulmonary:      Effort: No tachypnea, accessory muscle usage, respiratory distress or accessory muscle usage. He is not intubated.      Breath sounds: Decreased breath sounds present. No wheezing, rhonchi or rales.      Comments: Patient splinting and not taking deep breaths 2/2 pain.  Patient states that he was not using his PCEA because \"I thought I would get out of the hospital faster if I didn't use it.\"  Psychiatric:         Mood and Affect:  mood and affect abnormal.        Speech: Speech is not no receptive aphasia or no expressive aphasia.   Neurological:      General: No focal deficit present.      Mental Status: He is alert and oriented to person, place and time. Mental status is at baseline. He is CAM ICU negative.      Cranial Nerves: No dysarthria or facial asymmetry.      Sensory: No sensory deficit.      Motor: Strength full and intact in all extremities. No pronator drift or motor deficit.          Diagnostic Studies        Lab Results: I have reviewed the following results:     Medications:  Scheduled PRN   acetaminophen, 975 mg, Q8H COLBY  atorvastatin, 40 mg, Daily With Dinner  diazepam, 5 mg, Q6H  escitalopram, 20 mg, Daily  fish oil, 2,000 mg, Daily  folic acid, 1 mg, Daily  gabapentin, 300 mg, TID  lisinopril, 40 mg, Daily  methocarbamol, 1,000 mg, Q8H  mirtazapine, 15 mg, HS  multivitamin-minerals, 1 tablet, Daily  nicotine, 1 patch, Daily  pantoprazole, 40 mg, Daily Before Breakfast  polyethylene glycol, 17 g, Daily  senna-docusate sodium, 1 tablet, HS  thiamine, 100 mg, Daily      HYDROmorphone, 0.5 mg, Q2H PRN  naloxone, 0.04 mg, Q1MIN " PRN  ondansetron, 4 mg, Q4H PRN  oxyCODONE, 5 mg, Q4H PRN   Or  oxyCODONE, 10 mg, Q4H PRN       Continuous    ropivacaine 0.1% and fentaNYL 2 mcg/mL,          Labs:   CBC    Recent Labs     12/25/24 0425   WBC 5.05   HGB 12.7   HCT 37.5        BMP    Recent Labs     12/25/24 0425 12/26/24  0517   SODIUM 139 141   K 3.8 4.0    101   CO2 27 31   AGAP 11 9   BUN 17 17   CREATININE 1.03 0.93   CALCIUM 8.8 9.1       Coags    Recent Labs     12/25/24 0425   INR 1.00   PTT 30        Additional Electrolytes  Recent Labs     12/26/24  0517   MG 1.7*   PHOS 3.9          Blood Gas    No recent results  No recent results LFTs  No recent results    Infectious  No recent results  Glucose  Recent Labs     12/25/24 0425 12/26/24  0517   GLUC 106 90

## 2024-12-27 NOTE — ASSESSMENT & PLAN NOTE
Right 4-7 rib fractures      Plan:  Continue thoracic epidural infusion 0.1% ropivacaine/2mcg/ml fentanyl @8/5/10/3 (placed 12/25)  Plan for potential hold trial for possible epidural removal tomorrow 12/28    OK to continue SQ heparin for DVT prophylaxis - please hold morning dose on 12/28  Discontinued ketamine gtt 12/25  Continue MMA as below    Multimodal Analgesia:  Tylenol 975 mg every 8 hours scheduled  Oral Valium 5 mg  changed to every 6 hours as needed, for spasm related pain/anxiety  Gabapentin 300 mg 3 times daily  IV Toradol 15 mg every 6 hours scheduled x 2 days - now completed  Lidoderm patch 12 hours on/12 hours off to affected area  Robaxin 1000 mg every 8 hours scheduled  Oxycodone 5 mg every 4 hours as needed for moderate pain  Oxycodone 10 mg every 4 hours as needed for severe pain  IV Dilaudid 0.5 mg every 2 hours as needed for breakthrough pain  Narcan as needed for respiratory depression/opioid reversal  Continue bowel regimen while utilizing opioids to prevent opioid-induced constipation

## 2024-12-27 NOTE — ASSESSMENT & PLAN NOTE
Secondary to fall  Multiple right-sided rib fractures 4th through 7th, POA  Started on rib fracture protocol  Incentive spirometry with goal volume 15 mL/kg of ideal body weight    Plan:  Continue to encourage IS  Continue multimodal pain regimen  APS consulted, appreciate recommendations  Holding DVT prophylaxis this morning for epidural  Encourage deep breath/cough  OOB to chair TID, ambulate with assistance

## 2024-12-27 NOTE — PROGRESS NOTES
Progress Note - Critical Care/ICU   Name: Diogo Travis 59 y.o. male I MRN: [id number]  Unit/Bed#: ICU 10 I Date of Admission: 12/24/2024   Date of Service: 12/27/2024 I Hospital Day: 2       TRAUMA TRANSFER FROM ICU AND TERTIARY SURVEY NOTE    VTE Prophylaxis:Heparin   Will hold in AM for potential to removed epidural in AM    Disposition: Med Surg Tele    Code status:  Level 1 - Full Code    Consultants: IP CONSULT TO ACUTE PAIN SERVICE  IP CONSULT TO CASE MANAGEMENT    History of Present Illness     Mechanism of Injury:Fall  Summary of Diagnosed Injuries:   Multiple right-sided rib fractures (4th-7th)     HPI/Last 24 hour events: Epidural was placed on 12/26.  With improvement of pain control.     Reason for ICU admission: ETOH withdrawal and pain management    Summary of ICU clinical course: No significant withdrawal symptoms or treatment.  Received epidural for pain control.  APS following  Recent or scheduled procedures: None     Outstanding/pending diagnostics: None    Objective :  Temp:  [99.4 °F (37.4 °C)-99.6 °F (37.6 °C)] 99.6 °F (37.6 °C)  HR:  [] 76  BP: (113-151)/(67-93) 123/69  Resp:  [11-19] 18  SpO2:  [91 %-95 %] 95 %  O2 Device: None (Room air)    Physical Exam  HENT:      Head: Normocephalic.      Mouth/Throat:      Mouth: Mucous membranes are moist.   Eyes:      Pupils: Pupils are equal, round, and reactive to light.   Cardiovascular:      Rate and Rhythm: Normal rate and regular rhythm.      Pulses: Normal pulses.      Heart sounds: Normal heart sounds.   Pulmonary:      Effort: Pulmonary effort is normal.      Breath sounds: Normal breath sounds.   Abdominal:      Palpations: Abdomen is soft.   Musculoskeletal:        Back:    Skin:     General: Skin is warm.      Capillary Refill: Capillary refill takes less than 2 seconds.   Neurological:      General: No focal deficit present.      Mental Status: He is alert and oriented to person, place, and time.         Rationale for remaining  devices: Medications requiring IVs   Epidural placement    PIC Score  PIC Pain Score: 2 (12/27/2024  8:00 AM)  PIC Incentive Spirometry Score: 4 (12/27/2024  8:00 AM)  PIC Cough Description: 3 (12/27/2024  8:00 AM)  PIC Total Score: 9 (12/27/2024  8:00 AM)       If the Total PIC Score </=5, did you consult APS and evaluate patient for further intervention?: yes      Pain:    Incentive Spirometry  Cough  3 = Controlled  4 = Above goal volume 3 = Strong  2 = Moderate  3 = Goal to alert volume 2 = Weak  1 = Severe  2 = Below alert volume 1 = Absent     1 = Unable to perform IS        Lab Results: I have reviewed the following results:  Imaging Results Review: I reviewed radiology reports from this admission including: chest xray, CT head, and CT C-spine.  Other Study Results Review: EKG was reviewed.   Chest Xray(s): As noted below    FAST exam(s): negative for acute findings   CT Scan(s): CTA Chest PE  As noted below.    Additional Xray(s): N/A     CTA Chest: Suboptimal contrast opacification of the pulmonary arteries. No central or definitive peripheral pulmonary emboli.  Chronic prominent central pulmonary arteries and borderline elevated RV/LV ratio suggestive of chronic right heart disease.  Acute to subacute minimally displaced fractures of the right fourth through sixth ribs and nondisplaced fracture of the right seventh rib.  Mild bibasilar subsegmental atelectasis. No pneumothorax or hemothorax.  CXR: No acute cardiopulmonary disease. Refer to follow-up CT chest for rib fractures.   Patient seen and evaluated by Critical Care today and deemed to be appropriate for transfer to [facility] with Telemetry. Spoke to Dr. Lewis from Trauma service regarding transfer. Critical Care can be contacted via SecureChat with any questions or concerns.

## 2024-12-27 NOTE — ASSESSMENT & PLAN NOTE
"Patient presented 12/24 s/p fall.   Patient reports he may have fallen 3 days prior and developed severe pain prompting him to seek medical attention    CT imaging 12/24: \"Acute to subacute minimally displaced fractures of the right fourth through sixth ribs and nondisplaced fracture of the right seventh rib.\"    Currently maintaining oxygen saturations on room air, denies shortness of breath and able to inspire 2500 mL with spirometry.     Rib Fracture Evaluation:  Chest tube: none  Respiratory Co-morbidities: Obesity  SpO2:   SPO2 RA Rest      Flowsheet Row ED to Hosp-Admission (Current) from 12/24/2024 in [facility]   SpO2 94 %   SpO2 Activity At Rest   O2 Device Nasal cannula   O2 Flow Rate --                                                                             Incentive Spirometer: Incentive Spirometry Achieved (mL): 2250 mL   Platelet Count:   Results from last 7 days   Lab Units 12/25/24  0425   PLATELETS Thousands/uL 153     Coags:   Results from last 7 days   Lab Units 12/25/24  0425   INR  1.00   PROTIME seconds 13.9     Home anticoagulants: none  VTE covered by:    None      "

## 2024-12-27 NOTE — ASSESSMENT & PLAN NOTE
PTA Lexapro, Atarax, Remeron  Patient states that he has some frustration/depression with being unemployed    Plan:  Continue Lexapro, Remeron  Continue CIWA scoring/treatment as above.

## 2024-12-28 PROCEDURE — 00PUX3Z REMOVAL OF INFUSION DEVICE FROM SPINAL CANAL, EXTERNAL APPROACH: ICD-10-PCS | Performed by: ANESTHESIOLOGY

## 2024-12-28 PROCEDURE — 99232 SBSQ HOSP IP/OBS MODERATE 35: CPT | Performed by: ANESTHESIOLOGY

## 2024-12-28 PROCEDURE — 99232 SBSQ HOSP IP/OBS MODERATE 35: CPT | Performed by: SURGERY

## 2024-12-28 RX ORDER — HYDROMORPHONE HYDROCHLORIDE 2 MG/1
2 TABLET ORAL EVERY 4 HOURS PRN
Refills: 0 | Status: DISCONTINUED | OUTPATIENT
Start: 2024-12-28 | End: 2024-12-29 | Stop reason: HOSPADM

## 2024-12-28 RX ORDER — HYDROMORPHONE HYDROCHLORIDE 2 MG/1
4 TABLET ORAL EVERY 4 HOURS PRN
Refills: 0 | Status: DISCONTINUED | OUTPATIENT
Start: 2024-12-28 | End: 2024-12-29 | Stop reason: HOSPADM

## 2024-12-28 RX ADMIN — LISINOPRIL 40 MG: 20 TABLET ORAL at 08:25

## 2024-12-28 RX ADMIN — GABAPENTIN 300 MG: 300 CAPSULE ORAL at 17:24

## 2024-12-28 RX ADMIN — FOLIC ACID 1 MG: 1 TABLET ORAL at 08:25

## 2024-12-28 RX ADMIN — GABAPENTIN 300 MG: 300 CAPSULE ORAL at 22:08

## 2024-12-28 RX ADMIN — ACETAMINOPHEN 975 MG: 325 TABLET, FILM COATED ORAL at 14:18

## 2024-12-28 RX ADMIN — METHOCARBAMOL TABLETS 1000 MG: 500 TABLET, COATED ORAL at 14:18

## 2024-12-28 RX ADMIN — MIRTAZAPINE 15 MG: 15 TABLET, FILM COATED ORAL at 22:08

## 2024-12-28 RX ADMIN — MULTIPLE VITAMINS W/ MINERALS TAB 1 TABLET: TAB ORAL at 08:25

## 2024-12-28 RX ADMIN — GABAPENTIN 300 MG: 300 CAPSULE ORAL at 08:25

## 2024-12-28 RX ADMIN — ESCITALOPRAM OXALATE 20 MG: 20 TABLET ORAL at 08:25

## 2024-12-28 RX ADMIN — OXYCODONE HYDROCHLORIDE 10 MG: 10 TABLET ORAL at 02:15

## 2024-12-28 RX ADMIN — OXYCODONE HYDROCHLORIDE 10 MG: 10 TABLET ORAL at 13:16

## 2024-12-28 RX ADMIN — ACETAMINOPHEN 975 MG: 325 TABLET, FILM COATED ORAL at 22:08

## 2024-12-28 RX ADMIN — HYDROMORPHONE HYDROCHLORIDE 0.5 MG: 1 INJECTION, SOLUTION INTRAMUSCULAR; INTRAVENOUS; SUBCUTANEOUS at 20:03

## 2024-12-28 RX ADMIN — DIAZEPAM 5 MG: 5 TABLET ORAL at 09:26

## 2024-12-28 RX ADMIN — Medication 100 MG: at 08:25

## 2024-12-28 RX ADMIN — HYDROMORPHONE HYDROCHLORIDE 0.5 MG: 1 INJECTION, SOLUTION INTRAMUSCULAR; INTRAVENOUS; SUBCUTANEOUS at 08:25

## 2024-12-28 RX ADMIN — PANTOPRAZOLE SODIUM 40 MG: 40 TABLET, DELAYED RELEASE ORAL at 05:59

## 2024-12-28 RX ADMIN — HEPARIN SODIUM 5000 UNITS: 5000 INJECTION INTRAVENOUS; SUBCUTANEOUS at 22:09

## 2024-12-28 RX ADMIN — METHOCARBAMOL TABLETS 1000 MG: 500 TABLET, COATED ORAL at 05:59

## 2024-12-28 RX ADMIN — HYDROMORPHONE HYDROCHLORIDE 4 MG: 2 TABLET ORAL at 17:23

## 2024-12-28 RX ADMIN — METHOCARBAMOL TABLETS 1000 MG: 500 TABLET, COATED ORAL at 22:08

## 2024-12-28 RX ADMIN — ATORVASTATIN CALCIUM 40 MG: 40 TABLET, FILM COATED ORAL at 17:24

## 2024-12-28 RX ADMIN — ACETAMINOPHEN 975 MG: 325 TABLET, FILM COATED ORAL at 05:59

## 2024-12-28 NOTE — ASSESSMENT & PLAN NOTE
- heavy daily drinker  - h/o alcohol withdrawal on multiple occurrences  - MVD/Thiamine/Folic Acid  - CM for SBIRT

## 2024-12-28 NOTE — PROGRESS NOTES
"Progress Note - Acute Pain   Name: Diogo Travis 59 y.o. male I MRN: [id number]  Unit/Bed#: S -01 I Date of Admission: 12/24/2024   Date of Service: 12/28/2024 I Hospital Day: 3    Assessment & Plan  Acute pain due to trauma    Right 4-7 rib fractures      Plan:  Epidural stopped and removed on 12/28/24    OK to continue SQ heparin for DVT prophylaxis   Discontinued ketamine gtt 12/25  Continue MMA as below    Multimodal Analgesia:  Tylenol 975 mg every 8 hours scheduled  Oral Valium 5 mg  changed to every 6 hours as needed, for spasm related pain/anxiety  Gabapentin 300 mg 3 times daily  IV Toradol 15 mg every 6 hours scheduled x 2 days - now completed  Lidoderm patch 12 hours on/12 hours off to affected area  Robaxin 1000 mg every 8 hours scheduled  Stop Oxycodone   Start oral dilaudid 2/4mg Q4H prn for mod/severe pain   IV Dilaudid 0.5 mg every 2 hours as needed for breakthrough pain  Narcan as needed for respiratory depression/opioid reversal  Continue bowel regimen while utilizing opioids to prevent opioid-induced constipation  Multiple rib fractures  Patient presented 12/24 s/p fall.   Patient reports he may have fallen 3 days prior and developed severe pain prompting him to seek medical attention    CT imaging 12/24: \"Acute to subacute minimally displaced fractures of the right fourth through sixth ribs and nondisplaced fracture of the right seventh rib.\"    Currently maintaining oxygen saturations on room air, denies shortness of breath and able to inspire 2500 mL with spirometry.     Rib Fracture Evaluation:  Chest tube: none  Respiratory Co-morbidities: Obesity  SpO2:   SPO2 RA Rest      Flowsheet Row ED to Hosp-Admission (Current) from 12/24/2024 in [facility] Intensive Care Unit   SpO2 94 %   SpO2 Activity At Rest   O2 Device Nasal cannula   O2 Flow Rate --                                                                             Incentive Spirometer: Incentive Spirometry " "Achieved (mL): 2000 mL   Platelet Count:   Results from last 7 days   Lab Units 12/27/24  1026   PLATELETS Thousands/uL 160     Coags:   Results from last 7 days   Lab Units 12/25/24  0425   INR  1.00   PROTIME seconds 13.9     Home anticoagulants: none  VTE covered by:  heparin (porcine), Subcutaneous, 5,000 Units at 12/27/24 1434      Alcohol use disorder  Patient with hx of AUD. Has had multiple hospitalizations recently for alcohol withdrawal.  Reports drinking at least \"half a bottle\" of bourbon and wine daily  Patient reports he is interested in cessation  Continue CIWA.  CM consultation for CATCH program    Depression  Patients with depression and/or anxiety have more perceived pain on average and often require higher doses of opioid pain medication.  Treat depression and/or anxiety aggressively.    Alcohol withdrawal (HCC)    Fall      APS will continue to follow. Please contact Acute Pain Service - via SecureChat from [phone] with additional questions or concerns. See SecureChat or [place] for additional contacts and after hours information.     Subjective   Diogo Travis is a 59 y.o. male who presents with history of alcohol use disorder. Patient reports he sustained a fall possibly 3 days ago and was found to have 4-7 right-sided rib fractures. Of note patient has had multiple hospitalizations recently for alcohol withdrawal.     Pain History  Current pain location(s):  Pain Score: 7  Pain Location/Orientation: Location: Rib Cage  Pain Scale: Pain Assessment Tool: 0-10  24 hour history: Patient was seen sitting up in chair, appears to be in mild distress from pain during afternoon rounds. States that he wanted the epidural stopped last night in anticipation of discharge home, says that he still has rib fracture pain but it is less compared to initial pain at time of admission. Discussed that we will plan to remove epidural today so that we can monitor the patient for one more day and make adjustments " pain regimen as needed, he is in agreement.     Opioid requirement previous 24 hours: Oxycodone 5mg x1 + 10mg x1, IV dilaudid 0.5mg x1   Meds/Allergies   all current active meds have been reviewed  Allergies   Allergen Reactions    Cefdinir Rash     Objective :  Temp:  [97.3 °F (36.3 °C)-100.6 °F (38.1 °C)] 98.7 °F (37.1 °C)  HR:  [] 111  BP: (120-157)/(84-98) 155/98  Resp:  [16] 16  SpO2:  [94 %-95 %] 95 %    Physical Exam  Vitals reviewed.   HENT:      Head: Normocephalic.      Mouth/Throat:      Mouth: Mucous membranes are dry.   Eyes:      Extraocular Movements: Extraocular movements intact.   Cardiovascular:      Rate and Rhythm: Normal rate.      Pulses: Normal pulses.   Pulmonary:      Effort: Pulmonary effort is normal.   Chest:      Chest wall: Tenderness (Right) present.   Abdominal:      General: Abdomen is flat.   Musculoskeletal:         General: Normal range of motion.      Cervical back: Normal range of motion.   Skin:     General: Skin is dry.   Neurological:      General: No focal deficit present.      Mental Status: He is alert and oriented to person, place, and time.   Psychiatric:         Mood and Affect: Mood normal.      Epidural: Site clean/dry/intact, no surrounding erythema/edema/induration, infusion functioning appropriately Removed with tip intact      Lab Results: I have reviewed the following results:  Estimated Creatinine Clearance: 99.2 mL/min (by C-G formula based on SCr of 0.93 mg/dL).  Lab Results   Component Value Date    WBC 6.26 12/27/2024    HGB 12.5 12/27/2024    HCT 36.4 (L) 12/27/2024     12/27/2024         Component Value Date/Time    K 4.0 12/26/2024 0517    K 3.7 12/27/2022 1818     12/26/2024 0517     12/27/2022 1818    CO2 31 12/26/2024 0517    CO2 15 (L) 12/12/2023 1611    CO2 23 12/27/2022 1818    BUN 17 12/26/2024 0517    BUN 10 12/27/2022 1818    CREATININE 0.93 12/26/2024 0517    CREATININE 0.92 12/27/2022 1818         Component Value  Date/Time    CALCIUM 9.1 12/26/2024 0517    CALCIUM 9.7 12/27/2022 1818    ALKPHOS 102 12/24/2024 0507    ALKPHOS 93 12/27/2022 1818    AST 46 (H) 12/24/2024 0507    AST 51 (H) 12/27/2022 1818    ALT 24 12/24/2024 0507    ALT 41 12/27/2022 1818    TP 7.6 12/24/2024 0507    TP 7.9 12/27/2022 1818    ALB 4.5 12/24/2024 0507    ALB 4.7 12/27/2022 1818

## 2024-12-28 NOTE — ASSESSMENT & PLAN NOTE
- Multiple right-sided rib fractures (4th-7th), present on admission.  - Continue rib fracture protocol.  - Continue to encourage incentive spirometer use and adequate pulmonary hygiene.  Currently pulling 2250 mL on I.S.  - Appreciate APS evaluation and recommendations.  - Continue multimodal analgesic regimen.  - Supplemental oxygen via nasal cannula as needed to maintain saturations greater than or equal to 94%.  - PT and OT evaluation and treatment as indicated.  - Outpatient follow-up in the trauma clinic for re-evaluation in approximately 2 weeks.

## 2024-12-28 NOTE — PROGRESS NOTES
Progress Note - Trauma   Name: Diogo Travis 59 y.o. male I MRN: [id number]  Unit/Bed#: S -01 I Date of Admission: 12/24/2024   Date of Service: 12/28/2024 I Hospital Day: 3    Assessment & Plan  Fall  - uncertain of timing of fall, heavy drinker and poor historian with regards to details of timing  - sustained below stated injuries  - PT/OT evaluations  - CM for dispo planning  Multiple rib fractures  - Multiple right-sided rib fractures (4th-7th), present on admission.  - Continue rib fracture protocol.  - Continue to encourage incentive spirometer use and adequate pulmonary hygiene.  Currently pulling 2250 mL on I.S.  - Appreciate APS evaluation and recommendations.  - Continue multimodal analgesic regimen.  - Supplemental oxygen via nasal cannula as needed to maintain saturations greater than or equal to 94%.  - PT and OT evaluation and treatment as indicated.  - Outpatient follow-up in the trauma clinic for re-evaluation in approximately 2 weeks.    Acute pain due to trauma  - Acute pain secondary to traumatic injuries.  - Continue multimodal analgesic regimen.  - Appreciate APS evaluation and recommendations.   - EDC placed and will discuss removal today on 12/28 with APS team  - Bowel regimen as long as using opioids.  - Continue to monitor pain and adjust regimen as indicated.  Depression  - resume home lexapro and remeron  Alcohol use disorder  - heavy daily drinker  - h/o alcohol withdrawal on multiple occurrences  - noted to have anxiety and tremors, mild tachycardia as well in the low 100s  - given 130 phenobarbital and placed on CIWA protocol  - MVD/Thiamine/Folic Acid  - CM for SBIRT  Alcohol withdrawal (HCC)  - heavy daily drinker  - h/o alcohol withdrawal on multiple occurrences  - MVD/Thiamine/Folic Acid  - CM for SBIRT    DVT Prophylaxis: SCDs and SQH on hold as patient refused as well as patient may have epidural removed today.   PT and OT: eval and treat    Disposition: Dispo planning.   Will follow-up with APS regarding potential discontinuation of epidural.  If epidural is discontinued;     24 Hour Events : No acute events overnight  Subjective : Patient offering no new complaints other than pain with his rib fractures.  Continues to do incentive spirometry getting 2250    Objective :  Temp:  [98 °F (36.7 °C)-100.6 °F (38.1 °C)] 98 °F (36.7 °C)  HR:  [] 74  BP: (113-157)/(67-93) 135/93  Resp:  [16-18] 16  SpO2:  [92 %-95 %] 94 %    I/O         12/26 0701  12/27 0700 12/27 0701  12/28 0700 12/28 0701 12/29 0700    P.O. 592      I.V. (mL/kg) 261.7 (2.6)      IV Piggyback 50      Total Intake(mL/kg) 903.7 (9)      Urine (mL/kg/hr) 200 (0.1)      Total Output 200      Net +703.7             Unmeasured Urine Occurrence 1 x      Unmeasured Stool Occurrence 2 x            Lines/Drains/Airways       Active Status       Name Placement date Placement time Site Days    Epidural Catheter 12/25/24 12/25/24  1255  -- 2                  Physical Exam  Vitals reviewed.   Constitutional:       Appearance: Normal appearance.   HENT:      Head: Normocephalic and atraumatic.      Mouth/Throat:      Pharynx: Oropharynx is clear.   Eyes:      Conjunctiva/sclera: Conjunctivae normal.   Cardiovascular:      Rate and Rhythm: Normal rate and regular rhythm.      Pulses: Normal pulses.      Heart sounds: Normal heart sounds.   Pulmonary:      Effort: Pulmonary effort is normal. No respiratory distress.      Breath sounds: Normal breath sounds.   Chest:      Chest wall: No tenderness.   Abdominal:      General: There is no distension.      Palpations: Abdomen is soft.      Tenderness: There is no abdominal tenderness. There is no guarding.   Musculoskeletal:         General: No swelling or tenderness. Normal range of motion.      Cervical back: Normal range of motion. No tenderness.   Skin:     General: Skin is warm and dry.   Neurological:      General: No focal deficit present.      Mental Status: He is alert and  oriented to person, place, and time. Mental status is at baseline.        PIC Score  PIC Pain Score: 1 (12/28/2024  5:59 AM)  PIC Incentive Spirometry Score: 4 (12/27/2024  8:00 PM)  PIC Cough Description: 3 (12/27/2024  8:00 PM)  PIC Total Score: 9 (12/27/2024  8:00 PM)       If the Total PIC Score </=5, did you consult APS and evaluate patient for further intervention?: yes      Pain:    Incentive Spirometry  Cough  3 = Controlled  4 = Above goal volume 3 = Strong  2 = Moderate  3 = Goal to alert volume 2 = Weak  1 = Severe  2 = Below alert volume 1 = Absent     1 = Unable to perform IS           Lab Results: I have reviewed the following results:  Recent Labs     12/26/24  0517 12/27/24  1026   WBC  --  6.26   HGB  --  12.5   HCT  --  36.4*   PLT  --  160   SODIUM 141  --    K 4.0  --      --    CO2 31  --    BUN 17  --    CREATININE 0.93  --    GLUC 90  --    MG 1.7*  --    PHOS 3.9  --        Imaging Results Review: No pertinent imaging studies reviewed.  Other Study Results Review: No additional pertinent studies reviewed.

## 2024-12-28 NOTE — ASSESSMENT & PLAN NOTE
"Patient presented 12/24 s/p fall.   Patient reports he may have fallen 3 days prior and developed severe pain prompting him to seek medical attention    CT imaging 12/24: \"Acute to subacute minimally displaced fractures of the right fourth through sixth ribs and nondisplaced fracture of the right seventh rib.\"    Currently maintaining oxygen saturations on room air, denies shortness of breath and able to inspire 2500 mL with spirometry.     Rib Fracture Evaluation:  Chest tube: none  Respiratory Co-morbidities: Obesity  SpO2:   SPO2 RA Rest      Flowsheet Row ED to Hosp-Admission (Current) from 12/24/2024 in [facility] Intensive Care Unit   SpO2 94 %   SpO2 Activity At Rest   O2 Device Nasal cannula   O2 Flow Rate --                                                                             Incentive Spirometer: Incentive Spirometry Achieved (mL): 2000 mL   Platelet Count:   Results from last 7 days   Lab Units 12/27/24  1026   PLATELETS Thousands/uL 160     Coags:   Results from last 7 days   Lab Units 12/25/24  0425   INR  1.00   PROTIME seconds 13.9     Home anticoagulants: none  VTE covered by:  heparin (porcine), Subcutaneous, 5,000 Units at 12/27/24 1439      "

## 2024-12-28 NOTE — PLAN OF CARE
Problem: PAIN - ADULT  Goal: Verbalizes/displays adequate comfort level or baseline comfort level  Description: Interventions:  - Encourage patient to monitor pain and request assistance  - Assess pain using appropriate pain scale  - Administer analgesics based on type and severity of pain and evaluate response  - Implement non-pharmacological measures as appropriate and evaluate response  - Consider cultural and social influences on pain and pain management  - Notify physician/advanced practitioner if interventions unsuccessful or patient reports new pain  Outcome: Progressing     Problem: INFECTION - ADULT  Goal: Absence or prevention of progression during hospitalization  Description: INTERVENTIONS:  - Assess and monitor for signs and symptoms of infection  - Monitor lab/diagnostic results  - Monitor all insertion sites, i.e. indwelling lines, tubes, and drains  - Monitor endotracheal if appropriate and nasal secretions for changes in amount and color  - [place] appropriate cooling/warming therapies per order  - Administer medications as ordered  - Instruct and encourage patient and family to use good hand hygiene technique  - Identify and instruct in appropriate isolation precautions for identified infection/condition  Outcome: Progressing

## 2024-12-28 NOTE — ASSESSMENT & PLAN NOTE
Right 4-7 rib fractures      Plan:  Epidural stopped and removed on 12/28/24    OK to continue SQ heparin for DVT prophylaxis   Discontinued ketamine gtt 12/25  Continue MMA as below    Multimodal Analgesia:  Tylenol 975 mg every 8 hours scheduled  Oral Valium 5 mg  changed to every 6 hours as needed, for spasm related pain/anxiety  Gabapentin 300 mg 3 times daily  IV Toradol 15 mg every 6 hours scheduled x 2 days - now completed  Lidoderm patch 12 hours on/12 hours off to affected area  Robaxin 1000 mg every 8 hours scheduled  Stop Oxycodone   Start oral dilaudid 2/4mg Q4H prn for mod/severe pain   IV Dilaudid 0.5 mg every 2 hours as needed for breakthrough pain  Narcan as needed for respiratory depression/opioid reversal  Continue bowel regimen while utilizing opioids to prevent opioid-induced constipation

## 2024-12-28 NOTE — ASSESSMENT & PLAN NOTE
- Acute pain secondary to traumatic injuries.  - Continue multimodal analgesic regimen.  - Appreciate APS evaluation and recommendations.   - EDC placed and will discuss removal today on 12/28 with APS team  - Bowel regimen as long as using opioids.  - Continue to monitor pain and adjust regimen as indicated.

## 2024-12-29 ENCOUNTER — TELEPHONE (OUTPATIENT)
Dept: OTHER | Facility: OTHER | Age: 59
End: 2024-12-29

## 2024-12-29 VITALS
DIASTOLIC BLOOD PRESSURE: 92 MMHG | SYSTOLIC BLOOD PRESSURE: 145 MMHG | WEIGHT: 220.68 LBS | BODY MASS INDEX: 33.45 KG/M2 | HEIGHT: 68 IN | OXYGEN SATURATION: 94 % | RESPIRATION RATE: 16 BRPM | TEMPERATURE: 97.5 F | HEART RATE: 77 BPM

## 2024-12-29 PROCEDURE — 99238 HOSP IP/OBS DSCHRG MGMT 30/<: CPT | Performed by: SURGERY

## 2024-12-29 RX ORDER — NICOTINE 21 MG/24HR
1 PATCH, TRANSDERMAL 24 HOURS TRANSDERMAL DAILY
Qty: 28 PATCH | Refills: 0 | Status: SHIPPED | OUTPATIENT
Start: 2024-12-30

## 2024-12-29 RX ORDER — AMOXICILLIN 250 MG
1 CAPSULE ORAL
Qty: 30 TABLET | Refills: 0 | Status: SHIPPED | OUTPATIENT
Start: 2024-12-29

## 2024-12-29 RX ORDER — GABAPENTIN 300 MG/1
300 CAPSULE ORAL 3 TIMES DAILY
Qty: 90 CAPSULE | Refills: 0 | Status: SHIPPED | OUTPATIENT
Start: 2024-12-29

## 2024-12-29 RX ORDER — METHOCARBAMOL 1000 MG/1
1000 TABLET, FILM COATED ORAL EVERY 8 HOURS
Qty: 45 TABLET | Refills: 0 | Status: SHIPPED | OUTPATIENT
Start: 2024-12-29 | End: 2025-01-07

## 2024-12-29 RX ORDER — LIDOCAINE 50 MG/G
1 PATCH TOPICAL DAILY
Qty: 30 PATCH | Refills: 0 | Status: SHIPPED | OUTPATIENT
Start: 2024-12-29

## 2024-12-29 RX ORDER — LIDOCAINE 50 MG/G
1 PATCH TOPICAL DAILY
Status: DISCONTINUED | OUTPATIENT
Start: 2024-12-29 | End: 2024-12-29 | Stop reason: HOSPADM

## 2024-12-29 RX ORDER — IBUPROFEN 400 MG/1
400 TABLET, FILM COATED ORAL EVERY 6 HOURS PRN
Qty: 30 TABLET | Refills: 0 | Status: SHIPPED | OUTPATIENT
Start: 2024-12-29

## 2024-12-29 RX ORDER — HYDROMORPHONE HYDROCHLORIDE 2 MG/1
2 TABLET ORAL EVERY 4 HOURS PRN
Qty: 30 TABLET | Refills: 0 | Status: SHIPPED | OUTPATIENT
Start: 2024-12-29 | End: 2025-01-07

## 2024-12-29 RX ORDER — ACETAMINOPHEN 325 MG/1
975 TABLET ORAL EVERY 8 HOURS SCHEDULED
Start: 2024-12-29

## 2024-12-29 RX ADMIN — PANTOPRAZOLE SODIUM 40 MG: 40 TABLET, DELAYED RELEASE ORAL at 05:51

## 2024-12-29 RX ADMIN — HYDROMORPHONE HYDROCHLORIDE 4 MG: 2 TABLET ORAL at 07:57

## 2024-12-29 RX ADMIN — LIDOCAINE 1 PATCH: 50 PATCH CUTANEOUS at 10:41

## 2024-12-29 RX ADMIN — ACETAMINOPHEN 975 MG: 325 TABLET, FILM COATED ORAL at 05:51

## 2024-12-29 RX ADMIN — LISINOPRIL 40 MG: 20 TABLET ORAL at 08:00

## 2024-12-29 RX ADMIN — GABAPENTIN 300 MG: 300 CAPSULE ORAL at 08:00

## 2024-12-29 RX ADMIN — HYDROMORPHONE HYDROCHLORIDE 4 MG: 2 TABLET ORAL at 02:02

## 2024-12-29 RX ADMIN — Medication 100 MG: at 08:00

## 2024-12-29 RX ADMIN — FOLIC ACID 1 MG: 1 TABLET ORAL at 08:00

## 2024-12-29 RX ADMIN — METHOCARBAMOL TABLETS 1000 MG: 500 TABLET, COATED ORAL at 05:51

## 2024-12-29 RX ADMIN — MULTIPLE VITAMINS W/ MINERALS TAB 1 TABLET: TAB ORAL at 08:00

## 2024-12-29 RX ADMIN — ESCITALOPRAM OXALATE 20 MG: 20 TABLET ORAL at 08:00

## 2024-12-29 RX ADMIN — HEPARIN SODIUM 5000 UNITS: 5000 INJECTION INTRAVENOUS; SUBCUTANEOUS at 05:51

## 2024-12-29 NOTE — DISCHARGE SUMMARY
Discharge Summary - Trauma   Name: Diogo Travis 59 y.o. male I MRN: [id number]  Unit/Bed#: S -01 I Date of Admission: 12/24/2024   Date of Service: 12/29/2024 I Hospital Day: 4    Assessment & Plan  Fall  - uncertain of timing of fall, heavy drinker and poor historian with regards to details of timing  - sustained below stated injuries  - PT/OT evaluations  - CM for dispo planning  Multiple rib fractures  - Multiple right-sided rib fractures (4th-7th), present on admission.  - Continue rib fracture protocol.  - Continue to encourage incentive spirometer use and adequate pulmonary hygiene.  Currently pulling 2500 mL on I.S.  - Appreciate APS evaluation and recommendations.  - Continue multimodal analgesic regimen.  - Supplemental oxygen via nasal cannula as needed to maintain saturations greater than or equal to 94%.  - PT and OT evaluation and treatment as indicated.  - Outpatient follow-up in the trauma clinic for re-evaluation in approximately 2 weeks.    Acute pain due to trauma  - Acute pain secondary to traumatic injuries.  - Continue multimodal analgesic regimen.  - Appreciate APS evaluation and recommendations.   - EDC placed, removed 12/28   - Bowel regimen as long as using opioids.  - Continue to monitor pain and adjust regimen as indicated.  Depression  - resume home lexapro and remeron  Alcohol use disorder  - heavy daily drinker  - h/o alcohol withdrawal on multiple occurrences  - noted to have anxiety and tremors, mild tachycardia as well in the low 100s  - given 130 phenobarbital and placed on CIWA protocol while inpatient  - MVD/Thiamine/Folic Acid  - CM for SBIRT  - Discussed alcohol cessation for 10 mins with patient. He has insight towards his addiction and plans for outpatient rehabilitation. He reports he has the resources for outpatient and does not want to go to inpatient alcohol rehab at this time due to needing to care for his father.   Alcohol withdrawal (HCC)  - heavy daily  "drinker  - h/o alcohol withdrawal on multiple occurrences  - MVD/Thiamine/Folic Acid  - CM for SBIRT    Admission Date: 12/24/2024 0425  Discharge Date: 12/29/24  Admitting Diagnosis: Alcohol withdrawal (HCC) [F10.939]  Back pain [M54.9]  Closed fracture of multiple ribs of right side, initial encounter [S22.41XA]  Discharge Diagnosis:   Medical Problems       Resolved Problems  Date Reviewed: 12/29/2024   None         HPI written by Jyothi Guadalupe PA-C 12/24/24 \"Diogo Travis is a 59 y.o. male who presents s/p fall, he believes three days ago when he was working on his garage door. He didn't think much of it at the time. He developed pain in his side yesterday morning and was unable to move off the couch all day due to the pain so called the ambulance to bring him to the ED last night. . \"    Procedures Performed: No orders of the defined types were placed in this encounter.      Summary of Hospital Course:  Please see above and reference hospital medical records.     Significant Findings, Care, Treatment and Services Provided: Patient was evaluated by the trauma service after falling and sustaining right sided rib fractures. History of alcohol abuse and evidence of alcohol withdrawal on admission resulted in admission to ICU for treatment with phenobarbital. Rib fracture protocol was started and APS was consulted. Pt had an epidural placed 12/25. He was improved and moved to med surg and the epidural was removed 12/28. PT and OT evaluated the patient, he was recommended outpt PT/OT. CM and trauma team discussed alcohol rehab and he declined inpatient alcohol rehab and preferred outpatient services. His pain was controlled and he was discharged home.     Complications: none    Discharge Day Visit / Exam:   /92   Pulse 77   Temp 97.5 °F (36.4 °C)   Resp 16   Ht 5' 8\" (1.727 m)   Wt 100 kg (220 lb 10.9 oz)   SpO2 94%   BMI 33.55 kg/m²   Physical Exam   GENERAL APPEARANCE: Patient in no acute " distress.  HEENT: NCAT; PERRL, EOMs intact; Mucous membranes moist  CV: Regular rate and rhythm; no murmur/gallops/rubs appreciated.  CHEST / LUNGS: Clear to auscultation; no wheezes/rales/rhonci.Right sided chest wall tenderness  ABD: NABS; soft; non-distended; non-tender.  : Voiding  EXT: +2 pulses bilaterally upper & lower extremities; no edema.  NEURO: GCS 15; no focal neurologic deficits; neurovascularly intact.  SKIN: Warm, dry and well perfused; no rash; no jaundice.      Discussion with Family:  none.     Condition at Discharge: good       Discharge instructions/Information to patient and family:   See After Visit Summary (AVS) for information provided to patient and family.      Provisions for Follow-Up Care:  See after visit summary for information related to follow-up care and any pertinent home health orders.      PCP: Enid Altman DO    Disposition: Home    Planned Readmission: No     Discharge Medications:  See after visit summary for reconciled discharge medications provided to patient and family.      Discharge Statement: I personally evaluated the patient on day of discharge

## 2024-12-29 NOTE — ASSESSMENT & PLAN NOTE
- Acute pain secondary to traumatic injuries.  - Continue multimodal analgesic regimen.  - Appreciate APS evaluation and recommendations.   - EDC placed, removed 12/28   - Bowel regimen as long as using opioids.  - Continue to monitor pain and adjust regimen as indicated.

## 2024-12-29 NOTE — ASSESSMENT & PLAN NOTE
- Multiple right-sided rib fractures (4th-7th), present on admission.  - Continue rib fracture protocol.  - Continue to encourage incentive spirometer use and adequate pulmonary hygiene.  Currently pulling 2500 mL on I.S.  - Appreciate APS evaluation and recommendations.  - Continue multimodal analgesic regimen.  - Supplemental oxygen via nasal cannula as needed to maintain saturations greater than or equal to 94%.  - PT and OT evaluation and treatment as indicated.  - Outpatient follow-up in the trauma clinic for re-evaluation in approximately 2 weeks.

## 2024-12-29 NOTE — ASSESSMENT & PLAN NOTE
- heavy daily drinker  - h/o alcohol withdrawal on multiple occurrences  - noted to have anxiety and tremors, mild tachycardia as well in the low 100s  - given 130 phenobarbital and placed on CIWA protocol while inpatient  - MVD/Thiamine/Folic Acid  - CM for SBIRT  - Discussed alcohol cessation for 10 mins with patient. He has insight towards his addiction and plans for outpatient rehabilitation. He reports he has the resources for outpatient and does not want to go to inpatient alcohol rehab at this time due to needing to care for his father.

## 2024-12-29 NOTE — PLAN OF CARE
Problem: PAIN - ADULT  Goal: Verbalizes/displays adequate comfort level or baseline comfort level  Description: Interventions:  - Encourage patient to monitor pain and request assistance  - Assess pain using appropriate pain scale  - Administer analgesics based on type and severity of pain and evaluate response  - Implement non-pharmacological measures as appropriate and evaluate response  - Consider cultural and social influences on pain and pain management  - Notify physician/advanced practitioner if interventions unsuccessful or patient reports new pain  Outcome: Progressing     Problem: INFECTION - ADULT  Goal: Absence or prevention of progression during hospitalization  Description: INTERVENTIONS:  - Assess and monitor for signs and symptoms of infection  - Monitor lab/diagnostic results  - Monitor all insertion sites, i.e. indwelling lines, tubes, and drains  - Monitor endotracheal if appropriate and nasal secretions for changes in amount and color  - [place] appropriate cooling/warming therapies per order  - Administer medications as ordered  - Instruct and encourage patient and family to use good hand hygiene technique  - Identify and instruct in appropriate isolation precautions for identified infection/condition  Outcome: Progressing     Problem: DISCHARGE PLANNING  Goal: Discharge to home or other facility with appropriate resources  Description: INTERVENTIONS:  - Identify barriers to discharge w/patient and caregiver  - Arrange for needed discharge resources and transportation as appropriate  - Identify discharge learning needs (meds, wound care, etc.)  - Arrange for interpretive services to assist at discharge as needed  - Refer to Case Management Department for coordinating discharge planning if the patient needs post-hospital services based on physician/advanced practitioner order or complex needs related to functional status, cognitive ability, or social support system  Outcome: Progressing

## 2024-12-30 LAB
ATRIAL RATE: 108 BPM
P AXIS: 58 DEGREES
PR INTERVAL: 158 MS
QRS AXIS: 22 DEGREES
QRSD INTERVAL: 92 MS
QT INTERVAL: 360 MS
QTC INTERVAL: 482 MS
T WAVE AXIS: 12 DEGREES
VENTRICULAR RATE: 108 BPM

## 2024-12-30 PROCEDURE — 93010 ELECTROCARDIOGRAM REPORT: CPT | Performed by: INTERNAL MEDICINE

## 2024-12-30 NOTE — UTILIZATION REVIEW
NOTIFICATION OF ADMISSION DISCHARGE   This is a Notification of Discharge from [facility]. Please be advised that this patient has been discharge from our facility. Below you will find the admission and discharge date and time including the patient’s disposition.   UTILIZATION REVIEW CONTACT:  Alyce Vieira  Utilization   Network Utilization Review Department  Phone: [phone] x carefully listen to the prompts. All voicemails are confidential.  Email: [email or web address]     ADMISSION INFORMATION  PRESENTATION DATE: 12/24/2024  4:25 AM  OBERVATION ADMISSION DATE: 12/24/2024 0856  INPATIENT ADMISSION DATE: 12/25/24  7:50 AM   DISCHARGE DATE: 12/29/2024 12:43 PM   DISPOSITION:Home/Self Care    Network Utilization Review Department  ATTENTION: Please call with any questions or concerns to [phone] and carefully listen to the prompts so that you are directed to the right person. All voicemails are confidential.   For Discharge needs, contact Care Management DC Support Team at [phone] opt. 2  Send all requests for admission clinical reviews, approved or denied determinations and any other requests to dedicated fax number below belonging to the campus where the patient is receiving treatment. List of dedicated fax numbers for the Facilities:  FACILITY NAME UR FAX NUMBER   ADMISSION DENIALS (Administrative/Medical Necessity) [phone]   DISCHARGE SUPPORT TEAM ([facility]) [phone]   PARENT CHILD HEALTH (Maternity/NICU/Pediatrics) [phone]   [facility] [phone]   [facility] [phone]   [facility] [phone]   [facility] [phone]   [facility] [phone]   [facility] [phone]   [facility] [phone]   [facility]  [phone]   [facility] [phone]   [facility] [phone]   [facility] [phone]   [facility] [phone]

## 2025-01-02 ENCOUNTER — TRANSITIONAL CARE MANAGEMENT (OUTPATIENT)
Dept: FAMILY MEDICINE CLINIC | Facility: CLINIC | Age: 60
End: 2025-01-02

## 2025-01-05 ENCOUNTER — TELEPHONE (OUTPATIENT)
Dept: OTHER | Facility: OTHER | Age: 60
End: 2025-01-05

## 2025-01-05 NOTE — TELEPHONE ENCOUNTER
Answering service, received call from hospital  regarding pt call. Tran spoke to pt who expressed severe pain s/p rib fractures. She then called our service and explained situation. I spoke to my supervisor who recommended sending an esc to the oncall for follow-up. Sent esc to Dr. May Salinas

## 2025-01-05 NOTE — PROGRESS NOTES
Received message from the answering service today in regards to Mr. Travis's pain. Pt called and noted that he is in severe pain and is out of his pain medication. Pt was admitted from 12/24-29 for right sided rib fractures 4-7 after a fall. Attempted to call patient at [phone], no answer.     In regards to patient's pain, if severe recommend evaluation in the ED for additional pain management. If pain is not severe, recommend alternating tylenol and motrin (use as directed) and calling clinic in the morning to move follow up appointment earlier than 1/9. Pt can also utilize icy hot with menthol lidocaine patches.

## 2025-01-06 ENCOUNTER — TELEPHONE (OUTPATIENT)
Dept: OTHER | Facility: HOSPITAL | Age: 60
End: 2025-01-06

## 2025-01-06 NOTE — TELEPHONE ENCOUNTER
Called patient twice in an attempt to discuss his pain and current pain regimen given he has used up his current opioid prescription.  Based on chart review of in-hospital opioid needs it appears that his usage has gone up, I would like to review recommended multimodal pain regimen with patient including Tylenol/ibuprofen/ointments/patches/muscle relaxants prior to refilling.  If his pain needs are going up, he may need earlier evaluation in the trauma clinic or if he is having respiratory symptoms may need emergent evaluation. Will attempt to call patient again later this afternoon/evening to further discuss.

## 2025-01-07 ENCOUNTER — HOSPITAL ENCOUNTER (EMERGENCY)
Facility: HOSPITAL | Age: 60
Discharge: HOME | End: 2025-01-07
Attending: EMERGENCY MEDICINE
Payer: COMMERCIAL

## 2025-01-07 ENCOUNTER — APPOINTMENT (EMERGENCY)
Dept: CT IMAGING | Facility: HOSPITAL | Age: 60
End: 2025-01-07
Payer: COMMERCIAL

## 2025-01-07 ENCOUNTER — APPOINTMENT (EMERGENCY)
Dept: RADIOLOGY | Facility: HOSPITAL | Age: 60
End: 2025-01-07
Payer: COMMERCIAL

## 2025-01-07 VITALS
SYSTOLIC BLOOD PRESSURE: 158 MMHG | WEIGHT: 220 LBS | RESPIRATION RATE: 22 BRPM | DIASTOLIC BLOOD PRESSURE: 82 MMHG | OXYGEN SATURATION: 95 % | TEMPERATURE: 98 F | HEART RATE: 83 BPM | BODY MASS INDEX: 33.45 KG/M2

## 2025-01-07 DIAGNOSIS — S22.41XA CLOSED FRACTURE OF MULTIPLE RIBS OF RIGHT SIDE, INITIAL ENCOUNTER: ICD-10-CM

## 2025-01-07 DIAGNOSIS — S22.49XA MULTIPLE RIB FRACTURES: Primary | ICD-10-CM

## 2025-01-07 DIAGNOSIS — F10.90 ALCOHOL USE DISORDER: ICD-10-CM

## 2025-01-07 LAB
ALBUMIN SERPL BCG-MCNC: 3.8 G/DL (ref 3.5–5)
ALP SERPL-CCNC: 152 U/L (ref 34–104)
ALT SERPL W P-5'-P-CCNC: 14 U/L (ref 7–52)
ANION GAP SERPL CALCULATED.3IONS-SCNC: 9 MMOL/L (ref 4–13)
APTT PPP: 28 SECONDS (ref 23–34)
AST SERPL W P-5'-P-CCNC: 28 U/L (ref 13–39)
BASOPHILS # BLD AUTO: 0.05 THOUSANDS/ΜL (ref 0–0.1)
BASOPHILS NFR BLD AUTO: 1 % (ref 0–1)
BILIRUB SERPL-MCNC: 0.36 MG/DL (ref 0.2–1)
BUN SERPL-MCNC: 8 MG/DL (ref 5–25)
CALCIUM SERPL-MCNC: 8.6 MG/DL (ref 8.4–10.2)
CARDIAC TROPONIN I PNL SERPL HS: <2 NG/L (ref ?–50)
CHLORIDE SERPL-SCNC: 105 MMOL/L (ref 96–108)
CK SERPL-CCNC: 63 U/L (ref 39–308)
CO2 SERPL-SCNC: 26 MMOL/L (ref 21–32)
CREAT SERPL-MCNC: 0.71 MG/DL (ref 0.6–1.3)
D DIMER PPP FEU-MCNC: 1.61 UG/ML FEU
EOSINOPHIL # BLD AUTO: 0.18 THOUSAND/ΜL (ref 0–0.61)
EOSINOPHIL NFR BLD AUTO: 3 % (ref 0–6)
ERYTHROCYTE [DISTWIDTH] IN BLOOD BY AUTOMATED COUNT: 13 % (ref 11.6–15.1)
ETHANOL SERPL-MCNC: 80 MG/DL
GFR SERPL CREATININE-BSD FRML MDRD: 103 ML/MIN/1.73SQ M
GLUCOSE SERPL-MCNC: 97 MG/DL (ref 65–140)
HCT VFR BLD AUTO: 35.5 % (ref 36.5–49.3)
HGB BLD-MCNC: 12 G/DL (ref 12–17)
IMM GRANULOCYTES # BLD AUTO: 0.03 THOUSAND/UL (ref 0–0.2)
IMM GRANULOCYTES NFR BLD AUTO: 1 % (ref 0–2)
INR PPP: 1.01 (ref 0.85–1.19)
LIPASE SERPL-CCNC: 23 U/L (ref 11–82)
LYMPHOCYTES # BLD AUTO: 1.65 THOUSANDS/ΜL (ref 0.6–4.47)
LYMPHOCYTES NFR BLD AUTO: 30 % (ref 14–44)
MCH RBC QN AUTO: 31.5 PG (ref 26.8–34.3)
MCHC RBC AUTO-ENTMCNC: 33.8 G/DL (ref 31.4–37.4)
MCV RBC AUTO: 93 FL (ref 82–98)
MONOCYTES # BLD AUTO: 0.32 THOUSAND/ΜL (ref 0.17–1.22)
MONOCYTES NFR BLD AUTO: 6 % (ref 4–12)
NEUTROPHILS # BLD AUTO: 3.36 THOUSANDS/ΜL (ref 1.85–7.62)
NEUTS SEG NFR BLD AUTO: 59 % (ref 43–75)
NRBC BLD AUTO-RTO: 0 /100 WBCS
PLATELET # BLD AUTO: 289 THOUSANDS/UL (ref 149–390)
PMV BLD AUTO: 8.7 FL (ref 8.9–12.7)
POTASSIUM SERPL-SCNC: 5 MMOL/L (ref 3.5–5.3)
PROT SERPL-MCNC: 6.7 G/DL (ref 6.4–8.4)
PROTHROMBIN TIME: 14 SECONDS (ref 12.3–15)
RBC # BLD AUTO: 3.81 MILLION/UL (ref 3.88–5.62)
SODIUM SERPL-SCNC: 140 MMOL/L (ref 135–147)
WBC # BLD AUTO: 5.59 THOUSAND/UL (ref 4.31–10.16)

## 2025-01-07 PROCEDURE — 71275 CT ANGIOGRAPHY CHEST: CPT

## 2025-01-07 PROCEDURE — 85379 FIBRIN DEGRADATION QUANT: CPT | Performed by: EMERGENCY MEDICINE

## 2025-01-07 PROCEDURE — 99244 OFF/OP CNSLTJ NEW/EST MOD 40: CPT | Performed by: SURGERY

## 2025-01-07 PROCEDURE — 83690 ASSAY OF LIPASE: CPT | Performed by: EMERGENCY MEDICINE

## 2025-01-07 PROCEDURE — 36415 COLL VENOUS BLD VENIPUNCTURE: CPT | Performed by: EMERGENCY MEDICINE

## 2025-01-07 PROCEDURE — 96361 HYDRATE IV INFUSION ADD-ON: CPT

## 2025-01-07 PROCEDURE — 85025 COMPLETE CBC W/AUTO DIFF WBC: CPT | Performed by: EMERGENCY MEDICINE

## 2025-01-07 PROCEDURE — 96374 THER/PROPH/DIAG INJ IV PUSH: CPT

## 2025-01-07 PROCEDURE — 82077 ASSAY SPEC XCP UR&BREATH IA: CPT | Performed by: EMERGENCY MEDICINE

## 2025-01-07 PROCEDURE — 85730 THROMBOPLASTIN TIME PARTIAL: CPT | Performed by: EMERGENCY MEDICINE

## 2025-01-07 PROCEDURE — 80053 COMPREHEN METABOLIC PANEL: CPT | Performed by: EMERGENCY MEDICINE

## 2025-01-07 PROCEDURE — 84484 ASSAY OF TROPONIN QUANT: CPT | Performed by: EMERGENCY MEDICINE

## 2025-01-07 PROCEDURE — 99284 EMERGENCY DEPT VISIT MOD MDM: CPT

## 2025-01-07 PROCEDURE — 71045 X-RAY EXAM CHEST 1 VIEW: CPT

## 2025-01-07 PROCEDURE — 99285 EMERGENCY DEPT VISIT HI MDM: CPT | Performed by: EMERGENCY MEDICINE

## 2025-01-07 PROCEDURE — 93005 ELECTROCARDIOGRAM TRACING: CPT

## 2025-01-07 PROCEDURE — 82550 ASSAY OF CK (CPK): CPT | Performed by: EMERGENCY MEDICINE

## 2025-01-07 PROCEDURE — 85610 PROTHROMBIN TIME: CPT | Performed by: EMERGENCY MEDICINE

## 2025-01-07 PROCEDURE — 96375 TX/PRO/DX INJ NEW DRUG ADDON: CPT

## 2025-01-07 RX ORDER — ONDANSETRON 2 MG/ML
4 INJECTION INTRAMUSCULAR; INTRAVENOUS ONCE
Status: COMPLETED | OUTPATIENT
Start: 2025-01-07 | End: 2025-01-07

## 2025-01-07 RX ORDER — KETOROLAC TROMETHAMINE 30 MG/ML
15 INJECTION, SOLUTION INTRAMUSCULAR; INTRAVENOUS ONCE
Status: COMPLETED | OUTPATIENT
Start: 2025-01-07 | End: 2025-01-07

## 2025-01-07 RX ORDER — METHOCARBAMOL 1000 MG/1
1000 TABLET, FILM COATED ORAL EVERY 8 HOURS
Qty: 45 TABLET | Refills: 0 | Status: SHIPPED | OUTPATIENT
Start: 2025-01-07

## 2025-01-07 RX ORDER — HYDROMORPHONE HYDROCHLORIDE 2 MG/1
2 TABLET ORAL EVERY 4 HOURS PRN
Qty: 30 TABLET | Refills: 0 | Status: SHIPPED | OUTPATIENT
Start: 2025-01-07 | End: 2025-01-15

## 2025-01-07 RX ORDER — HYDROMORPHONE HCL/PF 1 MG/ML
0.5 SYRINGE (ML) INJECTION ONCE
Refills: 0 | Status: COMPLETED | OUTPATIENT
Start: 2025-01-07 | End: 2025-01-07

## 2025-01-07 RX ORDER — LORAZEPAM 2 MG/1
2 TABLET ORAL EVERY 6 HOURS PRN
Qty: 10 TABLET | Refills: 0 | Status: SHIPPED | OUTPATIENT
Start: 2025-01-07 | End: 2025-01-15

## 2025-01-07 RX ORDER — HYDROMORPHONE HCL/PF 1 MG/ML
1 SYRINGE (ML) INJECTION ONCE
Status: COMPLETED | OUTPATIENT
Start: 2025-01-07 | End: 2025-01-07

## 2025-01-07 RX ADMIN — HYDROMORPHONE HYDROCHLORIDE 1 MG: 1 INJECTION, SOLUTION INTRAMUSCULAR; INTRAVENOUS; SUBCUTANEOUS at 02:48

## 2025-01-07 RX ADMIN — IOHEXOL 65 ML: 350 INJECTION, SOLUTION INTRAVENOUS at 03:55

## 2025-01-07 RX ADMIN — SODIUM CHLORIDE 1000 ML: 0.9 INJECTION, SOLUTION INTRAVENOUS at 03:13

## 2025-01-07 RX ADMIN — HYDROMORPHONE HYDROCHLORIDE 0.5 MG: 1 INJECTION, SOLUTION INTRAMUSCULAR; INTRAVENOUS; SUBCUTANEOUS at 05:12

## 2025-01-07 RX ADMIN — KETOROLAC TROMETHAMINE 15 MG: 30 INJECTION, SOLUTION INTRAMUSCULAR; INTRAVENOUS at 02:48

## 2025-01-07 RX ADMIN — ONDANSETRON 4 MG: 2 INJECTION, SOLUTION INTRAMUSCULAR; INTRAVENOUS at 02:48

## 2025-01-07 NOTE — ASSESSMENT & PLAN NOTE
- Right 4-7 rib fractures, was admitted to trauma service and has standard pain for his injuries  - Has scheduled follow up appointment in 48 hrs

## 2025-01-07 NOTE — CONSULTS
"Consultation - Trauma   Name: Diogo Travis 59 y.o. male I MRN: [id number]  Unit/Bed#: ED-43 I Date of Admission: 1/7/2025   Date of Service: 1/7/2025 I Hospital Day: 0   Trauma evaluation  Consult performed by: Chicho Hughes PA-C  Consult ordered by: Ras East MD        Physician Requesting Evaluation: Ras East MD   Reason for Evaluation / Principal Problem: Rib fx, acute pain    Assessment & Plan  Acute pain due to trauma  - Pt reports he ran out of pain medications at home and started drinking 2 bottles of wine daily to cope  - Patient states he will not drink alcohol if his pain is controlled  - Was taking dilaudid 4 mg every 4 hours for rib pain and ran out 3 days ago  - Denies any new falls/ trauma  - Lungs are clear, breathing well on room air  - Continues to pull 2200 mL on IS  - Repeat CTA chest completed without evidence of PE  - Rx for pain meds written   - Decrease dilaudid PO to 2 mg every 4 hours as needed for severe pain   - Continue tylenol, ibuprofen, gabapentin, robaxin, and lidocaine patches as prescribed  - Trauma clinic follow up 1/9  Multiple rib fractures  - Right 4-7 rib fractures, was admitted to trauma service and has standard pain for his injuries  - Has scheduled follow up appointment in 48 hrs  Alcohol use disorder  - Hx alcohol withdrawal requiring phenobarbital, benzo  - Rx ativan 2 mg Q6 hr PRN for shakiness, alcohol withdrawal per Dr. Cloud  - Counseled patient on alcohol cessation and he should NOT be drinking alcohol while on narcotic or benzodiazepine medications.  Patient expressed understanding  -Offered alcohol detox/alcohol rehabilitation services for the patient, he denied at this time.  Please contact the Secure[employer] role,\"[facility] Trauma [facility]\", with any questions/concerns.    Trauma Alert: Evaluation; trauma team notified at 0430 via phone   Model of Arrival: Ambulance    Trauma Team: Attending Pedro Luis and CAROLYN Hughes  Consultants:     None     History " of Present Illness   Chief Complaint: Right rib pain  Mechanism:Other: no new trauma     Diogo Travis is a 59 y.o. male who presents for uncontrolled right sided rib pain.  Patient denies any new traumatic events.  Reports he ran out of pain meds a couple days ago and has been drinking wine to cope with the pain.  He last drank alcohol at 8 PM, denies any symptoms of alcohol withdrawal at this time.  He reports that the medications that were prescribed for working but he ran out.  He denies any shortness of breath    Review of Systems   Constitutional:  Positive for activity change.   Respiratory:  Negative for shortness of breath.    Cardiovascular:  Positive for chest pain.   Gastrointestinal:  Negative for abdominal distention and nausea.   Musculoskeletal:  Positive for arthralgias, back pain and myalgias.     I have reviewed the patient's PMH, PSH, Social History, Family History, Meds, and Allergies  Immunization History   Administered Date(s) Administered    COVID-19 MODERNA VACC 0.5 ML IM 02/07/2022, 03/04/2022    Hep A, adult 07/09/2024    INFLUENZA 01/13/2014, 12/17/2014, 12/01/2015    Tdap 03/06/2009, 05/15/2020     Last Tetanus: 2020       Objective :  Temp:  [98 °F (36.7 °C)] 98 °F (36.7 °C)  HR:  [78-92] 83  BP: (158-173)/() 158/82  Resp:  [22] 22  SpO2:  [88 %-98 %] 95 %  O2 Device: Nasal cannula  Nasal Cannula O2 Flow Rate (L/min):  [2 L/min] 2 L/min    Initial Vitals:   Temperature: 98 °F (36.7 °C) (01/07/25 0225)  Pulse: 92 (01/07/25 0225)  Respirations: 22 (01/07/25 0225)  Blood Pressure: (!) 173/101 (01/07/25 0227)    Primary Survey:   Airway:        Status: patent;        Pre-hospital Interventions: none        Hospital Interventions: none  Breathing:        Pre-hospital Interventions: none       Effort: normal       Right breath sounds: normal       Left breath sounds: normal  Circulation:        Rhythm: regular       Rate: regular   Right Pulses Left Pulses    R radial: 2+    R pedal:  2+     L radial: 2+    L pedal: 2+       Disability:        GCS: Eye: 4; Verbal: 5 Motor: 6 Total: 15       Right Pupil: round;  reactive         Left Pupil:  round;  reactive      R Motor Strength L Motor Strength    R : 5/5  R dorsiflex: 5/5  R plantarflex: 5/5 L : 5/5  L dorsiflex: 5/5  L plantarflex: 5/5        Sensory:  No sensory deficit  Exposure:       Completed: Yes      Secondary Survey:  Physical Exam  Vitals reviewed.   Constitutional:       General: He is not in acute distress.     Appearance: Normal appearance.   HENT:      Head: Normocephalic and atraumatic.      Right Ear: External ear normal.      Left Ear: External ear normal.      Nose: Nose normal.      Mouth/Throat:      Mouth: Mucous membranes are moist.      Pharynx: Oropharynx is clear.   Eyes:      Extraocular Movements: Extraocular movements intact.      Conjunctiva/sclera: Conjunctivae normal.      Pupils: Pupils are equal, round, and reactive to light.   Cardiovascular:      Rate and Rhythm: Normal rate and regular rhythm.      Pulses: Normal pulses.      Heart sounds: Normal heart sounds.   Pulmonary:      Effort: Pulmonary effort is normal. No respiratory distress.      Breath sounds: Normal breath sounds.      Comments: Right posterolateral chest wall tenderness and ecchymosis  Chest:      Chest wall: Tenderness present.   Abdominal:      General: Abdomen is flat. Bowel sounds are normal. There is no distension.      Palpations: Abdomen is soft.      Tenderness: There is no abdominal tenderness. There is no guarding.   Musculoskeletal:         General: No swelling, tenderness, deformity or signs of injury. Normal range of motion.      Cervical back: No tenderness.   Skin:     General: Skin is warm and dry.      Capillary Refill: Capillary refill takes less than 2 seconds.      Findings: No bruising or lesion.   Neurological:      General: No focal deficit present.      Mental Status: He is alert and oriented to person, place,  and time. Mental status is at baseline.      Sensory: No sensory deficit.      Motor: No weakness.   Psychiatric:         Mood and Affect: Mood normal.         Behavior: Behavior normal.             Lab Results: I have reviewed the following results:  Recent Labs     01/07/25  0251   WBC 5.59   HGB 12.0   HCT 35.5*      SODIUM 140   K 5.0      CO2 26   BUN 8   CREATININE 0.71   GLUC 97   AST 28   ALT 14   ALB 3.8   TBILI 0.36   ALKPHOS 152*   PTT 28   INR 1.01   HSTNI0 <2       Imaging Results: I have personally reviewed pertinent images saved in PACS. CT scan findings (and other pertinent positive findings on images) were discussed with radiology. My interpretation of the images/reports are as follows:  Chest Xray(s): N/A   FAST exam(s): N/A   CT Scan(s): negative for acute findings   Additional Xray(s): N/A     Other Studies: Other Study Results Review: No additional pertinent studies reviewed.

## 2025-01-07 NOTE — ASSESSMENT & PLAN NOTE
- Hx alcohol withdrawal requiring phenobarbital, benzo  - Rx ativan 2 mg Q6 hr PRN for shakiness, alcohol withdrawal per Dr. Cloud  - Counseled patient on alcohol cessation and he should NOT be drinking alcohol while on narcotic or benzodiazepine medications.  Patient expressed understanding  -Offered alcohol detox/alcohol rehabilitation services for the patient, he denied at this time.

## 2025-01-07 NOTE — DISCHARGE INSTRUCTIONS
Traumatic Rib Fracture Discharge Instructions:    Your rib fractures will take time to heal. Rib fractures typically take at least 6-8 weeks to heal and may take longer.    Activity:  - Walking and normal light activities are encouraged. Normal daily activities including climbing steps are okay.  - Avoid lifting greater than 10 pounds, any strenuous activities and/or exercise, and contact sports until cleared by the trauma service.  - Continue using the incentive spirometer at least 10 times every hour while awake.    Return to work:    - You may return to work once you are cleared by the trauma service.    Medications:    - You are encouraged to use non-narcotic pain medications first and whenever possible. Reserve the use of narcotic pain medication for moderate to severe pain not controlled by non-narcotic medications.  - Decrease hydromorphone to 2 mg every 4 hours as needed for severe pain  - Please refer to your discharge medication list for further details.  - Please take the pain medications as directed.  - No driving while taking narcotic pain medications.  - You may become constipated, especially if taking pain medications. You may take any over the counter stool softeners or laxatives as needed. Examples: Milk of Magnesia, Colace, Senna.  - New prescription: Lorazepam 2 mg by mouth every 6 hours as needed for alcohol withdrawal.   If you develop uncontrolled symptoms of alcohol withdrawal, return to the ER    Additional Instructions:  - You have a trauma clinic appointment on 1/9/2025

## 2025-01-07 NOTE — ASSESSMENT & PLAN NOTE
- Pt reports he ran out of pain medications at home and started drinking 2 bottles of wine daily to cope  - Patient states he will not drink alcohol if his pain is controlled  - Was taking dilaudid 4 mg every 4 hours for rib pain and ran out 3 days ago  - Denies any new falls/ trauma  - Lungs are clear, breathing well on room air  - Continues to pull 2200 mL on IS  - Repeat CTA chest completed without evidence of PE  - Rx for pain meds written   - Decrease dilaudid PO to 2 mg every 4 hours as needed for severe pain   - Continue tylenol, ibuprofen, gabapentin, robaxin, and lidocaine patches as prescribed  - Trauma clinic follow up 1/9

## 2025-01-07 NOTE — ED PROVIDER NOTES
"Time reflects when diagnosis was documented in both MDM as applicable and the Disposition within this note       Time User Action Codes Description Comment    1/7/2025  4:36 AM Elian Singh Add [S22.49XA] Multiple rib fractures     1/7/2025  6:50 AM Chicho Hughes Add [S22.41XA] Closed fracture of multiple ribs of right side, initial encounter     1/7/2025  7:11 AM Chicho Hughes Add [F10.90] Alcohol use disorder           ED Disposition       ED Disposition   Discharge    Condition   --    Date/Time   Tue Jan 7, 2025  7:28 AM    Comment   --             Assessment & Plan       Medical Decision Making  Patient with history as below presented to triage with CC of \" Patient presents with:  Rib Pain: 4 broken ribs from fall last week. Pt no longer has pain medication and drinking 2 bottles of wine per night to cope with pain.   \"  Hx obtained from pt. Exam as below.    59-year-old male history of alcohol use disorder, recent fall with multiple right-sided rib fractures presented to the ED with worsening right rib pain.  Patient ran out of hydromorphone, has since been trying to self medicate with alcohol.  Drinks about 2 bottles of wine per night to cope, last drink was few hours ago.  No paradoxical chest wall movement.  Clear bilateral breath sounds present, lower suspicion for pneumothorax/pneumonia.  Denies interval fall/injury/trauma, fever, chills, cough, congestion, significant shortness of breath.  He is satting well on room air.  Given worsening pain, will evaluate with labs, EKG, chest x-ray, D-dimer, troponin.  Will treat with multimodal pain control.  Will place on CIWA protocol, and obtain alcohol level.  Plan to discuss with trauma with regards to further pain control/disposition.    After trauma evaluation, patient stable for discharge home.  Transient mild hypoxia after administration of pain medication, however he quickly self resolved and satting well on room air.  Pain improved after " additional doses of pain medication.  Reviewed return precautions, patient agreeable with discharge home.    DDX including but not limited to: Fracture, dislocation, ACS, MI, PE, PTX, pneumonia, dissection, pleurisy, pericarditis, myocarditis, rhabdomyolysis, GI etiology.        I have independently ordered, reviewed and interpreted the following: labs and/or imaging studies and or EKG listed below.  Reviewed external records including notes, and prior labs/imaging results.    Disposition: Patient stable for outpatient management. Discussed need for follow up with their primary doctor or specialist to review all results, including incidental findings as below. Patient discharged with explanation of ED workup and diagnosis, instructions on how to obtain outpatient follow up, care instructions at home, and strict return precautions if patient develops new or worsening symptoms. Patients questions answered and agreeable with discharge plan.     See ED Course for further MDM.      PLEASE NOTE:  This encounter was completed utilizing the [employer] Direct Speech Voice Recognition Software. Grammatical errors, random word insertions, pronoun errors and incomplete sentences are occasional inherent consequences of the system due to software limitations, ambient noise and hardware issues.These may be missed by proof reading prior to affixing electronic signature. Any questions or concerns about the content, text or information contained within the body of this dictation should be directly addressed to the physician for clarification. Please do not hesitate to call me directly if you have any questions or concerns.     Amount and/or Complexity of Data Reviewed  Independent Historian: EMS  External Data Reviewed: labs, radiology, ECG and notes.  Labs: ordered. Decision-making details documented in ED Course.  Radiology: ordered and independent interpretation performed. Decision-making details documented in ED  Course.  ECG/medicine tests: ordered and independent interpretation performed. Decision-making details documented in ED Course.    Risk  Prescription drug management.        ED Course as of 01/07/25 0803   Tue Jan 07, 2025   0301 CBC and differential(!)  No leukocytosis.  No anemia.  Platelets WNL.   0304 XR chest 1 view portable  No ptx or focal consolidations.    0332 D-Dimer, Quant(!): 1.61   0332 Comprehensive metabolic panel(!)  No acute electrolyte abnormality.  Renal function within normal limits.  Mild alk phos elevation otherwise no acute LFT elevation.  Glucose WNL.   0333 Coags WNL.   0333 Total CK: 63  WNL.   0333 LIPASE: 23  WNL.   0333 ETHANOL(!): 80   0333 D-Dimer, Quant(!): 1.61   0337 ECG 12 lead  On my independent interpretation.  Normal sinus at 76.  Normal axis.  Normal intervals.  No acute ischemic changes.  Sinus rhythm has replaced sinus tachycardia when compared to prior EKG done on December 24, 2024.   0428 CTA chest pe study  No evidence of pulmonary embolus     Acute/subacute fractures of the right fourth through sixth ribs posterolaterally and at the right fifth through seventh ribs posteriorly. Correlate for paradoxical movement during respiration     0436 Discussed with Dr. Cloud (trauma) will come evaluate pt in ED.    0738 Trauma evaluated the patient at the bedside, stable for discharge.        Medications   HYDROmorphone (DILAUDID) injection 1 mg (1 mg Intravenous Given 1/7/25 0248)   ketorolac (TORADOL) injection 15 mg (15 mg Intravenous Given 1/7/25 0248)   ondansetron (ZOFRAN) injection 4 mg (4 mg Intravenous Given 1/7/25 0248)   sodium chloride 0.9 % bolus 1,000 mL (0 mL Intravenous Stopped 1/7/25 0510)   iohexol (OMNIPAQUE) 350 MG/ML injection (MULTI-DOSE) 65 mL (65 mL Intravenous Given 1/7/25 0355)   HYDROmorphone (DILAUDID) injection 0.5 mg (0.5 mg Intravenous Given 1/7/25 0512)       ED Risk Strat Scores   HEART Risk Score      Flowsheet Row Most Recent Value   Heart Score  Risk Calculator    History 0 Filed at: 01/07/2025 0709   ECG 0 Filed at: 01/07/2025 0709   Age 1 Filed at: 01/07/2025 0709   Risk Factors 2 Filed at: 01/07/2025 0709   Troponin 0 Filed at: 01/07/2025 0709   HEART Score 3 Filed at: 01/07/2025 0709          HEART Risk Score      Flowsheet Row Most Recent Value   Heart Score Risk Calculator    History 0 Filed at: 01/07/2025 0709   ECG 0 Filed at: 01/07/2025 0709   Age 1 Filed at: 01/07/2025 0709   Risk Factors 2 Filed at: 01/07/2025 0709   Troponin 0 Filed at: 01/07/2025 0709   HEART Score 3 Filed at: 01/07/2025 0709                 CIWA-Ar Score       Row Name 01/07/25 0300             CIWA-Ar    Blood Pressure 158/82      Pulse 78      Nausea and Vomiting 2      Tactile Disturbances 0      Tremor 0      Auditory Disturbances 0      Paroxysmal Sweats 1      Visual Disturbances 0      Anxiety 0      Headache, Fullness in Head 0      Agitation 0      Orientation and Clouding of Sensorium 0      CIWA-Ar Total 3                          PERC Rule for PE      Flowsheet Row Most Recent Value   PERC Rule for PE    Age >=50 0 Filed at: 01/07/2025 0710   HR >=100 0 Filed at: 01/07/2025 0710   O2 Sat on room air < 95% 1 Filed at: 01/07/2025 0710   History of PE or DVT 0 Filed at: 01/07/2025 0710   Recent trauma or surgery 0 Filed at: 01/07/2025 0710   Hemoptysis 0 Filed at: 01/07/2025 0710   Exogenous estrogen 0 Filed at: 01/07/2025 0710   Unilateral leg swelling 0 Filed at: 01/07/2025 0710   PERC Rule for PE Results 1 Filed at: 01/07/2025 0710            SBIRT 22yo+      Flowsheet Row Most Recent Value   Initial Alcohol Screen: US AUDIT-C     1. How often do you have a drink containing alcohol? 6 Filed at: 01/07/2025 0227   2. How many drinks containing alcohol do you have on a typical day you are drinking?  6 Filed at: 01/07/2025 0227   3a. Male UNDER 65: How often do you have five or more drinks on one occasion? 6 Filed at: 01/07/2025 0227   Audit-C Score 18 Filed at:  01/07/2025 0227            Wells' Criteria for PE      Flowsheet Row Most Recent Value   Wells' Criteria for PE    Clinical signs and symptoms of DVT 0 Filed at: 01/07/2025 0710   PE is primary diagnosis or equally likely 0 Filed at: 01/07/2025 0710   HR >100 0 Filed at: 01/07/2025 0710   Immobilization at least 3 days or Surgery in the previous 4 weeks 0 Filed at: 01/07/2025 0710   Previous, objectively diagnosed PE or DVT 0 Filed at: 01/07/2025 0710   Hemoptysis 0 Filed at: 01/07/2025 0710   Malignancy with treatment within 6 months or palliative 0 Filed at: 01/07/2025 0710   Wells' Criteria Total 0 Filed at: 01/07/2025 0710                        History of Present Illness       Chief Complaint   Patient presents with    Rib Pain     4 broken ribs from fall last week. Pt no longer has pain medication and drinking 2 bottles of wine per night to cope with pain.         Past Medical History:   Diagnosis Date    Alcohol use disorder, severe, dependence (HCC) 04/02/2023    Alcohol withdrawal seizure (HCC)     Alcoholic ketoacidosis 10/16/2023    Anxiety     AR (allergic rhinitis)     Chronic alcoholic gastritis 04/02/2023    Depression     GERD (gastroesophageal reflux disease)     Hepatic steatosis 04/02/2023    Hyperlipidemia     Hypertension     IBS (irritable bowel syndrome)     Obesity     Rosacea     Vitamin B12 deficiency 02/14/2024    Vitamin D deficiency 02/14/2024      Past Surgical History:   Procedure Laterality Date    ANTERIOR CRUCIATE LIGAMENT REPAIR Left     WISDOM TOOTH EXTRACTION        Family History   Problem Relation Age of Onset    Cancer Mother 78        Type unknown    Hypertension Father     Coronary artery disease Father 60    Cancer Father 82        Bladder; + Smoker    No Known Problems Sister     No Known Problems Sister     No Known Problems Sister     No Known Problems Son     No Known Problems Son     Heart attack Paternal Grandfather 60    Coronary artery disease Paternal Grandfather  60      Social History     Tobacco Use    Smoking status: Some Days     Types: Cigarettes, Cigars     Start date: 1/1/2014     Passive exposure: Past (Father)    Smokeless tobacco: Never    Tobacco comments:     Smokes cigars 2-3 times per year   Vaping Use    Vaping status: Never Used   Substance Use Topics    Alcohol use: Yes     Comment: ETOH 12/7/24    Drug use: Never      E-Cigarette/Vaping    E-Cigarette Use Never User       E-Cigarette/Vaping Substances    Nicotine No     THC No     CBD No     Flavoring No     Other No     Unknown No       I have reviewed and agree with the history as documented.     59-year-old male with history of alcohol use disorder, and recent fall with multiple right-sided rib fractures presenting to the ED with worsening right-sided rib pain/chest wall pain associated with shortness of breath.  Patient had mechanical trip and fall on 12/24/2024 fracturing multiple ribs on the right side (4 through 7), after drinking alcohol.  History of alcohol use disorder, with history of detox forwithdrawal in the past.  No history of for withdrawal seizures.  Had been managed with hydromorphone at home along with Tylenol/Motrin.  He states that the pain was so bad he was doubling up on hydromorphone, and has ran out early.  Tried calling his prescriber for refill, but could not reach them.  He is now coping with his pain by drinking 2 bottles of wine daily.  Last drink this evening.  Otherwise has been using his incentive spirometer.  Denies interval fever, chills, productive cough, congestion, sore throat, abdominal pain, nausea, vomiting, diarrhea, leg pain, leg swelling, rash.  Denies interval fall or other trauma.        Review of Systems   Constitutional:  Negative for chills and fever.   HENT:  Negative for ear pain and sore throat.    Eyes:  Negative for pain and visual disturbance.   Respiratory:  Positive for shortness of breath. Negative for cough.    Cardiovascular:  Positive for chest  pain. Negative for palpitations and leg swelling.   Gastrointestinal:  Negative for abdominal pain, constipation, diarrhea, nausea and vomiting.   Genitourinary:  Negative for dysuria and hematuria.   Musculoskeletal:  Positive for back pain. Negative for arthralgias, neck pain and neck stiffness.        Right-sided rib pain.   Skin:  Negative for color change and rash.   Neurological:  Negative for dizziness, tremors, seizures, syncope, facial asymmetry, speech difficulty, weakness, light-headedness, numbness and headaches.   All other systems reviewed and are negative.          Objective       ED Triage Vitals   Temperature Pulse Blood Pressure Respirations SpO2 Patient Position - Orthostatic VS   01/07/25 0225 01/07/25 0225 01/07/25 0227 01/07/25 0225 01/07/25 0225 01/07/25 0332   98 °F (36.7 °C) 92 (!) 173/101 22 98 % Sitting      Temp src Heart Rate Source BP Location FiO2 (%) Pain Score    -- 01/07/25 0332 01/07/25 0332 -- --     Monitor Left arm        Vitals      Date and Time Temp Pulse SpO2 Resp BP Pain Score FACES Pain Rating User   01/07/25 0332 -- 83  95 % -- 158/82 -- -- KB   01/07/25 0330 -- 84  88 % -- -- -- -- KB   01/07/25 0300 -- 78 -- -- 158/82 -- -- JLZ   01/07/25 0227 -- -- -- -- 173/101 -- -- JLZ   01/07/25 0225 98 °F (36.7 °C) 92 98 % 22 -- -- -- [facility]            Physical Exam  Vitals and nursing note reviewed.   Constitutional:       General: He is in acute distress.      Appearance: He is well-developed. He is obese. He is not toxic-appearing.   HENT:      Head: Normocephalic and atraumatic.      Right Ear: External ear normal.      Left Ear: External ear normal.      Nose: Nose normal. No congestion.      Mouth/Throat:      Mouth: Mucous membranes are moist.      Pharynx: Oropharynx is clear. No oropharyngeal exudate or posterior oropharyngeal erythema.   Eyes:      Extraocular Movements: Extraocular movements intact.      Conjunctiva/sclera: Conjunctivae normal.      Pupils: Pupils are  equal, round, and reactive to light.   Cardiovascular:      Rate and Rhythm: Normal rate and regular rhythm.      Pulses: Normal pulses.      Heart sounds: Normal heart sounds. No murmur heard.     No friction rub. No gallop.   Pulmonary:      Effort: Pulmonary effort is normal. No respiratory distress.      Breath sounds: Normal breath sounds. No stridor. No wheezing, rhonchi or rales.      Comments: No flail chest or paradoxical chest wall movement.  Abdominal:      General: Bowel sounds are normal.      Palpations: Abdomen is soft.      Tenderness: There is no abdominal tenderness. There is no right CVA tenderness, left CVA tenderness, guarding or rebound.   Musculoskeletal:         General: No swelling or deformity. Normal range of motion.      Cervical back: Normal range of motion and neck supple. No rigidity or tenderness.      Right lower leg: No edema.      Left lower leg: No edema.      Comments: Severe posterior right-sided rib tenderness over ribs 4 through 6.  No midline C/T/L-spine tenderness, step-offs or deformities.   Lymphadenopathy:      Cervical: No cervical adenopathy.   Skin:     General: Skin is warm and dry.      Capillary Refill: Capillary refill takes less than 2 seconds.      Coloration: Skin is not jaundiced or pale.      Findings: No bruising, erythema, lesion or rash.   Neurological:      General: No focal deficit present.      Mental Status: He is alert and oriented to person, place, and time. Mental status is at baseline.      GCS: GCS eye subscore is 4. GCS verbal subscore is 5. GCS motor subscore is 6.      Cranial Nerves: Cranial nerves 2-12 are intact. No cranial nerve deficit, dysarthria or facial asymmetry.      Sensory: Sensation is intact. No sensory deficit.      Motor: Motor function is intact. No abnormal muscle tone or pronator drift.      Coordination: Coordination is intact.      Gait: Gait is intact.   Psychiatric:         Mood and Affect: Mood normal.         Behavior:  Behavior normal.         Thought Content: Thought content normal.         Results Reviewed       Procedure Component Value Units Date/Time    D-dimer, quantitative [[id number]]  (Abnormal) Collected: 01/07/25 0251    Lab Status: Final result Specimen: Blood from Arm, Left Updated: 01/07/25 0326     D-Dimer, Quant 1.61 ug/ml FEU     Narrative:      In the evaluation for possible pulmonary embolism, in the appropriate (Well's Score of 4 or less) patient, the age adjusted d-dimer cutoff for this patient can be calculated as:    Age x 0.01 (in ug/mL) for Age-adjusted D-dimer exclusion threshold for a patient over 50 years.    CK [[id number]]  (Normal) Collected: 01/07/25 0251    Lab Status: Final result Specimen: Blood from Arm, Left Updated: 01/07/25 0325     Total CK 63 U/L     Comprehensive metabolic panel [[id number]]  (Abnormal) Collected: 01/07/25 0251    Lab Status: Final result Specimen: Blood from Arm, Left Updated: 01/07/25 0325     Sodium 140 mmol/L      Potassium 5.0 mmol/L      Chloride 105 mmol/L      CO2 26 mmol/L      ANION GAP 9 mmol/L      BUN 8 mg/dL      Creatinine 0.71 mg/dL      Glucose 97 mg/dL      Calcium 8.6 mg/dL      AST 28 U/L      ALT 14 U/L      Alkaline Phosphatase 152 U/L      Total Protein 6.7 g/dL      Albumin 3.8 g/dL      Total Bilirubin 0.36 mg/dL      eGFR 103 ml/min/1.73sq m     Narrative:      National Kid[employer] Foundation guidelines for Chronic Kidney Disease (CKD):     Stage 1 with normal or high GFR (GFR > 90 mL/min/1.73 square meters)    Stage 2 Mild CKD (GFR = 60-89 mL/min/1.73 square meters)    Stage 3A Moderate CKD (GFR = 45-59 mL/min/1.73 square meters)    Stage 3B Moderate CKD (GFR = 30-44 mL/min/1.73 square meters)    Stage 4 Severe CKD (GFR = 15-29 mL/min/1.73 square meters)    Stage 5 End Stage CKD (GFR <15 mL/min/1.73 square meters)  Note: GFR calculation is accurate only with a steady state creatinine    HS Troponin 0hr (reflex protocol) [[id number]]  (Normal)  Collected: 01/07/25 0251    Lab Status: Final result Specimen: Blood from Arm, Left Updated: 01/07/25 0324     hs TnI 0hr <2 ng/L     Ethanol [[id number]]  (Abnormal) Collected: 01/07/25 0251    Lab Status: Final result Specimen: Blood from Arm, Left Updated: 01/07/25 0321     Ethanol Lvl 80 mg/dL     Lipase [[id number]]  (Normal) Collected: 01/07/25 0251    Lab Status: Final result Specimen: Blood from Arm, Left Updated: 01/07/25 0321     Lipase 23 u/L     Protime-INR [[id number]]  (Normal) Collected: 01/07/25 0251    Lab Status: Final result Specimen: Blood from Arm, Left Updated: 01/07/25 0312     Protime 14.0 seconds      INR 1.01    Narrative:      INR Therapeutic Range    Indication                                             INR Range      Atrial Fibrillation                                               2.0-3.0  Hypercoagulable State                                    2.0.2.3  Left Ventricular Asist Device                            2.0-3.0  Mechanical Heart Valve                                  -    Aortic(with afib, MI, embolism, HF, LA enlargement,    and/or coagulopathy)                                     2.0-3.0 (2.5-3.5)     Mitral                                                             2.5-3.5  Prosthetic/Bioprosthetic Heart Valve               2.0-3.0  Venous thromboembolism (VTE: VT, PE        2.0-3.0    APTT [[id number]]  (Normal) Collected: 01/07/25 0251    Lab Status: Final result Specimen: Blood from Arm, Left Updated: 01/07/25 0312     PTT 28 seconds     CBC and differential [[id number]]  (Abnormal) Collected: 01/07/25 0251    Lab Status: Final result Specimen: Blood from Arm, Left Updated: 01/07/25 0258     WBC 5.59 Thousand/uL      RBC 3.81 Million/uL      Hemoglobin 12.0 g/dL      Hematocrit 35.5 %      MCV 93 fL      MCH 31.5 pg      MCHC 33.8 g/dL      RDW 13.0 %      MPV 8.7 fL      Platelets 289 Thousands/uL      nRBC 0 /100 WBCs      Segmented % 59 %      Immature Grans % 1 %       Lymphocytes % 30 %      Monocytes % 6 %      Eosinophils Relative 3 %      Basophils Relative 1 %      Absolute Neutrophils 3.36 Thousands/µL      Absolute Immature Grans 0.03 Thousand/uL      Absolute Lymphocytes 1.65 Thousands/µL      Absolute Monocytes 0.32 Thousand/µL      Eosinophils Absolute 0.18 Thousand/µL      Basophils Absolute 0.05 Thousands/µL             CTA chest pe study   Final Interpretation by Williams Edgar MD (01/07 0425)      No evidence of pulmonary embolus      Acute/subacute fractures of the right fourth through sixth ribs posterolaterally and at the right fifth through seventh ribs posteriorly. Correlate for paradoxical movement during respiration                  Workstation performed: [id number]         XR chest 1 view portable   ED Interpretation by Elian Singh DO (01/07 0304)   No focal consolidations or ptx. No pleural effusions.           ECG 12 Lead Documentation Only    Date/Time: 1/7/2025 12:37 AM    Performed by: Elian Singh DO  Authorized by: Elian Singh DO    Indications / Diagnosis:  Chest wall pain, shortness of breath  ECG reviewed by me, the ED Provider: yes    Patient location:  ED  Previous ECG:     Previous ECG:  Compared to current    Comparison ECG info:  12/24/2024.    Similarity:  Changes noted  Interpretation:     Interpretation: normal    Rate:     ECG rate:  76    ECG rate assessment: normal    Rhythm:     Rhythm: sinus rhythm    Ectopy:     Ectopy: none    QRS:     QRS axis:  Normal    QRS intervals:  Normal  Conduction:     Conduction: normal    ST segments:     ST segments:  Normal  T waves:     T waves: normal        ED Medication and Procedure Management   Prior to Admission Medications   Prescriptions Last Dose Informant Patient Reported? Taking?   HYDROmorphone (DILAUDID) 2 mg tablet   No No   Sig: Take 1 tablet (2 mg total) by mouth every 4 (four) hours as needed for severe pain or moderate pain for up to 10 days Take 1 tablet (2 mg total) by mouth every 4  (four) hours as needed for moderate pain or take 2 tablets (4 mg total) as needed for severe pain for up to 10 days. Max Daily Amount: 12 mg   HYDROmorphone (DILAUDID) 2 mg tablet   No Yes   Sig: Take 1 tablet (2 mg total) by mouth every 4 (four) hours as needed for severe pain for up to 10 days Max Daily Amount: 12 mg   Methocarbamol 1000 MG TABS   No Yes   Sig: Take 1,000 mg by mouth every 8 (eight) hours   Thiamine Mononitrate (VITAMIN B1) 100 mg tablet   No No   Sig: Take 1 tablet (100 mg total) by mouth daily   acetaminophen (TYLENOL) 325 mg tablet   No No   Sig: Take 3 tablets (975 mg total) by mouth every 8 (eight) hours   atorvastatin (LIPITOR) 40 mg tablet   No No   Sig: Take 1 tablet (40 mg total) by mouth daily with dinner   escitalopram (LEXAPRO) 20 mg tablet   No No   Sig: Take 1 tablet (20 mg total) by mouth daily   folic acid (FOLVITE) 1 mg tablet   No No   Sig: Take 1 tablet (1 mg total) by mouth daily   gabapentin (NEURONTIN) 300 mg capsule   No No   Sig: Take 1 capsule (300 mg total) by mouth 3 (three) times a day   ibuprofen (MOTRIN) 400 mg tablet   No No   Sig: Take 1 tablet (400 mg total) by mouth every 6 (six) hours as needed for mild pain   lidocaine (LIDODERM) 5 %   No No   Sig: Apply 1 patch topically over 12 hours daily Remove & Discard patch within 12 hours or as directed by MD   lisinopril (ZESTRIL) 40 mg tablet   No No   Sig: Take 1 tablet (40 mg total) by mouth daily   methocarbamol 1000 MG TABS   No No   Sig: Take 1,000 mg by mouth every 8 (eight) hours   mirtazapine (REMERON) 15 mg tablet   No No   Sig: Take 1 tablet (15 mg total) by mouth daily at bedtime   nicotine (NICODERM CQ) 21 mg/24 hr TD 24 hr patch   No No   Sig: Place 1 patch on the skin over 24 hours daily   pantoprazole (PROTONIX) 40 mg tablet   No No   Sig: Take 1 tablet (40 mg total) by mouth daily in the early morning   senna-docusate sodium (SENOKOT S) 8.6-50 mg per tablet   No No   Sig: Take 1 tablet by mouth daily at  bedtime      Facility-Administered Medications: None     Discharge Medication List as of 1/7/2025  7:31 AM        START taking these medications    Details   LORazepam (ATIVAN) 2 mg tablet Take 1 tablet (2 mg total) by mouth every 6 (six) hours as needed (for shakiness, feelings of alcohol withdrawal) for up to 3 days, Starting Tue 1/7/2025, Until Fri 1/10/2025 at 2359, Normal           CONTINUE these medications which have CHANGED    Details   HYDROmorphone (DILAUDID) 2 mg tablet Take 1 tablet (2 mg total) by mouth every 4 (four) hours as needed for severe pain for up to 10 days Max Daily Amount: 12 mg, Starting Tue 1/7/2025, Until Fri 1/17/2025 at 2359, Normal      Methocarbamol 1000 MG TABS Take 1,000 mg by mouth every 8 (eight) hours, Starting Tue 1/7/2025, Normal           CONTINUE these medications which have NOT CHANGED    Details   acetaminophen (TYLENOL) 325 mg tablet Take 3 tablets (975 mg total) by mouth every 8 (eight) hours, Starting Sun 12/29/2024, No Print      atorvastatin (LIPITOR) 40 mg tablet Take 1 tablet (40 mg total) by mouth daily with dinner, Starting Mon 12/9/2024, Normal      escitalopram (LEXAPRO) 20 mg tablet Take 1 tablet (20 mg total) by mouth daily, Starting Wed 10/2/2024, Normal      folic acid (FOLVITE) 1 mg tablet Take 1 tablet (1 mg total) by mouth daily, Starting Sun 12/8/2024, Until Tue 1/7/2025, Normal      gabapentin (NEURONTIN) 300 mg capsule Take 1 capsule (300 mg total) by mouth 3 (three) times a day, Starting Sun 12/29/2024, Normal      ibuprofen (MOTRIN) 400 mg tablet Take 1 tablet (400 mg total) by mouth every 6 (six) hours as needed for mild pain, Starting Sun 12/29/2024, Normal      lidocaine (LIDODERM) 5 % Apply 1 patch topically over 12 hours daily Remove & Discard patch within 12 hours or as directed by MD, Starting Sun 12/29/2024, Normal      lisinopril (ZESTRIL) 40 mg tablet Take 1 tablet (40 mg total) by mouth daily, Starting Mon 12/9/2024, Normal      mirtazapine  (REMERON) 15 mg tablet Take 1 tablet (15 mg total) by mouth daily at bedtime, Starting Wed 11/13/2024, Normal      nicotine (NICODERM CQ) 21 mg/24 hr TD 24 hr patch Place 1 patch on the skin over 24 hours daily, Starting Mon 12/30/2024, Normal      pantoprazole (PROTONIX) 40 mg tablet Take 1 tablet (40 mg total) by mouth daily in the early morning, Starting Mon 12/9/2024, Until Sat 6/7/2025, Normal      senna-docusate sodium (SENOKOT S) 8.6-50 mg per tablet Take 1 tablet by mouth daily at bedtime, Starting Sun 12/29/2024, Normal      Thiamine Mononitrate (VITAMIN B1) 100 mg tablet Take 1 tablet (100 mg total) by mouth daily, Starting Tue 7/9/2024, Normal           Outpatient Discharge Orders   Shower Daily     No driving until     Call provider for:  persistent nausea or vomiting     Call provider for:  severe uncontrolled pain     Call provider for: active or persistent bleeding     Call provider for:  difficulty breathing, headache or visual disturbances     Call provider for:  persistent dizziness or light-headedness     Call provider for:  extreme fatigue     ED SEPSIS DOCUMENTATION   Time reflects when diagnosis was documented in both MDM as applicable and the Disposition within this note       Time User Action Codes Description Comment    1/7/2025  4:36 AM Elian Singh Add [S22.49XA] Multiple rib fractures     1/7/2025  6:50 AM Chicho Hughes [S22.41XA] Closed fracture of multiple ribs of right side, initial encounter     1/7/2025  7:11 AM Chicho Hughes Add [F10.90] Alcohol use disorder                  Elian Singh DO  01/07/25 0803

## 2025-01-07 NOTE — ED ATTENDING ATTESTATION
1/7/2025  I, Ras East MD, saw and evaluated the patient. I have discussed the patient with the resident/non-physician practitioner and agree with the resident's/non-physician practitioner's findings, Plan of Care, and MDM as documented in the resident's/non-physician practitioner's note, except where noted. All available labs and Radiology studies were reviewed.  I was present for key portions of any procedure(s) performed by the resident/non-physician practitioner and I was immediately available to provide assistance.       At this point I agree with the current assessment done in the Emergency Department.  I have conducted an independent evaluation of this patient a history and physical is as follows:  Patient is a 59 year old male with worsening right sided pleuritic upper back pain since running out of his dilaudid a few days ago. Has been drinking etoh due to pain. (+) N/V. No fever. No cough. No further trauma. Was last seen in this ED on 12/24/24 for multiple rib fractures and was admitted. [facility] website checked on this patient and last Rx found was on 12/29/24 for dilaudid for 5 day supply.  States he tried calling the trauma department without a call back from them. NCAT. No scleral icterus. Mucous membranes somewhat moist. Lungs clear. Heart regular without murmur. (+) R posterior chest wall tenderness. Abdomen soft and nontender. Good bowel sounds. No edema. No rash noted. DDx including but not limited to: Multiple rib fractures, herniated disc, arthritis, spinal stenosis, strain, sprain, PE, PTX, pneumonia, rhabdomyolysis; doubt epidural abscess, AAA, dissection or cardiac etiology. Alcohol abuse, metabolic abnormality, alcohol withdrawal. Will check EKG, labs, CXR and give IV toradol and IV dilaudid and IV zofran and IVFs and then notify trauma team.     ED Course         Critical Care Time  Procedures

## 2025-01-10 ENCOUNTER — HOSPITAL ENCOUNTER (EMERGENCY)
Facility: HOSPITAL | Age: 60
Discharge: HOME | End: 2025-01-11
Attending: EMERGENCY MEDICINE
Payer: COMMERCIAL

## 2025-01-10 ENCOUNTER — APPOINTMENT (EMERGENCY)
Dept: RADIOLOGY | Facility: HOSPITAL | Age: 60
End: 2025-01-10
Payer: COMMERCIAL

## 2025-01-10 DIAGNOSIS — R07.81 RIB PAIN ON RIGHT SIDE: Primary | ICD-10-CM

## 2025-01-10 DIAGNOSIS — E83.42 HYPOMAGNESEMIA: ICD-10-CM

## 2025-01-10 DIAGNOSIS — J40 BRONCHITIS: ICD-10-CM

## 2025-01-10 DIAGNOSIS — E87.3 RESPIRATORY ALKALOSIS: ICD-10-CM

## 2025-01-10 DIAGNOSIS — S22.41XA CLOSED FRACTURE OF MULTIPLE RIBS OF RIGHT SIDE, INITIAL ENCOUNTER: ICD-10-CM

## 2025-01-10 LAB
ATRIAL RATE: 76 BPM
BASE EX.OXY STD BLDV CALC-SCNC: 95.3 % (ref 60–80)
BASE EXCESS BLDV CALC-SCNC: 3.4 MMOL/L
BASOPHILS # BLD AUTO: 0.05 THOUSANDS/ΜL (ref 0–0.1)
BASOPHILS NFR BLD AUTO: 1 % (ref 0–1)
EOSINOPHIL # BLD AUTO: 0.17 THOUSAND/ΜL (ref 0–0.61)
EOSINOPHIL NFR BLD AUTO: 3 % (ref 0–6)
ERYTHROCYTE [DISTWIDTH] IN BLOOD BY AUTOMATED COUNT: 13.6 % (ref 11.6–15.1)
HCO3 BLDV-SCNC: 25.3 MMOL/L (ref 24–30)
HCT VFR BLD AUTO: 38.2 % (ref 36.5–49.3)
HGB BLD-MCNC: 13.2 G/DL (ref 12–17)
IMM GRANULOCYTES # BLD AUTO: 0.03 THOUSAND/UL (ref 0–0.2)
IMM GRANULOCYTES NFR BLD AUTO: 1 % (ref 0–2)
LYMPHOCYTES # BLD AUTO: 1.87 THOUSANDS/ΜL (ref 0.6–4.47)
LYMPHOCYTES NFR BLD AUTO: 31 % (ref 14–44)
MCH RBC QN AUTO: 32.7 PG (ref 26.8–34.3)
MCHC RBC AUTO-ENTMCNC: 34.6 G/DL (ref 31.4–37.4)
MCV RBC AUTO: 95 FL (ref 82–98)
MONOCYTES # BLD AUTO: 0.28 THOUSAND/ΜL (ref 0.17–1.22)
MONOCYTES NFR BLD AUTO: 5 % (ref 4–12)
NEUTROPHILS # BLD AUTO: 3.63 THOUSANDS/ΜL (ref 1.85–7.62)
NEUTS SEG NFR BLD AUTO: 59 % (ref 43–75)
NRBC BLD AUTO-RTO: 0 /100 WBCS
O2 CT BLDV-SCNC: 19 ML/DL
P AXIS: 57 DEGREES
PCO2 BLDV: 30.6 MM HG (ref 42–50)
PH BLDV: 7.54 [PH] (ref 7.3–7.4)
PLATELET # BLD AUTO: 271 THOUSANDS/UL (ref 149–390)
PMV BLD AUTO: 8.7 FL (ref 8.9–12.7)
PO2 BLDV: 109.2 MM HG (ref 35–45)
PR INTERVAL: 148 MS
QRS AXIS: 35 DEGREES
QRSD INTERVAL: 90 MS
QT INTERVAL: 414 MS
QTC INTERVAL: 465 MS
RBC # BLD AUTO: 4.04 MILLION/UL (ref 3.88–5.62)
T WAVE AXIS: 37 DEGREES
VENTRICULAR RATE: 76 BPM
WBC # BLD AUTO: 6.03 THOUSAND/UL (ref 4.31–10.16)

## 2025-01-10 PROCEDURE — 83735 ASSAY OF MAGNESIUM: CPT

## 2025-01-10 PROCEDURE — 84145 PROCALCITONIN (PCT): CPT

## 2025-01-10 PROCEDURE — 96375 TX/PRO/DX INJ NEW DRUG ADDON: CPT

## 2025-01-10 PROCEDURE — 82077 ASSAY SPEC XCP UR&BREATH IA: CPT

## 2025-01-10 PROCEDURE — 80053 COMPREHEN METABOLIC PANEL: CPT

## 2025-01-10 PROCEDURE — 83690 ASSAY OF LIPASE: CPT

## 2025-01-10 PROCEDURE — 71046 X-RAY EXAM CHEST 2 VIEWS: CPT

## 2025-01-10 PROCEDURE — 85730 THROMBOPLASTIN TIME PARTIAL: CPT

## 2025-01-10 PROCEDURE — 99285 EMERGENCY DEPT VISIT HI MDM: CPT | Performed by: EMERGENCY MEDICINE

## 2025-01-10 PROCEDURE — 87811 SARS-COV-2 COVID19 W/OPTIC: CPT

## 2025-01-10 PROCEDURE — 82010 KETONE BODYS QUAN: CPT

## 2025-01-10 PROCEDURE — 82805 BLOOD GASES W/O2 SATURATION: CPT

## 2025-01-10 PROCEDURE — 83605 ASSAY OF LACTIC ACID: CPT

## 2025-01-10 PROCEDURE — 85025 COMPLETE CBC W/AUTO DIFF WBC: CPT

## 2025-01-10 PROCEDURE — 93010 ELECTROCARDIOGRAM REPORT: CPT | Performed by: INTERNAL MEDICINE

## 2025-01-10 PROCEDURE — 99285 EMERGENCY DEPT VISIT HI MDM: CPT

## 2025-01-10 PROCEDURE — 82550 ASSAY OF CK (CPK): CPT

## 2025-01-10 PROCEDURE — 87804 INFLUENZA ASSAY W/OPTIC: CPT

## 2025-01-10 PROCEDURE — 85610 PROTHROMBIN TIME: CPT

## 2025-01-10 PROCEDURE — 36415 COLL VENOUS BLD VENIPUNCTURE: CPT

## 2025-01-10 PROCEDURE — 87040 BLOOD CULTURE FOR BACTERIA: CPT

## 2025-01-10 PROCEDURE — 96365 THER/PROPH/DIAG IV INF INIT: CPT

## 2025-01-10 RX ORDER — ONDANSETRON 2 MG/ML
4 INJECTION INTRAMUSCULAR; INTRAVENOUS ONCE
Status: COMPLETED | OUTPATIENT
Start: 2025-01-10 | End: 2025-01-10

## 2025-01-10 RX ORDER — DIAZEPAM 10 MG/2ML
10 INJECTION, SOLUTION INTRAMUSCULAR; INTRAVENOUS ONCE
Status: COMPLETED | OUTPATIENT
Start: 2025-01-11 | End: 2025-01-11

## 2025-01-10 RX ORDER — LIDOCAINE 50 MG/G
1 PATCH TOPICAL ONCE
Status: DISCONTINUED | OUTPATIENT
Start: 2025-01-10 | End: 2025-01-11 | Stop reason: HOSPADM

## 2025-01-10 RX ORDER — ACETAMINOPHEN 10 MG/ML
1000 INJECTION, SOLUTION INTRAVENOUS ONCE
Status: COMPLETED | OUTPATIENT
Start: 2025-01-10 | End: 2025-01-11

## 2025-01-10 RX ORDER — HYDROMORPHONE HCL/PF 1 MG/ML
0.5 SYRINGE (ML) INJECTION ONCE
Status: COMPLETED | OUTPATIENT
Start: 2025-01-11 | End: 2025-01-11

## 2025-01-10 RX ORDER — HYDROMORPHONE HCL/PF 1 MG/ML
0.5 SYRINGE (ML) INJECTION ONCE
Status: COMPLETED | OUTPATIENT
Start: 2025-01-10 | End: 2025-01-10

## 2025-01-10 RX ADMIN — ONDANSETRON 4 MG: 2 INJECTION INTRAMUSCULAR; INTRAVENOUS at 23:18

## 2025-01-10 RX ADMIN — LIDOCAINE 1 PATCH: 50 PATCH CUTANEOUS at 22:43

## 2025-01-10 RX ADMIN — ACETAMINOPHEN 1000 MG: 10 INJECTION INTRAVENOUS at 23:28

## 2025-01-10 RX ADMIN — HYDROMORPHONE HYDROCHLORIDE 0.5 MG: 1 INJECTION, SOLUTION INTRAMUSCULAR; INTRAVENOUS; SUBCUTANEOUS at 23:17

## 2025-01-11 ENCOUNTER — APPOINTMENT (EMERGENCY)
Dept: CT IMAGING | Facility: HOSPITAL | Age: 60
End: 2025-01-11
Payer: COMMERCIAL

## 2025-01-11 VITALS
OXYGEN SATURATION: 94 % | SYSTOLIC BLOOD PRESSURE: 114 MMHG | RESPIRATION RATE: 20 BRPM | DIASTOLIC BLOOD PRESSURE: 64 MMHG | TEMPERATURE: 97.3 F | HEART RATE: 91 BPM

## 2025-01-11 LAB
ALBUMIN SERPL BCG-MCNC: 4 G/DL (ref 3.5–5)
ALP SERPL-CCNC: 152 U/L (ref 34–104)
ALT SERPL W P-5'-P-CCNC: 18 U/L (ref 7–52)
ANION GAP SERPL CALCULATED.3IONS-SCNC: 11 MMOL/L (ref 4–13)
APTT PPP: 23 SECONDS (ref 23–34)
AST SERPL W P-5'-P-CCNC: 37 U/L (ref 13–39)
B-OH-BUTYR SERPL-MCNC: 0.13 MMOL/L (ref 0.02–0.27)
BILIRUB SERPL-MCNC: 0.32 MG/DL (ref 0.2–1)
BUN SERPL-MCNC: 11 MG/DL (ref 5–25)
CALCIUM SERPL-MCNC: 8.8 MG/DL (ref 8.4–10.2)
CHLORIDE SERPL-SCNC: 106 MMOL/L (ref 96–108)
CK SERPL-CCNC: 37 U/L (ref 39–308)
CO2 SERPL-SCNC: 25 MMOL/L (ref 21–32)
CREAT SERPL-MCNC: 0.73 MG/DL (ref 0.6–1.3)
ETHANOL SERPL-MCNC: 229 MG/DL
FLUAV AG UPPER RESP QL IA.RAPID: NEGATIVE
FLUBV AG UPPER RESP QL IA.RAPID: NEGATIVE
GFR SERPL CREATININE-BSD FRML MDRD: 102 ML/MIN/1.73SQ M
GLUCOSE SERPL-MCNC: 88 MG/DL (ref 65–140)
GLUCOSE SERPL-MCNC: 93 MG/DL (ref 65–140)
INR PPP: 0.9 (ref 0.85–1.19)
LACTATE SERPL-SCNC: 1.9 MMOL/L (ref 0.5–2)
LIPASE SERPL-CCNC: 25 U/L (ref 11–82)
MAGNESIUM SERPL-MCNC: 1.8 MG/DL (ref 1.9–2.7)
POTASSIUM SERPL-SCNC: 4.2 MMOL/L (ref 3.5–5.3)
PROCALCITONIN SERPL-MCNC: 0.06 NG/ML
PROT SERPL-MCNC: 7 G/DL (ref 6.4–8.4)
PROTHROMBIN TIME: 12.9 SECONDS (ref 12.3–15)
SARS-COV+SARS-COV-2 AG RESP QL IA.RAPID: NEGATIVE
SODIUM SERPL-SCNC: 142 MMOL/L (ref 135–147)

## 2025-01-11 PROCEDURE — 82948 REAGENT STRIP/BLOOD GLUCOSE: CPT

## 2025-01-11 PROCEDURE — 96366 THER/PROPH/DIAG IV INF ADDON: CPT

## 2025-01-11 PROCEDURE — 93005 ELECTROCARDIOGRAM TRACING: CPT

## 2025-01-11 PROCEDURE — 96375 TX/PRO/DX INJ NEW DRUG ADDON: CPT

## 2025-01-11 PROCEDURE — 96368 THER/DIAG CONCURRENT INF: CPT

## 2025-01-11 PROCEDURE — 96376 TX/PRO/DX INJ SAME DRUG ADON: CPT

## 2025-01-11 PROCEDURE — 71250 CT THORAX DX C-: CPT

## 2025-01-11 PROCEDURE — 96367 TX/PROPH/DG ADDL SEQ IV INF: CPT

## 2025-01-11 RX ORDER — ACETAMINOPHEN 325 MG/1
975 TABLET ORAL ONCE
Status: COMPLETED | OUTPATIENT
Start: 2025-01-11 | End: 2025-01-11

## 2025-01-11 RX ORDER — HYDROMORPHONE HCL/PF 1 MG/ML
0.5 SYRINGE (ML) INJECTION ONCE
Status: COMPLETED | OUTPATIENT
Start: 2025-01-11 | End: 2025-01-11

## 2025-01-11 RX ORDER — AZITHROMYCIN 250 MG/1
500 TABLET, FILM COATED ORAL ONCE
Status: COMPLETED | OUTPATIENT
Start: 2025-01-11 | End: 2025-01-11

## 2025-01-11 RX ORDER — METHOCARBAMOL 500 MG/1
1000 TABLET, FILM COATED ORAL 3 TIMES DAILY PRN
Qty: 20 TABLET | Refills: 0 | Status: SHIPPED | OUTPATIENT
Start: 2025-01-11 | End: 2025-01-15

## 2025-01-11 RX ORDER — AZITHROMYCIN 250 MG/1
250 TABLET, FILM COATED ORAL DAILY
Qty: 4 TABLET | Refills: 0 | Status: SHIPPED | OUTPATIENT
Start: 2025-01-11 | End: 2025-01-15

## 2025-01-11 RX ORDER — MAGNESIUM SULFATE HEPTAHYDRATE 40 MG/ML
2 INJECTION, SOLUTION INTRAVENOUS ONCE
Status: COMPLETED | OUTPATIENT
Start: 2025-01-11 | End: 2025-01-11

## 2025-01-11 RX ORDER — HYDROMORPHONE HYDROCHLORIDE 2 MG/1
2 TABLET ORAL EVERY 4 HOURS PRN
Qty: 30 TABLET | Refills: 0 | Status: SHIPPED | OUTPATIENT
Start: 2025-01-11 | End: 2025-01-15

## 2025-01-11 RX ADMIN — DIAZEPAM 10 MG: 10 INJECTION, SOLUTION INTRAMUSCULAR; INTRAVENOUS at 00:22

## 2025-01-11 RX ADMIN — SODIUM CHLORIDE, SODIUM LACTATE, POTASSIUM CHLORIDE, AND CALCIUM CHLORIDE 1000 ML: .6; .31; .03; .02 INJECTION, SOLUTION INTRAVENOUS at 00:59

## 2025-01-11 RX ADMIN — HYDROMORPHONE HYDROCHLORIDE 0.5 MG: 1 INJECTION, SOLUTION INTRAMUSCULAR; INTRAVENOUS; SUBCUTANEOUS at 03:10

## 2025-01-11 RX ADMIN — ACETAMINOPHEN 975 MG: 325 TABLET, FILM COATED ORAL at 03:04

## 2025-01-11 RX ADMIN — AZITHROMYCIN DIHYDRATE 500 MG: 250 TABLET ORAL at 04:01

## 2025-01-11 RX ADMIN — MAGNESIUM SULFATE IN WATER FOR 2 G: 40 INJECTION INTRAVENOUS at 01:05

## 2025-01-11 RX ADMIN — HYDROMORPHONE HYDROCHLORIDE 0.5 MG: 1 INJECTION, SOLUTION INTRAMUSCULAR; INTRAVENOUS; SUBCUTANEOUS at 00:21

## 2025-01-11 NOTE — ED ATTENDING ATTESTATION
1/10/2025  I, Ras East MD, saw and evaluated the patient. I have discussed the patient with the resident/non-physician practitioner and agree with the resident's/non-physician practitioner's findings, Plan of Care, and MDM as documented in the resident's/non-physician practitioner's note, except where noted. All available labs and Radiology studies were reviewed.  I was present for key portions of any procedure(s) performed by the resident/non-physician practitioner and I was immediately available to provide assistance.       At this point I agree with the current assessment done in the Emergency Department.  I have conducted an independent evaluation of this patient a history and physical is as follows: Patient is a 59 year old male with worsening right rib pain for past several days and has been coughing and had a fever today. (+) N/V. Has been self medicating with alcohol for the pain. Was last seen in this ED on 1/7/25 for multiple rib fractures and was given Rx for dilaudid by trauma service and patient states his pharmacy did not have this medication. [facility] website checked on this patient and last Rx filled was on 12/29/24 for dilaudid for 5 day supply. NCAT. No scleral icterus. Mucous membranes somewhat moist. Lungs clear. Heart regular without murmur. (+) R posterior chest wall tenderness. Abdomen soft and nontender. Good bowel sounds. No edema. No rash noted. No jaundice. DDX including but not limited to: Multiple rib fractures, alcohol abuse, alcohol withdrawal, metabolic abnormality, dehydration, KWAKU, ACS, MI, doubt PE since had recent CT PE study which was negative for PE; PTX, pneumonia, atelectasis; doubt dissection; pleurisy, pericarditis, myocarditis, rhabdomyolysis, covid, influenza; doubt UTI or GI etiology or meningitis. Will check EKG, CXR, labs and give IVFs and IV dilaudid.     ED Course         Critical Care Time  Procedures

## 2025-01-11 NOTE — DISCHARGE INSTRUCTIONS
Follow up with pain management if your pain persists.    Follow up with your PCP regarding your alcohol use and low magnesium levels.     Continue to use your incentive spirometer (breathing device).    Return to the ER if you develop:    Fever, vomiting, abdominal pain, shortness of breath, chest pain, confusion, difficulty walking, dizziness, seizure.

## 2025-01-11 NOTE — ED PROVIDER NOTES
Time reflects when diagnosis was documented in both MDM as applicable and the Disposition within this note       Time User Action Codes Description Comment    1/11/2025  2:57 AM AmishJose Juan parker Add [R07.81] Rib pain on right side     1/11/2025  3:22 AM MakedaJose Juan Add [E87.3] Respiratory alkalosis     1/11/2025  3:22 AM Jose Juan Ashford Add [E83.42] Hypomagnesemia     1/11/2025  3:25 AM Ras East Add [J40] Bronchitis     1/11/2025  3:26 AM Kita, Ras DIAL Add [S22.41XA] Closed fracture of multiple ribs of right side, initial encounter           ED Disposition       ED Disposition   Discharge    Condition   Stable    Date/Time   Sat Jan 11, 2025  3:23 AM    Comment   Diogo Travis discharge to home/self care.                   Assessment & Plan       Medical Decision Making  See ED course for MDM    Amount and/or Complexity of Data Reviewed  Labs: ordered. Decision-making details documented in ED Course.  Radiology: ordered and independent interpretation performed. Decision-making details documented in ED Course.    Risk  OTC drugs.  Prescription drug management.        ED Course as of 01/11/25 0420   Fri Dilip 10, 2025   2231 Ddx includes but not limited to EtOH withdraw, AKA, PNA, rhabdomyolysis; No signs or symptoms to suggest wernicke's encephalopathy at this time. Will place Pt on CIWA protocol and control his symptoms with zofran, dilaudid, and lidocaine.    2305 PE unlikely given recent CTA chest negative for PE, Pt not dyspneic or endorsing CP. Pt not in apparent EtOH withdraw at this time. Will monitor for progression of s/s.   2346 pH, Cy(!): 7.535  Respiratory alkalosis   Sat Jan 11, 2025   0014 CXR with suspicious looking opacification. Will obtain a CT chest to better visualize the lung fields    0016 Pt's Pain initially improved, however, it is now returning. Additionally, he endorses worsening anxiety. Given the HTN, persistent tachycardia, and anxiety, diazapam was ordered for impending  EtOH withdraw.   0018 CK not indicative of rhabdo   0018 IV Mg ordered for hypomagnesemia    0018 Labs overall not indicative of starvation ketosis, AKA, sepsis, COVID, FLU, pancreatitis.    0142 CT chest without contrast  IMPRESSION:     No evidence of focal consolidation, pneumothorax or effusion.     0309 Pt pain slightly improved. I advised he be admitted for pain management. However, he declined stating he would rather go home.   0330 Pt has signs and symptoms of PNA. Given his increased risk factors, will start him on azithromycin. Pt was referred to pain management. He was advised to f/u with his PCP regarding his EtOH use and low magnesium level. Strict return precautions discussed.       Medications   lidocaine (LIDODERM) 5 % patch 1 patch (1 patch Topical Medication Applied 1/10/25 2243)   lactated ringers bolus 1,000 mL (0 mL Intravenous Stopped 1/11/25 0408)   HYDROmorphone (DILAUDID) injection 0.5 mg (0.5 mg Intravenous Given 1/10/25 2317)   ondansetron (ZOFRAN) injection 4 mg (4 mg Intravenous Given 1/10/25 2318)   acetaminophen (Ofirmev) injection 1,000 mg (0 mg Intravenous Stopped 1/11/25 0052)   diazepam (VALIUM) injection 10 mg (10 mg Intravenous Given 1/11/25 0022)   HYDROmorphone (DILAUDID) injection 0.5 mg (0.5 mg Intravenous Given 1/11/25 0021)   magnesium sulfate 2 g/50 mL IVPB (premix) 2 g (0 g Intravenous Stopped 1/11/25 0305)   acetaminophen (TYLENOL) tablet 975 mg (975 mg Oral Given 1/11/25 0304)   HYDROmorphone (DILAUDID) injection 0.5 mg (0.5 mg Intravenous Given 1/11/25 0310)   azithromycin (ZITHROMAX) tablet 500 mg (500 mg Oral Given 1/11/25 0401)       ED Risk Strat Scores               CIWA-Ar Score       Row Name 01/11/25 0106             CIWA-Ar    Blood Pressure 114/64      Pulse 105      Nausea and Vomiting 0      Tactile Disturbances 0      Tremor 0      Auditory Disturbances 0      Paroxysmal Sweats 1      Visual Disturbances 0      Anxiety 4      Headache, Fullness in Head 0       Agitation 0      Orientation and Clouding of Sensorium 0      CIWA-Ar Total 5                              SBIRT 22yo+      Flowsheet Row Most Recent Value   Initial Alcohol Screen: US AUDIT-C     1. How often do you have a drink containing alcohol? 6 Filed at: 01/11/2025 0210   2. How many drinks containing alcohol do you have on a typical day you are drinking?  5 Filed at: 01/11/2025 0210   3a. Male UNDER 65: How often do you have five or more drinks on one occasion? 6 Filed at: 01/11/2025 0210   Audit-C Score 17 Filed at: 01/11/2025 0210   Full Alcohol Screen: US AUDIT    4. How often during the last year have you found that you were not able to stop drinking once you had started? 4 Filed at: 01/11/2025 0210   5. How often during past year have you failed to do what was normally expected of you because of drinking?  0 Filed at: 01/11/2025 0210   6. How often in past year have you needed a first drink in the morning to get yourself going after a heavy drinking session?  0 Filed at: 01/11/2025 0210   7. How often in past year have you had feeling of guilt or remorse after drinking?  4 Filed at: 01/11/2025 0210   8. How often in past year have you been unable to remember what happened night before because you had been drinking?  0 Filed at: 01/11/2025 0210   9. Have you or someone else been injured as a result of your drinking?  4 Filed at: 01/11/2025 0210   10. Has a relative, friend, doctor or other health worker been concerned about your drinking and suggested you cut down?  4 Filed at: 01/11/2025 0210   AUDIT Total Score 33 Filed at: 01/11/2025 0210   MILDRED: How many times in the past year have you...    Used an illegal drug or used a prescription medication for non-medical reasons? Never Filed at: 01/11/2025 0210                            History of Present Illness       Chief Complaint   Patient presents with    Pain     Pt arrives with EMS seeking pain control for rib fractures from previous injury. Pt  "reports he has been drinking past 3 days to control pain. Pt reports he hasn't been able to get pain med prescription filled. Also reports \"puking all day\"       Past Medical History:   Diagnosis Date    Alcohol use disorder, severe, dependence (HCC) 04/02/2023    Alcohol withdrawal seizure (HCC)     Alcoholic ketoacidosis 10/16/2023    Anxiety     AR (allergic rhinitis)     Chronic alcoholic gastritis 04/02/2023    Depression     GERD (gastroesophageal reflux disease)     Hepatic steatosis 04/02/2023    Hyperlipidemia     Hypertension     IBS (irritable bowel syndrome)     Obesity     Rosacea     Vitamin B12 deficiency 02/14/2024    Vitamin D deficiency 02/14/2024      Past Surgical History:   Procedure Laterality Date    ANTERIOR CRUCIATE LIGAMENT REPAIR Left     WISDOM TOOTH EXTRACTION        Family History   Problem Relation Age of Onset    Cancer Mother 78        Type unknown    Hypertension Father     Coronary artery disease Father 60    Cancer Father 82        Bladder; + Smoker    No Known Problems Sister     No Known Problems Sister     No Known Problems Sister     No Known Problems Son     No Known Problems Son     Heart attack Paternal Grandfather 60    Coronary artery disease Paternal Grandfather 60      Social History     Tobacco Use    Smoking status: Some Days     Types: Cigarettes, Cigars     Start date: 1/1/2014     Passive exposure: Past (Father)    Smokeless tobacco: Never    Tobacco comments:     Smokes cigars 2-3 times per year   Vaping Use    Vaping status: Never Used   Substance Use Topics    Alcohol use: Yes     Comment: ETOH 12/7/24    Drug use: Never      E-Cigarette/Vaping    E-Cigarette Use Never User       E-Cigarette/Vaping Substances    Nicotine No     THC No     CBD No     Flavoring No     Other No     Unknown No       I have reviewed and agree with the history as documented.     59y.o M w/ h/o AUD, EtOH induced seizure, AKA presenting for pain control. Approximately 2 weeks ago Pt " sustained a fall which resulted in Fx to his right posterolateral rib 4-7. He ws discharged on methocarbamol and dilaudid PO. He was doing fine the first week, however, once his Rx ran out his pain returned and as a result has been unable to function including not being able to use his incentive spirometer. He's been attempting to control the pain with alcohol. [facility] he has been a heavy drinker, predominantly whisky, for 20yrs, he started drinking 2 bottles of wine per day, last drink was earlier today. He presented to our ED a few days ago for uncontrolled pain, had a workup done which was negative for acute pathology. He was discharged on a refill of narcotics. However, he states that his pharmacy did not have the medicine in stock. He endorses difficulty taking deep breaths due to the pain, N/V/D, fevers, anxiety, diaphoresis, restlessness, tremors. Denies CP, SOB, abdominal pain, confusion, ataxic gait, vision changes.      History provided by:  Patient      Review of Systems   Constitutional:  Positive for diaphoresis and fever. Negative for activity change and appetite change.   Eyes:  Negative for visual disturbance.   Respiratory:  Negative for shortness of breath.    Cardiovascular:  Negative for chest pain.   Gastrointestinal:  Positive for diarrhea, nausea and vomiting. Negative for abdominal pain.   Musculoskeletal:  Positive for arthralgias and myalgias. Negative for gait problem.   Neurological:  Positive for tremors.   Psychiatric/Behavioral:  Negative for confusion. The patient is nervous/anxious.            Objective       ED Triage Vitals   Temperature Pulse Blood Pressure Respirations SpO2 Patient Position - Orthostatic VS   01/10/25 2152 01/10/25 2152 01/10/25 2152 01/10/25 2152 01/10/25 2152 01/10/25 2152   (!) 97.3 °F (36.3 °C) 93 146/78 20 94 % Lying      Temp Source Heart Rate Source BP Location FiO2 (%) Pain Score    01/10/25 2152 01/10/25 2152 01/10/25 2152 -- 01/10/25 2300    Oral  Monitor Right arm  10 - Worst Possible Pain      Vitals      Date and Time Temp Pulse SpO2 Resp BP Pain Score FACES Pain Rating User   01/11/25 0310 -- -- -- -- -- 8 -- KB   01/11/25 0304 -- -- -- -- -- 8 --    01/11/25 0200 -- 91 94 % -- -- -- --    01/11/25 0115 -- 99 94 % -- -- -- --    01/11/25 0106 -- 105 -- -- 114/64 -- --    01/11/25 0100 -- 104 95 % -- 114/64 -- --    01/11/25 0021 -- -- -- -- -- 9 --    01/10/25 2317 -- -- -- -- -- 10 - Worst Possible Pain --    01/10/25 2300 -- -- -- -- -- 10 - Worst Possible Pain --    01/10/25 2152 97.3 °F (36.3 °C) 93 94 % 20 146/78 -- -- KD            Physical Exam  Constitutional:       General: He is not in acute distress.     Comments: Patient anxious and mildly tremulous    HENT:      Head: Normocephalic and atraumatic.      Nose: Nose normal. No rhinorrhea.      Mouth/Throat:      Mouth: Mucous membranes are dry.      Pharynx: Oropharynx is clear.   Eyes:      Extraocular Movements: Extraocular movements intact.      Conjunctiva/sclera: Conjunctivae normal.      Pupils: Pupils are equal, round, and reactive to light.      Comments: NO nystagmus    Cardiovascular:      Rate and Rhythm: Regular rhythm. Tachycardia present.      Pulses: Normal pulses.      Heart sounds: Normal heart sounds.   Pulmonary:      Effort: Pulmonary effort is normal.      Breath sounds: Normal breath sounds.   Abdominal:      General: Abdomen is flat.      Palpations: Abdomen is soft.   Musculoskeletal:        Arms:       Comments: Right posterolateral rib pain   Skin:     General: Skin is warm and dry.      Capillary Refill: Capillary refill takes less than 2 seconds.   Neurological:      General: No focal deficit present.      Mental Status: He is alert and oriented to person, place, and time.         Results Reviewed       Procedure Component Value Units Date/Time    Fingerstick Glucose (POCT) [[id number]]  (Normal) Collected: 01/11/25 0123    Lab Status: Final result  Specimen: Blood Updated: 01/11/25 0124     POC Glucose 88 mg/dl     Protime-INR [[id number]]  (Normal) Collected: 01/10/25 2313    Lab Status: Final result Specimen: Blood from Arm, Left Updated: 01/11/25 0036     Protime 12.9 seconds      INR 0.90    Narrative:      INR Therapeutic Range    Indication                                             INR Range      Atrial Fibrillation                                               2.0-3.0  Hypercoagulable State                                    2.0.2.3  Left Ventricular Asist Device                            2.0-3.0  Mechanical Heart Valve                                  -    Aortic(with afib, MI, embolism, HF, LA enlargement,    and/or coagulopathy)                                     2.0-3.0 (2.5-3.5)     Mitral                                                             2.5-3.5  Prosthetic/Bioprosthetic Heart Valve               2.0-3.0  Venous thromboembolism (VTE: VT, PE        2.0-3.0    APTT [[id number]]  (Normal) Collected: 01/10/25 2313    Lab Status: Final result Specimen: Blood from Arm, Left Updated: 01/11/25 0036     PTT 23 seconds     Procalcitonin [[id number]]  (Normal) Collected: 01/10/25 2313    Lab Status: Final result Specimen: Blood from Arm, Left Updated: 01/11/25 0025     Procalcitonin 0.06 ng/ml     Comprehensive metabolic panel [[id number]]  (Abnormal) Collected: 01/10/25 2313    Lab Status: Final result Specimen: Blood from Arm, Left Updated: 01/11/25 0016     Sodium 142 mmol/L      Potassium 4.2 mmol/L      Chloride 106 mmol/L      CO2 25 mmol/L      ANION GAP 11 mmol/L      BUN 11 mg/dL      Creatinine 0.73 mg/dL      Glucose 93 mg/dL      Calcium 8.8 mg/dL      AST 37 U/L      ALT 18 U/L      Alkaline Phosphatase 152 U/L      Total Protein 7.0 g/dL      Albumin 4.0 g/dL      Total Bilirubin 0.32 mg/dL      eGFR 102 ml/min/1.73sq m     Narrative:      National Kid[employer] Disease Foundation guidelines for Chronic Kidney Disease (CKD):     Stage 1 with  normal or high GFR (GFR > 90 mL/min/1.73 square meters)    Stage 2 Mild CKD (GFR = 60-89 mL/min/1.73 square meters)    Stage 3A Moderate CKD (GFR = 45-59 mL/min/1.73 square meters)    Stage 3B Moderate CKD (GFR = 30-44 mL/min/1.73 square meters)    Stage 4 Severe CKD (GFR = 15-29 mL/min/1.73 square meters)    Stage 5 End Stage CKD (GFR <15 mL/min/1.73 square meters)  Note: GFR calculation is accurate only with a steady state creatinine    Lipase [[id number]]  (Normal) Collected: 01/10/25 2313    Lab Status: Final result Specimen: Blood from Arm, Left Updated: 01/11/25 0016     Lipase 25 u/L     Beta Hydroxybutyrate [[id number]]  (Normal) Collected: 01/10/25 2313    Lab Status: Final result Specimen: Blood from Arm, Left Updated: 01/11/25 0016     Beta- Hydroxybutyrate 0.13 mmol/L     Magnesium [[id number]]  (Abnormal) Collected: 01/10/25 2313    Lab Status: Final result Specimen: Blood from Arm, Left Updated: 01/11/25 0016     Magnesium 1.8 mg/dL     CK [[id number]]  (Abnormal) Collected: 01/10/25 2313    Lab Status: Final result Specimen: Blood from Arm, Left Updated: 01/11/25 0016     Total CK 37 U/L     Lactic acid, plasma (w/reflex if result > 2.0) [[id number]]  (Normal) Collected: 01/10/25 2313    Lab Status: Final result Specimen: Blood from Arm, Right Updated: 01/11/25 0016     LACTIC ACID 1.9 mmol/L     Narrative:      Result may be elevated if tourniquet was used during collection.    Ethanol [[id number]]  (Abnormal) Collected: 01/10/25 2313    Lab Status: Final result Specimen: Blood from Arm, Left Updated: 01/11/25 0014     Ethanol Lvl 229 mg/dL     FLU/COVID Rapid Antigen (30 min. TAT) - Preferred screening test in ED [[id number]]  (Normal) Collected: 01/10/25 2334    Lab Status: Final result Specimen: Nares from Nose Updated: 01/11/25 0006     SARS COV Rapid Antigen Negative     Influenza A Rapid Antigen Negative     Influenza B Rapid Antigen Negative    Narrative:      This test has been performed using the  [employer] Isa 2 FLU+SARS Antigen test under the Emergency Use Authorization (EUA). This test has been validated by the  and verified by the performing laboratory. The Isa uses lateral flow immunofluorescent sandwich assay to detect SARS-COV, Influenza A and Influenza B Antigen.     The Quidel Isa 2 SARS Antigen test does not differentiate between SARS-CoV and SARS-CoV-2.     Negative results are presumptive and may be confirmed with a molecular assay, if necessary, for patient management. Negative results do not rule out SARS-CoV-2 or influenza infection and should not be used as the sole basis for treatment or patient management decisions. A negative test result may occur if the level of antigen in a sample is below the limit of detection of this test.     Positive results are indicative of the presence of viral antigens, but do not rule out bacterial infection or co-infection with other viruses.     All test results should be used as an adjunct to clinical observations and other information available to the provider.    FOR PEDIATRIC PATIENTS - copy/paste COVID Guidelines URL to browser: [email or web address]    Blood culture #1 [[id number]] Collected: 01/10/25 2313    Lab Status: In process Specimen: Blood from Arm, Right Updated: 01/10/25 2346    Blood culture #2 [[id number]] Collected: 01/10/25 2313    Lab Status: In process Specimen: Blood from Arm, Left Updated: 01/10/25 2346    CBC and differential [[id number]]  (Abnormal) Collected: 01/10/25 2313    Lab Status: Final result Specimen: Blood from Arm, Left Updated: 01/10/25 2346     WBC 6.03 Thousand/uL      RBC 4.04 Million/uL      Hemoglobin 13.2 g/dL      Hematocrit 38.2 %      MCV 95 fL      MCH 32.7 pg      MCHC 34.6 g/dL      RDW 13.6 %      MPV 8.7 fL      Platelets 271 Thousands/uL      nRBC 0 /100 WBCs      Segmented % 59 %      Immature Grans % 1 %      Lymphocytes % 31 %       Monocytes % 5 %      Eosinophils Relative 3 %      Basophils Relative 1 %      Absolute Neutrophils 3.63 Thousands/µL      Absolute Immature Grans 0.03 Thousand/uL      Absolute Lymphocytes 1.87 Thousands/µL      Absolute Monocytes 0.28 Thousand/µL      Eosinophils Absolute 0.17 Thousand/µL      Basophils Absolute 0.05 Thousands/µL     Blood gas, venous [[id number]]  (Abnormal) Collected: 01/10/25 2313    Lab Status: Final result Specimen: Blood from Arm, Left Updated: 01/10/25 2344     pH, Cy 7.535     pCO2, Cy 30.6 mm Hg      pO2, Cy 109.2 mm Hg      HCO3, Cy 25.3 mmol/L      Base Excess, Cy 3.4 mmol/L      O2 Content, Cy 19.0 ml/dL      O2 HGB, VENOUS 95.3 %     UA w Reflex to Microscopic w Reflex to Culture [[id number]]     Lab Status: No result Specimen: Urine             CT chest without contrast   Final Interpretation by Williams Edgar MD (01/11 0124)      No evidence of focal consolidation, pneumothorax or effusion.      Stable appearance of fractures at the right fourth through sixth ribs posterolaterally and right fifth through seventh ribs posteriorly               Workstation performed: [id number]         XR chest 2 views   ED Interpretation by Jose Juan Ashford MD (01/11 0014)   No obvious acute cardiopulmonary pathology           ECG 12 Lead Documentation Only    Date/Time: 1/11/2025 1:28 AM    Performed by: Jose Juan Ashford MD  Authorized by: Jose Juan Ashford MD    ECG reviewed by me, the ED Provider: yes    Patient location:  ED  Interpretation:     Interpretation: abnormal    Rate:     ECG rate:  96    ECG rate assessment: normal    Rhythm:     Rhythm: sinus rhythm    Ectopy:     Ectopy: none    QRS:     QRS axis:  Normal    QRS intervals:  Normal  Conduction:     Conduction: normal    ST segments:     ST segments:  Normal  T waves:     T waves: inverted      Inverted:  V2 and V3      ED Medication and Procedure Management   Prior to Admission Medications   Prescriptions Last Dose Informant Patient  Reported? Taking?   HYDROmorphone (DILAUDID) 2 mg tablet   No No   Sig: Take 1 tablet (2 mg total) by mouth every 4 (four) hours as needed for severe pain for up to 10 days Max Daily Amount: 12 mg   LORazepam (ATIVAN) 2 mg tablet   No No   Sig: Take 1 tablet (2 mg total) by mouth every 6 (six) hours as needed (for shakiness, feelings of alcohol withdrawal) for up to 3 days   Methocarbamol 1000 MG TABS   No No   Sig: Take 1,000 mg by mouth every 8 (eight) hours   Thiamine Mononitrate (VITAMIN B1) 100 mg tablet   No No   Sig: Take 1 tablet (100 mg total) by mouth daily   acetaminophen (TYLENOL) 325 mg tablet   No No   Sig: Take 3 tablets (975 mg total) by mouth every 8 (eight) hours   atorvastatin (LIPITOR) 40 mg tablet   No No   Sig: Take 1 tablet (40 mg total) by mouth daily with dinner   escitalopram (LEXAPRO) 20 mg tablet   No No   Sig: Take 1 tablet (20 mg total) by mouth daily   folic acid (FOLVITE) 1 mg tablet   No No   Sig: Take 1 tablet (1 mg total) by mouth daily   gabapentin (NEURONTIN) 300 mg capsule   No No   Sig: Take 1 capsule (300 mg total) by mouth 3 (three) times a day   ibuprofen (MOTRIN) 400 mg tablet   No No   Sig: Take 1 tablet (400 mg total) by mouth every 6 (six) hours as needed for mild pain   lidocaine (LIDODERM) 5 %   No No   Sig: Apply 1 patch topically over 12 hours daily Remove & Discard patch within 12 hours or as directed by MD   lisinopril (ZESTRIL) 40 mg tablet   No No   Sig: Take 1 tablet (40 mg total) by mouth daily   mirtazapine (REMERON) 15 mg tablet   No No   Sig: Take 1 tablet (15 mg total) by mouth daily at bedtime   nicotine (NICODERM CQ) 21 mg/24 hr TD 24 hr patch   No No   Sig: Place 1 patch on the skin over 24 hours daily   pantoprazole (PROTONIX) 40 mg tablet   No No   Sig: Take 1 tablet (40 mg total) by mouth daily in the early morning   senna-docusate sodium (SENOKOT S) 8.6-50 mg per tablet   No No   Sig: Take 1 tablet by mouth daily at bedtime      Facility-Administered  Medications: None     Discharge Medication List as of 1/11/2025  3:29 AM        START taking these medications    Details   azithromycin (ZITHROMAX) 250 mg tablet Take 1 tablet (250 mg total) by mouth daily for 4 days, Starting Sat 1/11/2025, Until Wed 1/15/2025, Print      !! HYDROmorphone (Dilaudid) 2 mg tablet Take 1 tablet (2 mg total) by mouth every 4 (four) hours as needed for moderate pain for up to 5 days Max Daily Amount: 12 mg, Starting Sat 1/11/2025, Until Thu 1/16/2025 at 2359, Print      !! methocarbamol (ROBAXIN) 500 mg tablet Take 2 tablets (1,000 mg total) by mouth 3 (three) times a day as needed for muscle spasms, Starting Sat 1/11/2025, Normal       !! - Potential duplicate medications found. Please discuss with provider.        CONTINUE these medications which have NOT CHANGED    Details   acetaminophen (TYLENOL) 325 mg tablet Take 3 tablets (975 mg total) by mouth every 8 (eight) hours, Starting Sun 12/29/2024, No Print      atorvastatin (LIPITOR) 40 mg tablet Take 1 tablet (40 mg total) by mouth daily with dinner, Starting Mon 12/9/2024, Normal      escitalopram (LEXAPRO) 20 mg tablet Take 1 tablet (20 mg total) by mouth daily, Starting Wed 10/2/2024, Normal      folic acid (FOLVITE) 1 mg tablet Take 1 tablet (1 mg total) by mouth daily, Starting Sun 12/8/2024, Until Tue 1/7/2025, Normal      gabapentin (NEURONTIN) 300 mg capsule Take 1 capsule (300 mg total) by mouth 3 (three) times a day, Starting Sun 12/29/2024, Normal      !! HYDROmorphone (DILAUDID) 2 mg tablet Take 1 tablet (2 mg total) by mouth every 4 (four) hours as needed for severe pain for up to 10 days Max Daily Amount: 12 mg, Starting Tue 1/7/2025, Until Fri 1/17/2025 at 2359, Normal      ibuprofen (MOTRIN) 400 mg tablet Take 1 tablet (400 mg total) by mouth every 6 (six) hours as needed for mild pain, Starting Sun 12/29/2024, Normal      lidocaine (LIDODERM) 5 % Apply 1 patch topically over 12 hours daily Remove & Discard patch  within 12 hours or as directed by MD, Starting Sun 12/29/2024, Normal      lisinopril (ZESTRIL) 40 mg tablet Take 1 tablet (40 mg total) by mouth daily, Starting Mon 12/9/2024, Normal      LORazepam (ATIVAN) 2 mg tablet Take 1 tablet (2 mg total) by mouth every 6 (six) hours as needed (for shakiness, feelings of alcohol withdrawal) for up to 3 days, Starting Tue 1/7/2025, Until Fri 1/10/2025 at 2359, Normal      !! Methocarbamol 1000 MG TABS Take 1,000 mg by mouth every 8 (eight) hours, Starting Tue 1/7/2025, Normal      mirtazapine (REMERON) 15 mg tablet Take 1 tablet (15 mg total) by mouth daily at bedtime, Starting Wed 11/13/2024, Normal      nicotine (NICODERM CQ) 21 mg/24 hr TD 24 hr patch Place 1 patch on the skin over 24 hours daily, Starting Mon 12/30/2024, Normal      pantoprazole (PROTONIX) 40 mg tablet Take 1 tablet (40 mg total) by mouth daily in the early morning, Starting Mon 12/9/2024, Until Sat 6/7/2025, Normal      senna-docusate sodium (SENOKOT S) 8.6-50 mg per tablet Take 1 tablet by mouth daily at bedtime, Starting Sun 12/29/2024, Normal      Thiamine Mononitrate (VITAMIN B1) 100 mg tablet Take 1 tablet (100 mg total) by mouth daily, Starting Tue 7/9/2024, Normal       !! - Potential duplicate medications found. Please discuss with provider.          ED SEPSIS DOCUMENTATION   Time reflects when diagnosis was documented in both MDM as applicable and the Disposition within this note       Time User Action Codes Description Comment    1/11/2025  2:57 AM Jose Juan Ashford [R07.81] Rib pain on right side     1/11/2025  3:22 AM Jose Juan Ashford [E87.3] Respiratory alkalosis     1/11/2025  3:22 AM Jose Juan Ashford [E83.42] Hypomagnesemia     1/11/2025  3:25 AM Ras East [J40] Bronchitis     1/11/2025  3:26 AM Ras East [S22.41XA] Closed fracture of multiple ribs of right side, initial encounter                  Jose Juan Ashford MD  01/11/25 0420

## 2025-01-12 LAB
ATRIAL RATE: 96 BPM
P AXIS: 63 DEGREES
PR INTERVAL: 150 MS
QRS AXIS: 54 DEGREES
QRSD INTERVAL: 90 MS
QT INTERVAL: 368 MS
QTC INTERVAL: 464 MS
T WAVE AXIS: 38 DEGREES
VENTRICULAR RATE: 96 BPM

## 2025-01-12 PROCEDURE — 93010 ELECTROCARDIOGRAM REPORT: CPT | Performed by: INTERNAL MEDICINE

## 2025-01-14 ENCOUNTER — HOSPITAL ENCOUNTER (EMERGENCY)
Facility: HOSPITAL | Age: 60
End: 2025-01-14
Attending: STUDENT IN AN ORGANIZED HEALTH CARE EDUCATION/TRAINING PROGRAM
Payer: COMMERCIAL

## 2025-01-14 ENCOUNTER — APPOINTMENT (EMERGENCY)
Dept: RADIOLOGY | Facility: HOSPITAL | Age: 60
End: 2025-01-14
Payer: COMMERCIAL

## 2025-01-14 ENCOUNTER — HOSPITAL ENCOUNTER (INPATIENT)
Facility: HOSPITAL | Age: 60
LOS: 1 days | Discharge: HOME | DRG: 862 | End: 2025-01-15
Attending: FAMILY MEDICINE | Admitting: FAMILY MEDICINE
Payer: COMMERCIAL

## 2025-01-14 VITALS
TEMPERATURE: 98 F | OXYGEN SATURATION: 94 % | HEART RATE: 108 BPM | SYSTOLIC BLOOD PRESSURE: 139 MMHG | RESPIRATION RATE: 20 BRPM | DIASTOLIC BLOOD PRESSURE: 84 MMHG

## 2025-01-14 DIAGNOSIS — S22.41XD CLOSED FRACTURE OF MULTIPLE RIBS OF RIGHT SIDE WITH ROUTINE HEALING: ICD-10-CM

## 2025-01-14 DIAGNOSIS — S22.41XA CLOSED FRACTURE OF MULTIPLE RIBS OF RIGHT SIDE, INITIAL ENCOUNTER: ICD-10-CM

## 2025-01-14 DIAGNOSIS — R07.81 RIB PAIN: ICD-10-CM

## 2025-01-14 DIAGNOSIS — R09.02 OXYGEN DESATURATION: ICD-10-CM

## 2025-01-14 DIAGNOSIS — F10.90 ALCOHOL USE DISORDER: Primary | ICD-10-CM

## 2025-01-14 DIAGNOSIS — F10.930 ALCOHOL WITHDRAWAL SYNDROME WITHOUT COMPLICATION (HCC): Primary | ICD-10-CM

## 2025-01-14 DIAGNOSIS — F10.939 ALCOHOL WITHDRAWAL SYNDROME WITH COMPLICATION (HCC): ICD-10-CM

## 2025-01-14 PROBLEM — S22.39XA CLOSED FRACTURE OF RIB: Status: ACTIVE | Noted: 2025-01-14

## 2025-01-14 LAB
2HR DELTA HS TROPONIN: <-1 NG/L
ALBUMIN SERPL BCG-MCNC: 4.7 G/DL (ref 3.5–5)
ALP SERPL-CCNC: 163 U/L (ref 34–104)
ALT SERPL W P-5'-P-CCNC: 18 U/L (ref 7–52)
AMMONIA PLAS-SCNC: 32 UMOL/L (ref 18–72)
ANION GAP SERPL CALCULATED.3IONS-SCNC: 19 MMOL/L (ref 4–13)
APTT PPP: 30 SECONDS (ref 23–34)
AST SERPL W P-5'-P-CCNC: 42 U/L (ref 13–39)
BASOPHILS # BLD AUTO: 0.03 THOUSANDS/ΜL (ref 0–0.1)
BASOPHILS NFR BLD AUTO: 1 % (ref 0–1)
BILIRUB DIRECT SERPL-MCNC: 0.1 MG/DL (ref 0–0.2)
BILIRUB SERPL-MCNC: 0.67 MG/DL (ref 0.2–1)
BUN SERPL-MCNC: 10 MG/DL (ref 5–25)
CALCIUM SERPL-MCNC: 9.8 MG/DL (ref 8.4–10.2)
CARDIAC TROPONIN I PNL SERPL HS: 3 NG/L (ref ?–50)
CARDIAC TROPONIN I PNL SERPL HS: <2 NG/L (ref ?–50)
CHLORIDE SERPL-SCNC: 97 MMOL/L (ref 96–108)
CO2 SERPL-SCNC: 22 MMOL/L (ref 21–32)
CREAT SERPL-MCNC: 0.79 MG/DL (ref 0.6–1.3)
EOSINOPHIL # BLD AUTO: 0.05 THOUSAND/ΜL (ref 0–0.61)
EOSINOPHIL NFR BLD AUTO: 1 % (ref 0–6)
ERYTHROCYTE [DISTWIDTH] IN BLOOD BY AUTOMATED COUNT: 13.3 % (ref 11.6–15.1)
ETHANOL EXG-MCNC: 0.01 MG/DL
GFR SERPL CREATININE-BSD FRML MDRD: 98 ML/MIN/1.73SQ M
GLUCOSE SERPL-MCNC: 81 MG/DL (ref 65–140)
GLUCOSE SERPL-MCNC: 87 MG/DL (ref 65–140)
HCT VFR BLD AUTO: 33.3 % (ref 36.5–49.3)
HGB BLD-MCNC: 11.2 G/DL (ref 12–17)
IMM GRANULOCYTES # BLD AUTO: 0.03 THOUSAND/UL (ref 0–0.2)
IMM GRANULOCYTES NFR BLD AUTO: 1 % (ref 0–2)
INR PPP: 1.12 (ref 0.85–1.19)
LIPASE SERPL-CCNC: 15 U/L (ref 11–82)
LYMPHOCYTES # BLD AUTO: 0.67 THOUSANDS/ΜL (ref 0.6–4.47)
LYMPHOCYTES NFR BLD AUTO: 12 % (ref 14–44)
MAGNESIUM SERPL-MCNC: 1.7 MG/DL (ref 1.9–2.7)
MCH RBC QN AUTO: 32.2 PG (ref 26.8–34.3)
MCHC RBC AUTO-ENTMCNC: 33.6 G/DL (ref 31.4–37.4)
MCV RBC AUTO: 96 FL (ref 82–98)
MONOCYTES # BLD AUTO: 0.33 THOUSAND/ΜL (ref 0.17–1.22)
MONOCYTES NFR BLD AUTO: 6 % (ref 4–12)
NEUTROPHILS # BLD AUTO: 4.58 THOUSANDS/ΜL (ref 1.85–7.62)
NEUTS SEG NFR BLD AUTO: 79 % (ref 43–75)
NRBC BLD AUTO-RTO: 0 /100 WBCS
PHOSPHATE SERPL-MCNC: 3 MG/DL (ref 2.7–4.5)
PLATELET # BLD AUTO: 189 THOUSANDS/UL (ref 149–390)
PLATELET BLD QL SMEAR: ADEQUATE
PMV BLD AUTO: 8.6 FL (ref 8.9–12.7)
POTASSIUM SERPL-SCNC: 4.3 MMOL/L (ref 3.5–5.3)
PROT SERPL-MCNC: 8 G/DL (ref 6.4–8.4)
PROTHROMBIN TIME: 15.1 SECONDS (ref 12.3–15)
RBC # BLD AUTO: 3.48 MILLION/UL (ref 3.88–5.62)
RBC MORPH BLD: NORMAL
SODIUM SERPL-SCNC: 138 MMOL/L (ref 135–147)
TSH SERPL DL<=0.05 MIU/L-ACNC: 2.37 UIU/ML (ref 0.45–4.5)
WBC # BLD AUTO: 5.69 THOUSAND/UL (ref 4.31–10.16)

## 2025-01-14 PROCEDURE — 85730 THROMBOPLASTIN TIME PARTIAL: CPT

## 2025-01-14 PROCEDURE — 84443 ASSAY THYROID STIM HORMONE: CPT

## 2025-01-14 PROCEDURE — 99285 EMERGENCY DEPT VISIT HI MDM: CPT

## 2025-01-14 PROCEDURE — 99291 CRITICAL CARE FIRST HOUR: CPT | Performed by: STUDENT IN AN ORGANIZED HEALTH CARE EDUCATION/TRAINING PROGRAM

## 2025-01-14 PROCEDURE — 99255 IP/OBS CONSLTJ NEW/EST HI 80: CPT | Performed by: EMERGENCY MEDICINE

## 2025-01-14 PROCEDURE — 85025 COMPLETE CBC W/AUTO DIFF WBC: CPT

## 2025-01-14 PROCEDURE — 96374 THER/PROPH/DIAG INJ IV PUSH: CPT

## 2025-01-14 PROCEDURE — 96361 HYDRATE IV INFUSION ADD-ON: CPT

## 2025-01-14 PROCEDURE — 82948 REAGENT STRIP/BLOOD GLUCOSE: CPT

## 2025-01-14 PROCEDURE — 96375 TX/PRO/DX INJ NEW DRUG ADDON: CPT

## 2025-01-14 PROCEDURE — 85610 PROTHROMBIN TIME: CPT

## 2025-01-14 PROCEDURE — 96376 TX/PRO/DX INJ SAME DRUG ADON: CPT

## 2025-01-14 PROCEDURE — 82075 ASSAY OF BREATH ETHANOL: CPT

## 2025-01-14 PROCEDURE — 84100 ASSAY OF PHOSPHORUS: CPT

## 2025-01-14 PROCEDURE — 71046 X-RAY EXAM CHEST 2 VIEWS: CPT

## 2025-01-14 PROCEDURE — 84484 ASSAY OF TROPONIN QUANT: CPT

## 2025-01-14 PROCEDURE — 99223 1ST HOSP IP/OBS HIGH 75: CPT | Performed by: FAMILY MEDICINE

## 2025-01-14 PROCEDURE — 93005 ELECTROCARDIOGRAM TRACING: CPT

## 2025-01-14 PROCEDURE — 82140 ASSAY OF AMMONIA: CPT

## 2025-01-14 PROCEDURE — 96365 THER/PROPH/DIAG IV INF INIT: CPT

## 2025-01-14 PROCEDURE — 80076 HEPATIC FUNCTION PANEL: CPT

## 2025-01-14 PROCEDURE — 83690 ASSAY OF LIPASE: CPT

## 2025-01-14 PROCEDURE — 80048 BASIC METABOLIC PNL TOTAL CA: CPT

## 2025-01-14 PROCEDURE — 83735 ASSAY OF MAGNESIUM: CPT

## 2025-01-14 PROCEDURE — 36415 COLL VENOUS BLD VENIPUNCTURE: CPT

## 2025-01-14 RX ORDER — ONDANSETRON 2 MG/ML
4 INJECTION INTRAMUSCULAR; INTRAVENOUS ONCE
Status: COMPLETED | OUTPATIENT
Start: 2025-01-14 | End: 2025-01-14

## 2025-01-14 RX ORDER — PHENOBARBITAL SODIUM 130 MG/ML
130 INJECTION, SOLUTION INTRAMUSCULAR; INTRAVENOUS ONCE
Status: COMPLETED | OUTPATIENT
Start: 2025-01-14 | End: 2025-01-14

## 2025-01-14 RX ORDER — LIDOCAINE 50 MG/G
1 PATCH TOPICAL DAILY
Status: DISCONTINUED | OUTPATIENT
Start: 2025-01-14 | End: 2025-01-15 | Stop reason: HOSPADM

## 2025-01-14 RX ORDER — MORPHINE SULFATE 4 MG/ML
4 INJECTION, SOLUTION INTRAMUSCULAR; INTRAVENOUS ONCE
Status: COMPLETED | OUTPATIENT
Start: 2025-01-14 | End: 2025-01-14

## 2025-01-14 RX ORDER — MIRTAZAPINE 15 MG/1
15 TABLET, FILM COATED ORAL
Status: DISCONTINUED | OUTPATIENT
Start: 2025-01-14 | End: 2025-01-15 | Stop reason: HOSPADM

## 2025-01-14 RX ORDER — LISINOPRIL 20 MG/1
40 TABLET ORAL DAILY
Status: DISCONTINUED | OUTPATIENT
Start: 2025-01-14 | End: 2025-01-15 | Stop reason: HOSPADM

## 2025-01-14 RX ORDER — GABAPENTIN 300 MG/1
300 CAPSULE ORAL 3 TIMES DAILY
Status: DISCONTINUED | OUTPATIENT
Start: 2025-01-14 | End: 2025-01-15 | Stop reason: HOSPADM

## 2025-01-14 RX ORDER — AZITHROMYCIN 250 MG/1
250 TABLET, FILM COATED ORAL DAILY
Status: COMPLETED | OUTPATIENT
Start: 2025-01-14 | End: 2025-01-14

## 2025-01-14 RX ORDER — LANOLIN ALCOHOL/MO/W.PET/CERES
100 CREAM (GRAM) TOPICAL DAILY
Status: DISCONTINUED | OUTPATIENT
Start: 2025-01-14 | End: 2025-01-15 | Stop reason: HOSPADM

## 2025-01-14 RX ORDER — NICOTINE 21 MG/24HR
1 PATCH, TRANSDERMAL 24 HOURS TRANSDERMAL DAILY
Status: DISCONTINUED | OUTPATIENT
Start: 2025-01-14 | End: 2025-01-15 | Stop reason: HOSPADM

## 2025-01-14 RX ORDER — ATORVASTATIN CALCIUM 40 MG/1
40 TABLET, FILM COATED ORAL
Status: DISCONTINUED | OUTPATIENT
Start: 2025-01-14 | End: 2025-01-15 | Stop reason: HOSPADM

## 2025-01-14 RX ORDER — ESCITALOPRAM OXALATE 10 MG/1
20 TABLET ORAL DAILY
Status: DISCONTINUED | OUTPATIENT
Start: 2025-01-14 | End: 2025-01-15 | Stop reason: HOSPADM

## 2025-01-14 RX ORDER — ACETAMINOPHEN 325 MG/1
650 TABLET ORAL EVERY 6 HOURS PRN
Status: DISCONTINUED | OUTPATIENT
Start: 2025-01-14 | End: 2025-01-15 | Stop reason: HOSPADM

## 2025-01-14 RX ORDER — ONDANSETRON 2 MG/ML
INJECTION INTRAMUSCULAR; INTRAVENOUS
Status: COMPLETED
Start: 2025-01-14 | End: 2025-01-14

## 2025-01-14 RX ORDER — HYDROMORPHONE HYDROCHLORIDE 2 MG/1
1 TABLET ORAL EVERY 4 HOURS PRN
Refills: 0 | Status: DISCONTINUED | OUTPATIENT
Start: 2025-01-14 | End: 2025-01-15 | Stop reason: HOSPADM

## 2025-01-14 RX ORDER — FOLIC ACID 1 MG/1
1 TABLET ORAL DAILY
Status: DISCONTINUED | OUTPATIENT
Start: 2025-01-14 | End: 2025-01-15 | Stop reason: HOSPADM

## 2025-01-14 RX ORDER — PANTOPRAZOLE SODIUM 40 MG/1
40 TABLET, DELAYED RELEASE ORAL
Status: DISCONTINUED | OUTPATIENT
Start: 2025-01-14 | End: 2025-01-15 | Stop reason: HOSPADM

## 2025-01-14 RX ORDER — AMOXICILLIN 250 MG
1 CAPSULE ORAL
Status: DISCONTINUED | OUTPATIENT
Start: 2025-01-14 | End: 2025-01-15 | Stop reason: HOSPADM

## 2025-01-14 RX ORDER — ONDANSETRON 2 MG/ML
4 INJECTION INTRAMUSCULAR; INTRAVENOUS EVERY 6 HOURS PRN
Status: DISCONTINUED | OUTPATIENT
Start: 2025-01-14 | End: 2025-01-15 | Stop reason: HOSPADM

## 2025-01-14 RX ORDER — HYDROMORPHONE HYDROCHLORIDE 2 MG/1
2 TABLET ORAL EVERY 4 HOURS PRN
Refills: 0 | Status: DISCONTINUED | OUTPATIENT
Start: 2025-01-14 | End: 2025-01-15 | Stop reason: HOSPADM

## 2025-01-14 RX ORDER — SODIUM CHLORIDE, SODIUM GLUCONATE, SODIUM ACETATE, POTASSIUM CHLORIDE, MAGNESIUM CHLORIDE, SODIUM PHOSPHATE, DIBASIC, AND POTASSIUM PHOSPHATE .53; .5; .37; .037; .03; .012; .00082 G/100ML; G/100ML; G/100ML; G/100ML; G/100ML; G/100ML; G/100ML
100 INJECTION, SOLUTION INTRAVENOUS CONTINUOUS
Status: DISCONTINUED | OUTPATIENT
Start: 2025-01-14 | End: 2025-01-15 | Stop reason: HOSPADM

## 2025-01-14 RX ORDER — METHOCARBAMOL 500 MG/1
1000 TABLET, FILM COATED ORAL EVERY 8 HOURS SCHEDULED
Status: DISCONTINUED | OUTPATIENT
Start: 2025-01-14 | End: 2025-01-15 | Stop reason: HOSPADM

## 2025-01-14 RX ADMIN — AZITHROMYCIN DIHYDRATE 250 MG: 250 TABLET ORAL at 11:18

## 2025-01-14 RX ADMIN — ONDANSETRON 4 MG: 2 INJECTION INTRAMUSCULAR; INTRAVENOUS at 08:58

## 2025-01-14 RX ADMIN — PANTOPRAZOLE SODIUM 40 MG: 40 TABLET, DELAYED RELEASE ORAL at 11:21

## 2025-01-14 RX ADMIN — ACETAMINOPHEN 650 MG: 325 TABLET, FILM COATED ORAL at 11:18

## 2025-01-14 RX ADMIN — ONDANSETRON 4 MG: 2 INJECTION, SOLUTION INTRAMUSCULAR; INTRAVENOUS at 05:47

## 2025-01-14 RX ADMIN — FOLIC ACID 1 MG: 1 TABLET ORAL at 11:18

## 2025-01-14 RX ADMIN — ESCITALOPRAM OXALATE 20 MG: 10 TABLET ORAL at 11:18

## 2025-01-14 RX ADMIN — HYDROMORPHONE HYDROCHLORIDE 2 MG: 2 TABLET ORAL at 16:05

## 2025-01-14 RX ADMIN — PHENOBARBITAL SODIUM 680 MG: 65 INJECTION INTRAMUSCULAR; INTRAVENOUS at 06:00

## 2025-01-14 RX ADMIN — SODIUM CHLORIDE 1000 ML: 0.9 INJECTION, SOLUTION INTRAVENOUS at 05:41

## 2025-01-14 RX ADMIN — HYDROMORPHONE HYDROCHLORIDE 2 MG: 2 TABLET ORAL at 20:23

## 2025-01-14 RX ADMIN — ONDANSETRON 4 MG: 2 INJECTION INTRAMUSCULAR; INTRAVENOUS at 05:47

## 2025-01-14 RX ADMIN — PHENOBARBITAL SODIUM 130 MG: 130 INJECTION INTRAMUSCULAR; INTRAVENOUS at 11:59

## 2025-01-14 RX ADMIN — GABAPENTIN 300 MG: 300 CAPSULE ORAL at 20:23

## 2025-01-14 RX ADMIN — HYDROMORPHONE HYDROCHLORIDE 2 MG: 2 TABLET ORAL at 23:59

## 2025-01-14 RX ADMIN — GABAPENTIN 300 MG: 300 CAPSULE ORAL at 11:18

## 2025-01-14 RX ADMIN — METHOCARBAMOL TABLETS 1000 MG: 500 TABLET, COATED ORAL at 15:11

## 2025-01-14 RX ADMIN — ATORVASTATIN CALCIUM 40 MG: 40 TABLET, FILM COATED ORAL at 16:05

## 2025-01-14 RX ADMIN — METHOCARBAMOL TABLETS 1000 MG: 500 TABLET, COATED ORAL at 21:38

## 2025-01-14 RX ADMIN — SODIUM CHLORIDE 1000 ML: 0.9 INJECTION, SOLUTION INTRAVENOUS at 05:47

## 2025-01-14 RX ADMIN — MORPHINE SULFATE 4 MG: 4 INJECTION INTRAVENOUS at 08:11

## 2025-01-14 RX ADMIN — LISINOPRIL 40 MG: 20 TABLET ORAL at 11:18

## 2025-01-14 RX ADMIN — MIRTAZAPINE 15 MG: 15 TABLET, FILM COATED ORAL at 21:39

## 2025-01-14 RX ADMIN — PHENOBARBITAL SODIUM 130 MG: 130 INJECTION INTRAMUSCULAR; INTRAVENOUS at 15:11

## 2025-01-14 RX ADMIN — SODIUM CHLORIDE, SODIUM GLUCONATE, SODIUM ACETATE, POTASSIUM CHLORIDE, MAGNESIUM CHLORIDE, SODIUM PHOSPHATE, DIBASIC, AND POTASSIUM PHOSPHATE 100 ML/HR: .53; .5; .37; .037; .03; .012; .00082 INJECTION, SOLUTION INTRAVENOUS at 21:41

## 2025-01-14 RX ADMIN — THIAMINE HCL TAB 100 MG 100 MG: 100 TAB at 11:21

## 2025-01-14 RX ADMIN — GABAPENTIN 300 MG: 300 CAPSULE ORAL at 15:11

## 2025-01-14 RX ADMIN — HYDROMORPHONE HYDROCHLORIDE 2 MG: 2 TABLET ORAL at 11:35

## 2025-01-14 RX ADMIN — SODIUM CHLORIDE, SODIUM GLUCONATE, SODIUM ACETATE, POTASSIUM CHLORIDE, MAGNESIUM CHLORIDE, SODIUM PHOSPHATE, DIBASIC, AND POTASSIUM PHOSPHATE 100 ML/HR: .53; .5; .37; .037; .03; .012; .00082 INJECTION, SOLUTION INTRAVENOUS at 11:21

## 2025-01-14 NOTE — ED PROVIDER NOTES
Time reflects when diagnosis was documented in both MDM as applicable and the Disposition within this note       Time User Action Codes Description Comment    1/14/2025  7:40 AM Russell Hurtado Add [F10.930] Alcohol withdrawal syndrome without complication (HCC)     1/14/2025  7:41 AM Russell Hurtado Add [R09.02] Oxygen desaturation           ED Disposition       ED Disposition   Transfer to Another Facility-In Network    Condition   --    Date/Time   Tue Jan 14, 2025  7:40 AM    Comment   Diogo Travis should be transferred out to [facility].               Assessment & Plan   {Hyperlinks  Risk Stratification - NIHSS - HEART SCORE - Fill out sepsis note and make sure you call [phone] if severe or septic shock:[id number]}    Medical Decision Making  59-year-old male presents with diaphoresis, tremors, nausea, vomiting, lateral chest wall pain.  At risk for acute alcohol withdrawal, hyperammonemia, ACS, hyperthyroidism, electrolyte abnormality, pancreatitis, pneumonia, hemothorax, pneumothorax, gastritis, etc.  EKG shows no ST segment changes indicative of ischemia, patient sinus tachycardia with rate of 127  Patient started on 1 L bolus of normal saline  Patient likely in alcohol withdrawal, CIWA of 16  Will start phenobarbital at 10 mg/kg ideal body weight  Alcohol level of 0.01  Chest x-ray shows no hemothorax, no pneumothorax  Ammonia of 32-no hyperammonemia  Initial troponin of 3, will get 2-hour delta  TSH of 2.368-no hyperthyroidism  BMP shows no electrolyte abnormalities with mildly elevated anion gap  Hepatic function panel shows elevated AST with 2:1 ratio over AST to ALT as well as elevated alk phos at 163  Magnesium of 1.7  CBC shows no leukocytosis with mild anemia  Blood glucose of 81-no hypoglycemia  Phosphorus normal at 3 mg per deciliter  Lipase normal at 15-no pancreatitis  Smear shows normal RBC morphology  PT/INR mildly elevated with PT of 15.1 seconds APTT normal at 30 seconds  Patient  reevaluated post phenobarbital and fluids, significantly improved symptoms  Patient still complaining of rib pain, will give 4 mg morphine  Discussed with detox team, will accept to detox program at [place]  Patient agreeable to plan, in stable condition for discharge at this time    Amount and/or Complexity of Data Reviewed  Labs: ordered.  Radiology: ordered.    Risk  Prescription drug management.      ED Course as of 01/14/25 0811 Tue Jan 14, 2025 0543 Alcohol level of 0.01.  Likely in alcohol withdrawal.  Will start phenobarbital at 10 mg/kg ideal body weight       Medications   sodium chloride 0.9 % bolus 1,000 mL (0 mL Intravenous Stopped 1/14/25 0718)   PHENobarbital 680 mg in sodium chloride 0.9 % 100 mL IVPB (0 mg Intravenous Stopped 1/14/25 0650)   ondansetron (ZOFRAN) injection 4 mg (4 mg Intravenous Given 1/14/25 0547)   sodium chloride 0.9 % bolus 1,000 mL (0 mL Intravenous Stopped 1/14/25 0719)   morphine injection 4 mg (4 mg Intravenous Given 1/14/25 0811)       ED Risk Strat Scores               CIWA-Ar Score       Row Name 01/14/25 0530             CIWA-Ar    Blood Pressure 176/102      Pulse 121      Nausea and Vomiting 4      Tactile Disturbances 0      Tremor 7      Auditory Disturbances 0      Paroxysmal Sweats 4      Visual Disturbances 0      Anxiety 2      Headache, Fullness in Head 0      Agitation 0      Orientation and Clouding of Sensorium 0      CIWA-Ar Total 17                              SBIRT 20yo+      Flowsheet Row Most Recent Value   Initial Alcohol Screen: US AUDIT-C     1. How often do you have a drink containing alcohol? 6 Filed at: 01/14/2025 0511   2. How many drinks containing alcohol do you have on a typical day you are drinking?  6 Filed at: 01/14/2025 0511   3a. Male UNDER 65: How often do you have five or more drinks on one occasion? 6 Filed at: 01/14/2025 0511   Audit-C Score 18 Filed at: 01/14/2025 0511   Full Alcohol Screen: US AUDIT    4. How often during  the last year have you found that you were not able to stop drinking once you had started? 0 Filed at: 01/14/2025 0511   5. How often during past year have you failed to do what was normally expected of you because of drinking?  0 Filed at: 01/14/2025 0511   6. How often in past year have you needed a first drink in the morning to get yourself going after a heavy drinking session?  0 Filed at: 01/14/2025 0511   7. How often in past year have you had feeling of guilt or remorse after drinking?  0 Filed at: 01/14/2025 0511   8. How often in past year have you been unable to remember what happened night before because you had been drinking?  0 Filed at: 01/14/2025 0511   9. Have you or someone else been injured as a result of your drinking?  0 Filed at: 01/14/2025 0511   10. Has a relative, friend, doctor or other health worker been concerned about your drinking and suggested you cut down?  0 Filed at: 01/14/2025 0511   AUDIT Total Score 18 Filed at: 01/14/2025 0511   MILDRED: How many times in the past year have you...    Used an illegal drug or used a prescription medication for non-medical reasons? Never Filed at: 01/14/2025 0511                            History of Present Illness   {Hyperlinks  History (Med, Surg, Fam, Social) - Current Medications - Allergies  :[id number]}    Chief Complaint   Patient presents with   • Alcohol Intoxication     Pt reports recent fall with 4-7 right sided rib fractures. Pt also reporting drinking two bottles of wine tonight and taking two morphine tablets, unsure of dosage tonight. Pt reports waking up tonight with extreme pain in ribs and dizziness. Last drink at 0000.       Past Medical History:   Diagnosis Date   • Alcohol use disorder, severe, dependence (HCC) 04/02/2023   • Alcohol withdrawal seizure (HCC)    • Alcoholic ketoacidosis 10/16/2023   • Anxiety    • AR (allergic rhinitis)    • Chronic alcoholic gastritis 04/02/2023   • Depression    • GERD (gastroesophageal reflux  disease)    • Hepatic steatosis 04/02/2023   • Hyperlipidemia    • Hypertension    • IBS (irritable bowel syndrome)    • Obesity    • Rosacea    • Vitamin B12 deficiency 02/14/2024   • Vitamin D deficiency 02/14/2024      Past Surgical History:   Procedure Laterality Date   • ANTERIOR CRUCIATE LIGAMENT REPAIR Left    • WISDOM TOOTH EXTRACTION        Family History   Problem Relation Age of Onset   • Cancer Mother 78        Type unknown   • Hypertension Father    • Coronary artery disease Father 60   • Cancer Father 82        Bladder; + Smoker   • No Known Problems Sister    • No Known Problems Sister    • No Known Problems Sister    • No Known Problems Son    • No Known Problems Son    • Heart attack Paternal Grandfather 60   • Coronary artery disease Paternal Grandfather 60      Social History     Tobacco Use   • Smoking status: Some Days     Types: Cigarettes, Cigars     Start date: 1/1/2014     Passive exposure: Past (Father)   • Smokeless tobacco: Never   • Tobacco comments:     Smokes cigars 2-3 times per year   Vaping Use   • Vaping status: Never Used   Substance Use Topics   • Alcohol use: Yes     Comment: ETOH 12/7/24   • Drug use: Never      E-Cigarette/Vaping   • E-Cigarette Use Never User       E-Cigarette/Vaping Substances   • Nicotine No    • THC No    • CBD No    • Flavoring No    • Other No    • Unknown No       I have reviewed and agree with the history as documented.     59-year-old male that presented with tremor, diaphoresis, agitation that began approximately 30 minutes before appearing to the ED. Patient is an alcoholic who has gone through previous withdrawals but denies previous seizures. Patient was seen in ED on Christmas Eve after a fall that led to multiple fractured ribs on the right side. Patient states tonight he drank 2 bottles of wine approximately 6 hours ago and took 2 of his p.o. Dilaudid pills. Patient was fine for a few hours but began to feel lightheaded and developed the tremor  and diaphoresis.  Patient admits to rib pain but denies substernal chest pain, shortness of breath, numbness and tingling, auditory or tactile hallucinations      Alcohol Intoxication  Associated symptoms: nausea and vomiting    Associated symptoms: no abdominal pain, no confusion, no hallucinations, no palpitations, no seizures, no shortness of breath and no weakness        Review of Systems   Constitutional:  Positive for activity change and diaphoresis. Negative for chills and fever.   HENT:  Negative for ear pain, nosebleeds and sore throat.    Eyes:  Negative for pain and visual disturbance.   Respiratory:  Positive for chest tightness. Negative for cough and shortness of breath.    Cardiovascular:  Negative for chest pain and palpitations.   Gastrointestinal:  Positive for nausea and vomiting. Negative for abdominal pain and blood in stool.   Genitourinary:  Negative for dysuria and hematuria.   Musculoskeletal:  Negative for arthralgias and back pain.   Skin:  Negative for color change and rash.   Neurological:  Positive for tremors and light-headedness. Negative for dizziness, seizures, syncope and weakness.   Psychiatric/Behavioral:  Negative for confusion and hallucinations.    All other systems reviewed and are negative.          Objective   {Hyperlinks  Historical Vitals - Historical Labs - Chart Review/Microbiology - Last Echo - Code Status  :[id number]}    ED Triage Vitals   Temperature Pulse Blood Pressure Respirations SpO2 Patient Position - Orthostatic VS   01/14/25 0516 01/14/25 0513 01/14/25 0513 01/14/25 0513 01/14/25 0513 --   98 °F (36.7 °C) (!) 131 (!) 170/101 (!) 26 96 %       Temp Source Heart Rate Source BP Location FiO2 (%) Pain Score    01/14/25 0516 -- -- -- 01/14/25 0513    Oral    8      Vitals      Date and Time Temp Pulse SpO2 Resp BP Pain Score FACES Pain Rating User   01/14/25 0811 -- -- -- -- -- 8 -- JDT   01/14/25 0730 -- 116 96 % 20 144/82 -- -- JDT   01/14/25 0637 -- 109 95 %  22 171/81 -- -- [facility] [place]   01/14/25 0631 -- -- 87 % -- -- -- -- [facility] [place]   01/14/25 0605 -- 109 94 % 24 168/95 -- -- [facility] [place]   01/14/25 0530 -- 121 -- -- 176/102 -- -- [facility] [place]   01/14/25 0516 98 °F (36.7 °C) -- -- -- -- -- -- [facility] [place]   01/14/25 0513 -- 131 96 % 26 170/101 8 -- [facility] [place]            Physical Exam  Vitals and nursing note reviewed.   Constitutional:       General: He is in acute distress.      Appearance: He is well-developed. He is ill-appearing and diaphoretic.   HENT:      Head: Normocephalic and atraumatic.   Eyes:      General: No scleral icterus.        Right eye: No discharge.         Left eye: No discharge.      Conjunctiva/sclera: Conjunctivae normal.   Cardiovascular:      Rate and Rhythm: Normal rate and regular rhythm.      Heart sounds: No murmur heard.  Pulmonary:      Effort: Pulmonary effort is normal. No respiratory distress.      Breath sounds: Normal breath sounds. No stridor. No wheezing or rhonchi.   Abdominal:      General: There is no distension.      Palpations: Abdomen is soft.      Tenderness: There is no abdominal tenderness. There is no guarding.   Musculoskeletal:         General: Tenderness and signs of injury present. No swelling.      Cervical back: Neck supple. No tenderness.      Right lower leg: No edema.      Left lower leg: No edema.      Comments: Tenderness to palpation over right lateral chest wall   Skin:     General: Skin is warm.      Capillary Refill: Capillary refill takes less than 2 seconds.   Neurological:      Mental Status: He is alert and oriented to person, place, and time.      Sensory: No sensory deficit.      Motor: No weakness.      Comments: Mild asterixis and tongue fasciculations present.  5 out of 5 strength in all extremities.  Normal sensation to light touch throughout.  Cranial nerves II to XII intact.  Alert and oriented x 3   Psychiatric:         Mood and Affect: Mood normal.         Judgment: Judgment normal.       Results Reviewed       Procedure Component Value  Units Date/Time    HS Troponin I 2hr [[id number]] Collected: 01/14/25 0752    Lab Status: In process Specimen: Blood from Arm, Right Updated: 01/14/25 0756    Smear Review(Phlebs Do Not Order) [[id number]] Collected: 01/14/25 0637    Lab Status: Final result Specimen: Blood from Arm, Right Updated: 01/14/25 0749     RBC Morphology Normal     Platelet Estimate Adequate    CBC and differential [[id number]]  (Abnormal) Collected: 01/14/25 0637    Lab Status: Final result Specimen: Blood from Arm, Right Updated: 01/14/25 0749     WBC 5.69 Thousand/uL      RBC 3.48 Million/uL      Hemoglobin 11.2 g/dL      Hematocrit 33.3 %      MCV 96 fL      MCH 32.2 pg      MCHC 33.6 g/dL      RDW 13.3 %      MPV 8.6 fL      Platelets 189 Thousands/uL      nRBC 0 /100 WBCs      Segmented % 79 %      Immature Grans % 1 %      Lymphocytes % 12 %      Monocytes % 6 %      Eosinophils Relative 1 %      Basophils Relative 1 %      Absolute Neutrophils 4.58 Thousands/µL      Absolute Immature Grans 0.03 Thousand/uL      Absolute Lymphocytes 0.67 Thousands/µL      Absolute Monocytes 0.33 Thousand/µL      Eosinophils Absolute 0.05 Thousand/µL      Basophils Absolute 0.03 Thousands/µL     Narrative:      This is an appended report.  These results have been appended to a previously verified report.    HS Troponin I 4hr [[id number]]     Lab Status: No result Specimen: Blood     Protime-INR [[id number]]  (Abnormal) Collected: 01/14/25 0641    Lab Status: Final result Specimen: Blood from Arm, Right Updated: 01/14/25 0719     Protime 15.1 seconds      INR 1.12    Narrative:      INR Therapeutic Range    Indication                                             INR Range      Atrial Fibrillation                                               2.0-3.0  Hypercoagulable State                                    2.0.2.3  Left Ventricular Asist Device                            2.0-3.0  Mechanical Heart Valve                                  -    Aortic(with afib,  MI, embolism, HF, LA enlargement,    and/or coagulopathy)                                     2.0-3.0 (2.5-3.5)     Mitral                                                             2.5-3.5  Prosthetic/Bioprosthetic Heart Valve               2.0-3.0  Venous thromboembolism (VTE: VT, PE        2.0-3.0    APTT [[id number]]  (Normal) Collected: 01/14/25 0641    Lab Status: Final result Specimen: Blood from Arm, Right Updated: 01/14/25 0719     PTT 30 seconds     Basic metabolic panel [[id number]]  (Abnormal) Collected: 01/14/25 0540    Lab Status: Final result Specimen: Blood from Arm, Right Updated: 01/14/25 0642     Sodium 138 mmol/L      Potassium 4.3 mmol/L      Chloride 97 mmol/L      CO2 22 mmol/L      ANION GAP 19 mmol/L      BUN 10 mg/dL      Creatinine 0.79 mg/dL      Glucose 87 mg/dL      Calcium 9.8 mg/dL      eGFR 98 ml/min/1.73sq m     Narrative:      National Kid[employer] Foundation guidelines for Chronic Kidney Disease (CKD):   •  Stage 1 with normal or high GFR (GFR > 90 mL/min/1.73 square meters)  •  Stage 2 Mild CKD (GFR = 60-89 mL/min/1.73 square meters)  •  Stage 3A Moderate CKD (GFR = 45-59 mL/min/1.73 square meters)  •  Stage 3B Moderate CKD (GFR = 30-44 mL/min/1.73 square meters)  •  Stage 4 Severe CKD (GFR = 15-29 mL/min/1.73 square meters)  •  Stage 5 End Stage CKD (GFR <15 mL/min/1.73 square meters)  Note: GFR calculation is accurate only with a steady state creatinine    Hepatic function panel [[id number]]  (Abnormal) Collected: 01/14/25 0540    Lab Status: Final result Specimen: Blood from Arm, Right Updated: 01/14/25 0642     Total Bilirubin 0.67 mg/dL      Bilirubin, Direct 0.10 mg/dL      Alkaline Phosphatase 163 U/L      AST 42 U/L      ALT 18 U/L      Total Protein 8.0 g/dL      Albumin 4.7 g/dL     Magnesium [[id number]]  (Abnormal) Collected: 01/14/25 0540    Lab Status: Final result Specimen: Blood from Arm, Right Updated: 01/14/25 0642     Magnesium 1.7 mg/dL     Phosphorus  [[id number]]  (Normal) Collected: 01/14/25 0540    Lab Status: Final result Specimen: Blood from Arm, Right Updated: 01/14/25 0642     Phosphorus 3.0 mg/dL     Lipase [[id number]]  (Normal) Collected: 01/14/25 0540    Lab Status: Final result Specimen: Blood from Arm, Right Updated: 01/14/25 0642     Lipase 15 u/L     TSH, 3rd generation with Free T4 reflex [[id number]]  (Normal) Collected: 01/14/25 0540    Lab Status: Final result Specimen: Blood from Arm, Right Updated: 01/14/25 0630     TSH 3RD GENERATON 2.368 uIU/mL     HS Troponin 0hr (reflex protocol) [[id number]]  (Normal) Collected: 01/14/25 0540    Lab Status: Final result Specimen: Blood from Arm, Right Updated: 01/14/25 0625     hs TnI 0hr 3 ng/L     Ammonia [[id number]]  (Normal) Collected: 01/14/25 0540    Lab Status: Final result Specimen: Blood from Arm, Right Updated: 01/14/25 0622     Ammonia 32 umol/L     POCT alcohol breath test [[id number]]  (Normal) Resulted: 01/14/25 0544    Lab Status: Final result Updated: 01/14/25 0544     EXTBreath Alcohol 0.010    Fingerstick Glucose (POCT) [[id number]]  (Normal) Collected: 01/14/25 0543    Lab Status: Final result Specimen: Blood Updated: 01/14/25 0543     POC Glucose 81 mg/dl             XR chest 2 views    (Results Pending)       ECG 12 Lead Documentation Only    Date/Time: 1/14/2025 7:28 AM    Performed by: Russell Hurtado DO  Authorized by: Russell Hurtado DO    Indications / Diagnosis:  Diaphoresis and lateral chest wall pain  ECG reviewed by me, the ED Provider: yes    Patient location:  ED  Previous ECG:     Previous ECG:  Compared to current    Comparison ECG info:  1/11/2025    Similarity:  Changes noted    Comparison to cardiac monitor: Yes    Interpretation:     Interpretation: non-specific    Quality:     Tracing quality:  Limited by artifact  Rate:     ECG rate:  127    ECG rate assessment: tachycardic    Rhythm:     Rhythm: sinus rhythm    Ectopy:     Ectopy: none    QRS:     QRS axis:   Normal  Conduction:     Conduction: normal    ST segments:     ST segments:  Normal  T waves:     T waves: normal    Comments:      No ST segment changes negative ischemia.  Tachycardia with rate of 127.  Limited by artifact      ED Medication and Procedure Management   Prior to Admission Medications   Prescriptions Last Dose Informant Patient Reported? Taking?   HYDROmorphone (DILAUDID) 2 mg tablet   No No   Sig: Take 1 tablet (2 mg total) by mouth every 4 (four) hours as needed for severe pain for up to 10 days Max Daily Amount: 12 mg   HYDROmorphone (Dilaudid) 2 mg tablet   No No   Sig: Take 1 tablet (2 mg total) by mouth every 4 (four) hours as needed for moderate pain for up to 5 days Max Daily Amount: 12 mg   LORazepam (ATIVAN) 2 mg tablet   No No   Sig: Take 1 tablet (2 mg total) by mouth every 6 (six) hours as needed (for shakiness, feelings of alcohol withdrawal) for up to 3 days   Methocarbamol 1000 MG TABS   No No   Sig: Take 1,000 mg by mouth every 8 (eight) hours   Thiamine Mononitrate (VITAMIN B1) 100 mg tablet   No No   Sig: Take 1 tablet (100 mg total) by mouth daily   acetaminophen (TYLENOL) 325 mg tablet   No No   Sig: Take 3 tablets (975 mg total) by mouth every 8 (eight) hours   atorvastatin (LIPITOR) 40 mg tablet   No No   Sig: Take 1 tablet (40 mg total) by mouth daily with dinner   azithromycin (ZITHROMAX) 250 mg tablet   No No   Sig: Take 1 tablet (250 mg total) by mouth daily for 4 days   escitalopram (LEXAPRO) 20 mg tablet   No No   Sig: Take 1 tablet (20 mg total) by mouth daily   folic acid (FOLVITE) 1 mg tablet   No No   Sig: Take 1 tablet (1 mg total) by mouth daily   gabapentin (NEURONTIN) 300 mg capsule   No No   Sig: Take 1 capsule (300 mg total) by mouth 3 (three) times a day   ibuprofen (MOTRIN) 400 mg tablet   No No   Sig: Take 1 tablet (400 mg total) by mouth every 6 (six) hours as needed for mild pain   lidocaine (LIDODERM) 5 %   No No   Sig: Apply 1 patch topically over 12  hours daily Remove & Discard patch within 12 hours or as directed by MD   lisinopril (ZESTRIL) 40 mg tablet   No No   Sig: Take 1 tablet (40 mg total) by mouth daily   methocarbamol (ROBAXIN) 500 mg tablet   No No   Sig: Take 2 tablets (1,000 mg total) by mouth 3 (three) times a day as needed for muscle spasms   mirtazapine (REMERON) 15 mg tablet   No No   Sig: Take 1 tablet (15 mg total) by mouth daily at bedtime   nicotine (NICODERM CQ) 21 mg/24 hr TD 24 hr patch   No No   Sig: Place 1 patch on the skin over 24 hours daily   pantoprazole (PROTONIX) 40 mg tablet   No No   Sig: Take 1 tablet (40 mg total) by mouth daily in the early morning   senna-docusate sodium (SENOKOT S) 8.6-50 mg per tablet   No No   Sig: Take 1 tablet by mouth daily at bedtime      Facility-Administered Medications: None     Patient's Medications   Discharge Prescriptions    No medications on file     No discharge procedures on file.  ED SEPSIS DOCUMENTATION   Time reflects when diagnosis was documented in both MDM as applicable and the Disposition within this note       Time User Action Codes Description Comment    1/14/2025  7:40 AM Russell Hurtado Add [F10.930] Alcohol withdrawal syndrome without complication (HCC)     1/14/2025  7:41 AM Russell Hurtado [R09.02] Oxygen desaturation                Starting Sun 12/29/2024, Normal      !! HYDROmorphone (DILAUDID) 2 mg tablet Take 1 tablet (2 mg total) by mouth every 4 (four) hours as needed for severe pain for up to 10 days Max Daily Amount: 12 mg, Starting Tue 1/7/2025, Until Fri 1/17/2025 at 2359, Normal      !! HYDROmorphone (Dilaudid) 2 mg tablet Take 1 tablet (2 mg total) by mouth every 4 (four) hours as needed for moderate pain for up to 5 days Max Daily Amount: 12 mg, Starting Sat 1/11/2025, Until Thu 1/16/2025 at 2359, Print      LORazepam (ATIVAN) 2 mg tablet Take 1 tablet (2 mg total) by mouth every 6 (six) hours as needed (for shakiness, feelings of alcohol withdrawal) for up to 3 days, Starting Tue 1/7/2025, Until Fri 1/10/2025 at 2359, Normal      !! methocarbamol (ROBAXIN) 500 mg tablet Take 2 tablets (1,000 mg total) by mouth 3 (three) times a day as needed for muscle spasms, Starting Sat 1/11/2025, Normal       !! - Potential duplicate medications found. Please discuss with provider.        No discharge procedures on file.  ED SEPSIS DOCUMENTATION   Time reflects when diagnosis was documented in both MDM as applicable and the Disposition within this note       Time User Action Codes Description Comment    1/14/2025  7:40 AM Russell Hurtado [F10.930] Alcohol withdrawal syndrome without complication (HCC)     1/14/2025  7:41 AM Russell Hurtado [R09.02] Oxygen desaturation     1/14/2025  8:13 AM Russell Hurtado [R07.81] Rib pain                  Russell Hurtado,   01/22/25 [id number]

## 2025-01-14 NOTE — CERTIFIED RECOVERY SPECIALIST
Time spent:     Consult Rec'd:  Patient seen in:  Stage of change:    Substance used:  Frequency/Quantity:   Date of last use:                Time spent: 25 minutes    Consult Rec'd:Yes  Patient seen in:IP  Stage of change:pre contemplation    Substance used: Alcohol/ wine  Frequency/Quantity: Daily / 1 fifth  Date of last use: 1/14/25    CRS met with patient (who is known to CRS) to explain services and offer support. Patient expressed relief to see a familiar face, CRS and patient discussed this hospitalization and possible plan. Patient has been struggling for a long time with alcohol, and is contemplating if Inpatient treatment is an option. 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      CRS will continue to follow and support, provided business card.

## 2025-01-14 NOTE — SOCIAL WORK
01/15/25 1716   Referral Data   Referral Source Patient   Referral Name Pt initially presented to [facility] ED   Referral Reason Drug/Alcohol Abuse   County Information   County of Residence [place]   Readmission Root Cause   30 Day Readmission No   Patient Information   Mental Status Alert   Primary Caregiver Self   Accompanied by/Relationship n/a   Support System Immediate family;Other (Comment)  (fiancee)   Caodaism/Cultural Requests Scientology   Legal Information   Legal Issues Pt has been incarcerated in the past. Denied any current legal issues.   Health Care Proxy Appointed No   Activities of Daily Living Prior to Admission   Functional Status Independent   Assistive Device No device needed   Living Arrangement House;Lives alone   Ambulation Independent  (Pt currently has broken ribs, which are healing.)   Access to Firearms   Access to Firearms No  (Pt denied any access to firearms)   Income Information   Income Source Unemployed   Means of Transportation   Means of Transport to Appts: Drives Self     Pt is a 60 yo male admitted to withdrawal management unit for alcohol withdrawal. He initially presented to [facility] ED. Pt's name, date of birth, home address and telephone number were verified. Pt was informed of case management role and the purpose of the completion of intake with case management. Pt was cooperative.     SUBSTANCE ABUSE    Stressor/Trigger    Alcohol withdrawal; rib fracture d/t fall 1 week ago; pt states his father's diagnosis of cancer is a stressor and trigger for his drinking. 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 According to chart review, he typically requests OP f/u. He has also been referred  to [facility] in the past, but [facility] stated he has followed up with referral  to OP for the past year.     Pt reports no history of OP treatment.   AA/NA Meetings   Pt has never attended 12 step meetings before, but is willing to try this.    Withdrawal  Symptoms   tremor, diaphrosis and agitation, fatigue, palpitations, tremors, light-headedness, nausea, vomiting, right-sided chest pain, anxiety   History of OD/blackouts or Withdrawal Seizures   Pt denies hx of w/d seizure   MENTAL HEALTH INFORMATION    Hx of Mental Health Dx   Depression    Pt is on Lexapro for anxiety   Outpatient Mental Health Tx   Pt denies   Inpatient Hospitalizations (Mental Health)   Pt denies   Family History of Mental Health    Pt denies   Trauma History    Divorce; loss of mother; father diagnosed with cancer. Pt is a caretaker. Pt reports being laid off last year and being unable to find work since.   Current MH Symptoms   Pt endorses anxiety about the future. He denied any of symptoms. Denied SI/HI/AVH. He is able to contract for safety.    Access to Firearms    Pt denies any access to firearms.   PHYSICAL HEALTH INFORMATION    Physical Health Conditions (Chronic)   Alcohol withdrawal (HCC)   Hypomagnesemia   Hepatic steatosis (Chronic)   Hypertension (Chronic)   Alcoholic ketoacidosis   Hyperlipidemia (Chronic)   GERD (gastroesophageal reflux disease) (Chronic)   Coronary artery calcification of native artery (Chronic)   Alcohol use disorder   Hypoxia   Closed fracture of multiple ribs of right side with routine healing      PCP   Enid Altman DO  [phone]    Insurance   [facility]  [phone]      Preferred Pharmacy   [facility] [address] - [address] [place] [address]   Phone: [phone] Fax: [phone]      LEGAL ISSUES    Past or present legal issues   Pt denies   Probation/[place]   Pt denies   EMPLOYMENT/INCOME/NEEDS    Education   BA in economics   Employment Pt  "is not currently working. 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     History   Pt denies   Spiritual/Pentecostalism Beliefs   Religious   Transportation/Transport Home   Pt has a vehicle, is licensed and can drive to [place]   DEMOGRAPHICS    Discharge Address   [address]   [place] [address]    Living Arrangement   Pt lives alone in a house   Can pt return home yes     External Supports     Holly Kathleen ([place])   [phone] (M)     Diogo Yanez (Father)   [phone] (H)    Phone Number   [phone] (M)   [phone] (H)    Email   [email or web address]    Marital Status/Children    (in a relationship), 2 adult children. Per chart review, pt states his drinking led to divorce and loss of contact with children     Substance First use Last Use Frequency Amount Used How long Longest period of sobriety and when Method of use   THC            Heroin            Cocaine            ETOH   \"More than 20 years ago\" 1/14/25 daily 2 bottles of wine along with Dilaudid 4 mg  \"A long time\" 60 days in 2024 drink   Meth            Benzos            Other:            Pt and CM completed relapse prevention plan. Pt signed and received a copy. Pt stated current stressors were his father's cancer diagnosis and worsening condition, not being able to find a job and boredom. He also stated that being around others drinking is a trigger and when he was working there were often corporate events and others drinking and he felt like alcohol is everywhere. He stated some of his previous coping skills were working out at the gym, having steady employment, fishing and hiking. He stated that he is struggling to pay his bills and most of his savings is now depleted. He does have a supportive fiancee and sees a PCP, but currently doesn't have any other natural or professional supports. Pt was not agreeing to IP tx at this time, stating he was the primary caretaker for his " father. CM did discuss various levels of care, IP and OP. Pt was interested in IOP at this time. Pt's goals for detox are to successfully complete medical withdrawal and to develop a discharge plan that includes relapse prevention education. Pt signed full ROIs for alicia Barrera (emergency contact), [facility] (insurance). Declined [facility] for PCP.      Discussed with patient: AUDIT score of 18 UDS/Identified Substance(s) used: ETOH  Risks discussed included: physical health, mental health, social relationships, financial stressors  Recommendations discussed: CM recommends IP LOC (3.5)  Patient's response: Pt declined any IP and OP appts be set up for him at this time (see separate note).    Social Drivers     01/15/25 7296   Financial Resource Strain   How hard is it for you to pay for the very basics like food, housing, medical care, and heating? Very Hard   Housing Stability   In the last 12 months, was there a time when you were not able to pay the mortgage or rent on time? N   In the past 12 months, how many times have you moved where you were living? 0   At any time in the past 12 months, were you homeless or living in a shelter (including now)? N   Transportation Needs   In the past 12 months, has lack of transportation kept you from medical appointments or from getting medications? no   In the past 12 months, has lack of transportation kept you from meetings, work, or from getting things needed for daily living? No   Food Insecurity   Within the past 12 months, you worried that your food would run out before you got the money to buy more. Sometimes   Within the past 12 months, the food you bought just didn't last and you didn't have money to get more. Sometimes   Social Connections   How often do you get together with friends or relatives? Three times   How often do you attend Shinto or Hindu services? Never   Do you belong to any clubs or organizations such as Shinto groups, unions,  fraternal or athletic groups, or school groups? No   How often do you attend meetings of the clubs or organizations you belong to? Never   Are you , , , , never , or living with a partner?    Intimate Partner Violence   Within the last year, have you been afraid of your partner or ex-partner? No   Within the last year, have you been humiliated or emotionally abused in other ways by your partner or ex-partner? No   Within the last year, have you been kicked, hit, slapped, or otherwise physically hurt by your partner or ex-partner? No   Within the last year, have you been raped or forced to have any kind of sexual activity by your partner or ex-partner? No   Alcohol Use   Q1: How often do you have a drink containing alcohol? 4 or more ti   Q2: How many drinks containing alcohol do you have on a typical day when you are drinking? 10 or more   Q3: How often do you have six or more drinks on one occasion? Daily   Utilities   In the past 12 months has the electric, gas, oil, or water company threatened to shut off services in your home? No   Health Literacy   How often do you need to have someone help you when you read instructions, pamphlets, or other written material from your doctor or pharmacy? Never   Follow-Ups   We make community resources available to all of our patients to assist with everyday needs. We may be able to connect you with those resources. Would you be interested? Y   Would you be interested in assistance with any of these areas? If so, which ones? 2;4;8;10;23

## 2025-01-14 NOTE — EMTALA/ACUTE CARE TRANSFER
[facility] EMERGENCY DEPARTMENT  [address] [facility]  [place] [address]  Dept: [phone]      EMTALA TRANSFER CONSENT    NAME Diogo VILLASENOR 1965                              MRN [id number]    I have been informed of my rights regarding examination, treatment, and transfer   by Dr. Christie Black MD    Benefits: Specialized equipment and/or services available at the receiving facility (Include comment)________________________    Risks: Potential for delay in receiving treatment, Loss of IV, Increased discomfort during transfer, Potential deterioration of medical condition, Possible worsening of condition or death during transfer      Consent for Transfer:  I acknowledge that my medical condition has been evaluated and explained to me by the emergency department physician or other qualified medical person and/or my attending physician, who has recommended that I be transferred to the service of    at  . The above potential benefits of such transfer, the potential risks associated with such transfer, and the probable risks of not being transferred have been explained to me, and I fully understand them.  The doctor has explained that, in my case, the benefits of transfer outweigh the risks.  I agree to be transferred.    I authorize the performance of emergency medical procedures and treatments upon me in both transit and upon arrival at the receiving facility.  Additionally, I authorize the release of any and all medical records to the receiving facility and request they be transported with me, if possible.  I understand that the safest mode of transportation during a medical emergency is an ambulance and that the Hospital advocates the use of this mode of transport. Risks of traveling to the receiving facility by car, including absence of medical control, life sustaining equipment, such as oxygen, and medical personnel has been explained to me and  I fully understand them.    (ELINA CORRECT BOX BELOW)  [  ]  I consent to the stated transfer and to be transported by ambulance/helicopter.  [  ]  I consent to the stated transfer, but refuse transportation by ambulance and accept full responsibility for my transportation by car.  I understand the risks of non-ambulance transfers and I exonerate the Hospital and its staff from any deterioration in my condition that results from this refusal.    X___________________________________________    DATE  25  TIME________  Signature of patient or legally responsible individual signing on patient behalf           RELATIONSHIP TO PATIENT_________________________          Provider Certification    NAME Diogo Travis                                         1965                              MRN [id number]    A medical screening exam was performed on the above named patient.  Based on the examination:    Condition Necessitating Transfer The primary encounter diagnosis was Alcohol withdrawal syndrome without complication (HCC). A diagnosis of Oxygen desaturation was also pertinent to this visit.    Patient Condition: The patient has been stabilized such that within reasonable medical probability, no material deterioration of the patient condition or the condition of the unborn child(camelia) is likely to result from the transfer    Reason for Transfer: Level of Care needed not available at this facility    Transfer Requirements: Facility     Space available and qualified personnel available for treatment as acknowledged by    Agreed to accept transfer and to provide appropriate medical treatment as acknowledged by          Appropriate medical records of the examination and treatment of the patient are provided at the time of transfer   STAFF INITIAL WHEN COMPLETED _______  Transfer will be performed by qualified personnel from    and appropriate transfer equipment as required, including the use of necessary and  appropriate life support measures.    Provider Certification: I have examined the patient and explained the following risks and benefits of being transferred/refusing transfer to the patient/family:  General risk, such as traffic hazards, adverse weather conditions, rough terrain or turbulence, possible failure of equipment (including vehicle or aircraft), or consequences of actions of persons outside the control of the transport personnel, Unanticipated needs of medical equipment and personnel during transport, Risk of worsening condition, The possibility of a transport vehicle being unavailable      Based on these reasonable risks and benefits to the patient and/or the unborn child(camelia), and based upon the information available at the time of the patient’s examination, I certify that the medical benefits reasonably to be expected from the provision of appropriate medical treatments at another medical facility outweigh the increasing risks, if any, to the individual’s medical condition, and in the case of labor to the unborn child, from effecting the transfer.    X____________________________________________ DATE 01/14/25        TIME_______      ORIGINAL - SEND TO MEDICAL RECORDS   COPY - SEND WITH PATIENT DURING TRANSFER

## 2025-01-14 NOTE — ASSESSMENT & PLAN NOTE
Patient with a history of chronic daily alcohol use  Last drink was yesterday evening with Dilaudid  Serum alcohol 229 in the ED  Received a total of 710 mg of phenobarbital including 680 in the emergency department this morning  Initiate SEWS protocol for medical management of alcohol withdrawal  SEWS score 11 upon admission  Continue monitoring under protocol and administer phenobarbital as indicated with a maximum of 2g  Continuous pulse ox and telemetry monitoring  Patient reports symptoms have significantly improved status post phenobarbital

## 2025-01-14 NOTE — ED CARE HANDOFF
Emergency Department Sign Out Note        Sign out and transfer of care from Dr. Russell Hurtado. See Separate Emergency Department note.     The patient, Diogo Travis, was evaluated by the previous provider for alcohol intoxication.    Workup Completed:  Temp:  [97.2 °F (36.2 °C)-98 °F (36.7 °C)] 97.2 °F (36.2 °C)  HR:  [] 62  BP: (139-176)/() 176/98  Resp:  [18-26] 18  SpO2:  [87 %-97 %] 97 %  O2 Device: Nasal cannula  Nasal Cannula O2 Flow Rate (L/min):  [2 L/min] 2 L/min  Recent Results (from the past 12 hours)   ECG 12 lead    Collection Time: 01/14/25  5:22 AM   Result Value Ref Range    Ventricular Rate 127 BPM    Atrial Rate 127 BPM    TN Interval 146 ms    QRSD Interval 90 ms    QT Interval 396 ms    QTC Interval 575 ms    P Axis 56 degrees    QRS Axis 47 degrees    T Wave Axis 47 degrees   Basic metabolic panel    Collection Time: 01/14/25  5:40 AM   Result Value Ref Range    Sodium 138 135 - 147 mmol/L    Potassium 4.3 3.5 - 5.3 mmol/L    Chloride 97 96 - 108 mmol/L    CO2 22 21 - 32 mmol/L    ANION GAP 19 (H) 4 - 13 mmol/L    BUN 10 5 - 25 mg/dL    Creatinine 0.79 0.60 - 1.30 mg/dL    Glucose 87 65 - 140 mg/dL    Calcium 9.8 8.4 - 10.2 mg/dL    eGFR 98 ml/min/1.73sq m   Hepatic function panel    Collection Time: 01/14/25  5:40 AM   Result Value Ref Range    Total Bilirubin 0.67 0.20 - 1.00 mg/dL    Bilirubin, Direct 0.10 0.00 - 0.20 mg/dL    Alkaline Phosphatase 163 (H) 34 - 104 U/L    AST 42 (H) 13 - 39 U/L    ALT 18 7 - 52 U/L    Total Protein 8.0 6.4 - 8.4 g/dL    Albumin 4.7 3.5 - 5.0 g/dL   TSH, 3rd generation with Free T4 reflex    Collection Time: 01/14/25  5:40 AM   Result Value Ref Range    TSH 3RD GENERATON 2.368 0.450 - 4.500 uIU/mL   Ammonia    Collection Time: 01/14/25  5:40 AM   Result Value Ref Range    Ammonia 32 18 - 72 umol/L   Magnesium    Collection Time: 01/14/25  5:40 AM   Result Value Ref Range    Magnesium 1.7 (L) 1.9 - 2.7 mg/dL   Phosphorus    Collection Time: 01/14/25  " 5:40 AM   Result Value Ref Range    Phosphorus 3.0 2.7 - 4.5 mg/dL   Lipase    Collection Time: 01/14/25  5:40 AM   Result Value Ref Range    Lipase 15 11 - 82 u/L   HS Troponin 0hr (reflex protocol)    Collection Time: 01/14/25  5:40 AM   Result Value Ref Range    hs TnI 0hr 3 \"Refer to ACS Flowchart\"- see link ng/L   Fingerstick Glucose (POCT)    Collection Time: 01/14/25  5:43 AM   Result Value Ref Range    POC Glucose 81 65 - 140 mg/dl   POCT alcohol breath test    Collection Time: 01/14/25  5:44 AM   Result Value Ref Range    EXTBreath Alcohol 0.010    CBC and differential    Collection Time: 01/14/25  6:37 AM   Result Value Ref Range    WBC 5.69 4.31 - 10.16 Thousand/uL    RBC 3.48 (L) 3.88 - 5.62 Million/uL    Hemoglobin 11.2 (L) 12.0 - 17.0 g/dL    Hematocrit 33.3 (L) 36.5 - 49.3 %    MCV 96 82 - 98 fL    MCH 32.2 26.8 - 34.3 pg    MCHC 33.6 31.4 - 37.4 g/dL    RDW 13.3 11.6 - 15.1 %    MPV 8.6 (L) 8.9 - 12.7 fL    Platelets 189 149 - 390 Thousands/uL    nRBC 0 /100 WBCs    Segmented % 79 (H) 43 - 75 %    Immature Grans % 1 0 - 2 %    Lymphocytes % 12 (L) 14 - 44 %    Monocytes % 6 4 - 12 %    Eosinophils Relative 1 0 - 6 %    Basophils Relative 1 0 - 1 %    Absolute Neutrophils 4.58 1.85 - 7.62 Thousands/µL    Absolute Immature Grans 0.03 0.00 - 0.20 Thousand/uL    Absolute Lymphocytes 0.67 0.60 - 4.47 Thousands/µL    Absolute Monocytes 0.33 0.17 - 1.22 Thousand/µL    Eosinophils Absolute 0.05 0.00 - 0.61 Thousand/µL    Basophils Absolute 0.03 0.00 - 0.10 Thousands/µL   Smear Review(Phlebs Do Not Order)    Collection Time: 01/14/25  6:37 AM   Result Value Ref Range    RBC Morphology Normal     Platelet Estimate Adequate Adequate   Protime-INR    Collection Time: 01/14/25  6:41 AM   Result Value Ref Range    Protime 15.1 (H) 12.3 - 15.0 seconds    INR 1.12 0.85 - 1.19   APTT    Collection Time: 01/14/25  6:41 AM   Result Value Ref Range    PTT 30 23 - 34 seconds   HS Troponin I 2hr    Collection Time: " "01/14/25  7:52 AM   Result Value Ref Range    hs TnI 2hr <2 \"Refer to ACS Flowchart\"- see link ng/L    Delta 2hr hsTnI <-1 <20 ng/L     XR chest 2 views   Final Result      1.  Right rib fractures redemonstrated, seen better on recent chest CT.   2.  No new cardiopulmonary process.            Workstation performed: [id number]           XR chest 2 views  Result Date: 1/11/2025  Narrative: XR CHEST PA AND LATERAL DUAL ENERGY SUBTRACTION. INDICATION:  Rib pain. Rib fractures from previous injury. COMPARISON: CXR chest CT 1/7/2025. FINDINGS: Clear lungs. No pneumothorax or pleural effusion. Unremarkable cardiomediastinal silhouette. Redemonstration of right rib fractures and small extrapleural hematoma. Unremarkable upper abdomen.     Impression: Redemonstration of right rib fractures and small extrapleural hematoma. Workstation performed: [id number]     CT chest without contrast  Result Date: 1/11/2025  Narrative: CT CHEST WITHOUT IV CONTRAST INDICATION: cough, fever, multiple right rib fractures. COMPARISON: 1/7/2025 TECHNIQUE: CT examination of the chest was performed without intravenous contrast. Multiplanar 2D reformatted images were created from the source data. This examination, like all CT scans performed in the [facility], was performed utilizing techniques to minimize radiation dose exposure, including the use of iterative reconstruction and automated exposure control. Radiation dose length product (DLP) for this visit: 474 mGy-cm FINDINGS: LUNGS: Lungs are clear. There is no tracheal or endobronchial lesion. PLEURA: Unremarkable. HEART/GREAT VESSELS: Heart is unremarkable for patient's age. No thoracic aortic aneurysm. MEDIASTINUM AND VIVIAN: Unremarkable. CHEST WALL AND LOWER NECK: Unremarkable. VISUALIZED STRUCTURES IN THE UPPER ABDOMEN: Unremarkable. OSSEOUS STRUCTURES: Stable appearance of fractures at the right fourth through sixth ribs posterolaterally and right fifth through seventh ribs " posteriorly..     Impression: No evidence of focal consolidation, pneumothorax or effusion. Stable appearance of fractures at the right fourth through sixth ribs posterolaterally and right fifth through seventh ribs posteriorly Workstation performed: [id number]     XR chest 1 view portable  Result Date: 1/7/2025  Narrative: XR CHEST PORTABLE INDICATION: R sided rib pain, hx of rib fx s/p fall 12/24/24. COMPARISON: 12/27/2024. FINDINGS: No focal airspace consolidation, pleural effusion, signs of congestion, or pneumothorax. Cardiomediastinal silhouette is unremarkable. Multiple known right-sided rib fractures are better seen on subsequently performed chest CT.     Impression: No acute cardiopulmonary disease. Multiple known right-sided rib fractures are better seen on subsequently performed chest CT. Workstation performed: [id number]     CTA chest pe study  Result Date: 1/7/2025  Narrative: CTA - CHEST WITH IV CONTRAST - PULMONARY ANGIOGRAM INDICATION: known r sided rib fractures still painful, d-dimer elev.. COMPARISON: 12/24/2024 TECHNIQUE: CTA examination of the chest was performed using angiographic technique according to a protocol specifically tailored to evaluate for pulmonary embolism. Multiplanar 2D reformatted images were created from the source data. In addition, coronal  3D MIP postprocessing was performed on the acquisition scanner. Radiation dose length product (DLP) for this visit: 398 mGy-cm . This examination, like all CT scans performed in the [facility], was performed utilizing techniques to minimize radiation dose exposure, including the use of iterative reconstruction and automated exposure control. IV Contrast: 65 mL of iohexol (OMNIPAQUE) FINDINGS: PULMONARY ARTERIAL TREE:  No pulmonary embolus. LUNGS: Lungs are clear. No tracheal or endobronchial lesion. PLEURA: Unremarkable. HEART/GREAT VESSELS: Heart is unremarkable for patient's age. No thoracic aortic aneurysm.  MEDIASTINUM AND VIVIAN: Unremarkable. CHEST WALL AND LOWER NECK: Unremarkable. VISUALIZED STRUCTURES IN THE UPPER ABDOMEN: Unremarkable. OSSEOUS STRUCTURES: Acute/subacute fractures of the right fourth through sixth ribs posterolaterally and at the right fifth through seventh ribs posteriorly.     Impression: No evidence of pulmonary embolus Acute/subacute fractures of the right fourth through sixth ribs posterolaterally and at the right fifth through seventh ribs posteriorly. Correlate for paradoxical movement during respiration Workstation performed: [id number]     XR chest portable  Result Date: 12/28/2024  Narrative: XR CHEST PORTABLE INDICATION: r/o delayed hemothorax. COMPARISON: None FINDINGS: Clear lungs. No pneumothorax or pleural effusion. Cardiomegaly Bones are unremarkable for age. Normal upper abdomen. No pleural effusion or pneumothorax    Impression: No new pleural effusion seen The previously noted rib fractures are better seen on the CT Workstation performed: [id number]     XR chest 1 view portable  Result Date: 12/24/2024  Narrative: XR CHEST PORTABLE INDICATION: right rib pain. COMPARISON: Follow-up CT chest FINDINGS: Linear atelectasis or scarring right lung base with lungs otherwise clear. No pneumothorax or pleural effusion. Cardiomegaly noted. Bones are unremarkable for age. Normal upper abdomen.     Impression: No acute cardiopulmonary disease. Refer to follow-up CT chest for rib fractures. Workstation performed: [id number]     CTA chest pe study  Result Date: 12/24/2024  Narrative: CTA - CHEST WITH IV CONTRAST - PULMONARY ANGIOGRAM INDICATION: pleuritic chest pain. COMPARISON: CT chest abdomen pelvis 12/12/2023 TECHNIQUE: CTA examination of the chest was performed using angiographic technique according to a protocol specifically tailored to evaluate for pulmonary embolism. Multiplanar 2D reformatted images were created from the source data. In addition, coronal  3D MIP postprocessing was  performed on the acquisition scanner. Radiation dose length product (DLP) for this visit: 368 mGy-cm . This examination, like all CT scans performed in the [facility], was performed utilizing techniques to minimize radiation dose exposure, including the use of iterative reconstruction and automated exposure control. IV Contrast: 85 mL of iohexol (OMNIPAQUE) 350 FINDINGS: PULMONARY ARTERIAL TREE: Suboptimal contrast opacification of the pulmonary arteries. No central or definitive peripheral pulmonary emboli. Stable prominent main and right pulmonary arteries. RV/LV ratio calculated at 0.97. In retrospect, this is similar  to the prior study. LUNGS: No infiltrate. No pulmonary contusion. Mild bibasilar subsegmental atelectasis. No suspicious pulmonary nodule. Few scattered punctate calcified granulomata. Central airways are clear. PLEURA: Unremarkable. HEART/GREAT VESSELS: Heart is not enlarged.  No pericardial effusion.  Aortic and coronary artery calcification.  No thoracic aortic aneurysm. MEDIASTINUM AND VIVIAN: Unremarkable. CHEST WALL AND LOWER NECK: Unremarkable. VISUALIZED STRUCTURES IN THE UPPER ABDOMEN: Hepatic steatosis. OSSEOUS STRUCTURES: Acute to subacute minimally displaced fractures of the right fourth through sixth posterolateral ribs and nondisplaced fracture of the right seventh posterior rib. No osseous destructive lesion identified. Mild degenerative changes of  the spine. Multilevel spondylosis and paravertebral ossification suggestive of DISH (diffuse idiopathic skeletal hyperostosis).     Impression: Suboptimal contrast opacification of the pulmonary arteries. No central or definitive peripheral pulmonary emboli. Chronic prominent central pulmonary arteries and borderline elevated RV/LV ratio suggestive of chronic right heart disease. Acute to subacute minimally displaced fractures of the right fourth through sixth ribs and nondisplaced fracture of the right seventh rib. Mild  bibasilar subsegmental atelectasis. No pneumothorax or hemothorax. Additional chronic findings and negatives as above. The study was marked in EPIC for immediate notification. Workstation performed: [id number]         Given:    Medication Administration - last 24 hours from 01/13/2025 1119 to 01/14/2025 1119         Date/Time Order Dose Route Action Action by     01/14/2025 0718 EST sodium chloride 0.9 % bolus 1,000 mL 0 mL Intravenous Stopped Dai Baptiste RN     01/14/2025 0541 EST sodium chloride 0.9 % bolus 1,000 mL 1,000 mL Intravenous New Bag Radha Bailey, RN     01/14/2025 0650 EST PHENobarbital 680 mg in sodium chloride 0.9 % 100 mL IVPB 0 mg Intravenous Stopped Radha Bailey RN     01/14/2025 0600 EST PHENobarbital 680 mg in sodium chloride 0.9 % 100 mL IVPB 680 mg Intravenous New Bag Radha Bailey RN     01/14/2025 0547 EST ondansetron (ZOFRAN) injection 4 mg 4 mg Intravenous Given Radha Bailey RN     01/14/2025 0719 EST sodium chloride 0.9 % bolus 1,000 mL 0 mL Intravenous Stopped Dai Baptiste RN     01/14/2025 0547 EST sodium chloride 0.9 % bolus 1,000 mL 1,000 mL Intravenous New Bag Radha Bailey, CHAD     01/14/2025 0811 EST morphine injection 4 mg 4 mg Intravenous Given Dai Baptiste RN     01/14/2025 0858 EST ondansetron (ZOFRAN) injection 4 mg 4 mg Intravenous Given Dai Baptiste RN              ED Course / Workup Pending (followup):  Septic and waiting for transport to [place].                                     Procedures  Medical Decision Making  59-year-old male who is here for alcohol intoxication pending transfer to [place].  Patient signed out to me from Dr. Hurtado from night team.  EMS transport arrived and transported patient to [place] with no issues.    Amount and/or Complexity of Data Reviewed  Labs: ordered.  Radiology: ordered.    Risk  Prescription drug management.            Disposition  Final diagnoses:   Alcohol withdrawal syndrome  without complication (HCC)   Oxygen desaturation   Rib pain     Time reflects when diagnosis was documented in both MDM as applicable and the Disposition within this note       Time User Action Codes Description Comment    1/14/2025  7:40 AM Russell Hurtado [F10.930] Alcohol withdrawal syndrome without complication (HCC)     1/14/2025  7:41 AM Russell Hurtado [R09.02] Oxygen desaturation     1/14/2025  8:13 AM Russell Hurtado [R07.81] Rib pain           ED Disposition       ED Disposition   Transfer to Another Facility-In Network    Condition   --    Date/Time   Tue Jan 14, 2025  7:40 AM    Comment   Diogo Travis should be transferred out to [facility].               MD Documentation      Flowsheet Row Most Recent Value   Patient Condition The patient has been stabilized such that within reasonable medical probability, no material deterioration of the patient condition or the condition of the unborn child(camelia) is likely to result from the transfer   Reason for Transfer Level of Care needed not available at this facility   Benefits of Transfer Specialized equipment and/or services available at the receiving facility (Include comment)________________________   Risks of Transfer Potential for delay in receiving treatment, Loss of IV, Increased discomfort during transfer, Potential deterioration of medical condition, Possible worsening of condition or death during transfer   Sending MD Dr. Black   Provider Certification General risk, such as traffic hazards, adverse weather conditions, rough terrain or turbulence, possible failure of equipment (including vehicle or aircraft), or consequences of actions of persons outside the control of the transport personnel, Unanticipated needs of medical equipment and personnel during transport, Risk of worsening condition, The possibility of a transport vehicle being unavailable          RN Documentation      Flowsheet Row Most Recent Value   Transport Mode  Ambulance   Level of Care Basic life support          Follow-up Information    None       Discharge Medication List as of 1/14/2025 10:56 AM        CONTINUE these medications which have NOT CHANGED    Details   acetaminophen (TYLENOL) 325 mg tablet Take 3 tablets (975 mg total) by mouth every 8 (eight) hours, Starting Sun 12/29/2024, No Print      atorvastatin (LIPITOR) 40 mg tablet Take 1 tablet (40 mg total) by mouth daily with dinner, Starting Mon 12/9/2024, Normal      azithromycin (ZITHROMAX) 250 mg tablet Take 1 tablet (250 mg total) by mouth daily for 4 days, Starting Sat 1/11/2025, Until Wed 1/15/2025, Print      escitalopram (LEXAPRO) 20 mg tablet Take 1 tablet (20 mg total) by mouth daily, Starting Wed 10/2/2024, Normal      folic acid (FOLVITE) 1 mg tablet Take 1 tablet (1 mg total) by mouth daily, Starting Sun 12/8/2024, Until Tue 1/7/2025, Normal      gabapentin (NEURONTIN) 300 mg capsule Take 1 capsule (300 mg total) by mouth 3 (three) times a day, Starting Sun 12/29/2024, Normal      !! HYDROmorphone (DILAUDID) 2 mg tablet Take 1 tablet (2 mg total) by mouth every 4 (four) hours as needed for severe pain for up to 10 days Max Daily Amount: 12 mg, Starting Tue 1/7/2025, Until Fri 1/17/2025 at 2359, Normal      !! HYDROmorphone (Dilaudid) 2 mg tablet Take 1 tablet (2 mg total) by mouth every 4 (four) hours as needed for moderate pain for up to 5 days Max Daily Amount: 12 mg, Starting Sat 1/11/2025, Until Thu 1/16/2025 at 2359, Print      ibuprofen (MOTRIN) 400 mg tablet Take 1 tablet (400 mg total) by mouth every 6 (six) hours as needed for mild pain, Starting Sun 12/29/2024, Normal      lidocaine (LIDODERM) 5 % Apply 1 patch topically over 12 hours daily Remove & Discard patch within 12 hours or as directed by MD, Starting Sun 12/29/2024, Normal      lisinopril (ZESTRIL) 40 mg tablet Take 1 tablet (40 mg total) by mouth daily, Starting Mon 12/9/2024, Normal      LORazepam (ATIVAN) 2 mg tablet Take 1  tablet (2 mg total) by mouth every 6 (six) hours as needed (for shakiness, feelings of alcohol withdrawal) for up to 3 days, Starting Tue 1/7/2025, Until Fri 1/10/2025 at 2359, Normal      !! methocarbamol (ROBAXIN) 500 mg tablet Take 2 tablets (1,000 mg total) by mouth 3 (three) times a day as needed for muscle spasms, Starting Sat 1/11/2025, Normal      !! Methocarbamol 1000 MG TABS Take 1,000 mg by mouth every 8 (eight) hours, Starting Tue 1/7/2025, Normal      mirtazapine (REMERON) 15 mg tablet Take 1 tablet (15 mg total) by mouth daily at bedtime, Starting Wed 11/13/2024, Normal      nicotine (NICODERM CQ) 21 mg/24 hr TD 24 hr patch Place 1 patch on the skin over 24 hours daily, Starting Mon 12/30/2024, Normal      pantoprazole (PROTONIX) 40 mg tablet Take 1 tablet (40 mg total) by mouth daily in the early morning, Starting Mon 12/9/2024, Until Sat 6/7/2025, Normal      senna-docusate sodium (SENOKOT S) 8.6-50 mg per tablet Take 1 tablet by mouth daily at bedtime, Starting Sun 12/29/2024, Normal      Thiamine Mononitrate (VITAMIN B1) 100 mg tablet Take 1 tablet (100 mg total) by mouth daily, Starting Tue 7/9/2024, Normal       !! - Potential duplicate medications found. Please discuss with provider.        No discharge procedures on file.       ED Provider  Electronically Signed by     Chandu Sol MD  01/14/25 2696

## 2025-01-14 NOTE — H&P
"H&P - Hospitalist   Name: Diogo Travis 59 y.o. male I MRN: [id number]  Unit/Bed#: 7T [facility] 701-01 I Date of Admission: 1/14/2025   Date of Service: 1/14/2025 I Hospital Day: 0     Assessment & Plan  Alcohol withdrawal (HCC)  This is a 58 yo patient with history of alcohol use disorder, CAD, hyperlipidemia, HTN, GERD, hepatic steatosis and recent rib fracture on a course of oral Dilaudid who presented to [facility] with complaints of alcohol withdrawal reporting tremor, diaphrosis and agitation, prior history of withdrawal.  Last drink 1/13/252 bottles of wine along with Dilaudid 4 mg   Received phenobarbital in  mg IV at 6 am  Was noted for supine hypoxia in setting of recent rib fracture, placed on 2L NC, hypomagnesemia, abnormal LFTs, increased anion gap with concern for AKA  Accepted at  Detox unit  Toxicology consulted, appreciate input  SEWS protocol  Supportive management  Will continue PO Dilaudid in setting of rib fractures  Alcohol use disorder  Please refer to above under \"alcohol withdrawal\"  Hypomagnesemia  In setting of alcohol misuse  Replace and monitor electrolytes   Hepatic steatosis  In setting of alcohol use  Mild elevation in AST, monitor  Hypertension  Continue lisinopril  Alcoholic ketoacidosis  Mild, anion gap elevation at 19  Proceed with isolyte infusion, repeat CMP in am  Depression  Continue Lexapro, Remeron, gabapentin  Hyperlipidemia  Continue statin  GERD (gastroesophageal reflux disease)  Continue PPI  Coronary artery calcification of native artery  History noted  Not on BB  Continue statin  Should f/u with Cardiology outpatient  Hypoxia  Intermittent hypoxia in setting of recent multiple rib fractures on right  Currently requiring 2L NC  Provide analgesia, respiratory support    Closed fracture of multiple ribs of right side with routine healing  Following fall approximately one week ago  CT chest form 1/7/25:   \"Acute/subacute fractures of the right fourth through " "sixth ribs posterolaterally and at the right fifth through seventh ribs posteriorly\"  Patient had been on dilaudid PO, continue here  Noted for intermittent hypoxia  Incentive spirometry      VTE Pharmacologic Prophylaxis: VTE Score: 2 Low Risk (Score 0-2) - Encourage Ambulation.  Code Status: Level 1 - Full Code     Anticipated Length of Stay: Patient will be admitted on an inpatient basis with an anticipated length of stay of greater than 2 midnights secondary to close monitoring, IVF.    History of Present Illness   Chief Complaint: alcohol withdrawal    Diogo Travis is a 59 y.o. male with a PMH of alcohol use disorder, anxiety/depression, GED hyperlipidemia, HTN, liver steatosis who presented to [facility] with tremor, diaphoresis, agitation and concern fr alcohol withdrawal. He reported drinking 2 bottles of wine the night prior along with 4 mg of oral Dilaudid. The patient received phenobarbital 680 mg IV. The patient reports mild improvement after the phenobarbital dose. He voices his concern about his alcohol dependence and hopes to stop drinking.   He currently is c/o right-sided chest pain related to his recent rib fracture. Denies SOB. He also denies GI or urinary symptoms.     Review of Systems   Constitutional:  Positive for diaphoresis and fatigue.   HENT:  Negative for congestion.    Eyes:  Negative for visual disturbance.   Respiratory:  Negative for wheezing.    Cardiovascular:  Positive for palpitations. Negative for chest pain and leg swelling.   Gastrointestinal:  Negative for abdominal pain, constipation, diarrhea, nausea and vomiting.   Genitourinary:  Negative for dysuria.   Musculoskeletal:         Right-sided chest pain   Skin:  Negative for rash.   Neurological:  Positive for tremors and light-headedness.   Hematological:  Negative for adenopathy.   Psychiatric/Behavioral:  Negative for confusion.        Historical Information   Past Medical History:   Diagnosis Date    Alcohol use " disorder, severe, dependence (HCC) 04/02/2023    Alcohol withdrawal seizure (HCC)     Alcoholic ketoacidosis 10/16/2023    Anxiety     AR (allergic rhinitis)     Chronic alcoholic gastritis 04/02/2023    Depression     GERD (gastroesophageal reflux disease)     Hepatic steatosis 04/02/2023    Hyperlipidemia     Hypertension     IBS (irritable bowel syndrome)     Obesity     Rosacea     Vitamin B12 deficiency 02/14/2024    Vitamin D deficiency 02/14/2024     Past Surgical History:   Procedure Laterality Date    ANTERIOR CRUCIATE LIGAMENT REPAIR Left     WISDOM TOOTH EXTRACTION       Social History     Tobacco Use    Smoking status: Some Days     Types: Cigarettes, Cigars     Start date: 1/1/2014     Passive exposure: Past (Father)    Smokeless tobacco: Never    Tobacco comments:     Smokes cigars 2-3 times per year   Vaping Use    Vaping status: Never Used   Substance and Sexual Activity    Alcohol use: Yes     Comment: ETOH 12/7/24    Drug use: Never    Sexual activity: Yes     Partners: Female     Birth control/protection: Post-menopausal     E-Cigarette/Vaping    E-Cigarette Use Never User      E-Cigarette/Vaping Substances    Nicotine No     THC No     CBD No     Flavoring No     Other No     Unknown No      Family History   Problem Relation Age of Onset    Cancer Mother 78        Type unknown    Hypertension Father     Coronary artery disease Father 60    Cancer Father 82        Bladder; + Smoker    No Known Problems Sister     No Known Problems Sister     No Known Problems Sister     No Known Problems Son     No Known Problems Son     Heart attack Paternal Grandfather 60    Coronary artery disease Paternal Grandfather 60     Social History:  Marital Status: Single   Patient Pre-hospital Living Situation: Home  Patient Pre-hospital Level of Mobility: walks  Patient Pre-hospital Diet Restrictions: none    Meds/Allergies     Prior to Admission medications    Medication Sig Start Date End Date Taking? Authorizing  Provider   acetaminophen (TYLENOL) 325 mg tablet Take 3 tablets (975 mg total) by mouth every 8 (eight) hours 12/29/24   Chicho Hughes PA-C   atorvastatin (LIPITOR) 40 mg tablet Take 1 tablet (40 mg total) by mouth daily with dinner 12/9/24   Enid Altman DO   azithromycin (ZITHROMAX) 250 mg tablet Take 1 tablet (250 mg total) by mouth daily for 4 days 1/11/25 1/15/25  Ras East MD   escitalopram (LEXAPRO) 20 mg tablet Take 1 tablet (20 mg total) by mouth daily 10/2/24   Enid Altman DO   folic acid (FOLVITE) 1 mg tablet Take 1 tablet (1 mg total) by mouth daily 12/8/24 1/7/25  Lisa Andrews MD   gabapentin (NEURONTIN) 300 mg capsule Take 1 capsule (300 mg total) by mouth 3 (three) times a day 12/29/24   Chicho Hughes PA-C   HYDROmorphone (DILAUDID) 2 mg tablet Take 1 tablet (2 mg total) by mouth every 4 (four) hours as needed for severe pain for up to 10 days Max Daily Amount: 12 mg 1/7/25 1/17/25  Chicho Hughes PA-C   HYDROmorphone (Dilaudid) 2 mg tablet Take 1 tablet (2 mg total) by mouth every 4 (four) hours as needed for moderate pain for up to 5 days Max Daily Amount: 12 mg 1/11/25 1/16/25  Ras East MD   ibuprofen (MOTRIN) 400 mg tablet Take 1 tablet (400 mg total) by mouth every 6 (six) hours as needed for mild pain 12/29/24   Chicho Hughes PA-C   lidocaine (LIDODERM) 5 % Apply 1 patch topically over 12 hours daily Remove & Discard patch within 12 hours or as directed by MD 12/29/24   Chicho Hughes PA-C   lisinopril (ZESTRIL) 40 mg tablet Take 1 tablet (40 mg total) by mouth daily 12/9/24   Enid Altman DO   LORazepam (ATIVAN) 2 mg tablet Take 1 tablet (2 mg total) by mouth every 6 (six) hours as needed (for shakiness, feelings of alcohol withdrawal) for up to 3 days 1/7/25 1/10/25  Chicho Hughes PA-C   methocarbamol (ROBAXIN) 500 mg tablet Take 2 tablets (1,000 mg total) by mouth 3 (three) times a day as needed for muscle spasms 1/11/25    Jose Juan Ashford MD   Methocarbamol 1000 MG TABS Take 1,000 mg by mouth every 8 (eight) hours 1/7/25   Chicho Hughes PA-C   mirtazapine (REMERON) 15 mg tablet Take 1 tablet (15 mg total) by mouth daily at bedtime 11/13/24   Christine Roberts MD   nicotine (NICODERM CQ) 21 mg/24 hr TD 24 hr patch Place 1 patch on the skin over 24 hours daily 12/30/24   Chicho Hughes PA-C   pantoprazole (PROTONIX) 40 mg tablet Take 1 tablet (40 mg total) by mouth daily in the early morning 12/9/24 6/7/25  Enid Altman DO   senna-docusate sodium (SENOKOT S) 8.6-50 mg per tablet Take 1 tablet by mouth daily at bedtime 12/29/24   Chicho Hughes PA-C   Thiamine Mononitrate (VITAMIN B1) 100 mg tablet Take 1 tablet (100 mg total) by mouth daily 7/9/24   Enid Altman DO     Allergies   Allergen Reactions    Cefdinir Rash       Objective :  Temp:  [97.2 °F (36.2 °C)-98 °F (36.7 °C)] 97.2 °F (36.2 °C)  HR:  [] 62  BP: (139-176)/() 176/98  Resp:  [18-26] 18  SpO2:  [87 %-97 %] 97 %  O2 Device: Nasal cannula  Nasal Cannula O2 Flow Rate (L/min):  [2 L/min] 2 L/min    Physical Exam  Constitutional:       General: He is not in acute distress.  HENT:      Head: Normocephalic and atraumatic.   Eyes:      Conjunctiva/sclera: Conjunctivae normal.   Cardiovascular:      Rate and Rhythm: Regular rhythm. Tachycardia present.   Pulmonary:      Effort: No respiratory distress.      Breath sounds: No wheezing.   Chest:      Chest wall: Tenderness (right sided) present.   Abdominal:      General: There is no distension.      Tenderness: There is no abdominal tenderness. There is no guarding.   Musculoskeletal:      Right lower leg: No edema.      Left lower leg: No edema.   Skin:     General: Skin is warm and dry.   Neurological:      Mental Status: He is oriented to person, place, and time.   Psychiatric:         Mood and Affect: Mood normal.          Lines/Drains:            Lab Results: I have reviewed the following  results:  Results from last 7 days   Lab Units 01/14/25  0637   WBC Thousand/uL 5.69   HEMOGLOBIN g/dL 11.2*   HEMATOCRIT % 33.3*   PLATELETS Thousands/uL 189   SEGS PCT % 79*   LYMPHO PCT % 12*   MONO PCT % 6   EOS PCT % 1     Results from last 7 days   Lab Units 01/14/25  0540   SODIUM mmol/L 138   POTASSIUM mmol/L 4.3   CHLORIDE mmol/L 97   CO2 mmol/L 22   BUN mg/dL 10   CREATININE mg/dL 0.79   ANION GAP mmol/L 19*   CALCIUM mg/dL 9.8   ALBUMIN g/dL 4.7   TOTAL BILIRUBIN mg/dL 0.67   ALK PHOS U/L 163*   ALT U/L 18   AST U/L 42*   GLUCOSE RANDOM mg/dL 87     Results from last 7 days   Lab Units 01/14/25  0641   INR  1.12     Results from last 7 days   Lab Units 01/14/25  0543 01/11/25  0123   POC GLUCOSE mg/dl 81 88     Lab Results   Component Value Date    HGBA1C 4.9 02/14/2024    HGBA1C 5.0 11/12/2019     Results from last 7 days   Lab Units 01/10/25  2313   LACTIC ACID mmol/L 1.9   PROCALCITONIN ng/ml 0.06             Administrative Statements   I have spent 75 minutes on coordination of care, record review, discussion with other medical providers, documentation in medical record, patient counseling.    ** Please Note: This note has been constructed using a voice recognition system. **

## 2025-01-14 NOTE — ASSESSMENT & PLAN NOTE
"Following fall approximately one week ago  CT chest form 1/7/25:   \"Acute/subacute fractures of the right fourth through sixth ribs posterolaterally and at the right fifth through seventh ribs posteriorly\"  Patient had been on dilaudid PO, continue here  Noted for intermittent hypoxia  Incentive spirometry  "

## 2025-01-14 NOTE — ASSESSMENT & PLAN NOTE
Intermittent hypoxia in setting of recent multiple rib fractures on right  Currently requiring 2L NC  Provide analgesia, respiratory support

## 2025-01-14 NOTE — ED ATTENDING ATTESTATION
I, Christie Black MD, saw and evaluated the patient. I have discussed the patient with the resident/non-physician practitioner and agree with the resident's/non-physician practitioner's findings, Plan of Care, and MDM as documented in the resident's/non-physician practitioner's note, except where noted. All available labs and Radiology studies were reviewed.  I was present for key portions of any procedure(s) performed by the resident/non-physician practitioner and I was immediately available to provide assistance.       At this point I agree with the current assessment done in the Emergency Department.  I have conducted an independent evaluation of this patient a history and physical is as follows:    Subjective: 59-year-old male with history of alcohol abuse complicated by withdrawal, recent right-sided 4th through 7th rib fractures who presents with vomiting, anxiety, tremulousness and right-sided chest pain.  He reports that he has been taking p.o. Dilaudid for his pain as well as drinking to alleviate his symptoms.  He drinks 2 bottles of wine per day with his last drink approximately 2 AM this morning.  He feels as though he is currently going through withdrawal but denies history of intubation or seizure in the past.    Objective: Vitals with tachycardia, hypertension, tachypnea without fever or hypoxemia.  Patient is tremulous with tongue fasciculations and without asterixis.    Assessment/Plan: 59-year-old male with history of alcohol abuse complicated by withdrawal and rib fractures presenting with vomiting, anxiety, tremulousness as well as rib pain.  Exam and history concerning primarily for acute alcohol withdrawal for which patient was given a phenobarbital load with some improvement in symptoms.  Regarding his reproducible chest pain, chest x-ray showed no hemo-/pneumothorax or other acute abnormality.  He was provided with analgesia.  ECG was reassuring and initial troponin was 3.  Labs otherwise  notable for hypomagnesemia and mild transaminitis with AST predominance.    Patient developed oxygen requirement here in the emergency department suggestive of a complicated withdrawal syndrome.  He was transferred to [facility] for inpatient detox.    CriticalCare Time    Date/Time: 1/14/2025 7:15 AM    Performed by: Christie Black MD  Authorized by: Christie Black MD    Critical care provider statement:     Critical care time (minutes):  30 (CC time was exclusive separate form any procedures or treating other patients.)    Critical care time was exclusive of:  Separately billable procedures and treating other patients and teaching time    Critical care was necessary to treat or prevent imminent or life-threatening deterioration of the following conditions:  Respiratory failure, CNS failure or compromise and toxidrome    Critical care was time spent personally by me on the following activities:  Development of treatment plan with patient or surrogate, evaluation of patient's response to treatment, examination of patient, ordering and performing treatments and interventions, ordering and review of laboratory studies, ordering and review of radiographic studies and re-evaluation of patient's condition

## 2025-01-14 NOTE — CONSULTS
Consultation - Medical Toxicology   Name: Diogo Travis 59 y.o. male I MRN: [id number]  Unit/Bed#: 7T [facility] 701-01 I Date of Admission: 1/14/2025   Date of Service: 1/14/2025 I Hospital Day: 0   Inpatient consult to Toxicology  Consult performed by: Lavelle Cruz DO  Consult ordered by: Ashley Johnston MD        Physician Requesting Evaluation: Ashley Johnston MD   Reason for Evaluation / Principal Problem: Alcohol use disorder, alcohol withdrawal syndrome    Assessment & Plan  Alcohol withdrawal (HCC)  Patient with a history of chronic daily alcohol use  Last drink was yesterday evening with Dilaudid  Serum alcohol 229 in the ED  Received a total of 710 mg of phenobarbital including 680 in the emergency department this morning  Initiate SEWS protocol for medical management of alcohol withdrawal  SEWS score 11 upon admission  Continue monitoring under protocol and administer phenobarbital as indicated with a maximum of 2g  Continuous pulse ox and telemetry monitoring  Patient reports symptoms have significantly improved status post phenobarbital      Alcohol use disorder    Continue daily vitamin supplementation with thiamine, folic acid, and multivitamin  Appreciate certified  and case management consultations for recommendations regarding aftercare resources, recovery support and disposition planning  Patient is interested in outpatient rehabilitation.  He is currently receiving opioids for his pain for rib fractures  He has previously been on naltrexone p.o. before.  States he has a bottle of this at home  Not a candidate at this time given ongoing opioid use  Discussed with him that he could initiate this 7 to 10 days after his last use of opioids  Starting dose would be 25 mg on the first day followed by 50 mg daily as maintenance dose    Hypomagnesemia  Replacement per primary  Hepatic steatosis  Encourage alcohol cessation  Alcoholic ketoacidosis  Anion gap of  19  Dextrose containing IV fluids.  Trend CMP until resolution  Closed fracture of multiple ribs of right side with routine healing  Pain is one of the triggers for him drinking alcohol.  He has been using opioids to treat the pain as well  Would consider nonopioid analgesia  Could consider nerve block  I have discussed the above management plan in detail with the primary service.     Please see additional teaching note below (if available):    Ethanol Withdrawal  Department of Medical Toxicology  [facility]    Updated June 2019    Ethanol withdrawal can be precipitated by sudden discontinuation of chronic ethanol use. Symptoms of ethanol withdrawal syndrome include tremor, anxiety, headache, palpitations, insomnia, nausea and vomiting, diaphoresis, tachycardia and hypertension. In severe cases, seizures may also occur. Seizures are usually brief and generalized, and often occur within 6-12 hours after cessation of ethanol use. Each time someone goes through ethanol withdrawal, it is generally worse secondary to the Kindling Effect.     Sympathetic nervous system overactivity may progress to delirium tremens.  Delirium tremens is a life-threatening condition that is characterized by tachycardia, diaphoresis, hyperthermia, delirium and hallucinations.  It usually occurs about 48-72 hours after discontinuation of heavy ethanol use.  If not treated appropriately, significant morbidity and mortality can be associated.    The pathophysiology in ethanol dependence is associated with downregulation of GABAa receptors and upregulation of NMDA receptors. This is secondary to ethanol's continuous CLAUDIA agonism and NDMA antagonism. When ethanol use is discontinued, this resulting state leads to the hyperexcitation associated with the withdrawal syndrome. Treatment is primarily targeted at decreasing the excitatory state with sedatives, such as benzodiazepines and phenobarbital.  Adjunctive treatments  may include dexmedetomidine or ketamine. Propofol may also be utilized in cases where the airway is protected.      Other complications to consider in the setting of ethanol dependence include hypoglycemia, alcoholic ketoacidosis, Wernicke's Encephalopahty and Wernicke-Korsakoff Syndrome. It is important to routinely provide nutrition, hydration and often thiamine.       Treatment regimen utilized by Department of Medical Toxicology involves application of the Severity of Ethanol Withdrawal Scale:      PHENOBARBITAL FOR ALCOHOL WITHDRAWAL:    MILD SEWS SCORE (1-6)  Administer diazepam 10 mg PO X1 (unless unable to take PO)  Administer phenobarbital 65 mg IV x1 and then another 65 mg IV q60 min PRN (max 5 doses total)  * Document SEWS with each dose and/or minimum of q2h.  HOLD any further phenobarbital dosing for RASS -4 or -5    MODERATE SEWS SCORE (7-12)  Administer diazepam 10 mg PO X1 (unless unable to take PO)  Administer phenobarbital 260 mg IV x1 dose  Administer phenobarbital 130 mg IV q30 min PRN (max 5 doses)  * Document SEWS with each dose and/or minimum of q1h.  HOLD any further phenobarbital dosing for RASS -4 or -5    SEVERE SEWS SCORE (13 or higher)  Administer diazepam 10 mg PO X1 (unless unable to take PO)  If this is the INITIAL SEWS SCORE administer phenobarbital 650 mg IVPB over 15 minutes x1.  If this is NOT INITIAL SEWS SCORE administer phenobarbital 260 mg IV, and then another 260 mg IV q15 min PRN (max 3 doses total)  Administer phenobarbital 130 mg IV q30 min PRN (max 5 doses)  * Document SEWS with each dose and/or minimum of q30 min.  HOLD any further phenobarbital for RASS -4 or -5.      For further questions, please contact the medical  on call via SecureChat between 8am and 9pm. If between 9pm and 8am, please reach out to the [facility] at [phone].     History of Present Illness   Diogo Travis is a 59 y.o. year old male who presents with alcohol withdrawal  symptoms.  He had tremor, diaphoresis, agitation which prompted visit to the emergency department.  Patient drinks 2 bottles of wine per day.  Recently he fell off a ladder and sustained rib fractures.  He has been using Dilaudid to help with his pain management.  Last drink reported to be yesterday evening when he drank wine and Dilaudid together.  Denies any history of seizures.  Has had shakes with cessation of alcohol.  He has received phenobarbital which has improved his symptoms.  He does report being on naltrexone previously.  He does have naltrexone pills at home.  He is interested in restarting them to assist with his alcohol use disorder.  He is interested in rehab but on an outpatient basis.  Denies any supplement use such as kratom.    Review of Systems   Constitutional:  Positive for diaphoresis (improved). Negative for chills and fever.   HENT:  Negative for congestion and sore throat.    Eyes:  Negative for visual disturbance.   Respiratory:  Negative for cough and shortness of breath.    Cardiovascular:  Positive for chest pain (due to rib fx). Negative for palpitations and leg swelling.   Gastrointestinal:  Positive for nausea (improved). Negative for abdominal pain and vomiting.   Genitourinary:  Negative for difficulty urinating and dysuria.   Neurological:  Positive for tremors (improved). Negative for seizures and headaches.   Psychiatric/Behavioral:  Positive for agitation (improved).        Historical Information   I have reviewed the patient's PMH, PSH, Social History, Family History, Meds, and Allergies  Social History     Tobacco Use    Smoking status: Some Days     Types: Cigarettes, Cigars     Start date: 1/1/2014     Passive exposure: Past (Father)    Smokeless tobacco: Never    Tobacco comments:     Smokes cigars 2-3 times per year   Vaping Use    Vaping status: Never Used   Substance and Sexual Activity    Alcohol use: Yes     Comment: ETOH 12/7/24    Drug use: Never    Sexual  activity: Yes     Partners: Female     Birth control/protection: Post-menopausal     Family History   Problem Relation Age of Onset    Cancer Mother 78        Type unknown    Hypertension Father     Coronary artery disease Father 60    Cancer Father 82        Bladder; + Smoker    No Known Problems Sister     No Known Problems Sister     No Known Problems Sister     No Known Problems Son     No Known Problems Son     Heart attack Paternal Grandfather 60    Coronary artery disease Paternal Grandfather 60       Meds/Allergies   Prior to Admission medications    Medication Sig Start Date End Date Taking? Authorizing Provider   acetaminophen (TYLENOL) 325 mg tablet Take 3 tablets (975 mg total) by mouth every 8 (eight) hours 12/29/24   Chicho Hughes PA-C   atorvastatin (LIPITOR) 40 mg tablet Take 1 tablet (40 mg total) by mouth daily with dinner 12/9/24   Enid Altman DO   azithromycin (ZITHROMAX) 250 mg tablet Take 1 tablet (250 mg total) by mouth daily for 4 days 1/11/25 1/15/25  Ras East MD   escitalopram (LEXAPRO) 20 mg tablet Take 1 tablet (20 mg total) by mouth daily 10/2/24   Enid Altman DO   folic acid (FOLVITE) 1 mg tablet Take 1 tablet (1 mg total) by mouth daily 12/8/24 1/7/25  Lisa Andrews MD   gabapentin (NEURONTIN) 300 mg capsule Take 1 capsule (300 mg total) by mouth 3 (three) times a day 12/29/24   Chicho Hughes PA-C   HYDROmorphone (DILAUDID) 2 mg tablet Take 1 tablet (2 mg total) by mouth every 4 (four) hours as needed for severe pain for up to 10 days Max Daily Amount: 12 mg 1/7/25 1/17/25  Chicho Hughes PA-C   HYDROmorphone (Dilaudid) 2 mg tablet Take 1 tablet (2 mg total) by mouth every 4 (four) hours as needed for moderate pain for up to 5 days Max Daily Amount: 12 mg 1/11/25 1/16/25  Ras East MD   ibuprofen (MOTRIN) 400 mg tablet Take 1 tablet (400 mg total) by mouth every 6 (six) hours as needed for mild pain 12/29/24   Chicho Hughes PA-C    lidocaine (LIDODERM) 5 % Apply 1 patch topically over 12 hours daily Remove & Discard patch within 12 hours or as directed by MD 12/29/24   Chicho Hughes PA-C   lisinopril (ZESTRIL) 40 mg tablet Take 1 tablet (40 mg total) by mouth daily 12/9/24   Enid Altman DO   LORazepam (ATIVAN) 2 mg tablet Take 1 tablet (2 mg total) by mouth every 6 (six) hours as needed (for shakiness, feelings of alcohol withdrawal) for up to 3 days 1/7/25 1/10/25  Chicho Hughes PA-C   methocarbamol (ROBAXIN) 500 mg tablet Take 2 tablets (1,000 mg total) by mouth 3 (three) times a day as needed for muscle spasms 1/11/25   Jose Juan Ashfrod MD   Methocarbamol 1000 MG TABS Take 1,000 mg by mouth every 8 (eight) hours 1/7/25   Chicho Hughes PA-C   mirtazapine (REMERON) 15 mg tablet Take 1 tablet (15 mg total) by mouth daily at bedtime 11/13/24   Christine Roberts MD   nicotine (NICODERM CQ) 21 mg/24 hr TD 24 hr patch Place 1 patch on the skin over 24 hours daily 12/30/24   Chicho Hughes PA-C   pantoprazole (PROTONIX) 40 mg tablet Take 1 tablet (40 mg total) by mouth daily in the early morning 12/9/24 6/7/25  Enid Altman DO   senna-docusate sodium (SENOKOT S) 8.6-50 mg per tablet Take 1 tablet by mouth daily at bedtime 12/29/24   Chicho Hughes PA-C   Thiamine Mononitrate (VITAMIN B1) 100 mg tablet Take 1 tablet (100 mg total) by mouth daily 7/9/24   Enid Altman DO     Current Facility-Administered Medications:     acetaminophen (TYLENOL) tablet 650 mg, 650 mg, Oral, Q6H PRN, Ashley Johnston MD, 650 mg at 01/14/25 1118    atorvastatin (LIPITOR) tablet 40 mg, 40 mg, Oral, Daily With Dinner, Ashley Johnston MD    escitalopram (LEXAPRO) tablet 20 mg, 20 mg, Oral, Daily, Ashley Johnston MD, 20 mg at 01/14/25 1118    folic acid (FOLVITE) tablet 1 mg, 1 mg, Oral, Daily, Ashley Johnston MD, 1 mg at 01/14/25 1118    gabapentin (NEURONTIN) capsule 300 mg, 300 mg, Oral, TID, Ashley Conway  MD Preston, 300 mg at 01/14/25 1118    HYDROmorphone (DILAUDID) tablet 1 mg, 1 mg, Oral, Q4H PRN **OR** HYDROmorphone (DILAUDID) tablet 2 mg, 2 mg, Oral, Q4H PRN, Ashley Johnston MD, 2 mg at 01/14/25 1135    lidocaine (LIDODERM) 5 % patch 1 patch, 1 patch, Topical, Daily, Ashley Johnston MD    lisinopril (ZESTRIL) tablet 40 mg, 40 mg, Oral, Daily, Ashley Johnston MD, 40 mg at 01/14/25 1118    methocarbamol (ROBAXIN) tablet 1,000 mg, 1,000 mg, Oral, Q8H COLBY, Ashley Johnston MD    mirtazapine (REMERON) tablet 15 mg, 15 mg, Oral, HS, Ashley Johnston MD    multi-electrolyte (PLASMALYTE-A/ISOLYTE-S PH 7.4) IV solution, 100 mL/hr, Intravenous, Continuous, Ashley Johnston MD, Last Rate: 100 mL/hr at 01/14/25 1121, 100 mL/hr at 01/14/25 1121    multivitamin stress formula tablet 1 tablet, 1 tablet, Oral, Daily, Ashley Johnston MD    nicotine (NICODERM CQ) 14 mg/24hr TD 24 hr patch 1 patch, 1 patch, Transdermal, Daily, Ashley Johnston MD    nicotine (NICODERM CQ) 21 mg/24 hr TD 24 hr patch 1 patch, 1 patch, Transdermal, Daily, Ashley Johnston MD    ondansetron (ZOFRAN) injection 4 mg, 4 mg, Intravenous, Q6H PRN, Ashley Johnston MD    pantoprazole (PROTONIX) EC tablet 40 mg, 40 mg, Oral, Early Morning, Ashley Johnston MD, 40 mg at 01/14/25 1121    senna-docusate sodium (SENOKOT S) 8.6-50 mg per tablet 1 tablet, 1 tablet, Oral, HS, Ashley Johnston MD    thiamine tablet 100 mg, 100 mg, Oral, Daily, Ashley Johnston MD, 100 mg at 01/14/25 1121   Allergies   Allergen Reactions    Cefdinir Rash       Objective :  Temp:  [97.2 °F (36.2 °C)-98 °F (36.7 °C)] 97.2 °F (36.2 °C)  HR:  [] 62  BP: (139-176)/() 176/98  Resp:  [18-26] 18  SpO2:  [87 %-97 %] 97 %  O2 Device: Nasal cannula  Nasal Cannula O2 Flow Rate (L/min):  [2 L/min] 2 L/min    No intake or output data in the 24 hours ending 01/14/25 5867    Physical Exam  Vitals and nursing note  reviewed.   Constitutional:       General: He is not in acute distress.     Appearance: He is not toxic-appearing or diaphoretic.   HENT:      Head: Normocephalic and atraumatic.      Right Ear: External ear normal.      Left Ear: External ear normal.      Nose: Nose normal.   Eyes:      Pupils: Pupils are equal, round, and reactive to light.   Cardiovascular:      Rate and Rhythm: Normal rate and regular rhythm.      Pulses: Normal pulses.      Heart sounds: Normal heart sounds.   Pulmonary:      Effort: Pulmonary effort is normal.      Breath sounds: Normal breath sounds.   Chest:      Comments: Did not see any bruising to the right posterior chest.  Patient reports significant pain, did not palpate secondary to that.  Abdominal:      General: Abdomen is flat.      Palpations: Abdomen is soft.      Tenderness: There is no abdominal tenderness.   Musculoskeletal:         General: Normal range of motion.      Cervical back: Normal range of motion and neck supple.      Right lower leg: No edema.      Left lower leg: No edema.   Skin:     General: Skin is warm and dry.      Capillary Refill: Capillary refill takes less than 2 seconds.   Neurological:      General: No focal deficit present.      Mental Status: He is alert.      GCS: GCS eye subscore is 4. GCS verbal subscore is 5. GCS motor subscore is 6.      Cranial Nerves: No dysarthria or facial asymmetry.      Motor: No tremor, abnormal muscle tone or seizure activity.      Coordination: Finger-Nose-Finger Test normal.      Comments: No tongue fasciculations    Alert to location, month, year   Psychiatric:         Mood and Affect: Mood normal.         Behavior: Behavior is cooperative.           Lab Results: I have reviewed the following results:  Results from last 7 days   Lab Units 01/14/25  0637 01/10/25  2313   WBC Thousand/uL 5.69 6.03   HEMOGLOBIN g/dL 11.2* 13.2   HEMATOCRIT % 33.3* 38.2   PLATELETS Thousands/uL 189 271   SEGS PCT % 79* 59   LYMPHO PCT %  12* 31   MONO PCT % 6 5   EOS PCT % 1 3      Results from last 7 days   Lab Units 01/14/25  0540   POTASSIUM mmol/L 4.3   CHLORIDE mmol/L 97   CO2 mmol/L 22   BUN mg/dL 10   CREATININE mg/dL 0.79   CALCIUM mg/dL 9.8   ALBUMIN g/dL 4.7   ALK PHOS U/L 163*   ALT U/L 18   AST U/L 42*   MAGNESIUM mg/dL 1.7*   PHOSPHORUS mg/dL 3.0      Results from last 7 days   Lab Units 01/14/25  0641 01/10/25  2313   INR  1.12 0.90   PTT seconds 30 23     Results from last 7 days   Lab Units 01/10/25  2313   LACTIC ACID mmol/L 1.9     Results from last 7 days   Lab Units 01/14/25  0752 01/14/25  0540   HS TNI 0HR ng/L  --  3   HS TNI 2HR ng/L <2  --       Results from last 7 days   Lab Units 01/10/25  2313   PH FANG  7.535*   PCO2 FANG mm Hg 30.6*   PO2 FANG mm Hg 109.2*   HCO3 FANG mmol/L 25.3   O2 CONTENT FANG ml/dL 19.0   O2 HGB, VENOUS % 95.3*     Results from last 7 days   Lab Units 01/10/25  2313   ETHANOL LVL mg/dL 229*     Imaging Results Review: I reviewed radiology reports from this admission including: chest xray.  Other Study Results Review: EKG was personally reviewed and my interpretation is: Sinus Tachycardia. .  QTc prolonged.  Normal axis.  No ENRIQUE..    Administrative Statements   I have spent a total time of 40 minutes in caring for this patient on the day of the visit/encounter including Diagnostic results, Prognosis, Risks and benefits of tx options, Instructions for management, Patient and family education, Importance of tx compliance, Risk factor reductions, Impressions, Counseling / Coordination of care, Documenting in the medical record, Reviewing / ordering tests, medicine, procedures  , Obtaining or reviewing history  , and Communicating with other healthcare professionals .

## 2025-01-14 NOTE — ASSESSMENT & PLAN NOTE
Pain is one of the triggers for him drinking alcohol.  He has been using opioids to treat the pain as well  Would consider nonopioid analgesia  Could consider nerve block

## 2025-01-14 NOTE — ASSESSMENT & PLAN NOTE
Continue daily vitamin supplementation with thiamine, folic acid, and multivitamin  Appreciate certified  and case management consultations for recommendations regarding aftercare resources, recovery support and disposition planning  Patient is interested in outpatient rehabilitation.  He is currently receiving opioids for his pain for rib fractures  He has previously been on naltrexone p.o. before.  States he has a bottle of this at home  Not a candidate at this time given ongoing opioid use  Discussed with him that he could initiate this 7 to 10 days after his last use of opioids  Starting dose would be 25 mg on the first day followed by 50 mg daily as maintenance dose

## 2025-01-14 NOTE — ASSESSMENT & PLAN NOTE
This is a 58 yo patient with history of alcohol use disorder, CAD, hyperlipidemia, HTN, GERD, hepatic steatosis and recent rib fracture on a course of oral Dilaudid who presented to [facility] with complaints of alcohol withdrawal reporting tremor, diaphrosis and agitation, prior history of withdrawal.  Last drink 1/13/252 bottles of wine along with Dilaudid 4 mg   Received phenobarbital in  mg IV at 6 am  Was noted for supine hypoxia in setting of recent rib fracture, placed on 2L NC, hypomagnesemia, abnormal LFTs, increased anion gap with concern for AKA  Accepted at  Detox unit  Toxicology consulted, appreciate input  SEWS protocol  Supportive management  Will continue PO Dilaudid in setting of rib fractures

## 2025-01-15 VITALS
TEMPERATURE: 98.2 F | OXYGEN SATURATION: 92 % | HEIGHT: 68 IN | WEIGHT: 227 LBS | RESPIRATION RATE: 18 BRPM | DIASTOLIC BLOOD PRESSURE: 81 MMHG | SYSTOLIC BLOOD PRESSURE: 123 MMHG | BODY MASS INDEX: 34.4 KG/M2 | HEART RATE: 93 BPM

## 2025-01-15 PROBLEM — E87.29 ALCOHOLIC KETOACIDOSIS: Status: RESOLVED | Noted: 2023-10-16 | Resolved: 2025-01-15

## 2025-01-15 PROBLEM — R09.02 HYPOXIA: Status: RESOLVED | Noted: 2025-01-14 | Resolved: 2025-01-15

## 2025-01-15 LAB
ALBUMIN SERPL BCG-MCNC: 3.8 G/DL (ref 3.5–5)
ALP SERPL-CCNC: 114 U/L (ref 34–104)
ALT SERPL W P-5'-P-CCNC: 11 U/L (ref 7–52)
ANION GAP SERPL CALCULATED.3IONS-SCNC: 10 MMOL/L (ref 4–13)
AST SERPL W P-5'-P-CCNC: 22 U/L (ref 13–39)
ATRIAL RATE: 127 BPM
BILIRUB SERPL-MCNC: 1.04 MG/DL (ref 0.2–1)
BUN SERPL-MCNC: 8 MG/DL (ref 5–25)
CALCIUM SERPL-MCNC: 8.8 MG/DL (ref 8.4–10.2)
CHLORIDE SERPL-SCNC: 99 MMOL/L (ref 96–108)
CO2 SERPL-SCNC: 29 MMOL/L (ref 21–32)
CREAT SERPL-MCNC: 0.95 MG/DL (ref 0.6–1.3)
GFR SERPL CREATININE-BSD FRML MDRD: 87 ML/MIN/1.73SQ M
GLUCOSE SERPL-MCNC: 85 MG/DL (ref 65–140)
MAGNESIUM SERPL-MCNC: 1.7 MG/DL (ref 1.9–2.7)
P AXIS: 56 DEGREES
PHOSPHATE SERPL-MCNC: 4.1 MG/DL (ref 2.7–4.5)
POTASSIUM SERPL-SCNC: 3.9 MMOL/L (ref 3.5–5.3)
PR INTERVAL: 146 MS
PROT SERPL-MCNC: 6.4 G/DL (ref 6.4–8.4)
QRS AXIS: 47 DEGREES
QRSD INTERVAL: 90 MS
QT INTERVAL: 396 MS
QTC INTERVAL: 575 MS
SODIUM SERPL-SCNC: 138 MMOL/L (ref 135–147)
T WAVE AXIS: 47 DEGREES
VENTRICULAR RATE: 127 BPM

## 2025-01-15 PROCEDURE — 84100 ASSAY OF PHOSPHORUS: CPT | Performed by: FAMILY MEDICINE

## 2025-01-15 PROCEDURE — 80053 COMPREHEN METABOLIC PANEL: CPT | Performed by: FAMILY MEDICINE

## 2025-01-15 PROCEDURE — 99232 SBSQ HOSP IP/OBS MODERATE 35: CPT | Performed by: EMERGENCY MEDICINE

## 2025-01-15 PROCEDURE — 93010 ELECTROCARDIOGRAM REPORT: CPT | Performed by: INTERNAL MEDICINE

## 2025-01-15 PROCEDURE — 99239 HOSP IP/OBS DSCHRG MGMT >30: CPT

## 2025-01-15 PROCEDURE — 83735 ASSAY OF MAGNESIUM: CPT | Performed by: FAMILY MEDICINE

## 2025-01-15 PROCEDURE — 99232 SBSQ HOSP IP/OBS MODERATE 35: CPT

## 2025-01-15 RX ORDER — OXYCODONE HYDROCHLORIDE 5 MG/1
10 TABLET ORAL EVERY 6 HOURS PRN
Qty: 28 TABLET | Refills: 0 | Status: SHIPPED | OUTPATIENT
Start: 2025-01-15 | End: 2025-01-22

## 2025-01-15 RX ORDER — OXYCODONE HYDROCHLORIDE 5 MG/1
10 TABLET ORAL EVERY 6 HOURS PRN
Qty: 28 TABLET | Refills: 0 | Status: SHIPPED | OUTPATIENT
Start: 2025-01-15 | End: 2025-01-15

## 2025-01-15 RX ORDER — MAGNESIUM SULFATE HEPTAHYDRATE 40 MG/ML
2 INJECTION, SOLUTION INTRAVENOUS ONCE
Status: COMPLETED | OUTPATIENT
Start: 2025-01-15 | End: 2025-01-15

## 2025-01-15 RX ORDER — GABAPENTIN 300 MG/1
300 CAPSULE ORAL 3 TIMES DAILY
Qty: 90 CAPSULE | Refills: 0 | Status: SHIPPED | OUTPATIENT
Start: 2025-01-15 | End: 2025-02-14

## 2025-01-15 RX ADMIN — GABAPENTIN 300 MG: 300 CAPSULE ORAL at 08:44

## 2025-01-15 RX ADMIN — THIAMINE HCL TAB 100 MG 100 MG: 100 TAB at 08:44

## 2025-01-15 RX ADMIN — ATORVASTATIN CALCIUM 40 MG: 40 TABLET, FILM COATED ORAL at 16:31

## 2025-01-15 RX ADMIN — PANTOPRAZOLE SODIUM 40 MG: 40 TABLET, DELAYED RELEASE ORAL at 05:33

## 2025-01-15 RX ADMIN — METHOCARBAMOL TABLETS 1000 MG: 500 TABLET, COATED ORAL at 16:31

## 2025-01-15 RX ADMIN — LISINOPRIL 40 MG: 20 TABLET ORAL at 08:44

## 2025-01-15 RX ADMIN — GABAPENTIN 300 MG: 300 CAPSULE ORAL at 16:31

## 2025-01-15 RX ADMIN — METHOCARBAMOL TABLETS 1000 MG: 500 TABLET, COATED ORAL at 05:33

## 2025-01-15 RX ADMIN — HYDROMORPHONE HYDROCHLORIDE 2 MG: 2 TABLET ORAL at 16:31

## 2025-01-15 RX ADMIN — SODIUM CHLORIDE, SODIUM GLUCONATE, SODIUM ACETATE, POTASSIUM CHLORIDE, MAGNESIUM CHLORIDE, SODIUM PHOSPHATE, DIBASIC, AND POTASSIUM PHOSPHATE 100 ML/HR: .53; .5; .37; .037; .03; .012; .00082 INJECTION, SOLUTION INTRAVENOUS at 08:50

## 2025-01-15 RX ADMIN — FOLIC ACID 1 MG: 1 TABLET ORAL at 08:44

## 2025-01-15 RX ADMIN — ESCITALOPRAM OXALATE 20 MG: 10 TABLET ORAL at 08:44

## 2025-01-15 RX ADMIN — B-COMPLEX W/ C & FOLIC ACID TAB 1 TABLET: TAB at 08:44

## 2025-01-15 RX ADMIN — MAGNESIUM SULFATE IN WATER 2 G: 40 INJECTION, SOLUTION INTRAVENOUS at 08:44

## 2025-01-15 NOTE — UTILIZATION REVIEW
Initial Clinical Review    Admission: Date/Time/Statement:   Admission Orders (From admission, onward)       Ordered        01/14/25 1059  INPATIENT ADMISSION  Once                          Orders Placed This Encounter   Procedures    INPATIENT ADMISSION     Standing Status:   Standing     Number of Occurrences:   1     Level of Care:   Med Surg [16]     Estimated length of stay:   More than 2 Midnights     Certification:   I certify that inpatient services are medically necessary for this patient for a duration of greater than two midnights. See H&P and MD Progress Notes for additional information about the patient's course of treatment.       Initial Clinical Review    Initial Presentation: 59 y.o. male   PMH of alcohol use disorder, anxiety/depression, GED hyperlipidemia, HTN, liver steatosis   Recent presentations to [facility]  (1/7, 1/10,  1/14)  for  alchohol withdrawal symptoms and recent rib fx  (using po dilaudid for pain)    IN ER  1/14  Reporting tremor, diaphrosis and agitation, prior history of withdrawal.   Intermittent hypoxia - requiring supplemental oxygen @  2L NC   Serum ETOH: 229    SEWS SCORE 11        Therefore transferred to  [facility]    1/14  @  1100  ADMIT  IP status  MS  Level of care  for evaluation and treatment of   alcohol withdrawal syndrome,  alcoholic ketoacidosis,  hypoxia a/w  closed fx multiple ribs (4th thru 6th)  Rt side.  Presented w/ tremor, diaphrosis and agitation.   Exam:  diaphoretic, fatigued;  nausea, tremors.  C/o palpitations and light headedness.    Received a total of  680 mg of phenobarbital in the emergency department this morning    Plan:  IVF [place] @  100 cc/hr, telemetry, continuous pulse ox, supplemental oxygen prn.   trend labs, replete electrolytes as needed; I&O, fall & seizure precautions. Toxicology consulted.     1/14  0810  IV MS 4 mg  0550  IV zofran   0600  IV Phenobarb  680 mg   0900  IV Zofran   1200  IV phenobarb  130 mg   1510   IV phenobarb  130 mg   PO dilaudid prn -  given x4     1/14    Toxicology:    Patient reports symptoms have significantly improved status post phenobarbital.     Reports  2 bottles   wine  daily, last drink on 1/13   @ evening.   H/o prior withdrawal - on  naltrexone previously.   interested in rehab but on an outpatient basis.   Plan: SEWS monitoring w/ phenobarbital management  (max of 2 gm)  PO thiamine/folic acid supplement.      Anticipated Length of Stay: Patient will be admitted on an inpatient basis with an anticipated length of stay of greater than 2 midnights secondary to close monitoring, IVF    Date: 1/15           Day 2: Pt reports Reports improvement in withdrawal symptoms. Still endorsing rib pain which is secondary to recent fractures. Will provide lidocaine patches, encouraged incentive spirometry.  Decreased breath sounds Rt lower base.   Pt anxious, a/o,  no tremor      Given IV Mg sulfate 2 gm   x1 .   Not a candidate  naltrexone at this time secondary to opioid use from recent rib fx       Scheduled Medications:  atorvastatin, 40 mg, Oral, Daily With Dinner  escitalopram, 20 mg, Oral, Daily  folic acid, 1 mg, Oral, Daily  gabapentin, 300 mg, Oral, TID  lidocaine, 1 patch, Topical, Daily  lisinopril, 40 mg, Oral, Daily  methocarbamol, 1,000 mg, Oral, Q8H COLBY  mirtazapine, 15 mg, Oral, HS  multivitamin stress formula, 1 tablet, Oral, Daily  nicotine, 1 patch, Transdermal, Daily  nicotine, 1 patch, Transdermal, Daily  pantoprazole, 40 mg, Oral, Early Morning  senna-docusate sodium, 1 tablet, Oral, HS  Thiamine Mononitrate, 100 mg, Oral, Daily      Continuous IV Infusions:  multi-electrolyte, 100 mL/hr, Intravenous, Continuous      PRN Meds:  acetaminophen, 650 mg, Oral, Q6H PRN  HYDROmorphone, 1 mg, Oral, Q4H PRN  HYDROmorphone, 2 mg, Oral, Q4H PRN  ondansetron, 4 mg, Intravenous, Q6H PRN      ED Triage Vitals   Temperature Pulse Respirations Blood Pressure SpO2 Pain Score   01/14/25 1059 01/14/25  1059 01/14/25 1059 01/14/25 1059 01/14/25 1059 01/14/25 1118   (!) 97.2 °F (36.2 °C) 62 18 (!) 176/98 97 % 7     Weight (last 2 days)       Date/Time Weight    01/14/25 1059 103 (227)              Vital Signs (last 3 days)       Date/Time Temp Pulse Resp BP MAP (mmHg) SpO2 Nasal Cannula O2 Flow Rate (L/min) O2 Device [place] Coma Scale Score SEWS Total Score Pain    01/15/25 1107 -- 73 18 137/80 99 94 % 2 L/min Nasal cannula -- -- --    01/15/25 0844 -- -- -- -- -- -- -- -- -- -- No Pain    01/15/25 0718 97.7 °F (36.5 °C) 73 18 130/90 103 95 % -- None (Room air) -- -- --    01/15/25 0518 -- -- -- -- -- -- -- -- -- 0 --    01/15/25 0500 97 °F (36.1 °C) 76 18 137/80 -- 92 % -- None (Room air) -- -- --    01/14/25 2359 -- -- -- -- -- -- -- -- -- -- 7    01/14/25 2330 -- -- -- -- -- -- -- -- -- 0 --    01/14/25 2323 98.2 °F (36.8 °C) 74 18 147/86 106 93 % 2 L/min Nasal cannula -- -- --    01/14/25 2243 -- -- -- -- -- 95 % 2 L/min Nasal cannula -- -- --    01/14/25 2123 -- -- -- -- -- -- -- -- 15 -- --    01/14/25 2027 -- -- -- -- -- -- -- -- -- 0 --    01/14/25 2026 97.9 °F (36.6 °C) 75 18 139/90 -- 96 % -- None (Room air) -- -- --    01/14/25 2023 -- -- -- -- -- -- -- -- -- -- 8    01/14/25 1612 -- -- -- -- -- -- -- -- -- 0 --    01/14/25 1605 -- -- -- -- -- -- -- -- -- -- 8    01/14/25 1506 -- -- -- -- -- -- -- -- -- 8 --    01/14/25 1505 97.6 °F (36.4 °C) 73 19 160/96 114 95 % -- None (Room air) -- -- --    01/14/25 1230 -- -- -- -- -- -- -- -- -- 0 --    01/14/25 1135 -- -- -- -- -- -- -- -- -- -- 7    01/14/25 1128 -- -- -- -- -- -- -- -- -- 11 --    01/14/25 1118 -- -- -- -- -- -- -- -- -- -- 7    01/14/25 1059 97.2 °F (36.2 °C) 62 18 176/98 -- 97 % 2 L/min Nasal cannula -- -- --            Weight (last 2 days)       Date/Time Weight    01/14/25 1059 103 (227)            Vital Signs (last 3 days)       Date/Time Temp Pulse Resp BP SpO2 Nasal Cannula O2 Flow Rate (L/min) O2 Device [place] Coma Scale Score NOLBERTO  Total Score Pain    01/15/25 1107 -- 73 18 137/80 94 % 2 L/min Nasal cannula -- -- --    01/15/25 0844 -- -- -- -- -- -- -- -- -- No Pain    01/15/25 0718 97.7 °F (36.5 °C) 73 18 130/90 95 % -- None (Room air) -- -- --    01/15/25 0518 -- -- -- -- -- -- -- -- 0 --    01/15/25 0500 97 °F (36.1 °C) 76 18 137/80 92 % -- None (Room air) -- -- --    01/14/25 2359 -- -- -- -- -- -- -- -- -- 7    01/14/25 2330 -- -- -- -- -- -- -- -- 0 --    01/14/25 2323 98.2 °F (36.8 °C) 74 18 147/86 93 % 2 L/min Nasal cannula -- -- --    01/14/25 2243 -- -- -- -- 95 % 2 L/min Nasal cannula -- -- --    01/14/25 2123 -- -- -- -- -- -- -- 15 -- --    01/14/25 2027 -- -- -- -- -- -- -- -- 0 --    01/14/25 2026 97.9 °F (36.6 °C) 75 18 139/90 96 % -- None (Room air) -- -- --    01/14/25 2023 -- -- -- -- -- -- -- -- -- 8    01/14/25 1612 -- -- -- -- -- -- -- -- 0 --    01/14/25 1605 -- -- -- -- -- -- -- -- -- 8    01/14/25 1506 -- -- -- -- -- -- -- -- 8 --    01/14/25 1505 97.6 °F (36.4 °C) 73 19 160/96 95 % -- None (Room air) -- -- --    01/14/25 1230 -- -- -- -- -- -- -- -- 0 --    01/14/25 1135 -- -- -- -- -- -- -- -- -- 7 01/14/25 1128 -- -- -- -- -- -- -- -- 11 --    01/14/25 1118 -- -- -- -- -- -- -- -- -- 7 01/14/25 1059 97.2 °F (36.2 °C) 62 18 176/98 97 % 2 L/min Nasal cannula -- -- --     Pertinent Labs/Diagnostic Test Results:     1/15     CXR   1.  Right rib fractures redemonstrated, seen better on recent chest CT.      2.  No new cardiopulmonary process     1/14  EKG:  No ST segment changes negative ischemia.  Tachycardia with rate of 127.     1/11  CT CHEST  No evidence of focal consolidation, pneumothorax or effusion.   Stable appearance of fractures at the right fourth through sixth ribs posterolaterally and right fifth through seventh ribs posteriorly       Results from last 7 days   Lab Units 01/14/25  0637 01/10/25  2313   WBC Thousand/uL 5.69 6.03   HEMOGLOBIN g/dL 11.2* 13.2   HEMATOCRIT % 33.3* 38.2   PLATELETS  Thousands/uL 189 271   TOTAL NEUT ABS Thousands/µL 4.58 3.63         Results from last 7 days   Lab Units 01/15/25  0439 01/14/25  0540 01/10/25  2313   SODIUM mmol/L 138 138 142   POTASSIUM mmol/L 3.9 4.3 4.2   CHLORIDE mmol/L 99 97 106   CO2 mmol/L 29 22 25   ANION GAP mmol/L 10 19* 11   BUN mg/dL 8 10 11   CREATININE mg/dL 0.95 0.79 0.73   EGFR ml/min/1.73sq m 87 98 102   CALCIUM mg/dL 8.8 9.8 8.8   MAGNESIUM mg/dL 1.7* 1.7* 1.8*   PHOSPHORUS mg/dL 4.1 3.0  --      Results from last 7 days   Lab Units 01/15/25  0439 01/14/25  0540 01/10/25  2313   AST U/L 22 42* 37   ALT U/L 11 18 18   ALK PHOS U/L 114* 163* 152*   TOTAL PROTEIN g/dL 6.4 8.0 7.0   ALBUMIN g/dL 3.8 4.7 4.0   TOTAL BILIRUBIN mg/dL 1.04* 0.67 0.32   BILIRUBIN DIRECT mg/dL  --  0.10  --    AMMONIA umol/L  --  32  --      Results from last 7 days   Lab Units 01/14/25  0543 01/11/25  0123   POC GLUCOSE mg/dl 81 88     Results from last 7 days   Lab Units 01/15/25  0439 01/14/25  0540 01/10/25  2313   GLUCOSE RANDOM mg/dL 85 87 93             Beta- Hydroxybutyrate   Date Value Ref Range Status   01/10/2025 0.13 0.02 - 0.27 mmol/L Final     BETA-HYDROXYBUTYRATE   Date Value Ref Range Status   10/16/2023 2.9 (H) <0.6 mmol/L Final   10/01/2023 0.8 (H) <0.6 mmol/L Final          Results from last 7 days   Lab Units 01/10/25  2313   PH FANG  7.535*   PCO2 FANG mm Hg 30.6*   PO2 FANG mm Hg 109.2*   HCO3 FANG mmol/L 25.3   BASE EXC FANG mmol/L 3.4   O2 CONTENT FANG ml/dL 19.0   O2 HGB, VENOUS % 95.3*         Results from last 7 days   Lab Units 01/10/25  2313   CK TOTAL U/L 37*     Results from last 7 days   Lab Units 01/14/25  0752 01/14/25  0540   HS TNI 0HR ng/L  --  3   HS TNI 2HR ng/L <2  --    HSTNI D2 ng/L <-1  --          Results from last 7 days   Lab Units 01/14/25  0641 01/10/25  2313   PROTIME seconds 15.1* 12.9   INR  1.12 0.90   PTT seconds 30 23     Results from last 7 days   Lab Units 01/14/25  0540   TSH 3RD GENERATON uIU/mL 2.368     Results from  last 7 days   Lab Units 01/10/25  2313   PROCALCITONIN ng/ml 0.06     Results from last 7 days   Lab Units 01/10/25  2313   LACTIC ACID mmol/L 1.9       Results from last 7 days   Lab Units 01/14/25  0540 01/10/25  2313   LIPASE u/L 15 25       Results from last 7 days   Lab Units 01/10/25  2313   ETHANOL LVL mg/dL 229*       Results from last 7 days   Lab Units 01/10/25  2313   BLOOD CULTURE  No Growth After 4 Days.  No Growth After 4 Days.       Past Medical History:   Diagnosis Date    Alcohol use disorder, severe, dependence (HCC) 04/02/2023    Alcohol withdrawal seizure (HCC)     Alcoholic ketoacidosis 10/16/2023    Anxiety     AR (allergic rhinitis)     Chronic alcoholic gastritis 04/02/2023    Depression     GERD (gastroesophageal reflux disease)     Hepatic steatosis 04/02/2023    Hyperlipidemia     Hypertension     IBS (irritable bowel syndrome)     Obesity     Rosacea     Vitamin B12 deficiency 02/14/2024    Vitamin D deficiency 02/14/2024     Present on Admission:   Hepatic steatosis   Hypertension   Depression   Hyperlipidemia   GERD (gastroesophageal reflux disease)   Coronary artery calcification of native artery   Alcohol withdrawal (HCC)   (Resolved) Alcoholic ketoacidosis   Alcohol use disorder   Hypomagnesemia      Network Utilization Review Department  ATTENTION: Please call with any questions or concerns to [phone] and carefully listen to the prompts so that you are directed to the right person. All voicemails are confidential.   For Discharge needs, contact Care Management DC Support Team at [phone] opt. 2  Send all requests for admission clinical reviews, approved or denied determinations and any other requests to dedicated fax number below belonging to the campus where the patient is receiving treatment. List of dedicated fax numbers for the Facilities:  FACILITY NAME UR FAX NUMBER   ADMISSION DENIALS (Administrative/Medical Necessity) [phone]   DISCHARGE SUPPORT TEAM (NETWORK)  [phone]   PARENT CHILD HEALTH (Maternity/NICU/Pediatrics) [phone]   [facility] [phone]   [facility] [phone]   [facility] [phone]   [facility] [phone]   [facility] [phone]   [facility] [phone]   [facility] [phone]   [facility] [phone]   [facility] [phone]   [facility] [phone]   [facility] [phone]   [facility] [phone]

## 2025-01-15 NOTE — UTILIZATION REVIEW
NOTIFICATION OF INPATIENT MEDICAL ADMISSION   AUTHORIZATION REQUEST   SERVICING FACILITY:   [address] [address]  Tax ID: [id number]  NPI: [id number] ATTENDING PROVIDER:  Attending Name and NPI#: Ashley Johnston Md [[id number]]  Address: [address] [place] [address]  Phone: [phone]     ADMISSION INFORMATION:  Place of Service: Inpatient [facility]  Place of Service Code: 21  Inpatient Admission Date/Time: 1/14/25 10:56 AM  Discharge Date/Time: No discharge date for patient encounter.  Admitting Diagnosis Code/Description:  Alcohol withdrawal syndrome without complication (HCC) [F10.930]     UTILIZATION REVIEW CONTACT:  Dawna Lomas Utilization   Network Utilization Review Department  Phone: [phone]  Fax [phone]  Email: [email or web address]  Contact for approvals/pending authorizations, clinical reviews, and discharge.     PHYSICIAN ADVISORY SERVICES:  Medical Necessity Denial & Zimc-gj-Ucyu Review  Phone: [phone]  Fax: [phone]  Email: [email or web address]     DISCHARGE SUPPORT TEAM:  For Patients Discharge Needs & Updates  Phone: [phone] opt. 2 Fax: [phone]  Email: [email or web address]

## 2025-01-15 NOTE — ASSESSMENT & PLAN NOTE
Intermittent hypoxia in setting of recent multiple rib fractures on right  Initially required 2L upon admission, has been weened to room air.   Provide analgesia, respiratory support

## 2025-01-15 NOTE — CERTIFIED RECOVERY SPECIALIST
"Time spent: 40 minutes      CRS met with patient to offer support. Patient was very talkative and emotional about this hospital stay. Patient shared that he was hoping to leave today, when asked if he is craving or thinking of a drink, he said \"no, but I'm not going to lie to you, I do enjoy the taste of [place]\". CRS used Motivational Interviewing to help patient discern between the positive and negatives of having that drink. Patient expressed appreciation for the discussion. CRS provided suggestions on how to change small habits and notice improvements daily. CRS also provided some AA literature, patient said he was going to meetings in prison and reading some of the [employer].     CRS provided business card and resources, will continue to follow as needed.  "

## 2025-01-15 NOTE — PLAN OF CARE
Problem: SAFETY ADULT  Goal: Patient will remain free of falls  Description: INTERVENTIONS:  - Educate patient/family on patient safety including physical limitations  - Instruct patient to call for assistance with activity   - Consult OT/PT to assist with strengthening/mobility   - Keep Call bell within reach  - Keep bed low and locked with side rails adjusted as appropriate  - Keep care items and personal belongings within reach  - Initiate and maintain comfort rounds  - Make Fall Risk Sign visible to staff  - Offer Toileting every 2 Hours, in advance of need  - Initiate/Maintain bed alarm  - Obtain necessary fall risk management equipment:   - Apply yellow socks and bracelet for high fall risk patients  - Consider moving patient to room near nurses station  Outcome: Progressing  Goal: Maintain or return to baseline ADL function  Description: INTERVENTIONS:  -  Assess patient's ability to carry out ADLs; assess patient's baseline for ADL function and identify physical deficits which impact ability to perform ADLs (bathing, care of mouth/teeth, toileting, grooming, dressing, etc.)  - Assess/evaluate cause of self-care deficits   - Assess range of motion  - Assess patient's mobility; develop plan if impaired  - Assess patient's need for assistive devices and provide as appropriate  - Encourage maximum independence but intervene and supervise when necessary  - Involve family in performance of ADLs  - Assess for home care needs following discharge   - Consider OT consult to assist with ADL evaluation and planning for discharge  - Provide patient education as appropriate  Outcome: Progressing  Goal: Maintains/Returns to pre admission functional level  Description: INTERVENTIONS:  - Perform AM-PAC 6 Click Basic Mobility/ Daily Activity assessment daily.  - Set and communicate daily mobility goal to care team and patient/family/caregiver.   - Collaborate with rehabilitation services on mobility goals if consulted  -  Perform Range of Motion 3 times a day.  - Reposition patient every 2 hours.  - Dangle patient 3 times a day  - Stand patient 3 times a day  - Ambulate patient 3 times a day  - Out of bed to chair 3 times a day   - Out of bed for meals 3 times a day  - Out of bed for toileting  - Record patient progress and toleration of activity level   Outcome: Progressing     Problem: SUBSTANCE USE/ABUSE  Goal: By discharge, will develop insight into their chemical dependency and sustain motivation to continue in recovery  Description: INTERVENTIONS:  - Attends all daily group sessions and scheduled AA groups  - Actively practices coping skills through participation in the therapeutic community and adherence to program rules  - Reviews and completes assignments from individual treatment plan  - Assist patient development of understanding of their personal cycle of addiction and relapse triggers  Outcome: Progressing  Goal: By discharge, patient will have ongoing treatment plan addressing chemical dependency  Description: INTERVENTIONS:  - Assist patient with resources and/or appointments for ongoing recovery based living  Outcome: Progressing

## 2025-01-15 NOTE — DISCHARGE INSTR - OTHER ORDERS
Olya Flores  Certified     [facility] [place] [facility]  [phone]    AA meeting guide   https://www.aa.org/find-aa    AA Phone apps:   Meeting Guide  Everything AA  In the Rooms    AA/24 hour hotline   [phone]    AALV   [email or web address]@[email or web address]  [phone]    SMART Recovery Meetings    Self Management and Recovery Training (SMART)  SMART Recovery is an evidenced-informed recovery method grounded in Rational Emotive Behavioral Therapy (REBT) and Cognitive Behavioral Therapy (CBT), that supports people with substance dependencies or problem behaviors to:  Build and maintain motivation  [place] with urges and cravings  Manage thoughts, feelings and behaviors  Live a balanced life    Find meetings and tools: https://"[employer]org/    SYNC Recovery Events    Sync Recovery Adventure organizes meaningful events for people in recovery and their families. Our main goal is to have fun by connecting with nature and other people. We can then realize that there is a world of possibilities open to us, now that we are no longer in the bondage of addiction. These events are designed to provide :  Hope for those in early recovery  Tangible proof that life gets better when we’re sober  Service opportunities for people with recovery experience  Social connectedness for everyone involved    [address]  [place] [address]  Phone: [phone]  Email: info@Bomgar   Website: https://[employer].org/      [facility]  [address]  [place]  [address]  [phone]    [place]  [address] [place] [address]  [phone]    Inpatient Treatment Facilities   [facility]   [phone]    [facility]  [phone]    [facility]  [phone]    [facility]  [phone]    [facility]   [phone]    Local Outpatient Providers  MARS-IOP and other outpatient services, naltrexone, CRS  [address] [place] [address]  [phone]    [facility] SHARE Recovery Program- naltrexone, individual counseling (virtual options), CRS and case management services  [facility]  [address]  [place] [address]  Phone: [phone]    [facility]- PHP, IOP, OP levels of care, naltrexone  followup, CRS, JENNIFER services  [address], [place] [address]  [phone]  [email or web address]    [facility]   [address] [place] [address]   [phone]    [facility]   [address] [place] [address] [phone]    CRISIS INFORMATION  If you are experiencing a mental health emergency, you may call the [facility] Crisis Intervention Office 24 hours a day, 7 days per week at [phone].    In [facility], call [phone].    Warmline is a confidential 24/7 telephone support service manned by trained mental health consumers.  Warmline provides support, a listening ear and can provide information about available services.Warmline specializes in the concerns of mental health consumers, their families and friends.  However, we are also here for anyone who has a mental health concern, is confused about or just doesn't know anything about mental health or where to get information.  To reach [facility], call [phone].    HOW TO GET SUBSTANCE ABUSE HELP:  If you or someone you know has a drug or alcohol problem, there is help:  [facility] Drug & Alcohol Abuse Services: [phone]  [facility] Drug & Alcohol Abuse Services: [phone]  An assessment is the first step.   In addition to those listed there are other programs available in the area but assessment is best to determine an appropriate level of care.  If you DO NOT have Medical Assistance (MA) or Private Insurance, an assessment can be scheduled at one of these providers:  [facility]  [address] [place] [address]  [phone]   [address] [place] [address]  [phone]   [address] [place] [address]  [phone]   [facility]  [address] [place] [address]  [phone]   [facility]  [address] [place] [address]  [phone]   [facility]  [address] [place] [address]  [phone]   Lootsie.  [address], [place] [address]  [phone]     If you HAVE Medical Assistance, an assessment can be scheduled at one of these providers:  [facility] [place] [facility] [place] on Alcohol & Drug Abuse  [address] [facility] [place] [address]  [phone]   [facility]  [address] [place] [address]  [phone]   [facility]  [address] 1st Floor, [place] [address]  [phone]  [address], [address], [place] [address] [phone]   [address] [place] [address]  [phone]   [address] [place] [address]  [phone]   [facility] ([facility])  [address] [facility] [place] [address]  [phone]   [facility]  [address] [place] [address]  [phone]   [facility]  [address] [place] [address] [place] [address]  [phone]   [facility]  [address] [facility] [place] [address]  [phone]   [facility]  [address], [place] [address]  [phone]     If you HAVE Private Insurance, an assessment can be scheduled at one of these providers.  Please contact these Providers to determine if they are in your network plan:  [facility]  [address] [facility], 1st Floor, [place] [address]  [phone]   [place] [address] [address], [place] [address]  [phone]   [facility] [address] [place] [address]  [phone]   NET ([facility])  [address] [place] [address]  [phone]   [facility]  [address] [place] [address]  [phone]   Glen [employer].  [address], [place] [address]  [phone]     From the [facility] website www.[place].gov/guides/opioid-epidemic/#GetNaloxone    How do I get naloxone?  Family members and friends can access naloxone by:    Obtaining a prescription from their family doctor  Using the standing order issued by Acting Physician General Marsha Singer. A standing order is a prescription written for the general public, rather than specifically for an individual.  To use the standing order, print it and take it with you to the pharmacy or have the digital version on your phone. Download the standing order from the Department of [facility] (PDF).    If you are unable to print it or use the digital version, the standing order is kept on file at many pharmacies. If a pharmacy does not have it on file, they may have the ability to look it up.    Naloxone prescriptions can be filled at most pharmacies. Although the medication might not be available for same-day pickup, it often can be ordered and available within a day or two.        [facility] Assistance DPW Office [address] [address] [phone]  Financial Help Provided: SNAP, TANF Cash Assistance, Food [place], Welfare Office, Medicaid  Please visit this office to complete your application for Medical Assistance    [facility] [place] [employer] Locations & Contact Information:  [facility] Food Christensen  Municipalities:  [place], [place], [place], [place], [place], [place], [place], [place],  [place], and [place]  Emergency Food Pantries  [place] [address]  [facility]  Address: [address] [place] [address], [place] [address]  Phone: [phone]  Hours of  Operation: Fridays 10:00AM-1:00PM  Seventh Day Mandaen Methodist  Address: [address] [address]  Phone: [phone]  Hours of Operation: Thursdays 5:30PM-6:30PM    [facility] [address]  [facility] Real[employer]ing  Address: [address] [place] [address]  Phone: [phone]  Hours of Operation: Monday-Friday 9:30AM-12:00PM  [facility]  Address: [address] [address]  Phone: [phone]  Hours of Operation: By appointment -- Mondays, Tuesdays, Thursdays, & Fridays 8:30AM-4:00PM;  Wednesdays 8:30AM-12:00PM  Scotty Rushing  Address: [address] [address]  Phone: [phone]  Hours of Operation: 1st & 3rd Saturdays 10:00AM-12:00PM  [facility]  Address: [address] [place] [address]  Phone: [phone]  Hours of Operation: 2nd & 4th Thursdays 4:00PM-5:30PM (Only 4th Thursdays during the summer)  Religion Zoroastrianism Bengali Methodist  Address: [address] [place] [address]  Phone: [phone]  Hours of Operation: By appointment -- Wednesdays 6:00PM  [facility]  Address: [address] [place] [address]  Phone: [phone]  Hours of Operation: Thursdays 11:00AM-2:00PM or By appointment  [employer]  Address: [address] [address]  Phone: [phone]  Hours of Operation: Saturdays 9:00AM-11:30AM  Arely Hinduism Methodist  Address: [address] [address]  Phone: [phone]  Hours of Operation: Fridays, 3rd & 4th Saturdays 9:00AM-11:00AM  Bishnu Greeno Pentecostales  Address: [address] [place] [address]  Phone: [phone]  Hours of Operation: Tuesdays 3:00PM-5:00PM or By appointment  Ministering [facility]  Address: [address] [place] [address]  Phone: [phone]  Hours of Operation: By appointment  [facility]  Address: [address] [place] [address]  Phone: [phone]  Hours of  Operation: 3rd Saturday, 8:00AM-11:00AM  RIPPLE  Address: [address] [place] [address]  Phone: [phone]  Hours of Operation: 3rd Sunday 4:00PM-5:30PM    [employer]andan [employer] (SAACA)  Address: [address] [address]  Phone: [phone]  Hours of Operation: 3rd Wednesday 9:00AM-4:00PM  [facility]  Address: [address] [place] [address]  Phone: [phone]  Hours of Operation: 1st & 3rd Saturdays 9:00AM-11:30AM  [facility]  Address: [address] [address]  Phone: [phone]  Hours of Operation: By appointment  [place]  [address]  [employer]  Address: [address] [address]  Phone: [phone]  Hours of Operation: 3rd Sunday 12:00PM-1:30PM  [facility]  Address: [address] [facility] [place] [address]  Phone: [phone]  Hours of Operation: 1st & 3rd Thursdays 6:30PM-7:30PM; By appointment -- Mondays  Arely Guerrero [facility]  Address: [address] [place] [address]  Phone: [phone]  Hours of Operation: By appointment  [facility]  Address: [address] [place] [address]  Phone: [phone]  Hours of Operation: 1st & 3rd Wednesdays 4:00PM-5:30PM  [facility]  Address: [address] [address]  Phone: [phone]  Hours of Operation: 4th Wednesday 6:30PM-7:30PM   [facility]  Address: [address] [place] [address]  Phone: [phone]  Hours of Operation: 2nd Tuesday 10:00AM-12:00PM; 4th Thursday 4:00PM-6:00PM  [place] [address]  [facility] (Kosher & Non-Kosher)  Address: [address] [place] [address], [place], [address]  Phone: [phone]  Hours of Operation: By appointment -- Monday-Friday 9:00AM-5:00PM  [place] [address]  [facility]  Address: [address] [place], [place] [address]d, [place] [address]  Phone: [phone]  Hours  of Operation: 1st & 3rd Tuesdays 9:00AM-10:30AM; Thursdays 6:00PM-8:00PM    [employer]  Address: [address] [place] [address]  Phone: [phone]  Hours of Operation: Every other Saturday 10:00AM-12:00PM  [facility]  Address: [address] [place] [address]  Phone: [phone]  Hours of Operation: 3rd Monday 6:00PM-7:30PM  [facility] [address]  [facility]  Address: [address] [place] [address]  Phone: [phone]  Hours of Operation: 4th Sunday 9:00AM-11:00AM   [place]onzalez Adarsh Gregoryesia  Address: [address] [place] [address]  Phone: [phone]  Hours of Operation: Saturdays 10:30AM-12:30PM  [facility]  Address: [address] [place] [address]  Phone: [phone]  Hours of Operation: By appointment -- Tuesday-Thursday 8:30AM-4:30PM  [facility]  Address: [address] [place] [address]  Phone: [phone]  Hours of Operation: 1st & 3rd Wednesdays 6:00PM-8:00PM  [place]  [address]  [facility] Food Bank  Address: [address] [place] [address]  Phone: [phone]  Hours of Operation: Wednesdays & Fridays 3:00PM-4:00PM  [place] - [address]  Edita Gallego’s Pantry  Address: [address] [facility] [place] [address]  Phone: [phone]  Hours of Operation: Every other Saturday 10:30AM-12:30PM  [place] [address]  [facility]  Address: [address] [facility] [address] [place] [address]  Phone: [phone]  Hours of Operation: Tuesdays & Wednesdays 10:00AM-2:00PM; Closed last week of the month  [place]ley P[place] - [address]  [facility]  Address: [facility], [place] [address]  Phone: [phone]  Hours of Operation: 1st Saturday 9:00AM-12:00PM; 3rd Thursday 4:00PM-7:00PM    [facility] Mitchell[address] [place]ely [facility]  Address: [address] [place] [address], [place] [address]  Phone: [phone]  Hours of Operation: 3rd Monday & 3rd  Wednesday 4:00PM-6:00PM; 3rd Saturday 10:00AM-12:00PM  [place] - [address]  [facility]  Address: [address] [facility], [place] [address]  Phone: [phone]  Hours of Operation: 1st & 3rd Mondays 9:00AM-11:30AM  [place] - [address]  [facility]  Address: [address] [place] [address]  Phone: [phone]  Hours of Operation: 2nd Saturday 9:00AM-11:00AM  [facility] [address]  [facility]  Address: [address] [place] [address]  Phone: [phone]  Hours of Operation: 1st, 2nd, & 3rd Thursdays 4:00PM-7:00PM; Last Saturday 9:30AM-12:00PM  [address]  Address: [address] [place] [address]  Phone: [phone]  Hours of Operation: Tuesdays 6:00PM-7:00PM; Saturdays 4:00PM-5:00PM; Sundays 12:30PM-1:30PM  [place] Food [facility]  Address: [address] [place] [address]  Phone: [phone]  Hours of Operation: By appointment -- Mondays 6:00PM      Soup Pan  [facility]  Address: [address] [place] [address]  Phone: [phone]  Hours of Operation: Monday-Friday 1:30PM-2:30PM  [facility] ([facility])  Address: [address] [place] [address]  Phone: [phone]  Hours of Operation: Monday-Friday 9:00AM-5:00PM  [facility] Soup Kitchen  Address: [address] [address]  Phone: [phone]  Hours of Operation: Tuesday-Thursday 12:00PM-1:00PM    [facility] [facility]  Address: [address] [address]  Phone: [phone]  Hours of Operation: Sundays 8:00AM-9:00AM; Some holidays  [facility] Food Christensen  Municipalities:  [place], [facility], [place], [place], [place], [place], and [place]  Emergency Food Pantries  [place] - [address]  [facility] Food Bank  Address: [address] [place] [address], [place] [address]  Phone: [phone]  Hours of Operation: 2nd Tuesday 10:00AM-12:00PM  [place] - [address]  [facility]  Address: [address] [place] [address]  [place], [place] [address]  Phone: [phone]  Hours of Operation: Wednesdays 10:00AM-12:00PM  Pricila Sexton [facility]  Address: [address] [facility], [place] [address]  Phone: [phone]  Hours of Operation: 1st & 3rd Tuesdays 5:00PM-6:00PM  [facility]an [facility]  Address: [address] [facility], [place], [address]  Phone: [phone]  Hours of Operation: Tuesdays 6:00PM-7:00PM  [facility]  Address: [address], [place] [address]  Phone: [phone]  Hours of Operation: 1st & 2nd Saturdays 12:00PM- 4:00PM  [employer] Pantry  Address: [address], [place] [address]  Phone: [phone]  Hours of Operation: Monday-Friday 10:30AM-11:30AM  [place] [facility]  Address: [address], [place] [address]  Phone: [phone]  Hours of Operation: 3rd & 4th Saturdays 9:00AM-11:00AM; [facility] residents only    [place] - [address]  [place] [facility]  Address: [address] [facility], [place] [address]  Phone: [phone]  Hours of Operation: 2nd Thursday 10:00AM-12:00PM  Luyando Jeff Food Pantry  Address: [address] [facility], [place] [address]  Phone: [phone]  Hours of Operation: Monday & Tuesday of the first full week of the month 9:00AM-11:00AM  [facility]  Address: [address] [facility], [place] [address]  Phone: [phone]  Hours of Operation: Mondays, Wednesdays, & Thursdays 9:30AM-11:30AM  [facility]  Address: [address] [facility], [place] [address]  Phone: [phone]  Hours of Operation: 2nd & 4th Saturdays 10:00AM-12:00PM  [place] - [address]  [place] [facility]  Address: [address] [place] [address], [place] [address]  Phone: [phone]  Hours of Operation: By appointment -- Tuesdays & Wednesdays 10:00AM-4:00PM, Thursdays 10:00AM-  12:00PM, & Sundays 4:00PM-7:00PM  [facility] [employer] [facility]  Address: [address] [facility], [place] [address]  Phone: [phone]  Hours of Operation: 3rd  Saturday 10:00AM-12:00PM  BENJI Yanez  Address: [address] [place] [address]  Phone: [phone]  Hours of Operation: 3rd Saturday 9:00AM-11:30AM or by appointment  St. Isaak Carreon  Address: [address] [place] [address]  Phone: [phone]  Hours of Operation: 2nd & 4thTuesdays 10:00AM-12:00PM  [facility] Pantry  Address: [address] [place] [address]  Phone: [phone]  Hours of Operation: 1st, 2nd, & 4th Wednesdays 11:00AM-1:00PM; 3rd Wednesday 5:00PM-7:00PM  [facility] Pantry  Address: [address] [place] [address]  Phone: [phone]  Hours of Operation: Wednesdays 10:00AM-12:00PM; Last Wednesday 6:00PM-8:00PM  [place] [address]  Address: [address] [place] [address]  Phone: [phone]  Hours of Operation: Fridays 8:30AM-11:30AM & 1:30PM-3:30PM    [facility]  Address: [address] [place] [address]  Phone: [phone]  Hours of Operation: By appointment--Mondays-Fridays 9:00AM -4:30PM.  [facility]  Address: [address] [place] [address]  Phone: [phone]  Hours of Operation: 1st & 3rd Tuesdays 6:00PM-7:30PM  [facility]  Address: [address] [place] [address]  Phone: [phone]  Hours of Operation: By appointment -- Mondays-Fridays 9:00AM-5:00PM  Holy WatermanYolanda  Address: [address] [place] [address]  Phone: [phone]  Hours of Operation: 3rd, 4th, & 5th Thursdays 5:00PM-7:00PM  [facility]  Address: [address] [place] [address]  Phone: [phone]  Hours of Operation: Mondays 10:00AM-12:30PM; Thursdays 10:00AM-12:30PM & 1:00PM-3:30 PM  [facility] [employer] [facility], [facility]  Address: [address] [facility], [place] [address]  Phone: [phone]  Hours of Operation: Tuesdays 5:00PM-6:00PM  [facility]  Address: [address] [place] [address]  Phone:  [phone]  Hours of Operation: Thursdays 6:00PM-7:30PM; Closed 5th Thursday  [place] - [address]  [facility] Food [facility]  Address: [address] [facility], [place] [address]  Phone: [phone]  Hours of Operation: For last names beginning with A-M: 2nd Tuesday 8:00AM-10:00AM or 6:00PM-7:00PM;  For last names beginning with N-Z: 2nd Wednesday 8:00AM-10:00AM  [place] - [address]  [facility] Food Bank  Address: [address] [facility] [place] [address]  Phone: [phone]  Hours of Operation: Wednesdays 9:30AM-12:00PM; 1st, 2nd, & 3rd Thursdays 6:00PM-8:00PM; 2nd & 3rd Saturdays 9:30AM-12:00PM  [place] - [address]  [facility]  Address: [address] [facility], [place] [address]  Phone: [phone]  Hours of Operation: 3rd Saturday 9:00AM-11:00AM    [facility]  Address: [address] [facility], [place] [address]  Phone: [phone]  Hours of Operation: Tuesdays 10:00AM-2:00PM  [place] [employer]  Address: [address] [facility], [place] [address]  Phone: [phone]  Hours of Operation: Tuesdays 10:00AM-12:00PM; Closed 5th Tuesday  [place] - [address]  PUMP  Address: [address] [place] [address]  Phone: [phone]  Hours of Operation: Mondays 11:00AM-12:30PM & 7:00PM-8:30PM  [place] - [address]  [facility] Food Pantry  Address: [address] [facility], [place] [address]  Phone: [phone]  Hours of Operation: Mondays 5:00PM-7:00PM  [facility]  Address: [address] [facility], [place] [address]  Phone: [phone]  Hours of Operation: 2nd & 4th Saturdays 9:00AM-10:00AM  Soup Pan  [place]  [facility] [place]  Address: [address] [facility], [place] [address]  Phone: [phone]  Hours of Operation: 2nd Saturday - Lunch (Call for times)  [place]  [place] [facility]  Address: [address] [place] [address], [place] [address]  Phone: [phone]  Ours of Operation: Sundays 1:00PM-2:00PM  [facility]  Address: [address] [facility],  [place] [address]  Phone: [phone]  Hours of Operation: Saturdays 12:00PM-1:00PM  [facility]  Address: [address] [facility], [place] [address]  Phone: [phone]  Hours of Operation: Monday-Friday 8:00AM-4:00PM  Yvette Yarsani Soup Kitchen  Address: [address] [facility], [place] [address]  Phone: [phone]  Hours of Operation: Monday-Friday 12:00PM-1:00PM    [facility]  Address: [address] [facility] [place] [address]  Phone: [phone]  Hours of Operation: Call for [facility]  Address: [address] [place] [address] [place] [address]  Phone: [phone]  Hours of Operation: Monday-Friday 12:00PM-1:00PM

## 2025-01-15 NOTE — ASSESSMENT & PLAN NOTE
[facility] with complaints of alcohol withdrawal reporting tremor, diaphrosis and agitation, prior history of withdrawal.  Last drink 1/13/25 - endorses to drinking 2 bottles of wine daily   Toxicology consulted:  Currently patient is on SEWS protocol; withdrawal symptoms are adequately controlled at this time   Total phenobarbital: 840 mg   No further evidence of withdrawal; SEWS protocol discontinued. Stable for discharge from detox standpoint.

## 2025-01-15 NOTE — ASSESSMENT & PLAN NOTE
Lab Results   Component Value Date    ALT 11 01/15/2025    AST 22 01/15/2025    ALKPHOS 114 (H) 01/15/2025    TBILI 1.04 (H) 01/15/2025        Slightly Elevated LFTs on admission as above  Likely 2/2 chronic alcohol use/alcoholic liver disease  Pt  denies RUQ pain   Initiate IVFs  Continue monitoring CMP will check direct bilirubin in AM  Encourage alcohol cessation

## 2025-01-15 NOTE — ASSESSMENT & PLAN NOTE
"Following fall approximately one week ago  CT chest form 1/7/25:   \"Acute/subacute fractures of the right fourth through sixth ribs posterolaterally and at the right fifth through seventh ribs posteriorly\"  Patient had been on dilaudid PO, states he is out of medication. Will prescribe 1 week worth of pain medication. He has appointment with trauma next week  Pain management referral     "

## 2025-01-15 NOTE — ASSESSMENT & PLAN NOTE
Patient with a history of chronic daily alcohol use  Last drink was yesterday evening with Dilaudid  Serum alcohol 229 in the ED  Received a total of 710 mg of phenobarbital including 680 in the emergency department this morning  Initiate SEWS protocol for medical management of alcohol withdrawal  SEWS score 11 upon admission  Continue monitoring under protocol and administer phenobarbital as indicated with a maximum of 2g  Continuous pulse ox and telemetry monitoring  Patient reports symptoms have significantly improved status post phenobarbital  Patient's physical exam and subjective symptoms have improved that we can discontinue SEWS protocol.  We will be signing off service at this time.

## 2025-01-15 NOTE — ASSESSMENT & PLAN NOTE
[facility] with complaints of alcohol withdrawal reporting tremor, diaphrosis and agitation, prior history of withdrawal.  Last drink 1/13/25 - endorses to drinking 2 bottles of wine daily   Toxicology consulted:  Currently patient is on SEWS protocol; withdrawal symptoms are adequately controlled at this time   Total phenobarbital: 840 mg

## 2025-01-15 NOTE — ASSESSMENT & PLAN NOTE
Drinks 2 bottles of wine daily  Denies H/o withdrawal seizures  Not a candidate for naltrexone at this time secondary to current opioid use for recent rib fractures   Withdrawal management as above  Initiate IVFs, IV thiamine, folic acid, and MVI  Consult case management/CRS for assistance with aftercare resources - pt interested in outpatient resources at this time

## 2025-01-15 NOTE — ASSESSMENT & PLAN NOTE
[facility] with complaints of alcohol withdrawal reporting tremor, diaphrosis and agitation, prior history of withdrawal.  Last drink 1/13/25 - endorses to drinking 2 bottles of wine daily   Toxicology consulted:  Currently patient is on SEWS protocol; withdrawal symptoms are adequately controlled at this time   Total phenobarbital: 840 mg

## 2025-01-15 NOTE — ASSESSMENT & PLAN NOTE
Drinks 2 bottles of wine daily  Denies H/o withdrawal seizures  Not a candidate  naltrexone at this time secondary to opioid use from recent rib fx   Withdrawal management as above  Initiate IVFs, IV thiamine, folic acid, and MVI  Consult case management/CRS for assistance with aftercare resources - pt interested in outpatient resources at this time

## 2025-01-15 NOTE — PROGRESS NOTES
"Progress Note - Hospitalist   Name: Diogo Travis 59 y.o. male I MRN: [id number]  Unit/Bed#: 5T DETOX 505-01 I Date of Admission: 1/14/2025   Date of Service: 1/15/2025 I Hospital Day: 1    Assessment & Plan  Alcohol withdrawal ([facility])  [facility] with complaints of alcohol withdrawal reporting tremor, diaphrosis and agitation, prior history of withdrawal.  Last drink 1/13/25 - endorses to drinking 2 bottles of wine daily   Toxicology consulted:  Currently patient is on SEWS protocol; withdrawal symptoms are adequately controlled at this time   Total phenobarbital: 840 mg   Alcohol use disorder  Drinks 2 bottles of wine daily  Denies H/o withdrawal seizures  Not a candidate  naltrexone at this time secondary to opioid use from recent rib fx   Withdrawal management as above  Initiate IVFs, IV thiamine, folic acid, and MVI  Consult case management/CRS for assistance with aftercare resources - pt interested in outpatient resources at this time   Hypomagnesemia  Recent Labs     01/14/25  0540 01/15/25  0439   K 4.3 3.9   MG 1.7* 1.7*      Hepatic steatosis  In setting of alcohol use  Mild elevation in AST, downtrending   Hypertension  Continue lisinopril  Depression  Continue Lexapro, Remeron, gabapentin  Hyperlipidemia  Continue statin  GERD (gastroesophageal reflux disease)  Continue PPI  Coronary artery calcification of native artery  History noted  Not on BB  Continue statin  Should f/u with Cardiology outpatient  Hypoxia  Intermittent hypoxia in setting of recent multiple rib fractures on right  Initially required 2L upon admission, has been weened to room air.   Provide analgesia, respiratory support    Closed fracture of multiple ribs of right side with routine healing  Following fall approximately one week ago  CT chest form 1/7/25:   \"Acute/subacute fractures of the right fourth through sixth ribs posterolaterally and at the right fifth through seventh ribs posteriorly\"  Patient had been on dilaudid PO, " continue here  Noted for intermittent hypoxia  Incentive spirometry    VTE Pharmacologic Prophylaxis: VTE Score: 2 Low Risk (Score 0-2) - Encourage Ambulation.    Mobility:   Basic Mobility Inpatient Raw Score: 24  JH-HLM Goal: 8: Walk 250 feet or more  JH-HLM Achieved: 7: Walk 25 feet or more  JH-HLM Goal achieved. Continue to encourage appropriate mobility.    Patient Centered Rounds: I performed bedside rounds with nursing staff today.   Discussions with Specialists or Other Care Team Provider: Toxicology     Education and Discussions with Family / Patient: Patient declined call to .     Current Length of Stay: 1 day(s)  Current Patient Status: Inpatient   Certification Statement: The patient will continue to require additional inpatient hospital stay due to alcohol withdrawal   Discharge Plan: Anticipate discharge in 24-48 hrs to home.    Code Status: Level 1 - Full Code    Subjective   Patient seen and examined at bedside. Reports improvement in withdrawal symptoms. Still endorsing rib pain which is secondary to recent fractures. Will provide lidocaine patches, encouraged incentive spirometry.     Objective :  Temp:  [97 °F (36.1 °C)-98.2 °F (36.8 °C)] 97.7 °F (36.5 °C)  HR:  [] 73  BP: (130-176)/(80-98) 130/90  Resp:  [18-20] 18  SpO2:  [92 %-97 %] 95 %  O2 Device: None (Room air)  Nasal Cannula O2 Flow Rate (L/min):  [2 L/min] 2 L/min    Body mass index is 34.52 kg/m².     Input and Output Summary (last 24 hours):     Intake/Output Summary (Last 24 hours) at 1/15/2025 0852  Last data filed at 1/14/2025 2141  Gross per 24 hour   Intake 1000 ml   Output --   Net 1000 ml       Physical Exam  Constitutional:       General: He is not in acute distress.     Appearance: He is not diaphoretic.   Cardiovascular:      Rate and Rhythm: Normal rate and regular rhythm.   Pulmonary:      Breath sounds: Decreased breath sounds present.      Comments: Secondary to pain with inspiration from rib fx    Neurological:      Mental Status: He is alert and oriented to person, place, and time.      Motor: No tremor.   Psychiatric:         Mood and Affect: Mood is anxious. Mood is not depressed.           Lines/Drains:              Lab Results: I have reviewed the following results:   Results from last 7 days   Lab Units 01/14/25  0637   WBC Thousand/uL 5.69   HEMOGLOBIN g/dL 11.2*   HEMATOCRIT % 33.3*   PLATELETS Thousands/uL 189   SEGS PCT % 79*   LYMPHO PCT % 12*   MONO PCT % 6   EOS PCT % 1     Results from last 7 days   Lab Units 01/15/25  0439   SODIUM mmol/L 138   POTASSIUM mmol/L 3.9   CHLORIDE mmol/L 99   CO2 mmol/L 29   BUN mg/dL 8   CREATININE mg/dL 0.95   ANION GAP mmol/L 10   CALCIUM mg/dL 8.8   ALBUMIN g/dL 3.8   TOTAL BILIRUBIN mg/dL 1.04*   ALK PHOS U/L 114*   ALT U/L 11   AST U/L 22   GLUCOSE RANDOM mg/dL 85     Results from last 7 days   Lab Units 01/14/25  0641   INR  1.12     Results from last 7 days   Lab Units 01/14/25  0543 01/11/25  0123   POC GLUCOSE mg/dl 81 88         Results from last 7 days   Lab Units 01/10/25  2313   LACTIC ACID mmol/L 1.9   PROCALCITONIN ng/ml 0.06       Recent Cultures (last 7 days):   Results from last 7 days   Lab Units 01/10/25  2313   BLOOD CULTURE  No Growth After 4 Days.  No Growth After 4 Days.       Imaging Results Review: No pertinent imaging studies reviewed.  Other Study Results Review: EKG was reviewed.     Last 24 Hours Medication List:     Current Facility-Administered Medications:     acetaminophen (TYLENOL) tablet 650 mg, Q6H PRN    atorvastatin (LIPITOR) tablet 40 mg, Daily With Dinner    escitalopram (LEXAPRO) tablet 20 mg, Daily    folic acid (FOLVITE) tablet 1 mg, Daily    gabapentin (NEURONTIN) capsule 300 mg, TID    HYDROmorphone (DILAUDID) tablet 1 mg, Q4H PRN **OR** HYDROmorphone (DILAUDID) tablet 2 mg, Q4H PRN    lidocaine (LIDODERM) 5 % patch 1 patch, Daily    lisinopril (ZESTRIL) tablet 40 mg, Daily    magnesium sulfate 2 g/50 mL IVPB  (premix) 2 g, Once, Last Rate: 2 g (01/15/25 0844)    methocarbamol (ROBAXIN) tablet 1,000 mg, Q8H COLBY    mirtazapine (REMERON) tablet 15 mg, HS    multi-electrolyte (PLASMALYTE-A/ISOLYTE-S PH 7.4) IV solution, Continuous, Last Rate: 100 mL/hr (01/15/25 0850)    multivitamin stress formula tablet 1 tablet, Daily    nicotine (NICODERM CQ) 14 mg/24hr TD 24 hr patch 1 patch, Daily    nicotine (NICODERM CQ) 21 mg/24 hr TD 24 hr patch 1 patch, Daily    ondansetron (ZOFRAN) injection 4 mg, Q6H PRN    pantoprazole (PROTONIX) EC tablet 40 mg, Early Morning    senna-docusate sodium (SENOKOT S) 8.6-50 mg per tablet 1 tablet, HS    thiamine tablet 100 mg, Daily    Administrative Statements   Today, Patient Was Seen By: HARRY OliveiraC      **Please Note: This note may have been constructed using a voice recognition system.**

## 2025-01-15 NOTE — DISCHARGE SUMMARY
"Discharge Summary - Hospitalist   Name: Diogo Travis 59 y.o. male I MRN: [id number]  Unit/Bed#: 5T [facility] 505-01 I Date of Admission: 1/14/2025   Date of Service: 1/15/2025 I Hospital Day: 1     Assessment & Plan  Alcohol withdrawal ([facility])  [facility] with complaints of alcohol withdrawal reporting tremor, diaphrosis and agitation, prior history of withdrawal.  Last drink 1/13/25 - endorses to drinking 2 bottles of wine daily   Toxicology consulted:  Currently patient is on SEWS protocol; withdrawal symptoms are adequately controlled at this time   Total phenobarbital: 840 mg   No further evidence of withdrawal; SEWS protocol discontinued. Stable for discharge from detox standpoint.   Alcohol use disorder  Please refer to above under \"alcohol withdrawal\"  Hypomagnesemia  Recent Labs     01/14/25  0540 01/15/25  0439   K 4.3 3.9   MG 1.7* 1.7*    Supplemented prior to discharge  Hepatic steatosis  In setting of alcohol use  Mild elevation in AST, downtrending   Hypertension  Continue lisinopril  Depression  Continue Lexapro, Remeron, gabapentin  Hyperlipidemia  Continue statin  GERD (gastroesophageal reflux disease)  Continue PPI  Coronary artery calcification of native artery  History noted  Not on BB  Continue statin  Should f/u with Cardiology outpatient  Hypoxia (Resolved: 1/15/2025)  Intermittent hypoxia in setting of recent multiple rib fractures on right  Initially required 2L upon admission, has been weened to room air.   Provide analgesia, respiratory support    Closed fracture of multiple ribs of right side with routine healing  Following fall approximately one week ago  CT chest form 1/7/25:   \"Acute/subacute fractures of the right fourth through sixth ribs posterolaterally and at the right fifth through seventh ribs posteriorly\"  Patient had been on dilaudid PO, states he is out of medication. Will prescribe 1 week worth of pain medication. He has appointment with trauma next week  Pain management " referral        Medical Problems       Resolved Problems  Date Reviewed: 1/7/2025          Resolved    Alcoholic ketoacidosis 1/15/2025     Resolved by  Clau Schulz PA-C    Hypoxia 1/15/2025     Resolved by  Clau Schulz PA-C        Discharging Physician / Practitioner: Clau Schulz PA-C  PCP: Enid Altman DO  Admission Date:   Admission Orders (From admission, onward)       Ordered        01/14/25 1059  INPATIENT ADMISSION  Once                          Discharge Date: 01/15/25    Consultations During Hospital Stay:  Toxicology     Procedures Performed:   None    Significant Findings / Test Results:   Hypomagnesmia     Incidental Findings:   None       Test Results Pending at Discharge (will require follow up):   None     Outpatient Tests Requested:  None    Complications:  none    Reason for Admission: alcohol withdrawal    Hospital Course:   Diogo Travis is a 59 y.o. male patient who originally presented to the hospital on 1/14/2025 due to alcohol withdrawal. Patient initially presented to the [facility]  ED requesting detoxification from alcohol. Patient was admitted to the [facility] medical detox unit under [facility] protocol for medically assisted alcohol withdrawal and received a total of 840 mg phenobarbital without complication, with last dose of phenobarbital administered 1/14/25. Patient's alcohol withdrawal symptoms subsequently resolved, and he has remained without objective evidence of alcohol withdrawal at this time. During this hospitalization, patient was found to have hypomagnesemia, which resolved with electrolyte supplementation.  Case management and CRS were consulted for assistance with aftercare resources, and patient will be discharged with outpatient resources.         The patient, initially admitted to the hospital as inpatient, was discharged earlier than expected given the following: minimal withdrawal, patient requesting discharge.  Please see above list of  diagnoses and related plan for additional information.     Condition at Discharge: stable    Discharge Day Visit / Exam:   * Please refer to separate progress note for these details *    Discussion with Family: Patient declined call to .     Discharge instructions/Information to patient and family:   See after visit summary for information provided to patient and family.      Provisions for Follow-Up Care:  See after visit summary for information related to follow-up care and any pertinent home health orders.      Mobility at time of Discharge:   Basic Mobility Inpatient Raw Score: 24  JH-HLM Goal: 8: Walk 250 feet or more  JH-HLM Achieved: 7: Walk 25 feet or more  HLM Goal achieved. Continue to encourage appropriate mobility.     Disposition:   Home    Planned Readmission: none     Discharge Medications:  See after visit summary for reconciled discharge medications provided to patient and/or family.      Administrative Statements   Discharge Statement:  I have spent a total time of 32 minutes in caring for this patient on the day of the visit/encounter. .    **Please Note: This note may have been constructed using a voice recognition system**

## 2025-01-15 NOTE — PROGRESS NOTES
Progress Note - Medical Toxicology   Name: Diogo Travis 59 y.o. male I MRN: [id number]  Unit/Bed#: 5T DETOX 505-01 I Date of Admission: 1/14/2025   Date of Service: 1/15/2025 I Hospital Day: 1    Assessment & Plan  Alcohol withdrawal ([facility])  Patient with a history of chronic daily alcohol use  Last drink was yesterday evening with Dilaudid  Serum alcohol 229 in the ED  Received a total of 710 mg of phenobarbital including 680 in the emergency department this morning  Initiate SEWS protocol for medical management of alcohol withdrawal  SEWS score 11 upon admission  Continue monitoring under protocol and administer phenobarbital as indicated with a maximum of 2g  Continuous pulse ox and telemetry monitoring  Patient reports symptoms have significantly improved status post phenobarbital  Patient's physical exam and subjective symptoms have improved that we can discontinue SEWS protocol.  We will be signing off service at this time.    Alcohol use disorder    Continue daily vitamin supplementation with thiamine, folic acid, and multivitamin  Appreciate certified  and case management consultations for recommendations regarding aftercare resources, recovery support and disposition planning  Patient is interested in outpatient rehabilitation.   He is currently receiving opioids for his pain for rib fractures  He has previously been on naltrexone p.o. before.  States he has a bottle of this at home  Not a candidate at this time given ongoing opioid use  Discussed with him that he could initiate this 7 to 10 days after his last use of opioids  Starting dose would be 25 mg on the first day followed by 50 mg daily as maintenance dose  Hypomagnesemia  Replacement per primary  Hepatic steatosis  Encourage alcohol cessation  Closed fracture of multiple ribs of right side with routine healing  Pain is one of the triggers for him drinking alcohol.  He has been using opioids to treat the pain as well  Would  consider nonopioid analgesia  Could consider nerve block    Reason for current consult: Alcohol use disorder, acute alcohol withdrawal.     Subjective   59-year-old male with PMH of alcohol use disorder and acute alcohol withdrawal.  He reports there is no overnight events that he is been sleeping comfortably.  He reports that his anxiety and tremor have improved.  He has no further questions.    Objective :  Temp:  [97 °F (36.1 °C)-98.2 °F (36.8 °C)] 97.7 °F (36.5 °C)  HR:  [73-76] 73  BP: (130-160)/(80-96) 137/80  Resp:  [18-19] 18  SpO2:  [92 %-96 %] 94 %  O2 Device: Nasal cannula  Nasal Cannula O2 Flow Rate (L/min):  [2 L/min] 2 L/min      Intake/Output Summary (Last 24 hours) at 1/15/2025 1202  Last data filed at 1/15/2025 0906  Gross per 24 hour   Intake 1118 ml   Output --   Net 1118 ml       Physical Exam  Constitutional:       General: He is not in acute distress.     Appearance: Normal appearance. He is normal weight. He is not ill-appearing or toxic-appearing.   HENT:      Head: Normocephalic and atraumatic.   Eyes:      General:         Right eye: No discharge.         Left eye: No discharge.      Conjunctiva/sclera: Conjunctivae normal.   Pulmonary:      Effort: No respiratory distress.   Abdominal:      General: There is no distension.   Skin:     Coloration: Skin is not jaundiced.   Neurological:      General: No focal deficit present.      Mental Status: He is oriented to person, place, and time. Mental status is at baseline.      Motor: No tremor.      Coordination: Finger-Nose-Finger Test normal.      Comments: No tongue fasciculations.   Psychiatric:         Mood and Affect: Mood normal.         Behavior: Behavior normal.           Lab Results: I have reviewed the following results:CBC/BMP:   .     01/15/25  0439   SODIUM 138   K 3.9   CL 99   CO2 29   BUN 8   CREATININE 0.95   GLUC 85   MG 1.7*   PHOS 4.1    , Creatinine Clearance: Estimated Creatinine Clearance: 98.5 mL/min (by C-G formula based  on SCr of 0.95 mg/dL)., LFTs:   .     01/15/25  0439   AST 22   ALT 11   ALB 3.8   TBILI 1.04*   ALKPHOS 114*    , PTT/INR:No new results in last 24 hours.     Imaging Results Review: No pertinent imaging studies reviewed.  Other Study Results Review: No additional pertinent studies reviewed.    Administrative Statements   I have spent a total time of 15 minutes in caring for this patient on the day of the visit/encounter including Risks and benefits of tx options, Instructions for management, Patient and family education, Importance of tx compliance, Risk factor reductions, Impressions, Counseling / Coordination of care, and Documenting in the medical record.

## 2025-01-15 NOTE — ASSESSMENT & PLAN NOTE
Recent Labs     01/14/25  0540 01/15/25  0439   K 4.3 3.9   MG 1.7* 1.7*    Supplement with magnesium sulfate 2g IV   Continue to monitor

## 2025-01-15 NOTE — ASSESSMENT & PLAN NOTE
Recent Labs     01/14/25  0540 01/15/25  0439   K 4.3 3.9   MG 1.7* 1.7*    Supplemented prior to discharge

## 2025-01-15 NOTE — SOCIAL WORK
This CM spoke to pt about recommendation for IP LOC following discharge and possible solutions to care for his father if he entered treatment. CM also offered [facility] as an option for OP treatment as pt stated he was willing to do IOP and lived in [place]. The SHARE program was also mentioned d/t virtual option as he had concerns about driving immediately after discharge. Pt stated he would need to talk to family and then make a decision.     Approximately 3:30pm, medical provider made CM aware that pt was wanting to discharge. CM went to meet with pt and he declined to stay until the next day when appts could be arranged, stated he felt ok and he wanted to go home and be comfortable in his own house. CM asked about appts and pt stated that he received several resources from CM and CRS and he could arrange appts on his own.     Pt stated he could get a ride home from [facility].     IP and OP AUD treatment resources placed in pt's AVS and pt made aware.

## 2025-01-16 ENCOUNTER — PATIENT OUTREACH (OUTPATIENT)
Dept: CASE MANAGEMENT | Facility: OTHER | Age: 60
End: 2025-01-16

## 2025-01-16 ENCOUNTER — NURSE TRIAGE (OUTPATIENT)
Dept: OTHER | Facility: OTHER | Age: 60
End: 2025-01-16

## 2025-01-16 DIAGNOSIS — Z59.86 FINANCIAL INSECURITY: Primary | ICD-10-CM

## 2025-01-16 DIAGNOSIS — Z59.41 FOOD INSECURITY: ICD-10-CM

## 2025-01-16 LAB
BACTERIA BLD CULT: NORMAL
BACTERIA BLD CULT: NORMAL

## 2025-01-16 SDOH — ECONOMIC STABILITY - INCOME SECURITY: FINANCIAL INSECURITY: Z59.86

## 2025-01-16 SDOH — ECONOMIC STABILITY - FOOD INSECURITY: FOOD INSECURITY: Z59.41

## 2025-01-16 NOTE — TELEPHONE ENCOUNTER
Pt made aware of the response from Dr Kunz. He will try to be seen in urgent care [facility] but would like a call back tomorrow to be seen in office if possible with his provider.

## 2025-01-16 NOTE — TELEPHONE ENCOUNTER
Esc response from Dr Kunz: Pt will  need to be evaluated for this. Please let him know that he can be seen in urgent care or call for an acute appointment, but this is not something we will treat without an eval.

## 2025-01-16 NOTE — PROGRESS NOTES
MISSY JUSTICE received IB from OP CM AC. Pt referred for f/u d/t SDOH needs - food and financial needs. Pt recently d/c from [facility] detox unit. Per IP CM note, pt opted to set up OP D&A services independently. Pt was referred to OP RN [facility] in October 2024 and was UTR.    MISSY JUSTICE placed call to pt to introduce self and offer assistance. Pt did not answer. MISSY JUSTICE left a message. Will try to reach pt again next week.

## 2025-01-16 NOTE — PROGRESS NOTES
01/16/25 1112   Other Referral/Resources/Interventions Provided:   Referrals Provided: [employer];Crisis Hotline;Food Bank;IOP;Therapist;Support Group;Psychiatrist;Other (Specify)  (IP and OP AUD tx resources)   Government Services: SNAP   Discharge Communications   Discharge planning discussed with: pt   Freedom of Choice Yes   Comments - Freedom of Choice Pt was offered IP and OP options for treatment. He preferred to discharge home and set ip his own OP appts.   Were Treatment Team discharge recommendations reviewed with patient/caregiver? Yes   Did patient/caregiver verbalize understanding of patient care needs? Yes   Were patient/caregiver advised of the risks associated with not following Treatment Team discharge recommendations? Yes     CM was made aware by medical provider that pt is medically stable for discharge. Pt to discharge same day 1/15/25. Pt denies any withdrawal symptoms at this time. Pt to discharge to home and girlfriend will  upon discharge. Pt elected to set up OP appts independently. Pt was given contact information for several OP providers as well as IP providers if needed. Pt's medications were sent to preferred pharmacy.     Discharge Address: [address] [place] [address]   [place] [address]   Phone Number: [phone] (Mobile)   [phone] (Home Phone)

## 2025-01-16 NOTE — TELEPHONE ENCOUNTER
Esc to on call Dr rosenberg: Pt is calling with cough after rib fractures on 12/24/2024. He recently had 4 ribs fracture and was asked to call his pcp office if he develops a cough due to pneumonia risk. also has chills and runny nose

## 2025-01-16 NOTE — TELEPHONE ENCOUNTER
"Developed a cough after 4 broken ribs and was told to call pcp if he develops a cough.  Answer Assessment - Initial Assessment Questions  1. ONSET: \"When did the nasal discharge start?\"       Two days  2. AMOUNT: \"How much discharge is there?\"       Its clear and running moderately  3. COUGH: \"Do you have a cough?\" If Yes, ask: \"Describe the color of your mucus.\" (e.g., clear, white, yellow, green)      Yes coughed up some clear  4. RESPIRATORY DISTRESS: \"Describe your breathing.\"       none  5. FEVER: \"Do you have a fever?\" If Yes, ask: \"What is your temperature, how was it measured, and when did it start?\"      No fever but feels hot and then cold.   6. SEVERITY: \"Overall, how bad are you feeling right now?\" (e.g., doesn't interfere with normal activities, staying home from school/work, staying in bed)       He recently had 4 ribs fracture 12/24/2024 and was asked to call if he develops a cough due to pneumonia risk.  7. OTHER SYMPTOMS: \"Do you have any other symptoms?\" (e.g., earache, mouth sores, sore throat, wheezing)      Onset on the cough was two days ago.    Protocols used: Common Cold-Adult-    "

## 2025-01-17 ENCOUNTER — HOSPITAL ENCOUNTER (INPATIENT)
Facility: HOSPITAL | Age: 60
LOS: 1 days | Discharge: HOME | DRG: 137 | End: 2025-01-19
Attending: EMERGENCY MEDICINE | Admitting: FAMILY MEDICINE
Payer: COMMERCIAL

## 2025-01-17 ENCOUNTER — OFFICE VISIT (OUTPATIENT)
Dept: FAMILY MEDICINE CLINIC | Facility: CLINIC | Age: 60
End: 2025-01-17
Payer: COMMERCIAL

## 2025-01-17 ENCOUNTER — TELEPHONE (OUTPATIENT)
Dept: FAMILY MEDICINE CLINIC | Facility: CLINIC | Age: 60
End: 2025-01-17

## 2025-01-17 ENCOUNTER — TELEPHONE (OUTPATIENT)
Age: 60
End: 2025-01-17

## 2025-01-17 ENCOUNTER — APPOINTMENT (EMERGENCY)
Dept: RADIOLOGY | Facility: HOSPITAL | Age: 60
DRG: 137 | End: 2025-01-17
Payer: COMMERCIAL

## 2025-01-17 VITALS
OXYGEN SATURATION: 97 % | DIASTOLIC BLOOD PRESSURE: 90 MMHG | WEIGHT: 226.6 LBS | SYSTOLIC BLOOD PRESSURE: 148 MMHG | BODY MASS INDEX: 34.34 KG/M2 | HEIGHT: 68 IN | TEMPERATURE: 100.6 F | HEART RATE: 127 BPM

## 2025-01-17 DIAGNOSIS — S22.41XS CLOSED FRACTURE OF MULTIPLE RIBS OF RIGHT SIDE, SEQUELA: ICD-10-CM

## 2025-01-17 DIAGNOSIS — U07.1 COVID-19: Primary | ICD-10-CM

## 2025-01-17 DIAGNOSIS — R05.1 ACUTE COUGH: Primary | ICD-10-CM

## 2025-01-17 DIAGNOSIS — R50.9 FEVER IN ADULT: ICD-10-CM

## 2025-01-17 DIAGNOSIS — R55 NEAR SYNCOPE: ICD-10-CM

## 2025-01-17 DIAGNOSIS — S22.49XA MULTIPLE RIB FRACTURES: ICD-10-CM

## 2025-01-17 DIAGNOSIS — R52 INTRACTABLE PAIN: ICD-10-CM

## 2025-01-17 DIAGNOSIS — Z87.898 HISTORY OF ALCOHOL USE DISORDER: ICD-10-CM

## 2025-01-17 DIAGNOSIS — R00.0 TACHYCARDIA: ICD-10-CM

## 2025-01-17 LAB
ALBUMIN SERPL BCG-MCNC: 4.4 G/DL (ref 3.5–5)
ALP SERPL-CCNC: 115 U/L (ref 34–104)
ALT SERPL W P-5'-P-CCNC: 14 U/L (ref 7–52)
ANION GAP SERPL CALCULATED.3IONS-SCNC: 12 MMOL/L (ref 4–13)
APTT PPP: 28 SECONDS (ref 23–34)
AST SERPL W P-5'-P-CCNC: 44 U/L (ref 13–39)
ATRIAL RATE: 110 BPM
BASOPHILS # BLD AUTO: 0.03 THOUSANDS/ΜL (ref 0–0.1)
BASOPHILS NFR BLD AUTO: 1 % (ref 0–1)
BILIRUB SERPL-MCNC: 0.83 MG/DL (ref 0.2–1)
BUN SERPL-MCNC: 7 MG/DL (ref 5–25)
CALCIUM SERPL-MCNC: 9.3 MG/DL (ref 8.4–10.2)
CHLORIDE SERPL-SCNC: 102 MMOL/L (ref 96–108)
CO2 SERPL-SCNC: 26 MMOL/L (ref 21–32)
CREAT SERPL-MCNC: 0.94 MG/DL (ref 0.6–1.3)
EOSINOPHIL # BLD AUTO: 0.1 THOUSAND/ΜL (ref 0–0.61)
EOSINOPHIL NFR BLD AUTO: 2 % (ref 0–6)
ERYTHROCYTE [DISTWIDTH] IN BLOOD BY AUTOMATED COUNT: 13.7 % (ref 11.6–15.1)
FLUAV AG UPPER RESP QL IA.RAPID: NEGATIVE
FLUBV AG UPPER RESP QL IA.RAPID: NEGATIVE
GFR SERPL CREATININE-BSD FRML MDRD: 89 ML/MIN/1.73SQ M
GLUCOSE SERPL-MCNC: 98 MG/DL (ref 65–140)
HCT VFR BLD AUTO: 36.1 % (ref 36.5–49.3)
HGB BLD-MCNC: 12.7 G/DL (ref 12–17)
IMM GRANULOCYTES # BLD AUTO: 0.02 THOUSAND/UL (ref 0–0.2)
IMM GRANULOCYTES NFR BLD AUTO: 0 % (ref 0–2)
INR PPP: 1 (ref 0.85–1.19)
LACTATE SERPL-SCNC: 1.3 MMOL/L (ref 0.5–2)
LIPASE SERPL-CCNC: 11 U/L (ref 11–82)
LYMPHOCYTES # BLD AUTO: 0.36 THOUSANDS/ΜL (ref 0.6–4.47)
LYMPHOCYTES NFR BLD AUTO: 7 % (ref 14–44)
MAGNESIUM SERPL-MCNC: 1.3 MG/DL (ref 1.9–2.7)
MCH RBC QN AUTO: 32.6 PG (ref 26.8–34.3)
MCHC RBC AUTO-ENTMCNC: 35.2 G/DL (ref 31.4–37.4)
MCV RBC AUTO: 93 FL (ref 82–98)
MONOCYTES # BLD AUTO: 0.31 THOUSAND/ΜL (ref 0.17–1.22)
MONOCYTES NFR BLD AUTO: 6 % (ref 4–12)
NEUTROPHILS # BLD AUTO: 4.57 THOUSANDS/ΜL (ref 1.85–7.62)
NEUTS SEG NFR BLD AUTO: 84 % (ref 43–75)
NRBC BLD AUTO-RTO: 0 /100 WBCS
P AXIS: 55 DEGREES
PLATELET # BLD AUTO: 155 THOUSANDS/UL (ref 149–390)
PMV BLD AUTO: 8.8 FL (ref 8.9–12.7)
POTASSIUM SERPL-SCNC: 4.1 MMOL/L (ref 3.5–5.3)
PR INTERVAL: 158 MS
PROCALCITONIN SERPL-MCNC: 0.1 NG/ML
PROT SERPL-MCNC: 7.6 G/DL (ref 6.4–8.4)
PROTHROMBIN TIME: 13.9 SECONDS (ref 12.3–15)
QRS AXIS: 37 DEGREES
QRSD INTERVAL: 92 MS
QT INTERVAL: 348 MS
QTC INTERVAL: 470 MS
RBC # BLD AUTO: 3.9 MILLION/UL (ref 3.88–5.62)
SARS-COV+SARS-COV-2 AG RESP QL IA.RAPID: POSITIVE
SODIUM SERPL-SCNC: 140 MMOL/L (ref 135–147)
T WAVE AXIS: 13 DEGREES
VENTRICULAR RATE: 110 BPM
WBC # BLD AUTO: 5.39 THOUSAND/UL (ref 4.31–10.16)

## 2025-01-17 PROCEDURE — 96375 TX/PRO/DX INJ NEW DRUG ADDON: CPT

## 2025-01-17 PROCEDURE — 93010 ELECTROCARDIOGRAM REPORT: CPT | Performed by: INTERNAL MEDICINE

## 2025-01-17 PROCEDURE — 96361 HYDRATE IV INFUSION ADD-ON: CPT

## 2025-01-17 PROCEDURE — 83690 ASSAY OF LIPASE: CPT | Performed by: EMERGENCY MEDICINE

## 2025-01-17 PROCEDURE — XW033E5 INTRODUCTION OF REMDESIVIR ANTI-INFECTIVE INTO PERIPHERAL VEIN, PERCUTANEOUS APPROACH, NEW TECHNOLOGY GROUP 5: ICD-10-PCS | Performed by: INTERNAL MEDICINE

## 2025-01-17 PROCEDURE — 85730 THROMBOPLASTIN TIME PARTIAL: CPT | Performed by: EMERGENCY MEDICINE

## 2025-01-17 PROCEDURE — 85610 PROTHROMBIN TIME: CPT | Performed by: EMERGENCY MEDICINE

## 2025-01-17 PROCEDURE — 85025 COMPLETE CBC W/AUTO DIFF WBC: CPT | Performed by: EMERGENCY MEDICINE

## 2025-01-17 PROCEDURE — 99285 EMERGENCY DEPT VISIT HI MDM: CPT

## 2025-01-17 PROCEDURE — 93005 ELECTROCARDIOGRAM TRACING: CPT

## 2025-01-17 PROCEDURE — 83735 ASSAY OF MAGNESIUM: CPT | Performed by: INTERNAL MEDICINE

## 2025-01-17 PROCEDURE — 87154 CUL TYP ID BLD PTHGN 6+ TRGT: CPT | Performed by: EMERGENCY MEDICINE

## 2025-01-17 PROCEDURE — 99223 1ST HOSP IP/OBS HIGH 75: CPT | Performed by: INTERNAL MEDICINE

## 2025-01-17 PROCEDURE — 80053 COMPREHEN METABOLIC PANEL: CPT | Performed by: EMERGENCY MEDICINE

## 2025-01-17 PROCEDURE — 84145 PROCALCITONIN (PCT): CPT | Performed by: EMERGENCY MEDICINE

## 2025-01-17 PROCEDURE — 87077 CULTURE AEROBIC IDENTIFY: CPT | Performed by: EMERGENCY MEDICINE

## 2025-01-17 PROCEDURE — 99215 OFFICE O/P EST HI 40 MIN: CPT | Performed by: FAMILY MEDICINE

## 2025-01-17 PROCEDURE — 36415 COLL VENOUS BLD VENIPUNCTURE: CPT | Performed by: EMERGENCY MEDICINE

## 2025-01-17 PROCEDURE — 87804 INFLUENZA ASSAY W/OPTIC: CPT | Performed by: EMERGENCY MEDICINE

## 2025-01-17 PROCEDURE — 87811 SARS-COV-2 COVID19 W/OPTIC: CPT | Performed by: EMERGENCY MEDICINE

## 2025-01-17 PROCEDURE — 99285 EMERGENCY DEPT VISIT HI MDM: CPT | Performed by: EMERGENCY MEDICINE

## 2025-01-17 PROCEDURE — 83605 ASSAY OF LACTIC ACID: CPT | Performed by: EMERGENCY MEDICINE

## 2025-01-17 PROCEDURE — 71045 X-RAY EXAM CHEST 1 VIEW: CPT

## 2025-01-17 PROCEDURE — 96374 THER/PROPH/DIAG INJ IV PUSH: CPT

## 2025-01-17 PROCEDURE — 96376 TX/PRO/DX INJ SAME DRUG ADON: CPT

## 2025-01-17 PROCEDURE — 87040 BLOOD CULTURE FOR BACTERIA: CPT | Performed by: EMERGENCY MEDICINE

## 2025-01-17 RX ORDER — GABAPENTIN 300 MG/1
300 CAPSULE ORAL 3 TIMES DAILY
Status: DISCONTINUED | OUTPATIENT
Start: 2025-01-17 | End: 2025-01-19 | Stop reason: HOSPADM

## 2025-01-17 RX ORDER — BENZONATATE 100 MG/1
100 CAPSULE ORAL 3 TIMES DAILY PRN
Status: DISCONTINUED | OUTPATIENT
Start: 2025-01-17 | End: 2025-01-18

## 2025-01-17 RX ORDER — LISINOPRIL 20 MG/1
40 TABLET ORAL DAILY
Status: DISCONTINUED | OUTPATIENT
Start: 2025-01-18 | End: 2025-01-19 | Stop reason: HOSPADM

## 2025-01-17 RX ORDER — ESCITALOPRAM OXALATE 20 MG/1
20 TABLET ORAL DAILY
Status: DISCONTINUED | OUTPATIENT
Start: 2025-01-18 | End: 2025-01-19 | Stop reason: HOSPADM

## 2025-01-17 RX ORDER — HYDROMORPHONE HCL/PF 1 MG/ML
0.5 SYRINGE (ML) INJECTION ONCE
Refills: 0 | Status: COMPLETED | OUTPATIENT
Start: 2025-01-17 | End: 2025-01-17

## 2025-01-17 RX ORDER — OXYCODONE HYDROCHLORIDE 5 MG/1
5 TABLET ORAL EVERY 6 HOURS PRN
Refills: 0 | Status: DISCONTINUED | OUTPATIENT
Start: 2025-01-17 | End: 2025-01-19 | Stop reason: HOSPADM

## 2025-01-17 RX ORDER — AMOXICILLIN 250 MG
1 CAPSULE ORAL
Status: DISCONTINUED | OUTPATIENT
Start: 2025-01-17 | End: 2025-01-19 | Stop reason: HOSPADM

## 2025-01-17 RX ORDER — HYDROMORPHONE HCL/PF 1 MG/ML
0.5 SYRINGE (ML) INJECTION EVERY 4 HOURS PRN
Refills: 0 | Status: DISCONTINUED | OUTPATIENT
Start: 2025-01-17 | End: 2025-01-18

## 2025-01-17 RX ORDER — ENOXAPARIN SODIUM 100 MG/ML
30 INJECTION SUBCUTANEOUS EVERY 12 HOURS
Status: DISCONTINUED | OUTPATIENT
Start: 2025-01-17 | End: 2025-01-19 | Stop reason: HOSPADM

## 2025-01-17 RX ORDER — ONDANSETRON 2 MG/ML
4 INJECTION INTRAMUSCULAR; INTRAVENOUS ONCE
Status: DISCONTINUED | OUTPATIENT
Start: 2025-01-17 | End: 2025-01-19 | Stop reason: HOSPADM

## 2025-01-17 RX ORDER — ATORVASTATIN CALCIUM 40 MG/1
40 TABLET, FILM COATED ORAL
Status: DISCONTINUED | OUTPATIENT
Start: 2025-01-17 | End: 2025-01-19 | Stop reason: HOSPADM

## 2025-01-17 RX ORDER — NICOTINE 21 MG/24HR
1 PATCH, TRANSDERMAL 24 HOURS TRANSDERMAL DAILY
Status: DISCONTINUED | OUTPATIENT
Start: 2025-01-18 | End: 2025-01-18

## 2025-01-17 RX ORDER — FOLIC ACID 1 MG/1
1 TABLET ORAL DAILY
Status: DISCONTINUED | OUTPATIENT
Start: 2025-01-18 | End: 2025-01-19 | Stop reason: HOSPADM

## 2025-01-17 RX ORDER — LORAZEPAM 0.5 MG/1
0.5 TABLET ORAL EVERY 8 HOURS PRN
Status: DISCONTINUED | OUTPATIENT
Start: 2025-01-17 | End: 2025-01-19 | Stop reason: HOSPADM

## 2025-01-17 RX ORDER — PANTOPRAZOLE SODIUM 40 MG/1
40 TABLET, DELAYED RELEASE ORAL
Status: DISCONTINUED | OUTPATIENT
Start: 2025-01-18 | End: 2025-01-19 | Stop reason: HOSPADM

## 2025-01-17 RX ORDER — LANOLIN ALCOHOL/MO/W.PET/CERES
100 CREAM (GRAM) TOPICAL DAILY
Status: DISCONTINUED | OUTPATIENT
Start: 2025-01-18 | End: 2025-01-19 | Stop reason: HOSPADM

## 2025-01-17 RX ORDER — LIDOCAINE 50 MG/G
1 PATCH TOPICAL DAILY
Status: DISCONTINUED | OUTPATIENT
Start: 2025-01-18 | End: 2025-01-19 | Stop reason: HOSPADM

## 2025-01-17 RX ORDER — METHOCARBAMOL 500 MG/1
1000 TABLET, FILM COATED ORAL EVERY 8 HOURS
Status: DISCONTINUED | OUTPATIENT
Start: 2025-01-17 | End: 2025-01-19 | Stop reason: HOSPADM

## 2025-01-17 RX ORDER — ACETAMINOPHEN 325 MG/1
650 TABLET ORAL ONCE
Status: DISCONTINUED | OUTPATIENT
Start: 2025-01-17 | End: 2025-01-17

## 2025-01-17 RX ORDER — MAGNESIUM SULFATE HEPTAHYDRATE 40 MG/ML
4 INJECTION, SOLUTION INTRAVENOUS ONCE
Status: COMPLETED | OUTPATIENT
Start: 2025-01-17 | End: 2025-01-17

## 2025-01-17 RX ORDER — KETOROLAC TROMETHAMINE 30 MG/ML
15 INJECTION, SOLUTION INTRAMUSCULAR; INTRAVENOUS ONCE
Status: COMPLETED | OUTPATIENT
Start: 2025-01-17 | End: 2025-01-17

## 2025-01-17 RX ORDER — ACETAMINOPHEN 325 MG/1
975 TABLET ORAL EVERY 8 HOURS SCHEDULED
Status: DISCONTINUED | OUTPATIENT
Start: 2025-01-17 | End: 2025-01-18

## 2025-01-17 RX ORDER — NICOTINE 21 MG/24HR
1 PATCH, TRANSDERMAL 24 HOURS TRANSDERMAL DAILY
Status: DISCONTINUED | OUTPATIENT
Start: 2025-01-18 | End: 2025-01-17

## 2025-01-17 RX ORDER — ACETAMINOPHEN 10 MG/ML
1000 INJECTION, SOLUTION INTRAVENOUS ONCE
Status: COMPLETED | OUTPATIENT
Start: 2025-01-17 | End: 2025-01-17

## 2025-01-17 RX ORDER — MIRTAZAPINE 15 MG/1
15 TABLET, FILM COATED ORAL
Status: DISCONTINUED | OUTPATIENT
Start: 2025-01-17 | End: 2025-01-19 | Stop reason: HOSPADM

## 2025-01-17 RX ADMIN — ENOXAPARIN SODIUM 30 MG: 30 INJECTION SUBCUTANEOUS at 21:00

## 2025-01-17 RX ADMIN — OXYCODONE HYDROCHLORIDE 5 MG: 5 TABLET ORAL at 21:00

## 2025-01-17 RX ADMIN — HYDROMORPHONE HYDROCHLORIDE 0.5 MG: 1 INJECTION, SOLUTION INTRAMUSCULAR; INTRAVENOUS; SUBCUTANEOUS at 17:23

## 2025-01-17 RX ADMIN — ACETAMINOPHEN 1000 MG: 10 INJECTION INTRAVENOUS at 19:11

## 2025-01-17 RX ADMIN — KETOROLAC TROMETHAMINE 15 MG: 30 INJECTION, SOLUTION INTRAMUSCULAR; INTRAVENOUS at 16:14

## 2025-01-17 RX ADMIN — HYDROMORPHONE HYDROCHLORIDE 0.5 MG: 1 INJECTION, SOLUTION INTRAMUSCULAR; INTRAVENOUS; SUBCUTANEOUS at 22:20

## 2025-01-17 RX ADMIN — SODIUM CHLORIDE 1000 ML: 0.9 INJECTION, SOLUTION INTRAVENOUS at 16:13

## 2025-01-17 RX ADMIN — MIRTAZAPINE 15 MG: 15 TABLET, FILM COATED ORAL at 22:23

## 2025-01-17 RX ADMIN — HYDROMORPHONE HYDROCHLORIDE 0.5 MG: 1 INJECTION, SOLUTION INTRAMUSCULAR; INTRAVENOUS; SUBCUTANEOUS at 16:11

## 2025-01-17 RX ADMIN — SENNOSIDES AND DOCUSATE SODIUM 1 TABLET: 50; 8.6 TABLET ORAL at 21:00

## 2025-01-17 RX ADMIN — MAGNESIUM SULFATE HEPTAHYDRATE 4 G: 40 INJECTION, SOLUTION INTRAVENOUS at 19:52

## 2025-01-17 RX ADMIN — ACETAMINOPHEN 975 MG: 325 TABLET, FILM COATED ORAL at 21:00

## 2025-01-17 RX ADMIN — ATORVASTATIN CALCIUM 40 MG: 40 TABLET, FILM COATED ORAL at 19:16

## 2025-01-17 NOTE — TELEPHONE ENCOUNTER
Patient called stating he found someone who can take him to his appt and would like to cancel the rideshare transportation.

## 2025-01-17 NOTE — TELEPHONE ENCOUNTER
Pt said he already called the ins and changed the pcp because he has the new card with Dr Altman on it. I did advise him to call again just to see why this is happening then.

## 2025-01-17 NOTE — ASSESSMENT & PLAN NOTE
Patient presents with cough chills and fever of 101.5 at home, associated with headache and nausea and back pain from the fractured ribs from the recent fall  Presented to the ED as a medical emergency from his PCP office   tested positive for COVID-19 on 1/17/2025  Transient hypoxia noted to low 90s, placed on 2 L currently on room air saturating 94 to 95%  Will treat with remdesivir with mild pathway  Hold off on Decadron  Chest x-ray shows no acute infiltrate

## 2025-01-17 NOTE — H&P
H&P - Hospitalist   Name: Diogo Travis 59 y.o. male I MRN: [id number]  Unit/Bed#: ED-27 I Date of Admission: 1/17/2025   Date of Service: 1/17/2025 I Hospital Day: 0     Assessment & Plan  COVID-19  Patient presents with cough chills and fever of 101.5 at home, associated with headache and nausea and back pain from the fractured ribs from the recent fall  Presented to the ED as a medical emergency from his PCP office   tested positive for COVID-19 on 1/17/2025  Transient hypoxia noted to low 90s, placed on 2 L currently on room air saturating 94 to 95%  Will treat with remdesivir with mild pathway  Hold off on Decadron  Chest x-ray shows no acute infiltrate  Hypertension  Will resume lisinopril 40 mg daily home dose,, blood pressure is controlled,  Sinus tachycardia  Tachycardia noted with heart rate of 100s to 110s,  Likely secondary to pain, CTA of the chest was done recently on 1/7/25, was negative for PE,  Received IV hydration, will give pain medication with scheduled Tylenol, oxycodone for moderate pain and Dilaudid IV for breakthrough pain  Monitor heart rate,  Hyperlipidemia  Continue statin  GERD (gastroesophageal reflux disease)  CONT  PPI  Multiple rib fractures  Patient fell from a ladder on [facility], patient states that he was drinking alcohol and he hurt his back after he fell on the ground, noted to have Acute/subacute fractures of the right fourth through sixth ribs posterolaterally and at the right fifth through seventh ribs posteriorly   Will give pain management, incentive spirometry  Alcohol use disorder  Placed on CIWA protocol  Patient was recently admitted to detox unit at [place] for withdrawal, received phenobarb at the time,  States that he has quit alcohol since the recent admission to detox.  Continue folate and thiamine  Ativan as needed      VTE Pharmacologic Prophylaxis:   Moderate Risk (Score 3-4) - Pharmacological DVT Prophylaxis Ordered: enoxaparin (Lovenox).  Code  Status: Level 1 - Full Code   Discussion with family: Patient declined call to .     Anticipated Length of Stay: Patient will be admitted on an observation basis with an anticipated length of stay of less than 2 midnights secondary to COVID-19 positive, back pain secondary to rib fracture.    History of Present Illness   Chief Complaint: Cough and shortness of breath,     Diogo Travis is a 59 y.o. male with a PMH of hypertension, chronic alcohol use disorder, and pain disorder, obesity, who presents with cough and shortness of breath.  Patient presents with near syncope and lightheadedness, patient presented as a medical emergency when he saw his primary care physician today at the office he was noted to have elevated heart rate with shortness of breath with sitting and extreme pain with difficulty to take a deep breath.  Patient had fever 101.5 and headache at home, became nauseous and diaphoretic, heart rate was in the range of 130s O2 saturation remained high at 99%, was sent into the ED for further evaluation.  Patient has been having cold-like symptoms runny nose cough which is dry with chest congestion and dizziness for the past 2 to 3 days.  Has been having decreased p.o. intake.  Also complained of back pain on the right side with recent fall on [employer] [place] with multiple rib fractures.  Patient also was admitted to detox unit as he was on Dilaudid and had been drinking alcohol.  Patient states the last drink was prior to going into the detox unit at [place].  Patient denies of chest pain but complains of chest tightness with cough and pain.  Denies of abdominal pain but has nausea but no vomiting or diarrhea.  Complains of abdominal bloating with constipation.  States that his fiancée had RSV and flu a week ago.    Review of Systems   Constitutional:  Positive for activity change, chills, fatigue and fever.   HENT:  Positive for congestion.    Respiratory:  Positive for cough and  shortness of breath.    Gastrointestinal:  Positive for abdominal distention, constipation and nausea.   Neurological:  Positive for tremors, light-headedness and headaches.   Psychiatric/Behavioral:  Positive for sleep disturbance. The patient is nervous/anxious.    All other systems reviewed and are negative.      Historical Information   Past Medical History:   Diagnosis Date    Alcohol use disorder, severe, dependence (HCC) 04/02/2023    Alcohol withdrawal seizure (HCC)     Alcoholic ketoacidosis 10/16/2023    Anxiety     AR (allergic rhinitis)     Chronic alcoholic gastritis 04/02/2023    Depression     GERD (gastroesophageal reflux disease)     Hepatic steatosis 04/02/2023    Hyperlipidemia     Hypertension     IBS (irritable bowel syndrome)     Obesity     Rosacea     Vitamin B12 deficiency 02/14/2024    Vitamin D deficiency 02/14/2024     Past Surgical History:   Procedure Laterality Date    ANTERIOR CRUCIATE LIGAMENT REPAIR Left     WISDOM TOOTH EXTRACTION       Social History     Tobacco Use    Smoking status: Some Days     Types: Cigars     Start date: 1/1/2014     Passive exposure: Past (Father)    Smokeless tobacco: Never    Tobacco comments:     Smokes cigars 2-3 times per year   Vaping Use    Vaping status: Never Used   Substance and Sexual Activity    Alcohol use: Yes     Comment: ETOH 12/7/24    Drug use: Never    Sexual activity: Yes     Partners: Female     Birth control/protection: Post-menopausal     E-Cigarette/Vaping    E-Cigarette Use Never User      E-Cigarette/Vaping Substances    Nicotine No     THC No     CBD No     Flavoring No     Other No     Unknown No      Family history non-contributory  Social History:  Marital Status: Single   Occupation: employed  Patient Pre-hospital Living Situation: Home  Patient Pre-hospital Level of Mobility: walks  Patient Pre-hospital Diet Restrictions: nil    Meds/Allergies   I have reviewed home medications with patient personally.  Prior to Admission  medications    Medication Sig Start Date End Date Taking? Authorizing Provider   acetaminophen (TYLENOL) 325 mg tablet Take 3 tablets (975 mg total) by mouth every 8 (eight) hours 12/29/24   Chicho Hughes PA-C   atorvastatin (LIPITOR) 40 mg tablet Take 1 tablet (40 mg total) by mouth daily with dinner 12/9/24   Enid Altman DO   Diclofenac Sodium (VOLTAREN) 1 % Apply 2 g topically 4 (four) times a day 1/15/25   Clau Schulz PA-C   escitalopram (LEXAPRO) 20 mg tablet Take 1 tablet (20 mg total) by mouth daily 10/2/24   Enid Altman DO   folic acid (FOLVITE) 1 mg tablet Take 1 tablet (1 mg total) by mouth daily  Patient not taking: Reported on 1/17/2025 12/8/24 1/7/25  Lisa Andrews MD   gabapentin (NEURONTIN) 300 mg capsule Take 1 capsule (300 mg total) by mouth 3 (three) times a day 1/15/25 2/14/25  Clau Schulz PA-C   ibuprofen (MOTRIN) 400 mg tablet Take 1 tablet (400 mg total) by mouth every 6 (six) hours as needed for mild pain 12/29/24   Chicho Hughes PA-C   lidocaine (LIDODERM) 5 % Apply 1 patch topically over 12 hours daily Remove & Discard patch within 12 hours or as directed by MD 12/29/24   Chicho Hughes PA-C   lisinopril (ZESTRIL) 40 mg tablet Take 1 tablet (40 mg total) by mouth daily 12/9/24   Enid Altman DO   Methocarbamol 1000 MG TABS Take 1,000 mg by mouth every 8 (eight) hours 1/7/25   Chicho Hughes PA-C   mirtazapine (REMERON) 15 mg tablet Take 1 tablet (15 mg total) by mouth daily at bedtime  Patient not taking: Reported on 1/17/2025 11/13/24   Christine Roberts MD   nicotine (NICODERM CQ) 21 mg/24 hr TD 24 hr patch Place 1 patch on the skin over 24 hours daily  Patient not taking: Reported on 1/17/2025 12/30/24   Chicho Hughes PA-C   oxyCODONE (Roxicodone) 5 immediate release tablet Take 2 tablets (10 mg total) by mouth every 6 (six) hours as needed for moderate pain for up to 7 days Max Daily Amount: 40 mg 1/15/25 1/22/25  Clua  Arely Schulz PA-C   pantoprazole (PROTONIX) 40 mg tablet Take 1 tablet (40 mg total) by mouth daily in the early morning 12/9/24 6/7/25  Enid Altman DO   senna-docusate sodium (SENOKOT S) 8.6-50 mg per tablet Take 1 tablet by mouth daily at bedtime 12/29/24   Chicho Hughes PA-C   Thiamine Mononitrate (VITAMIN B1) 100 mg tablet Take 1 tablet (100 mg total) by mouth daily  Patient not taking: Reported on 1/17/2025 7/9/24   Enid Altman DO     Allergies   Allergen Reactions    Cefdinir Rash       Objective :  Temp:  [99.6 °F (37.6 °C)-100.6 °F (38.1 °C)] 99.6 °F (37.6 °C)  HR:  [102-127] 102  BP: (146-196)/() 146/84  Resp:  [20] 20  SpO2:  [92 %-98 %] 93 %  O2 Device: None (Room air)  Nasal Cannula O2 Flow Rate (L/min):  [2 L/min] 2 L/min    Physical Exam   HEENT-PERRLA, moist oral mucosa  Neck-supple, no JVD elevation   Respiratory-decreased air entry at the bases,   cardiovascular system-S1, S2 heard, no murmur or gallops or rubs  Abdomen-soft, nontender, no guarding or rigidity, bowel sounds heard  Extremities-no pedal edema  Peripheral pulses palpable  Musculoskeletal-no contractures  Central nervous system-no acute focal neurological deficit ,no sensory or motor deficit noted.  Skin-no rash noted       Lines/Drains:            Lab Results: I have reviewed the following results:  Results from last 7 days   Lab Units 01/17/25  1547   WBC Thousand/uL 5.39   HEMOGLOBIN g/dL 12.7   HEMATOCRIT % 36.1*   PLATELETS Thousands/uL 155   SEGS PCT % 84*   LYMPHO PCT % 7*   MONO PCT % 6   EOS PCT % 2     Results from last 7 days   Lab Units 01/17/25  1547   SODIUM mmol/L 140   POTASSIUM mmol/L 4.1   CHLORIDE mmol/L 102   CO2 mmol/L 26   BUN mg/dL 7   CREATININE mg/dL 0.94   ANION GAP mmol/L 12   CALCIUM mg/dL 9.3   ALBUMIN g/dL 4.4   TOTAL BILIRUBIN mg/dL 0.83   ALK PHOS U/L 115*   ALT U/L 14   AST U/L 44*   GLUCOSE RANDOM mg/dL 98     Results from last 7 days   Lab Units 01/17/25  1547   INR  1.00      Results from last 7 days   Lab Units 01/14/25  0543 01/11/25  0123   POC GLUCOSE mg/dl 81 88     Lab Results   Component Value Date    HGBA1C 4.9 02/14/2024    HGBA1C 5.0 11/12/2019     Results from last 7 days   Lab Units 01/17/25  1547 01/10/25  2313   LACTIC ACID mmol/L 1.3 1.9   PROCALCITONIN ng/ml 0.10 0.06       Imaging Results Review: I personally reviewed the following image studies in PACS and associated radiology reports: chest xray and CT chest. My interpretation of the radiology images/reports is: No acute infiltrate noted recent CTA chest was reviewed shows no PE, shows multiple rib fractures as noted above.  Other Study Results Review: EKG was personally reviewed and my interpretation is: Sinus Tachycardia. HR heart rate of 100s no acute ST-T changes..      ** Please Note: This note has been constructed using a voice recognition system. **

## 2025-01-17 NOTE — PROGRESS NOTES
Assessment/Plan:     Assessment & Plan  Acute cough         Fever in adult         Closed fracture of multiple ribs of right side, sequela         Tachycardia            Pt with heart rate > 125 on exam, shortness of breath with sitting, in extreme pain with difficulty taking a deep inspiration  Advised him he may have pneumonia with his fever, and recommend stat chest x-ray and covid/flu/rsv   He then stated that he felt very dizzy and not well   Became nauseous and diaphoretic   Unable to help him to calm down   Heart rate became higher 133, pulse ox remained high 99%  Oxygen was placed on him   Medical emergency called and patient taken to ER by team in the hospital.     5 minutes spent on chart prep, 45 minutes spent with patient counseling/educating on their diagnoses, tests completed and any new tests ordered, any referrals placed, treatment options, and documentation of above today.   In prescribing new medications, or changing doses, we reviewed the risks and benefits and side effects of these medications along with other treatment options if appropriate.       There are no Patient Instructions on file for this visit.  No follow-ups on file.  Subjective:    RUY Lind is a 59 y.o. male who presents with:  Chief Complaint    Cold Like Symptoms       HPI     Cold Like Symptoms     Additional comments: Pt has 4 broken ribs - left the hospital with 101.5 fever and was told to monitor it.  Sx: (dry) cough, runny nose, stomach bloated, chest congestion, dizziness (pt has not eaten in 2.5 days). Started yesterday. Took: advil and tylenol.          Last edited by Brandy Linares MA on 1/17/2025  2:16 PM.        ---Above per clinical staff & reviewed. ---        Today:  Since he was in the hospital he is sick  Nasal discharge   Coughing, dry. Moving into his chest   Fevers started last night   101.5   Head and ribs hurt  States he was recently admitted for detox becauase he was on dilaudid and it did not mix with  "the alcohol he was drinking  Now they sent in oxy but has not picked it up  Last drink a week ago   8/10 pain just sitting here   Clear mucus, thin   Productive cough before that   No smoking ever, but reports bronchitis every time he is sick   Fiance had RSV and flu a week ago    The following portions of the patient's history were reviewed and updated as appropriate: allergies, current medications, past family history, past medical history, past social history, past surgical history and problem list.  Review of Systems  ROS:  all others negative - no chest pain, SOB, normal urine and bowels. no GERD. Not sleeping well. + cough  + fever    Objective:    /90 (Patient Position: Sitting, Cuff Size: Standard)   Pulse (!) 127   Temp (!) 100.6 °F (38.1 °C) (Temporal)   Ht 5' 8\" (1.727 m)   Wt 103 kg (226 lb 9.6 oz)   SpO2 97%   BMI 34.45 kg/m²   Wt Readings from Last 3 Encounters:   01/17/25 103 kg (226 lb 9.6 oz)   01/17/25 103 kg (226 lb 9.6 oz)   01/14/25 103 kg (227 lb)     BP Readings from Last 3 Encounters:   01/17/25 160/93   01/17/25 148/90   01/15/25 123/81       Current Medications:  No current facility-administered medications for this visit.     Current Outpatient Medications   Medication Sig Dispense Refill   • acetaminophen (TYLENOL) 325 mg tablet Take 3 tablets (975 mg total) by mouth every 8 (eight) hours     • atorvastatin (LIPITOR) 40 mg tablet Take 1 tablet (40 mg total) by mouth daily with dinner 90 tablet 1   • Diclofenac Sodium (VOLTAREN) 1 % Apply 2 g topically 4 (four) times a day 240 g 0   • escitalopram (LEXAPRO) 20 mg tablet Take 1 tablet (20 mg total) by mouth daily 90 tablet 1   • gabapentin (NEURONTIN) 300 mg capsule Take 1 capsule (300 mg total) by mouth 3 (three) times a day 90 capsule 0   • ibuprofen (MOTRIN) 400 mg tablet Take 1 tablet (400 mg total) by mouth every 6 (six) hours as needed for mild pain 30 tablet 0   • lidocaine (LIDODERM) 5 % Apply 1 patch topically over 12 " hours daily Remove & Discard patch within 12 hours or as directed by MD 30 patch 0   • lisinopril (ZESTRIL) 40 mg tablet Take 1 tablet (40 mg total) by mouth daily 90 tablet 1   • Methocarbamol 1000 MG TABS Take 1,000 mg by mouth every 8 (eight) hours 45 tablet 0   • oxyCODONE (Roxicodone) 5 immediate release tablet Take 2 tablets (10 mg total) by mouth every 6 (six) hours as needed for moderate pain for up to 7 days Max Daily Amount: 40 mg 28 tablet 0   • pantoprazole (PROTONIX) 40 mg tablet Take 1 tablet (40 mg total) by mouth daily in the early morning 90 tablet 1   • senna-docusate sodium (SENOKOT S) 8.6-50 mg per tablet Take 1 tablet by mouth daily at bedtime 30 tablet 0   • folic acid (FOLVITE) 1 mg tablet Take 1 tablet (1 mg total) by mouth daily (Patient not taking: Reported on 1/17/2025) 30 tablet 0   • mirtazapine (REMERON) 15 mg tablet Take 1 tablet (15 mg total) by mouth daily at bedtime (Patient not taking: Reported on 1/17/2025) 30 tablet 0   • nicotine (NICODERM CQ) 21 mg/24 hr TD 24 hr patch Place 1 patch on the skin over 24 hours daily (Patient not taking: Reported on 1/17/2025) 28 patch 0   • Thiamine Mononitrate (VITAMIN B1) 100 mg tablet Take 1 tablet (100 mg total) by mouth daily (Patient not taking: Reported on 1/17/2025) 30 tablet 1     Facility-Administered Medications Ordered in Other Visits   Medication Dose Route Frequency Provider Last Rate Last Admin   • ondansetron (ZOFRAN) injection 4 mg  4 mg Intravenous Once Elian Singh DO            Physical Exam   Constitutional: he appears in pain. Diaphoretic. Pale.   HENT: Head: Normocephalic.   Right Ear: External ear normal. Tympanic membrane normal.   Left Ear: External ear normal. Tympanic membrane normal.   Nose: Nose normal. No mucosal edema, No rhinorrhea.   Right sinus exhibits no maxillary sinus tenderness.   Left sinus exhibits no maxillary sinus tenderness.   Mouth/Throat: Oropharynx is clear and moist.   Eyes: Normal conjunctiva.   No erythema. No discharge.  Neck: No pain on exam. Neck supple.   Cardiovascular: tacycardia, regular rhythm and normal heart sounds.    Pulmonary/Chest: Effort increased and breath sounds decreased at right base.    Abdominal: Soft. Bowel sounds are normal. There is no tenderness.   Lymphadenopathy: he has no cervical adenopathy.   Neurological: he is alert and oriented to person, place, and time.   Skin: Skin is warm and dry.   Psychiatric: he has a anxious mood and affect.

## 2025-01-17 NOTE — ED ATTENDING ATTESTATION
1/17/2025  I, Sina Mcdonald MD, saw and evaluated the patient. I have discussed the patient with the resident/non-physician practitioner and agree with the resident's/non-physician practitioner's findings, Plan of Care, and MDM as documented in the resident's/non-physician practitioner's note, except where noted. All available labs and Radiology studies were reviewed.  I was present for key portions of any procedure(s) performed by the resident/non-physician practitioner and I was immediately available to provide assistance.       At this point I agree with the current assessment done in the Emergency Department.  I have conducted an independent evaluation of this patient a history and physical is as follows:  Briefly, 59-year-old male with recent admission for flail chest due to rib fractures sustained from a fall now presenting with increased shortness of breath.  Patient's wife was recently diagnosed with COVID and the flu, patient has had similar symptoms, has had increased cough as well as substantially increased pain in his ribs due to the persistent coughing.  The pain is currently dull, severe, in the right ribs, worse with cough or breathing and better at rest.  He denies further trauma, fever, abdominal pain, vomiting, other symptoms.  On examination, tender to palpation to the right rib cage on the posterior and lateral aspects, abdomen nontender, heart sounds normal, lungs clear to auscultation.  Labs remarkable for positive COVID test, chest x-ray shows no pneumothorax, no focal consolidation, no other significant abnormalities.  Patient initially requiring some supplemental oxygen, after pain control was able to be weaned off with sats in the low 90s, however was unable to tolerate ambulation, leading to admission to internal medicine.  Hemodynamically stable and comfortable at time of admit.  ED Course         Critical Care Time  Procedures

## 2025-01-17 NOTE — ED PROVIDER NOTES
"Time reflects when diagnosis was documented in both MDM as applicable and the Disposition within this note       Time User Action Codes Description Comment    1/17/2025  4:47 PM Elian Singh Add [U07.1] COVID-19     1/17/2025  4:47 PM Coleen Singhza Add [R55] Near syncope     1/17/2025  4:47 PM Coleen Singhza Add [S22.49XA] Multiple rib fractures     1/17/2025  5:39 PM Ahmed, Elian Add [R52] Intractable pain     1/17/2025  5:39 PM Coleen Singhza Add [Z87.898] History of alcohol use disorder           ED Disposition       ED Disposition   Admit    Condition   Stable    Date/Time   Fri Jan 17, 2025  5:40 PM    Comment   Case was discussed with ish and the patient's admission status was agreed to be Admission Status: observation status to the service of Dr. Narayan .               Assessment & Plan       Medical Decision Making  Patient with history as below presented to triage with CC: \"Patient presents with:  Shortness of Breath: Pt to ED as medical emergency from [facility] [place]. Pt recently admitted for flail chest. Pt c/o cough, and pain in ribs. Wife recently positive for covid and flu.   \"  Hx obtained from pt    59M hx of HTN presented as medical emergency for near syncope/lightheadedness. Had fall with R sided rib fxs on imani grover, seen by trauma at that time. Went to f/u appt and had near syncopal episode 2ary to worsening pain. Also with Viral URI sxs x3-4 days with worsening cough and fever at home exacerbating pain.  Patient with transient hypoxia, placed on 2 L nasal cannula.  Tachycardic, with low-grade temperature meeting SIRS criteria.  Given history of recent rib fracture, increased concern for possible pneumonia however viral infection is more likely in the setting of recent sick contact.  Recently discharged from detox, without recent alcohol use.    ED workup significant for +COVID, cxr otherwise w/o focal consolidation.  EKG sinus tach without acute ischemic changes.  Labs otherwise unremarkable.  " Transient hypoxia req 2L NC, weaned to RA. Requiring multiple dose of dilaudid for pain control. Attempted ambulatory pulse oc but pt could not toleratate pain. EKG sinus tach w/o ischemic changes.  Discussed case with ish, will admit for further management.    DDX including but not limited to: pneumonia, pneumothorax, viral syndrome, worsening rib pain, dehydration, metabolic abnormality, cardiac arrhythmia; doubt PE, ACS, pneumothorax.     I have independently ordered, reviewed and interpreted the following: labs and/or imaging and/or EKG studies listed below  Reviewed external records including notes, and prior labs/imaging results.    Disposition: Patient condition, stable.  Discussed need for admission with patient for further management and workup with pt and they are agreeable with plan. Discussed case with [facility] hospitalist, who agreed to admit patient to obs status.     See ED Course for further MDM.      PLEASE NOTE:  This encounter was completed utilizing the [employer] Direct Speech Voice Recognition Software. Grammatical errors, random word insertions, pronoun errors and incomplete sentences are occasional inherent consequences of the system due to software limitations, ambient noise and hardware issues.These may be missed by proof reading prior to affixing electronic signature. Any questions or concerns about the content, text or information contained within the body of this dictation should be directly addressed to the physician for clarification. Please do not hesitate to call me directly if you have any questions or concerns.     Amount and/or Complexity of Data Reviewed  External Data Reviewed: labs, radiology, ECG and notes.  Labs: ordered. Decision-making details documented in ED Course.  Radiology: ordered and independent interpretation performed. Decision-making details documented in ED Course.  ECG/medicine tests: ordered and independent interpretation performed. Decision-making details  documented in ED Course.    Risk  Prescription drug management.  Decision regarding hospitalization.        ED Course as of 01/17/25 2033 Fri Jan 17, 2025   1518 ECG 12 lead  EKG independently interpreted by me.  Sinus tachycardia 110.. no ST elevations or significant ST depressions concerning for acute coronary syndrome or Brugada syndrome.  No evidence of AV block tachy yusuf syndrome. No significantly shortened NM interval or delta wave to suggest Phylicia-Parkinson-White syndrome.  No significant LVH to suggest hypertrophic cardiomyopathy.  QTC within normal limits and no epsilon wave to suggest arrhythmogenic right ventricular dysplasia.     1620 CBC and differential(!)  No leukocytosis.  No significant anemia.  Platelets WNL.   1620 Comprehensive metabolic panel(!)  No acute electrolyte abnormality.  Renal function at baseline.  AST and alk phos elevation.  Normal glucose.   1620 LACTIC ACID: 1.3  WNL.   1620 WNL.   1620 LIPASE: 11  WNL.   1620 Blood Pressure: 162/90   1623 XR chest 1 view portable  No acute cardiopulmonary process seen on chest x-ray.   1630 Procalcitonin: 0.10   1646 SARS COV Rapid Antigen(!): Positive   1703 On repeat evaluation.  Patient reports improvement in his rib pain after Dilaudid.  Weaned off of 2 L nasal cannula to room air.  Patient is positive for COVID.  Otherwise no significant leukocytosis, and chest x-ray without focal consolidation.  Long discussion had with regards to disposition for patient.  At this time we will do ambulatory pulse ox, if patient has persistent desaturation or dyspnea upon ambulation or worsening pain requiring continued narcotics will admit for further management.   1727 Patient reports worsening pain, requiring multiple doses of narcotic pain medication.  When attempting ambulation, patient could not ambulate secondary to pain.  Has been satting well on room air at this time.  Will reach out to [facility] for admission for intractable pain.       Medications    ondansetron (ZOFRAN) injection 4 mg (0 mg Intravenous Hold 1/17/25 1614)   acetaminophen (TYLENOL) tablet 975 mg (has no administration in time range)   atorvastatin (LIPITOR) tablet 40 mg (40 mg Oral Given 1/17/25 1916)   escitalopram (LEXAPRO) tablet 20 mg (has no administration in time range)   thiamine tablet 100 mg (has no administration in time range)   senna-docusate sodium (SENOKOT S) 8.6-50 mg per tablet 1 tablet (has no administration in time range)   pantoprazole (PROTONIX) EC tablet 40 mg (has no administration in time range)   oxyCODONE (ROXICODONE) IR tablet 5 mg (has no administration in time range)   mirtazapine (REMERON) tablet 15 mg (has no administration in time range)   methocarbamol (ROBAXIN) tablet 1,000 mg (1,000 mg Oral Not Given 1/17/25 1914)   lisinopril (ZESTRIL) tablet 40 mg (has no administration in time range)   lidocaine (LIDODERM) 5 % patch 1 patch (has no administration in time range)   gabapentin (NEURONTIN) capsule 300 mg (has no administration in time range)   folic acid (FOLVITE) tablet 1 mg (has no administration in time range)   HYDROmorphone (DILAUDID) injection 0.5 mg (has no administration in time range)   LORazepam (ATIVAN) tablet 0.5 mg (has no administration in time range)   nicotine (NICODERM CQ) 14 mg/24hr TD 24 hr patch 1 patch (has no administration in time range)   enoxaparin (LOVENOX) subcutaneous injection 30 mg (has no administration in time range)   benzonatate (TESSALON PERLES) capsule 100 mg (has no administration in time range)   magnesium sulfate 4 g/100 mL IVPB (premix) 4 g (4 g Intravenous New Bag 1/17/25 1952)   ketorolac (TORADOL) injection 15 mg (15 mg Intravenous Given 1/17/25 1614)   sodium chloride 0.9 % bolus 1,000 mL (0 mL Intravenous Stopped 1/17/25 1713)   HYDROmorphone (DILAUDID) injection 0.5 mg (0.5 mg Intravenous Given 1/17/25 1611)   HYDROmorphone (DILAUDID) injection 0.5 mg (0.5 mg Intravenous Given 1/17/25 1723)   acetaminophen (Ofirmev)  injection 1,000 mg (0 mg Intravenous Stopped 1/17/25 1926)       ED Risk Strat Scores               CIWA-Ar Score       Row Name 01/17/25 1615             CIWA-Ar    Blood Pressure 162/90      Pulse 117      Nausea and Vomiting 0      Tactile Disturbances 0      Tremor 0      Auditory Disturbances 0      Paroxysmal Sweats 0      Visual Disturbances 0      Anxiety 0      Headache, Fullness in Head 0      Agitation 0      Orientation and Clouding of Sensorium 0      CIWA-Ar Total 0                                                  History of Present Illness       Chief Complaint   Patient presents with    Shortness of Breath     [place] [facility] recently admitted for flail chest. Pt c/o cough, and pain in ribs. Wife recently positive for covid and flu.        Past Medical History:   Diagnosis Date    Alcohol use disorder, severe, dependence (HCC) 04/02/2023    Alcohol withdrawal seizure (HCC)     Alcoholic ketoacidosis 10/16/2023    Anxiety     AR (allergic rhinitis)     Chronic alcoholic gastritis 04/02/2023    Depression     GERD (gastroesophageal reflux disease)     Hepatic steatosis 04/02/2023    Hyperlipidemia     Hypertension     IBS (irritable bowel syndrome)     Obesity     Rosacea     Vitamin B12 deficiency 02/14/2024    Vitamin D deficiency 02/14/2024      Past Surgical History:   Procedure Laterality Date    ANTERIOR CRUCIATE LIGAMENT REPAIR Left     WISDOM TOOTH EXTRACTION        Family History   Problem Relation Age of Onset    Cancer Mother 78        Type unknown    Hypertension Father     Coronary artery disease Father 60    Cancer Father 82        Bladder; + Smoker    No Known Problems Sister     No Known Problems Sister     No Known Problems Sister     No Known Problems Son     No Known Problems Son     Heart attack Paternal Grandfather 60    Coronary artery disease Paternal Grandfather 60      Social History     Tobacco Use    Smoking status: Some Days     Types: Cigars      Start date: 1/1/2014     Passive exposure: Past (Father)    Smokeless tobacco: Never    Tobacco comments:     Smokes cigars 2-3 times per year   Vaping Use    Vaping status: Never Used   Substance Use Topics    Alcohol use: Yes     Comment: ETOH 12/7/24    Drug use: Never      E-Cigarette/Vaping    E-Cigarette Use Never User       E-Cigarette/Vaping Substances    Nicotine No     THC No     CBD No     Flavoring No     Other No     Unknown No       I have reviewed and agree with the history as documented.     59-year-old male with history of alcohol use disorder, recent fall with multiple rib fractures presenting to the ED for worsening viral URI symptoms with cough, exacerbation of right-sided rib pain and near syncopal episode earlier today.  Patient states for the past 3 to 4 days he has been dealing with viral URI symptoms with worsening dry cough.  Recent sick contact with wife being positive for COVID.  Has had recent fall on [employer], with resultant right-sided multiple rib fractures.  Has been seen by trauma, admitted and had subsequent evaluation for worsening pain earlier this month.  Was at follow-up appointment for the pain today, when he had episode of coughing fit exacerbating his right sided rib pain making him feel lightheaded and about to pass out for which medical emergency was called.  He reports fever at home.  Has been taking NSAIDs at home with minimal relief.  States that his Dilaudid was recently changed to oxycodone due to increased dizziness and has not been able to  that prescription.  Viral symptoms been ongoing for the past 3 to 4 days, otherwise denies nausea, vomiting, diarrhea, abdominal pain, neck pain/stiffness, chest pain, headache, leg pain, leg swelling.  Reports decreased appetite with decreased oral intake for the past 3 days.        Review of Systems   Constitutional:  Positive for appetite change, fatigue and fever. Negative for chills.   HENT:  Positive for  congestion. Negative for sneezing and sore throat.    Eyes:  Negative for photophobia, pain, discharge, redness, itching and visual disturbance.   Respiratory:  Positive for cough and shortness of breath. Negative for chest tightness.    Cardiovascular:  Negative for chest pain, palpitations and leg swelling.   Gastrointestinal:  Negative for abdominal pain, constipation, diarrhea, nausea and vomiting.   Genitourinary:  Negative for dysuria, flank pain, frequency, hematuria, testicular pain and urgency.   Musculoskeletal:  Negative for arthralgias, back pain, neck pain and neck stiffness.   Skin:  Negative for color change and rash.   Neurological:  Negative for dizziness, tremors, seizures, syncope, facial asymmetry, speech difficulty, weakness, light-headedness, numbness and headaches.   All other systems reviewed and are negative.          Objective       ED Triage Vitals   Temperature Pulse Blood Pressure Respirations SpO2 Patient Position - Orthostatic VS   01/17/25 1511 01/17/25 1503 01/17/25 1503 01/17/25 1503 01/17/25 1503 01/17/25 1511   99.6 °F (37.6 °C) (!) 114 (!) 196/114 20 97 % Sitting      Temp Source Heart Rate Source BP Location FiO2 (%) Pain Score    01/17/25 1511 01/17/25 1511 01/17/25 1503 -- 01/17/25 1611    Oral Monitor Right arm  9      Vitals      Date and Time Temp Pulse SpO2 Resp BP Pain Score FACES Pain Rating User   01/17/25 2026 99.1 °F (37.3 °C) 98 92 % 16 138/81 -- -- DII   01/17/25 1956 -- 98 95 % 20 163/84 5 -- [facility]   01/17/25 1930 -- 98 93 % 20 163/84 -- -- [facility]   01/17/25 1810 -- -- -- -- -- 10 - Worst Possible Pain -- [facility]   01/17/25 1800 -- 102 93 % 20 146/84 -- -- [facility]   01/17/25 1730 -- 104 92 % 20 153/85 -- -- [facility]   01/17/25 1723 -- -- -- -- -- 8 -- [facility]   01/17/25 1700 -- 107 94 % 20 160/93 -- -- [facility]   01/17/25 1630 -- 107 95 % 20 161/94 5 -- [facility]   01/17/25 1615 -- 117 -- -- 162/90 -- -- [facility]   01/17/25 1611 -- -- -- -- -- 9 -- [facility]   01/17/25 1511 99.6 °F (37.6 °C) 114 98 % 20  196/114 -- -- [facility]   01/17/25 1503 -- 114 97 % 20 196/114 -- -- [facility]            Physical Exam  Vitals and nursing note reviewed.   Constitutional:       General: He is in acute distress.      Appearance: He is ill-appearing.      Interventions: He is not intubated.  HENT:      Head: Normocephalic and atraumatic.      Mouth/Throat:      Mouth: Mucous membranes are moist.      Pharynx: No pharyngeal swelling or oropharyngeal exudate.   Eyes:      Extraocular Movements: Extraocular movements intact.      Pupils: Pupils are equal, round, and reactive to light.   Cardiovascular:      Rate and Rhythm: Regular rhythm. Tachycardia present.   Pulmonary:      Effort: Pulmonary effort is normal. No tachypnea, bradypnea, accessory muscle usage or respiratory distress. He is not intubated.      Breath sounds: Normal breath sounds. No decreased breath sounds, wheezing or rales.   Chest:      Chest wall: No mass, deformity, tenderness, crepitus or edema.   Abdominal:      Palpations: Abdomen is soft. There is no mass.      Tenderness: There is no abdominal tenderness. There is no guarding or rebound.   Musculoskeletal:         General: Normal range of motion.      Cervical back: Normal range of motion and neck supple.      Right lower leg: No tenderness. No edema.      Left lower leg: No tenderness. No edema.   Skin:     General: Skin is warm and dry.      Capillary Refill: Capillary refill takes less than 2 seconds.      Coloration: Skin is not cyanotic or pale.      Findings: No ecchymosis, erythema or rash.      Nails: There is no clubbing.   Neurological:      General: No focal deficit present.      Mental Status: He is alert and oriented to person, place, and time.      Sensory: Sensation is intact.      Motor: Motor function is intact.   Psychiatric:         Mood and Affect: Mood normal.         Behavior: Behavior normal.         Results Reviewed       Procedure Component Value Units Date/Time    Magnesium [[id number]]   (Abnormal) Collected: 01/17/25 1547    Lab Status: Final result Specimen: Blood from Arm, Left Updated: 01/17/25 1904     Magnesium 1.3 mg/dL     FLU/COVID Rapid Antigen (30 min. TAT) - Preferred screening test in ED [[id number]]  (Abnormal) Collected: 01/17/25 1547    Lab Status: Final result Specimen: Nares from Nose Updated: 01/17/25 1644     SARS COV Rapid Antigen Positive     Influenza A Rapid Antigen Negative     Influenza B Rapid Antigen Negative    Narrative:      This test has been performed using the [employer] Isa 2 FLU+SARS Antigen test under the Emergency Use Authorization (EUA). This test has been validated by the  and verified by the performing laboratory. The Isa uses lateral flow immunofluorescent sandwich assay to detect SARS-COV, Influenza A and Influenza B Antigen.     The [employer] Isa 2 SARS Antigen test does not differentiate between SARS-CoV and SARS-CoV-2.     Negative results are presumptive and may be confirmed with a molecular assay, if necessary, for patient management. Negative results do not rule out SARS-CoV-2 or influenza infection and should not be used as the sole basis for treatment or patient management decisions. A negative test result may occur if the level of antigen in a sample is below the limit of detection of this test.     Positive results are indicative of the presence of viral antigens, but do not rule out bacterial infection or co-infection with other viruses.     All test results should be used as an adjunct to clinical observations and other information available to the provider.    FOR PEDIATRIC PATIENTS - copy/paste COVID Guidelines URL to browser: [email or web address]    Procalcitonin [[id number]]  (Normal) Collected: 01/17/25 1547    Lab Status: Final result Specimen: Blood from Arm, Left Updated: 01/17/25 1627     Procalcitonin 0.10 ng/ml     Blood culture #1 [[id number]] Collected: 01/17/25 1610     Lab Status: In process Specimen: Blood from Arm, Left Updated: 01/17/25 1621    Lactic acid, plasma (w/reflex if result > 2.0) [[id number]]  (Normal) Collected: 01/17/25 1547    Lab Status: Final result Specimen: Blood from Arm, Left Updated: 01/17/25 1616     LACTIC ACID 1.3 mmol/L     Narrative:      Result may be elevated if tourniquet was used during collection.    Comprehensive metabolic panel [[id number]]  (Abnormal) Collected: 01/17/25 1547    Lab Status: Final result Specimen: Blood from Arm, Left Updated: 01/17/25 1615     Sodium 140 mmol/L      Potassium 4.1 mmol/L      Chloride 102 mmol/L      CO2 26 mmol/L      ANION GAP 12 mmol/L      BUN 7 mg/dL      Creatinine 0.94 mg/dL      Glucose 98 mg/dL      Calcium 9.3 mg/dL      AST 44 U/L      ALT 14 U/L      Alkaline Phosphatase 115 U/L      Total Protein 7.6 g/dL      Albumin 4.4 g/dL      Total Bilirubin 0.83 mg/dL      eGFR 89 ml/min/1.73sq m     Narrative:      National Kidney Disease Foundation guidelines for Chronic Kidney Disease (CKD):     Stage 1 with normal or high GFR (GFR > 90 mL/min/1.73 square meters)    Stage 2 Mild CKD (GFR = 60-89 mL/min/1.73 square meters)    Stage 3A Moderate CKD (GFR = 45-59 mL/min/1.73 square meters)    Stage 3B Moderate CKD (GFR = 30-44 mL/min/1.73 square meters)    Stage 4 Severe CKD (GFR = 15-29 mL/min/1.73 square meters)    Stage 5 End Stage CKD (GFR <15 mL/min/1.73 square meters)  Note: GFR calculation is accurate only with a steady state creatinine    Lipase [[id number]]  (Normal) Collected: 01/17/25 1547    Lab Status: Final result Specimen: Blood from Arm, Left Updated: 01/17/25 1615     Lipase 11 u/L     Protime-INR [[id number]]  (Normal) Collected: 01/17/25 1547    Lab Status: Final result Specimen: Blood from Arm, Left Updated: 01/17/25 1614     Protime 13.9 seconds      INR 1.00    Narrative:      INR Therapeutic Range    Indication                                             INR Range      Atrial Fibrillation                                                2.0-3.0  Hypercoagulable State                                    2.0.2.3  Left Ventricular Asist Device                            2.0-3.0  Mechanical Heart Valve                                  -    Aortic(with afib, MI, embolism, HF, LA enlargement,    and/or coagulopathy)                                     2.0-3.0 (2.5-3.5)     Mitral                                                             2.5-3.5  Prosthetic/Bioprosthetic Heart Valve               2.0-3.0  Venous thromboembolism (VTE: VT, PE        2.0-3.0    APTT [[id number]]  (Normal) Collected: 01/17/25 1547    Lab Status: Final result Specimen: Blood from Arm, Left Updated: 01/17/25 1614     PTT 28 seconds     CBC and differential [[id number]]  (Abnormal) Collected: 01/17/25 1547    Lab Status: Final result Specimen: Blood from Arm, Left Updated: 01/17/25 1559     WBC 5.39 Thousand/uL      RBC 3.90 Million/uL      Hemoglobin 12.7 g/dL      Hematocrit 36.1 %      MCV 93 fL      MCH 32.6 pg      MCHC 35.2 g/dL      RDW 13.7 %      MPV 8.8 fL      Platelets 155 Thousands/uL      nRBC 0 /100 WBCs      Segmented % 84 %      Immature Grans % 0 %      Lymphocytes % 7 %      Monocytes % 6 %      Eosinophils Relative 2 %      Basophils Relative 1 %      Absolute Neutrophils 4.57 Thousands/µL      Absolute Immature Grans 0.02 Thousand/uL      Absolute Lymphocytes 0.36 Thousands/µL      Absolute Monocytes 0.31 Thousand/µL      Eosinophils Absolute 0.10 Thousand/µL      Basophils Absolute 0.03 Thousands/µL     Blood culture #2 [[id number]] Collected: 01/17/25 1547    Lab Status: In process Specimen: Blood from Arm, Left Updated: 01/17/25 1555            XR chest 1 view portable   ED Interpretation by Elian Singh DO (01/17 1623)   No focal consolidations or pneumothorax.  No pleural effusion.  Appears similar to prior chest x-ray done regenerate 14 2025      Final Interpretation by Lisa Mcdonald MD (01/17 1641)      No  acute pulmonary pathology.      Findings are concordant with preliminary interpretation provided in the Emergency Department.            Workstation performed: [id number]             ECG 12 Lead Documentation Only    Date/Time: 1/17/2025 3:18 PM    Performed by: Elian Singh DO  Authorized by: Elian Singh DO    Indications / Diagnosis:  Shortness of breath.  ECG reviewed by me, the ED Provider: yes    Patient location:  ED  Previous ECG:     Previous ECG:  Compared to current    Similarity:  Changes noted  Interpretation:     Interpretation: abnormal    Rate:     ECG rate:  110    ECG rate assessment: tachycardic    Rhythm:     Rhythm: sinus tachycardia    Ectopy:     Ectopy: none    QRS:     QRS axis:  Normal    QRS intervals:  Normal  Conduction:     Conduction: normal    ST segments:     ST segments:  Normal  T waves:     T waves: normal        ED Medication and Procedure Management   Prior to Admission Medications   Prescriptions Last Dose Informant Patient Reported? Taking?   Diclofenac Sodium (VOLTAREN) 1 %   No No   Sig: Apply 2 g topically 4 (four) times a day   Methocarbamol 1000 MG TABS   No No   Sig: Take 1,000 mg by mouth every 8 (eight) hours   Thiamine Mononitrate (VITAMIN B1) 100 mg tablet   No No   Sig: Take 1 tablet (100 mg total) by mouth daily   Patient not taking: Reported on 1/17/2025   acetaminophen (TYLENOL) 325 mg tablet   No No   Sig: Take 3 tablets (975 mg total) by mouth every 8 (eight) hours   atorvastatin (LIPITOR) 40 mg tablet   No No   Sig: Take 1 tablet (40 mg total) by mouth daily with dinner   escitalopram (LEXAPRO) 20 mg tablet   No No   Sig: Take 1 tablet (20 mg total) by mouth daily   folic acid (FOLVITE) 1 mg tablet   No No   Sig: Take 1 tablet (1 mg total) by mouth daily   Patient not taking: Reported on 1/17/2025   gabapentin (NEURONTIN) 300 mg capsule   No No   Sig: Take 1 capsule (300 mg total) by mouth 3 (three) times a day   ibuprofen (MOTRIN) 400 mg tablet   No No   Sig:  Take 1 tablet (400 mg total) by mouth every 6 (six) hours as needed for mild pain   lidocaine (LIDODERM) 5 %   No No   Sig: Apply 1 patch topically over 12 hours daily Remove & Discard patch within 12 hours or as directed by MD   lisinopril (ZESTRIL) 40 mg tablet   No No   Sig: Take 1 tablet (40 mg total) by mouth daily   mirtazapine (REMERON) 15 mg tablet   No No   Sig: Take 1 tablet (15 mg total) by mouth daily at bedtime   Patient not taking: Reported on 1/17/2025   nicotine (NICODERM CQ) 21 mg/24 hr TD 24 hr patch   No No   Sig: Place 1 patch on the skin over 24 hours daily   Patient not taking: Reported on 1/17/2025   oxyCODONE (Roxicodone) 5 immediate release tablet   No No   Sig: Take 2 tablets (10 mg total) by mouth every 6 (six) hours as needed for moderate pain for up to 7 days Max Daily Amount: 40 mg   pantoprazole (PROTONIX) 40 mg tablet   No No   Sig: Take 1 tablet (40 mg total) by mouth daily in the early morning   senna-docusate sodium (SENOKOT S) 8.6-50 mg per tablet   No No   Sig: Take 1 tablet by mouth daily at bedtime      Facility-Administered Medications: None     Current Discharge Medication List        CONTINUE these medications which have NOT CHANGED    Details   acetaminophen (TYLENOL) 325 mg tablet Take 3 tablets (975 mg total) by mouth every 8 (eight) hours    Associated Diagnoses: Closed fracture of multiple ribs of right side, initial encounter      atorvastatin (LIPITOR) 40 mg tablet Take 1 tablet (40 mg total) by mouth daily with dinner  Qty: 90 tablet, Refills: 1    Associated Diagnoses: Hyperlipidemia, unspecified hyperlipidemia type      Diclofenac Sodium (VOLTAREN) 1 % Apply 2 g topically 4 (four) times a day  Qty: 240 g, Refills: 0    Associated Diagnoses: Closed fracture of multiple ribs of right side with routine healing      escitalopram (LEXAPRO) 20 mg tablet Take 1 tablet (20 mg total) by mouth daily  Qty: 90 tablet, Refills: 1    Associated Diagnoses: Anxiety; Current  moderate episode of major depressive disorder, unspecified whether recurrent ([facility])      folic acid (FOLVITE) 1 mg tablet Take 1 tablet (1 mg total) by mouth daily  Qty: 30 tablet, Refills: 0    Associated Diagnoses: Alcohol withdrawal ([facility])      gabapentin (NEURONTIN) 300 mg capsule Take 1 capsule (300 mg total) by mouth 3 (three) times a day  Qty: 90 capsule, Refills: 0    Associated Diagnoses: Closed fracture of multiple ribs of right side, initial encounter      ibuprofen (MOTRIN) 400 mg tablet Take 1 tablet (400 mg total) by mouth every 6 (six) hours as needed for mild pain  Qty: 30 tablet, Refills: 0    Associated Diagnoses: Closed fracture of multiple ribs of right side, initial encounter      lidocaine (LIDODERM) 5 % Apply 1 patch topically over 12 hours daily Remove & Discard patch within 12 hours or as directed by MD  Qty: 30 patch, Refills: 0    Associated Diagnoses: Closed fracture of multiple ribs of right side, initial encounter      lisinopril (ZESTRIL) 40 mg tablet Take 1 tablet (40 mg total) by mouth daily  Qty: 90 tablet, Refills: 1    Associated Diagnoses: Primary hypertension      Methocarbamol 1000 MG TABS Take 1,000 mg by mouth every 8 (eight) hours  Qty: 45 tablet, Refills: 0    Associated Diagnoses: Closed fracture of multiple ribs of right side, initial encounter      mirtazapine (REMERON) 15 mg tablet Take 1 tablet (15 mg total) by mouth daily at bedtime  Qty: 30 tablet, Refills: 0    Associated Diagnoses: Major depressive disorder, recurrent, moderate ([facility])      nicotine (NICODERM CQ) 21 mg/24 hr TD 24 hr patch Place 1 patch on the skin over 24 hours daily  Qty: 28 patch, Refills: 0    Associated Diagnoses: Closed fracture of multiple ribs of right side, initial encounter      oxyCODONE (Roxicodone) 5 immediate release tablet Take 2 tablets (10 mg total) by mouth every 6 (six) hours as needed for moderate pain for up to 7 days Max Daily Amount: 40 mg  Qty: 28 tablet, Refills: 0     Associated Diagnoses: Closed fracture of multiple ribs of right side with routine healing      pantoprazole (PROTONIX) 40 mg tablet Take 1 tablet (40 mg total) by mouth daily in the early morning  Qty: 90 tablet, Refills: 1    Associated Diagnoses: Chronic alcoholic gastritis without hemorrhage; Gastroesophageal reflux disease, unspecified whether esophagitis present      senna-docusate sodium (SENOKOT S) 8.6-50 mg per tablet Take 1 tablet by mouth daily at bedtime  Qty: 30 tablet, Refills: 0    Associated Diagnoses: Closed fracture of multiple ribs of right side, initial encounter      Thiamine Mononitrate (VITAMIN B1) 100 mg tablet Take 1 tablet (100 mg total) by mouth daily  Qty: 30 tablet, Refills: 1    Associated Diagnoses: Alcohol use disorder, severe, dependence (HCC)           No discharge procedures on file.  ED SEPSIS DOCUMENTATION   Time reflects when diagnosis was documented in both MDM as applicable and the Disposition within this note       Time User Action Codes Description Comment    1/17/2025  4:47 PM Elian Singh [U07.1] COVID-19     1/17/2025  4:47 PM Elian Singh [R55] Near syncope     1/17/2025  4:47 PM Elian Singh [S22.49XA] Multiple rib fractures     1/17/2025  5:39 PM Elian Singh [R52] Intractable pain     1/17/2025  5:39 PM Elian Singh [Z87.898] History of alcohol use disorder                  Elian Singh DO  01/17/25 2034

## 2025-01-17 NOTE — ASSESSMENT & PLAN NOTE
Tachycardia noted with heart rate of 100s to 110s,  Likely secondary to pain, CTA of the chest was done recently on 1/7/25, was negative for PE,  Received IV hydration, will give pain medication with scheduled Tylenol, oxycodone for moderate pain and Dilaudid IV for breakthrough pain  Monitor heart rate,

## 2025-01-17 NOTE — TELEPHONE ENCOUNTER
[employer]:      Pt has appt set for today, 1/17 @ 2pm.     Address and phone number has been confirmed.     Please advise

## 2025-01-18 ENCOUNTER — APPOINTMENT (INPATIENT)
Dept: CT IMAGING | Facility: HOSPITAL | Age: 60
DRG: 137 | End: 2025-01-18
Payer: COMMERCIAL

## 2025-01-18 PROBLEM — E87.8 ELECTROLYTE ABNORMALITY: Status: ACTIVE | Noted: 2025-01-18

## 2025-01-18 LAB
ALBUMIN SERPL BCG-MCNC: 4.2 G/DL (ref 3.5–5)
ALP SERPL-CCNC: 109 U/L (ref 34–104)
ALT SERPL W P-5'-P-CCNC: 12 U/L (ref 7–52)
ANION GAP SERPL CALCULATED.3IONS-SCNC: 13 MMOL/L (ref 4–13)
AST SERPL W P-5'-P-CCNC: 34 U/L (ref 13–39)
BASOPHILS # BLD AUTO: 0.02 THOUSANDS/ΜL (ref 0–0.1)
BASOPHILS NFR BLD AUTO: 0 % (ref 0–1)
BILIRUB SERPL-MCNC: 0.67 MG/DL (ref 0.2–1)
BUN SERPL-MCNC: 7 MG/DL (ref 5–25)
CALCIUM SERPL-MCNC: 9.1 MG/DL (ref 8.4–10.2)
CHLORIDE SERPL-SCNC: 102 MMOL/L (ref 96–108)
CO2 SERPL-SCNC: 25 MMOL/L (ref 21–32)
CREAT SERPL-MCNC: 0.92 MG/DL (ref 0.6–1.3)
CRP SERPL QL: 35.8 MG/L
D DIMER PPP FEU-MCNC: 0.67 UG/ML FEU
EOSINOPHIL # BLD AUTO: 0.15 THOUSAND/ΜL (ref 0–0.61)
EOSINOPHIL NFR BLD AUTO: 3 % (ref 0–6)
ERYTHROCYTE [DISTWIDTH] IN BLOOD BY AUTOMATED COUNT: 13.8 % (ref 11.6–15.1)
GFR SERPL CREATININE-BSD FRML MDRD: 91 ML/MIN/1.73SQ M
GLUCOSE P FAST SERPL-MCNC: 85 MG/DL (ref 65–99)
GLUCOSE SERPL-MCNC: 85 MG/DL (ref 65–140)
HCT VFR BLD AUTO: 37 % (ref 36.5–49.3)
HGB BLD-MCNC: 12.8 G/DL (ref 12–17)
IMM GRANULOCYTES # BLD AUTO: 0.02 THOUSAND/UL (ref 0–0.2)
IMM GRANULOCYTES NFR BLD AUTO: 0 % (ref 0–2)
LYMPHOCYTES # BLD AUTO: 0.59 THOUSANDS/ΜL (ref 0.6–4.47)
LYMPHOCYTES NFR BLD AUTO: 13 % (ref 14–44)
MAGNESIUM SERPL-MCNC: 2.3 MG/DL (ref 1.9–2.7)
MCH RBC QN AUTO: 32.5 PG (ref 26.8–34.3)
MCHC RBC AUTO-ENTMCNC: 34.6 G/DL (ref 31.4–37.4)
MCV RBC AUTO: 94 FL (ref 82–98)
MONOCYTES # BLD AUTO: 0.55 THOUSAND/ΜL (ref 0.17–1.22)
MONOCYTES NFR BLD AUTO: 12 % (ref 4–12)
NEUTROPHILS # BLD AUTO: 3.13 THOUSANDS/ΜL (ref 1.85–7.62)
NEUTS SEG NFR BLD AUTO: 72 % (ref 43–75)
NRBC BLD AUTO-RTO: 0 /100 WBCS
PLATELET # BLD AUTO: 133 THOUSANDS/UL (ref 149–390)
PMV BLD AUTO: 8.5 FL (ref 8.9–12.7)
POTASSIUM SERPL-SCNC: 3.8 MMOL/L (ref 3.5–5.3)
PROCALCITONIN SERPL-MCNC: 0.15 NG/ML
PROT SERPL-MCNC: 7.2 G/DL (ref 6.4–8.4)
RBC # BLD AUTO: 3.94 MILLION/UL (ref 3.88–5.62)
SODIUM SERPL-SCNC: 140 MMOL/L (ref 135–147)
WBC # BLD AUTO: 4.46 THOUSAND/UL (ref 4.31–10.16)

## 2025-01-18 PROCEDURE — 85379 FIBRIN DEGRADATION QUANT: CPT | Performed by: PHYSICIAN ASSISTANT

## 2025-01-18 PROCEDURE — 85025 COMPLETE CBC W/AUTO DIFF WBC: CPT | Performed by: INTERNAL MEDICINE

## 2025-01-18 PROCEDURE — 83735 ASSAY OF MAGNESIUM: CPT | Performed by: PHYSICIAN ASSISTANT

## 2025-01-18 PROCEDURE — 84145 PROCALCITONIN (PCT): CPT | Performed by: INTERNAL MEDICINE

## 2025-01-18 PROCEDURE — 80053 COMPREHEN METABOLIC PANEL: CPT | Performed by: INTERNAL MEDICINE

## 2025-01-18 PROCEDURE — 99232 SBSQ HOSP IP/OBS MODERATE 35: CPT | Performed by: PHYSICIAN ASSISTANT

## 2025-01-18 PROCEDURE — 86140 C-REACTIVE PROTEIN: CPT | Performed by: INTERNAL MEDICINE

## 2025-01-18 PROCEDURE — 71275 CT ANGIOGRAPHY CHEST: CPT

## 2025-01-18 RX ORDER — OXYCODONE HYDROCHLORIDE 10 MG/1
10 TABLET ORAL EVERY 4 HOURS PRN
Refills: 0 | Status: DISCONTINUED | OUTPATIENT
Start: 2025-01-18 | End: 2025-01-18

## 2025-01-18 RX ORDER — KETOROLAC TROMETHAMINE 30 MG/ML
30 INJECTION, SOLUTION INTRAMUSCULAR; INTRAVENOUS ONCE
Status: COMPLETED | OUTPATIENT
Start: 2025-01-18 | End: 2025-01-18

## 2025-01-18 RX ORDER — OXYCODONE HYDROCHLORIDE 10 MG/1
10 TABLET ORAL EVERY 4 HOURS PRN
Refills: 0 | Status: DISCONTINUED | OUTPATIENT
Start: 2025-01-18 | End: 2025-01-19 | Stop reason: HOSPADM

## 2025-01-18 RX ORDER — GUAIFENESIN/DEXTROMETHORPHAN 100-10MG/5
10 SYRUP ORAL EVERY 4 HOURS PRN
Status: DISCONTINUED | OUTPATIENT
Start: 2025-01-18 | End: 2025-01-19 | Stop reason: HOSPADM

## 2025-01-18 RX ORDER — ACETAMINOPHEN 325 MG/1
975 TABLET ORAL EVERY 6 HOURS PRN
Status: DISCONTINUED | OUTPATIENT
Start: 2025-01-18 | End: 2025-01-18

## 2025-01-18 RX ORDER — ACETAMINOPHEN 325 MG/1
650 TABLET ORAL EVERY 8 HOURS PRN
Status: DISCONTINUED | OUTPATIENT
Start: 2025-01-18 | End: 2025-01-19 | Stop reason: HOSPADM

## 2025-01-18 RX ORDER — BENZONATATE 100 MG/1
200 CAPSULE ORAL 3 TIMES DAILY
Status: DISCONTINUED | OUTPATIENT
Start: 2025-01-18 | End: 2025-01-18

## 2025-01-18 RX ORDER — ACETAMINOPHEN 325 MG/1
650 TABLET ORAL ONCE
Status: COMPLETED | OUTPATIENT
Start: 2025-01-18 | End: 2025-01-18

## 2025-01-18 RX ORDER — FAMOTIDINE 20 MG/1
20 TABLET, FILM COATED ORAL 2 TIMES DAILY
Status: DISCONTINUED | OUTPATIENT
Start: 2025-01-18 | End: 2025-01-19 | Stop reason: HOSPADM

## 2025-01-18 RX ADMIN — OXYCODONE HYDROCHLORIDE 5 MG: 5 TABLET ORAL at 06:30

## 2025-01-18 RX ADMIN — PANTOPRAZOLE SODIUM 40 MG: 40 TABLET, DELAYED RELEASE ORAL at 06:30

## 2025-01-18 RX ADMIN — ENOXAPARIN SODIUM 30 MG: 30 INJECTION SUBCUTANEOUS at 21:25

## 2025-01-18 RX ADMIN — ACETAMINOPHEN 975 MG: 325 TABLET, FILM COATED ORAL at 06:30

## 2025-01-18 RX ADMIN — ACETAMINOPHEN 650 MG: 325 TABLET, FILM COATED ORAL at 17:37

## 2025-01-18 RX ADMIN — LISINOPRIL 40 MG: 20 TABLET ORAL at 08:23

## 2025-01-18 RX ADMIN — SENNOSIDES AND DOCUSATE SODIUM 1 TABLET: 50; 8.6 TABLET ORAL at 21:25

## 2025-01-18 RX ADMIN — ACETAMINOPHEN 975 MG: 325 TABLET, FILM COATED ORAL at 13:01

## 2025-01-18 RX ADMIN — LORAZEPAM 0.5 MG: 0.5 TABLET ORAL at 15:37

## 2025-01-18 RX ADMIN — MIRTAZAPINE 15 MG: 15 TABLET, FILM COATED ORAL at 21:25

## 2025-01-18 RX ADMIN — REMDESIVIR 200 MG: 100 INJECTION, POWDER, LYOPHILIZED, FOR SOLUTION INTRAVENOUS at 10:28

## 2025-01-18 RX ADMIN — FAMOTIDINE 20 MG: 20 TABLET, FILM COATED ORAL at 11:42

## 2025-01-18 RX ADMIN — OXYCODONE HYDROCHLORIDE 10 MG: 10 TABLET ORAL at 21:25

## 2025-01-18 RX ADMIN — HYDROMORPHONE HYDROCHLORIDE 0.5 MG: 1 INJECTION, SOLUTION INTRAMUSCULAR; INTRAVENOUS; SUBCUTANEOUS at 02:45

## 2025-01-18 RX ADMIN — ENOXAPARIN SODIUM 30 MG: 30 INJECTION SUBCUTANEOUS at 08:21

## 2025-01-18 RX ADMIN — IOHEXOL 70 ML: 350 INJECTION, SOLUTION INTRAVENOUS at 13:38

## 2025-01-18 RX ADMIN — ESCITALOPRAM OXALATE 20 MG: 20 TABLET ORAL at 08:23

## 2025-01-18 RX ADMIN — GUAIFENESIN AND DEXTROMETHORPHAN 10 ML: 100; 10 SYRUP ORAL at 22:51

## 2025-01-18 RX ADMIN — OXYCODONE HYDROCHLORIDE 10 MG: 10 TABLET ORAL at 16:59

## 2025-01-18 RX ADMIN — LORAZEPAM 0.5 MG: 0.5 TABLET ORAL at 00:16

## 2025-01-18 RX ADMIN — KETOROLAC TROMETHAMINE 30 MG: 30 INJECTION, SOLUTION INTRAMUSCULAR; INTRAVENOUS at 01:03

## 2025-01-18 RX ADMIN — HYDROMORPHONE HYDROCHLORIDE 0.5 MG: 1 INJECTION, SOLUTION INTRAMUSCULAR; INTRAVENOUS; SUBCUTANEOUS at 08:21

## 2025-01-18 RX ADMIN — OXYCODONE HYDROCHLORIDE 10 MG: 10 TABLET ORAL at 13:01

## 2025-01-18 RX ADMIN — ATORVASTATIN CALCIUM 40 MG: 40 TABLET, FILM COATED ORAL at 16:59

## 2025-01-18 NOTE — ASSESSMENT & PLAN NOTE
BP slightly elevated; SBP range 140-160s  Manage pain  C/w PTA lisinopril; titrate meds accordingly

## 2025-01-18 NOTE — PROGRESS NOTES
"Progress Note - Hospitalist   Name: Diogo Travis 59 y.o. male I MRN: [id number]  Unit/Bed#: W -01 I Date of Admission: 1/17/2025   Date of Service: 1/18/2025 I Hospital Day: 0    Assessment & Plan  COVID-19  Patient presents from PCPs office w/ SOB and tachycardia (130 bpm) w/ associated cough, chills, fever of 101.5.  Recently discharged from detox unit for alcohol withdrawal on 01/15; h/o rib fractures following fall.  COVID-19 on 01/17/25   Patient endorses transient hypoxia noted by patient overnight \"in 80's\" requiring 2 L of nasal cannula; this was not documented; stable on room air 90-92%   W/ significant SOB, tachycardia, and increased O2 demand obtain D-dimer  W/ comorbidities, add remdesivir, day #1   Hold off on Decadron; if pt has documented hypoxia, would add  CXR w/o infiltrate   Multiple rib fractures  S/p fall from later on 12/24/24   CT chest (01/11): R 4th-6th posterior lateral & R 5th-7th posterior rib fractures  Incentive spirometry, analgesia (scheduled Tylenol & PRN Oxy)   Sinus tachycardia  's at PCP office on day of admit; 's in ED   Likely 2/2 pain, anxiousness, dehydration  Heart rate currently stable  D-dimer to be obtained as above in setting of COVID-19  Alcohol use disorder  DC'd from [facility] on 01/15, s/p phenobarbital therapy  Placed on CIWA protocol on admit; very low risk for withdrawal; likely can D/C CIWA later today  C/w thiamine, folate  Remains abstinent from alcohol  Hypertension  BP slightly elevated; SBP range 140-160s  Manage pain  C/w PTA lisinopril; titrate meds accordingly  Electrolyte abnormality  Recent Labs     01/17/25  1547 01/18/25  0629   MG 1.3*  --    K 4.1 3.8     Trend & replete; lab pending this AM   Hyperlipidemia  C/w statin  GERD (gastroesophageal reflux disease)  C/w  PPI    VTE Pharmacologic Prophylaxis:   Moderate Risk (Score 3-4) - Pharmacological DVT Prophylaxis Ordered: enoxaparin (Lovenox).    Mobility:   Basic " Mobility Inpatient Raw Score: 24  JH-HLM Goal: 8: Walk 250 feet or more  JH-HLM Achieved: 7: Walk 25 feet or more  JH-HLM Goal achieved. Continue to encourage appropriate mobility.    Patient Centered Rounds: I performed bedside rounds with nursing staff today.   Discussions with Specialists or Other Care Team Provider: Plan of care reviewed with nursing    Education and Discussions with Family / Patient: Patient declined call to .  Patient will update his family.    Current Length of Stay: 0 day(s)  Current Patient Status: Observation   Certification Statement: The patient will continue to require additional inpatient hospital stay due to COVID-19, hypertension, electrolyte abnormalities  Discharge Plan: Anticipate discharge in 24-48 hrs to home.    Code Status: Level 1 - Full Code    Subjective   Patient is doing okay.  He admits that his coughing was very severe overnight.  He described multiple coughing fits.  He is still having some discomfort in his ribs on the right side.  He is currently denying gross shortness of breath or dyspnea on exertion.  He is afebrile.  He had recent bowel movement prior to admission.  He denies dysuria.    Objective :  Temp:  [99.1 °F (37.3 °C)-100.6 °F (38.1 °C)] 99.6 °F (37.6 °C)  HR:  [] 96  BP: (138-196)/() 158/85  Resp:  [16-20] 18  SpO2:  [92 %-98 %] 95 %  O2 Device: None (Room air)  Nasal Cannula O2 Flow Rate (L/min):  [2 L/min] 2 L/min    Body mass index is 34.36 kg/m².     Input and Output Summary (last 24 hours):     Intake/Output Summary (Last 24 hours) at 1/18/2025 0910  Last data filed at 1/17/2025 1713  Gross per 24 hour   Intake 1000 ml   Output --   Net 1000 ml       Physical Exam  Constitutional:       General: He is not in acute distress.     Appearance: He is not toxic-appearing.      Comments: Overweight    HENT:      Head: Normocephalic.      Right Ear: External ear normal.      Left Ear: External ear normal.      Nose: Nose normal.       Mouth/Throat:      Mouth: Mucous membranes are moist.      Pharynx: Oropharynx is clear.   Eyes:      Extraocular Movements: Extraocular movements intact.      Conjunctiva/sclera: Conjunctivae normal.   Cardiovascular:      Rate and Rhythm: Normal rate and regular rhythm.      Pulses: Normal pulses.      Heart sounds: Normal heart sounds.   Pulmonary:      Effort: Pulmonary effort is normal. No respiratory distress.      Breath sounds: Rhonchi (exp rhonchi) present. No wheezing.   Abdominal:      General: Bowel sounds are normal. There is no distension.      Palpations: Abdomen is soft.      Tenderness: There is no abdominal tenderness. There is no guarding.      Comments: Obese abdomen      Musculoskeletal:         General: No swelling or deformity. Normal range of motion.      Cervical back: Normal range of motion and neck supple.   Skin:     General: Skin is warm and dry.      Coloration: Skin is not jaundiced.      Findings: No bruising or lesion.   Neurological:      General: No focal deficit present.      Mental Status: He is alert and oriented to person, place, and time. Mental status is at baseline.   Psychiatric:         Mood and Affect: Mood normal.         Behavior: Behavior normal.           Lab Results: I have reviewed the following results:   Results from last 7 days   Lab Units 01/18/25  0629   WBC Thousand/uL 4.46   HEMOGLOBIN g/dL 12.8   HEMATOCRIT % 37.0   PLATELETS Thousands/uL 133*   SEGS PCT % 72   LYMPHO PCT % 13*   MONO PCT % 12   EOS PCT % 3     Results from last 7 days   Lab Units 01/18/25  0629   SODIUM mmol/L 140   POTASSIUM mmol/L 3.8   CHLORIDE mmol/L 102   CO2 mmol/L 25   BUN mg/dL 7   CREATININE mg/dL 0.92   ANION GAP mmol/L 13   CALCIUM mg/dL 9.1   ALBUMIN g/dL 4.2   TOTAL BILIRUBIN mg/dL 0.67   ALK PHOS U/L 109*   ALT U/L 12   AST U/L 34   GLUCOSE RANDOM mg/dL 85     Results from last 7 days   Lab Units 01/17/25  1547   INR  1.00     Results from last 7 days   Lab Units  01/14/25  0543   POC GLUCOSE mg/dl 81         Results from last 7 days   Lab Units 01/18/25  0629 01/17/25  1547   LACTIC ACID mmol/L  --  1.3   PROCALCITONIN ng/ml 0.15 0.10       Recent Cultures (last 7 days):   Results from last 7 days   Lab Units 01/17/25  1610 01/17/25  1547   BLOOD CULTURE  Received in Microbiology Lab. Culture in Progress. Received in Microbiology Lab. Culture in Progress.       Imaging Results Review: I reviewed radiology reports from this admission including: chest xray.  Other Study Results Review: EKG was reviewed.     Last 24 Hours Medication List:     Current Facility-Administered Medications:     acetaminophen (TYLENOL) tablet 975 mg, Q8H COLBY    atorvastatin (LIPITOR) tablet 40 mg, Daily With Dinner    benzonatate (TESSALON PERLES) capsule 200 mg, TID    enoxaparin (LOVENOX) subcutaneous injection 30 mg, Q12H    escitalopram (LEXAPRO) tablet 20 mg, Daily    folic acid (FOLVITE) tablet 1 mg, Daily    gabapentin (NEURONTIN) capsule 300 mg, TID    lidocaine (LIDODERM) 5 % patch 1 patch, Daily    lisinopril (ZESTRIL) tablet 40 mg, Daily    LORazepam (ATIVAN) tablet 0.5 mg, Q8H PRN    methocarbamol (ROBAXIN) tablet 1,000 mg, Q8H    mirtazapine (REMERON) tablet 15 mg, HS    ondansetron (ZOFRAN) injection 4 mg, Once    oxyCODONE (ROXICODONE) immediate release tablet 10 mg, Q4H PRN    oxyCODONE (ROXICODONE) IR tablet 5 mg, Q6H PRN    pantoprazole (PROTONIX) EC tablet 40 mg, Early Morning    remdesivir (Veklury) 200 mg in sodium chloride 0.9 % 290 mL IVPB, Q24H **FOLLOWED BY** [START ON 1/19/2025] remdesivir (Veklury) 100 mg in sodium chloride 0.9 % 270 mL IVPB, Q24H    senna-docusate sodium (SENOKOT S) 8.6-50 mg per tablet 1 tablet, HS    thiamine tablet 100 mg, Daily    Administrative Statements   Today, Patient Was Seen By: Adrienne Guajardo PA-C  I have spent a total time of 45 minutes in caring for this patient on the day of the visit/encounter including Diagnostic results, Prognosis, Risks  and benefits of tx options, Instructions for management, Patient and family education, Importance of tx compliance, Risk factor reductions, Impressions, Counseling / Coordination of care, Documenting in the medical record, Reviewing / ordering tests, medicine, procedures  , Obtaining or reviewing history  , and Communicating with other healthcare professionals .    **Please Note: This note may have been constructed using a voice recognition system.**

## 2025-01-18 NOTE — PLAN OF CARE
Problem: Potential for Falls  Goal: Patient will remain free of falls  Description: INTERVENTIONS:  - Educate patient/family on patient safety including physical limitations  - Instruct patient to call for assistance with activity   - Consult OT/PT to assist with strengthening/mobility   - Keep Call bell within reach  - Keep bed low and locked with side rails adjusted as appropriate  - Keep care items and personal belongings within reach  - Initiate and maintain comfort rounds  - Make Fall Risk Sign visible to staff  - Offer Toileting every *** Hours, in advance of need  - Initiate/Maintain ***alarm  - Obtain necessary fall risk management equipment: ***  - Apply yellow socks and bracelet for high fall risk patients  - Consider moving patient to room near nurses station  Outcome: Progressing     Problem: RESPIRATORY - ADULT  Goal: Achieves optimal ventilation and oxygenation  Description: INTERVENTIONS:  - Assess for changes in respiratory status  - Assess for changes in mentation and behavior  - Position to facilitate oxygenation and minimize respiratory effort  - Oxygen administered by appropriate delivery if ordered  - Initiate smoking cessation education as indicated  - Encourage broncho-pulmonary hygiene including cough, deep breathe, Incentive Spirometry  - Assess the need for suctioning and aspirate as needed  - Assess and instruct to report SOB or any respiratory difficulty  - Respiratory Therapy support as indicated  Outcome: Progressing     Problem: PAIN - ADULT  Goal: Verbalizes/displays adequate comfort level or baseline comfort level  Description: Interventions:  - Encourage patient to monitor pain and request assistance  - Assess pain using appropriate pain scale  - Administer analgesics based on type and severity of pain and evaluate response  - Implement non-pharmacological measures as appropriate and evaluate response  - Consider cultural and social influences on pain and pain management  - Notify  physician/advanced practitioner if interventions unsuccessful or patient reports new pain  Outcome: Progressing     Problem: INFECTION - ADULT  Goal: Absence or prevention of progression during hospitalization  Description: INTERVENTIONS:  - Assess and monitor for signs and symptoms of infection  - Monitor lab/diagnostic results  - Monitor all insertion sites, i.e. indwelling lines, tubes, and drains  - Monitor endotracheal if appropriate and nasal secretions for changes in amount and color  - [place] appropriate cooling/warming therapies per order  - Administer medications as ordered  - Instruct and encourage patient and family to use good hand hygiene technique  - Identify and instruct in appropriate isolation precautions for identified infection/condition  Outcome: Progressing     Problem: SAFETY ADULT  Goal: Patient will remain free of falls  Description: INTERVENTIONS:  - Educate patient/family on patient safety including physical limitations  - Instruct patient to call for assistance with activity   - Consult OT/PT to assist with strengthening/mobility   - Keep Call bell within reach  - Keep bed low and locked with side rails adjusted as appropriate  - Keep care items and personal belongings within reach  - Initiate and maintain comfort rounds  - Make Fall Risk Sign visible to staff  - Offer Toileting every *** Hours, in advance of need  - Initiate/Maintain ***alarm  - Obtain necessary fall risk management equipment: ***  - Apply yellow socks and bracelet for high fall risk patients  - Consider moving patient to room near nurses station  Outcome: Progressing  Goal: Maintain or return to baseline ADL function  Description: INTERVENTIONS:  -  Assess patient's ability to carry out ADLs; assess patient's baseline for ADL function and identify physical deficits which impact ability to perform ADLs (bathing, care of mouth/teeth, toileting, grooming, dressing, etc.)  - Assess/evaluate cause of self-care deficits    - Assess range of motion  - Assess patient's mobility; develop plan if impaired  - Assess patient's need for assistive devices and provide as appropriate  - Encourage maximum independence but intervene and supervise when necessary  - Involve family in performance of ADLs  - Assess for home care needs following discharge   - Consider OT consult to assist with ADL evaluation and planning for discharge  - Provide patient education as appropriate  Outcome: Progressing  Goal: Maintains/Returns to pre admission functional level  Description: INTERVENTIONS:  - Perform AM-PAC 6 Click Basic Mobility/ Daily Activity assessment daily.  - Set and communicate daily mobility goal to care team and patient/family/caregiver.   - Collaborate with rehabilitation services on mobility goals if consulted  - Perform Range of Motion *** times a day.  - Reposition patient every *** hours.  - Dangle patient *** times a day  - Stand patient *** times a day  - Ambulate patient *** times a day  - Out of bed to chair *** times a day   - Out of bed for meals *** times a day  - Out of bed for toileting  - Record patient progress and toleration of activity level   Outcome: Progressing     Problem: DISCHARGE PLANNING  Goal: Discharge to home or other facility with appropriate resources  Description: INTERVENTIONS:  - Identify barriers to discharge w/patient and caregiver  - Arrange for needed discharge resources and transportation as appropriate  - Identify discharge learning needs (meds, wound care, etc.)  - Arrange for interpretive services to assist at discharge as needed  - Refer to Case Management Department for coordinating discharge planning if the patient needs post-hospital services based on physician/advanced practitioner order or complex needs related to functional status, cognitive ability, or social support system  Outcome: Progressing     Problem: Knowledge Deficit  Goal: Patient/family/caregiver demonstrates understanding of disease  process, treatment plan, medications, and discharge instructions  Description: Complete learning assessment and assess knowledge base.  Interventions:  - Provide teaching at level of understanding  - Provide teaching via preferred learning methods  Outcome: Progressing     Problem: Potential for Falls  Goal: Patient will remain free of falls  Description: INTERVENTIONS:  - Educate patient/family on patient safety including physical limitations  - Instruct patient to call for assistance with activity   - Consult OT/PT to assist with strengthening/mobility   - Keep Call bell within reach  - Keep bed low and locked with side rails adjusted as appropriate  - Keep care items and personal belongings within reach  - Initiate and maintain comfort rounds  - Make Fall Risk Sign visible to staff  - Offer Toileting every *** Hours, in advance of need  - Initiate/Maintain ***alarm  - Obtain necessary fall risk management equipment: ***  - Apply yellow socks and bracelet for high fall risk patients  - Consider moving patient to room near nurses station  Outcome: Progressing

## 2025-01-18 NOTE — ASSESSMENT & PLAN NOTE
S/p fall from later on 12/24/24   CT chest (01/11): R 4th-6th posterior lateral & R 5th-7th posterior rib fractures  Incentive spirometry, analgesia (scheduled Tylenol & PRN Oxy)

## 2025-01-18 NOTE — ASSESSMENT & PLAN NOTE
Placed on CIWA protocol  Patient was recently admitted to detox unit at [facility] for withdrawal, received phenobarb at the time,  States that he has quit alcohol since the recent admission to detox.  Continue folate and thiamine  Ativan as needed

## 2025-01-18 NOTE — ASSESSMENT & PLAN NOTE
Recent Labs     01/17/25  1547 01/18/25  0629   MG 1.3*  --    K 4.1 3.8     Trend & replete; lab pending this AM

## 2025-01-18 NOTE — ASSESSMENT & PLAN NOTE
DC'd from [facility] on 01/15, s/p phenobarbital therapy  Placed on CIWA protocol on admit; very low risk for withdrawal; likely can D/C EMMAWA later today  C/w thiamine, folate  Remains abstinent from alcohol

## 2025-01-18 NOTE — UTILIZATION REVIEW
Initial Clinical Review  OBSERVATION ADMISSION 1/17/2025 1741   CONVERTED TO   INPATIENT ADMISSION 1/18/2025 1011  DUE TO ONGOING MANAGEMENT OF COVID 19 INFECTION  Admission: Date/Time/Statement:   Admission Orders (From admission, onward)       Ordered        01/18/25 1011  INPATIENT ADMISSION  Once            01/17/25 1741  Place in Observation  Once                          Orders Placed This Encounter   Procedures    INPATIENT ADMISSION     Standing Status:   Standing     Number of Occurrences:   1     Level of Care:   Med Surg [16]     Estimated length of stay:   More than 2 Midnights     Certification:   I certify that inpatient services are medically necessary for this patient for a duration of greater than two midnights. See H&P and MD Progress Notes for additional information about the patient's course of treatment.     ED Arrival Information       Expected   -    Arrival   1/17/2025 15:01    Acuity   Urgent              Means of arrival   Ambulance    Escorted by   [facility]    Service   Hospitalist    Admission type   Emergency              Arrival complaint   -             Chief Complaint   Patient presents with    Shortness of Breath     Pt to ED as medical emergency from [facility]. Pt recently admitted for flail chest. Pt c/o cough, and pain in ribs. Wife recently positive for covid and flu.        Initial Presentation: 59 y.o. male to ED by EMS presents w cough, SOB  PMH  hypertension, chronic alcohol use disorder,  pain disorder, obesity,  presents with near syncope and lightheadedness, from PCP office w elevated heart rate with shortness of breath with sitting and extreme pain with difficulty to take a deep breath.  Patient had fever 101.5 and headache at home, became nauseous and diaphoretic, heart rate was in the range of 130s O2 saturation remained high at 99%, was sent into the ED for further evaluation. States that his fiancée had RSV and flu a week ago. Having cold-like symptoms  "runny nose cough which is dry with chest congestion and dizziness for the past 2 to 3 days.  Has been having decreased p.o. intake w/ back pain on the right side with recent fall on [place] grover with multiple rib fractures.  Recent admission to detox unit  on Dilaudid and had been drinking alcohol.  Patient states the last drink was prior to going into the detox unit at [place].  Complains of abdominal bloating with constipation.     EXAM COVID + 1/17, sinus tachy; transient low hypoxia low 90s req 2 L for POX 94-95%. Given IVF,  scheduled Tylenol, oxycodone for moderate pain and Dilaudid IV for breakthrough pain   Observation admission due to COVID 19, alcohol use disorder  IV Remdesivir; hold off on Decadron, supplemental O2, CIWA, folate, thiamine, PRN Ativan  Anticipated Length of Stay/Certification Statement: Patient will be admitted on an observation basis with an anticipated length of stay of less than 2 midnights secondary to COVID-19 positive, back pain secondary to rib fracture.   Date: 1/18/2025   Day 2: changed to Inpatient  BP slightly elevated. MAG & potassium abnormality. Reports coughing was very severe overnight. Described multiple coughing fits. Increased O2 need; He is still having some discomfort in his ribs on the right side.   CONT Covid RX, replete electrolytes; obtain D Dimer, cont PTA BP meds  DATE 1/19/2025  COVID 19: Patient endorses transient hypoxia noted by patient \"in 80's\" requiring 2 L of nasal cannula; this was not documented; stable on room air   started on IV remdesivir and required supplemental oxygen intermittently. Given persistent tachycardia, increased oxygen demand and fever,  evaluated for PE with D-dimer which was elevated prompting a CTA PE study  negative for pulmonary embolus.   Weaned down to room air with adequate oxygen saturations. Noted to have electrolyte abnormalities on labs with low magnesium and potassium which were both repleted adequately. Patient with " symptomatic improvement.   Completed 2 doses of IV remdesivir  Alcohol use disorder  C/w thiamine, folate  Remains abstinent from alcohol    ED Treatment-Medication Administration from 01/17/2025 1501 to 01/17/2025 2018         Date/Time Order Dose Route Action     01/17/2025 1614 ketorolac (TORADOL) injection 15 mg 15 mg Intravenous Given     01/17/2025 1613 sodium chloride 0.9 % bolus 1,000 mL 1,000 mL Intravenous New Bag     01/17/2025 1611 HYDROmorphone (DILAUDID) injection 0.5 mg 0.5 mg Intravenous Given     01/17/2025 1723 HYDROmorphone (DILAUDID) injection 0.5 mg 0.5 mg Intravenous Given     01/17/2025 1911 acetaminophen (Ofirmev) injection 1,000 mg 1,000 mg Intravenous New Bag     01/17/2025 1916 atorvastatin (LIPITOR) tablet 40 mg 40 mg Oral Given     01/17/2025 1952 magnesium sulfate 4 g/100 mL IVPB (premix) 4 g 4 g Intravenous New Bag            Scheduled Medications:  atorvastatin, 40 mg, Oral, Daily With Dinner  enoxaparin, 30 mg, Subcutaneous, Q12H  escitalopram, 20 mg, Oral, Daily  famotidine, 20 mg, Oral, BID  folic acid, 1 mg, Oral, Daily  gabapentin, 300 mg, Oral, TID  lidocaine, 1 patch, Topical, Daily  lisinopril, 40 mg, Oral, Daily  methocarbamol, 1,000 mg, Oral, Q8H  mirtazapine, 15 mg, Oral, HS  ondansetron, 4 mg, Intravenous, Once  pantoprazole, 40 mg, Oral, Early Morning  remdesivir, 100 mg, Intravenous, Q24H  senna-docusate sodium, 1 tablet, Oral, HS  Thiamine Mononitrate, 100 mg, Oral, Daily      Continuous IV Infusions:     PRN Meds:  acetaminophen, 650 mg, Oral, Q8H PRN  dextromethorphan-guaiFENesin, 10 mL, Oral, Q4H PRN  LORazepam, 0.5 mg, Oral, Q8H PRN  oxyCODONE, 10 mg, Oral, Q4H PRN  oxyCODONE, 5 mg, Oral, Q6H PRN      ED Triage Vitals   Temperature Pulse Respirations Blood Pressure SpO2 Pain Score   01/17/25 1511 01/17/25 1503 01/17/25 1503 01/17/25 1503 01/17/25 1503 01/17/25 1611   99.6 °F (37.6 °C) (!) 114 20 (!) 196/114 97 % 9     Weight (last 2 days)       Date/Time Weight     01/17/25 20:26:32 103 (226)    01/17/25 1511 103 (226.6)            Vital Signs (last 3 days)       Date/Time Temp Pulse Resp BP MAP (mmHg) SpO2 Calculated FIO2 (%) - Nasal Cannula Nasal Cannula O2 Flow Rate (L/min) O2 Device Patient Position - Orthostatic VS CIWA-Ar Total Pain    01/19/25 07:58:55 98.5 °F (36.9 °C) 84 18 134/95 108 93 % -- -- -- -- -- --    01/19/25 0618 -- -- -- -- -- -- -- -- -- -- -- 8    01/19/25 0138 -- -- -- -- -- -- -- -- -- -- -- 9    01/19/25 0027 -- -- -- -- -- -- -- -- -- -- -- 6    01/18/25 2337 -- -- -- 140/81 -- -- -- -- -- -- -- --    01/18/25 23:24:54 99.2 °F (37.3 °C) 94 15 167/93 118 93 % -- -- -- -- -- --    01/18/25 1900 -- -- -- 150/84 -- -- -- -- -- -- 5 --    01/18/25 15:22:40 99.6 °F (37.6 °C) 93 18 156/89 111 94 % -- -- None (Room air) Lying -- 6    01/18/25 1500 -- -- -- 140/70 -- -- -- -- -- -- 5 --    01/18/25 1100 -- -- -- 156/89 -- -- -- -- -- -- 5 --    01/18/25 0900 -- -- -- -- -- -- -- -- None (Room air) -- -- --    01/18/25 0854 -- -- -- -- -- 95 % -- -- -- -- -- --    01/18/25 0850 -- -- -- -- -- 94 % -- -- -- -- -- --    01/18/25 0846 -- -- -- -- -- 95 % -- -- -- -- -- --    01/18/25 0821 -- -- -- -- -- -- -- -- -- -- -- 9 01/18/25 0725 -- -- -- 158/85 -- -- -- -- -- -- -- --    01/18/25 07:18:26 99.6 °F (37.6 °C) 96 -- 168/98 121 94 % -- -- -- -- -- --    01/18/25 0630 -- -- -- -- -- -- -- -- -- -- -- 9 01/18/25 0400 -- -- -- -- -- -- -- -- -- -- 5 --    01/18/25 0245 -- -- -- -- -- -- -- -- -- -- -- 9 01/18/25 0000 -- -- -- -- -- -- -- -- -- -- 4 --    01/17/25 23:12:49 99.2 °F (37.3 °C) 104 18 141/81 101 95 % -- -- -- -- -- --    01/17/25 2220 -- -- -- -- -- -- -- -- -- -- -- 9 01/17/25 2100 -- -- -- -- -- -- -- -- -- -- -- 10 - Worst Possible Pain    01/17/25 20:26:32 99.1 °F (37.3 °C) 98 16 138/81 100 92 % -- -- -- -- -- --    01/17/25 1956 -- 98 20 163/84 117 95 % -- -- None (Room air) Lying -- 5 01/17/25 1930 -- 98 20 163/84 117 93 % -- --  None (Room air) Sitting -- --    01/17/25 1810 -- -- -- -- -- -- -- -- -- -- -- 10 - Worst Possible Pain    01/17/25 1800 -- 102 20 146/84 109 93 % -- -- None (Room air) Lying -- --    01/17/25 1730 -- 104 20 153/85 112 92 % -- -- None (Room air) Sitting -- --    01/17/25 1723 -- -- -- -- -- -- -- -- -- -- -- 8    01/17/25 1700 -- 107 20 160/93 120 94 % -- -- None (Room air) Sitting -- --    01/17/25 1630 -- 107 20 161/94 121 95 % 28 2 L/min Nasal cannula Sitting -- 5    01/17/25 1615 -- 117 -- 162/90 -- -- -- -- -- -- 0 --    01/17/25 1611 -- -- -- -- -- -- -- -- -- -- -- 9    01/17/25 1545 -- -- -- -- -- -- -- -- Nasal cannula -- -- --    01/17/25 1511 99.6 °F (37.6 °C) 114 20 196/114 143 98 % 28 2 L/min Nasal cannula Sitting -- --    01/17/25 1503 -- 114 20 196/114 -- 97 % -- -- None (Room air) -- -- --           CIWA-Ar Score       Row Name 01/18/25 1900 01/18/25 1500 01/18/25 1100       CIWA-Ar    /84 140/70 156/89    Nausea and Vomiting 0 0 0    Tactile Disturbances 0 0 0    Tremor 1 1 1    Auditory Disturbances 0 0 0    Paroxysmal Sweats 1 1 1    Visual Disturbances 0 0 0    Anxiety 1 1 1    Headache, Fullness in Head 2 2 2    Agitation 0 0 0    Orientation and Clouding of Sensorium 0 0 0    CIWA-Ar Total 5 5 5      Row Name 01/18/25 0400 01/18/25 0000 01/17/25 1615       CIWA-Ar    BP -- -- 162/90    Pulse -- -- 117    Nausea and Vomiting 0 1 0    Tactile Disturbances 0 0 0    Tremor 1 0 0    Auditory Disturbances 0 0 0    Paroxysmal Sweats 1 1 0    Visual Disturbances 0 0 0    Anxiety 1 1 0    Headache, Fullness in Head 2 1 0    Agitation 0 0 0    Orientation and Clouding of Sensorium 0 0 0    CIWA-Ar Total 5 4 0                    Pertinent Labs/Diagnostic Test Results:   Radiology:  CTA chest pe study   Final Interpretation by Hardik Barboza MD (01/18 1616)      No pulmonary embolus.      Measured RV/LV ratio is within normal limits at less than 0.9.            XR chest 1 view portable   ED  Interpretation by Elian Singh DO (01/17 1623)   No focal consolidations or pneumothorax.  No pleural effusion.  Appears similar to prior chest x-ray done regenerate 14 2025      Final Interpretation by Lisa Mcdonald MD (01/17 1641)      No acute pulmonary pathology.      Findings are concordant with preliminary interpretation provided in the Emergency Department.           Cardiology:  ECG 12 lead   Final Result by Milad Knowles MD (01/17 2841)   Sinus tachycardia   Otherwise normal ECG   When compared with ECG of 14-Jan-2025 05:22,   No significant change was found   Confirmed by Milad Knowles ([id number]) on 1/17/2025 5:40:27 PM        GI:  No orders to display           Results from last 7 days   Lab Units 01/19/25  0548 01/18/25  0629 01/17/25  1547 01/14/25  0637   WBC Thousand/uL 4.22* 4.46 5.39 5.69   HEMOGLOBIN g/dL 11.7* 12.8 12.7 11.2*   HEMATOCRIT % 33.5* 37.0 36.1* 33.3*   PLATELETS Thousands/uL 139* 133* 155 189   TOTAL NEUT ABS Thousands/µL  --  3.13 4.57 4.58         Results from last 7 days   Lab Units 01/19/25  0548 01/18/25  0629 01/17/25  1547 01/15/25  0439 01/14/25  0540   SODIUM mmol/L 140 140 140 138 138   POTASSIUM mmol/L 3.5 3.8 4.1 3.9 4.3   CHLORIDE mmol/L 106 102 102 99 97   CO2 mmol/L 25 25 26 29 22   ANION GAP mmol/L 9 13 12 10 19*   BUN mg/dL 7 7 7 8 10   CREATININE mg/dL 0.80 0.92 0.94 0.95 0.79   EGFR ml/min/1.73sq m 98 91 89 87 98   CALCIUM mg/dL 8.7 9.1 9.3 8.8 9.8   MAGNESIUM mg/dL 2.0 2.3 1.3* 1.7* 1.7*   PHOSPHORUS mg/dL  --   --   --  4.1 3.0     Results from last 7 days   Lab Units 01/19/25  0548 01/18/25  0629 01/17/25  1547 01/15/25  0439 01/14/25  0540   AST U/L 27 34 44* 22 42*   ALT U/L 10 12 14 11 18   ALK PHOS U/L 90 109* 115* 114* 163*   TOTAL PROTEIN g/dL 6.5 7.2 7.6 6.4 8.0   ALBUMIN g/dL 3.7 4.2 4.4 3.8 4.7   TOTAL BILIRUBIN mg/dL 0.55 0.67 0.83 1.04* 0.67   BILIRUBIN DIRECT mg/dL  --   --   --   --  0.10   AMMONIA umol/L  --   --   --   --  32     Results from  last 7 days   Lab Units 01/14/25  0543   POC GLUCOSE mg/dl 81     Results from last 7 days   Lab Units 01/19/25  0548 01/18/25  0629 01/17/25  1547 01/15/25  0439 01/14/25  0540   GLUCOSE RANDOM mg/dL 85 85 98 85 87             Beta- Hydroxybutyrate   Date Value Ref Range Status   01/10/2025 0.13 0.02 - 0.27 mmol/L Final     BETA-HYDROXYBUTYRATE   Date Value Ref Range Status   10/16/2023 2.9 (H) <0.6 mmol/L Final   10/01/2023 0.8 (H) <0.6 mmol/L Final                      Results from last 7 days   Lab Units 01/14/25  0752 01/14/25  0540   HS TNI 0HR ng/L  --  3   HS TNI 2HR ng/L <2  --    HSTNI D2 ng/L <-1  --      Results from last 7 days   Lab Units 01/18/25  1002   D-DIMER QUANTITATIVE ug/ml FEU 0.67*     Results from last 7 days   Lab Units 01/17/25  1547 01/14/25  0641   PROTIME seconds 13.9 15.1*   INR  1.00 1.12   PTT seconds 28 30     Results from last 7 days   Lab Units 01/14/25  0540   TSH 3RD GENERATON uIU/mL 2.368     Results from last 7 days   Lab Units 01/18/25  0629 01/17/25  1547   PROCALCITONIN ng/ml 0.15 0.10     Results from last 7 days   Lab Units 01/17/25  1547   LACTIC ACID mmol/L 1.3                                 Results from last 7 days   Lab Units 01/17/25  1547 01/14/25  0540   LIPASE u/L 11 15     Results from last 7 days   Lab Units 01/18/25  0629   CRP mg/L 35.8*                                             Results from last 7 days   Lab Units 01/17/25  1610 01/17/25  1547   BLOOD CULTURE  No Growth at 24 hrs. No Growth at 24 hrs.                   Past Medical History:   Diagnosis Date    Alcohol use disorder, severe, dependence (HCC) 04/02/2023    Alcohol withdrawal seizure (HCC)     Alcoholic ketoacidosis 10/16/2023    Anxiety     AR (allergic rhinitis)     Chronic alcoholic gastritis 04/02/2023    Depression     GERD (gastroesophageal reflux disease)     Hepatic steatosis 04/02/2023    Hyperlipidemia     Hypertension     IBS (irritable bowel syndrome)     Obesity     Rosacea      Vitamin B12 deficiency 02/14/2024    Vitamin D deficiency 02/14/2024     Present on Admission:   Sinus tachycardia   Hypertension   Hyperlipidemia   GERD (gastroesophageal reflux disease)   Multiple rib fractures   Alcohol use disorder      Admitting Diagnosis: SOB (shortness of breath) [R06.02]  Near syncope [R55]  Multiple rib fractures [S22.49XA]  Intractable pain [R52]  History of alcohol use disorder [Z87.898]  COVID-19 [U07.1]  Age/Sex: 59 y.o. male    Network Utilization Review Department  ATTENTION: Please call with any questions or concerns to [phone] and carefully listen to the prompts so that you are directed to the right person. All voicemails are confidential.   For Discharge needs, contact Care Management DC Support Team at [phone] opt. 2  Send all requests for admission clinical reviews, approved or denied determinations and any other requests to dedicated fax number below belonging to the campus where the patient is receiving treatment. List of dedicated fax numbers for the Facilities:  FACILITY NAME UR FAX NUMBER   ADMISSION DENIALS (Administrative/Medical Necessity) [phone]   DISCHARGE SUPPORT TEAM (NETWORK) [phone]   PARENT CHILD HEALTH (Maternity/NICU/Pediatrics) [phone]   [facility] [phone]   [facility] [phone]   [facility] [phone]   [facility] [phone]   [facility] [phone]   [facility] [phone]   [facility] [phone]   [facility] [phone]   [facility] [phone]   [facility] [phone]   [facility] [phone]   [facility] [phone]

## 2025-01-18 NOTE — ASSESSMENT & PLAN NOTE
Patient fell from a ladder on [place] [address], patient states that he was drinking alcohol and he hurt his back after he fell on the ground, noted to have Acute/subacute fractures of the right fourth through sixth ribs posterolaterally and at the right fifth through seventh ribs posteriorly   Will give pain management, incentive spirometry

## 2025-01-18 NOTE — UTILIZATION REVIEW
NOTIFICATION OF INPATIENT ADMISSION   AUTHORIZATION REQUEST   SERVICING FACILITY:   [place] [address]  Tax ID: [id number]  NPI: [id number]   ATTENDING PROVIDER:  Attending Name and NPI#: Fredy Rodríguez Md [[id number]]  Address: [address] [place] [address]  Phone: [phone]     ADMISSION INFORMATION:  Place of Service: Inpatient Acute [facility]  Place of Service Code: 21  Inpatient Admission Date/Time: 1/18/25 10:11 AM  Discharge Date/Time: No discharge date for patient encounter.  Admitting Diagnosis Code/Description:  SOB (shortness of breath) [R06.02]  Near syncope [R55]  Multiple rib fractures [S22.49XA]  Intractable pain [R52]  History of alcohol use disorder [Z87.898]  COVID-19 [U07.1]     UTILIZATION REVIEW CONTACT:  Alyce Vieira, Utilization   Network Utilization Review Department  Phone: [phone]  Fax: [phone]  Email: [email or web address]  Contact for approvals/pending authorizations, clinical reviews, and discharge.     PHYSICIAN ADVISORY SERVICES:  Medical Necessity Denial & [place] Review  Phone: [phone]  Fax: [phone]  Email: [email or web address]     DISCHARGE SUPPORT TEAM:  For Patients Discharge Needs & Updates  Phone: [phone] opt. 2 Fax: [phone]  Email: [email or web address]

## 2025-01-18 NOTE — ASSESSMENT & PLAN NOTE
's at PCP office on day of admit; 's in ED   Likely 2/2 pain, anxiousness, dehydration  Heart rate currently stable  D-dimer to be obtained as above in setting of COVID-19

## 2025-01-18 NOTE — ASSESSMENT & PLAN NOTE
"Patient presents from PCPs office w/ SOB and tachycardia (130 bpm) w/ associated cough, chills, fever of 101.5.  Recently discharged from detox unit for alcohol withdrawal on 01/15; h/o rib fractures following fall.  COVID-19 on 01/17/25   Patient endorses transient hypoxia noted by patient overnight \"in 80's\" requiring 2 L of nasal cannula; this was not documented; stable on room air 90-92%   W/ significant SOB, tachycardia, and increased O2 demand obtain D-dimer  W/ comorbidities, add remdesivir, day #1   Hold off on Decadron; if pt has documented hypoxia, would add  CXR w/o infiltrate   "

## 2025-01-18 NOTE — PLAN OF CARE
Problem: Potential for Falls  Goal: Patient will remain free of falls  Description: INTERVENTIONS:  - Educate patient/family on patient safety including physical limitations  - Instruct patient to call for assistance with activity   - Consult OT/PT to assist with strengthening/mobility   - Keep Call bell within reach  - Keep bed low and locked with side rails adjusted as appropriate  - Keep care items and personal belongings within reach  - Initiate and maintain comfort rounds  - Make Fall Risk Sign visible to staff  - Offer Toileting every  Hours, in advance of need  - Initiate/Maintain alarm  - Obtain necessary fall risk management equipment:   - Apply yellow socks and bracelet for high fall risk patients  - Consider moving patient to room near nurses station  Outcome: Progressing     Problem: RESPIRATORY - ADULT  Goal: Achieves optimal ventilation and oxygenation  Description: INTERVENTIONS:  - Assess for changes in respiratory status  - Assess for changes in mentation and behavior  - Position to facilitate oxygenation and minimize respiratory effort  - Oxygen administered by appropriate delivery if ordered  - Initiate smoking cessation education as indicated  - Encourage broncho-pulmonary hygiene including cough, deep breathe, Incentive Spirometry  - Assess the need for suctioning and aspirate as needed  - Assess and instruct to report SOB or any respiratory difficulty  - Respiratory Therapy support as indicated  Outcome: Progressing     Problem: PAIN - ADULT  Goal: Verbalizes/displays adequate comfort level or baseline comfort level  Description: Interventions:  - Encourage patient to monitor pain and request assistance  - Assess pain using appropriate pain scale  - Administer analgesics based on type and severity of pain and evaluate response  - Implement non-pharmacological measures as appropriate and evaluate response  - Consider cultural and social influences on pain and pain management  - Notify  physician/advanced practitioner if interventions unsuccessful or patient reports new pain  Outcome: Progressing     Problem: INFECTION - ADULT  Goal: Absence or prevention of progression during hospitalization  Description: INTERVENTIONS:  - Assess and monitor for signs and symptoms of infection  - Monitor lab/diagnostic results  - Monitor all insertion sites, i.e. indwelling lines, tubes, and drains  - Monitor endotracheal if appropriate and nasal secretions for changes in amount and color  - [place] appropriate cooling/warming therapies per order  - Administer medications as ordered  - Instruct and encourage patient and family to use good hand hygiene technique  - Identify and instruct in appropriate isolation precautions for identified infection/condition  Outcome: Progressing     Problem: SAFETY ADULT  Goal: Patient will remain free of falls  Description: INTERVENTIONS:  - Educate patient/family on patient safety including physical limitations  - Instruct patient to call for assistance with activity   - Consult OT/PT to assist with strengthening/mobility   - Keep Call bell within reach  - Keep bed low and locked with side rails adjusted as appropriate  - Keep care items and personal belongings within reach  - Initiate and maintain comfort rounds  - Make Fall Risk Sign visible to staff  - Offer Toileting every  Hours, in advance of need  - Initiate/Maintain alarm  - Obtain necessary fall risk management equipment:   - Apply yellow socks and bracelet for high fall risk patients  - Consider moving patient to room near nurses station  Outcome: Progressing  Goal: Maintain or return to baseline ADL function  Description: INTERVENTIONS:  -  Assess patient's ability to carry out ADLs; assess patient's baseline for ADL function and identify physical deficits which impact ability to perform ADLs (bathing, care of mouth/teeth, toileting, grooming, dressing, etc.)  - Assess/evaluate cause of self-care deficits   - Assess  range of motion  - Assess patient's mobility; develop plan if impaired  - Assess patient's need for assistive devices and provide as appropriate  - Encourage maximum independence but intervene and supervise when necessary  - Involve family in performance of ADLs  - Assess for home care needs following discharge   - Consider OT consult to assist with ADL evaluation and planning for discharge  - Provide patient education as appropriate  Outcome: Progressing  Goal: Maintains/Returns to pre admission functional level  Description: INTERVENTIONS:  - Perform AM-PAC 6 Click Basic Mobility/ Daily Activity assessment daily.  - Set and communicate daily mobility goal to care team and patient/family/caregiver.   - Collaborate with rehabilitation services on mobility goals if consulted  - Perform Range of Motion  times a day.  - Reposition patient every  hours.  - Dangle patient  times a day  - Stand patient  times a day  - Ambulate patient  times a day  - Out of bed to chair  times a day   - Out of bed for meals times a day  - Out of bed for toileting  - Record patient progress and toleration of activity level   Outcome: Progressing     Problem: DISCHARGE PLANNING  Goal: Discharge to home or other facility with appropriate resources  Description: INTERVENTIONS:  - Identify barriers to discharge w/patient and caregiver  - Arrange for needed discharge resources and transportation as appropriate  - Identify discharge learning needs (meds, wound care, etc.)  - Arrange for interpretive services to assist at discharge as needed  - Refer to Case Management Department for coordinating discharge planning if the patient needs post-hospital services based on physician/advanced practitioner order or complex needs related to functional status, cognitive ability, or social support system  Outcome: Progressing     Problem: Knowledge Deficit  Goal: Patient/family/caregiver demonstrates understanding of disease process, treatment plan,  medications, and discharge instructions  Description: Complete learning assessment and assess knowledge base.  Interventions:  - Provide teaching at level of understanding  - Provide teaching via preferred learning methods  Outcome: Progressing

## 2025-01-19 VITALS
TEMPERATURE: 98.5 F | HEIGHT: 68 IN | BODY MASS INDEX: 34.25 KG/M2 | OXYGEN SATURATION: 93 % | RESPIRATION RATE: 18 BRPM | WEIGHT: 226 LBS | DIASTOLIC BLOOD PRESSURE: 95 MMHG | SYSTOLIC BLOOD PRESSURE: 134 MMHG | HEART RATE: 84 BPM

## 2025-01-19 PROBLEM — E87.8 ELECTROLYTE ABNORMALITY: Status: RESOLVED | Noted: 2025-01-18 | Resolved: 2025-01-19

## 2025-01-19 PROBLEM — R00.0 SINUS TACHYCARDIA: Status: RESOLVED | Noted: 2023-11-28 | Resolved: 2025-01-19

## 2025-01-19 LAB
ALBUMIN SERPL BCG-MCNC: 3.7 G/DL (ref 3.5–5)
ALP SERPL-CCNC: 90 U/L (ref 34–104)
ALT SERPL W P-5'-P-CCNC: 10 U/L (ref 7–52)
ANION GAP SERPL CALCULATED.3IONS-SCNC: 9 MMOL/L (ref 4–13)
AST SERPL W P-5'-P-CCNC: 27 U/L (ref 13–39)
BILIRUB SERPL-MCNC: 0.55 MG/DL (ref 0.2–1)
BUN SERPL-MCNC: 7 MG/DL (ref 5–25)
CALCIUM SERPL-MCNC: 8.7 MG/DL (ref 8.4–10.2)
CHLORIDE SERPL-SCNC: 106 MMOL/L (ref 96–108)
CO2 SERPL-SCNC: 25 MMOL/L (ref 21–32)
CREAT SERPL-MCNC: 0.8 MG/DL (ref 0.6–1.3)
ERYTHROCYTE [DISTWIDTH] IN BLOOD BY AUTOMATED COUNT: 13.4 % (ref 11.6–15.1)
GFR SERPL CREATININE-BSD FRML MDRD: 98 ML/MIN/1.73SQ M
GLUCOSE SERPL-MCNC: 85 MG/DL (ref 65–140)
HCT VFR BLD AUTO: 33.5 % (ref 36.5–49.3)
HGB BLD-MCNC: 11.7 G/DL (ref 12–17)
MAGNESIUM SERPL-MCNC: 2 MG/DL (ref 1.9–2.7)
MCH RBC QN AUTO: 32.9 PG (ref 26.8–34.3)
MCHC RBC AUTO-ENTMCNC: 34.9 G/DL (ref 31.4–37.4)
MCV RBC AUTO: 94 FL (ref 82–98)
PLATELET # BLD AUTO: 139 THOUSANDS/UL (ref 149–390)
PMV BLD AUTO: 8.5 FL (ref 8.9–12.7)
POTASSIUM SERPL-SCNC: 3.5 MMOL/L (ref 3.5–5.3)
PROT SERPL-MCNC: 6.5 G/DL (ref 6.4–8.4)
RBC # BLD AUTO: 3.56 MILLION/UL (ref 3.88–5.62)
SODIUM SERPL-SCNC: 140 MMOL/L (ref 135–147)
WBC # BLD AUTO: 4.22 THOUSAND/UL (ref 4.31–10.16)

## 2025-01-19 PROCEDURE — 80053 COMPREHEN METABOLIC PANEL: CPT | Performed by: INTERNAL MEDICINE

## 2025-01-19 PROCEDURE — 99239 HOSP IP/OBS DSCHRG MGMT >30: CPT

## 2025-01-19 PROCEDURE — 83735 ASSAY OF MAGNESIUM: CPT | Performed by: PHYSICIAN ASSISTANT

## 2025-01-19 PROCEDURE — 85027 COMPLETE CBC AUTOMATED: CPT | Performed by: PHYSICIAN ASSISTANT

## 2025-01-19 RX ADMIN — REMDESIVIR 100 MG: 100 INJECTION, POWDER, LYOPHILIZED, FOR SOLUTION INTRAVENOUS at 10:49

## 2025-01-19 RX ADMIN — FAMOTIDINE 20 MG: 20 TABLET, FILM COATED ORAL at 09:10

## 2025-01-19 RX ADMIN — ACETAMINOPHEN 650 MG: 325 TABLET, FILM COATED ORAL at 09:14

## 2025-01-19 RX ADMIN — METHOCARBAMOL TABLETS 1000 MG: 500 TABLET, COATED ORAL at 10:49

## 2025-01-19 RX ADMIN — PANTOPRAZOLE SODIUM 40 MG: 40 TABLET, DELAYED RELEASE ORAL at 06:18

## 2025-01-19 RX ADMIN — OXYCODONE HYDROCHLORIDE 10 MG: 10 TABLET ORAL at 01:38

## 2025-01-19 RX ADMIN — ACETAMINOPHEN 650 MG: 325 TABLET, FILM COATED ORAL at 01:38

## 2025-01-19 RX ADMIN — LISINOPRIL 40 MG: 20 TABLET ORAL at 09:09

## 2025-01-19 RX ADMIN — OXYCODONE HYDROCHLORIDE 10 MG: 10 TABLET ORAL at 06:18

## 2025-01-19 RX ADMIN — ESCITALOPRAM OXALATE 20 MG: 20 TABLET ORAL at 09:10

## 2025-01-19 NOTE — PLAN OF CARE
Problem: Potential for Falls  Goal: Patient will remain free of falls  Description: INTERVENTIONS:  - Educate patient/family on patient safety including physical limitations  - Instruct patient to call for assistance with activity   - Consult OT/PT to assist with strengthening/mobility   - Keep Call bell within reach  - Keep bed low and locked with side rails adjusted as appropriate  - Keep care items and personal belongings within reach  - Initiate and maintain comfort rounds  - Make Fall Risk Sign visible to staff  - Apply yellow socks and bracelet for high fall risk patients  - Consider moving patient to room near nurses station  Outcome: Adequate for Discharge     Problem: RESPIRATORY - ADULT  Goal: Achieves optimal ventilation and oxygenation  Description: INTERVENTIONS:  - Assess for changes in respiratory status  - Assess for changes in mentation and behavior  - Position to facilitate oxygenation and minimize respiratory effort  - Oxygen administered by appropriate delivery if ordered  - Initiate smoking cessation education as indicated  - Encourage broncho-pulmonary hygiene including cough, deep breathe, Incentive Spirometry  - Assess the need for suctioning and aspirate as needed  - Assess and instruct to report SOB or any respiratory difficulty  - Respiratory Therapy support as indicated  Outcome: Adequate for Discharge     Problem: PAIN - ADULT  Goal: Verbalizes/displays adequate comfort level or baseline comfort level  Description: Interventions:  - Encourage patient to monitor pain and request assistance  - Assess pain using appropriate pain scale  - Administer analgesics based on type and severity of pain and evaluate response  - Implement non-pharmacological measures as appropriate and evaluate response  - Consider cultural and social influences on pain and pain management  - Notify physician/advanced practitioner if interventions unsuccessful or patient reports new pain  Outcome: Adequate for  Discharge     Problem: INFECTION - ADULT  Goal: Absence or prevention of progression during hospitalization  Description: INTERVENTIONS:  - Assess and monitor for signs and symptoms of infection  - Monitor lab/diagnostic results  - Monitor all insertion sites, i.e. indwelling lines, tubes, and drains  - Monitor endotracheal if appropriate and nasal secretions for changes in amount and color  - [place] appropriate cooling/warming therapies per order  - Administer medications as ordered  - Instruct and encourage patient and family to use good hand hygiene technique  - Identify and instruct in appropriate isolation precautions for identified infection/condition  Outcome: Adequate for Discharge     Problem: SAFETY ADULT  Goal: Patient will remain free of falls  Description: INTERVENTIONS:  - Educate patient/family on patient safety including physical limitations  - Instruct patient to call for assistance with activity   - Consult OT/PT to assist with strengthening/mobility   - Keep Call bell within reach  - Keep bed low and locked with side rails adjusted as appropriate  - Keep care items and personal belongings within reach  - Initiate and maintain comfort rounds  - Make Fall Risk Sign visible to staff  - Apply yellow socks and bracelet for high fall risk patients  - Consider moving patient to room near nurses station  Outcome: Adequate for Discharge  Goal: Maintain or return to baseline ADL function  Description: INTERVENTIONS:  -  Assess patient's ability to carry out ADLs; assess patient's baseline for ADL function and identify physical deficits which impact ability to perform ADLs (bathing, care of mouth/teeth, toileting, grooming, dressing, etc.)  - Assess/evaluate cause of self-care deficits   - Assess range of motion  - Assess patient's mobility; develop plan if impaired  - Assess patient's need for assistive devices and provide as appropriate  - Encourage maximum independence but intervene and supervise when  necessary  - Involve family in performance of ADLs  - Assess for home care needs following discharge   - Consider OT consult to assist with ADL evaluation and planning for discharge  - Provide patient education as appropriate  Outcome: Adequate for Discharge  Goal: Maintains/Returns to pre admission functional level  Description: INTERVENTIONS:  - Perform AM-PAC 6 Click Basic Mobility/ Daily Activity assessment daily.  - Set and communicate daily mobility goal to care team and patient/family/caregiver.   - Collaborate with rehabilitation services on mobility goals if consulted  - Perform Range of Motion 3 times a day.  - Reposition patient every 3 hours.  - Dangle patient 3 times a day  - Stand patient 3 times a day  - Ambulate patient 3 times a day  - Out of bed to chair 3 times a day   - Out of bed for meals 3 times a day  - Out of bed for toileting  - Record patient progress and toleration of activity level   Outcome: Adequate for Discharge

## 2025-01-19 NOTE — ASSESSMENT & PLAN NOTE
's at PCP office on day of admit; 's in ED   Likely 2/2 pain, anxiousness, dehydration  Heart rate currently stable

## 2025-01-19 NOTE — NURSING NOTE
"Patient's plan was to be discharged later in the day. PA was going to do the paperwork. Nurse removed IV. Patient was educated that as soon as the discharge papers were in she would print them and be in to go over them. Pt stated,\" that is fine, I am in no rush.\" A little later nurse went to check on patient and he was not in his room. He had started reaching for other workers arms to ask them for the paperwork. He was educated that the nurse will bring them in, wife stated \" we aren't waiting for them\" and they both left the building. Doctors, supervisor, and charge all aware.   "

## 2025-01-19 NOTE — PLAN OF CARE
Problem: Potential for Falls  Goal: Patient will remain free of falls  Description: INTERVENTIONS:  - Educate patient/family on patient safety including physical limitations  - Instruct patient to call for assistance with activity   - Consult OT/PT to assist with strengthening/mobility   - Keep Call bell within reach  - Keep bed low and locked with side rails adjusted as appropriate  - Keep care items and personal belongings within reach  - Initiate and maintain comfort rounds  - Make Fall Risk Sign visible to staff  - Offer Toileting every  Hours, in advance of need  - Initiate/Maintain alarm  - Obtain necessary fall risk management equipment:   - Apply yellow socks and bracelet for high fall risk patients  - Consider moving patient to room near nurses station  Outcome: Progressing     Problem: RESPIRATORY - ADULT  Goal: Achieves optimal ventilation and oxygenation  Description: INTERVENTIONS:  - Assess for changes in respiratory status  - Assess for changes in mentation and behavior  - Position to facilitate oxygenation and minimize respiratory effort  - Oxygen administered by appropriate delivery if ordered  - Initiate smoking cessation education as indicated  - Encourage broncho-pulmonary hygiene including cough, deep breathe, Incentive Spirometry  - Assess the need for suctioning and aspirate as needed  - Assess and instruct to report SOB or any respiratory difficulty  - Respiratory Therapy support as indicated  Outcome: Progressing     Problem: PAIN - ADULT  Goal: Verbalizes/displays adequate comfort level or baseline comfort level  Description: Interventions:  - Encourage patient to monitor pain and request assistance  - Assess pain using appropriate pain scale  - Administer analgesics based on type and severity of pain and evaluate response  - Implement non-pharmacological measures as appropriate and evaluate response  - Consider cultural and social influences on pain and pain management  - Notify  physician/advanced practitioner if interventions unsuccessful or patient reports new pain  Outcome: Progressing     Problem: INFECTION - ADULT  Goal: Absence or prevention of progression during hospitalization  Description: INTERVENTIONS:  - Assess and monitor for signs and symptoms of infection  - Monitor lab/diagnostic results  - Monitor all insertion sites, i.e. indwelling lines, tubes, and drains  - Monitor endotracheal if appropriate and nasal secretions for changes in amount and color  - [place] appropriate cooling/warming therapies per order  - Administer medications as ordered  - Instruct and encourage patient and family to use good hand hygiene technique  - Identify and instruct in appropriate isolation precautions for identified infection/condition  Outcome: Progressing     Problem: SAFETY ADULT  Goal: Patient will remain free of falls  Description: INTERVENTIONS:  - Educate patient/family on patient safety including physical limitations  - Instruct patient to call for assistance with activity   - Consult OT/PT to assist with strengthening/mobility   - Keep Call bell within reach  - Keep bed low and locked with side rails adjusted as appropriate  - Keep care items and personal belongings within reach  - Initiate and maintain comfort rounds  - Make Fall Risk Sign visible to staff  - Offer Toileting every  Hours, in advance of need  - Initiate/Maintain alarm  - Obtain necessary fall risk management equipment:   - Apply yellow socks and bracelet for high fall risk patients  - Consider moving patient to room near nurses station  Outcome: Progressing  Goal: Maintain or return to baseline ADL function  Description: INTERVENTIONS:  -  Assess patient's ability to carry out ADLs; assess patient's baseline for ADL function and identify physical deficits which impact ability to perform ADLs (bathing, care of mouth/teeth, toileting, grooming, dressing, etc.)  - Assess/evaluate cause of self-care deficits   - Assess  range of motion  - Assess patient's mobility; develop plan if impaired  - Assess patient's need for assistive devices and provide as appropriate  - Encourage maximum independence but intervene and supervise when necessary  - Involve family in performance of ADLs  - Assess for home care needs following discharge   - Consider OT consult to assist with ADL evaluation and planning for discharge  - Provide patient education as appropriate  Outcome: Progressing  Goal: Maintains/Returns to pre admission functional level  Description: INTERVENTIONS:  - Perform AM-PAC 6 Click Basic Mobility/ Daily Activity assessment daily.  - Set and communicate daily mobility goal to care team and patient/family/caregiver.   - Collaborate with rehabilitation services on mobility goals if consulted  - Perform Range of Motion  times a day.  - Reposition patient every  hours.  - Dangle patient  times a day  - Stand patient  times a day  - Ambulate patient  times a day  - Out of bed to chair  times a day   - Out of bed for meals  times a day  - Out of bed for toileting  - Record patient progress and toleration of activity level   Outcome: Progressing     Problem: DISCHARGE PLANNING  Goal: Discharge to home or other facility with appropriate resources  Description: INTERVENTIONS:  - Identify barriers to discharge w/patient and caregiver  - Arrange for needed discharge resources and transportation as appropriate  - Identify discharge learning needs (meds, wound care, etc.)  - Arrange for interpretive services to assist at discharge as needed  - Refer to Case Management Department for coordinating discharge planning if the patient needs post-hospital services based on physician/advanced practitioner order or complex needs related to functional status, cognitive ability, or social support system  Outcome: Progressing     Problem: Knowledge Deficit  Goal: Patient/family/caregiver demonstrates understanding of disease process, treatment plan,  medications, and discharge instructions  Description: Complete learning assessment and assess knowledge base.  Interventions:  - Provide teaching at level of understanding  - Provide teaching via preferred learning methods  Outcome: Progressing

## 2025-01-19 NOTE — DISCHARGE SUMMARY
"Discharge Summary - Hospitalist   Name: Diogo Travis 59 y.o. male I MRN: [id number]  Unit/Bed#: W -01 I Date of Admission: 1/17/2025   Date of Service: 1/19/2025 I Hospital Day: 1     Assessment & Plan  COVID-19  Patient presents from PCPs office w/ SOB and tachycardia (130 bpm) w/ associated cough, chills, fever of 101.5.  Recently discharged from detox unit for alcohol withdrawal on 01/15; h/o rib fractures following fall.  Patient endorses transient hypoxia noted by patient \"in 80's\" requiring 2 L of nasal cannula; this was not documented; stable on room air   COVID-19 on 01/17/25   CXR without infiltrate  D-dimer elevated at 0.67 --> CTA PE study without evidence of pulmonary embolus  Completed 2 doses of IV remdesivir  Multiple rib fractures  S/p fall from later on 12/24/24   CT chest (01/11): R 4th-6th posterior lateral & R 5th-7th posterior rib fractures  Incentive spirometry, analgesia (scheduled Tylenol & PRN Oxy)   Sinus tachycardia (Resolved: 1/19/2025)  's at PCP office on day of admit; 's in ED   Likely 2/2 pain, anxiousness, dehydration  Heart rate currently stable  Alcohol use disorder  DC'd from [facility] on 01/15, s/p phenobarbital therapy  C/w thiamine, folate  Remains abstinent from alcohol  Hypertension  BP slightly elevated; SBP range 140-160s  Manage pain  C/w PTA lisinopril; titrate meds accordingly  Electrolyte abnormality (Resolved: 1/19/2025)  Recent Labs     01/17/25  1547 01/18/25  0629 01/19/25  0548   MG 1.3* 2.3 2.0   K 4.1 3.8 3.5   Magnesium and potassium stable  Hyperlipidemia  C/w statin  GERD (gastroesophageal reflux disease)  C/w  PPI     Medical Problems       Resolved Problems  Date Reviewed: 1/17/2025          Resolved    Sinus tachycardia 1/19/2025     Resolved by  Jyothi Manuel PA-C    Electrolyte abnormality 1/19/2025     Resolved by  Jyothi Manuel PA-C        Discharging Physician / Practitioner: Jyothi Manuel PA-C  PCP: " Enid Altman DO  Admission Date:   Admission Orders (From admission, onward)       Ordered        01/18/25 1011  INPATIENT ADMISSION  Once            01/17/25 1741  Place in Observation  Once                          Discharge Date: 01/19/25    Significant Findings / Test Results:   Blood cultures x 2 no growth at 24 hours  Flu/COVID positive for COVID-19  1/17 chest x-ray: No acute pulmonary pathology  1/18 CTA chest PE study: No pulmonary embolus.  Measured RV/LV ratio was within normal limits at less than 0.9  Magnesium low at 1.3, replenished to 2.0  D-dimer elevated at 0.67  CRP 35.8    Incidental Findings:   None    Test Results Pending at Discharge (will require follow up):   None     Outpatient Tests Requested:  None    Complications: None    Reason for Admission: COVID 19    Hospital Course:   Diogo Travis is a 59 y.o. male patient who originally presented to the hospital on 1/17/2025 due to shortness of breath with associated fever and chills.  Patient was found to be COVID-positive in the ED.  Chest x-ray without infiltrates.  Patient was started on IV remdesivir and required supplemental oxygen intermittently. Given persistent tachycardia, increased oxygen demand and fever patient was evaluated for PE with D-dimer which was elevated prompting a CTA PE study which was negative for pulmonary embolus. He was eventually weaned down to room air with adequate oxygen saturations.  Noted to have electrolyte abnormalities on labs with low magnesium and potassium which were both repleted adequately.  Patient with symptomatic improvement.  Given stable labs and vital signs in addition to overall clinical improvement the patient is medically stable for discharge at this time.  Return precautions and discharge instructions were discussed with the patient.      Please see above list of diagnoses and related plan for additional information.     Condition at Discharge: good    Discharge Day Visit / Exam:  "  Subjective: Patient doing well on exam, states his shortness of breath is much improved from days prior.  Still notes some right-sided rib pain from his prior fractures.  Denies any fever, chills, hemoptysis, chest pain, palpitations.  Vitals: Blood Pressure: 134/95 (01/19/25 0758)  Pulse: 84 (01/19/25 0758)  Temperature: 98.5 °F (36.9 °C) (01/19/25 0758)  Temp Source: Oral (01/18/25 1522)  Respirations: 18 (01/19/25 0758)  Height: 5' 8\" (172.7 cm) (01/17/25 2026)  Weight - Scale: 103 kg (226 lb) (01/17/25 2026)  SpO2: 93 % (01/19/25 0758)  Physical Exam  Vitals reviewed.   Constitutional:       General: He is not in acute distress.     Appearance: Normal appearance.   HENT:      Mouth/Throat:      Mouth: Mucous membranes are moist.   Cardiovascular:      Rate and Rhythm: Normal rate and regular rhythm.      Heart sounds: Normal heart sounds.   Pulmonary:      Effort: Pulmonary effort is normal.      Breath sounds: Normal breath sounds.   Neurological:      Mental Status: He is alert and oriented to person, place, and time.          Discussion with Family: Updated  (fiancé) at bedside.    Discharge instructions/Information to patient and family:   See after visit summary for information provided to patient and family.      Provisions for Follow-Up Care:  See after visit summary for information related to follow-up care and any pertinent home health orders.      Mobility at time of Discharge:   Basic Mobility Inpatient Raw Score: 24  JH-HLM Goal: 8: Walk 250 feet or more  JH-HLM Achieved: 8: Walk 250 feet ot more  HLM Goal achieved. Continue to encourage appropriate mobility.     Disposition:   Home    Planned Readmission: No    Discharge Medications:  See after visit summary for reconciled discharge medications provided to patient and/or family.      Administrative Statements   Discharge Statement:  I have spent a total time of 55 minutes in caring for this patient on the day of the visit/encounter. " .    **Please Note: This note may have been constructed using a voice recognition system**

## 2025-01-19 NOTE — ASSESSMENT & PLAN NOTE
"Patient presents from PCPs office w/ SOB and tachycardia (130 bpm) w/ associated cough, chills, fever of 101.5.  Recently discharged from detox unit for alcohol withdrawal on 01/15; h/o rib fractures following fall.  Patient endorses transient hypoxia noted by patient \"in 80's\" requiring 2 L of nasal cannula; this was not documented; stable on room air   COVID-19 on 01/17/25   CXR without infiltrate  D-dimer elevated at 0.67 --> CTA PE study without evidence of pulmonary embolus  Completed 2 doses of IV remdesivir  "

## 2025-01-19 NOTE — ASSESSMENT & PLAN NOTE
Recent Labs     01/17/25  1547 01/18/25  0629 01/19/25  0548   MG 1.3* 2.3 2.0   K 4.1 3.8 3.5   Magnesium and potassium stable

## 2025-01-19 NOTE — ASSESSMENT & PLAN NOTE
DC'd from [facility] on 01/15, s/p phenobarbital therapy  C/w thiamine, folate  Remains abstinent from alcohol

## 2025-01-19 NOTE — DISCHARGE INSTR - AVS FIRST PAGE
Dear Diogo Travis,     It was our pleasure to care for you here at [facility].  It is our hope that we were always able to exceed the expected standards for your care during your stay.  You were hospitalized due to COVID-19 infection.  You were cared for on the fourth MedSur floor by Jyothi Manuel PA-C under the service of Yulissa Cadena MD with the [facility] who covers for your primary care physician (PCP), Enid Altman DO, while you were hospitalized.  If you have any questions or concerns related to this hospitalization, you may contact us at .  For follow up as well as any medication refills, we recommend that you follow up with your primary care physician.  A registered nurse will reach out to you by phone within a few days after your discharge to answer any additional questions that you may have after going home.  However, at this time we provide for you here, the most important instructions / recommendations at discharge:     Notable Medication Adjustments -   None  Please continue to take Robitussin as needed for cough suppression.  Please continue to use your incentive spirometer for deep inspiration and further management of your rib fractures  Testing Required after Discharge -   None  ** Please contact your PCP to request testing orders for any of the testing recommended here **  Important follow up information -   Please continue to use your incentive spirometer, please continue Tylenol and ibuprofen as needed for rib pain.  Please continue to hydrate and monitor for fevers.  Other Instructions -   Please return to the emergency department for further evaluation if you develop new or worsening shortness of breath, coughing blood or yellow mucus, chest pain, persistent fever or chills.  Please review this entire after visit summary as additional general instructions including medication list, appointments,  activity, diet, any pertinent wound care, and other additional recommendations from your care team that may be provided for you.      Sincerely,     Jyothi Manuel PA-C

## 2025-01-20 ENCOUNTER — TRANSITIONAL CARE MANAGEMENT (OUTPATIENT)
Dept: FAMILY MEDICINE CLINIC | Facility: CLINIC | Age: 60
End: 2025-01-20

## 2025-01-20 ENCOUNTER — TELEPHONE (OUTPATIENT)
Dept: FAMILY MEDICINE CLINIC | Facility: CLINIC | Age: 60
End: 2025-01-20

## 2025-01-20 ENCOUNTER — PATIENT OUTREACH (OUTPATIENT)
Dept: CASE MANAGEMENT | Facility: OTHER | Age: 60
End: 2025-01-20

## 2025-01-20 NOTE — TELEPHONE ENCOUNTER
ANDREI Ivan, DO  Pt requesting medications for his cough. He understand that with Covid they don't want him to keep the cough in because it may lead to pneumonia, but due to his broken ribs and the frequency and severity of his coughing he would like something to reduce them.    Please give home care advice for cough.     Advise Pee med sinus rinse kit, Mucinex, Claritin/Zyrtec/Allegra/Xyzal, Flonase / Nasacort nasal spray.  Avoid decongestants if you have high blood pressure.    As previously discussed, patient needs to be seen for TCM (virtual is ok) in order to receive prescription meds.

## 2025-01-20 NOTE — UTILIZATION REVIEW
NOTIFICATION OF ADMISSION DISCHARGE   This is a Notification of Discharge from [facility]. Please be advised that this patient has been discharge from our facility. Below you will find the admission and discharge date and time including the patient’s disposition.   UTILIZATION REVIEW CONTACT:  Jessica Armstrong  Utilization   Network Utilization Review Department  Phone: [phone] x carefully listen to the prompts. All voicemails are confidential.  Email: [email or web address]     ADMISSION INFORMATION  PRESENTATION DATE: 1/14/2025 10:56 AM  OBERVATION ADMISSION DATE: N/A  INPATIENT ADMISSION DATE: 1/14/25 10:56 AM   DISCHARGE DATE: 1/15/2025  5:31 PM   DISPOSITION:Home/Self Care    Network Utilization Review Department  ATTENTION: Please call with any questions or concerns to [phone] and carefully listen to the prompts so that you are directed to the right person. All voicemails are confidential.   For Discharge needs, contact Care Management DC Support Team at [phone] opt. [phone]  Send all requests for admission clinical reviews, approved or denied determinations and any other requests to dedicated fax number below belonging to the campus where the patient is receiving treatment. List of dedicated fax numbers for the Facilities:  FACILITY NAME UR FAX NUMBER   ADMISSION DENIALS (Administrative/Medical Necessity) [phone]   DISCHARGE SUPPORT TEAM ([facility]) [phone]   PARENT CHILD HEALTH (Maternity/NICU/Pediatrics) [phone]   [facility] [phone]   [facility] [phone]   [facility] [phone]   [facility] [phone]   [facility] [phone]   [facility] [phone]   [facility] [phone]   [facility] [phone]    [facility] [phone]   [facility] [phone]   [facility] [phone]   [facility] [phone]

## 2025-01-20 NOTE — LETTER
[address] [facility] [address]  [phone]    Re: Unable to Reach   1/20/2025       Dear Diogo,    I am a [facility]  and wanted to be certain you had information to contact me should you desire assistance with or have questions about non-medical aspects of your care such as [but not limited to] medical insurance, housing, transportation, material needs, or emergency needs.  If I do not have an answer I will assist you in finding the appropriate agency or individual who can help.      Please feel free to contact me at [phone]. Thank You.    Sincerely,         Yamila Coleman LCSW

## 2025-01-20 NOTE — TELEPHONE ENCOUNTER
Please advise him to use over the counter cough medication. Since he is on oxy there is nothing we can give him that will help any more than over the counter cough meds.

## 2025-01-20 NOTE — PROGRESS NOTES
MISSY JUSTICE made second phone outreach attempt to patient to f/u on referral. Patient did not answer. MISSY JUSTICE left message asking patient to return call. UTR letter sent via [employer] Referral closed at this time. MISSY JUSTICE will remain available for any needs.

## 2025-01-20 NOTE — TELEPHONE ENCOUNTER
----- Message from Brandy ARNOLD sent at 1/20/2025  3:13 PM EST -----  Pt requesting medications for his cough. He understand that with Covid they don't want him to keep the cough in because it may lead to pneumonia, but due to his broken ribs and the frequency and severity of his coughing he would like something to reduce them.

## 2025-01-20 NOTE — UTILIZATION REVIEW
NOTIFICATION OF INPATIENT ADMISSION   AUTHORIZATION REQUEST   SERVICING FACILITY:   [place] [address]  Tax ID: [id number]  NPI: [id number]   ATTENDING PROVIDER:  Attending Name and NPI#: Yulissa Cadena Md [[id number]]  Address: [address] [place] [address]  Phone: [phone]     ADMISSION INFORMATION:  Place of Service: Inpatient Acute [facility]  Place of Service Code: 21  Inpatient Admission Date/Time: 1/18/25 10:11 AM  Discharge Date/Time: 1/19/2025 12:43 PM  Admitting Diagnosis Code/Description:  SOB (shortness of breath) [R06.02]  Near syncope [R55]  Multiple rib fractures [S22.49XA]  Intractable pain [R52]  History of alcohol use disorder [Z87.898]  COVID-19 [U07.1]     UTILIZATION REVIEW CONTACT:  Alyce Vieira Utilization   Network Utilization Review Department  Phone: [phone]  Fax: [phone]  Email: [email or web address]  Contact for approvals/pending authorizations, clinical reviews, and discharge.     PHYSICIAN ADVISORY SERVICES:  Medical Necessity Denial & Ujaf-oj-Fkuq Review  Phone: [phone]  Fax: [phone]  Email: [email or web address]     DISCHARGE SUPPORT TEAM:  For Patients Discharge Needs & Updates  Phone: [phone] opt. 2 Fax: [phone]  Email: [email or web address]

## 2025-01-22 LAB — BACTERIA BLD CULT: NORMAL

## 2025-01-22 NOTE — TELEPHONE ENCOUNTER
Patient called in to follow up on request for provider recommendation for cough medication. Reports cough spasms overnight increasing rib pain from fractures and not getting any sleep. Not interested in products for nasal congestion.     Patient is aware that OV is required post hospitalization but is not ready to schedule due to rib fractures and patient's father having emergent surgery today. Patient is adamant to fu to schedule OV. At this time, he only wants to know provider recommendation for cough medication. Please follow up with patient for provider response.

## 2025-01-22 NOTE — TELEPHONE ENCOUNTER
History     Chief Complaint   Patient presents with     Fall     No loss of consciousness, pt given Fentanyl 25 mcg by EMS.     Shoulder Pain     HPI  Lara Wills is a 89 year old female, past medical history significant for hypothyroidism, hypertension, depression, hyperlipidemia, chronic constipation, osteoarthritis, generalized anxiety disorder, presents to the emergency department with concerns of fall without loss of consciousness and shoulder pain.  EMS arrival.  History is obtained from the patient and from her daughter to a lesser extent who accompanies her.  The patient states that she awoke this morning and felt the urge to void and in the process of getting out of bed slipped and fell onto her left shoulder area.  She did not strike her head and there was no loss of consciousness.  She denies pain anywhere except her left shoulder and left upper arm area.  Specifically denies any head pain neck pain chest pain back pain or abdominal pain.  She did not traumatize her lower extremities.   Patient normally uses a walker for ambulation.  She presents from home where she lives alone.    Problem List:    Patient Active Problem List    Diagnosis Date Noted     Hypothyroidism, unspecified type 11/09/2016     Priority: Medium     Benign essential hypertension 08/31/2016     Priority: Medium     Major depressive disorder, single episode, mild (H) 05/20/2016     Priority: Medium     Advance Care Planning 12/11/2013     Priority: Medium     Advance Care Planning 4/10/2017: Receipt of ACP document:  Received: POLST which was signed and dated by provider on 1/10/2017.  Document previously scanned on 1/23/2017.  Previous POLST received, signed and dated by provider on 8/19/2014. Orders reviewed and found to be valid.  Code Status needs to be updated to reflect choices in most recent ACP document. POLST states DNR/DNR. Confirmed/documented designated decision maker(s).  Added by Florence Puentes [facility] Advance Care  LVM for pt with the otc med recommendations and also let him know to avoid decongestants if he has high blood pressure.     Planning Liaison  Advance Care Planning 1/14/2014: Receipt of ACP document:  Received: Health Care Directive which was witnessed or notarized on 10/16/2009.  Document not previously scanned.  Validation form completed and sent with document to be scanned.  Code Status reflects choices in most recent ACP document.  Confirmed/documented designated decision maker(s). See permanent comments section of demographics in clinical tab. View document(s) and details by clicking on code status. Added by Milli Nunez on 1/14/2014.  Advance Care Planning 12/20/2013: Receipt of ACP document:  Received: invalid HCD document which was incomplete and not dated.  Document not previously scanned. Validation form completed indicating invalid document. Copy sent to client with information and facilitation resources. Validation form sent to be scanned as notation of invalid document received.  Code Status needs to be updated to reflect choices. Confirmed/documented designated decision maker(s). View document(s) and details by clicking on code status. Added by Kim Curry RN, System ACP Coordinator on 12/20/2013.  Advance Care Planning 12/11/2013: Patient has completed an Advance/Health Care Directive (HCD), to bring in copy to be scanned into Epic.[employer] December 11, 2013       Hyperlipidemia with target LDL less than 130 12/11/2013     Priority: Medium     Chronic constipation 12/11/2013     Priority: Medium     OA (osteoarthritis) of knee 12/11/2013     Priority: Medium     Generalized anxiety disorder 12/11/2013     Priority: Medium        Past Medical History:    No past medical history on file.    Past Surgical History:    Past Surgical History:   Procedure Laterality Date     APPENDECTOMY  1947     BREAST BIOPSY, RT/LT      right - benign     CATARACT IOL, RT/LT      bilateral     partial thyroidectomy       VITRECTOMY,STRIP EPIRETINAL MEMBRANE  2002       Family History:    Family History   Problem Relation Age of Onset      C.A.D. Mother      Cancer - colorectal Father      Alzheimer Disease Brother      Neurologic Disorder Brother      parkinson     CANCER Daughter      lymphoma       Social History:  Marital Status:   [5]  Social History   Substance Use Topics     Smoking status: Never Smoker     Smokeless tobacco: Never Used     Alcohol use No        Medications:      levothyroxine (SYNTHROID/LEVOTHROID) 25 MCG tablet   losartan (COZAAR) 25 MG tablet   amLODIPine (NORVASC) 5 MG tablet   brimonidine (ALPHAGAN) 0.2 % ophthalmic solution   metoprolol (TOPROL-XL) 25 MG 24 hr tablet   busPIRone (BUSPAR) 10 MG tablet   VITAMIN D, CHOLECALCIFEROL, PO   DULoxetine (CYMBALTA) 60 MG EC capsule   mirtazapine (REMERON) 15 MG tablet   order for DME   latanoprost (XALATAN) 0.005 % ophthalmic solution   multivitamin  with lutein (OCUVITE WITH LTEIN) CAPS   calcium-vitamin D (CALTRATE) 600-400 MG-UNIT per tablet   docusate sodium (COLACE) 100 MG capsule   zolpidem (AMBIEN) 5 MG tablet   aspirin-acetaminophen-caffeine (EXCEDRIN EXTRA STRENGTH) 250-250-65 MG per tablet         Review of Systems   All other systems reviewed and are negative.      Physical Exam   BP: (!) 190/95  Pulse: 78  Heart Rate: 86  Temp: 98  F (36.7  C)  Resp: 18  Weight: 72.6 kg (160 lb)  SpO2: 92 %      Physical Exam   Constitutional: She appears well-developed and well-nourished.   Alert, clearly uncomfortable with an ice pack on her left shoulder.   HENT:   Head: Normocephalic and atraumatic.   Right Ear: External ear normal.   Left Ear: External ear normal.   Nose: Nose normal.   Mouth/Throat: Oropharynx is clear and moist.   Eyes: Conjunctivae and EOM are normal. Pupils are equal, round, and reactive to light.   Neck: Normal range of motion.   Cardiovascular: Normal rate, regular rhythm, normal heart sounds and intact distal pulses.    Pulmonary/Chest: Effort normal and breath sounds normal.   Abdominal: Soft. Bowel sounds are normal.   Musculoskeletal:         Arms:  Nursing note and vitals reviewed.      ED Course     ED Course     Procedures     With signed consent obtained closed reduction of left shoulder was performed.  External rotation technique without technical difficulty and the reduction maintained with a shoulder immobilizer immediately after performing the reduction with the patient still sedated.  Post reduction x-ray was obtained.  The patient was allowed to wake up.             Results for orders placed or performed during the hospital encounter of 02/03/18   Shoulder XR, 2 view left    Narrative    LEFT SHOULDER THREE VIEWS  2/3/2018 11:00 AM     HISTORY: Fall, pain.    COMPARISON: None.      Impression    IMPRESSION: There is an anteroinferior glenohumeral joint dislocation  with associated fracture of the humeral head with some fracture  fragments head. There are several fracture fragments projecting just  lateral to the glenoid.     CHET ORSE MD   XR Humerus Left G/E 2 Views    Narrative    HUMERUS LEFT TWO OR MORE VIEWS    2/3/2018 11:00 AM     HISTORY: Fall, pain.    COMPARISON: None.      Impression    IMPRESSION: Fracture dislocation of the glenohumeral joint is present  with a large Hill-Sachs deformity and some fracture fragments  projecting in the joint space. There is also smaller osseous densities  which could be due to loose bodies. The mid and distal humerus appear  intact.    CHET ROSE MD   XR Shoulder Left Port G/E 2 Views    Narrative    XR SHOULDER LT PORT G/E 2 VW 2/3/2018 1:29 PM    COMPARISON: Radiographs earlier on the same day.    HISTORY: Postreduction.      Impression    IMPRESSION: Normal alignment of the left glenohumeral joint following  reduction. Multiple calcific fragments are seen adjacent to the  humeral head, may represent fractures related to the dislocation,  versus fractured osteophytes or joint bodies.    GOSIA BUSTOS MD         Critical Care time:  none               Labs Ordered and Resulted from Time of ED  Arrival Up to the Time of Departure from the ED   BASIC METABOLIC PANEL - Abnormal; Notable for the following:        Result Value    Glucose 103 (*)     Calcium 8.4 (*)     All other components within normal limits   ROUTINE UA WITH MICROSCOPIC REFLEX TO CULTURE - Abnormal; Notable for the following:     pH Urine 7.5 (*)     Mucous Urine Present (*)     All other components within normal limits   CBC WITH PLATELETS DIFFERENTIAL     3:03 PM  Discussed with on-call hospitalist Dr. Muñoz for admission.  Inpatient status at the hospitalist request.  Transition orders are placed by myself in the emergency department.  3:26 PM  Per trauma protocol the patient was discussed with on-call orthopedics Dr. Vallecillo [facility] who agreed to see the patient in hospital.    Assessments & Plan (with Medical Decision Making)   6-year-old female past medical history reviewed as above who presents with concerns of fall and shoulder discomfort as discussed in the HPI.  Physical exam finds her alert and clearly uncomfortable with ice pack in place on her left shoulder.  Tenderness about the shoulder proximal left humerus, no crepitance, loss of shoulder contour.  X-ray revealed anterior dislocation of the shoulder with possible associated fracture fragments.  After discussion with the patient and her significant other, with signed consent, closed reduction under sedation with CRNA was performed.  No technical difficulties in the shoulder immobilizer was applied after reduction.  Postreduction x-ray confirms appropriate positioning of the humeral head into the glenoid.  The previously considered fractures seen adjacent to the humeral head may represent fracture related to dislocation versus fractured osteophytes or joint bodies.  The patient was discussed with on-call orthopedics as well as with the hospitalist service for admission.  The patient will be seen tomorrow by orthopedics in consultation for further  recommendations regarding her left shoulder.  Transition orders were placed by myself in the emergency department    Disclaimer: This note consists of symbols derived from keyboarding, dictation and/or voice recognition software. As a result, there may be errors in the script that have gone undetected. Please consider this when interpreting information found in this chart.      I have reviewed the nursing notes.    I have reviewed the findings, diagnosis, plan and need for follow up with the patient.          New Prescriptions    No medications on file       Final diagnoses:   Fall, initial encounter   Shoulder dislocation, left, initial encounter - Reduced in the emergency department.       2/3/2018   [facility] EMERGENCY DEPARTMENT     Kenn Ayon MD  02/13/18 0140

## 2025-01-23 ENCOUNTER — RESULTS FOLLOW-UP (OUTPATIENT)
Dept: EMERGENCY DEPT | Facility: HOSPITAL | Age: 60
End: 2025-01-23

## 2025-01-23 NOTE — TELEPHONE ENCOUNTER
Patient called because he keeps receiving calls and message to schedule an appointment and its becoming too much he wants to stop. He also said that he's never picked up the oxyCODONE (Roxicodone) 5 immediate release tablet. He said that the over the counter medication is not working and he has coughing spasms lasting at least 15mins and its worse at night. He wants to know if the provider can send something for his cough to the pharmacy. Please advise. Thank you.

## 2025-01-23 NOTE — TELEPHONE ENCOUNTER
See previous advice from 1/20/25 -     As previously discussed, patient needs to be seen for TCM (virtual is ok) in order to receive prescription meds.     He can try over the counter Coricidin, Delsym, Robitussin.

## 2025-01-24 LAB
ALL TARGETS: NOT DETECTED
BACTERIA BLD CULT: ABNORMAL
GRAM STN SPEC: ABNORMAL

## 2025-01-24 NOTE — TELEPHONE ENCOUNTER
Phone call placed to pt, no answer. LM letting pt know that no rx can be sent in without evaluation and that he can try otc cold and cough medicines.

## 2025-02-06 ENCOUNTER — TELEPHONE (OUTPATIENT)
Age: 60
End: 2025-02-06

## 2025-02-06 NOTE — TELEPHONE ENCOUNTER
Pt requested a courtesy refill for the escitalopram (LEXAPRO) 20 mg tablet and Ativan. Send to:       [place] [id number] [facility] [place] [facility] [address] [facility]    Please contact pt and let him know what PCP decided. Thank you for your help.

## 2025-02-07 NOTE — TELEPHONE ENCOUNTER
Lvm for patient to confirm new appointment time of 1:40pm to accommodate 40 minute appointment. Asked patient to call back to confirm appt time and give update on refill request per Dr. Altman

## 2025-02-07 NOTE — TELEPHONE ENCOUNTER
Patient is scheduled for a med check appt 2/12/25.  Clerical to please check that this is a 40 min appt and update appt notes as below.     I last saw him 7/9/24 (2nd visit) and he was advised to Return in about 6 weeks (around 8/20/2024) for 40min - F/U HTN, HL, Anx/Dep, ETOH, GERD, Vit B12/D Def, Labs; 6mo - Hep A#2; PRN [id number].     He has already received a courtesy refill on Lexapro for 6 months on 10/2/24.  He has enough Rx until 4/2/25.    Ativan request declined and will not be prescribed as it places his alcohol sobriety at risk and is not appropriate.

## 2025-02-12 ENCOUNTER — TELEPHONE (OUTPATIENT)
Dept: FAMILY MEDICINE CLINIC | Facility: CLINIC | Age: 60
End: 2025-02-12

## 2025-02-12 ENCOUNTER — OFFICE VISIT (OUTPATIENT)
Dept: FAMILY MEDICINE CLINIC | Facility: CLINIC | Age: 60
End: 2025-02-12
Payer: COMMERCIAL

## 2025-02-12 VITALS
BODY MASS INDEX: 33.65 KG/M2 | SYSTOLIC BLOOD PRESSURE: 110 MMHG | HEIGHT: 68 IN | WEIGHT: 222 LBS | TEMPERATURE: 98.5 F | HEART RATE: 125 BPM | OXYGEN SATURATION: 97 % | RESPIRATION RATE: 16 BRPM | DIASTOLIC BLOOD PRESSURE: 80 MMHG

## 2025-02-12 DIAGNOSIS — Z11.59 NEED FOR HEPATITIS C SCREENING TEST: ICD-10-CM

## 2025-02-12 DIAGNOSIS — R31.29 MICROSCOPIC HEMATURIA: ICD-10-CM

## 2025-02-12 DIAGNOSIS — Z12.5 SCREENING FOR PROSTATE CANCER: ICD-10-CM

## 2025-02-12 DIAGNOSIS — Z13.1 DIABETES MELLITUS SCREENING: ICD-10-CM

## 2025-02-12 DIAGNOSIS — R05.1 ACUTE COUGH: ICD-10-CM

## 2025-02-12 DIAGNOSIS — K58.0 IRRITABLE BOWEL SYNDROME WITH DIARRHEA: Chronic | ICD-10-CM

## 2025-02-12 DIAGNOSIS — K76.0 HEPATIC STEATOSIS: Chronic | ICD-10-CM

## 2025-02-12 DIAGNOSIS — Z23 ENCOUNTER FOR IMMUNIZATION: ICD-10-CM

## 2025-02-12 DIAGNOSIS — E55.9 VITAMIN D DEFICIENCY: Chronic | ICD-10-CM

## 2025-02-12 DIAGNOSIS — F32.1 CURRENT MODERATE EPISODE OF MAJOR DEPRESSIVE DISORDER, UNSPECIFIED WHETHER RECURRENT (HCC): Chronic | ICD-10-CM

## 2025-02-12 DIAGNOSIS — I25.10 CORONARY ARTERY CALCIFICATION OF NATIVE ARTERY: Chronic | ICD-10-CM

## 2025-02-12 DIAGNOSIS — F41.9 ANXIETY: ICD-10-CM

## 2025-02-12 DIAGNOSIS — U07.1 COVID-19: ICD-10-CM

## 2025-02-12 DIAGNOSIS — Z13.6 SCREENING FOR CARDIOVASCULAR CONDITION: ICD-10-CM

## 2025-02-12 DIAGNOSIS — F10.90 ALCOHOL USE DISORDER: ICD-10-CM

## 2025-02-12 DIAGNOSIS — K29.20 CHRONIC ALCOHOLIC GASTRITIS WITHOUT HEMORRHAGE: Chronic | ICD-10-CM

## 2025-02-12 DIAGNOSIS — L71.9 ROSACEA: Chronic | ICD-10-CM

## 2025-02-12 DIAGNOSIS — E53.8 VITAMIN B12 DEFICIENCY: Chronic | ICD-10-CM

## 2025-02-12 DIAGNOSIS — E78.5 HYPERLIPIDEMIA, UNSPECIFIED HYPERLIPIDEMIA TYPE: Chronic | ICD-10-CM

## 2025-02-12 DIAGNOSIS — I10 PRIMARY HYPERTENSION: Primary | Chronic | ICD-10-CM

## 2025-02-12 DIAGNOSIS — I25.84 CORONARY ARTERY CALCIFICATION OF NATIVE ARTERY: Chronic | ICD-10-CM

## 2025-02-12 DIAGNOSIS — K21.9 GASTROESOPHAGEAL REFLUX DISEASE, UNSPECIFIED WHETHER ESOPHAGITIS PRESENT: Chronic | ICD-10-CM

## 2025-02-12 DIAGNOSIS — F33.1 MAJOR DEPRESSIVE DISORDER, RECURRENT, MODERATE (HCC): ICD-10-CM

## 2025-02-12 PROCEDURE — 90632 HEPA VACCINE ADULT IM: CPT

## 2025-02-12 PROCEDURE — 99215 OFFICE O/P EST HI 40 MIN: CPT | Performed by: FAMILY MEDICINE

## 2025-02-12 PROCEDURE — 90471 IMMUNIZATION ADMIN: CPT

## 2025-02-12 PROCEDURE — 90677 PCV20 VACCINE IM: CPT

## 2025-02-12 PROCEDURE — 90472 IMMUNIZATION ADMIN EACH ADD: CPT

## 2025-02-12 RX ORDER — MIRTAZAPINE 15 MG/1
15 TABLET, FILM COATED ORAL
Qty: 30 TABLET | Refills: 1 | Status: SHIPPED | OUTPATIENT
Start: 2025-02-12

## 2025-02-12 RX ORDER — CHLORAL HYDRATE 500 MG
2000 CAPSULE ORAL DAILY
COMMUNITY

## 2025-02-12 RX ORDER — ESCITALOPRAM OXALATE 20 MG/1
20 TABLET ORAL DAILY
Qty: 90 TABLET | Refills: 0 | Status: SHIPPED | OUTPATIENT
Start: 2025-02-12

## 2025-02-12 RX ORDER — ALBUTEROL SULFATE 90 UG/1
2 INHALANT RESPIRATORY (INHALATION) EVERY 4 HOURS PRN
Qty: 18 G | Refills: 0 | Status: SHIPPED | OUTPATIENT
Start: 2025-02-12 | End: 2025-02-22

## 2025-02-12 NOTE — PROGRESS NOTES
Assessment/Plan:  Assessment & Plan  Primary hypertension  Stable.  Check blood pressure outside of office.  Recommend lifestyle modifications.  ETOH cessation advised.         Hyperlipidemia, unspecified hyperlipidemia type  Pending labs s/p initiation of Lipitor 40mg QHS.  Recommend lifestyle modifications.    Orders:  •  CBC and differential; Future  •  Comprehensive metabolic panel; Future  •  Lipid panel; Future  •  LDL cholesterol, direct; Future    Hepatic steatosis  Pending Hepatology consult. ETOH cessation advised. Recommend lifestyle modifications.   Orders:  •  Ambulatory Referral to Gastroenterology; Future  •  Ambulatory Referral to Hepatology; Future    Chronic alcoholic gastritis without hemorrhage  Management per GI.  Continue Protonix 40mg QD.  ETOH Cessation advised.       Orders:  •  Ambulatory Referral to Gastroenterology; Future  •  Ambulatory Referral to Hepatology; Future  •  Hepatitis panel, acute; Future    Gastroesophageal reflux disease, unspecified whether esophagitis present  Management per GI.  Continue Protonix 40mg QD.  ETOH Cessation advised.       Orders:  •  Ambulatory Referral to Gastroenterology; Future  •  Ambulatory Referral to Hepatology; Future  •  Magnesium; Future    Irritable bowel syndrome with diarrhea  Management per GI. Overdue for colonoscopy.   Orders:  •  Ambulatory Referral to Gastroenterology; Future  •  Ambulatory Referral to Hepatology; Future  •  TSH, 3rd generation with Free T4 reflex; Future    Rosacea  Uncontrolled.  Pending Derm consult.  Previously on Metronidazole 0.75% cream BID.  ETOH cessation advised.    Orders:  •  Ambulatory Referral to Dermatology; Future    Coronary artery calcification of native artery  Continue Lipitor 40mg QHS. Pending Cardio consult.   Orders:  •  Ambulatory Referral to Cardiology; Future    Current moderate episode of major depressive disorder, unspecified whether recurrent (HCC)  Depression Screening Follow-up Plan:  Patient's depression screening was positive with a PHQ-9 score of 15.   Orders:  •  escitalopram (LEXAPRO) 20 mg tablet; Take 1 tablet (20 mg total) by mouth daily      Uncontrolled.  Restart Remeron 15mg QHS.  Continue Lexapro 20mg QD.  Previously taking Atarax 25mg q6 hours PRN.  Patient declines starting Buspar 7.5mg TID PRN, but may reconsider in future.       Vitamin D deficiency  Pending labs.      Orders:  •  Vitamin D 25 hydroxy; Future    Vitamin B12 deficiency  Pending labs.      Orders:  •  CBC and differential; Future  •  Vitamin B12; Future  •  Folate; Future    Alcohol use disorder  Uncontrolled.  Patient encouraged to attend inpatient Rehab.  Smart phone dharmesh list and counseling list provided today.    Orders:  •  Ambulatory Referral to Gastroenterology; Future  •  Ambulatory Referral to Hepatology; Future  •  Vitamin B12; Future  •  Folate; Future    Acute cough  Post-viral s/p COVID.  Start Albuterol HFA PRN.      Orders:  •  albuterol (PROVENTIL HFA,VENTOLIN HFA) 90 mcg/act inhaler; Inhale 2 puffs every 4 (four) hours as needed (Cough) for up to 10 days    Major depressive disorder, recurrent, moderate (HCC)  Depression Screening Follow-up Plan: Patient's depression screening was positive with a PHQ-9 score of 15. Patient advised to follow-up with PCP for further management.    Uncontrolled.  Restart Remeron 15mg QHS.  Continue Lexapro 20mg QD.  Previously taking Atarax 25mg q6 hours PRN.  Patient declines starting Buspar 7.5mg TID PRN, but may reconsider in future.     Orders:  •  mirtazapine (REMERON) 15 mg tablet; Take 1 tablet (15 mg total) by mouth daily at bedtime    Anxiety    Uncontrolled.  Restart Remeron 15mg QHS.  Continue Lexapro 20mg QD.  Previously taking Atarax 25mg q6 hours PRN.  Patient declines starting Buspar 7.5mg TID PRN, but may reconsider in future.     Orders:  •  escitalopram (LEXAPRO) 20 mg tablet; Take 1 tablet (20 mg total) by mouth daily    Microscopic hematuria  Pending  labs.  To Uro if persists.      Orders:  •  Urinalysis with microscopic  •  Urine culture    Screening for cardiovascular condition    Orders:  •  CBC and differential; Future  •  Comprehensive metabolic panel; Future  •  Lipid panel; Future  •  LDL cholesterol, direct; Future    Diabetes mellitus screening    Orders:  •  Hemoglobin A1C; Future    Screening for prostate cancer    Orders:  •  PSA, Total Screen; Future    Encounter for immunization    Orders:  •  Pneumococcal Conjugate Vaccine 20-valent (Pcv20)  •  HEPATITIS A VACCINE ADULT IM    Need for hepatitis C screening test    Orders:  •  Hepatitis panel, acute; Future    COVID-19    Orders:  •  albuterol (PROVENTIL HFA,VENTOLIN HFA) 90 mcg/act inhaler; Inhale 2 puffs every 4 (four) hours as needed (Cough) for up to 10 days          Return in about 6 weeks (around 3/26/2025) for Physical -  F/U HTN, HL, Anx/Dep, ETOH, GERD, Vit B12/D Def, Labs.      No future appointments.       Subjective:     Diogo is a 59 y.o. male who presents today for a follow-up on his chronic medical conditions.        HPI:  Chief Complaint   Patient presents with   • Follow-up     6 week     -- Above per clinical staff and reviewed. --      HPI      Today:      Return in about 6 weeks (around 8/20/2024) for  F/U HTN, HL, Anx/Dep, ETOH, GERD, Vit B12/D Def, Labs; 6mo - Hep A#2; PRN [id number]    Patient did not complete labs 7/9/24.      PTO c pham Barrera    Desires PSA, MIAN c overdue colonoscopy and yearly per PCP thereafter.      COVID - Dx 1/25.  Has cough that causes rib spasm.  Not using OTC meds.       Obesity - Trying to watch diet.  No exercise.       HTN - On Lisinopril 40mg QD.  No other HTN meds.  No BP check outside of office on arm cuff.     Hyperlipidemia - No statin previously.       Anxiety / Depression - Triggered by divorce 2017.  Mood is depressed.  Father Dx c Bladder Cancer.  Has good and bad days.  Good social supports.  No SI/HI/AH/VH.  No Psych previously.  On  Lexapro 20mg QD and Atarax 25mg q6 hours PRN - patient did not  Rx yet.  Previously on Xanax 0.5mg QD PRN.  He never filled Remeron 15mg QHS  for no particular reason.  No other mood meds.  No counseling previously.       PHQ-2/9 Depression Screening      Little interest or pleasure in doing things: 1 - several days  Feeling down, depressed, or hopeless: 1 - several days  Trouble falling or staying asleep, or sleeping too much: 3 - nearly every day  Feeling tired or having little energy: 2 - more than half the days  Poor appetite or overeatin - more than half the days  Feeling bad about yourself - or that you are a failure or have let yourself or your family down: 3 - nearly every day  Trouble concentrating on things, such as reading the newspaper or watching television: 1 - several days  Moving or speaking so slowly that other people could have noticed. Or the opposite - being so fidgety or restless that you have been moving around a lot more than usual: 0 - not at all  Thoughts that you would be better off dead, or of hurting yourself in some way: 0 - not at all  PHQ-9 Score: 13  PHQ-9 Interpretation: Moderate depression                POLO-7 Flowsheet Screening    Flowsheet Row Most Recent Value   Over the last 2 weeks, how often have you been bothered by any of the following problems?     Feeling nervous, anxious, or on edge 2   Not being able to stop or control worrying 2   Worrying too much about different things 2   Trouble relaxing 2   Being so restless that it is hard to sit still 2   Becoming easily annoyed or irritable 1   Feeling afraid as if something awful might happen 2   POLO-7 Total Score 13         .  PHQ-2/9 Depression Screening    Little interest or pleasure in doing things: 3 - nearly every day  Feeling down, depressed, or hopeless: 3 - nearly every day  Trouble falling or staying asleep, or sleeping too much: 3 - nearly every day  Feeling tired or having little energy: 3 - nearly  every day  Poor appetite or overeating: 3 - nearly every day  Feeling bad about yourself - or that you are a failure or have let yourself or your family down: 0 - not at all  Trouble concentrating on things, such as reading the newspaper or watching television: 0 - not at all  Moving or speaking so slowly that other people could have noticed. Or the opposite - being so fidgety or restless that you have been moving around a lot more than usual: 0 - not at all  Thoughts that you would be better off dead, or of hurting yourself in some way: 0 - not at all  PHQ-9 Score: 15  PHQ-9 Interpretation: Moderately severe depression       POLO-7 Flowsheet Screening    Flowsheet Row Most Recent Value   Over the last two weeks, how often have you been bothered by the following problems?     Feeling nervous, anxious, or on edge 1   Not being able to stop or control worrying 3   Worrying too much about different things 3   Trouble relaxing  3   Being so restless that it's hard to sit still 3   Becoming easily annoyed or irritable  3   Feeling afraid as if something awful might happen 1   How difficult have these problems made it for you to do your work, take care of things at home, or get along with other people?  Not difficult at all   POLO Score  17             MDQ:   1, Asynchronous, No Problem        GERD / IBS / Chronic Alcoholic Gastritis / Hepatomegaly / Fatty Liver - Referred to Hepatology and GI - No appts scheduled yet.  Management per GI Dr. Art - Next appt PRN as his insurance is not participating.  Last EGD and Colonoscopy 2/12.  On Protonix 40mg QD. Overdue for 10-year repeat colonoscopy 2/2022.  Watching GERD diet.       Microscopic Hematuria - No Uro work-up previously.  U/A C&S?      ETOH Abuse / H/O ETOH Withdrawal Seizures - He has had 6 admissions in the past year due to ETOH use disorder, and only followed up c PCP once.  Triggered by divorce 2017.  s/p Medical Detox Admission [facility] 2/5/24 -  2/7/24.  Last ETOH drink 2/4/24.  Previously drinking 3 bottles of wine daily.  On Naltrexone, MVI, Thiamine, Folic Acid.   provided patient with outpatient Alcohol Rehab resources -  patient has not yet made call due to caring for his father.         AR - Stable s meds.       Rosacea - Pending Derm consult as referred 1/14/24 - Not appt scheduled yet.  On Metronidazole cream.       Reviewed:  Labs 1/19/25     No Uro.       The following portions of the patient's history were reviewed and updated as appropriate: allergies, current medications, past family history, past medical history, past social history, past surgical history and problem list.      Review of Systems   Constitutional:  Positive for fatigue. Negative for appetite change, chills, diaphoresis and fever.   Respiratory:  Positive for cough (Dry) and wheezing (At rest). Negative for chest tightness and shortness of breath.    Cardiovascular:  Negative for chest pain.   Gastrointestinal:  Negative for abdominal pain, blood in stool, diarrhea, nausea and vomiting.   Genitourinary:  Negative for dysuria.        Current Outpatient Medications   Medication Sig Dispense Refill   • acetaminophen (TYLENOL) 325 mg tablet Take 3 tablets (975 mg total) by mouth every 8 (eight) hours     • albuterol (PROVENTIL HFA,VENTOLIN HFA) 90 mcg/act inhaler Inhale 2 puffs every 4 (four) hours as needed (Cough) for up to 10 days 18 g 0   • atorvastatin (LIPITOR) 40 mg tablet Take 1 tablet (40 mg total) by mouth daily with dinner (Patient taking differently: Take 40 mg by mouth daily at bedtime) 90 tablet 1   • escitalopram (LEXAPRO) 20 mg tablet Take 1 tablet (20 mg total) by mouth daily 90 tablet 0   • ibuprofen (MOTRIN) 400 mg tablet Take 1 tablet (400 mg total) by mouth every 6 (six) hours as needed for mild pain 30 tablet 0   • lisinopril (ZESTRIL) 40 mg tablet Take 1 tablet (40 mg total) by mouth daily 90 tablet 1   • mirtazapine (REMERON) 15 mg tablet Take 1  "tablet (15 mg total) by mouth daily at bedtime 30 tablet 1   • Omega-3 Fatty Acids (fish oil) 1,000 mg Take 2,000 mg by mouth daily     • pantoprazole (PROTONIX) 40 mg tablet Take 1 tablet (40 mg total) by mouth daily in the early morning 90 tablet 1   • folic acid (FOLVITE) 1 mg tablet Take 1 tablet (1 mg total) by mouth daily (Patient not taking: Reported on 1/17/2025) 30 tablet 0   • Thiamine Mononitrate (VITAMIN B1) 100 mg tablet Take 1 tablet (100 mg total) by mouth daily (Patient not taking: Reported on 2/12/2025) 30 tablet 1     No current facility-administered medications for this visit.       Objective:  /80 (BP Location: Left arm, Patient Position: Sitting, Cuff Size: Large)   Pulse (!) 125   Temp 98.5 °F (36.9 °C) (Tympanic)   Resp 16   Ht 5' 8\" (1.727 m)   Wt 101 kg (222 lb)   SpO2 97%   BMI 33.75 kg/m²    Wt Readings from Last 3 Encounters:   02/12/25 101 kg (222 lb)   01/17/25 103 kg (226 lb)   01/17/25 103 kg (226 lb 9.6 oz)      BP Readings from Last 3 Encounters:   02/12/25 110/80   01/19/25 134/95   01/17/25 148/90          Physical Exam  Vitals and nursing note reviewed.   Constitutional:       General: He is not in acute distress.     Appearance: Normal appearance. He is well-developed. He is obese. He is not ill-appearing or toxic-appearing.   HENT:      Head: Normocephalic and atraumatic.      Mouth/Throat:      Mouth: Mucous membranes are moist.   Eyes:      Conjunctiva/sclera: Conjunctivae normal.   Neck:      Thyroid: No thyromegaly.   Cardiovascular:      Rate and Rhythm: Normal rate and regular rhythm.      Pulses: Normal pulses.      Heart sounds: Normal heart sounds.   Pulmonary:      Effort: Pulmonary effort is normal.      Breath sounds: Normal breath sounds.   Abdominal:      General: Bowel sounds are normal. There is no distension.      Palpations: Abdomen is soft. There is no mass.      Tenderness: There is no abdominal tenderness. There is no guarding or rebound. " "  Musculoskeletal:         General: No swelling or tenderness.      Cervical back: Neck supple.      Right lower leg: No edema.      Left lower leg: No edema.   Lymphadenopathy:      Cervical: No cervical adenopathy.   Neurological:      General: No focal deficit present.      Mental Status: He is alert and oriented to person, place, and time.   Psychiatric:         Attention and Perception: Attention and perception normal.         Mood and Affect: Mood is depressed. Affect is tearful.         Speech: Speech normal.         Behavior: Behavior normal. Behavior is cooperative.         Thought Content: Thought content normal.         Cognition and Memory: Cognition and memory normal.         Judgment: Judgment normal.         Lab Results:      Lab Results   Component Value Date    WBC 4.22 (L) 01/19/2025    HGB 11.7 (L) 01/19/2025    HCT 33.5 (L) 01/19/2025     (L) 01/19/2025    TRIG 152 (H) 02/14/2024    HDL 46 02/14/2024    LDLDIRECT 252 (H) 02/14/2024    ALT 10 01/19/2025    AST 27 01/19/2025    K 3.5 01/19/2025     01/19/2025    CREATININE 0.80 01/19/2025    BUN 7 01/19/2025    CO2 25 01/19/2025    TSH 1.73 11/12/2019    PSA 0.51 02/14/2024    INR 1.00 01/17/2025    GLUF 85 01/18/2025    HGBA1C 4.9 02/14/2024     No results found for: \"URICACID\"  Invalid input(s): \"BASENAME\" Vitamin D    CTA chest pe study  Result Date: 1/18/2025  Narrative: CTA - CHEST WITH IV CONTRAST - PULMONARY ANGIOGRAM INDICATION: r/o PE elevated di dimer. COMPARISON: Compared with 1/11/2025 TECHNIQUE: CTA examination of the chest was performed using angiographic technique according to a protocol specifically tailored to evaluate for pulmonary embolism. Multiplanar 2D reformatted images were created from the source data. In addition, coronal  3D MIP postprocessing was performed on the acquisition scanner. Radiation dose length product (DLP) for this visit: 387 mGy-cm . This examination, like all CT scans performed in the [facility]" [facility], was performed utilizing techniques to minimize radiation dose exposure, including the use of iterative reconstruction and automated exposure control. IV Contrast: 70 mL of iohexol (OMNIPAQUE) FINDINGS: PULMONARY ARTERIAL TREE:  No pulmonary embolus. LUNGS: Lungs are clear. No tracheal or endobronchial lesion. PLEURA: Unremarkable. HEART/GREAT VESSELS: Heart is unremarkable for patient's age. No thoracic aortic aneurysm. MEDIASTINUM AND VIVIAN: Unremarkable. CHEST WALL AND LOWER NECK: Unremarkable. VISUALIZED STRUCTURES IN THE UPPER ABDOMEN: Unremarkable. OSSEOUS STRUCTURES: Unchanged appearance of the right-sided rib fractures seen last week.     Impression: No pulmonary embolus. Measured RV/LV ratio is within normal limits at less than 0.9. Workstation performed: [id number]     XR chest 1 view portable  Result Date: 1/17/2025  Narrative: CHEST INDICATION: Worsening cough, recent rib fracture and flulike symptoms. COMPARISON: Several prior chest radiographs, the most recent from 1/14/2025 and CT of the chest from 1/11/2025. TECHNIQUE: XR CHEST PORTABLE - 2 images. FINDINGS: The lungs are clear. No pleural effusions. No evidence of pneumothorax. Cardiac silhouette not accurately accessed on this projection. The right-sided rib fractures seen on recent CT are not well seen with radiographic technique.     Impression: No acute pulmonary pathology. Findings are concordant with preliminary interpretation provided in the Emergency Department. Workstation performed: [id number]     XR chest 2 views  Result Date: 1/14/2025  Narrative: XR CHEST PA AND LATERAL INDICATION: rib fractures. COMPARISON: 1/10/2025, CT chest from 1/11/2025 FINDINGS: Low lung volumes. No focal consolidation. No pneumothorax or pleural effusion. Normal cardiomediastinal silhouette. Right-sided rib fractures are seen better on recent chest CT. No new displaced fractures identified. Normal upper abdomen.     Impression: 1.  Right rib  fractures redemonstrated, seen better on recent chest CT. 2.  No new cardiopulmonary process. Workstation performed: [id number]        POCT Labs                 5 minutes spent on chart prep, 35 minutes spent with patient counseling/educating on their diagnoses, tests completed and any new tests ordered, any referrals placed, treatment options, and documentation of above today.   In prescribing new medications, or changing doses, we reviewed the risks and benefits and side effects of these medications along with other treatment options if appropriate.

## 2025-02-12 NOTE — ASSESSMENT & PLAN NOTE
Uncontrolled.  Pending Derm consult.  Previously on Metronidazole 0.75% cream BID.  ETOH cessation advised.    Orders:  •  Ambulatory Referral to Dermatology; Future

## 2025-02-12 NOTE — ASSESSMENT & PLAN NOTE
Management per GI. Overdue for colonoscopy.   Orders:  •  Ambulatory Referral to Gastroenterology; Future  •  Ambulatory Referral to Hepatology; Future  •  TSH, 3rd generation with Free T4 reflex; Future

## 2025-02-12 NOTE — ASSESSMENT & PLAN NOTE
Management per GI.  Continue Protonix 40mg QD.  ETOH Cessation advised.       Orders:  •  Ambulatory Referral to Gastroenterology; Future  •  Ambulatory Referral to Hepatology; Future  •  Magnesium; Future

## 2025-02-12 NOTE — ASSESSMENT & PLAN NOTE
Uncontrolled.  Patient encouraged to attend inpatient Rehab.  Smart phone dharmesh list and counseling list provided today.    Orders:  •  Ambulatory Referral to Gastroenterology; Future  •  Ambulatory Referral to Hepatology; Future  •  Vitamin B12; Future  •  Folate; Future

## 2025-02-12 NOTE — ASSESSMENT & PLAN NOTE
Pending labs.      Orders:  •  CBC and differential; Future  •  Vitamin B12; Future  •  Folate; Future

## 2025-02-12 NOTE — ASSESSMENT & PLAN NOTE
Pending Hepatology consult. ETOH cessation advised. Recommend lifestyle modifications.   Orders:  •  Ambulatory Referral to Gastroenterology; Future  •  Ambulatory Referral to Hepatology; Future

## 2025-02-12 NOTE — ASSESSMENT & PLAN NOTE
Continue Lipitor 40mg QHS. Pending Cardio consult.   Orders:  •  Ambulatory Referral to Cardiology; Future

## 2025-02-12 NOTE — ASSESSMENT & PLAN NOTE
Pending labs s/p initiation of Lipitor 40mg QHS.  Recommend lifestyle modifications.    Orders:  •  CBC and differential; Future  •  Comprehensive metabolic panel; Future  •  Lipid panel; Future  •  LDL cholesterol, direct; Future

## 2025-02-12 NOTE — TELEPHONE ENCOUNTER
Pt left appt without scheduling f/u:    Return in about 6 weeks (around 3/26/2025) for Physical - F/U HTN, HL, Anx/Dep, ETOH, GERD, Vit B12/D Def, Labs.      Called pt lm to call back and schedule

## 2025-02-12 NOTE — ASSESSMENT & PLAN NOTE
Depression Screening Follow-up Plan: Patient's depression screening was positive with a PHQ-9 score of 15.   Orders:  •  escitalopram (LEXAPRO) 20 mg tablet; Take 1 tablet (20 mg total) by mouth daily      Uncontrolled.  Restart Remeron 15mg QHS.  Continue Lexapro 20mg QD.  Previously taking Atarax 25mg q6 hours PRN.  Patient declines starting Buspar 7.5mg TID PRN, but may reconsider in future.

## 2025-02-12 NOTE — ASSESSMENT & PLAN NOTE
Management per GI.  Continue Protonix 40mg QD.  ETOH Cessation advised.       Orders:  •  Ambulatory Referral to Gastroenterology; Future  •  Ambulatory Referral to Hepatology; Future  •  Hepatitis panel, acute; Future

## 2025-02-12 NOTE — PATIENT INSTRUCTIONS
Drug & Alcohol Outpatient Providers  DRUG AND ALCOHOL:  OUTPATIENT PROVIDERS     [facility]                                                         Ph: [phone]  [address] [facility]  [place] [address]     [facility]                            Ph: [phone]  [address] [facility]   [place] [address]     [facility]                                          Ph: [phone]  [address] [place] [address]  [place] [address]     [facility]                                                                                 Ph: [phone]  [address]  [address]   [place] [address]     [facility]                                                                [phone]  [address] [facility]  [place] [address]  -sees pediatric patients also  -offers medication management if needed     [facility]                                       Ph: [phone]  [address] [facility]  [place] [address]     [facility]                                                                                      Ph: [phone]  [address] [place] [address]   [place] [address]   **Adults, Adolescents, Families**     [facility]                                                            Ph: [phone]  [address] [place] [address]  **Accepts adolescents**     [facility]                                                                                    Ph: [phone]  [address] [place] [address]     [facility]                                                                                    Ph: [phone]  [address] [place] [address]     [facility]                                                                           Ph: [phone]  [address] [facility]   [place] [address]     [facility]                                                        Ph: [phone]  [address] [facility]   [place] [address]     [facility]                                                        Ph: [phone]  [address]  [place] [address]     [facility]                                                                                  Ph: [phone]  [address] [place] [address]     Drug & Alcohol Partial Programs  DRUG AND ALCOHOL:  PARTIAL PROGRAMS     [facility]                                          Ph: [phone]  [address] [place] [address]     [facility]                                                         Ph: [phone]  [address] [facility] [place] [address]  **Children and Adolescents**     [facility]                                                                          Ph: [phone]  [address] [place] [address]  **Dual     [facility] (walk in hours available)                                                                        Ph: [phone] [address] [place] [address]     [facility]                                                                              Ph: [phone]  [address]  [address]  [place] [address]     [facility]     Drug and/or Alcohol Services [phone]  Main Location: [address] [place] [address]  Outpatient Locations:  Main Location: [address] [facility], [place] [address]  [address], [address], [place] [address]    Walk in Help Mon-Thurs 9:00 am - 2:00 pm     Mental Health Outpatient [phone]    [email or web address]

## 2025-02-13 ENCOUNTER — APPOINTMENT (EMERGENCY)
Dept: CT IMAGING | Facility: HOSPITAL | Age: 60
DRG: 425 | End: 2025-02-13
Payer: COMMERCIAL

## 2025-02-13 ENCOUNTER — HOSPITAL ENCOUNTER (INPATIENT)
Facility: HOSPITAL | Age: 60
LOS: 1 days | Discharge: HOME | DRG: 425 | End: 2025-02-14
Attending: EMERGENCY MEDICINE | Admitting: INTERNAL MEDICINE
Payer: COMMERCIAL

## 2025-02-13 DIAGNOSIS — F10.20 ALCOHOL USE DISORDER, SEVERE, DEPENDENCE (HCC): ICD-10-CM

## 2025-02-13 DIAGNOSIS — F10.10 ALCOHOL ABUSE: ICD-10-CM

## 2025-02-13 DIAGNOSIS — G89.11 ACUTE PAIN DUE TO TRAUMA: ICD-10-CM

## 2025-02-13 DIAGNOSIS — S22.41XA CLOSED FRACTURE OF MULTIPLE RIBS OF RIGHT SIDE, INITIAL ENCOUNTER: Primary | ICD-10-CM

## 2025-02-13 DIAGNOSIS — F10.939 ALCOHOL WITHDRAWAL (HCC): ICD-10-CM

## 2025-02-13 DIAGNOSIS — E83.42 HYPOMAGNESEMIA: ICD-10-CM

## 2025-02-13 LAB
ALBUMIN SERPL BCG-MCNC: 4.1 G/DL (ref 3.5–5)
ALBUMIN SERPL BCG-MCNC: 4.5 G/DL (ref 3.5–5)
ALP SERPL-CCNC: 108 U/L (ref 34–104)
ALP SERPL-CCNC: 94 U/L (ref 34–104)
ALT SERPL W P-5'-P-CCNC: 18 U/L (ref 7–52)
ALT SERPL W P-5'-P-CCNC: 21 U/L (ref 7–52)
ANION GAP SERPL CALCULATED.3IONS-SCNC: 12 MMOL/L (ref 4–13)
ANION GAP SERPL CALCULATED.3IONS-SCNC: 9 MMOL/L (ref 4–13)
AST SERPL W P-5'-P-CCNC: 26 U/L (ref 13–39)
AST SERPL W P-5'-P-CCNC: 33 U/L (ref 13–39)
BASOPHILS # BLD AUTO: 0.05 THOUSANDS/ÂΜL (ref 0–0.1)
BASOPHILS NFR BLD AUTO: 1 % (ref 0–1)
BILIRUB SERPL-MCNC: 0.3 MG/DL (ref 0.2–1)
BILIRUB SERPL-MCNC: 0.31 MG/DL (ref 0.2–1)
BUN SERPL-MCNC: 10 MG/DL (ref 5–25)
BUN SERPL-MCNC: 12 MG/DL (ref 5–25)
CALCIUM SERPL-MCNC: 9.2 MG/DL (ref 8.4–10.2)
CALCIUM SERPL-MCNC: 9.5 MG/DL (ref 8.4–10.2)
CHLORIDE SERPL-SCNC: 102 MMOL/L (ref 96–108)
CHLORIDE SERPL-SCNC: 103 MMOL/L (ref 96–108)
CO2 SERPL-SCNC: 25 MMOL/L (ref 21–32)
CO2 SERPL-SCNC: 27 MMOL/L (ref 21–32)
CREAT SERPL-MCNC: 0.8 MG/DL (ref 0.6–1.3)
CREAT SERPL-MCNC: 0.81 MG/DL (ref 0.6–1.3)
EOSINOPHIL # BLD AUTO: 0.16 THOUSAND/ÂΜL (ref 0–0.61)
EOSINOPHIL NFR BLD AUTO: 2 % (ref 0–6)
ERYTHROCYTE [DISTWIDTH] IN BLOOD BY AUTOMATED COUNT: 13 % (ref 11.6–15.1)
GFR SERPL CREATININE-BSD FRML MDRD: 97 ML/MIN/1.73SQ M
GFR SERPL CREATININE-BSD FRML MDRD: 98 ML/MIN/1.73SQ M
GLUCOSE SERPL-MCNC: 84 MG/DL (ref 65–140)
GLUCOSE SERPL-MCNC: 99 MG/DL (ref 65–140)
HCT VFR BLD AUTO: 40.2 % (ref 36.5–49.3)
HGB BLD-MCNC: 13.8 G/DL (ref 12–17)
IMM GRANULOCYTES # BLD AUTO: 0.02 THOUSAND/UL (ref 0–0.2)
IMM GRANULOCYTES NFR BLD AUTO: 0 % (ref 0–2)
LYMPHOCYTES # BLD AUTO: 2.04 THOUSANDS/ÂΜL (ref 0.6–4.47)
LYMPHOCYTES NFR BLD AUTO: 27 % (ref 14–44)
MAGNESIUM SERPL-MCNC: 1.4 MG/DL (ref 1.9–2.7)
MAGNESIUM SERPL-MCNC: 1.9 MG/DL (ref 1.9–2.7)
MCH RBC QN AUTO: 32.5 PG (ref 26.8–34.3)
MCHC RBC AUTO-ENTMCNC: 34.3 G/DL (ref 31.4–37.4)
MCV RBC AUTO: 95 FL (ref 82–98)
MONOCYTES # BLD AUTO: 0.68 THOUSAND/ÂΜL (ref 0.17–1.22)
MONOCYTES NFR BLD AUTO: 9 % (ref 4–12)
NEUTROPHILS # BLD AUTO: 4.75 THOUSANDS/ÂΜL (ref 1.85–7.62)
NEUTS SEG NFR BLD AUTO: 61 % (ref 43–75)
NRBC BLD AUTO-RTO: 0 /100 WBCS
PHOSPHATE SERPL-MCNC: 3.2 MG/DL (ref 2.7–4.5)
PLATELET # BLD AUTO: 234 THOUSANDS/UL (ref 149–390)
PMV BLD AUTO: 8.6 FL (ref 8.9–12.7)
POTASSIUM SERPL-SCNC: 4.4 MMOL/L (ref 3.5–5.3)
POTASSIUM SERPL-SCNC: 4.6 MMOL/L (ref 3.5–5.3)
PROT SERPL-MCNC: 6.7 G/DL (ref 6.4–8.4)
PROT SERPL-MCNC: 7.4 G/DL (ref 6.4–8.4)
RBC # BLD AUTO: 4.25 MILLION/UL (ref 3.88–5.62)
SODIUM SERPL-SCNC: 139 MMOL/L (ref 135–147)
SODIUM SERPL-SCNC: 139 MMOL/L (ref 135–147)
WBC # BLD AUTO: 7.7 THOUSAND/UL (ref 4.31–10.16)

## 2025-02-13 PROCEDURE — 99223 1ST HOSP IP/OBS HIGH 75: CPT | Performed by: INTERNAL MEDICINE

## 2025-02-13 PROCEDURE — 72125 CT NECK SPINE W/O DYE: CPT

## 2025-02-13 PROCEDURE — 96375 TX/PRO/DX INJ NEW DRUG ADDON: CPT

## 2025-02-13 PROCEDURE — 36415 COLL VENOUS BLD VENIPUNCTURE: CPT

## 2025-02-13 PROCEDURE — 83735 ASSAY OF MAGNESIUM: CPT | Performed by: INTERNAL MEDICINE

## 2025-02-13 PROCEDURE — 80053 COMPREHEN METABOLIC PANEL: CPT

## 2025-02-13 PROCEDURE — 99285 EMERGENCY DEPT VISIT HI MDM: CPT | Performed by: EMERGENCY MEDICINE

## 2025-02-13 PROCEDURE — 99285 EMERGENCY DEPT VISIT HI MDM: CPT

## 2025-02-13 PROCEDURE — 83735 ASSAY OF MAGNESIUM: CPT

## 2025-02-13 PROCEDURE — 71250 CT THORAX DX C-: CPT

## 2025-02-13 PROCEDURE — 80053 COMPREHEN METABOLIC PANEL: CPT | Performed by: INTERNAL MEDICINE

## 2025-02-13 PROCEDURE — 85025 COMPLETE CBC W/AUTO DIFF WBC: CPT

## 2025-02-13 PROCEDURE — 93005 ELECTROCARDIOGRAM TRACING: CPT

## 2025-02-13 PROCEDURE — 96365 THER/PROPH/DIAG IV INF INIT: CPT

## 2025-02-13 PROCEDURE — 84100 ASSAY OF PHOSPHORUS: CPT | Performed by: INTERNAL MEDICINE

## 2025-02-13 PROCEDURE — 70450 CT HEAD/BRAIN W/O DYE: CPT

## 2025-02-13 RX ORDER — ALBUTEROL SULFATE 90 UG/1
2 INHALANT RESPIRATORY (INHALATION) EVERY 4 HOURS PRN
Status: DISCONTINUED | OUTPATIENT
Start: 2025-02-13 | End: 2025-02-14 | Stop reason: HOSPADM

## 2025-02-13 RX ORDER — LISINOPRIL 10 MG/1
40 TABLET ORAL DAILY
Status: DISCONTINUED | OUTPATIENT
Start: 2025-02-14 | End: 2025-02-14 | Stop reason: HOSPADM

## 2025-02-13 RX ORDER — LIDOCAINE 50 MG/G
1 PATCH TOPICAL DAILY
Status: DISCONTINUED | OUTPATIENT
Start: 2025-02-14 | End: 2025-02-14 | Stop reason: HOSPADM

## 2025-02-13 RX ORDER — PANTOPRAZOLE SODIUM 40 MG/1
40 TABLET, DELAYED RELEASE ORAL
Status: DISCONTINUED | OUTPATIENT
Start: 2025-02-14 | End: 2025-02-14 | Stop reason: HOSPADM

## 2025-02-13 RX ORDER — OXYCODONE HYDROCHLORIDE 5 MG/1
5 TABLET ORAL EVERY 4 HOURS PRN
Status: DISCONTINUED | OUTPATIENT
Start: 2025-02-13 | End: 2025-02-14 | Stop reason: HOSPADM

## 2025-02-13 RX ORDER — FOLIC ACID 1 MG/1
1 TABLET ORAL DAILY
Status: DISCONTINUED | OUTPATIENT
Start: 2025-02-14 | End: 2025-02-14 | Stop reason: HOSPADM

## 2025-02-13 RX ORDER — LIDOCAINE 50 MG/G
1 PATCH TOPICAL ONCE
Status: DISCONTINUED | OUTPATIENT
Start: 2025-02-13 | End: 2025-02-13

## 2025-02-13 RX ORDER — CHLORAL HYDRATE 500 MG
2000 CAPSULE ORAL DAILY
Status: DISCONTINUED | OUTPATIENT
Start: 2025-02-14 | End: 2025-02-14 | Stop reason: HOSPADM

## 2025-02-13 RX ORDER — ESCITALOPRAM OXALATE 20 MG/1
20 TABLET ORAL DAILY
Status: DISCONTINUED | OUTPATIENT
Start: 2025-02-14 | End: 2025-02-14 | Stop reason: HOSPADM

## 2025-02-13 RX ORDER — HYDROMORPHONE HCL/PF 1 MG/ML
0.5 SYRINGE (ML) INJECTION EVERY 4 HOURS PRN
Status: DISCONTINUED | OUTPATIENT
Start: 2025-02-13 | End: 2025-02-14 | Stop reason: HOSPADM

## 2025-02-13 RX ORDER — HYDROMORPHONE HCL/PF 1 MG/ML
0.5 SYRINGE (ML) INJECTION ONCE
Status: COMPLETED | OUTPATIENT
Start: 2025-02-13 | End: 2025-02-13

## 2025-02-13 RX ORDER — LORAZEPAM 2 MG/ML
2 INJECTION INTRAMUSCULAR EVERY 4 HOURS PRN
Status: DISCONTINUED | OUTPATIENT
Start: 2025-02-13 | End: 2025-02-13

## 2025-02-13 RX ORDER — MIRTAZAPINE 15 MG/1
15 TABLET, FILM COATED ORAL
Status: DISCONTINUED | OUTPATIENT
Start: 2025-02-13 | End: 2025-02-14 | Stop reason: HOSPADM

## 2025-02-13 RX ORDER — SODIUM CHLORIDE, SODIUM GLUCONATE, SODIUM ACETATE, POTASSIUM CHLORIDE, MAGNESIUM CHLORIDE, SODIUM PHOSPHATE, DIBASIC, AND POTASSIUM PHOSPHATE .53; .5; .37; .037; .03; .012; .00082 G/100ML; G/100ML; G/100ML; G/100ML; G/100ML; G/100ML; G/100ML
125 INJECTION, SOLUTION INTRAVENOUS CONTINUOUS
Status: DISCONTINUED | OUTPATIENT
Start: 2025-02-13 | End: 2025-02-14 | Stop reason: HOSPADM

## 2025-02-13 RX ORDER — LORAZEPAM 2 MG/ML
2 INJECTION INTRAMUSCULAR EVERY 4 HOURS PRN
Status: DISCONTINUED | OUTPATIENT
Start: 2025-02-13 | End: 2025-02-14 | Stop reason: HOSPADM

## 2025-02-13 RX ORDER — HEPARIN SODIUM 5000 [USP'U]/ML
5000 INJECTION, SOLUTION INTRAVENOUS; SUBCUTANEOUS EVERY 8 HOURS SCHEDULED
Status: DISCONTINUED | OUTPATIENT
Start: 2025-02-13 | End: 2025-02-14 | Stop reason: HOSPADM

## 2025-02-13 RX ORDER — LORAZEPAM 2 MG/ML
2 INJECTION INTRAMUSCULAR ONCE
Status: COMPLETED | OUTPATIENT
Start: 2025-02-14 | End: 2025-02-14

## 2025-02-13 RX ORDER — CHLORDIAZEPOXIDE HYDROCHLORIDE 25 MG/1
25 CAPSULE, GELATIN COATED ORAL EVERY 6 HOURS
Status: DISCONTINUED | OUTPATIENT
Start: 2025-02-14 | End: 2025-02-14 | Stop reason: HOSPADM

## 2025-02-13 RX ORDER — NICOTINE 21 MG/24HR
1 PATCH, TRANSDERMAL 24 HOURS TRANSDERMAL DAILY
Status: DISCONTINUED | OUTPATIENT
Start: 2025-02-14 | End: 2025-02-14 | Stop reason: HOSPADM

## 2025-02-13 RX ORDER — MAGNESIUM SULFATE HEPTAHYDRATE 40 MG/ML
2 INJECTION, SOLUTION INTRAVENOUS ONCE
Status: COMPLETED | OUTPATIENT
Start: 2025-02-13 | End: 2025-02-13

## 2025-02-13 RX ORDER — KETOROLAC TROMETHAMINE 30 MG/ML
15 INJECTION, SOLUTION INTRAMUSCULAR; INTRAVENOUS ONCE
Status: COMPLETED | OUTPATIENT
Start: 2025-02-13 | End: 2025-02-13

## 2025-02-13 RX ORDER — IBUPROFEN 400 MG/1
400 TABLET, FILM COATED ORAL EVERY 6 HOURS PRN
Status: DISCONTINUED | OUTPATIENT
Start: 2025-02-13 | End: 2025-02-14 | Stop reason: HOSPADM

## 2025-02-13 RX ORDER — ATORVASTATIN CALCIUM 40 MG/1
40 TABLET, FILM COATED ORAL
Status: DISCONTINUED | OUTPATIENT
Start: 2025-02-13 | End: 2025-02-14 | Stop reason: HOSPADM

## 2025-02-13 RX ORDER — HYDROMORPHONE HCL IN WATER/PF 6 MG/30 ML
0.2 PATIENT CONTROLLED ANALGESIA SYRINGE INTRAVENOUS ONCE
Status: COMPLETED | OUTPATIENT
Start: 2025-02-13 | End: 2025-02-13

## 2025-02-13 RX ORDER — METHOCARBAMOL 500 MG/1
500 TABLET, FILM COATED ORAL EVERY 6 HOURS PRN
Status: DISCONTINUED | OUTPATIENT
Start: 2025-02-13 | End: 2025-02-14 | Stop reason: HOSPADM

## 2025-02-13 RX ORDER — ACETAMINOPHEN 325 MG/1
975 TABLET ORAL EVERY 8 HOURS SCHEDULED
Status: DISCONTINUED | OUTPATIENT
Start: 2025-02-13 | End: 2025-02-14 | Stop reason: HOSPADM

## 2025-02-13 RX ORDER — ONDANSETRON 2 MG/ML
4 INJECTION INTRAMUSCULAR; INTRAVENOUS ONCE
Status: COMPLETED | OUTPATIENT
Start: 2025-02-13 | End: 2025-02-13

## 2025-02-13 RX ORDER — OXYCODONE HYDROCHLORIDE 10 MG/1
10 TABLET ORAL EVERY 4 HOURS PRN
Status: DISCONTINUED | OUTPATIENT
Start: 2025-02-13 | End: 2025-02-14 | Stop reason: HOSPADM

## 2025-02-13 RX ADMIN — PHENOBARBITAL SODIUM 650 MG: 130 INJECTION INTRAMUSCULAR; INTRAVENOUS at 20:07

## 2025-02-13 RX ADMIN — ATORVASTATIN CALCIUM 40 MG: 40 TABLET, FILM COATED ORAL at 23:27

## 2025-02-13 RX ADMIN — HYDROMORPHONE HYDROCHLORIDE 0.2 MG: 0.2 INJECTION, SOLUTION INTRAMUSCULAR; INTRAVENOUS; SUBCUTANEOUS at 18:17

## 2025-02-13 RX ADMIN — SODIUM CHLORIDE, SODIUM GLUCONATE, SODIUM ACETATE, POTASSIUM CHLORIDE, MAGNESIUM CHLORIDE, SODIUM PHOSPHATE, DIBASIC, AND POTASSIUM PHOSPHATE 125 ML/HR: .53; .5; .37; .037; .03; .012; .00082 INJECTION, SOLUTION INTRAVENOUS at 20:43

## 2025-02-13 RX ADMIN — MAGNESIUM SULFATE HEPTAHYDRATE 2 G: 40 INJECTION, SOLUTION INTRAVENOUS at 18:20

## 2025-02-13 RX ADMIN — ACETAMINOPHEN 975 MG: 325 TABLET, FILM COATED ORAL at 23:27

## 2025-02-13 RX ADMIN — ONDANSETRON 4 MG: 2 INJECTION INTRAMUSCULAR; INTRAVENOUS at 23:22

## 2025-02-13 RX ADMIN — HYDROMORPHONE HYDROCHLORIDE 0.5 MG: 1 INJECTION, SOLUTION INTRAMUSCULAR; INTRAVENOUS; SUBCUTANEOUS at 20:56

## 2025-02-13 RX ADMIN — KETOROLAC TROMETHAMINE 15 MG: 30 INJECTION, SOLUTION INTRAMUSCULAR; INTRAVENOUS at 16:51

## 2025-02-13 NOTE — ED ATTENDING ATTESTATION
2/13/2025  IShari MD, saw and evaluated the patient. I have discussed the patient with the resident/non-physician practitioner and agree with the resident's/non-physician practitioner's findings, Plan of Care, and MDM as documented in the resident's/non-physician practitioner's note, except where noted. All available labs and Radiology studies were reviewed.  I was present for key portions of any procedure(s) performed by the resident/non-physician practitioner and I was immediately available to provide assistance.       At this point I agree with the current assessment done in the Emergency Department.  I have conducted an independent evaluation of this patient a history and physical is as follows:    59-year-old male with a history of hypertension, hyperlipidemia, GERD, alcohol use disorder presenting with EMS for evaluation after a fall.  Patient states he was drinking wine with a friend when he slipped and fell backwards, striking the right side of his back on the edge of the island and falling to the ground.  Patient did not strike her head or lose consciousness.  He is not on anticoagulation.  He is endorsing significant constant pain in his right back with radiation to the chest wall.  Did not take anything for pain at home.  Was able to ambulate afterward.  Denies fever, chills, chest pain, nausea, vomiting, abdominal pain, headache, neck pain, back pain, paresthesias, focal weakness.  States he is drinking 2 L of wine daily.  Recently admitted to the withdrawal management unit last month.  Patient states he started drinking several days after discharge.    Please see resident documentation for histories and review of systems.    Exam: Vital signs and nursing notes reviewed  General: Awake, alert, in acute distress  HEENT: Normocephalic, atraumatic, mucous membranes moist  Neck: No midline cervical tenderness to palpation  Back: No midline thoracic or lumbar tenderness to palpation.   Right posterior back tenderness to palpation and tenderness over the right inferior scapula  Heart: Tachycardic but regular  Lungs: Clear to auscultation bilaterally without wheezing, rales, or rhonchi  Abdomen: Soft, nontender, nondistended, no rebound or guarding  Extremities: No swelling or deformity  Skin: Warm, dry, intact, no rash  Neuro: No gross motor deficits, no tongue fasciculations or intention tremor    ED Course  ED Course as of 25 2234   Thu 2025   1708 CBC and differential(!)  Grossly normal   1723 Comprehensive metabolic panel(!)  Grossly normal   172 MAGNESIUM(!): 1.4   1824 CT head without contrast  IMPRESSION:     No acute intracranial abnormality.     182 CT spine cervical without contrast  IMPRESSION:     No cervical spine fracture or traumatic malalignment.     Degenerative change within the cervical spine.     183 CT chest without contrast  IMPRESSION:     1.  No acute or thoracic pathology. Specifically, no acute rib fracture or scapular fracture identified.  2.  Subacute right-sided rib fractures detailed above are unchanged.  3.  Diffuse hepatic steatosis.       ED course/Medical Decision Makin-year-old male presenting for evaluation of back pain after a fall.  Differential diagnosis includes spinal fracture, posterior rib fracture, scapular fracture, cervical spine fracture, contusion.  CBC and CMP were obtained, which are grossly normal.  Patient has hypomagnesemia present, likely due to underlying alcohol use.  CT of the head is without acute intracranial abnormality, and CT of the cervical spine is without acute fracture or traumatic malalignment.  CT of the chest shows no acute intrathoracic pathology, specifically related to the ribs or scapula.  Patient has subacute right-sided rib fractures that are unchanged in addition to diffuse hepatic steatosis.  Patient's pain was treated supportively with some improvement, but the patient still has significant pain.   Was also given magnesium replacement.  Patient would like assistance with alcohol cessation but does not desire transfer to the withdrawal management unit at this time.  Patient loaded with phenobarbital, and he will be admitted to medicine for further care.  Patient is in agreement with this plan.    Diagnosis: Fall, subacute right sided posterior rib fractures, hypomagnesemia, alcohol use disorder  Disposition: Admission

## 2025-02-13 NOTE — ASSESSMENT & PLAN NOTE
Orders:  •  albuterol (PROVENTIL HFA,VENTOLIN HFA) 90 mcg/act inhaler; Inhale 2 puffs every 4 (four) hours as needed (Cough) for up to 10 days

## 2025-02-13 NOTE — Clinical Note
Case was discussed with Dr. Andrews and the patient's admission status was agreed to be Admission Status: inpatient status to the service of Dr. Persaud .

## 2025-02-13 NOTE — ED PROVIDER NOTES
Time reflects when diagnosis was documented in both MDM as applicable and the Disposition within this note       Time User Action Codes Description Comment    2/13/2025  6:57 PM Brenda Killian Add [S22.41XA] Closed fracture of multiple ribs of right side, initial encounter     2/13/2025  6:57 PM Brenda Killian Add [E83.42] Hypomagnesemia     2/13/2025  6:57 PM Brenda Killian Add [F10.10] Alcohol abuse     2/13/2025  7:41 PM Cayetano Persaud Add [G89.11] Acute pain due to trauma           ED Disposition       ED Disposition   Admit    Condition   Stable    Date/Time   Thu Feb 13, 2025  6:57 PM    Comment   Case was discussed with Dr. Andrews and the patient's admission status was agreed to be Admission Status: inpatient status to the service of Dr. Persaud .               Assessment & Plan       Medical Decision Making  Patient is a 59-year-old male with a history of alcohol abuse presenting to the ED 2 hours status post mechanical fall with right upper back pain.  Patient reports that he had drank 2 bottles of wine with his fiancée and was dancing in the kitchen wearing socks on a hardwood floor when he slipped and fell backwards striking his right upper back on the edge of the kitchen island.  Denies any head strike/LOC.  No anticoagulation/antiplatelet.  His last drink was approximately 2.5 hours ago.  Patient has history of right-sided posterior rib fractures secondary to a fall 12/2024.  Patient reports multiple life stressors including sick family members and loss of his job leading to his struggle with alcohol.  Drinks approximately 2 bottles of wine per day.  Reports that he has resources for alcohol abuse already, and does not wish to come in for inpatient treatment at this time.    Exam reveals tenderness small area of ecchymosis over inferior right scapula.  Range of motion intact in bilateral upper extremities.  Asymmetric protrusion of his right inferior scapular region on lateral abduction of his  upper extremities.    Plan: CT head, CT C-spine, CT chest, CBC, CMP, magnesium, pain control    ED course as below.    IV Toradol without improvement in patient's pain.  IV Dilaudid ordered  CT evaluation negative for acute process.  Shows stability of pre-existing fractures.  Noted to have hypomagnesemia.  IV magnesium sulfate 2 g given  Pain improved with IV Dilaudid.  ECG showed QTc within normal limits, nausea improved after Zofran.  Patient reporting he feels as if he is getting alcohol withdrawal symptoms.  Loaded with 650 mg phenobarbital.    [facility] internal medicine contacted and patient was admitted in stable condition    Amount and/or Complexity of Data Reviewed  Labs: ordered. Decision-making details documented in ED Course.  Radiology: ordered.    Risk  Prescription drug management.  Decision regarding hospitalization.        ED Course as of 02/13/25 2337   Thu Feb 13, 2025   1701 CBC and differential(!)  CBC within normal limits   1701 Patient refused lidocaine patch stating that he has had it in the past for similar pain and that it did not work.   1819 CT head and C-spine negative for acute process   2219 Discussed case with [facility] internal medicine resident regarding administration of Zofran to patient.  They requested that ECG be obtained and if QTc is within normal limits 4 mg IV Zofran can be administered for his nausea.   2255 QTc 452.  4mg IV Zofran will be given.   2337 ECG compared to prior January 17, 2025-normal sinus rhythm rate 91.  Normal axis.  Normal QRS.  No ectopy.  Normal QT.  Negative Brugada.  No ST/T-segment changes indicative of acute ischemia         Medications   acetaminophen (TYLENOL) tablet 975 mg (975 mg Oral Given 2/13/25 2327)   heparin (porcine) subcutaneous injection 5,000 Units (has no administration in time range)   albuterol (PROVENTIL HFA,VENTOLIN HFA) inhaler 2 puff (has no administration in time range)   atorvastatin (LIPITOR) tablet 40 mg (40 mg Oral  Given 2/13/25 2327)   escitalopram (LEXAPRO) tablet 20 mg (has no administration in time range)   ibuprofen (MOTRIN) tablet 400 mg (has no administration in time range)   folic acid (FOLVITE) tablet 1 mg (has no administration in time range)   lisinopril (ZESTRIL) tablet 40 mg (has no administration in time range)   mirtazapine (REMERON) tablet 15 mg (has no administration in time range)   fish oil capsule 2,000 mg (has no administration in time range)   pantoprazole (PROTONIX) EC tablet 40 mg (has no administration in time range)   multivitamin-minerals (CENTRUM) tablet 1 tablet (has no administration in time range)   thiamine (VITAMIN B1) 100 mg in sodium chloride 0.9 % 50 mL IVPB (has no administration in time range)   nicotine (NICODERM CQ) 14 mg/24hr TD 24 hr patch 1 patch (has no administration in time range)   oxyCODONE (ROXICODONE) IR tablet 5 mg (has no administration in time range)   oxyCODONE (ROXICODONE) immediate release tablet 10 mg (has no administration in time range)   HYDROmorphone (DILAUDID) injection 0.5 mg (has no administration in time range)   lidocaine (LIDODERM) 5 % patch 1 patch (has no administration in time range)   methocarbamol (ROBAXIN) tablet 500 mg (has no administration in time range)   multi-electrolyte (PLASMALYTE-A/ISOLYTE-S PH 7.4) IV solution (125 mL/hr Intravenous New Bag 2/13/25 2043)   chlordiazePOXIDE (LIBRIUM) capsule 25 mg (has no administration in time range)   LORazepam (ATIVAN) injection 2 mg (has no administration in time range)   ketorolac (TORADOL) injection 15 mg (15 mg Intravenous Given 2/13/25 1651)   magnesium sulfate 2 g/50 mL IVPB (premix) 2 g (0 g Intravenous Stopped 2/13/25 2015)   HYDROmorphone HCl (DILAUDID) injection 0.2 mg (0.2 mg Intravenous Given 2/13/25 1817)   PHENobarbital 650 mg in sodium chloride 0.9 % 100 mL IVPB (0 mg Intravenous Stopped 2/13/25 2037)   HYDROmorphone (DILAUDID) injection 0.5 mg (0.5 mg Intravenous Given 2/13/25 7270)    ondansetron (ZOFRAN) injection 4 mg (4 mg Intravenous Given 2/13/25 2322)       ED Risk Strat Scores               CIWA-Ar Score       Row Name 02/13/25 2329 02/13/25 2008          CIWA-Ar    Blood Pressure 176/93 124/81     Pulse 93 105     Nausea and Vomiting 2 0     Tactile Disturbances 0 0     Tremor 2 1     Auditory Disturbances 0 0     Paroxysmal Sweats 0 0     Visual Disturbances 0 0     Anxiety 5 5     Headache, Fullness in Head 0 0     Agitation 0 0     Orientation and Clouding of Sensorium 0 0     CIWA-Ar Total 9 6                             SBIRT 22yo+      Flowsheet Row Most Recent Value   Initial Alcohol Screen: US AUDIT-C     1. How often do you have a drink containing alcohol? 6 Filed at: 02/13/2025 1628   2. How many drinks containing alcohol do you have on a typical day you are drinking?  6 Filed at: 02/13/2025 1628   3a. Male UNDER 65: How often do you have five or more drinks on one occasion? 6 Filed at: 02/13/2025 1628   3b. FEMALE Any Age, or MALE 65+: How often do you have 4 or more drinks on one occassion? 0 Filed at: 02/13/2025 1628   Audit-C Score 18 Filed at: 02/13/2025 1628   Full Alcohol Screen: US AUDIT    4. How often during the last year have you found that you were not able to stop drinking once you had started? 4 Filed at: 02/13/2025 1628   5. How often during past year have you failed to do what was normally expected of you because of drinking?  4 Filed at: 02/13/2025 1628   6. How often in past year have you needed a first drink in the morning to get yourself going after a heavy drinking session?  4 Filed at: 02/13/2025 1628   7. How often in past year have you had feeling of guilt or remorse after drinking?  4 Filed at: 02/13/2025 1628   8. How often in past year have you been unable to remember what happened night before because you had been drinking?  0 Filed at: 02/13/2025 1628   9. Have you or someone else been injured as a result of your drinking?  0 Filed at: 02/13/2025  1628   10. Has a relative, friend, doctor or other health worker been concerned about your drinking and suggested you cut down?  4 Filed at: 02/13/2025 1628   AUDIT Total Score 38 Filed at: 02/13/2025 1628   MILDRED: How many times in the past year have you...    Used an illegal drug or used a prescription medication for non-medical reasons? Never Filed at: 02/13/2025 1628                            History of Present Illness       Chief Complaint   Patient presents with    Fall     Patient +ETOH (2 bottles wine) slipped in kitchen and hit back R side (falling backwards into island). Recent rib fx back in December, hit same side. Patient interested in detox, reports daily drinking. Does not want to go to [place]        Past Medical History:   Diagnosis Date    Alcohol use disorder 12/24/2024    Alcohol use disorder, severe, dependence (HCC) 04/02/2023    Alcohol withdrawal seizure (HCC)     Alcoholic ketoacidosis 10/16/2023    Anxiety     AR (allergic rhinitis)     Chronic alcoholic gastritis 04/02/2023    Depression     GERD (gastroesophageal reflux disease)     Hepatic steatosis 04/02/2023    Hyperlipidemia     Hypertension     IBS (irritable bowel syndrome)     Obesity     Rosacea     Vitamin B12 deficiency 02/14/2024    Vitamin D deficiency 02/14/2024      Past Surgical History:   Procedure Laterality Date    ANTERIOR CRUCIATE LIGAMENT REPAIR Left     WISDOM TOOTH EXTRACTION        Family History   Problem Relation Age of Onset    Cancer Mother 78        Type unknown    Hypertension Father     Coronary artery disease Father 60    Cancer Father 81        Bladder    No Known Problems Sister     No Known Problems Sister     No Known Problems Sister     No Known Problems Son     No Known Problems Son     Heart attack Paternal Grandfather 60    Coronary artery disease Paternal Grandfather 60      Social History     Tobacco Use    Smoking status: Some Days     Types: Cigars     Start date: 1/1/2014     Passive  exposure: Past (Father)    Smokeless tobacco: Never    Tobacco comments:     Smokes cigars 2-3 times per year   Vaping Use    Vaping status: Never Used   Substance Use Topics    Alcohol use: Yes     Alcohol/week: 28.0 standard drinks of alcohol     Types: 28 Glasses of wine per week    Drug use: Never      E-Cigarette/Vaping    E-Cigarette Use Never User       E-Cigarette/Vaping Substances    Nicotine No     THC No     CBD No     Flavoring No     Other No     Unknown No       I have reviewed and agree with the history as documented.     Patient is a 59-year-old male with a history of alcohol abuse presenting to the ED 2 hours status post mechanical fall with right upper back pain.  Patient reports that he had drank 2 bottles of wine with his fiancée and was dancing in the kitchen wearing socks on a hardwood floor when he slipped and fell backwards striking his right upper back on the edge of the kitchen island.  Denies any head strike/LOC.  No anticoagulation/antiplatelet.  His last drink was approximately 2.5 hours ago.  Patient has history of right-sided posterior rib fractures secondary to a fall 12/2024.  Patient reports multiple life stressors including sick family members and loss of his job leading to his struggle with alcohol.  Drinks approximately 2 bottles of wine per day.  Reports that he has resources for alcohol abuse already, and does not wish to come in for inpatient treatment at this time.      Fall  Associated symptoms: back pain        Review of Systems   Musculoskeletal:  Positive for back pain.   All other systems reviewed and are negative.          Objective       ED Triage Vitals   Temperature Pulse Blood Pressure Respirations SpO2 Patient Position - Orthostatic VS   02/13/25 1625 02/13/25 1625 02/13/25 1625 02/13/25 1625 02/13/25 1625 02/13/25 1625   98.1 °F (36.7 °C) (!) 123 141/85 18 97 % Sitting      Temp Source Heart Rate Source BP Location FiO2 (%) Pain Score    02/13/25 1625 02/13/25  1827 02/13/25 1625 -- 02/13/25 1625    Oral Monitor Right arm  10 - Worst Possible Pain      Vitals      Date and Time Temp Pulse SpO2 Resp BP Pain Score FACES Pain Rating User   02/13/25 2330 -- 87 95 % 18 176/93 -- -- JY   02/13/25 2329 -- 93 -- -- 176/93 -- -- JY   02/13/25 2327 -- -- -- -- -- 9 -- JY   02/13/25 2056 -- -- -- -- -- 10 - Worst Possible Pain -- JY   02/13/25 2008 -- 105 -- -- 124/81 -- -- JY   02/13/25 1830 -- 105 93 % 18 143/81 -- -- AG   02/13/25 1827 -- 98 93 % 18 148/89 -- -- AG   02/13/25 1817 -- -- -- -- -- 10 - Worst Possible Pain -- AG   02/13/25 1625 98.1 °F (36.7 °C) 123 97 % 18 141/85 10 - Worst Possible Pain -- CR            Physical Exam  Vitals and nursing note reviewed.   Constitutional:       Comments: Intoxicated   HENT:      Head: Normocephalic and atraumatic.      Mouth/Throat:      Pharynx: Oropharynx is clear.   Eyes:      Extraocular Movements: Extraocular movements intact.      Conjunctiva/sclera: Conjunctivae normal.      Pupils: Pupils are equal, round, and reactive to light.   Neck:      Comments: No midline cervical spinal tenderness  Cardiovascular:      Rate and Rhythm: Regular rhythm. Tachycardia present.      Pulses: Normal pulses.      Heart sounds: Normal heart sounds. No murmur heard.  Pulmonary:      Effort: Pulmonary effort is normal. No respiratory distress.      Breath sounds: Normal breath sounds.      Comments: No anterior chest wall tenderness  Chest:      Chest wall: No tenderness.   Abdominal:      General: There is no distension.      Palpations: Abdomen is soft.      Tenderness: There is no abdominal tenderness. There is no guarding or rebound.      Comments: No overlying skin changes suggestive of intra-abdominal trauma   Musculoskeletal:         General: Tenderness present. Normal range of motion.      Cervical back: Normal range of motion and neck supple. No tenderness.      Comments: Range of motion intact in bilateral upper and lower  extremities  Asymmetric protrusion of inferior right scapular region with abduction and posterior extension of bilateral upper extremities.  Small area of ecchymosis over inferior right scapula.  Noted to the area without  bony protrusion, tenting, or obvious deformity noted.   Skin:     General: Skin is warm and dry.      Capillary Refill: Capillary refill takes less than 2 seconds.      Findings: No rash.   Neurological:      General: No focal deficit present.      Mental Status: He is alert and oriented to person, place, and time.      Sensory: No sensory deficit.   Psychiatric:         Mood and Affect: Mood normal.         Behavior: Behavior normal.      Comments: Intoxicated.  Slurring words.         Results Reviewed       Procedure Component Value Units Date/Time    Comprehensive metabolic panel [[id number]] Collected: 02/13/25 2049    Lab Status: Final result Specimen: Blood from Arm, Right Updated: 02/13/25 2117     Sodium 139 mmol/L      Potassium 4.6 mmol/L      Chloride 103 mmol/L      CO2 27 mmol/L      ANION GAP 9 mmol/L      BUN 12 mg/dL      Creatinine 0.81 mg/dL      Glucose 84 mg/dL      Calcium 9.2 mg/dL      AST 26 U/L      ALT 18 U/L      Alkaline Phosphatase 94 U/L      Total Protein 6.7 g/dL      Albumin 4.1 g/dL      Total Bilirubin 0.30 mg/dL      eGFR 97 ml/min/1.73sq m     Narrative:      National Kidney Disease Foundation guidelines for Chronic Kidney Disease (CKD):     Stage 1 with normal or high GFR (GFR > 90 mL/min/1.73 square meters)    Stage 2 Mild CKD (GFR = 60-89 mL/min/1.73 square meters)    Stage 3A Moderate CKD (GFR = 45-59 mL/min/1.73 square meters)    Stage 3B Moderate CKD (GFR = 30-44 mL/min/1.73 square meters)    Stage 4 Severe CKD (GFR = 15-29 mL/min/1.73 square meters)    Stage 5 End Stage CKD (GFR <15 mL/min/1.73 square meters)  Note: GFR calculation is accurate only with a steady state creatinine    Magnesium [[id number]]  (Normal) Collected: 02/13/25 2049    Lab Status:  Final result Specimen: Blood from Arm, Right Updated: 02/13/25 2117     Magnesium 1.9 mg/dL     Phosphorus [[id number]]  (Normal) Collected: 02/13/25 2049    Lab Status: Final result Specimen: Blood from Arm, Right Updated: 02/13/25 2117     Phosphorus 3.2 mg/dL     Comprehensive metabolic panel [[id number]]  (Abnormal) Collected: 02/13/25 1651    Lab Status: Final result Specimen: Blood from Arm, Right Updated: 02/13/25 1716     Sodium 139 mmol/L      Potassium 4.4 mmol/L      Chloride 102 mmol/L      CO2 25 mmol/L      ANION GAP 12 mmol/L      BUN 10 mg/dL      Creatinine 0.80 mg/dL      Glucose 99 mg/dL      Calcium 9.5 mg/dL      AST 33 U/L      ALT 21 U/L      Alkaline Phosphatase 108 U/L      Total Protein 7.4 g/dL      Albumin 4.5 g/dL      Total Bilirubin 0.31 mg/dL      eGFR 98 ml/min/1.73sq m     Narrative:      National Kidney Disease Foundation guidelines for Chronic Kidney Disease (CKD):     Stage 1 with normal or high GFR (GFR > 90 mL/min/1.73 square meters)    Stage 2 Mild CKD (GFR = 60-89 mL/min/1.73 square meters)    Stage 3A Moderate CKD (GFR = 45-59 mL/min/1.73 square meters)    Stage 3B Moderate CKD (GFR = 30-44 mL/min/1.73 square meters)    Stage 4 Severe CKD (GFR = 15-29 mL/min/1.73 square meters)    Stage 5 End Stage CKD (GFR <15 mL/min/1.73 square meters)  Note: GFR calculation is accurate only with a steady state creatinine    Magnesium [[id number]]  (Abnormal) Collected: 02/13/25 1651    Lab Status: Final result Specimen: Blood from Arm, Right Updated: 02/13/25 1716     Magnesium 1.4 mg/dL     CBC and differential [[id number]]  (Abnormal) Collected: 02/13/25 1651    Lab Status: Final result Specimen: Blood from Arm, Right Updated: 02/13/25 1659     WBC 7.70 Thousand/uL      RBC 4.25 Million/uL      Hemoglobin 13.8 g/dL      Hematocrit 40.2 %      MCV 95 fL      MCH 32.5 pg      MCHC 34.3 g/dL      RDW 13.0 %      MPV 8.6 fL      Platelets 234 Thousands/uL      nRBC 0 /100 WBCs      Segmented %  61 %      Immature Grans % 0 %      Lymphocytes % 27 %      Monocytes % 9 %      Eosinophils Relative 2 %      Basophils Relative 1 %      Absolute Neutrophils 4.75 Thousands/µL      Absolute Immature Grans 0.02 Thousand/uL      Absolute Lymphocytes 2.04 Thousands/µL      Absolute Monocytes 0.68 Thousand/µL      Eosinophils Absolute 0.16 Thousand/µL      Basophils Absolute 0.05 Thousands/µL             CT head without contrast   Final Interpretation by Jagjit Ashford MD (02/13 1816)      No acute intracranial abnormality.      The study was marked in EPIC for immediate notification.                  Workstation performed: [id number]         CT spine cervical without contrast   Final Interpretation by Jagjit Ashford MD (02/13 1815)      No cervical spine fracture or traumatic malalignment.      Degenerative change within the cervical spine.            Workstation performed: [id number]         CT chest without contrast   Final Interpretation by Humberto Rehman MD (02/13 1830)      1.  No acute or thoracic pathology. Specifically, no acute rib fracture or scapular fracture identified.   2.  Subacute right-sided rib fractures detailed above are unchanged.   3.  Diffuse hepatic steatosis.      The study was marked in EPIC for immediate notification.         Workstation performed: [id number]             Procedures    ED Medication and Procedure Management   Prior to Admission Medications   Prescriptions Last Dose Informant Patient Reported? Taking?   Omega-3 Fatty Acids (fish oil) 1,000 mg   Yes No   Sig: Take 2,000 mg by mouth daily   Thiamine Mononitrate (VITAMIN B1) 100 mg tablet   No No   Sig: Take 1 tablet (100 mg total) by mouth daily   Patient not taking: Reported on 2/12/2025   acetaminophen (TYLENOL) 325 mg tablet   No No   Sig: Take 3 tablets (975 mg total) by mouth every 8 (eight) hours   albuterol (PROVENTIL HFA,VENTOLIN HFA) 90 mcg/act inhaler   No No   Sig: Inhale 2 puffs every 4 (four) hours as  needed (Cough) for up to 10 days   atorvastatin (LIPITOR) 40 mg tablet   No No   Sig: Take 1 tablet (40 mg total) by mouth daily with dinner   Patient taking differently: Take 40 mg by mouth daily at bedtime   escitalopram (LEXAPRO) 20 mg tablet   No No   Sig: Take 1 tablet (20 mg total) by mouth daily   folic acid (FOLVITE) 1 mg tablet   No No   Sig: Take 1 tablet (1 mg total) by mouth daily   Patient not taking: Reported on 1/17/2025   ibuprofen (MOTRIN) 400 mg tablet   No No   Sig: Take 1 tablet (400 mg total) by mouth every 6 (six) hours as needed for mild pain   lisinopril (ZESTRIL) 40 mg tablet   No No   Sig: Take 1 tablet (40 mg total) by mouth daily   mirtazapine (REMERON) 15 mg tablet   No No   Sig: Take 1 tablet (15 mg total) by mouth daily at bedtime   pantoprazole (PROTONIX) 40 mg tablet   No No   Sig: Take 1 tablet (40 mg total) by mouth daily in the early morning      Facility-Administered Medications: None     Patient's Medications   Discharge Prescriptions    No medications on file     No discharge procedures on file.  ED SEPSIS DOCUMENTATION   Time reflects when diagnosis was documented in both MDM as applicable and the Disposition within this note       Time User Action Codes Description Comment    2/13/2025  6:57 PM Brenda Killian Add [S22.41XA] Closed fracture of multiple ribs of right side, initial encounter     2/13/2025  6:57 PM Brenda Killian Add [E83.42] Hypomagnesemia     2/13/2025  6:57 PM Brenda Killian Add [F10.10] Alcohol abuse     2/13/2025  7:41 PM Cayetano Persaud Add [G89.11] Acute pain due to trauma                  Brenda Killian MD  02/13/25 8936

## 2025-02-14 VITALS
OXYGEN SATURATION: 97 % | RESPIRATION RATE: 18 BRPM | BODY MASS INDEX: 33.75 KG/M2 | DIASTOLIC BLOOD PRESSURE: 88 MMHG | TEMPERATURE: 97.8 F | HEIGHT: 68 IN | SYSTOLIC BLOOD PRESSURE: 153 MMHG | HEART RATE: 65 BPM

## 2025-02-14 LAB
ALBUMIN SERPL BCG-MCNC: 3.9 G/DL (ref 3.5–5)
ALP SERPL-CCNC: 83 U/L (ref 34–104)
ALT SERPL W P-5'-P-CCNC: 17 U/L (ref 7–52)
ANION GAP SERPL CALCULATED.3IONS-SCNC: 5 MMOL/L (ref 4–13)
AST SERPL W P-5'-P-CCNC: 28 U/L (ref 13–39)
ATRIAL RATE: 91 BPM
BASOPHILS # BLD AUTO: 0.04 THOUSANDS/ÂΜL (ref 0–0.1)
BASOPHILS NFR BLD AUTO: 1 % (ref 0–1)
BILIRUB SERPL-MCNC: 0.61 MG/DL (ref 0.2–1)
BUN SERPL-MCNC: 16 MG/DL (ref 5–25)
CALCIUM SERPL-MCNC: 8.9 MG/DL (ref 8.4–10.2)
CHLORIDE SERPL-SCNC: 104 MMOL/L (ref 96–108)
CO2 SERPL-SCNC: 31 MMOL/L (ref 21–32)
CREAT SERPL-MCNC: 0.98 MG/DL (ref 0.6–1.3)
EOSINOPHIL # BLD AUTO: 0.07 THOUSAND/ÂΜL (ref 0–0.61)
EOSINOPHIL NFR BLD AUTO: 1 % (ref 0–6)
ERYTHROCYTE [DISTWIDTH] IN BLOOD BY AUTOMATED COUNT: 12.8 % (ref 11.6–15.1)
GFR SERPL CREATININE-BSD FRML MDRD: 84 ML/MIN/1.73SQ M
GLUCOSE SERPL-MCNC: 97 MG/DL (ref 65–140)
HCT VFR BLD AUTO: 36.1 % (ref 36.5–49.3)
HGB BLD-MCNC: 12.1 G/DL (ref 12–17)
IMM GRANULOCYTES # BLD AUTO: 0.01 THOUSAND/UL (ref 0–0.2)
IMM GRANULOCYTES NFR BLD AUTO: 0 % (ref 0–2)
LYMPHOCYTES # BLD AUTO: 1.28 THOUSANDS/ÂΜL (ref 0.6–4.47)
LYMPHOCYTES NFR BLD AUTO: 26 % (ref 14–44)
MAGNESIUM SERPL-MCNC: 2 MG/DL (ref 1.9–2.7)
MCH RBC QN AUTO: 32.4 PG (ref 26.8–34.3)
MCHC RBC AUTO-ENTMCNC: 33.5 G/DL (ref 31.4–37.4)
MCV RBC AUTO: 97 FL (ref 82–98)
MONOCYTES # BLD AUTO: 0.49 THOUSAND/ÂΜL (ref 0.17–1.22)
MONOCYTES NFR BLD AUTO: 10 % (ref 4–12)
NEUTROPHILS # BLD AUTO: 3.08 THOUSANDS/ÂΜL (ref 1.85–7.62)
NEUTS SEG NFR BLD AUTO: 62 % (ref 43–75)
NRBC BLD AUTO-RTO: 0 /100 WBCS
P AXIS: 44 DEGREES
PHOSPHATE SERPL-MCNC: 5 MG/DL (ref 2.7–4.5)
PLATELET # BLD AUTO: 170 THOUSANDS/UL (ref 149–390)
PMV BLD AUTO: 8.9 FL (ref 8.9–12.7)
POTASSIUM SERPL-SCNC: 4.7 MMOL/L (ref 3.5–5.3)
POTASSIUM SERPL-SCNC: 5.7 MMOL/L (ref 3.5–5.3)
PR INTERVAL: 150 MS
PROT SERPL-MCNC: 6.6 G/DL (ref 6.4–8.4)
QRS AXIS: 44 DEGREES
QRSD INTERVAL: 90 MS
QT INTERVAL: 368 MS
QTC INTERVAL: 452 MS
RBC # BLD AUTO: 3.74 MILLION/UL (ref 3.88–5.62)
SODIUM SERPL-SCNC: 140 MMOL/L (ref 135–147)
T WAVE AXIS: 13 DEGREES
VENTRICULAR RATE: 91 BPM
WBC # BLD AUTO: 4.97 THOUSAND/UL (ref 4.31–10.16)

## 2025-02-14 PROCEDURE — 85025 COMPLETE CBC W/AUTO DIFF WBC: CPT | Performed by: INTERNAL MEDICINE

## 2025-02-14 PROCEDURE — 97167 OT EVAL HIGH COMPLEX 60 MIN: CPT

## 2025-02-14 PROCEDURE — 84100 ASSAY OF PHOSPHORUS: CPT | Performed by: INTERNAL MEDICINE

## 2025-02-14 PROCEDURE — 80053 COMPREHEN METABOLIC PANEL: CPT | Performed by: INTERNAL MEDICINE

## 2025-02-14 PROCEDURE — 94664 DEMO&/EVAL PT USE INHALER: CPT

## 2025-02-14 PROCEDURE — 97163 PT EVAL HIGH COMPLEX 45 MIN: CPT

## 2025-02-14 PROCEDURE — 93010 ELECTROCARDIOGRAM REPORT: CPT | Performed by: INTERNAL MEDICINE

## 2025-02-14 PROCEDURE — 84132 ASSAY OF SERUM POTASSIUM: CPT

## 2025-02-14 PROCEDURE — 99239 HOSP IP/OBS DSCHRG MGMT >30: CPT

## 2025-02-14 PROCEDURE — 97112 NEUROMUSCULAR REEDUCATION: CPT

## 2025-02-14 PROCEDURE — 36415 COLL VENOUS BLD VENIPUNCTURE: CPT | Performed by: INTERNAL MEDICINE

## 2025-02-14 PROCEDURE — 83735 ASSAY OF MAGNESIUM: CPT | Performed by: INTERNAL MEDICINE

## 2025-02-14 RX ORDER — GAUZE BANDAGE 2" X 2"
100 BANDAGE TOPICAL DAILY
Qty: 30 TABLET | Refills: 0 | Status: SHIPPED | OUTPATIENT
Start: 2025-02-14

## 2025-02-14 RX ORDER — FOLIC ACID 1 MG/1
1 TABLET ORAL DAILY
Qty: 30 TABLET | Refills: 0 | Status: SHIPPED | OUTPATIENT
Start: 2025-02-14 | End: 2025-03-16

## 2025-02-14 RX ORDER — GABAPENTIN 100 MG/1
100 CAPSULE ORAL 3 TIMES DAILY PRN
Qty: 90 CAPSULE | Refills: 0 | Status: SHIPPED | OUTPATIENT
Start: 2025-02-14 | End: 2025-02-17

## 2025-02-14 RX ORDER — METHOCARBAMOL 500 MG/1
500 TABLET, FILM COATED ORAL 4 TIMES DAILY
Qty: 20 TABLET | Refills: 0 | Status: SHIPPED | OUTPATIENT
Start: 2025-02-14 | End: 2025-02-19

## 2025-02-14 RX ORDER — OXYCODONE AND ACETAMINOPHEN 5; 325 MG/1; MG/1
1 TABLET ORAL EVERY 4 HOURS PRN
Qty: 15 TABLET | Refills: 0 | Status: SHIPPED | OUTPATIENT
Start: 2025-02-14 | End: 2025-02-24

## 2025-02-14 RX ADMIN — CHLORDIAZEPOXIDE HYDROCHLORIDE 25 MG: 25 CAPSULE ORAL at 00:04

## 2025-02-14 RX ADMIN — HEPARIN SODIUM 5000 UNITS: 5000 INJECTION INTRAVENOUS; SUBCUTANEOUS at 06:08

## 2025-02-14 RX ADMIN — ESCITALOPRAM OXALATE 20 MG: 20 TABLET ORAL at 10:05

## 2025-02-14 RX ADMIN — HYDROMORPHONE HYDROCHLORIDE 0.5 MG: 1 INJECTION, SOLUTION INTRAMUSCULAR; INTRAVENOUS; SUBCUTANEOUS at 10:07

## 2025-02-14 RX ADMIN — MIRTAZAPINE 15 MG: 15 TABLET, FILM COATED ORAL at 00:04

## 2025-02-14 RX ADMIN — CHLORDIAZEPOXIDE HYDROCHLORIDE 25 MG: 25 CAPSULE ORAL at 06:09

## 2025-02-14 RX ADMIN — HEPARIN SODIUM 5000 UNITS: 5000 INJECTION INTRAVENOUS; SUBCUTANEOUS at 00:09

## 2025-02-14 RX ADMIN — LORAZEPAM 2 MG: 2 INJECTION INTRAMUSCULAR; INTRAVENOUS at 00:05

## 2025-02-14 RX ADMIN — LISINOPRIL 40 MG: 10 TABLET ORAL at 10:06

## 2025-02-14 RX ADMIN — HYDROMORPHONE HYDROCHLORIDE 0.5 MG: 1 INJECTION, SOLUTION INTRAMUSCULAR; INTRAVENOUS; SUBCUTANEOUS at 06:06

## 2025-02-14 RX ADMIN — PANTOPRAZOLE SODIUM 40 MG: 40 TABLET, DELAYED RELEASE ORAL at 06:09

## 2025-02-14 NOTE — ASSESSMENT & PLAN NOTE
History of a fall last month, with rib fractures.  Patient again had a mechanical fall today.  Patient complained of pains from his previous rib fractures.  CT scan of the chest revealed no acute or thoracic pathology.  Specifically, no acute rib fractures or scapular fracture identified.  However found to have subacute right-sided rib fractures, which were unchanged from previous imaging study.  Multimodal pain management.    Fall precautions.  PT OT evaluation and management.  Rib fracture protocol.

## 2025-02-14 NOTE — ASSESSMENT & PLAN NOTE
History of alcohol use disorder.  According to the patient, he has been taking 2 bottles of thom per day.  Last drink was around 2 PM.  Patient starting to have alcohol withdrawal signs and symptoms.  Patient to be given phenobarbital in the emergency room, ordered by the emergency room physician.  [facility] protocol.  Continue folic acid.  IV thiamine for now.  Multivitamins.  IV fluids.  Alcohol cessation counseling done.  Case management referral for possibility of alcohol rehab.  According to ER physician, patient was offered detoxification in [place], but patient refused.

## 2025-02-14 NOTE — H&P
H&P - Hospitalist   Name: Diogo Travis 59 y.o. male I MRN: [id number]  Unit/Bed#: ED-25 I Date of Admission: 2/13/2025   Date of Service: 2/13/2025 I Hospital Day: 0     Assessment & Plan  Fall  History of a fall last month, with rib fractures.  Patient again had a mechanical fall today.  Patient complained of pains from his previous rib fractures.  CT scan of the chest revealed no acute or thoracic pathology.  Specifically, no acute rib fractures or scapular fracture identified.  However found to have subacute right-sided rib fractures, which were unchanged from previous imaging study.  Multimodal pain management.    Acute pain service consult.  Fall precautions.  PT OT evaluation and management.  Rib fracture protocol.  Alcohol withdrawal (HCC)  History of alcohol use disorder.  According to the patient, he has been taking 2 bottles of thom per day.  Last drink was around 2 PM.  Patient starting to have alcohol withdrawal signs and symptoms.  Patient to be given phenobarbital in the emergency room, ordered by the emergency room physician.  CIWA protocol.  Continue folic acid.  IV thiamine for now.  Multivitamins.  IV fluids.  Alcohol cessation counseling done.  Case management referral for possibility of alcohol rehab.  According to ER physician, patient was offered detoxification in [place], but patient refused.  Closed fracture of multiple ribs of right side with routine healing  On imaging study, patient found to have subacute right-sided rib fractures which were unchanged from previous fractures that he had last January.  Rib fracture protocol.  Pain management.  Acute pain specialist consult per protocol.  Per protocol, pharmacologic DVT prophylaxis with heparin.  Hypomagnesemia  Replacement was given in the emergency room.  Monitor electrolytes.  Replace as needed.      VTE Pharmacologic Prophylaxis: VTE Score: 2 Low Risk (Score 0-2) - Encourage Ambulation.  However started on heparin subcu, per rib  "fracture protocol.  Code Status: Level 1 - Full Code   Discussion with family: Patient declined call to .     Anticipated Length of Stay: Patient will be admitted on an inpatient basis with an anticipated length of stay of greater than 2 midnights secondary to above findings and plans.    History of Present Illness   Chief Complaint: Fall, right thoracic back pain and \"I am going to withdrawal.\"    Diogo Travis is a 59 y.o. male with a PMH significant for alcohol use disorder, alcohol withdrawal seizure, anxiety, hepatic steatosis, hypertension, and depression who presents with fall at home.  Last month, patient was admitted here due to a fall and was found to have rib fractures.  Today, patient again had a mechanical fall at home.  According to him, he was starting to lift a hot object, when wearing his socks, he slipped and fell and hit his back hit the [employer] counter.  Patient denied losing consciousness at the time.  Patient complained of right thoracic back pains, where his previous rib fractures were.  Patient denied any other pains.  Patient told me, that he was starting to have alcohol withdrawal symptoms.  Patient told me, that he drinks 2 bottles of wine this per day.  His last drink was earlier this afternoon.  In the emergency room, patient was given pain medications and ordered for a dose of phenobarbital.    Review of Systems    10 point review systems done and they were negative except for the ones I mentioned in my history of present illness and patient admitted to feeling palpitations earlier today, with heart racing.  Patient denied any headaches or any loss of consciousness or any dizziness.  Patient denied any fever, chills or any cough or colds.  Patient denied any shortness of breath or any chest pains.  Patient denied any nausea, vomiting or any abdominal pains.  Patient denied any bowel problems or any urinary problems.  For the other symptoms, please see notes " above.    Historical Information   Past Medical History:   Diagnosis Date    Alcohol use disorder 12/24/2024    Alcohol use disorder, severe, dependence (HCC) 04/02/2023    Alcohol withdrawal seizure (HCC)     Alcoholic ketoacidosis 10/16/2023    Anxiety     AR (allergic rhinitis)     Chronic alcoholic gastritis 04/02/2023    Depression     GERD (gastroesophageal reflux disease)     Hepatic steatosis 04/02/2023    Hyperlipidemia     Hypertension     IBS (irritable bowel syndrome)     Obesity     Rosacea     Vitamin B12 deficiency 02/14/2024    Vitamin D deficiency 02/14/2024     Past Surgical History:   Procedure Laterality Date    ANTERIOR CRUCIATE LIGAMENT REPAIR Left     WISDOM TOOTH EXTRACTION       Social History     Tobacco Use    Smoking status: Some Days     Types: Cigars     Start date: 1/1/2014     Passive exposure: Past (Father)    Smokeless tobacco: Never    Tobacco comments:     Smokes cigars 2-3 times per year   Vaping Use    Vaping status: Never Used   Substance and Sexual Activity    Alcohol use: Yes     Alcohol/week: 28.0 standard drinks of alcohol     Types: 28 Glasses of wine per week    Drug use: Never    Sexual activity: Yes     Partners: Female     Birth control/protection: Post-menopausal     E-Cigarette/Vaping    E-Cigarette Use Never User      E-Cigarette/Vaping Substances    Nicotine No     THC No     CBD No     Flavoring No     Other No     Unknown No      Family History   Problem Relation Age of Onset    Cancer Mother 78        Type unknown    Hypertension Father     Coronary artery disease Father 60    Cancer Father 81        Bladder    No Known Problems Sister     No Known Problems Sister     No Known Problems Sister     No Known Problems Son     No Known Problems Son     Heart attack Paternal Grandfather 60    Coronary artery disease Paternal Grandfather 60     Social History:  Marital Status: Single   Patient Pre-hospital Living Situation: Home  Patient Pre-hospital Level of Mobility:  walks    Meds/Allergies   I have reviewed home medications using recent Epic encounter.  Prior to Admission medications    Medication Sig Start Date End Date Taking? Authorizing Provider   acetaminophen (TYLENOL) 325 mg tablet Take 3 tablets (975 mg total) by mouth every 8 (eight) hours 12/29/24   Chicho Hughes PA-C   albuterol (PROVENTIL HFA,VENTOLIN HFA) 90 mcg/act inhaler Inhale 2 puffs every 4 (four) hours as needed (Cough) for up to 10 days 2/12/25 2/22/25  Enid Altman DO   atorvastatin (LIPITOR) 40 mg tablet Take 1 tablet (40 mg total) by mouth daily with dinner  Patient taking differently: Take 40 mg by mouth daily at bedtime 12/9/24   Enid Altman DO   escitalopram (LEXAPRO) 20 mg tablet Take 1 tablet (20 mg total) by mouth daily 2/12/25   Enid Altman DO   folic acid (FOLVITE) 1 mg tablet Take 1 tablet (1 mg total) by mouth daily  Patient not taking: Reported on 1/17/2025 12/8/24 1/7/25  Lisa Andrews MD   ibuprofen (MOTRIN) 400 mg tablet Take 1 tablet (400 mg total) by mouth every 6 (six) hours as needed for mild pain 12/29/24   Chicho Hughes PA-C   lisinopril (ZESTRIL) 40 mg tablet Take 1 tablet (40 mg total) by mouth daily 12/9/24   Enid Altman DO   mirtazapine (REMERON) 15 mg tablet Take 1 tablet (15 mg total) by mouth daily at bedtime 2/12/25   Enid Altman DO   Omega-3 Fatty Acids (fish oil) 1,000 mg Take 2,000 mg by mouth daily    Historical Provider, MD   pantoprazole (PROTONIX) 40 mg tablet Take 1 tablet (40 mg total) by mouth daily in the early morning 12/9/24 6/7/25  Enid Altman DO   Thiamine Mononitrate (VITAMIN B1) 100 mg tablet Take 1 tablet (100 mg total) by mouth daily  Patient not taking: Reported on 2/12/2025 7/9/24   Enid Altman DO     Allergies   Allergen Reactions    Cefdinir Rash       Objective :  Temp:  [98.1 °F (36.7 °C)] 98.1 °F (36.7 °C)  HR:  [] 105  BP: (141-148)/(81-89) 143/81  Resp:  [18] 18  SpO2:  [93 %-97 %] 93 %  O2  Device: None (Room air)    Physical Exam  Vitals and nursing note reviewed.   Constitutional:       General: He is not in acute distress.     Appearance: He is not ill-appearing, toxic-appearing or diaphoretic.   HENT:      Head: Normocephalic and atraumatic.      Mouth/Throat:      Mouth: Mucous membranes are dry.      Pharynx: Oropharynx is clear. No oropharyngeal exudate or posterior oropharyngeal erythema.   Eyes:      General: No scleral icterus.        Right eye: No discharge.         Left eye: No discharge.      Conjunctiva/sclera: Conjunctivae normal.   Cardiovascular:      Rate and Rhythm: Regular rhythm. Tachycardia present.      Heart sounds: Normal heart sounds. No murmur heard.     No friction rub. No gallop.   Pulmonary:      Effort: Pulmonary effort is normal. No respiratory distress.      Breath sounds: Normal breath sounds. No stridor. No wheezing, rhonchi or rales.   Abdominal:      General: Bowel sounds are normal. There is no distension.      Tenderness: There is no abdominal tenderness. There is no guarding or rebound.   Musculoskeletal:         General: Tenderness present.      Right lower leg: No edema.      Left lower leg: No edema.      Comments: Positive for tenderness on palpation of patient's right thoracic back area.  No other signs of injuries like hematomas, ecchymosis or wounds.  No other signs of inflammation such as abnormal warmth, erythema or swelling.   Skin:     General: Skin is warm.      Coloration: Skin is not pale.      Findings: No erythema or rash.   Neurological:      General: No focal deficit present.      Mental Status: He is alert and oriented to person, place, and time.   Psychiatric:         Thought Content: Thought content normal.      Comments: Anxious.              Lines/Drains:            Lab Results: I have reviewed the following results:  Results from last 7 days   Lab Units 02/13/25  1651   WBC Thousand/uL 7.70   HEMOGLOBIN g/dL 13.8   HEMATOCRIT % 40.2    PLATELETS Thousands/uL 234   SEGS PCT % 61   LYMPHO PCT % 27   MONO PCT % 9   EOS PCT % 2     Results from last 7 days   Lab Units 02/13/25  1651   SODIUM mmol/L 139   POTASSIUM mmol/L 4.4   CHLORIDE mmol/L 102   CO2 mmol/L 25   BUN mg/dL 10   CREATININE mg/dL 0.80   ANION GAP mmol/L 12   CALCIUM mg/dL 9.5   ALBUMIN g/dL 4.5   TOTAL BILIRUBIN mg/dL 0.31   ALK PHOS U/L 108*   ALT U/L 21   AST U/L 33   GLUCOSE RANDOM mg/dL 99             Lab Results   Component Value Date    HGBA1C 4.9 02/14/2024    HGBA1C 5.0 11/12/2019           Imaging Results Review: I reviewed radiology reports from this admission including: CT chest, CT head, and CT C-spine.  Other Study Results Review: No additional pertinent studies reviewed.    Administrative Statements       ** Please Note: This note has been constructed using a voice recognition system. **

## 2025-02-14 NOTE — DISCHARGE SUMMARY
Discharge Summary - Hospitalist   Name: Diogo Travis 59 y.o. male I MRN: [id number]  Unit/Bed#: ED-25 I Date of Admission: 2/13/2025   Date of Service: 2/14/2025 I Hospital Day: 1     Assessment & Plan  Fall  History of a fall last month, with rib fractures.  Patient again had a mechanical fall today.  Patient complained of pains from his previous rib fractures.  CT scan of the chest revealed no acute or thoracic pathology.  Specifically, no acute rib fractures or scapular fracture identified.  However found to have subacute right-sided rib fractures, which were unchanged from previous imaging study.  Multimodal pain management.    Fall precautions.  PT OT evaluation and management.  Rib fracture protocol.  Alcohol withdrawal (HCC)  History of alcohol use disorder.  According to the patient, he has been taking 2 bottles of thom per day.  Last drink was around 2 PM.  Patient starting to have alcohol withdrawal signs and symptoms.  Patient to be given phenobarbital in the emergency room, ordered by the emergency room physician.  CIWA protocol.  Continue folic acid.  IV thiamine for now.  Multivitamins.  IV fluids.  Alcohol cessation counseling done.  Case management referral for possibility of alcohol rehab.  According to ER physician, patient was offered detoxification in [place], but patient refused.  Patient not requiring Ativan, endorses mild anxiety however no other symptoms of alcohol withdrawal, patient reports does not want additional support for recovery as he previously had information in the past and can follow previous information  Closed fracture of multiple ribs of right side with routine healing  On imaging study, patient found to have subacute right-sided rib fractures which were unchanged from previous fractures that he had last January.  Rib fracture protocol.  Pain management.  Per protocol, pharmacologic DVT prophylaxis with heparin.  Hypomagnesemia  Replacement was given in the emergency  "room.  Monitor electrolytes.  Replace as needed.     Discharging Physician / Practitioner: Keaton Urias PA-C  PCP: Enid Altman DO  Admission Date:   Admission Orders (From admission, onward)       Ordered        02/13/25 1858  INPATIENT ADMISSION  Once                          Discharge Date: 02/14/25    Consultations During Hospital Stay:  IP CONSULT TO CASE MANAGEMENT    Procedures Performed:   None    Significant Findings / Test Results:   CT chest without contrast  Result Date: 2/13/2025  Impression: 1.  No acute or thoracic pathology. Specifically, no acute rib fracture or scapular fracture identified. 2.  Subacute right-sided rib fractures detailed above are unchanged. 3.  Diffuse hepatic steatosis. The study was marked in EPIC for immediate notification. Workstation performed: [id number]     CT head without contrast  Result Date: 2/13/2025  Impression: No acute intracranial abnormality. The study was marked in EPIC for immediate notification. Workstation performed: [id number]     CT spine cervical without contrast  Result Date: 2/13/2025  Impression: No cervical spine fracture or traumatic malalignment. Degenerative change within the cervical spine. Workstation performed: [id number]       No Chest XR results available for this patient.     No results found for this or any previous visit.      No results for input(s): \"BLOODCX\", \"SPUTUMCULTUR\", \"GRAMSTAIN\", \"URINECX\", \"WOUNDCULT\", \"BODYFLUIDCUL\", \"MRSACULTURE\", \"INFLUAPCR\", \"INFLUBPCR\", \"RSVPCR\", \"LEGIONELLAUR\", \"STPU\", \"CDIFFTOXINB\" in the last 72 hours.    Incidental Findings:   None other than noted above; I reviewed the above mentioned incidental findings with the patient and/or family and they expressed understanding.    Test Results Pending at Discharge (will require follow up):   None     Outpatient Tests Requested:  None    Complications:  None    Reason for Admission:   Chief Complaint   Patient presents with    Fall     Patient +ETOH (2 " "bottles wine) slipped in kitchen and hit back R side (falling backwards into island). Recent rib fx back in December, hit same side. Patient interested in detox, reports daily drinking. Does not want to go to [facility] Course:   Patient initially presented after a fall associated with alcohol intoxication.  Patient fell onto ribs which he previously had a traumatic rib fracture from similar event.  X-rays were obtained which showed no acute fracture and subacute findings.  Patient was placed on CIWA and started multimodal pain regimen.  Patient did not require Ativan with CIWA protocol and after 24 hours was able to be discharged with a short supply of multimodal plain regimen.  Patient is instructed to continue to follow-up with outpatient alcohol recovery as he wished to follow-up.  Additional information was not given to patient as he previously reported he has information and does not want to go over information a second time.  At this time patient is medically stable to be discharged and agreeable for plan at discharge.  For further formation regards her course of hospitalization, please notes attached.      Please see above list of diagnoses and related plan for additional information.     Condition at Discharge: good    Discharge Day Visit / Exam:   Subjective: Patient reports to be feeling well however continuing to have rib pain.  He currently denies any chest pain/pressure, palpitations, lightheadedness, nausea, shortness of breath, tremors, hallucinations, or chills.  Offers no new complaints at this time. No acute events reported overnight. Understanding of plan.  All questions answered to the best of my ability at this time to patient satisfaction. Plan of care agreed upon.  Vitals: Blood Pressure: 153/88 (02/14/25 0930)  Pulse: 65 (02/14/25 0930)  Temperature: 97.8 °F (36.6 °C) (02/14/25 0600)  Temp Source: Oral (02/13/25 1625)  Respirations: 18 (02/14/25 0930)  Height: 5' 8\" " (172.7 cm) (02/14/25 0700)  SpO2: 97 % (02/14/25 0930)  Exam:   Physical Exam  Vitals and nursing note reviewed.   Constitutional:       General: He is not in acute distress.     Appearance: He is obese. He is not ill-appearing, toxic-appearing or diaphoretic.      Comments: Resting comfortably in bed   HENT:      Head: Normocephalic.      Nose: Nose normal.      Mouth/Throat:      Mouth: Mucous membranes are moist.      Pharynx: Oropharynx is clear.   Eyes:      General: No scleral icterus.     Conjunctiva/sclera: Conjunctivae normal.      Pupils: Pupils are equal, round, and reactive to light.   Cardiovascular:      Rate and Rhythm: Normal rate and regular rhythm.      Heart sounds: No murmur heard.     No friction rub. No gallop.   Pulmonary:      Effort: Pulmonary effort is normal. No respiratory distress.      Breath sounds: Normal breath sounds. No stridor. No wheezing, rhonchi or rales.   Abdominal:      General: Abdomen is flat.      Palpations: Abdomen is soft.   Musculoskeletal:         General: Normal range of motion.      Cervical back: Normal range of motion and neck supple.      Right lower leg: No edema.      Left lower leg: No edema.   Lymphadenopathy:      Cervical: No cervical adenopathy.   Skin:     General: Skin is warm.      Coloration: Skin is not jaundiced or pale.      Findings: No bruising, erythema or lesion.   Neurological:      General: No focal deficit present.      Mental Status: He is alert and oriented to person, place, and time. Mental status is at baseline.      Cranial Nerves: No cranial nerve deficit.      Motor: No weakness.   Psychiatric:         Mood and Affect: Mood normal.         Behavior: Behavior normal.         Thought Content: Thought content normal.          Discussion with Family: Patient declined call to .     Discharge instructions/Information to patient and family:   See after visit summary for information provided to patient and family.       Provisions for Follow-Up Care:  See after visit summary for information related to follow-up care and any pertinent home health orders.       Mobility at time of Discharge:      HLM Goal achieved. Continue to encourage appropriate mobility.    Disposition:   Home    Planned Readmission: none     Discharge Statement:  I spent 65 minutes discharging the patient. This time was spent on the day of discharge. I had direct contact with the patient on the day of discharge. Greater than 50% of the total time was spent examining patient, answering all patient questions, arranging and discussing plan of care with patient as well as directly providing post-discharge instructions.  Additional time then spent on discharge activities.    Discharge Medications:  See after visit summary for reconciled discharge medications provided to patient and/or family.      **Please Note: This note may have been constructed using a voice recognition system**

## 2025-02-14 NOTE — ASSESSMENT & PLAN NOTE
History of alcohol use disorder.  According to the patient, he has been taking 2 bottles of thom per day.  Last drink was around 2 PM.  Patient starting to have alcohol withdrawal signs and symptoms.  Patient to be given phenobarbital in the emergency room, ordered by the emergency room physician.  [facility] protocol.  Continue folic acid.  IV thiamine for now.  Multivitamins.  IV fluids.  Alcohol cessation counseling done.  Case management referral for possibility of alcohol rehab.  According to ER physician, patient was offered detoxification in [place], but patient refused.  Patient not requiring Ativan, endorses mild anxiety however no other symptoms of alcohol withdrawal, patient reports does not want additional support for recovery as he previously had information in the past and can follow previous information

## 2025-02-14 NOTE — RESPIRATORY THERAPY NOTE
02/14/25 0756   Incentive Spirometry Tx   IS level of assistance Assisted by respiratory care provider;Independent   Frequency q1hr W/A   Respiratory Effort Normal   Treatment Tolerance Tolerated well   Incentive Spirometry Goal (mL) 1026 mL   Incentive Spirometry Achieved (mL) 2250 mL   Chest Physiotherapy Tx   $ Demo/Eval of Kameron, NARDA, IPPB, CPT Yes

## 2025-02-14 NOTE — PHYSICAL THERAPY NOTE
"PHYSICAL THERAPY EVALUATION  NAME:  Diogo Travis  DATE: 02/14/25    AGE:   59 y.o.  Mrn:   [id number]  Principal problem: Principal Problem:    Fall  Active Problems:    Hypomagnesemia    Alcohol withdrawal (HCC)    Closed fracture of multiple ribs of right side with routine healing      Vitals:    02/14/25 0530 02/14/25 0600 02/14/25 0700 02/14/25 0930   BP: 145/84 158/86  153/88   BP Location:  Right arm     Pulse: 65 69  65   Resp:  18  18   Temp:  97.8 °F (36.6 °C)     TempSrc:       SpO2:  96%  97%   Height:   5' 8\" (1.727 m)        Length Of Stay: 1  Performed at least 2 patient identifiers during session: Name and Birthday  PHYSICAL THERAPY EVALUATION :    02/14/25 1204   PT Last Visit   PT Visit Date 02/14/25   Note Type   Note type Evaluation   Pain Assessment   Pain Assessment Tool 0-10   Pain Score 8   Pain Location/Orientation Orientation: Right;Orientation: Mid;Orientation: Upper  (posterior ribs, kevin under medial scapular border)   Effect of Pain on Daily Activities limits speed and indep of mobility-R UE AROM kevin above head.   Patient's Stated Pain Goal No pain   Hospital Pain Intervention(s) Repositioned;Ambulation/increased activity;Emotional support  (attempted taping)   Restrictions/Precautions   Other Precautions Fall Risk;Pain   Home Living   Type of Home House   Home Layout Two level;Bed/bath upstairs  (2STE from garage per last admission PT eval)   Home Equipment   (none)   Prior Function   Level of [place] Independent with ADLs;Independent with functional mobility;Independent with IADLS   Lives With Alone  (fiance stays with patient occasionally)   IADLs Independent with driving;Independent with meal prep;Independent with medication management   Falls in the last 6 months 1 to 4   Vocational Unemployed  (was let go from job in [place] recently)   General   Additional Pertinent History This is patient's fifth readmission/ED visit since initial fall in Dec.  Patient reports not " taking some of his pain medications upon discharge that were provided on past admissions.  Did report having issues of EtOH and has a multitude of information about it at home   Family/Caregiver Present No   Cognition   Overall Cognitive Status WFL   Arousal/Participation Alert   Attention Within functional limits   Orientation Level Oriented X4   Memory Within functional limits   Following Commands Follows all commands and directions without difficulty   Subjective   Subjective Pt cooperative and agreeable to participate.  Reports initial lightheadedness with upright positioning from bed.  Reports not walking within the room/halls since admission.   RUE Assessment   RUE Assessment   (WFL except shoulder flexion/abd AROM about >80, limited by pain)   LUE Assessment   LUE Assessment WFL   RLE Assessment   RLE Assessment   (no pain to back /ribs w/LE movement)   Strength RLE   R Hip Flexion 4/5   R Knee Extension 4/5   R Ankle Dorsiflexion 4/5   LLE Assessment   LLE Assessment   (no pain to back /ribs w/LE movement)   Strength LLE   L Hip Flexion 4/5   L Knee Extension 4/5   L Ankle Dorsiflexion 4/5   Coordination   Movements are Fluid and Coordinated 0   Coordination and Movement Description mild intermittent tremors   Light Touch   RLE Light Touch Grossly intact   LLE Light Touch Grossly intact   Bed Mobility   Supine to Sit 4  Minimal assistance   Additional items Increased time required;Bedrails;HOB elevated;Verbal cues  (limited by pain from stretcher level, slow transitional movement)   Sit to Supine 5  Supervision   Transfers   Sit to Stand 6  Modified independent   Stand to Sit 6  Modified independent   Additional Comments Trunk: limited thorughout, especially w/rotation   Ambulation/Elevation   Gait pattern Step through pattern  (no uncorrected losses of balance)   Gait Assistance 5  Supervision   Assistive Device None   Distance 200'   Stair Management Assistance   (not formally performed, anticipated no  needs upon DC)   Balance   Static Sitting Good   Static Standing Fair   Ambulatory Fair -   Endurance Deficit   Endurance Deficit Yes   Endurance Deficit Description limited amb distance and standing tolerance compared to baseline   Activity Tolerance   Activity Tolerance Patient limited by pain   Medical Staff Made Aware care coordination w/ OT, spoke to Keaton LEON from [facility] post session re: recommendations.   Nurse Made Aware spoke to RN   Assessment:   Pt is a 59 y.o. male who [facility] on 2/[facility]/2025 w/ Fall (one last month and one day PTA), Etoh, R rib fracture .   Order placed for PT.      Prior to admission: Pt lived in 2 story home, intermittently has fiancee there, did not use DME for mobility, falls PTA.  Upon evaluation: Pt needed physical A for bed mobility, but none for transfers and ambulation. However mobility is primarily limited now by pain>balance. Pt able to compensate for balance/coordination and scored adequate for 4 point DGI test.       Pt's clinical presentation is currently unstable/unpredictable given the functional mobility deficits above, especially (but not limited to) decreased ROM, gait deviations, pain, and decreased functional mobility tolerance, and combined with medical complications including tachycardia and abnormal potassium values, multiple ED visits/admissions and +Etoh.  Pt IS NOT at his mobility baseline.  .       During this admission, pt would benefit from continued skilled inpatient PT in the acute care setting in order to address deficits as defined above to maximize function and mobility.  Ideally pt may benefit from OPPT      Recommendations:    From a PT standpoint, recommend next several sessions focus on trials w/taping @ R upper and mid trap near ribs, progression w/ mobility for trunk and RUE.     Prognosis Fair   Problem List Decreased endurance;Decreased range of motion;Impaired balance;Decreased mobility;Decreased coordination;Decreased  cognition;Impaired judgement;Decreased safety awareness;Decreased skin integrity;Pain  (gait deviations, inconsistent carryover of recommendations upon DC)   Barriers to Discharge Inaccessible home environment  (home alone but s/o has been spending more time @ his place)   Goals   Patient Goals to have less pain. Is interested in ETOH treatment , but has information at home.   STG Expiration Date 02/24/25  (if not DCed)   Short Term Goal #1 Goals If not DCed: Pt will:  Perform bed mobility with modified I using strategies to assist w/ pain control during transitional movement.  Perform transfers with modified I to promote proper hand placement and approach. Perform full DGI or FGA and score better than cut of score to demonstrate less risk for falls. Perform flight of stairs w/ railing +/- DME and w/modified I to return to home with ISAMAR and return to [facility] home. Pt verbalize understanding of kinesiotaping and next plan of care if helping/ hurting pain control   PT Treatment Day 1   Plan   Treatment/Interventions Functional transfer training;LE strengthening/ROM;Elevations;Therapeutic exercise;Endurance training;Cognitive reorientation;Patient/family training;Equipment eval/education;Bed mobility;Gait training;Spoke to nursing;Spoke to advanced practitioner;OT   PT Frequency   (Additional treatment session after eval, then 1-2x/wk)   Discharge Recommendation   Rehab Resource Intensity Level, PT III (Minimum Resource Intensity)  (OPPT)   Equipment Recommended   (none)   AM-PAC Basic Mobility Inpatient   Turning in Flat Bed Without Bedrails 3   Lying on Back to Sitting on Edge of Flat Bed Without Bedrails 2   Moving Bed to Chair 3   Standing Up From Chair Using Arms 4   Walk in Room 3   Climb 3-5 Stairs With Railing 3   Basic Mobility Inpatient Raw Score 18   Basic Mobility Standardized Score 41.05   [facility] Highest Level Of Mobility   -HLM Goal 6: Walk 10 steps or more   -HLM Achieved 7: Walk 25 feet or  "more   Dynamic Gait Index   Gait level surface  3   Change in gait speed 3   Gait with horizontal head turns  3   Gait with vertical head turns  2  (4 point DGI: 11/12)   Additional Treatment Session   Start Time 1150   End Time 1204   Treatment Assessment Pt verbalized understanding and denied  having increased pain s/p taping-verbalized understanding of purpose and how to take tape off if needed. Pt verbalized understanding of rationale for continued OPPT . Skilled PT recommended to progress pt toward treatment goals   Additional Treatment Day 1   Exercises   Neuro re-ed After photos, education and demonstration--Pt agreeable to kinesio taping @ R mid thoracic area under scapula x 2 strips, 25% stretch. instructed in plan of care from here s/p tape, removal of same with increased pain /irritation.   End of Consult   Patient Position at End of Consult All needs within reach;Supine   (Please find full objective findings from PT assessment regarding body systems outlined above).     The patient's [facility] Basic Mobility Inpatient Short Form Raw Score is 18. A Raw score of greater than 16 suggests the patient may benefit from discharge to home, which DOES coincide with CURRENT above PT recommendations.     However please refer to therapist recommendation for discharge planning given other factors that may influence destination.     Adapted from Trent HERNÁNDEZ, Madalyn J, Nati J, [employer]. [employer] of -PAC “6-Clicks” Basic Mobility and Daily Activity Scores With Discharge Destination. Physical Therapy, 2021;101:1-9. DOI: [id number]    Portions of the record may have been created with voice recognition software.  Occasional wrong word or \"sound a like\" substitutions may have occurred due to the inherent limitations of voice recognition software.  Read the chart carefully and recognize, using context, where substitutions have occurred    "

## 2025-02-14 NOTE — CASE MANAGEMENT
Case Management Progress Note    Patient name Diogo Travis  Location ED-25/ED-25 MRN [id number]  : 1965 Date 2025       LOS (days): 1  Geometric Mean LOS (GMLOS) (days):   Days to GMLOS:        OBJECTIVE:        Current admission status: Inpatient  Preferred Pharmacy:   [address] [facility] - [address] [address]  Phone: [phone] Fax: [phone]    [facility] [place] - [address]  [address] [place] [address]  Phone: [phone] Fax: [phone]    Primary Care Provider: Enid Altman DO    Primary Insurance: [employer]  Secondary Insurance:     PROGRESS NOTE:  JOSE spoke with patient at bedside re: alcohol abuse.

## 2025-02-14 NOTE — OCCUPATIONAL THERAPY NOTE
Occupational Therapy Evaluation       02/14/25 1140   OT Last Visit   OT Visit Date 02/14/25   Note Type   Note type Evaluation   Pain Assessment   Pain Assessment Tool 0-10   Pain Score 8   Restrictions/Precautions   Other Precautions Chair Alarm;Bed Alarm;Pain;Fall Risk   Home Living   Type of Home House   Home Layout Two level;Bed/bath upstairs   Bathroom Shower/Tub Walk-in shower   Bathroom Toilet Standard   Bathroom Equipment   (none)   Home Equipment   (none)   Prior Function   Level of [place] Independent with ADLs;Independent with functional mobility;Independent with IADLS   Lives With Alone  (fiance stays with patient occasionally)   IADLs Independent with driving;Independent with meal prep;Independent with medication management   Falls in the last 6 months 1 to 4   Vocational Unemployed  (was let go from job in [place])   General   Additional Pertinent History Pt admitted s/p fall, +ETOH, reinjured site of prior R rib fractures from recent fall last month, no new fractures   ADL   Eating Assistance 6  Modified independent   Grooming Assistance 6  Modified Independent   UB Bathing Assistance 6  Modified Independent   LB Bathing Assistance 6  Modified Independent   UB Dressing Assistance 6  Modified independent   LB Dressing Assistance 6  Modified independent   Toileting Assistance  6  Modified independent   Additional Comments provided adaptive dressing techniques to minimize pain, pt verbalizing understanding and return demo   Transfers   Sit to Stand 6  Modified independent   Stand to Sit 6  Modified independent   Toilet transfer 6  Modified independent   Functional Mobility   Functional Mobility 5  Supervision   Additional Comments engaged in fxl mobility household distances   Additional items   (no AD)   Balance   Static Sitting Good   Dynamic Sitting Good   Static Standing Fair   Dynamic Standing Fair -   Activity Tolerance   Activity Tolerance Patient limited by pain   Medical Staff  Made Aware care coordination with PT Allie to challenge pt's activity tolerance, PT and OT goals addressed separately   RUE Assessment   RUE Assessment   (WFL except shoulder flexion AROM to ~85, limited by pain)   LUE Assessment   LUE Assessment WFL   Cognition   Overall Cognitive Status WFL   Arousal/Participation Alert;Cooperative   Attention Within functional limits   Orientation Level Oriented X4   Memory Within functional limits   Following Commands Follows all commands and directions without difficulty   Assessment   Limitation   (pain, decreased activity tolerance)   Prognosis Good   Assessment Patient evaluated by Occupational Therapy.  Patient admitted with Fall.  The patients occupational profile, medical and therapy history includes a extensive additional review of physical, cognitive, or psychosocial history related to current functional performance.  Comorbidities affecting functional mobility and ADLS include: alcohol withdrawal, recent fracture of multiple right ribs, hypomagnesemia.  Prior to admission, patient was independent with functional mobility without assistive device, independent with ADLS, and independent with IADLS.  The evaluation identifies the following performance deficits: pain and decreased activity tolerance, that result in activity limitations and/or participation restrictions. This evaluation requires clinical decision making of high complexity, because the patient presents with comorbidites that affect occupational performance and required significant modification of tasks or assistance with consideration of multiple treatment options. The patient's raw score on the -PAC Daily Activity Inpatient Short Form is 24. A raw score of greater than or equal to 19 suggests the patient may benefit from discharge to home. Please refer to the recommendation of the Occupational Therapist for safe discharge planning.  Given findings of this evaluation, pt is at or very near baseline level  of functioning and has no acute OT needs at this time. Will D/C OT. Please re-consult if needs arise. Anticipate no OT needs upon d/c when medically cleared.   Goals   Patient Goals to have less pain   Plan   OT Frequency Eval only   Discharge Recommendation   Rehab Resource Intensity Level, OT No post-acute rehabilitation needs   AM-PAC Daily Activity Inpatient   Lower Body Dressing 4   Bathing 4   Toileting 4   Upper Body Dressing 4   Grooming 4   Eating 4   Daily Activity Raw Score 24   Daily Activity Standardized Score (Calc for Raw Score >=11) 57.54   AM-PAC Applied Cognition Inpatient   Following a Speech/Presentation 4   Understanding Ordinary Conversation 4   Taking Medications 4   Remembering Where Things Are Placed or Put Away 4   Remembering List of 4-5 Errands 4   Taking Care of Complicated Tasks 4   Applied Cognition Raw Score 24   Applied Cognition Standardized Score 62.21     Yany Thomas OTR/L   [place] License # [id number]  [place] License # [id number]

## 2025-02-14 NOTE — DISCHARGE INSTR - AVS FIRST PAGE
Please be aware I have added numerous medications.  Please restart taking your folate acid and B1 (thiamine vitamin).  I have also ordered gabapentin which you may take up to 3 times a day as needed for anxiety associated with your withdrawal.  I have also added Percocet which is an as needed medication for your rib pain.  Lastly I have added a muscle relaxer called Robaxin.  Please take this medication 4 times a day to further assist with muscle spasms and the muscle tightness associate with your rib pain.  Please see your PCP within 1 week.    Lastly it is recommended to minimize taking his medications together and especially do not take with alcohol as it their side effects may be amplified and can cause serious drowsiness or respiratory depression.  Additionally do not operate your motor vehicle, heavy machinery, swimming, climbing a ladder, or any other dangerous activities while on these medications.  You have also taken phenobarbital and it is recommended to not drive within 1 week after taking phenobarbital.    Please follow-up with your PCP within a week as well as contacting one of the outpatient alcohol  with the information that was previously given to you.

## 2025-02-14 NOTE — UTILIZATION REVIEW
Initial Clinical Review    Admission: Date/Time/Statement:   Admission Orders (From admission, onward)       Ordered        02/13/25 1858  INPATIENT ADMISSION  Once                          Orders Placed This Encounter   Procedures    INPATIENT ADMISSION     Standing Status:   Standing     Number of Occurrences:   1     Level of Care:   Med Surg [16]     Estimated length of stay:   More than 2 Midnights     Certification:   I certify that inpatient services are medically necessary for this patient for a duration of greater than two midnights. See H&P and MD Progress Notes for additional information about the patient's course of treatment.     ED Arrival Information       Expected   -    Arrival   2/13/2025 16:21    Acuity   Urgent              Means of arrival   Ambulance    Escorted by   [facility]    Service   Hospitalist    Admission type   Emergency              Arrival complaint   FALL             Chief Complaint   Patient presents with    Fall     Patient +ETOH (2 bottles wine) slipped in kitchen and hit back R side (falling backwards into island). Recent rib fx back in December, hit same side. Patient interested in detox, reports daily drinking. Does not want to go to [place]        Initial Presentation: 59 y.o. male with a PMH significant for alcohol use disorder, alcohol withdrawal seizure, anxiety, hepatic steatosis, hypertension, and depression who presents with fall at home. Last month, patient was admitted here due to a fall and was found to have rib fractures. Today, patient again had a mechanical fall at home.  According to him, he was starting to lift a hot object, when wearing his socks, he slipped and fell and hit his back against the island counter. Patient denied losing consciousness at the time. Patient complained of right thoracic back pains, where his previous rib fractures were. Patient denied any other pains. Patient told me, that he was starting to have alcohol  withdrawal symptoms. Patient told me, that he drinks 2 bottles of wine this per day.  His last drink was earlier this afternoon. In the emergency room, patient was given pain medications and ordered for a dose of phenobarbital. Plan: Inpatient admission for evaluation and treatment of fall, alcohol withdrawal, closed fracture of right ribs, hypomagnesemia: pain management, fall precautions, PT/OT eval, rib fracture protocol, CIWA protocol, folic acid, IV thiamine, MVI, CM consult, replete Mg and monitor.     Anticipated Length of Stay/Certification Statement: Patient will be admitted on an inpatient basis with an anticipated length of stay of greater than 2 midnights secondary to above findings and plans.     Date: 2/14   Day 2:     Internal medicine: presented after a fall associated with alcohol intoxication. Patient fell onto ribs which he previously had a traumatic rib fracture from similar event. X-rays were obtained which showed no acute fracture and subacute findings. Patient was placed on CIWA and started multimodal pain regimen. Patient has not required Ativan with CIWA protocol.     ED Treatment-Medication Administration from 02/13/2025 1621 to 02/14/2025 1200         Date/Time Order Dose Route Action     02/13/2025 1651 ketorolac (TORADOL) injection 15 mg 15 mg Intravenous Given     02/13/2025 1820 magnesium sulfate 2 g/50 mL IVPB (premix) 2 g 2 g Intravenous New Bag     02/13/2025 1817 HYDROmorphone HCl (DILAUDID) injection 0.2 mg 0.2 mg Intravenous Given     02/13/2025 2007 PHENobarbital 650 mg in sodium chloride 0.9 % 100 mL IVPB 650 mg Intravenous New Bag     02/13/2025 2327 acetaminophen (TYLENOL) tablet 975 mg 975 mg Oral Given     02/14/2025 0009 heparin (porcine) subcutaneous injection 5,000 Units 5,000 Units Subcutaneous Given     02/14/2025 0608 heparin (porcine) subcutaneous injection 5,000 Units 5,000 Units Subcutaneous Given     02/13/2025 2327 atorvastatin (LIPITOR) tablet 40 mg 40 mg Oral  Given     02/14/2025 1005 escitalopram (LEXAPRO) tablet 20 mg 20 mg Oral Given     02/14/2025 1006 lisinopril (ZESTRIL) tablet 40 mg 40 mg Oral Given     02/14/2025 0004 mirtazapine (REMERON) tablet 15 mg 15 mg Oral Given     02/14/2025 0609 pantoprazole (PROTONIX) EC tablet 40 mg 40 mg Oral Given     02/14/2025 0606 HYDROmorphone (DILAUDID) injection 0.5 mg 0.5 mg Intravenous Given     02/14/2025 1007 HYDROmorphone (DILAUDID) injection 0.5 mg 0.5 mg Intravenous Given     02/13/2025 2043 multi-electrolyte (PLASMALYTE-A/ISOLYTE-S PH 7.4) IV solution 125 mL/hr Intravenous New Bag     02/13/2025 2056 HYDROmorphone (DILAUDID) injection 0.5 mg 0.5 mg Intravenous Given     02/13/2025 2322 ondansetron (ZOFRAN) injection 4 mg 4 mg Intravenous Given     02/14/2025 0004 chlordiazePOXIDE (LIBRIUM) capsule 25 mg 25 mg Oral Given     02/14/2025 0609 chlordiazePOXIDE (LIBRIUM) capsule 25 mg 25 mg Oral Given     02/14/2025 0005 LORazepam (ATIVAN) injection 2 mg 2 mg Intravenous Given            Scheduled Medications:  acetaminophen, 975 mg, Oral, Q8H COLBY  atorvastatin, 40 mg, Oral, HS  chlordiazePOXIDE, 25 mg, Oral, Q6H  escitalopram, 20 mg, Oral, Daily  fish oil, 2,000 mg, Oral, Daily  folic acid, 1 mg, Oral, Daily  heparin (porcine), 5,000 Units, Subcutaneous, Q8H COLBY  lidocaine, 1 patch, Topical, Daily  lisinopril, 40 mg, Oral, Daily  mirtazapine, 15 mg, Oral, HS  multivitamin-minerals, 1 tablet, Oral, Daily  nicotine, 1 patch, Transdermal, Daily  pantoprazole, 40 mg, Oral, Early Morning  thiamine, 100 mg, Intravenous, Daily      Continuous IV Infusions:  multi-electrolyte, 125 mL/hr, Intravenous, Continuous      PRN Meds:  albuterol, 2 puff, Inhalation, Q4H PRN  HYDROmorphone, 0.5 mg, Intravenous, Q4H PRN  ibuprofen, 400 mg, Oral, Q6H PRN  LORazepam, 2 mg, Intravenous, Q4H PRN  methocarbamol, 500 mg, Oral, Q6H PRN  oxyCODONE, 10 mg, Oral, Q4H PRN  oxyCODONE, 5 mg, Oral, Q4H PRN      ED Triage Vitals [02/13/25 1625]    Temperature Pulse Respirations Blood Pressure SpO2 Pain Score   98.1 °F (36.7 °C) (!) 123 18 141/85 97 % 10 - Worst Possible Pain     Weight (last 2 days)       None            Vital Signs (last 3 days)       Date/Time Temp Pulse Resp BP MAP (mmHg) SpO2 O2 Device Patient Position - Orthostatic VS Spencer Coma Scale Score CIWA-Ar Total Pain    02/14/25 1007 -- -- -- -- -- -- -- -- -- -- 8    02/14/25 0930 -- 65 18 153/88 111 97 % -- -- -- -- --    02/14/25 0606 -- -- -- -- -- -- -- -- -- -- 9    02/14/25 0600 97.8 °F (36.6 °C) 69 18 158/86 113 96 % -- Lying -- -- 9    02/14/25 0530 -- 65 -- 145/84 -- -- -- -- -- 0 --    02/14/25 0400 -- 66 18 114/66 87 95 % None (Room air) Lying -- -- --    02/14/25 0124 -- -- -- -- 110 93 % -- -- -- -- --    02/14/25 0000 -- -- -- -- -- -- -- -- 15 -- --    02/13/25 2330 -- 87 18 176/93 128 95 % None (Room air) Lying -- -- --    02/13/25 2329 -- 93 -- 176/93 -- -- -- -- -- 9 --    02/13/25 2327 -- -- -- -- -- -- -- -- -- -- 9 02/13/25 2056 -- -- -- -- -- -- -- -- -- -- 10 - Worst Possible Pain    02/13/25 2008 -- 105 -- 124/81 -- -- -- -- -- 6 --    02/13/25 1830 -- 105 18 143/81 105 93 % None (Room air) Lying -- -- --    02/13/25 1827 -- 98 18 148/89 110 93 % None (Room air) Lying -- -- --    02/13/25 1817 -- -- -- -- -- -- -- -- -- -- 10 - Worst Possible Pain    02/13/25 1628 -- -- -- -- -- -- -- -- 15 -- --    02/13/25 1627 -- -- -- -- -- -- None (Room air) -- -- -- --    02/13/25 1625 98.1 °F (36.7 °C) 123 18 141/85 -- 97 % None (Room air) Sitting -- -- 10 - Worst Possible Pain           CIWA-Ar Score       Row Name 02/14/25 0530 02/14/25 0124 02/13/25 2329       CIWA-Ar    /84 -- 176/93    Pulse 65 -- 93    Nausea and Vomiting 0 -- 2    Tactile Disturbances 0 -- 0    Tremor 0 -- 2    Auditory Disturbances 0 -- 0    Paroxysmal Sweats 0 -- 0    Visual Disturbances 0 -- 0    Anxiety 0 -- 5    Headache, Fullness in Head 0 -- 0    Agitation 0 -- 0    Orientation and  Clouding of Sensorium 0 -- 0    CIWA-Ar Total 0 -- 9      Row Name 02/13/25 2008             CIWA-Ar    /81      Pulse 105      Nausea and Vomiting 0      Tactile Disturbances 0      Tremor 1      Auditory Disturbances 0      Paroxysmal Sweats 0      Visual Disturbances 0      Anxiety 5      Headache, Fullness in Head 0      Agitation 0      Orientation and Clouding of Sensorium 0      CIWA-Ar Total 6                      Pertinent Labs/Diagnostic Test Results:   Radiology:  CT head without contrast   Final Interpretation by Jagjit Ashford MD (02/13 1816)      No acute intracranial abnormality.      The study was marked in EPIC for immediate notification.                  Workstation performed: [id number]         CT spine cervical without contrast   Final Interpretation by Jagjit Ashford MD (02/13 1815)      No cervical spine fracture or traumatic malalignment.      Degenerative change within the cervical spine.            Workstation performed: [id number]         CT chest without contrast   Final Interpretation by Humberto Rehman MD (02/13 1830)      1.  No acute or thoracic pathology. Specifically, no acute rib fracture or scapular fracture identified.   2.  Subacute right-sided rib fractures detailed above are unchanged.   3.  Diffuse hepatic steatosis.      The study was marked in EPIC for immediate notification.         Workstation performed: [id number]           Cardiology:  ECG 12 lead   Final Result by Milad Knowles MD (02/14 0866)   Normal sinus rhythm   Normal ECG   When compared with ECG of 17-Jan-2025 15:04,   No significant change was found   Confirmed by Milad Knowles ([id number]) on 2/14/2025 8:48:59 AM        GI:  No orders to display           Results from last 7 days   Lab Units 02/14/25  0559 02/13/25  1651   WBC Thousand/uL 4.97 7.70   HEMOGLOBIN g/dL 12.1 13.8   HEMATOCRIT % 36.1* 40.2   PLATELETS Thousands/uL 170 234   TOTAL NEUT ABS Thousands/µL 3.08 4.75         Results from last  7 days   Lab Units 02/14/25  0941 02/14/25  0559 02/13/25 2049 02/13/25  1651   SODIUM mmol/L  --  140 139 139   POTASSIUM mmol/L 4.7 5.7* 4.6 4.4   CHLORIDE mmol/L  --  104 103 102   CO2 mmol/L  --  31 27 25   ANION GAP mmol/L  --  5 9 12   BUN mg/dL  --  16 12 10   CREATININE mg/dL  --  0.98 0.81 0.80   EGFR ml/min/1.73sq m  --  84 97 98   CALCIUM mg/dL  --  8.9 9.2 9.5   MAGNESIUM mg/dL  --  2.0 1.9 1.4*   PHOSPHORUS mg/dL  --  5.0* 3.2  --      Results from last 7 days   Lab Units 02/14/25  0559 02/13/25 2049 02/13/25  1651   AST U/L 28 26 33   ALT U/L 17 18 21   ALK PHOS U/L 83 94 108*   TOTAL PROTEIN g/dL 6.6 6.7 7.4   ALBUMIN g/dL 3.9 4.1 4.5   TOTAL BILIRUBIN mg/dL 0.61 0.30 0.31         Results from last 7 days   Lab Units 02/14/25  0559 02/13/25 2049 02/13/25  1651   GLUCOSE RANDOM mg/dL 97 84 99             Beta- Hydroxybutyrate   Date Value Ref Range Status   01/10/2025 0.13 0.02 - 0.27 mmol/L Final     BETA-HYDROXYBUTYRATE   Date Value Ref Range Status   10/16/2023 2.9 (H) <0.6 mmol/L Final   10/01/2023 0.8 (H) <0.6 mmol/L Final          Past Medical History:   Diagnosis Date    Alcohol use disorder 12/24/2024    Alcohol use disorder, severe, dependence (HCC) 04/02/2023    Alcohol withdrawal seizure (HCC)     Alcoholic ketoacidosis 10/16/2023    Anxiety     AR (allergic rhinitis)     Chronic alcoholic gastritis 04/02/2023    Depression     GERD (gastroesophageal reflux disease)     Hepatic steatosis 04/02/2023    Hyperlipidemia     Hypertension     IBS (irritable bowel syndrome)     Obesity     Rosacea     Vitamin B12 deficiency 02/14/2024    Vitamin D deficiency 02/14/2024     Present on Admission:   Alcohol withdrawal (HCC)   Fall   Hypomagnesemia      Admitting Diagnosis: No admission diagnoses are documented for this encounter.  Age/Sex: 59 y.o. male    Network Utilization Review Department  ATTENTION: Please call with any questions or concerns to [phone] and carefully listen to the prompts  so that you are directed to the right person. All voicemails are confidential.   For Discharge needs, contact Care Management DC Support Team at [phone] opt. 2  Send all requests for admission clinical reviews, approved or denied determinations and any other requests to dedicated fax number below belonging to the campus where the patient is receiving treatment. List of dedicated fax numbers for the Facilities:  FACILITY NAME UR FAX NUMBER   ADMISSION DENIALS (Administrative/Medical Necessity) [phone]   DISCHARGE SUPPORT TEAM (NETWORK) [phone]   PARENT CHILD HEALTH (Maternity/NICU/Pediatrics) [phone]   [facility] [phone]   [facility] [phone]   Critical access hospital [phone]   [facility] [phone]   Critical access hospital [phone]   [facility] [phone]   [facility] [phone]   [facility] [phone]   [facility] [phone]   [facility] [phone]   [facility] [phone]   [facility] [phone]

## 2025-02-14 NOTE — ASSESSMENT & PLAN NOTE
On imaging study, patient found to have subacute right-sided rib fractures which were unchanged from previous fractures that he had last January.  Rib fracture protocol.  Pain management.  Per protocol, pharmacologic DVT prophylaxis with heparin.

## 2025-02-14 NOTE — ASSESSMENT & PLAN NOTE
On imaging study, patient found to have subacute right-sided rib fractures which were unchanged from previous fractures that he had last January.  Rib fracture protocol.  Pain management.  Acute pain specialist consult per protocol.  Per protocol, pharmacologic DVT prophylaxis with heparin.

## 2025-02-14 NOTE — ASSESSMENT & PLAN NOTE
History of a fall last month, with rib fractures.  Patient again had a mechanical fall today.  Patient complained of pains from his previous rib fractures.  CT scan of the chest revealed no acute or thoracic pathology.  Specifically, no acute rib fractures or scapular fracture identified.  However found to have subacute right-sided rib fractures, which were unchanged from previous imaging study.  Multimodal pain management.    Acute pain service consult.  Fall precautions.  PT OT evaluation and management.  Rib fracture protocol.

## 2025-02-14 NOTE — UTILIZATION REVIEW
NOTIFICATION OF INPATIENT ADMISSION   AUTHORIZATION REQUEST   SERVICING FACILITY:   [place] [address]  Tax ID: [id number]  NPI: [id number]   ATTENDING PROVIDER:  Attending Name and NPI#: Martha Rodríguez Md [[id number]]  Address: [address] [place] [address]  Phone: [phone]     ADMISSION INFORMATION:  Place of Service: Inpatient [facility]  Place of Service Code: 21  Inpatient Admission Date/Time: 2/13/25  6:58 PM  Discharge Date/Time: No discharge date for patient encounter.  Admitting Diagnosis Code/Description:  No admission diagnoses are documented for this encounter.     UTILIZATION REVIEW CONTACT:  Alyce Vieira Utilization   Network Utilization Review Department  Phone: [phone]  Fax: [phone]  Email: [email or web address]  Contact for approvals/pending authorizations, clinical reviews, and discharge.     PHYSICIAN ADVISORY SERVICES:  Medical Necessity Denial & Dnat-pm-Qdes Review  Phone: [phone]  Fax: [phone]  Email: [email or web address]     DISCHARGE SUPPORT TEAM:  For Patients Discharge Needs & Updates  Phone: [phone] opt. 2 Fax: [phone]  Email: [email or web address]

## 2025-02-17 NOTE — UTILIZATION REVIEW
NOTIFICATION OF ADMISSION DISCHARGE   This is a Notification of Discharge from [facility]. Please be advised that this patient has been discharge from our facility. Below you will find the admission and discharge date and time including the patient’s disposition.   UTILIZATION REVIEW CONTACT:  Alyce Vieira  Utilization   Network Utilization Review Department  Phone: [phone] x carefully listen to the prompts. All voicemails are confidential.  Email: [email or web address]     ADMISSION INFORMATION  PRESENTATION DATE: 2/13/2025  4:21 PM  OBERVATION ADMISSION DATE: N/A  INPATIENT ADMISSION DATE: 2/13/25  6:58 PM   DISCHARGE DATE: 2/14/2025  1:35 PM   DISPOSITION:Home/Self Care    Network Utilization Review Department  ATTENTION: Please call with any questions or concerns to [phone] and carefully listen to the prompts so that you are directed to the right person. All voicemails are confidential.   For Discharge needs, contact Care Management DC Support Team at [phone] opt. 2  Send all requests for admission clinical reviews, approved or denied determinations and any other requests to dedicated fax number below belonging to the campus where the patient is receiving treatment. List of dedicated fax numbers for the Facilities:  FACILITY NAME UR FAX NUMBER   ADMISSION DENIALS (Administrative/Medical Necessity) [phone]   DISCHARGE SUPPORT TEAM ([facility]) [phone]   PARENT CHILD HEALTH (Maternity/NICU/Pediatrics) [phone]   [facility] [phone]   [facility] [phone]   [facility] [phone]   [facility] [phone]   [facility] [phone]   [facility] [phone]   [facility] [phone]   [facility] [phone]    [facility] [phone]   [facility] [phone]   [facility] [phone]   [facility] [phone]

## 2025-04-04 DIAGNOSIS — K21.9 GASTROESOPHAGEAL REFLUX DISEASE, UNSPECIFIED WHETHER ESOPHAGITIS PRESENT: ICD-10-CM

## 2025-04-04 DIAGNOSIS — K29.20 CHRONIC ALCOHOLIC GASTRITIS WITHOUT HEMORRHAGE: ICD-10-CM

## 2025-04-04 RX ORDER — PANTOPRAZOLE SODIUM 40 MG/1
40 TABLET, DELAYED RELEASE ORAL
Qty: 90 TABLET | Refills: 1 | Status: SHIPPED | OUTPATIENT
Start: 2025-04-04 | End: 2025-10-01

## 2025-04-04 NOTE — TELEPHONE ENCOUNTER
Reason for call:   [x] Refill   [] Prior Auth  [] Other:     Office:   [x] PCP/Provider -   [] Specialty/Provider -     Medication: pantoprazole (PROTONIX) 40 mg tablet     Dose/Frequency: Take 1 tablet (40 mg total) by mouth daily in the early morning     Quantity: 90    Pharmacy: [place] [address] [address]   Does the patient have enough for 3 days?   [] Yes   [x] No - Send as HP to POD

## 2025-04-07 ENCOUNTER — TELEPHONE (OUTPATIENT)
Dept: FAMILY MEDICINE CLINIC | Facility: CLINIC | Age: 60
End: 2025-04-07

## 2025-04-07 NOTE — TELEPHONE ENCOUNTER
This can be discussed at his overdue appt.  Nurse to please schedule and remind him to complete overdue FBW 2/12/25 -     Return in about 6 weeks (around 3/26/2025) for Physical - F/U HTN, HL, Anx/Dep, ETOH, GERD, Vit B12/D Def, Labs.

## 2025-04-07 NOTE — TELEPHONE ENCOUNTER
Pt called to say that his lexapro is not working and he has a lot going on, pt is also not able to sleep, was on remeron which gave him bad dreams and makes him wake up groggy so he had to stop tzaking that, pt would like to explore other med options.

## 2025-04-10 NOTE — TELEPHONE ENCOUNTER
Patient returning a call from the office to schedule over due visit. No available visits next week.  Please call patient back to get scheduled

## 2025-04-10 NOTE — TELEPHONE ENCOUNTER
Pt scheduled a regular appt for issues he is having with medication and will schedule phy and bw at another time. He said he is more worried about existing issues.

## 2025-04-21 ENCOUNTER — TELEPHONE (OUTPATIENT)
Dept: FAMILY MEDICINE CLINIC | Facility: CLINIC | Age: 60
End: 2025-04-21

## 2025-04-28 ENCOUNTER — HOSPITAL ENCOUNTER (EMERGENCY)
Facility: HOSPITAL | Age: 60
Discharge: HOME | End: 2025-04-28
Attending: EMERGENCY MEDICINE
Payer: COMMERCIAL

## 2025-04-28 VITALS
WEIGHT: 214.29 LBS | TEMPERATURE: 98.6 F | OXYGEN SATURATION: 95 % | HEART RATE: 99 BPM | SYSTOLIC BLOOD PRESSURE: 147 MMHG | RESPIRATION RATE: 20 BRPM | DIASTOLIC BLOOD PRESSURE: 96 MMHG | BODY MASS INDEX: 32.58 KG/M2

## 2025-04-28 DIAGNOSIS — F10.930 ALCOHOL WITHDRAWAL SYNDROME WITHOUT COMPLICATION (HCC): Primary | ICD-10-CM

## 2025-04-28 DIAGNOSIS — E83.42 HYPOMAGNESEMIA: ICD-10-CM

## 2025-04-28 LAB
ALBUMIN SERPL BCG-MCNC: 4.5 G/DL (ref 3.5–5)
ALP SERPL-CCNC: 137 U/L (ref 34–104)
ALT SERPL W P-5'-P-CCNC: 20 U/L (ref 7–52)
AMMONIA PLAS-SCNC: 37 UMOL/L (ref 18–72)
ANION GAP SERPL CALCULATED.3IONS-SCNC: 14 MMOL/L (ref 4–13)
AST SERPL W P-5'-P-CCNC: 36 U/L (ref 13–39)
ATRIAL RATE: 123 BPM
BASOPHILS # BLD AUTO: 0.05 THOUSANDS/ÂΜL (ref 0–0.1)
BASOPHILS NFR BLD AUTO: 0 % (ref 0–1)
BILIRUB SERPL-MCNC: 1.25 MG/DL (ref 0.2–1)
BUN SERPL-MCNC: 7 MG/DL (ref 5–25)
CALCIUM SERPL-MCNC: 9.5 MG/DL (ref 8.4–10.2)
CHLORIDE SERPL-SCNC: 100 MMOL/L (ref 96–108)
CO2 SERPL-SCNC: 24 MMOL/L (ref 21–32)
CREAT SERPL-MCNC: 0.95 MG/DL (ref 0.6–1.3)
EOSINOPHIL # BLD AUTO: 0.04 THOUSAND/ÂΜL (ref 0–0.61)
EOSINOPHIL NFR BLD AUTO: 0 % (ref 0–6)
ERYTHROCYTE [DISTWIDTH] IN BLOOD BY AUTOMATED COUNT: 13.2 % (ref 11.6–15.1)
GFR SERPL CREATININE-BSD FRML MDRD: 87 ML/MIN/1.73SQ M
GLUCOSE SERPL-MCNC: 127 MG/DL (ref 65–140)
HCT VFR BLD AUTO: 43.6 % (ref 36.5–49.3)
HGB BLD-MCNC: 15.5 G/DL (ref 12–17)
IMM GRANULOCYTES # BLD AUTO: 0.05 THOUSAND/UL (ref 0–0.2)
IMM GRANULOCYTES NFR BLD AUTO: 0 % (ref 0–2)
LIPASE SERPL-CCNC: 21 U/L (ref 11–82)
LYMPHOCYTES # BLD AUTO: 1.73 THOUSANDS/ÂΜL (ref 0.6–4.47)
LYMPHOCYTES NFR BLD AUTO: 13 % (ref 14–44)
MAGNESIUM SERPL-MCNC: 1.2 MG/DL (ref 1.9–2.7)
MCH RBC QN AUTO: 32.7 PG (ref 26.8–34.3)
MCHC RBC AUTO-ENTMCNC: 35.6 G/DL (ref 31.4–37.4)
MCV RBC AUTO: 92 FL (ref 82–98)
MONOCYTES # BLD AUTO: 0.92 THOUSAND/ÂΜL (ref 0.17–1.22)
MONOCYTES NFR BLD AUTO: 7 % (ref 4–12)
NEUTROPHILS # BLD AUTO: 11.05 THOUSANDS/ÂΜL (ref 1.85–7.62)
NEUTS SEG NFR BLD AUTO: 80 % (ref 43–75)
NRBC BLD AUTO-RTO: 0 /100 WBCS
P AXIS: 57 DEGREES
PLATELET # BLD AUTO: 247 THOUSANDS/UL (ref 149–390)
PMV BLD AUTO: 8.9 FL (ref 8.9–12.7)
POTASSIUM SERPL-SCNC: 4 MMOL/L (ref 3.5–5.3)
PR INTERVAL: 140 MS
PROT SERPL-MCNC: 7.7 G/DL (ref 6.4–8.4)
QRS AXIS: 85 DEGREES
QRSD INTERVAL: 88 MS
QT INTERVAL: 330 MS
QTC INTERVAL: 472 MS
RBC # BLD AUTO: 4.74 MILLION/UL (ref 3.88–5.62)
SODIUM SERPL-SCNC: 138 MMOL/L (ref 135–147)
T WAVE AXIS: 53 DEGREES
VENTRICULAR RATE: 123 BPM
WBC # BLD AUTO: 13.84 THOUSAND/UL (ref 4.31–10.16)

## 2025-04-28 PROCEDURE — 93010 ELECTROCARDIOGRAM REPORT: CPT | Performed by: INTERNAL MEDICINE

## 2025-04-28 PROCEDURE — 93005 ELECTROCARDIOGRAM TRACING: CPT

## 2025-04-28 PROCEDURE — 99285 EMERGENCY DEPT VISIT HI MDM: CPT

## 2025-04-28 PROCEDURE — 80053 COMPREHEN METABOLIC PANEL: CPT

## 2025-04-28 PROCEDURE — 85025 COMPLETE CBC W/AUTO DIFF WBC: CPT

## 2025-04-28 PROCEDURE — 96375 TX/PRO/DX INJ NEW DRUG ADDON: CPT

## 2025-04-28 PROCEDURE — 36415 COLL VENOUS BLD VENIPUNCTURE: CPT

## 2025-04-28 PROCEDURE — 96361 HYDRATE IV INFUSION ADD-ON: CPT

## 2025-04-28 PROCEDURE — 99285 EMERGENCY DEPT VISIT HI MDM: CPT | Performed by: EMERGENCY MEDICINE

## 2025-04-28 PROCEDURE — 83690 ASSAY OF LIPASE: CPT

## 2025-04-28 PROCEDURE — 83735 ASSAY OF MAGNESIUM: CPT

## 2025-04-28 PROCEDURE — 96365 THER/PROPH/DIAG IV INF INIT: CPT

## 2025-04-28 PROCEDURE — 82140 ASSAY OF AMMONIA: CPT

## 2025-04-28 RX ORDER — KETOROLAC TROMETHAMINE 30 MG/ML
15 INJECTION, SOLUTION INTRAMUSCULAR; INTRAVENOUS ONCE
Status: COMPLETED | OUTPATIENT
Start: 2025-04-28 | End: 2025-04-28

## 2025-04-28 RX ORDER — ONDANSETRON 2 MG/ML
4 INJECTION INTRAMUSCULAR; INTRAVENOUS ONCE
Status: COMPLETED | OUTPATIENT
Start: 2025-04-28 | End: 2025-04-28

## 2025-04-28 RX ORDER — ONDANSETRON 4 MG/1
4 TABLET, FILM COATED ORAL EVERY 6 HOURS
Qty: 12 TABLET | Refills: 0 | Status: SHIPPED | OUTPATIENT
Start: 2025-04-28 | End: 2025-05-02

## 2025-04-28 RX ORDER — MAGNESIUM SULFATE HEPTAHYDRATE 40 MG/ML
2 INJECTION, SOLUTION INTRAVENOUS ONCE
Status: COMPLETED | OUTPATIENT
Start: 2025-04-28 | End: 2025-04-28

## 2025-04-28 RX ORDER — FAMOTIDINE 20 MG/1
20 TABLET, FILM COATED ORAL DAILY
Qty: 30 TABLET | Refills: 0 | Status: SHIPPED | OUTPATIENT
Start: 2025-04-28

## 2025-04-28 RX ORDER — FAMOTIDINE 10 MG/ML
20 INJECTION, SOLUTION INTRAVENOUS ONCE
Status: COMPLETED | OUTPATIENT
Start: 2025-04-28 | End: 2025-04-28

## 2025-04-28 RX ADMIN — PHENOBARBITAL SODIUM 684 MG: 130 INJECTION INTRAMUSCULAR; INTRAVENOUS at 04:52

## 2025-04-28 RX ADMIN — KETOROLAC TROMETHAMINE 15 MG: 30 INJECTION, SOLUTION INTRAMUSCULAR; INTRAVENOUS at 05:36

## 2025-04-28 RX ADMIN — SODIUM CHLORIDE 1000 ML: 0.9 INJECTION, SOLUTION INTRAVENOUS at 04:31

## 2025-04-28 RX ADMIN — MAGNESIUM SULFATE HEPTAHYDRATE 2 G: 40 INJECTION, SOLUTION INTRAVENOUS at 05:36

## 2025-04-28 RX ADMIN — ONDANSETRON 4 MG: 2 INJECTION, SOLUTION INTRAMUSCULAR; INTRAVENOUS at 04:31

## 2025-04-28 RX ADMIN — FAMOTIDINE 20 MG: 10 INJECTION INTRAVENOUS at 04:31

## 2025-04-28 NOTE — ED ATTENDING ATTESTATION
4/28/2025  I, Jose Juan Blancas MD, saw and evaluated the patient. I have discussed the patient with the resident/non-physician practitioner and agree with the resident's/non-physician practitioner's findings, Plan of Care, and MDM as documented in the resident's/non-physician practitioner's note, except where noted. All available labs and Radiology studies were reviewed.  I was present for key portions of any procedure(s) performed by the resident/non-physician practitioner and I was immediately available to provide assistance.       At this point I agree with the current assessment done in the Emergency Department.  I have conducted an independent evaluation of this patient a history and physical is as follows: Alcohol withdrawal symptoms.  Nausea, epigastric discomfort.  No chest pain or shortness of breath.  Laboratory studies with hypomagnesemia, IV replacement.  EKG with no acute abnormalities.  Patient given phenobarbital to assist with withdrawal symptoms.  He is GCS 15.  He is clinically sober.  He is showing no evidence of DTs.  He is showing evidence of alcohol withdrawal.  He prefers discharge home rather than inpatient detox.  He has appropriate outpatient resources and plan for outpatient alcohol treatment.    Symptoms markedly improved with ED management.  Heart rate has improved.  Patient eager for comfortable with discharge home.  He states he has appropriate outpatient resources.    Results Reviewed       Procedure Component Value Units Date/Time    Lipase [[id number]]  (Normal) Collected: 04/28/25 0429    Lab Status: Final result Specimen: Blood from Arm, Left Updated: 04/28/25 0521     Lipase 21 u/L     Comprehensive metabolic panel [[id number]]  (Abnormal) Collected: 04/28/25 0429    Lab Status: Final result Specimen: Blood from Arm, Left Updated: 04/28/25 0521     Sodium 138 mmol/L      Potassium 4.0 mmol/L      Chloride 100 mmol/L      CO2 24 mmol/L      ANION GAP 14 mmol/L      BUN 7 mg/dL       Creatinine 0.95 mg/dL      Glucose 127 mg/dL      Calcium 9.5 mg/dL      AST 36 U/L      ALT 20 U/L      Alkaline Phosphatase 137 U/L      Total Protein 7.7 g/dL      Albumin 4.5 g/dL      Total Bilirubin 1.25 mg/dL      eGFR 87 ml/min/1.73sq m     Narrative:      National Kidney Disease Foundation guidelines for Chronic Kidney Disease (CKD):     Stage 1 with normal or high GFR (GFR > 90 mL/min/1.73 square meters)    Stage 2 Mild CKD (GFR = 60-89 mL/min/1.73 square meters)    Stage 3A Moderate CKD (GFR = 45-59 mL/min/1.73 square meters)    Stage 3B Moderate CKD (GFR = 30-44 mL/min/1.73 square meters)    Stage 4 Severe CKD (GFR = 15-29 mL/min/1.73 square meters)    Stage 5 End Stage CKD (GFR <15 mL/min/1.73 square meters)  Note: GFR calculation is accurate only with a steady state creatinine    Magnesium [[id number]]  (Abnormal) Collected: 04/28/25 0429    Lab Status: Final result Specimen: Blood from Arm, Left Updated: 04/28/25 0521     Magnesium 1.2 mg/dL     Ammonia [[id number]]  (Normal) Collected: 04/28/25 0429    Lab Status: Final result Specimen: Blood from Arm, Left Updated: 04/28/25 0518     Ammonia 37 umol/L     CBC and differential [[id number]]  (Abnormal) Collected: 04/28/25 0429    Lab Status: Final result Specimen: Blood from Arm, Left Updated: 04/28/25 0446     WBC 13.84 Thousand/uL      RBC 4.74 Million/uL      Hemoglobin 15.5 g/dL      Hematocrit 43.6 %      MCV 92 fL      MCH 32.7 pg      MCHC 35.6 g/dL      RDW 13.2 %      MPV 8.9 fL      Platelets 247 Thousands/uL      nRBC 0 /100 WBCs      Segmented % 80 %      Immature Grans % 0 %      Lymphocytes % 13 %      Monocytes % 7 %      Eosinophils Relative 0 %      Basophils Relative 0 %      Absolute Neutrophils 11.05 Thousands/µL      Absolute Immature Grans 0.05 Thousand/uL      Absolute Lymphocytes 1.73 Thousands/µL      Absolute Monocytes 0.92 Thousand/µL      Eosinophils Absolute 0.04 Thousand/µL      Basophils Absolute 0.05 Thousands/µL            No orders to display         ED Course         Critical Care Time  Procedures

## 2025-04-28 NOTE — DISCHARGE INSTRUCTIONS
Follow up with outpatient resources for alcohol use.  Follow up with PCP for ER follow up.  Return to ER with tremors, vomiting, seizures, chest pain, or any other concerning symptoms.

## 2025-04-29 NOTE — ED PROVIDER NOTES
Time reflects when diagnosis was documented in both MDM as applicable and the Disposition within this note       Time User Action Codes Description Comment    4/28/2025  7:08 AM Keaton Solitario Add [F10.930] Alcohol withdrawal syndrome without complication (HCC)     4/28/2025  7:08 AM Keaton Solitario Add [E83.42] Hypomagnesemia           ED Disposition       ED Disposition   Discharge    Condition   Stable    Date/Time   Mon Apr 28, 2025  7:10 AM    Comment   Diogo Travis discharge to home/self care.                   Assessment & Plan       Medical Decision Making  Very pleasant 59 year old male with a history of alcohol use disorder presenting with alcohol withdrawal symptoms starting last evening. Patient states he has been drinking a bottle of wine and a small bottle (~100-150ml) of bourbon whiskey daily, states his last drink was at 3:00pm yesterday. States he was sober for ~ 2 months, but has started drinking over the past few weeks due to stress from his father's recent cancer diagnosis. Patient denies any drug use. Patient notes nausea, vomiting, epigastric pain, and tremors. He denies any seizure like activity. Also notes mild intermittent shortness of breath due to vomiting. On exam, patient anxious with notable tremors. GCS 15, AOX3. Clinically sober. Mild tenderness to epigastric region.     DDX: alcohol withdrawal, ACS, viral gastroenteritis, other viral etiology, drug use    Patient clinically exhibiting alcohol withdrawal symptoms. CIWA score 11. Mild leukocytosis, likely due to N/V. Mg 1.2, repleted in ED. EKG sinus tachycardia, no acute ischemic changes. Patient given phenobarbital, zofran, pepcid, toradol, fluids in ED with significant improvement of symptoms in ED. Patient states he had inpatient rehab previously and is not currently interested in inpatient rehab, states he has multiple outpatient resources for his alcohol use disorder and would prefer outpatient treatment. Vitals stabilized after  treatment, tachycardia resolved. Patient still clinically sober. With shared decision making, patient safe for discharge with outpatient follow up. Will prescribe zofran and pepcid. Prior to discharge, discharge instructions were discussed with patient at bedside. Patient was provided both verbal and written instructions. Patient is understanding of the discharge instructions and is agreeable to plan of care. Return precautions were discussed with patient bedside, patient verbalized understanding of signs and symptoms that would necessitate return to the ED. All questions were answered. Patient was comfortable with the plan of care and discharged to home.       Problems Addressed:  Alcohol withdrawal syndrome without complication (HCC): acute illness or injury  Hypomagnesemia: acute illness or injury    Amount and/or Complexity of Data Reviewed  Labs: ordered.    Risk  Prescription drug management.        ED Course as of 04/29/25 0446   Mon Apr 28, 2025   0704 Patient with significant symptomatic improvement following phenobarb, pepcid, toradol, fluids. Patient still prefers outpatient management, has outpatient resources that he will follow up with.        Medications   ondansetron (ZOFRAN) injection 4 mg (4 mg Intravenous Given 4/28/25 0431)   sodium chloride 0.9 % bolus 1,000 mL (0 mL Intravenous Stopped 4/28/25 0531)   Famotidine (PF) (PEPCID) injection 20 mg (20 mg Intravenous Given 4/28/25 0431)   PHENobarbital 684 mg in sodium chloride 0.9 % 100 mL IVPB (684 mg Intravenous Given 4/28/25 0452)   ketorolac (TORADOL) injection 15 mg (15 mg Intravenous Given 4/28/25 0536)   magnesium sulfate 2 g/50 mL IVPB (premix) 2 g (0 g Intravenous Stopped 4/28/25 0654)       ED Risk Strat Scores                 CIWA-Ar Score       Row Name 04/28/25 6578             CIWA-Ar    Nausea and Vomiting 4      Tactile Disturbances 0      Tremor 3      Auditory Disturbances 0      Paroxysmal Sweats 1      Visual Disturbances 0       Anxiety 3      Headache, Fullness in Head 0      Agitation 0      Orientation and Clouding of Sensorium 0      CIWA-Ar Total 11                      No data recorded                            History of Present Illness       Chief Complaint   Patient presents with    Withdrawal - Alcohol     Pt reports going through alcohol withdraw, last drink 4/27 at 1500. Drinks bottle of wine and small bottle of bourbon daily. Pt denies wanting to go inpatient, just wants to feel better. Denies seizures, pt has visible tremors in triage. +N/V/SOB, denies CP       Past Medical History:   Diagnosis Date    Alcohol use disorder 12/24/2024    Alcohol use disorder, severe, dependence (HCC) 04/02/2023    Alcohol withdrawal seizure (HCC)     Alcoholic ketoacidosis 10/16/2023    Anxiety     AR (allergic rhinitis)     Chronic alcoholic gastritis 04/02/2023    Depression     GERD (gastroesophageal reflux disease)     Hepatic steatosis 04/02/2023    Hyperlipidemia     Hypertension     IBS (irritable bowel syndrome)     Obesity     Rosacea     Vitamin B12 deficiency 02/14/2024    Vitamin D deficiency 02/14/2024      Past Surgical History:   Procedure Laterality Date    ANTERIOR CRUCIATE LIGAMENT REPAIR Left     WISDOM TOOTH EXTRACTION        Family History   Problem Relation Age of Onset    Cancer Mother 78        Type unknown    Hypertension Father     Coronary artery disease Father 60    Cancer Father 81        Bladder    No Known Problems Sister     No Known Problems Sister     No Known Problems Sister     No Known Problems Son     No Known Problems Son     Heart attack Paternal Grandfather 60    Coronary artery disease Paternal Grandfather 60      Social History     Tobacco Use    Smoking status: Some Days     Types: Cigars     Start date: 1/1/2014     Passive exposure: Past (Father)    Smokeless tobacco: Never    Tobacco comments:     Smokes cigars 2-3 times per year   Vaping Use    Vaping status: Never Used   Substance Use Topics     Alcohol use: Yes     Alcohol/week: 28.0 standard drinks of alcohol     Types: 28 Glasses of wine per week    Drug use: Never      E-Cigarette/Vaping    E-Cigarette Use Never User       E-Cigarette/Vaping Substances    Nicotine No     THC No     CBD No     Flavoring No     Other No     Unknown No       I have reviewed and agree with the history as documented.     Very pleasant 59 year old male with a history of alcohol use disorder presenting with alcohol withdrawal symptoms starting last evening. Patient states he has been drinking a bottle of wine and a small bottle (~100-150ml) of bourbon whiskey daily, states his last drink was at 3:00pm yesterday. States he was sober for ~ 2 months, but has started drinking over the past few weeks due to stress from his father's recent cancer diagnosis. Patient denies any drug use. Patient notes nausea, vomiting, epigastric pain, and tremors. He denies any seizure like activity. Also notes mild intermittent shortness of breath due to vomiting. He denies headaches, fevers, chest pain, palpitations, diarrhea, urinary symptoms, visual changes, paresthesias. Patient is AOX3, GCS 15.       Withdrawal - Alcohol  Associated symptoms: abdominal pain (epigastric), nausea, shortness of breath (intermittent when vomiting) and vomiting    Associated symptoms: no palpitations, no seizures and no weakness        Review of Systems   Constitutional:  Positive for diaphoresis. Negative for fever.   HENT:  Negative for congestion and ear pain.    Eyes:  Negative for visual disturbance.   Respiratory:  Positive for shortness of breath (intermittent when vomiting). Negative for cough and wheezing.    Cardiovascular:  Negative for chest pain and palpitations.   Gastrointestinal:  Positive for abdominal pain (epigastric), nausea and vomiting. Negative for blood in stool and diarrhea.   Genitourinary: Negative.    Musculoskeletal: Negative.    Neurological:  Positive for tremors. Negative for  dizziness, seizures, syncope, facial asymmetry, speech difficulty, weakness, light-headedness and numbness.   Psychiatric/Behavioral:  The patient is nervous/anxious.            Objective       ED Triage Vitals   Temperature Pulse Blood Pressure Respirations SpO2 Patient Position - Orthostatic VS   04/28/25 0439 04/28/25 0403 04/28/25 0403 04/28/25 0403 04/28/25 0403 04/28/25 0403   98.6 °F (37 °C) (!) 135 (!) 178/99 22 97 % Lying      Temp Source Heart Rate Source BP Location FiO2 (%) Pain Score    04/28/25 0403 04/28/25 0403 04/28/25 0403 -- 04/28/25 0403    Oral Monitor Right arm  10 - Worst Possible Pain      Vitals      Date and Time Temp Pulse SpO2 Resp BP Pain Score FACES Pain Rating User   04/28/25 0700 -- 99 95 % 20 147/96 -- -- KM   04/28/25 0600 -- 96 94 % 20 139/83 -- -- ND   04/28/25 0500 -- 102 93 % 20 164/95 -- -- ND   04/28/25 0439 98.6 °F (37 °C) -- -- -- -- -- -- ND   04/28/25 0403 -- 135 97 % 22 178/99 10 - Worst Possible Pain -- MM            Physical Exam  Constitutional:       General: He is in acute distress (mild due to tremors).      Appearance: He is not toxic-appearing.      Comments: AOX3, GCS 15, linear thought content, clinically sober.    HENT:      Head: Normocephalic and atraumatic.      Right Ear: Tympanic membrane, ear canal and external ear normal.      Left Ear: Tympanic membrane, ear canal and external ear normal.      Nose: Nose normal.      Mouth/Throat:      Mouth: Mucous membranes are moist.      Pharynx: Oropharynx is clear.   Eyes:      Conjunctiva/sclera: Conjunctivae normal.   Cardiovascular:      Rate and Rhythm: Regular rhythm. Tachycardia present.      Pulses: Normal pulses.      Heart sounds: Normal heart sounds. No murmur heard.  Pulmonary:      Effort: Pulmonary effort is normal.      Breath sounds: Normal breath sounds. No wheezing, rhonchi or rales.   Abdominal:      General: Abdomen is flat. Bowel sounds are normal.      Palpations: Abdomen is soft.       Tenderness: There is abdominal tenderness (mild epigastric). There is no guarding or rebound.   Musculoskeletal:         General: Normal range of motion.      Cervical back: Normal range of motion.   Skin:     General: Skin is warm and dry.      Capillary Refill: Capillary refill takes less than 2 seconds.   Neurological:      General: No focal deficit present.      Mental Status: He is alert and oriented to person, place, and time. Mental status is at baseline.      GCS: GCS eye subscore is 4. GCS verbal subscore is 5. GCS motor subscore is 6.      Cranial Nerves: Cranial nerves 2-12 are intact.   Psychiatric:         Mood and Affect: Mood normal.         Behavior: Behavior normal.         Thought Content: Thought content normal.         Results Reviewed       Procedure Component Value Units Date/Time    Lipase [[id number]]  (Normal) Collected: 04/28/25 0429    Lab Status: Final result Specimen: Blood from Arm, Left Updated: 04/28/25 0521     Lipase 21 u/L     Comprehensive metabolic panel [[id number]]  (Abnormal) Collected: 04/28/25 0429    Lab Status: Final result Specimen: Blood from Arm, Left Updated: 04/28/25 0521     Sodium 138 mmol/L      Potassium 4.0 mmol/L      Chloride 100 mmol/L      CO2 24 mmol/L      ANION GAP 14 mmol/L      BUN 7 mg/dL      Creatinine 0.95 mg/dL      Glucose 127 mg/dL      Calcium 9.5 mg/dL      AST 36 U/L      ALT 20 U/L      Alkaline Phosphatase 137 U/L      Total Protein 7.7 g/dL      Albumin 4.5 g/dL      Total Bilirubin 1.25 mg/dL      eGFR 87 ml/min/1.73sq m     Narrative:      [employer] guidelines for Chronic Kidney Disease (CKD):     Stage 1 with normal or high GFR (GFR > 90 mL/min/1.73 square meters)    Stage 2 Mild CKD (GFR = 60-89 mL/min/1.73 square meters)    Stage 3A Moderate CKD (GFR = 45-59 mL/min/1.73 square meters)    Stage 3B Moderate CKD (GFR = 30-44 mL/min/1.73 square meters)    Stage 4 Severe CKD (GFR = 15-29 mL/min/1.73 square meters)     Stage 5 End Stage CKD (GFR <15 mL/min/1.73 square meters)  Note: GFR calculation is accurate only with a steady state creatinine    Magnesium [[id number]]  (Abnormal) Collected: 04/28/25 0429    Lab Status: Final result Specimen: Blood from Arm, Left Updated: 04/28/25 0521     Magnesium 1.2 mg/dL     Ammonia [[id number]]  (Normal) Collected: 04/28/25 0429    Lab Status: Final result Specimen: Blood from Arm, Left Updated: 04/28/25 0518     Ammonia 37 umol/L     CBC and differential [[id number]]  (Abnormal) Collected: 04/28/25 0429    Lab Status: Final result Specimen: Blood from Arm, Left Updated: 04/28/25 0446     WBC 13.84 Thousand/uL      RBC 4.74 Million/uL      Hemoglobin 15.5 g/dL      Hematocrit 43.6 %      MCV 92 fL      MCH 32.7 pg      MCHC 35.6 g/dL      RDW 13.2 %      MPV 8.9 fL      Platelets 247 Thousands/uL      nRBC 0 /100 WBCs      Segmented % 80 %      Immature Grans % 0 %      Lymphocytes % 13 %      Monocytes % 7 %      Eosinophils Relative 0 %      Basophils Relative 0 %      Absolute Neutrophils 11.05 Thousands/µL      Absolute Immature Grans 0.05 Thousand/uL      Absolute Lymphocytes 1.73 Thousands/µL      Absolute Monocytes 0.92 Thousand/µL      Eosinophils Absolute 0.04 Thousand/µL      Basophils Absolute 0.05 Thousands/µL             No orders to display       ECG 12 Lead Documentation Only    Date/Time: 4/28/2025 4:15 AM    Performed by: Keaton Solitario PA-C  Authorized by: Keaton Solitario PA-C    Indications / Diagnosis:  Withdrawal symptoms  Patient location:  ED  Previous ECG:     Previous ECG:  Compared to current    Similarity:  Changes noted  Interpretation:     Interpretation: abnormal    Quality:     Tracing quality:  Limited by artifact  Rate:     ECG rate:  123    ECG rate assessment: tachycardic    Rhythm:     Rhythm: sinus tachycardia    Ectopy:     Ectopy: none    QRS:     QRS axis:  Normal    QRS intervals:  Normal  Conduction:     Conduction: normal    ST segments:     ST  segments:  Normal  T waves:     T waves: normal    Comments:      Sinus tachycardia, no acute ischemic changes.      ED Medication and Procedure Management   Prior to Admission Medications   Prescriptions Last Dose Informant Patient Reported? Taking?   Omega-3 Fatty Acids (fish oil) 1,000 mg   Yes No   Sig: Take 2,000 mg by mouth daily   Thiamine Mononitrate (VITAMIN B1) 100 mg tablet   No No   Sig: Take 1 tablet (100 mg total) by mouth daily   acetaminophen (TYLENOL) 325 mg tablet   No No   Sig: Take 3 tablets (975 mg total) by mouth every 8 (eight) hours   atorvastatin (LIPITOR) 40 mg tablet   No No   Sig: Take 1 tablet (40 mg total) by mouth daily with dinner   Patient taking differently: Take 40 mg by mouth daily at bedtime   escitalopram (LEXAPRO) 20 mg tablet   No No   Sig: Take 1 tablet (20 mg total) by mouth daily   folic acid (FOLVITE) 1 mg tablet   No No   Sig: Take 1 tablet (1 mg total) by mouth daily   gabapentin (Neurontin) 100 mg capsule   No No   Sig: Take 1 capsule (100 mg total) by mouth 3 (three) times a day as needed (anxiety) for up to 3 days   ibuprofen (MOTRIN) 400 mg tablet   No No   Sig: Take 1 tablet (400 mg total) by mouth every 6 (six) hours as needed for mild pain   lisinopril (ZESTRIL) 40 mg tablet   No No   Sig: Take 1 tablet (40 mg total) by mouth daily   methocarbamol (ROBAXIN) 500 mg tablet   No No   Sig: Take 1 tablet (500 mg total) by mouth 4 (four) times a day for 5 days   mirtazapine (REMERON) 15 mg tablet   No No   Sig: Take 1 tablet (15 mg total) by mouth daily at bedtime   pantoprazole (PROTONIX) 40 mg tablet   No No   Sig: Take 1 tablet (40 mg total) by mouth daily in the early morning      Facility-Administered Medications: None     Discharge Medication List as of 4/28/2025  7:12 AM        START taking these medications    Details   famotidine (PEPCID) 20 mg tablet Take 1 tablet (20 mg total) by mouth daily, Starting Mon 4/28/2025, Normal      ondansetron (ZOFRAN) 4 mg  tablet Take 1 tablet (4 mg total) by mouth every 6 (six) hours, Starting Mon 4/28/2025, Normal           CONTINUE these medications which have NOT CHANGED    Details   acetaminophen (TYLENOL) 325 mg tablet Take 3 tablets (975 mg total) by mouth every 8 (eight) hours, Starting Sun 12/29/2024, No Print      atorvastatin (LIPITOR) 40 mg tablet Take 1 tablet (40 mg total) by mouth daily with dinner, Starting Mon 12/9/2024, Normal      escitalopram (LEXAPRO) 20 mg tablet Take 1 tablet (20 mg total) by mouth daily, Starting Wed 2/12/2025, Normal      folic acid (FOLVITE) 1 mg tablet Take 1 tablet (1 mg total) by mouth daily, Starting Fri 2/14/2025, Until Sun 3/16/2025, Normal      gabapentin (Neurontin) 100 mg capsule Take 1 capsule (100 mg total) by mouth 3 (three) times a day as needed (anxiety) for up to 3 days, Starting Fri 2/14/2025, Until Mon 2/17/2025 at 2359, Normal      ibuprofen (MOTRIN) 400 mg tablet Take 1 tablet (400 mg total) by mouth every 6 (six) hours as needed for mild pain, Starting Sun 12/29/2024, Normal      lisinopril (ZESTRIL) 40 mg tablet Take 1 tablet (40 mg total) by mouth daily, Starting Mon 12/9/2024, Normal      methocarbamol (ROBAXIN) 500 mg tablet Take 1 tablet (500 mg total) by mouth 4 (four) times a day for 5 days, Starting Fri 2/14/2025, Until Wed 2/19/2025, Normal      mirtazapine (REMERON) 15 mg tablet Take 1 tablet (15 mg total) by mouth daily at bedtime, Starting Wed 2/12/2025, Normal      Omega-3 Fatty Acids (fish oil) 1,000 mg Take 2,000 mg by mouth daily, Historical Med      pantoprazole (PROTONIX) 40 mg tablet Take 1 tablet (40 mg total) by mouth daily in the early morning, Starting Fri 4/4/2025, Until Wed 10/1/2025, Normal      Thiamine Mononitrate (VITAMIN B1) 100 mg tablet Take 1 tablet (100 mg total) by mouth daily, Starting Fri 2/14/2025, Normal           No discharge procedures on file.  ED SEPSIS DOCUMENTATION   Time reflects when diagnosis was documented in both MDM as  applicable and the Disposition within this note       Time User Action Codes Description Comment    4/28/2025  7:08 AM Keaton Solitario [F10.930] Alcohol withdrawal syndrome without complication (HCC)     4/28/2025  7:08 AM Keaton Solitario [E83.42] Hypomagnesemia                  Keaton Solitario PA-C  04/29/25 0446

## 2025-05-01 NOTE — PROGRESS NOTES
Assessment/Plan:  Assessment & Plan  Primary hypertension  Elevated BP today.  Check blood pressure outside of office. Recommend lifestyle modifications. ETOH cessation advised.        Alcohol use disorder  Uncontrolled.  Complete ETOH cessation advised.  Patient encouraged to attend outpatient Rehab as he feels that is his best option.  He has contact information for these resources.  Smart phone dharmesh list and counseling list provided previously.         Anxiety  Unstable.  D/C Lexapro 20mg QD as ineffective.  Start Zoloft 50mg QD, Atarax 25mg 1/2- 1 tab BID PRN.  Patient declines starting Buspar 7.5mg TID PRN, but may reconsider in future.     Orders:    hydrOXYzine HCL (ATARAX) 25 mg tablet; Take 0.5-1 tablets (12.5-25 mg total) by mouth 2 (two) times a day as needed for anxiety    sertraline (ZOLOFT) 50 mg tablet; 1/2 tab by mouth daily x 3 days, then increase 1 tab by mouth daily.    Bilateral impacted cerumen  Patient tolerated B/L ear lavage well.  Advise Debrox PRN, avoid Q-tips.         Viral illness  Negative POCT COVID and Strep.  Throat culture is pending.      Advise Pee med sinus rinse kit, Mucinex, Claritin/Zyrtec/Allegra/Xyzal, Flonase / Nasacort nasal spray.  Avoid decongestants if you have high blood pressure.    Orders:    POCT Rapid Covid Ag    Pharyngitis, unspecified etiology  Negative POCT COVID and Strep.  Throat culture is pending.  Advise Motrin / Tylenol PRN, salt water gargles.      Advise Pee med sinus rinse kit, Mucinex, Claritin/Zyrtec/Allegra/Xyzal, Flonase / Nasacort nasal spray.  Avoid decongestants if you have high blood pressure.      Orders:    POCT rapid ANTIGEN strepA    Throat culture    Dysphagia, unspecified type  Advise GI consult for possible EGD if no improvement s/p URI resolution.  ETOH cessation advised.         Globus sensation  Advise GI consult for possible EGD if no improvement s/p URI resolution.  ETOH cessation advised.         Other insomnia  Unstable.  D/C  Lexapro 20mg QD as ineffective.  Start Zoloft 50mg QD, Atarax 25mg 1/2- 1 tab BID PRN.  Patient declines starting Buspar 7.5mg TID PRN, but may reconsider in future.       Dysfunction of both eustachian tubes  Advise Pee med sinus rinse kit, Mucinex, Claritin/Zyrtec/Allegra/Xyzal, Flonase / Nasacort nasal spray.  Avoid decongestants if you have high blood pressure.         Current moderate episode of major depressive disorder, unspecified whether recurrent (HCC)  Depression Screening Follow-up Plan: Patient's depression screening was positive with a PHQ-9 score of 13. Patient advised to follow-up with PCP for further management.    Unstable.  D/C Lexapro 20mg QD as ineffective.  Start Zoloft 50mg QD, Atarax 25mg 1/2- 1 tab BID PRN.  Patient declines starting Buspar 7.5mg TID PRN, but may reconsider in future.    Orders:    sertraline (ZOLOFT) 50 mg tablet; 1/2 tab by mouth daily x 3 days, then increase 1 tab by mouth daily.          Return in about 6 weeks (around 6/13/2025) for  Physical -  F/U HTN, HL, Anx/Dep, ETOH, GERD, Vit B12/D Def, Labs.      Future Appointments   Date Time Provider Department Center   7/2/2025 10:00 AM Enid Altman DO [facility]          Subjective:     Diogo is a 59 y.o. male who presents today for a follow-up on his chronic medical conditions.        HPI:  Chief Complaint   Patient presents with    Anxiety    Sore Throat     Clogged ears, mouth pain     -- Above per clinical staff and reviewed. --      HPI      Today:      Pharyngitis - Symptoms x 8 days,  No COVID home test.  Using Tylenol and Ibuprofen PRN c benefit.  Has food feels stuck with swallowing x 1 week and had phlegm in his throat.       B/L Ears Clogged - Symptoms x couple weeks.  L > R.  Not using OTC mes.             Obesity - Trying to watch diet.  No exercise.       HTN - On Lisinopril 40mg QD.  No other HTN meds.  No BP check outside of office on arm cuff.        Anxiety / Depression - Triggered by  divorce 2017.  Mood is anxious, had insomnia.  Father Dx c Bladder Cancer c Mets.  Has good and bad days.  Good social supports.  No SI/HI/AH/VH.  No Psych previously.  On Lexapro 20mg QD - feels Rx is not working;  and Atarax 25mg q6 hours PRN - patient did not  Rx yet.  Previously on Xanax 0.5mg QD PRN, D/C Remeron 15mg QHS due to nightmares and fatigue.  No other mood meds.  No counseling previously.             PHQ-2/9 Depression Screening    Little interest or pleasure in doing things: 2 - more than half the days  Feeling down, depressed, or hopeless: 3 - nearly every day  Trouble falling or staying asleep, or sleeping too much: 3 - nearly every day  Feeling tired or having little energy: 2 - more than half the days  Poor appetite or overeatin - several days  Feeling bad about yourself - or that you are a failure or have let yourself or your family down: 2 - more than half the days  Trouble concentrating on things, such as reading the newspaper or watching television: 0 - not at all  Moving or speaking so slowly that other people could have noticed. Or the opposite - being so fidgety or restless that you have been moving around a lot more than usual: 0 - not at all  Thoughts that you would be better off dead, or of hurting yourself in some way: 0 - not at all  PHQ-9 Score: 13  PHQ-9 Interpretation: Moderate depression       POLO-7 Flowsheet Screening      Flowsheet Row Most Recent Value   Over the last two weeks, how often have you been bothered by the following problems?     Feeling nervous, anxious, or on edge 3   Not being able to stop or control worrying 3   Worrying too much about different things 3   Trouble relaxing  3   Being so restless that it's hard to sit still 2   Becoming easily annoyed or irritable  2   Feeling afraid as if something awful might happen 2   How difficult have these problems made it for you to do your work, take care of things at home, or get along with other people?  Not  difficult at all   POLO Score  18               MDQ:   1, Asynchronous, No Problem        GERD / IBS / Chronic Alcoholic Gastritis / Hepatomegaly / Fatty Liver - Referred to Hepatology and GI - No appts scheduled yet.  Management per GI Dr. Art - Next appt PRN as his insurance is not participating.  Last EGD and Colonoscopy 2/12.  On Protonix 40mg QD. Overdue for 10-year repeat colonoscopy 2/2022.  Watching GERD diet.       Microscopic Hematuria - No Uro work-up previously.  U/A C&S?       ETOH Abuse / H/O ETOH Withdrawal Seizures - He has had 6 admissions in the past year due to ETOH use disorder, and only followed up c PCP once.  Triggered by divorce 2017.  s/p Medical Detox Admission [facility] [place] 2/5/24 - 2/7/24.  Last ETOH drink 4/27/24.  Previously drinking 35 drinks weekly /  3 bottles of wine daily.  Previously on Naltrexone.  On MVI, Thiamine, Folic Acid.   provided patient with outpatient Alcohol Rehab resources -  patient has not yet made call due to caring for his father.                From previous note:    Return in about 6 weeks (around 8/20/2024) for  F/U HTN, HL, Anx/Dep, ETOH, GERD, Vit B12/D Def, Labs; 6mo - Hep A#2; PRN [id number]     Patient did not complete labs 7/9/24.       PTO c pham Barrera     Desires PSA, MIAN c overdue colonoscopy and yearly per PCP thereafter.       COVID - Dx 1/25.  Has cough that causes rib spasm.  Not using OTC meds.       Obesity - Trying to watch diet.  No exercise.       HTN - On Lisinopril 40mg QD.  No other HTN meds.  No BP check outside of office on arm cuff.     Hyperlipidemia - No statin previously.       Anxiety / Depression - Triggered by divorce 2017.  Mood is depressed.  Father Dx c Bladder Cancer.  Has good and bad days.  Good social supports.  No SI/HI/AH/VH.  No Psych previously.  On Lexapro 20mg QD and Atarax 25mg q6 hours PRN - patient did not  Rx yet.  Previously on Xanax 0.5mg QD PRN.  He never filled Remeron 15mg  [facility]  for no particular reason.  No other mood meds.  No counseling previously.       PHQ-2/9 Depression Screening      Little interest or pleasure in doing things: 1 - several days  Feeling down, depressed, or hopeless: 1 - several days  Trouble falling or staying asleep, or sleeping too much: 3 - nearly every day  Feeling tired or having little energy: 2 - more than half the days  Poor appetite or overeatin - more than half the days  Feeling bad about yourself - or that you are a failure or have let yourself or your family down: 3 - nearly every day  Trouble concentrating on things, such as reading the newspaper or watching television: 1 - several days  Moving or speaking so slowly that other people could have noticed. Or the opposite - being so fidgety or restless that you have been moving around a lot more than usual: 0 - not at all  Thoughts that you would be better off dead, or of hurting yourself in some way: 0 - not at all  PHQ-9 Score: 13  PHQ-9 Interpretation: Moderate depression                  POLO-7 Flowsheet Screening    Flowsheet Row Most Recent Value   Over the last 2 weeks, how often have you been bothered by any of the following problems?     Feeling nervous, anxious, or on edge 2   Not being able to stop or control worrying 2   Worrying too much about different things 2   Trouble relaxing 2   Being so restless that it is hard to sit still 2   Becoming easily annoyed or irritable 1   Feeling afraid as if something awful might happen 2   POLO-7 Total Score 13          .  PHQ-2/9 Depression Screening      Little interest or pleasure in doing things: 3 - nearly every day  Feeling down, depressed, or hopeless: 3 - nearly every day  Trouble falling or staying asleep, or sleeping too much: 3 - nearly every day  Feeling tired or having little energy: 3 - nearly every day  Poor appetite or overeating: 3 - nearly every day  Feeling bad about yourself - or that you are a failure or have let yourself  or your family down: 0 - not at all  Trouble concentrating on things, such as reading the newspaper or watching television: 0 - not at all  Moving or speaking so slowly that other people could have noticed. Or the opposite - being so fidgety or restless that you have been moving around a lot more than usual: 0 - not at all  Thoughts that you would be better off dead, or of hurting yourself in some way: 0 - not at all  PHQ-9 Score: 15  PHQ-9 Interpretation: Moderately severe depression                POLO-7 Flowsheet Screening    Flowsheet Row Most Recent Value   Over the last two weeks, how often have you been bothered by the following problems?      Feeling nervous, anxious, or on edge 1   Not being able to stop or control worrying 3   Worrying too much about different things 3   Trouble relaxing  3   Being so restless that it's hard to sit still 3   Becoming easily annoyed or irritable  3   Feeling afraid as if something awful might happen 1   How difficult have these problems made it for you to do your work, take care of things at home, or get along with other people?  Not difficult at all   POLO Score  17                MDQ:   1, Asynchronous, No Problem        GERD / IBS / Chronic Alcoholic Gastritis / Hepatomegaly / Fatty Liver - Referred to Hepatology and GI - No appts scheduled yet.  Management per GI Dr. Art - Next appt PRN as his insurance is not participating.  Last EGD and Colonoscopy 2/12.  On Protonix 40mg QD. Overdue for 10-year repeat colonoscopy 2/2022.  Watching GERD diet.       Microscopic Hematuria - No Uro work-up previously.  U/A C&S?      ETOH Abuse / H/O ETOH Withdrawal Seizures - He has had 6 admissions in the past year due to ETOH use disorder, and only followed up c PCP once.  Triggered by divorce 2017.  s/p Medical Detox Admission [facility] [place] 2/5/24 - 2/7/24.  Last ETOH drink 4/27/24.  Previously drinking 35 drinks weekly /  3 bottles of wine daily.  Previously on  Naltrexone.  On MVI, Thiamine, Folic Acid.   provided patient with outpatient Alcohol Rehab resources -  patient has not yet made call due to caring for his father.         AR - Stable s meds.       Rosacea - Pending Derm consult as referred 1/14/24 - Not appt scheduled yet.  On Metronidazole cream.       Reviewed:  Labs 1/19/25     No Uro.        The following portions of the patient's history were reviewed and updated as appropriate: allergies, current medications, past family history, past medical history, past social history, past surgical history and problem list.      Review of Systems   Constitutional:  Positive for appetite change (Decreased) and fatigue. Negative for chills, diaphoresis and fever (Resolved).   HENT:  Positive for sore throat. Negative for ear discharge and ear pain.    Respiratory:  Positive for cough (Dry). Negative for chest tightness, shortness of breath and wheezing.    Cardiovascular:  Negative for chest pain.   Gastrointestinal:  Negative for abdominal pain, blood in stool, diarrhea, nausea and vomiting.   Genitourinary:  Negative for dysuria.        Current Outpatient Medications   Medication Sig Dispense Refill    acetaminophen (TYLENOL) 325 mg tablet Take 3 tablets (975 mg total) by mouth every 8 (eight) hours      atorvastatin (LIPITOR) 40 mg tablet Take 1 tablet (40 mg total) by mouth daily with dinner 90 tablet 1    famotidine (PEPCID) 20 mg tablet Take 1 tablet (20 mg total) by mouth daily 30 tablet 0    folic acid (FOLVITE) 1 mg tablet Take 1 tablet (1 mg total) by mouth daily 30 tablet 0    hydrOXYzine HCL (ATARAX) 25 mg tablet Take 0.5-1 tablets (12.5-25 mg total) by mouth 2 (two) times a day as needed for anxiety 30 tablet 0    ibuprofen (MOTRIN) 400 mg tablet Take 1 tablet (400 mg total) by mouth every 6 (six) hours as needed for mild pain 30 tablet 0    lisinopril (ZESTRIL) 40 mg tablet Take 1 tablet (40 mg total) by mouth daily 90 tablet 1    Omega-3 Fatty  "Acids (fish oil) 1,000 mg Take 2,000 mg by mouth daily      pantoprazole (PROTONIX) 40 mg tablet Take 1 tablet (40 mg total) by mouth daily in the early morning 90 tablet 1    sertraline (ZOLOFT) 50 mg tablet 1/2 tab by mouth daily x 3 days, then increase 1 tab by mouth daily. 30 tablet 1    Thiamine Mononitrate (VITAMIN B1) 100 mg tablet Take 1 tablet (100 mg total) by mouth daily 30 tablet 0     No current facility-administered medications for this visit.       Objective:  /90   Pulse 98   Temp 98.7 °F (37.1 °C) (Oral)   Resp 16   Ht 5' 8\" (1.727 m)   Wt 102 kg (224 lb)   SpO2 97%   BMI 34.06 kg/m²    Wt Readings from Last 3 Encounters:   05/02/25 102 kg (224 lb)   04/28/25 97.2 kg (214 lb 4.6 oz)   02/12/25 101 kg (222 lb)      BP Readings from Last 3 Encounters:   05/02/25 120/90   04/28/25 147/96   02/14/25 153/88          Physical Exam  Vitals and nursing note reviewed.   Constitutional:       General: He is not in acute distress.     Appearance: Normal appearance. He is well-developed. He is obese. He is not ill-appearing or toxic-appearing.   HENT:      Head: Normocephalic and atraumatic.      Right Ear: External ear normal. There is impacted cerumen.      Left Ear: Tympanic membrane, ear canal and external ear normal.      Ears:      Comments: S/p B/L ear lavage - B/L TM clear, gray intact     Nose: Nose normal.      Right Sinus: No maxillary sinus tenderness or frontal sinus tenderness.      Left Sinus: No maxillary sinus tenderness or frontal sinus tenderness.      Mouth/Throat:      Mouth: Mucous membranes are moist.      Pharynx: Oropharynx is clear. Uvula midline. No oropharyngeal exudate or posterior oropharyngeal erythema.      Tonsils: No tonsillar exudate.   Eyes:      Extraocular Movements: Extraocular movements intact.      Conjunctiva/sclera: Conjunctivae normal.   Cardiovascular:      Rate and Rhythm: Normal rate and regular rhythm.      Pulses: Normal pulses.      Heart sounds: " "Normal heart sounds.   Pulmonary:      Effort: Pulmonary effort is normal.      Breath sounds: Normal breath sounds.   Abdominal:      General: Bowel sounds are normal.   Musculoskeletal:         General: No swelling or tenderness.      Cervical back: Neck supple.      Right lower leg: No edema.      Left lower leg: No edema.   Lymphadenopathy:      Cervical: No cervical adenopathy.   Skin:     Findings: No rash.   Neurological:      General: No focal deficit present.      Mental Status: He is alert and oriented to person, place, and time.   Psychiatric:         Attention and Perception: Attention and perception normal.         Mood and Affect: Mood is depressed. Affect is tearful.         Speech: Speech normal.         Behavior: Behavior normal. Behavior is cooperative.         Thought Content: Thought content normal.         Cognition and Memory: Cognition and memory normal.         Judgment: Judgment normal.         Lab Results:      Lab Results   Component Value Date    WBC 13.84 (H) 04/28/2025    HGB 15.5 04/28/2025    HCT 43.6 04/28/2025     04/28/2025    TRIG 152 (H) 02/14/2024    HDL 46 02/14/2024    LDLDIRECT 252 (H) 02/14/2024    ALT 20 04/28/2025    AST 36 04/28/2025    K 4.0 04/28/2025     04/28/2025    CREATININE 0.95 04/28/2025    BUN 7 04/28/2025    CO2 24 04/28/2025    TSH 1.73 11/12/2019    PSA 0.51 02/14/2024    INR 1.00 01/17/2025    GLUF 85 01/18/2025    HGBA1C 4.9 02/14/2024     No results found for: \"URICACID\"  Invalid input(s): \"BASENAME\" Vitamin D    No results found.     POCT Labs           5 minutes spent on chart prep, 52 minutes spent with patient counseling/educating on their diagnoses, tests completed and any new tests ordered, any referrals placed, treatment options, and documentation of above today.   In prescribing new medications, or changing doses, we reviewed the risks and benefits and side effects of these medications along with other treatment options if " appropriate.

## 2025-05-02 ENCOUNTER — OFFICE VISIT (OUTPATIENT)
Dept: FAMILY MEDICINE CLINIC | Facility: CLINIC | Age: 60
End: 2025-05-02
Payer: COMMERCIAL

## 2025-05-02 VITALS
WEIGHT: 224 LBS | TEMPERATURE: 98.7 F | SYSTOLIC BLOOD PRESSURE: 120 MMHG | DIASTOLIC BLOOD PRESSURE: 90 MMHG | HEART RATE: 98 BPM | RESPIRATION RATE: 16 BRPM | BODY MASS INDEX: 33.95 KG/M2 | OXYGEN SATURATION: 97 % | HEIGHT: 68 IN

## 2025-05-02 DIAGNOSIS — R13.10 DYSPHAGIA, UNSPECIFIED TYPE: ICD-10-CM

## 2025-05-02 DIAGNOSIS — F41.9 ANXIETY: ICD-10-CM

## 2025-05-02 DIAGNOSIS — H69.93 DYSFUNCTION OF BOTH EUSTACHIAN TUBES: ICD-10-CM

## 2025-05-02 DIAGNOSIS — F10.90 ALCOHOL USE DISORDER: ICD-10-CM

## 2025-05-02 DIAGNOSIS — H61.23 BILATERAL IMPACTED CERUMEN: ICD-10-CM

## 2025-05-02 DIAGNOSIS — J02.9 PHARYNGITIS, UNSPECIFIED ETIOLOGY: ICD-10-CM

## 2025-05-02 DIAGNOSIS — B34.9 VIRAL ILLNESS: ICD-10-CM

## 2025-05-02 DIAGNOSIS — I10 PRIMARY HYPERTENSION: Primary | Chronic | ICD-10-CM

## 2025-05-02 DIAGNOSIS — R09.A2 GLOBUS SENSATION: ICD-10-CM

## 2025-05-02 DIAGNOSIS — F32.1 CURRENT MODERATE EPISODE OF MAJOR DEPRESSIVE DISORDER, UNSPECIFIED WHETHER RECURRENT (HCC): Chronic | ICD-10-CM

## 2025-05-02 DIAGNOSIS — G47.09 OTHER INSOMNIA: ICD-10-CM

## 2025-05-02 PROBLEM — G89.11 ACUTE PAIN DUE TO TRAUMA: Status: RESOLVED | Noted: 2024-12-24 | Resolved: 2025-05-02

## 2025-05-02 PROBLEM — F10.939 ALCOHOL WITHDRAWAL (HCC): Status: RESOLVED | Noted: 2024-10-10 | Resolved: 2025-05-02

## 2025-05-02 PROBLEM — U07.1 COVID-19: Status: RESOLVED | Noted: 2025-01-17 | Resolved: 2025-05-02

## 2025-05-02 PROBLEM — S22.49XA MULTIPLE RIB FRACTURES: Status: RESOLVED | Noted: 2024-12-24 | Resolved: 2025-05-02
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
## 2025-05-02 LAB
S PYO AG THROAT QL: NEGATIVE
SARS-COV-2 AG UPPER RESP QL IA: NEGATIVE
VALID CONTROL: NORMAL

## 2025-05-02 PROCEDURE — 99215 OFFICE O/P EST HI 40 MIN: CPT | Performed by: FAMILY MEDICINE

## 2025-05-02 PROCEDURE — 99417 PROLNG OP E/M EACH 15 MIN: CPT | Performed by: FAMILY MEDICINE

## 2025-05-02 PROCEDURE — 87811 SARS-COV-2 COVID19 W/OPTIC: CPT | Performed by: FAMILY MEDICINE

## 2025-05-02 PROCEDURE — 87880 STREP A ASSAY W/OPTIC: CPT | Performed by: FAMILY MEDICINE

## 2025-05-02 RX ORDER — HYDROXYZINE HYDROCHLORIDE 25 MG/1
12.5-25 TABLET, FILM COATED ORAL 2 TIMES DAILY PRN
Qty: 30 TABLET | Refills: 0 | Status: SHIPPED | OUTPATIENT
Start: 2025-05-02

## 2025-05-02 NOTE — ASSESSMENT & PLAN NOTE
Unstable.  D/C Lexapro 20mg QD as ineffective.  Start Zoloft 50mg QD, Atarax 25mg 1/2- 1 tab BID PRN.  Patient declines starting Buspar 7.5mg TID PRN, but may reconsider in future.     Orders:    hydrOXYzine HCL (ATARAX) 25 mg tablet; Take 0.5-1 tablets (12.5-25 mg total) by mouth 2 (two) times a day as needed for anxiety    sertraline (ZOLOFT) 50 mg tablet; 1/2 tab by mouth daily x 3 days, then increase 1 tab by mouth daily.

## 2025-05-02 NOTE — PATIENT INSTRUCTIONS
Advise Pee med sinus rinse kit, Mucinex, Claritin/Zyrtec/Allegra/Xyzal, Flonase / Nasacort nasal spray.  Avoid decongestants if you have high blood pressure.    You may use Motrin (Ibuprofen) up to 800mg 3 times daily with food (in 24 hours) as needed for pain or fever.    You may use Tylenol (Acetaminophen) up to 3,000mg daily (in 24 hours) as needed for pain or fever.

## 2025-05-02 NOTE — ASSESSMENT & PLAN NOTE
Elevated BP today.  Check blood pressure outside of office. Recommend lifestyle modifications. ETOH cessation advised.

## 2025-05-02 NOTE — ASSESSMENT & PLAN NOTE
Unstable.  D/C Lexapro 20mg QD as ineffective.  Start Zoloft 50mg QD, Atarax 25mg 1/2- 1 tab BID PRN.  Patient declines starting Buspar 7.5mg TID PRN, but may reconsider in future.

## 2025-05-02 NOTE — ASSESSMENT & PLAN NOTE
Uncontrolled.  Complete ETOH cessation advised.  Patient encouraged to attend outpatient Rehab as he feels that is his best option.  He has contact information for these resources.  Smart phone dharmesh list and counseling list provided previously.

## 2025-05-02 NOTE — ASSESSMENT & PLAN NOTE
Depression Screening Follow-up Plan: Patient's depression screening was positive with a PHQ-9 score of 13. Patient advised to follow-up with PCP for further management.    Unstable.  D/C Lexapro 20mg QD as ineffective.  Start Zoloft 50mg QD, Atarax 25mg 1/2- 1 tab BID PRN.  Patient declines starting Buspar 7.5mg TID PRN, but may reconsider in future.    Orders:    sertraline (ZOLOFT) 50 mg tablet; 1/2 tab by mouth daily x 3 days, then increase 1 tab by mouth daily.

## 2025-06-21 ENCOUNTER — HOSPITAL ENCOUNTER (EMERGENCY)
Facility: HOSPITAL | Age: 60
Discharge: HOME | End: 2025-06-21
Attending: STUDENT IN AN ORGANIZED HEALTH CARE EDUCATION/TRAINING PROGRAM | Admitting: STUDENT IN AN ORGANIZED HEALTH CARE EDUCATION/TRAINING PROGRAM
Payer: COMMERCIAL

## 2025-06-21 VITALS
OXYGEN SATURATION: 98 % | DIASTOLIC BLOOD PRESSURE: 73 MMHG | HEART RATE: 77 BPM | SYSTOLIC BLOOD PRESSURE: 124 MMHG | RESPIRATION RATE: 20 BRPM | TEMPERATURE: 98.2 F

## 2025-06-21 DIAGNOSIS — F10.930 ALCOHOL WITHDRAWAL SYNDROME WITHOUT COMPLICATION (HCC): Primary | ICD-10-CM

## 2025-06-21 LAB
ALBUMIN SERPL BCG-MCNC: 4.8 G/DL (ref 3.5–5)
ALP SERPL-CCNC: 131 U/L (ref 34–104)
ALT SERPL W P-5'-P-CCNC: 34 U/L (ref 7–52)
ANION GAP SERPL CALCULATED.3IONS-SCNC: 9 MMOL/L (ref 4–13)
AST SERPL W P-5'-P-CCNC: 65 U/L (ref 13–39)
BASOPHILS # BLD AUTO: 0.07 THOUSANDS/ÂΜL (ref 0–0.1)
BASOPHILS NFR BLD AUTO: 1 % (ref 0–1)
BILIRUB SERPL-MCNC: 0.87 MG/DL (ref 0.2–1)
BUN SERPL-MCNC: 10 MG/DL (ref 5–25)
CALCIUM SERPL-MCNC: 10 MG/DL (ref 8.4–10.2)
CARDIAC TROPONIN I PNL SERPL HS: 5 NG/L (ref ?–50)
CHLORIDE SERPL-SCNC: 106 MMOL/L (ref 96–108)
CO2 SERPL-SCNC: 23 MMOL/L (ref 21–32)
CREAT SERPL-MCNC: 0.97 MG/DL (ref 0.6–1.3)
EOSINOPHIL # BLD AUTO: 0.03 THOUSAND/ÂΜL (ref 0–0.61)
EOSINOPHIL NFR BLD AUTO: 0 % (ref 0–6)
ERYTHROCYTE [DISTWIDTH] IN BLOOD BY AUTOMATED COUNT: 12.9 % (ref 11.6–15.1)
ETHANOL EXG-MCNC: 0 MG/DL
ETHANOL SERPL-MCNC: <10 MG/DL
GFR SERPL CREATININE-BSD FRML MDRD: 85 ML/MIN/1.73SQ M
GLUCOSE SERPL-MCNC: 148 MG/DL (ref 65–140)
HCT VFR BLD AUTO: 45.1 % (ref 36.5–49.3)
HGB BLD-MCNC: 16.1 G/DL (ref 12–17)
IMM GRANULOCYTES # BLD AUTO: 0.08 THOUSAND/UL (ref 0–0.2)
IMM GRANULOCYTES NFR BLD AUTO: 1 % (ref 0–2)
LIPASE SERPL-CCNC: 16 U/L (ref 11–82)
LYMPHOCYTES # BLD AUTO: 1.3 THOUSANDS/ÂΜL (ref 0.6–4.47)
LYMPHOCYTES NFR BLD AUTO: 10 % (ref 14–44)
MCH RBC QN AUTO: 33.8 PG (ref 26.8–34.3)
MCHC RBC AUTO-ENTMCNC: 35.7 G/DL (ref 31.4–37.4)
MCV RBC AUTO: 95 FL (ref 82–98)
MONOCYTES # BLD AUTO: 0.52 THOUSAND/ÂΜL (ref 0.17–1.22)
MONOCYTES NFR BLD AUTO: 4 % (ref 4–12)
NEUTROPHILS # BLD AUTO: 10.87 THOUSANDS/ÂΜL (ref 1.85–7.62)
NEUTS SEG NFR BLD AUTO: 84 % (ref 43–75)
NRBC BLD AUTO-RTO: 0 /100 WBCS
PLATELET # BLD AUTO: 389 THOUSANDS/UL (ref 149–390)
PMV BLD AUTO: 8.7 FL (ref 8.9–12.7)
POTASSIUM SERPL-SCNC: 4.6 MMOL/L (ref 3.5–5.3)
PROT SERPL-MCNC: 8.6 G/DL (ref 6.4–8.4)
RBC # BLD AUTO: 4.76 MILLION/UL (ref 3.88–5.62)
SODIUM SERPL-SCNC: 138 MMOL/L (ref 135–147)
WBC # BLD AUTO: 12.87 THOUSAND/UL (ref 4.31–10.16)

## 2025-06-21 PROCEDURE — 93005 ELECTROCARDIOGRAM TRACING: CPT

## 2025-06-21 PROCEDURE — 99291 CRITICAL CARE FIRST HOUR: CPT | Performed by: STUDENT IN AN ORGANIZED HEALTH CARE EDUCATION/TRAINING PROGRAM

## 2025-06-21 PROCEDURE — 99284 EMERGENCY DEPT VISIT MOD MDM: CPT

## 2025-06-21 PROCEDURE — 84484 ASSAY OF TROPONIN QUANT: CPT | Performed by: STUDENT IN AN ORGANIZED HEALTH CARE EDUCATION/TRAINING PROGRAM

## 2025-06-21 PROCEDURE — 36415 COLL VENOUS BLD VENIPUNCTURE: CPT | Performed by: STUDENT IN AN ORGANIZED HEALTH CARE EDUCATION/TRAINING PROGRAM

## 2025-06-21 PROCEDURE — 96375 TX/PRO/DX INJ NEW DRUG ADDON: CPT

## 2025-06-21 PROCEDURE — 83690 ASSAY OF LIPASE: CPT | Performed by: STUDENT IN AN ORGANIZED HEALTH CARE EDUCATION/TRAINING PROGRAM

## 2025-06-21 PROCEDURE — 96376 TX/PRO/DX INJ SAME DRUG ADON: CPT

## 2025-06-21 PROCEDURE — 96361 HYDRATE IV INFUSION ADD-ON: CPT

## 2025-06-21 PROCEDURE — 80053 COMPREHEN METABOLIC PANEL: CPT | Performed by: STUDENT IN AN ORGANIZED HEALTH CARE EDUCATION/TRAINING PROGRAM

## 2025-06-21 PROCEDURE — 85025 COMPLETE CBC W/AUTO DIFF WBC: CPT | Performed by: STUDENT IN AN ORGANIZED HEALTH CARE EDUCATION/TRAINING PROGRAM

## 2025-06-21 PROCEDURE — 96374 THER/PROPH/DIAG INJ IV PUSH: CPT

## 2025-06-21 PROCEDURE — 82075 ASSAY OF BREATH ETHANOL: CPT | Performed by: STUDENT IN AN ORGANIZED HEALTH CARE EDUCATION/TRAINING PROGRAM

## 2025-06-21 PROCEDURE — 82077 ASSAY SPEC XCP UR&BREATH IA: CPT | Performed by: STUDENT IN AN ORGANIZED HEALTH CARE EDUCATION/TRAINING PROGRAM

## 2025-06-21 RX ORDER — ONDANSETRON 2 MG/ML
4 INJECTION INTRAMUSCULAR; INTRAVENOUS ONCE
Status: COMPLETED | OUTPATIENT
Start: 2025-06-21 | End: 2025-06-21

## 2025-06-21 RX ORDER — ONDANSETRON 4 MG/1
4 TABLET, ORALLY DISINTEGRATING ORAL EVERY 6 HOURS PRN
Qty: 12 TABLET | Refills: 0 | Status: SHIPPED | OUTPATIENT
Start: 2025-06-21

## 2025-06-21 RX ORDER — PHENOBARBITAL SODIUM 65 MG/ML
130 INJECTION, SOLUTION INTRAMUSCULAR; INTRAVENOUS ONCE
Status: COMPLETED | OUTPATIENT
Start: 2025-06-21 | End: 2025-06-21

## 2025-06-21 RX ORDER — ONDANSETRON 4 MG/1
4 TABLET, ORALLY DISINTEGRATING ORAL ONCE
Status: COMPLETED | OUTPATIENT
Start: 2025-06-21 | End: 2025-06-21

## 2025-06-21 RX ORDER — HYDROXYZINE HYDROCHLORIDE 25 MG/1
25 TABLET, FILM COATED ORAL ONCE
Status: COMPLETED | OUTPATIENT
Start: 2025-06-21 | End: 2025-06-21

## 2025-06-21 RX ADMIN — ONDANSETRON 4 MG: 2 INJECTION INTRAMUSCULAR; INTRAVENOUS at 10:07

## 2025-06-21 RX ADMIN — SODIUM CHLORIDE 1000 ML: 0.9 INJECTION, SOLUTION INTRAVENOUS at 08:16

## 2025-06-21 RX ADMIN — SODIUM CHLORIDE 650 MG: 9 INJECTION, SOLUTION INTRAVENOUS at 08:31

## 2025-06-21 RX ADMIN — SODIUM CHLORIDE 1000 ML: 0.9 INJECTION, SOLUTION INTRAVENOUS at 08:57

## 2025-06-21 RX ADMIN — PHENOBARBITAL SODIUM 130 MG: 65 INJECTION INTRAMUSCULAR at 10:08

## 2025-06-21 RX ADMIN — ONDANSETRON 4 MG: 2 INJECTION INTRAMUSCULAR; INTRAVENOUS at 08:56

## 2025-06-21 RX ADMIN — ONDANSETRON 4 MG: 4 TABLET, ORALLY DISINTEGRATING ORAL at 11:09

## 2025-06-21 RX ADMIN — HYDROXYZINE HYDROCHLORIDE 25 MG: 25 TABLET, FILM COATED ORAL at 11:09

## 2025-06-21 NOTE — ED PROVIDER NOTES
Time reflects when diagnosis was documented in both MDM as applicable and the Disposition within this note       Time User Action Codes Description Comment    2025 11:03 AM Christie Black Add [F10.930] Alcohol withdrawal syndrome without complication (HCC)           ED Disposition       ED Disposition   Discharge    Condition   Stable    Date/Time   Sat 2025 11:04 AM    Comment   Diogo Travis discharge to home/self care.                   Assessment & Plan       Medical Decision Making  59-year-old male with history of alcohol use disorder complicated by withdrawal in the past, hypertension, CAD, anxiety/depression presenting with nausea, anxiety, headache, tremors, and epigastric abdominal pain in the setting of alcohol cessation with last drink at a 100 this morning.  He reports that he relapsed with his drinking approximately 2 months ago after his father became ill and his father was recently sent to hospice and then passed away.  Today is his father's  and patient stopped drinking this morning around 0100.  Since then he has been having symptoms typical of his usual alcohol withdrawal without other symptoms.    Vitals notable for tachycardia and hypertension but otherwise stable and afebrile.  Tremulous, tongue fasciculations present, anxious mood.  Abdomen soft, nontender.  Lung and heart sounds normal.    Differential diagnosis includes but is not limited to: Alcohol withdrawal, gastritis, pancreatitis, PUD, less likely ACS    Workup and treatment as below:    Imaging: See ED course  EKG: N/A  Labs: See ED course  Meds: Phenobarbital, antiemetics, IV fluids  Consults: N/A  Reassessment: On reassessment, patient is clinically sober with CIWA score of 0 and feels symptomatically improved with stable vitals.  I offered him inpatient admission for monitoring for alcohol withdrawal versus discussing outpatient resources with our catch provider but he adamantly declined these options stating that  he does not like our [facility] where we manage inpatient detox and that he already has outpatient resources provide from a prior ER visit.  I gave him strict return precautions and counseled him that should he change his mind that he would be welcome to return to care.  He tolerated p.o. and ambulatory challenges in the ER prior to discharge.    Dispo: Patient was discharged to home with strict return precautions. Patient acknowledged understanding of plan and diagnostic results, and all their questions were answered to their satisfaction.    Critical Care Time Statement: Upon my evaluation, this patient had a high probability of imminent or life-threatening deterioration due to alcohol withdrawal requiring multiple IV doses of phenobarbital, which required my direct attention, intervention, and personal management.  I spent a total of 60 minutes directly providing critical care services, including evaluating for the presence of life-threatening injuries or illnesses, management of organ system failure(s) , and complex medical decision making (to support/prevent further life-threatening deterioration).. This time is exclusive of procedures, teaching, treating other patients, family meetings, and any prior time recorded by providers other than myself.    Amount and/or Complexity of Data Reviewed  Labs: ordered. Decision-making details documented in ED Course.    Risk  Prescription drug management.        ED Course as of 06/22/25 1021   Sat Jun 21, 2025   0843 WBC(!): 12.87  Nonspecific leukocytosis   0917 AST(!): 65   0917 ALT: 34   0917 ALK PHOS(!): 131   0917 Total Bilirubin: 0.87   0917 LIPASE: 16   0917 hs TnI 0hr: 5   0934 ETHANOL: <10       Medications   sodium chloride 0.9 % bolus 1,000 mL (0 mL Intravenous Stopped 6/21/25 0857)   sodium chloride 0.9 % bolus 1,000 mL (0 mL Intravenous Stopped 6/21/25 1109)   PHENobarbital 650 mg in sodium chloride 0.9 % 100 mL IVPB (650 mg Intravenous Given 6/21/25  0831)   ondansetron (ZOFRAN) injection 4 mg (4 mg Intravenous Given 6/21/25 0856)   PHENobarbital injection 130 mg (130 mg Intravenous Given 6/21/25 1008)   ondansetron (ZOFRAN) injection 4 mg (4 mg Intravenous Given 6/21/25 1007)   ondansetron (ZOFRAN-ODT) dispersible tablet 4 mg (4 mg Oral Given 6/21/25 1109)   hydrOXYzine HCL (ATARAX) tablet 25 mg (25 mg Oral Given 6/21/25 1109)       ED Risk Strat Scores                 CIWA-Ar Score       Row Name 06/21/25 0754             CIWA-Ar    Nausea and Vomiting 5      Tactile Disturbances 0      Tremor 2      Auditory Disturbances 0      Paroxysmal Sweats 2      Visual Disturbances 0      Anxiety 3      Headache, Fullness in Head 4      Agitation 0      Orientation and Clouding of Sensorium 0      CIWA-Ar Total 16                      No data recorded                            History of Present Illness       Chief Complaint   Patient presents with    Alcohol Intoxication     Pt reports alcohol dependence, pt's father passed Tuesday, pt reports started to drink again and trouble coping with this loss. Pt reports last drink was around 1am. Reports nausea, sweats, tremors, headache, lightheadedness that started right after the last drink at 1am. Reports was drinking yesterday and throughout the night and the last two weeks. Reports can't keep anything down.        Past Medical History[1]   Past Surgical History[2]   Family History[3]   Social History[4]   E-Cigarette/Vaping    E-Cigarette Use Never User       E-Cigarette/Vaping Substances    Nicotine No     THC No     CBD No     Flavoring No     Other No     Unknown No       I have reviewed and agree with the history as documented.     59-year-old male with history of alcohol use disorder complicated by withdrawal in the past, hypertension, CAD, anxiety/depression presenting with nausea, anxiety, headache, tremors, and epigastric abdominal pain in the setting of alcohol cessation with last drink at a 100 this morning.   He reports that he relapsed with his drinking approximately 2 months ago after his father became ill and his father was recently sent to hospice and then passed away.  Today is his father's  and patient stopped drinking this morning around 0100.  Since then he has been having symptoms typical of his usual alcohol withdrawal without other symptoms.      Alcohol Intoxication  Associated symptoms: abdominal pain, headaches, nausea and vomiting    Associated symptoms: no hallucinations, no palpitations, no seizures, no shortness of breath, no suicidal ideation and no weakness        Review of Systems   Constitutional:  Negative for chills and fever.   HENT:  Negative for ear pain and sore throat.    Eyes:  Negative for pain and visual disturbance.   Respiratory:  Negative for cough and shortness of breath.    Cardiovascular:  Negative for chest pain and palpitations.   Gastrointestinal:  Positive for abdominal pain, nausea and vomiting. Negative for blood in stool, constipation and diarrhea.   Genitourinary:  Negative for dysuria and hematuria.   Musculoskeletal:  Negative for arthralgias and back pain.   Skin:  Negative for color change and rash.   Neurological:  Positive for tremors and headaches. Negative for dizziness, seizures, syncope, facial asymmetry, speech difficulty, weakness, light-headedness and numbness.   Psychiatric/Behavioral:  Positive for dysphoric mood. Negative for hallucinations, self-injury and suicidal ideas. The patient is nervous/anxious.    All other systems reviewed and are negative.          Objective       ED Triage Vitals [25 0756]   Temperature Pulse Blood Pressure Respirations SpO2 Patient Position - Orthostatic VS   98.2 °F (36.8 °C) (!) 126 (!) 144/102 22 96 % Sitting      Temp Source Heart Rate Source BP Location FiO2 (%) Pain Score    Oral Monitor Right arm -- --      Vitals      Date and Time Temp Pulse SpO2 Resp BP Pain Score FACES Pain Rating User   25 1030 --  77 98 % 20 124/73 -- -- AD   06/21/25 0930 -- 98 97 % 22 149/86 -- -- AD   06/21/25 0830 -- 106 97 % 22 153/90 -- -- AD   06/21/25 0756 98.2 °F (36.8 °C) 126 96 % 22 144/102 -- -- AW            Physical Exam  Vitals and nursing note reviewed.   Constitutional:       General: He is in acute distress.      Appearance: He is obese. He is ill-appearing. He is not toxic-appearing or diaphoretic.   HENT:      Head: Normocephalic.      Mouth/Throat:      Mouth: Mucous membranes are dry.      Pharynx: Oropharynx is clear.      Comments: Tongue fasciculations present    Cardiovascular:      Rate and Rhythm: Normal rate and regular rhythm.      Heart sounds: Normal heart sounds.   Pulmonary:      Effort: Pulmonary effort is normal.      Breath sounds: Normal breath sounds.   Abdominal:      Palpations: Abdomen is soft.      Tenderness: There is no abdominal tenderness.     Musculoskeletal:      Right lower leg: No edema.      Left lower leg: No edema.     Skin:     General: Skin is warm.      Capillary Refill: Capillary refill takes less than 2 seconds.     Neurological:      Mental Status: He is alert and oriented to person, place, and time.     Psychiatric:      Comments: Tremulous, anxious without SI/HI or AVH         Results Reviewed       Procedure Component Value Units Date/Time    Ethanol [[id number]]  (Normal) Collected: 06/21/25 0842    Lab Status: Final result Specimen: Blood Updated: 06/21/25 0925     Ethanol Lvl <10 mg/dL     HS Troponin 0hr (reflex protocol) [[id number]]  (Normal) Collected: 06/21/25 0831    Lab Status: Final result Specimen: Blood from Arm, Left Updated: 06/21/25 0907     hs TnI 0hr 5 ng/L     Comprehensive metabolic panel [[id number]]  (Abnormal) Collected: 06/21/25 0831    Lab Status: Final result Specimen: Blood from Arm, Left Updated: 06/21/25 0858     Sodium 138 mmol/L      Potassium 4.6 mmol/L      Chloride 106 mmol/L      CO2 23 mmol/L      ANION GAP 9 mmol/L      BUN 10 mg/dL       Creatinine 0.97 mg/dL      Glucose 148 mg/dL      Calcium 10.0 mg/dL      AST 65 U/L      ALT 34 U/L      Alkaline Phosphatase 131 U/L      Total Protein 8.6 g/dL      Albumin 4.8 g/dL      Total Bilirubin 0.87 mg/dL      eGFR 85 ml/min/1.73sq m     Narrative:      National Kid[employer] Disease Foundation guidelines for Chronic Kidney Disease (CKD):     Stage 1 with normal or high GFR (GFR > 90 mL/min/1.73 square meters)    Stage 2 Mild CKD (GFR = 60-89 mL/min/1.73 square meters)    Stage 3A Moderate CKD (GFR = 45-59 mL/min/1.73 square meters)    Stage 3B Moderate CKD (GFR = 30-44 mL/min/1.73 square meters)    Stage 4 Severe CKD (GFR = 15-29 mL/min/1.73 square meters)    Stage 5 End Stage CKD (GFR <15 mL/min/1.73 square meters)  Note: GFR calculation is accurate only with a steady state creatinine    Lipase [[id number]]  (Normal) Collected: 06/21/25 0831    Lab Status: Final result Specimen: Blood from Arm, Left Updated: 06/21/25 0858     Lipase 16 u/L     CBC and differential [[id number]]  (Abnormal) Collected: 06/21/25 0831    Lab Status: Final result Specimen: Blood from Arm, Left Updated: 06/21/25 0842     WBC 12.87 Thousand/uL      RBC 4.76 Million/uL      Hemoglobin 16.1 g/dL      Hematocrit 45.1 %      MCV 95 fL      MCH 33.8 pg      MCHC 35.7 g/dL      RDW 12.9 %      MPV 8.7 fL      Platelets 389 Thousands/uL      nRBC 0 /100 WBCs      Segmented % 84 %      Immature Grans % 1 %      Lymphocytes % 10 %      Monocytes % 4 %      Eosinophils Relative 0 %      Basophils Relative 1 %      Absolute Neutrophils 10.87 Thousands/µL      Absolute Immature Grans 0.08 Thousand/uL      Absolute Lymphocytes 1.30 Thousands/µL      Absolute Monocytes 0.52 Thousand/µL      Eosinophils Absolute 0.03 Thousand/µL      Basophils Absolute 0.07 Thousands/µL     POCT alcohol breath test [[id number]]  (Normal) Collected: 06/21/25 0835    Lab Status: Edited Result - FINAL Updated: 06/21/25 0835     EXTBreath Alcohol 0.000            No  orders to display       Procedures    ED Medication and Procedure Management   Prior to Admission Medications   Prescriptions Last Dose Informant Patient Reported? Taking?   Omega-3 Fatty Acids (fish oil) 1,000 mg   Yes No   Sig: Take 2,000 mg by mouth daily   Thiamine Mononitrate (VITAMIN B1) 100 mg tablet   No No   Sig: Take 1 tablet (100 mg total) by mouth daily   acetaminophen (TYLENOL) 325 mg tablet   No No   Sig: Take 3 tablets (975 mg total) by mouth every 8 (eight) hours   atorvastatin (LIPITOR) 40 mg tablet   No No   Sig: Take 1 tablet (40 mg total) by mouth daily with dinner   famotidine (PEPCID) 20 mg tablet   No No   Sig: Take 1 tablet (20 mg total) by mouth daily   folic acid (FOLVITE) 1 mg tablet   No No   Sig: Take 1 tablet (1 mg total) by mouth daily   hydrOXYzine HCL (ATARAX) 25 mg tablet   No No   Sig: Take 0.5-1 tablets (12.5-25 mg total) by mouth 2 (two) times a day as needed for anxiety   ibuprofen (MOTRIN) 400 mg tablet   No No   Sig: Take 1 tablet (400 mg total) by mouth every 6 (six) hours as needed for mild pain   lisinopril (ZESTRIL) 40 mg tablet   No No   Sig: Take 1 tablet (40 mg total) by mouth daily   pantoprazole (PROTONIX) 40 mg tablet   No No   Sig: Take 1 tablet (40 mg total) by mouth daily in the early morning   sertraline (ZOLOFT) 50 mg tablet   No No   Si/2 tab by mouth daily x 3 days, then increase 1 tab by mouth daily.      Facility-Administered Medications: None     Discharge Medication List as of 2025 11:04 AM        START taking these medications    Details   ondansetron (ZOFRAN-ODT) 4 mg disintegrating tablet Take 1 tablet (4 mg total) by mouth every 6 (six) hours as needed for nausea or vomiting for up to 12 doses, Starting Sat 2025, Normal           CONTINUE these medications which have NOT CHANGED    Details   acetaminophen (TYLENOL) 325 mg tablet Take 3 tablets (975 mg total) by mouth every 8 (eight) hours, Starting Sun 2024, No Print       atorvastatin (LIPITOR) 40 mg tablet Take 1 tablet (40 mg total) by mouth daily with dinner, Starting Mon 12/9/2024, Normal      famotidine (PEPCID) 20 mg tablet Take 1 tablet (20 mg total) by mouth daily, Starting Mon 4/28/2025, Normal      folic acid (FOLVITE) 1 mg tablet Take 1 tablet (1 mg total) by mouth daily, Starting Fri 2/14/2025, Until Fri 5/2/2025, Normal      hydrOXYzine HCL (ATARAX) 25 mg tablet Take 0.5-1 tablets (12.5-25 mg total) by mouth 2 (two) times a day as needed for anxiety, Starting Fri 5/2/2025, Normal      ibuprofen (MOTRIN) 400 mg tablet Take 1 tablet (400 mg total) by mouth every 6 (six) hours as needed for mild pain, Starting Sun 12/29/2024, Normal      lisinopril (ZESTRIL) 40 mg tablet Take 1 tablet (40 mg total) by mouth daily, Starting Mon 12/9/2024, Normal      Omega-3 Fatty Acids (fish oil) 1,000 mg Take 2,000 mg by mouth daily, Historical Med      pantoprazole (PROTONIX) 40 mg tablet Take 1 tablet (40 mg total) by mouth daily in the early morning, Starting Fri 4/4/2025, Until Wed 10/1/2025, Normal      sertraline (ZOLOFT) 50 mg tablet 1/2 tab by mouth daily x 3 days, then increase 1 tab by mouth daily., Normal      Thiamine Mononitrate (VITAMIN B1) 100 mg tablet Take 1 tablet (100 mg total) by mouth daily, Starting Fri 2/14/2025, Normal           No discharge procedures on file.  ED SEPSIS DOCUMENTATION   Time reflects when diagnosis was documented in both MDM as applicable and the Disposition within this note       Time User Action Codes Description Comment    6/21/2025 11:03 AM Christie Black Add [F10.930] Alcohol withdrawal syndrome without complication (HCC)                    Christie Black MD  06/22/25 1022         [1]   Past Medical History:  Diagnosis Date    Alcohol use disorder 12/24/2024    Alcohol use disorder, severe, dependence (HCC) 04/02/2023    Alcohol withdrawal seizure (HCC)     Alcoholic ketoacidosis 10/16/2023    Anxiety     AR (allergic rhinitis)     Chronic  alcoholic gastritis 04/02/2023    Depression     GERD (gastroesophageal reflux disease)     Hepatic steatosis 04/02/2023    Hyperlipidemia     Hypertension     IBS (irritable bowel syndrome)     Obesity     Other insomnia 05/02/2025    Rosacea     Vitamin B12 deficiency 02/14/2024    Vitamin D deficiency 02/14/2024   [2]   Past Surgical History:  Procedure Laterality Date    ANTERIOR CRUCIATE LIGAMENT REPAIR Left     WISDOM TOOTH EXTRACTION     [3]   Family History  Problem Relation Name Age of Onset    Cancer Mother Luther Travis 78        Type unknown    Hypertension Father Diogo Travis     Coronary artery disease Father Diogo Traivs 60    Cancer Father Diogo Travis 81        Bladder    No Known Problems Sister Sharon     No Known Problems Sister Sofie     No Known Problems Sister Jackelin     No Known Problems Son Chandu     No Known Problems Son Rojelio     Heart attack Paternal Grandfather  60    Coronary artery disease Paternal Grandfather  60   [4]   Social History  Tobacco Use    Smoking status: Former     Types: Cigars     Start date: 1/1/2014     Passive exposure: Past (Father)    Smokeless tobacco: Never    Tobacco comments:     Smokes cigars 2-3 times per year   Vaping Use    Vaping status: Never Used   Substance Use Topics    Alcohol use: Not Currently     Alcohol/week: 35.0 standard drinks of alcohol     Types: 35 Standard drinks or equivalent per week     Comment: Last ETOH 4/27/25    Drug use: Never        Christie Black MD  06/22/25 1024

## 2025-06-21 NOTE — ED NOTES
Jaye called to speak with patient. Patient reports he is too nauseous to speak with her at the moment. Jaye reports they will call back this afternoon.     Clau Minor RN  06/21/25 0831     Xeluchez Pregnancy And Lactation Text: This medication is Pregnancy Category D and is not considered safe during pregnancy.  The risk during breast feeding is also uncertain.

## 2025-06-21 NOTE — DISCHARGE INSTRUCTIONS
You were seen in the Emergency Department for: Alcohol withdrawal    Your workup today showed: Vital signs and physical exam consistent with alcohol withdrawal with otherwise reassuring testing    Your next steps should include: Please call today to make an appointment with your primary care provider in one week.    Reasons to RETURN IMMEDIATELY to the Emergency Department: worsening symptoms, difficulty breathing, temperature > 100.4 degrees, uncontrollable nausea/vomiting, or any other concerns.

## 2025-06-22 ENCOUNTER — PATIENT OUTREACH (OUTPATIENT)
Facility: HOSPITAL | Age: 60
End: 2025-06-22

## 2025-06-22 LAB
ATRIAL RATE: 128 BPM
P AXIS: 56 DEGREES
PR INTERVAL: 146 MS
QRS AXIS: 106 DEGREES
QRSD INTERVAL: 80 MS
QT INTERVAL: 324 MS
QTC INTERVAL: 473 MS
T WAVE AXIS: 45 DEGREES
VENTRICULAR RATE: 128 BPM

## 2025-06-22 PROCEDURE — 93010 ELECTROCARDIOGRAM REPORT: CPT | Performed by: INTERNAL MEDICINE

## 2025-06-22 NOTE — PROGRESS NOTES
[facility] Warm Handoff Outcome Note    Patient name Diogo Travis  Location Room/bed info not found MRN [id number]  Age: 59 y.o.          Plan Type:  Warm Handoff                                                                                    Plan Date: 6/22/2025  Service:  ED Warm Handoff      Substance Use History:  alcohol    Warm Handoff Update:  Pt refused IP detox    Warm Handoff Outcome: Treatment Related Resources

## 2025-06-24 ENCOUNTER — HOSPITAL ENCOUNTER (EMERGENCY)
Facility: HOSPITAL | Age: 60
Discharge: HOME | End: 2025-06-24
Attending: EMERGENCY MEDICINE
Payer: COMMERCIAL

## 2025-06-24 ENCOUNTER — APPOINTMENT (OUTPATIENT)
Dept: RADIOLOGY | Facility: HOSPITAL | Age: 60
End: 2025-06-24
Payer: COMMERCIAL

## 2025-06-24 VITALS
OXYGEN SATURATION: 94 % | SYSTOLIC BLOOD PRESSURE: 134 MMHG | DIASTOLIC BLOOD PRESSURE: 85 MMHG | HEART RATE: 81 BPM | RESPIRATION RATE: 16 BRPM | TEMPERATURE: 98.2 F

## 2025-06-24 DIAGNOSIS — S83.8X1A: Primary | ICD-10-CM

## 2025-06-24 PROCEDURE — 96372 THER/PROPH/DIAG INJ SC/IM: CPT

## 2025-06-24 PROCEDURE — 99284 EMERGENCY DEPT VISIT MOD MDM: CPT

## 2025-06-24 PROCEDURE — 73564 X-RAY EXAM KNEE 4 OR MORE: CPT

## 2025-06-24 PROCEDURE — 99284 EMERGENCY DEPT VISIT MOD MDM: CPT | Performed by: EMERGENCY MEDICINE

## 2025-06-24 RX ORDER — ACETAMINOPHEN 325 MG/1
975 TABLET ORAL ONCE
Status: COMPLETED | OUTPATIENT
Start: 2025-06-24 | End: 2025-06-24

## 2025-06-24 RX ORDER — KETOROLAC TROMETHAMINE 30 MG/ML
15 INJECTION, SOLUTION INTRAMUSCULAR; INTRAVENOUS ONCE
Status: COMPLETED | OUTPATIENT
Start: 2025-06-24 | End: 2025-06-24

## 2025-06-24 RX ADMIN — KETOROLAC TROMETHAMINE 15 MG: 30 INJECTION, SOLUTION INTRAMUSCULAR; INTRAVENOUS at 11:52

## 2025-06-24 RX ADMIN — ACETAMINOPHEN 975 MG: 325 TABLET ORAL at 11:52

## 2025-06-24 NOTE — DISCHARGE INSTRUCTIONS
Dear Diogo Travis,     It was our pleasure to care for you here at [facility]. It was an honor and privilege to be part of your health care team. Please review this entire after visit summary and read your personalized discharge instructions below:  Please follow up with orthopedics within 1 week or as soon as possible.  Please take Tylenol, up to 1000mg every 8 hours, and Ibuprofen, 600mg every 6 hours as needed..  Please return to the ER if your symptoms worsen or fail to improve, if you experience numbness, tingling, or if you experience anything concerning to you.    Sincerely,     Leonidas Rojo MD

## 2025-06-24 NOTE — ED PROVIDER NOTES
Time reflects when diagnosis was documented in both MDM as applicable and the Disposition within this note       Time User Action Codes Description Comment    6/24/2025 11:43 AM Leonidas Rojo Add [S83.8X1A] Sprain of patella, right, initial encounter           ED Disposition       ED Disposition   Discharge    Condition   Stable    Date/Time   Tue Jun 24, 2025 11:41 AM    Comment   Diogo Travis discharge to home/self care.                   Assessment & Plan       Medical Decision Making  Patient seen and examined. Physical exam is notable for tenderness over the right patellofemoral tendon, pain with passive flexing of the right knee.  Valgus and varus testing normal.  Unable to perform anterior or posterior drawer due to patient's inability to bend the knee secondary to pain. Remainder of exam is within normal limits.    Differential: Patellofemoral tendon strain, quadriceps sprain, effusion, less likely internal derangement of the knee    Appropriate imaging ordered. Pain control with Tylenol, Toradol.    Imaging shows no fractures or other bony abnormalities, minimal effusion surrounding the patellofemoral tendon. This supports a diagnosis of patellofemoral tendon strain. All results discussed with patient, they express understanding.     Patient is agreeable to discharge home with follow-up with orthopedics. All questions answered and return precautions discussed.       Amount and/or Complexity of Data Reviewed  Radiology: independent interpretation performed.    Risk  OTC drugs.  Prescription drug management.             Medications   acetaminophen (TYLENOL) tablet 975 mg (975 mg Oral Given 6/24/25 1152)   ketorolac (TORADOL) injection 15 mg (15 mg Intramuscular Given 6/24/25 1152)       ED Risk Strat Scores                    No data recorded                            History of Present Illness       Chief Complaint   Patient presents with    Knee Injury     Pt presents to the ED with severe right knee  pain. Pt reports unable to bear weight. Reports in kneeling position yesterday doing some work and when he got up on his right knee felt excruciating pain and worsening throughout the night.        Past Medical History[1]   Past Surgical History[2]   Family History[3]   Social History[4]   E-Cigarette/Vaping    E-Cigarette Use Never User       E-Cigarette/Vaping Substances    Nicotine No     THC No     CBD No     Flavoring No     Other No     Unknown No       I have reviewed and agree with the history as documented.     Diogo Travis is a 59 y.o. male     They presented to the emergency department on June 24, 2025. Patient presents with:  Knee Injury: Pt presents to the ED with severe right knee pain. Pt reports unable to bear weight. Reports in kneeling position yesterday doing some work and when he got up on his right knee felt excruciating pain and worsening throughout the night.   .    The patient states that he was cleaning his bathroom yesterday and was on his left knee, when he stood up with all of his rounds right leg he felt a burning pain around the knee.  This improved slightly after he was standing up and he was able to ambulate.  Pain progressively worsened throughout the night, when he woke up this morning he had worse pain so he decided come to the ER.  He has been taking Tylenol and ibuprofen, last took meds before bed yesterday.  Has not taken any medications today.  He states he was alternating for 100 mg of ibuprofen and 650 mg of Tylenol, taking 1 medication every 4 hours. Patient denies hip pain, ankle pain, hematuria, dysuria, diarrhea, chest pain, shortness of breath, fever, chills, nausea, vomiting, headache, lightheadedness, dizziness, or any other complaint at this time.            History provided by:  Patient      Review of Systems   Constitutional:  Negative for chills, fatigue and fever.   HENT:  Negative for congestion, ear pain, postnasal drip, rhinorrhea, sinus pain and sore throat.     Eyes:  Negative for pain and visual disturbance.   Respiratory:  Negative for cough, chest tightness and shortness of breath.    Cardiovascular:  Negative for chest pain and palpitations.   Gastrointestinal:  Negative for abdominal pain, constipation, diarrhea, nausea and vomiting.   Genitourinary:  Negative for difficulty urinating, dysuria and hematuria.   Musculoskeletal:  Positive for arthralgias and myalgias. Negative for back pain.   Skin:  Negative for color change and rash.   Neurological:  Negative for dizziness, seizures, syncope, weakness, light-headedness, numbness and headaches.   All other systems reviewed and are negative.          Objective       ED Triage Vitals [06/24/25 1036]   Temperature Pulse Blood Pressure Respirations SpO2 Patient Position - Orthostatic VS   98.2 °F (36.8 °C) 81 134/85 16 94 % Sitting      Temp Source Heart Rate Source BP Location FiO2 (%) Pain Score    Oral Monitor Right arm -- 10 - Worst Possible Pain      Vitals      Date and Time Temp Pulse SpO2 Resp BP Pain Score FACES Pain Rating User   06/24/25 1152 -- -- -- -- -- 9 -- KL   06/24/25 1036 98.2 °F (36.8 °C) 81 94 % 16 134/85 10 - Worst Possible Pain -- HVL            Physical Exam  Constitutional:       General: He is not in acute distress.     Appearance: He is not diaphoretic.   HENT:      Head: Normocephalic and atraumatic.      Nose: No congestion or rhinorrhea.      Mouth/Throat:      Mouth: Mucous membranes are moist.      Pharynx: No oropharyngeal exudate.     Eyes:      General: No scleral icterus.      Cardiovascular:      Rate and Rhythm: Normal rate and regular rhythm.      Heart sounds: Normal heart sounds. No murmur heard.     No friction rub. No gallop.   Pulmonary:      Effort: No respiratory distress.      Breath sounds: Normal breath sounds. No wheezing, rhonchi or rales.   Abdominal:      General: Abdomen is flat. There is no distension.      Palpations: Abdomen is soft.      Tenderness: There is no  abdominal tenderness.     Musculoskeletal:      Right hip: Normal.      Left hip: Normal.      Right upper leg: Tenderness present. No deformity or bony tenderness.      Left upper leg: Normal.      Right knee: Swelling present. No deformity, effusion or erythema. Decreased range of motion (Secondary to pain). Tenderness present.      Left knee: Normal.      Right lower leg: Normal.      Left lower leg: Normal.      Right ankle: Normal.      Left ankle: Normal.      Comments: Pain and tenderness over the patellofemoral tendon, increased pain with passive stretch of this tendon and quadriceps muscle   Lymphadenopathy:      Cervical: No cervical adenopathy.     Skin:     General: Skin is warm and dry.      Capillary Refill: Capillary refill takes less than 2 seconds.     Neurological:      General: No focal deficit present.      Mental Status: He is alert and oriented to person, place, and time.         Results Reviewed       None            XR knee 4+ views RIGHT   ED Interpretation by Leonidas Rojo MD (06/24 8473)   No acute fracture.  There is effusion below the patellofemoral tendon.  Independently interpreted by myself.          Procedures    ED Medication and Procedure Management   Prior to Admission Medications   Prescriptions Last Dose Informant Patient Reported? Taking?   Omega-3 Fatty Acids (fish oil) 1,000 mg   Yes No   Sig: Take 2,000 mg by mouth daily   Thiamine Mononitrate (VITAMIN B1) 100 mg tablet   No No   Sig: Take 1 tablet (100 mg total) by mouth daily   acetaminophen (TYLENOL) 325 mg tablet   No No   Sig: Take 3 tablets (975 mg total) by mouth every 8 (eight) hours   atorvastatin (LIPITOR) 40 mg tablet   No No   Sig: Take 1 tablet (40 mg total) by mouth daily with dinner   famotidine (PEPCID) 20 mg tablet   No No   Sig: Take 1 tablet (20 mg total) by mouth daily   folic acid (FOLVITE) 1 mg tablet   No No   Sig: Take 1 tablet (1 mg total) by mouth daily   hydrOXYzine HCL (ATARAX) 25 mg tablet   No No    Sig: Take 0.5-1 tablets (12.5-25 mg total) by mouth 2 (two) times a day as needed for anxiety   ibuprofen (MOTRIN) 400 mg tablet   No No   Sig: Take 1 tablet (400 mg total) by mouth every 6 (six) hours as needed for mild pain   lisinopril (ZESTRIL) 40 mg tablet   No No   Sig: Take 1 tablet (40 mg total) by mouth daily   ondansetron (ZOFRAN-ODT) 4 mg disintegrating tablet   No No   Sig: Take 1 tablet (4 mg total) by mouth every 6 (six) hours as needed for nausea or vomiting for up to 12 doses   pantoprazole (PROTONIX) 40 mg tablet   No No   Sig: Take 1 tablet (40 mg total) by mouth daily in the early morning   sertraline (ZOLOFT) 50 mg tablet   No No   Si/2 tab by mouth daily x 3 days, then increase 1 tab by mouth daily.      Facility-Administered Medications: None     Discharge Medication List as of 2025 11:56 AM        CONTINUE these medications which have NOT CHANGED    Details   acetaminophen (TYLENOL) 325 mg tablet Take 3 tablets (975 mg total) by mouth every 8 (eight) hours, Starting Sun 2024, No Print      atorvastatin (LIPITOR) 40 mg tablet Take 1 tablet (40 mg total) by mouth daily with dinner, Starting Mon 2024, Normal      famotidine (PEPCID) 20 mg tablet Take 1 tablet (20 mg total) by mouth daily, Starting Mon 2025, Normal      folic acid (FOLVITE) 1 mg tablet Take 1 tablet (1 mg total) by mouth daily, Starting 2025, Until 2025, Normal      hydrOXYzine HCL (ATARAX) 25 mg tablet Take 0.5-1 tablets (12.5-25 mg total) by mouth 2 (two) times a day as needed for anxiety, Starting 2025, Normal      ibuprofen (MOTRIN) 400 mg tablet Take 1 tablet (400 mg total) by mouth every 6 (six) hours as needed for mild pain, Starting Sun 2024, Normal      lisinopril (ZESTRIL) 40 mg tablet Take 1 tablet (40 mg total) by mouth daily, Starting Mon 2024, Normal      Omega-3 Fatty Acids (fish oil) 1,000 mg Take 2,000 mg by mouth daily, Historical Med      ondansetron  (ZOFRAN-ODT) 4 mg disintegrating tablet Take 1 tablet (4 mg total) by mouth every 6 (six) hours as needed for nausea or vomiting for up to 12 doses, Starting Sat 6/21/2025, Normal      pantoprazole (PROTONIX) 40 mg tablet Take 1 tablet (40 mg total) by mouth daily in the early morning, Starting Fri 4/4/2025, Until Wed 10/1/2025, Normal      sertraline (ZOLOFT) 50 mg tablet 1/2 tab by mouth daily x 3 days, then increase 1 tab by mouth daily., Normal      Thiamine Mononitrate (VITAMIN B1) 100 mg tablet Take 1 tablet (100 mg total) by mouth daily, Starting Fri 2/14/2025, Normal             ED SEPSIS DOCUMENTATION   Time reflects when diagnosis was documented in both MDM as applicable and the Disposition within this note       Time User Action Codes Description Comment    6/24/2025 11:43 AM Leonidas Rojo Add [S83.8X1A] Sprain of patella, right, initial encounter                      [1]   Past Medical History:  Diagnosis Date    Alcohol use disorder 12/24/2024    Alcohol use disorder, severe, dependence (HCC) 04/02/2023    Alcohol withdrawal seizure (HCC)     Alcoholic ketoacidosis 10/16/2023    Anxiety     AR (allergic rhinitis)     Chronic alcoholic gastritis 04/02/2023    Depression     GERD (gastroesophageal reflux disease)     Hepatic steatosis 04/02/2023    Hyperlipidemia     Hypertension     IBS (irritable bowel syndrome)     Obesity     Other insomnia 05/02/2025    Rosacea     Vitamin B12 deficiency 02/14/2024    Vitamin D deficiency 02/14/2024   [2]   Past Surgical History:  Procedure Laterality Date    ANTERIOR CRUCIATE LIGAMENT REPAIR Left     WISDOM TOOTH EXTRACTION     [3]   Family History  Problem Relation Name Age of Onset    Cancer Mother Luther Travis 78        Type unknown    Hypertension Father Diogo Travis     Coronary artery disease Father Diogo Travis 60    Cancer Father Diogo Jordanjessie 81        Bladder    No Known Problems Sister Sharon     No Known Problems Sister Sofie     No Known Problems  Sister Jackelin     No Known Problems Son Chandu     No Known Problems Son Rojelio     Heart attack Paternal Grandfather  60    Coronary artery disease Paternal Grandfather  60   [4]   Social History  Tobacco Use    Smoking status: Former     Types: Cigars     Start date: 1/1/2014     Passive exposure: Past (Father)    Smokeless tobacco: Never    Tobacco comments:     Smokes cigars 2-3 times per year   Vaping Use    Vaping status: Never Used   Substance Use Topics    Alcohol use: Not Currently     Alcohol/week: 35.0 standard drinks of alcohol     Types: 35 Standard drinks or equivalent per week     Comment: Last ETOH 4/27/25    Drug use: Never        Leonidas Rojo MD  06/24/25 2017

## 2025-06-24 NOTE — ED ATTENDING ATTESTATION
6/24/2025  IDanielle MD, saw and evaluated the patient. I have discussed the patient with the resident/non-physician practitioner and agree with the resident's/non-physician practitioner's findings, Plan of Care, and MDM as documented in the resident's/non-physician practitioner's note, except where noted. All available labs and Radiology studies were reviewed.  I was present for key portions of any procedure(s) performed by the resident/non-physician practitioner and I was immediately available to provide assistance.       At this point I agree with the current assessment done in the Emergency Department.  I have conducted an independent evaluation of this patient a history and physical is as follows:    ED Course         Critical Care Time  Procedures

## 2025-07-02 ENCOUNTER — TELEPHONE (OUTPATIENT)
Dept: FAMILY MEDICINE CLINIC | Facility: CLINIC | Age: 60
End: 2025-07-02

## 2025-07-07 ENCOUNTER — HOSPITAL ENCOUNTER (EMERGENCY)
Facility: HOSPITAL | Age: 60
Discharge: HOME | End: 2025-07-08
Attending: EMERGENCY MEDICINE | Admitting: EMERGENCY MEDICINE
Payer: COMMERCIAL

## 2025-07-07 VITALS
OXYGEN SATURATION: 93 % | BODY MASS INDEX: 33.39 KG/M2 | DIASTOLIC BLOOD PRESSURE: 85 MMHG | RESPIRATION RATE: 18 BRPM | TEMPERATURE: 98.6 F | HEART RATE: 95 BPM | SYSTOLIC BLOOD PRESSURE: 137 MMHG | WEIGHT: 219.58 LBS

## 2025-07-07 DIAGNOSIS — F10.930 ALCOHOL WITHDRAWAL SYNDROME, UNCOMPLICATED (HCC): ICD-10-CM

## 2025-07-07 DIAGNOSIS — F10.20 ALCOHOL USE DISORDER, SEVERE, DEPENDENCE (HCC): Primary | ICD-10-CM

## 2025-07-07 LAB
ALBUMIN SERPL BCG-MCNC: 4 G/DL (ref 3.5–5)
ALP SERPL-CCNC: 139 U/L (ref 34–104)
ALT SERPL W P-5'-P-CCNC: 29 U/L (ref 7–52)
ANION GAP SERPL CALCULATED.3IONS-SCNC: 14 MMOL/L (ref 4–13)
AST SERPL W P-5'-P-CCNC: 68 U/L (ref 13–39)
BASOPHILS # BLD AUTO: 0.03 THOUSANDS/ÂΜL (ref 0–0.1)
BASOPHILS NFR BLD AUTO: 1 % (ref 0–1)
BILIRUB SERPL-MCNC: 0.41 MG/DL (ref 0.2–1)
BUN SERPL-MCNC: 13 MG/DL (ref 5–25)
CALCIUM SERPL-MCNC: 8.7 MG/DL (ref 8.4–10.2)
CARDIAC TROPONIN I PNL SERPL HS: 3 NG/L (ref ?–50)
CHLORIDE SERPL-SCNC: 100 MMOL/L (ref 96–108)
CO2 SERPL-SCNC: 21 MMOL/L (ref 21–32)
CREAT SERPL-MCNC: 0.87 MG/DL (ref 0.6–1.3)
EOSINOPHIL # BLD AUTO: 0.08 THOUSAND/ÂΜL (ref 0–0.61)
EOSINOPHIL NFR BLD AUTO: 1 % (ref 0–6)
ERYTHROCYTE [DISTWIDTH] IN BLOOD BY AUTOMATED COUNT: 12.5 % (ref 11.6–15.1)
ETHANOL SERPL-MCNC: 95 MG/DL
GFR SERPL CREATININE-BSD FRML MDRD: 94 ML/MIN/1.73SQ M
GLUCOSE SERPL-MCNC: 113 MG/DL (ref 65–140)
HCT VFR BLD AUTO: 38.3 % (ref 36.5–49.3)
HGB BLD-MCNC: 13.5 G/DL (ref 12–17)
IMM GRANULOCYTES # BLD AUTO: 0.02 THOUSAND/UL (ref 0–0.2)
IMM GRANULOCYTES NFR BLD AUTO: 0 % (ref 0–2)
LIPASE SERPL-CCNC: 18 U/L (ref 11–82)
LYMPHOCYTES # BLD AUTO: 1.18 THOUSANDS/ÂΜL (ref 0.6–4.47)
LYMPHOCYTES NFR BLD AUTO: 18 % (ref 14–44)
MCH RBC QN AUTO: 34 PG (ref 26.8–34.3)
MCHC RBC AUTO-ENTMCNC: 35.2 G/DL (ref 31.4–37.4)
MCV RBC AUTO: 97 FL (ref 82–98)
MONOCYTES # BLD AUTO: 0.39 THOUSAND/ÂΜL (ref 0.17–1.22)
MONOCYTES NFR BLD AUTO: 6 % (ref 4–12)
NEUTROPHILS # BLD AUTO: 4.83 THOUSANDS/ÂΜL (ref 1.85–7.62)
NEUTS SEG NFR BLD AUTO: 74 % (ref 43–75)
NRBC BLD AUTO-RTO: 0 /100 WBCS
PLATELET # BLD AUTO: 253 THOUSANDS/UL (ref 149–390)
PMV BLD AUTO: 8.5 FL (ref 8.9–12.7)
POTASSIUM SERPL-SCNC: 4.3 MMOL/L (ref 3.5–5.3)
PROT SERPL-MCNC: 7.3 G/DL (ref 6.4–8.4)
RBC # BLD AUTO: 3.97 MILLION/UL (ref 3.88–5.62)
SODIUM SERPL-SCNC: 135 MMOL/L (ref 135–147)
WBC # BLD AUTO: 6.53 THOUSAND/UL (ref 4.31–10.16)

## 2025-07-07 PROCEDURE — 84484 ASSAY OF TROPONIN QUANT: CPT

## 2025-07-07 PROCEDURE — 99284 EMERGENCY DEPT VISIT MOD MDM: CPT

## 2025-07-07 PROCEDURE — 36415 COLL VENOUS BLD VENIPUNCTURE: CPT

## 2025-07-07 PROCEDURE — 85025 COMPLETE CBC W/AUTO DIFF WBC: CPT

## 2025-07-07 PROCEDURE — 93005 ELECTROCARDIOGRAM TRACING: CPT

## 2025-07-07 PROCEDURE — 83690 ASSAY OF LIPASE: CPT

## 2025-07-07 PROCEDURE — 96376 TX/PRO/DX INJ SAME DRUG ADON: CPT

## 2025-07-07 PROCEDURE — 99291 CRITICAL CARE FIRST HOUR: CPT | Performed by: EMERGENCY MEDICINE

## 2025-07-07 PROCEDURE — 82077 ASSAY SPEC XCP UR&BREATH IA: CPT | Performed by: EMERGENCY MEDICINE

## 2025-07-07 PROCEDURE — 96374 THER/PROPH/DIAG INJ IV PUSH: CPT

## 2025-07-07 PROCEDURE — 96375 TX/PRO/DX INJ NEW DRUG ADDON: CPT

## 2025-07-07 PROCEDURE — 80053 COMPREHEN METABOLIC PANEL: CPT

## 2025-07-07 RX ORDER — METOCLOPRAMIDE HYDROCHLORIDE 5 MG/ML
10 INJECTION INTRAMUSCULAR; INTRAVENOUS ONCE
Status: COMPLETED | OUTPATIENT
Start: 2025-07-07 | End: 2025-07-07

## 2025-07-07 RX ORDER — PHENOBARBITAL SODIUM 65 MG/ML
130 INJECTION, SOLUTION INTRAMUSCULAR; INTRAVENOUS ONCE
Status: COMPLETED | OUTPATIENT
Start: 2025-07-07 | End: 2025-07-07

## 2025-07-07 RX ORDER — PANTOPRAZOLE SODIUM 40 MG/10ML
40 INJECTION, POWDER, LYOPHILIZED, FOR SOLUTION INTRAVENOUS ONCE
Status: COMPLETED | OUTPATIENT
Start: 2025-07-07 | End: 2025-07-07

## 2025-07-07 RX ORDER — ONDANSETRON 2 MG/ML
4 INJECTION INTRAMUSCULAR; INTRAVENOUS ONCE
Status: COMPLETED | OUTPATIENT
Start: 2025-07-07 | End: 2025-07-07

## 2025-07-07 RX ADMIN — METOCLOPRAMIDE 10 MG: 5 INJECTION, SOLUTION INTRAMUSCULAR; INTRAVENOUS at 22:46

## 2025-07-07 RX ADMIN — PANTOPRAZOLE SODIUM 40 MG: 40 INJECTION, POWDER, LYOPHILIZED, FOR SOLUTION INTRAVENOUS at 21:55

## 2025-07-07 RX ADMIN — ONDANSETRON 4 MG: 2 INJECTION INTRAMUSCULAR; INTRAVENOUS at 21:27

## 2025-07-07 RX ADMIN — PHENOBARBITAL SODIUM 260 MG: 130 INJECTION INTRAMUSCULAR; INTRAVENOUS at 22:55

## 2025-07-07 RX ADMIN — PHENOBARBITAL SODIUM 130 MG: 65 INJECTION INTRAMUSCULAR at 21:57

## 2025-07-08 DIAGNOSIS — K21.9 GASTROESOPHAGEAL REFLUX DISEASE, UNSPECIFIED WHETHER ESOPHAGITIS PRESENT: ICD-10-CM

## 2025-07-08 DIAGNOSIS — K29.20 CHRONIC ALCOHOLIC GASTRITIS WITHOUT HEMORRHAGE: ICD-10-CM

## 2025-07-08 LAB
2HR DELTA HS TROPONIN: 0 NG/L
ATRIAL RATE: 98 BPM
CARDIAC TROPONIN I PNL SERPL HS: 3 NG/L (ref ?–50)
P AXIS: 50 DEGREES
PR INTERVAL: 152 MS
QRS AXIS: 18 DEGREES
QRSD INTERVAL: 88 MS
QT INTERVAL: 362 MS
QTC INTERVAL: 462 MS
T WAVE AXIS: 37 DEGREES
VENTRICULAR RATE: 98 BPM

## 2025-07-08 PROCEDURE — 93010 ELECTROCARDIOGRAM REPORT: CPT | Performed by: INTERNAL MEDICINE

## 2025-07-08 NOTE — ED PROVIDER NOTES
Time reflects when diagnosis was documented in both MDM as applicable and the Disposition within this note       Time User Action Codes Description Comment    7/7/2025 11:53 PM Santos Mason Add [F10.20] Alcohol use disorder, severe, dependence (HCC)     7/7/2025 11:53 PM Santos Mason Add [F10.930] Alcohol withdrawal syndrome, uncomplicated (HCC)           ED Disposition       ED Disposition   Discharge    Condition   Stable    Date/Time   Mon Jul 7, 2025 11:53 PM    Comment   Diogo Travis discharge to home/self care.                   Assessment & Plan       Medical Decision Making  A/P: This is a 60 y.o. male who presents to the ED for evaluation of intoxication.  Also with some mild withdrawal symptoms.  He is willing to except outpatient help.  Will treat his withdrawal with phenobarbital.  Should also help with his anxiety.  Will treat his nausea with Zofran, add PPI.  Warm handoff to catch for placement.    Patient's nausea is not improved, will add Reglan.  Withdrawal symptoms also not improved, will give additional phenobarb.    Patient does not with to make placement choice now until talking to david. WD symptoms much improved with meds. STable for DC to home. ENcouraged sobriety. With OP resources. I personally discussed return precautions with this patient. I provided the patient with written discharge instructions and particularly highlighted specific areas of interest to this patient, including but not limited to: medications for symptom managment, follow up recommendations, and return precautions. Patient is in agreement with this plan as outlined above.    Critical Care Time Statement: Upon my evaluation, this patient had a high probability of imminent or life-threatening deterioration due to Alcohol withdrawal syndrome, which required my direct attention, intervention, and personal management.  I spent a total of 44 minutes directly providing critical care services, including  interpretation of complex medical databases, evaluating for the presence of life-threatening injuries or illnesses, and complex medical decision making (to support/prevent further life-threatening deterioration). This time is exclusive of procedures, teaching, treating other patients, family meetings, and any prior time recorded by providers other than myself.        Amount and/or Complexity of Data Reviewed  Labs: ordered.    Risk  Prescription drug management.             Medications   ondansetron (ZOFRAN) injection 4 mg (4 mg Intravenous Given 7/7/25 2127)   pantoprazole (PROTONIX) injection 40 mg (40 mg Intravenous Given 7/7/25 2155)   PHENobarbital injection 130 mg (130 mg Intravenous Given 7/7/25 2157)   metoclopramide (REGLAN) injection 10 mg (10 mg Intravenous Given 7/7/25 2246)   PHENobarbital 260 mg in sodium chloride 0.9 % 100 mL IVPB (260 mg Intravenous Given 7/7/25 2255)       ED Risk Strat Scores                 CIWA-Ar Score       Row Name 07/07/25 2119             CIWA-Ar    Nausea and Vomiting 5      Tactile Disturbances 0      Tremor 2      Auditory Disturbances 0      Paroxysmal Sweats 1      Visual Disturbances 0      Anxiety 4      Headache, Fullness in Head 0      Agitation 2      Orientation and Clouding of Sensorium 0      CIWA-Ar Total 14                      No data recorded        SBIRT 20yo+      Flowsheet Row Most Recent Value   Initial Alcohol Screen: US AUDIT-C     1. How often do you have a drink containing alcohol? 6 Filed at: 07/07/2025 2118   2. How many drinks containing alcohol do you have on a typical day you are drinking?  6 Filed at: 07/07/2025 2118   3a. Male UNDER 65: How often do you have five or more drinks on one occasion? 6 Filed at: 07/07/2025 2118   3b. FEMALE Any Age, or MALE 65+: How often do you have 4 or more drinks on one occassion? 0 Filed at: 07/07/2025 2118   Audit-C Score 18 Filed at: 07/07/2025 2118   Full Alcohol Screen: US AUDIT    4. How often during the  last year have you found that you were not able to stop drinking once you had started? 4 Filed at: 2025   5. How often during past year have you failed to do what was normally expected of you because of drinking?  4 Filed at: 2025   6. How often in past year have you needed a first drink in the morning to get yourself going after a heavy drinking session?  4 Filed at: 2025   7. How often in past year have you had feeling of guilt or remorse after drinking?  4 Filed at: 2025   8. How often in past year have you been unable to remember what happened night before because you had been drinking?  0 Filed at: 2025   9. Have you or someone else been injured as a result of your drinking?  0 Filed at: 2025   10. Has a relative, friend, doctor or other health worker been concerned about your drinking and suggested you cut down?  0 Filed at: 2025   AUDIT Total Score 34 Filed at: 2025   MILDRED: How many times in the past year have you...    Used an illegal drug or used a prescription medication for non-medical reasons? Never Filed at: 2025                            History of Present Illness       Chief Complaint   Patient presents with    Chest Pain     Pt reports nausea/vomiting/diarrhea since 1500 today. -abdominal pain. Reports chest pain and dizziness started around 1800 after an episode of vomiting. Pt states he drinks about 2 bottles of wine daily with vodka sometimes. Last drank about 1600 today. States his father recently  2 weeks ago causing him increased stress, insomnia. Reports passive SI one time, three weeks ago,(reports it was a bad day) but denies SI at this time. States [employer]atemeh is his social support at home.    Alcohol Problem       Past Medical History[1]   Past Surgical History[2]   Family History[3]   Social History[4]   E-Cigarette/Vaping    E-Cigarette Use Never User       E-Cigarette/Vaping Substances     Nicotine No     THC No     CBD No     Flavoring No     Other No     Unknown No       I have reviewed and agree with the history as documented.     This is a 60 y.o. old male who presents to the ED for evaluation of intoxication.  Longstanding history of alcoholism, recent illness and death of his father is caused him to drink heavily.  States he had at least 2 bottles of wine today, last drink around 4:00.  Start to feel withdrawal shakes,.  Is at history of withdrawal, but no seizures.  States he needs treatment and he is willing to but does not want to go to inpatient treatment.  States he is very anxious and is nauseous with some upper abdominal pain at this time.      Review of Systems   Constitutional:  Negative for chills, fatigue, fever and unexpected weight change.   HENT:  Negative for congestion, rhinorrhea and sore throat.    Eyes:  Negative for redness and visual disturbance.   Respiratory:  Negative for cough and shortness of breath.    Cardiovascular:  Negative for chest pain and leg swelling.   Gastrointestinal:  Positive for abdominal pain. Negative for constipation, diarrhea, nausea and vomiting.   Endocrine: Negative for cold intolerance and heat intolerance.   Genitourinary:  Negative for dysuria, frequency and urgency.   Musculoskeletal:  Negative for back pain.   Skin:  Negative for rash.   Neurological:  Positive for tremors. Negative for dizziness, syncope and numbness.   Psychiatric/Behavioral:  The patient is nervous/anxious.    All other systems reviewed and are negative.        Objective       ED Triage Vitals [07/07/25 2115]   Temperature Pulse Blood Pressure Respirations SpO2 Patient Position - Orthostatic VS   98.6 °F (37 °C) 97 120/82 18 94 % Sitting      Temp Source Heart Rate Source BP Location FiO2 (%) Pain Score    Oral Monitor Left arm -- 7      Vitals      Date and Time Temp Pulse SpO2 Resp BP Pain Score FACES Pain Rating User   07/07/25 2245 -- 95 93 % 18 137/85 -- --     07/07/25 2215 -- 93 93 % 18 134/83 -- --    07/07/25 2200 -- 94 94 % 18 134/85 -- --    07/07/25 2115 98.6 °F (37 °C) 97 94 % 18 120/82 7 -- RL            Physical Exam  Vitals and nursing note reviewed.   Constitutional:       General: He is not in acute distress.     Appearance: He is well-developed. He is not diaphoretic.      Comments: Tearful affect   HENT:      Head: Normocephalic and atraumatic.      Comments: Tongue fasciculations     Right Ear: External ear normal.      Left Ear: External ear normal.      Nose: Nose normal.      Mouth/Throat:      Mouth: Mucous membranes are moist.     Eyes:      Conjunctiva/sclera: Conjunctivae normal.      Pupils: Pupils are equal, round, and reactive to light.       Cardiovascular:      Rate and Rhythm: Normal rate and regular rhythm.      Heart sounds: Normal heart sounds. No murmur heard.     No gallop.   Pulmonary:      Effort: Pulmonary effort is normal. No respiratory distress.      Breath sounds: Normal breath sounds. No wheezing or rales.   Abdominal:      General: Bowel sounds are normal. There is no distension.      Palpations: Abdomen is soft. There is no mass.      Tenderness: There is no abdominal tenderness. There is no guarding or rebound.     Musculoskeletal:         General: No deformity. Normal range of motion.      Cervical back: Normal range of motion and neck supple.      Right lower leg: No edema.      Left lower leg: No edema.   Lymphadenopathy:      Cervical: No cervical adenopathy.     Skin:     General: Skin is warm and dry.      Findings: No erythema or rash.     Neurological:      Mental Status: He is alert and oriented to person, place, and time.      Cranial Nerves: No cranial nerve deficit.         Results Reviewed       Procedure Component Value Units Date/Time    HS Troponin I 2hr [[id number]] Collected: 07/07/25 2331    Lab Status: In process Specimen: Blood from Arm, Right Updated: 07/07/25 2333    HS Troponin I 4hr [[id number]]      Lab Status: No result Specimen: Blood     Ethanol [[id number]]  (Abnormal) Collected: 07/07/25 2155    Lab Status: Final result Specimen: Blood from Arm, Right Updated: 07/07/25 2222     Ethanol Lvl 95 mg/dL     HS Troponin 0hr (reflex protocol) [[id number]]  (Normal) Collected: 07/07/25 2125    Lab Status: Final result Specimen: Blood from Arm, Right Updated: 07/07/25 2156     hs TnI 0hr 3 ng/L     Comprehensive metabolic panel [[id number]]  (Abnormal) Collected: 07/07/25 2125    Lab Status: Final result Specimen: Blood from Arm, Right Updated: 07/07/25 2152     Sodium 135 mmol/L      Potassium 4.3 mmol/L      Chloride 100 mmol/L      CO2 21 mmol/L      ANION GAP 14 mmol/L      BUN 13 mg/dL      Creatinine 0.87 mg/dL      Glucose 113 mg/dL      Calcium 8.7 mg/dL      AST 68 U/L      ALT 29 U/L      Alkaline Phosphatase 139 U/L      Total Protein 7.3 g/dL      Albumin 4.0 g/dL      Total Bilirubin 0.41 mg/dL      eGFR 94 ml/min/1.73sq m     Narrative:      National Kidney Disease Foundation guidelines for Chronic Kidney Disease (CKD):     Stage 1 with normal or high GFR (GFR > 90 mL/min/1.73 square meters)    Stage 2 Mild CKD (GFR = 60-89 mL/min/1.73 square meters)    Stage 3A Moderate CKD (GFR = 45-59 mL/min/1.73 square meters)    Stage 3B Moderate CKD (GFR = 30-44 mL/min/1.73 square meters)    Stage 4 Severe CKD (GFR = 15-29 mL/min/1.73 square meters)    Stage 5 End Stage CKD (GFR <15 mL/min/1.73 square meters)  Note: GFR calculation is accurate only with a steady state creatinine    Lipase [[id number]]  (Normal) Collected: 07/07/25 2125    Lab Status: Final result Specimen: Blood from Arm, Right Updated: 07/07/25 2152     Lipase 18 u/L     CBC and differential [[id number]]  (Abnormal) Collected: 07/07/25 2125    Lab Status: Final result Specimen: Blood from Arm, Right Updated: 07/07/25 2138     WBC 6.53 Thousand/uL      RBC 3.97 Million/uL      Hemoglobin 13.5 g/dL      Hematocrit 38.3 %      MCV 97 fL      MCH 34.0  pg      MCHC 35.2 g/dL      RDW 12.5 %      MPV 8.5 fL      Platelets 253 Thousands/uL      nRBC 0 /100 WBCs      Segmented % 74 %      Immature Grans % 0 %      Lymphocytes % 18 %      Monocytes % 6 %      Eosinophils Relative 1 %      Basophils Relative 1 %      Absolute Neutrophils 4.83 Thousands/µL      Absolute Immature Grans 0.02 Thousand/uL      Absolute Lymphocytes 1.18 Thousands/µL      Absolute Monocytes 0.39 Thousand/µL      Eosinophils Absolute 0.08 Thousand/µL      Basophils Absolute 0.03 Thousands/µL             No orders to display       Procedures    ED Medication and Procedure Management   Prior to Admission Medications   Prescriptions Last Dose Informant Patient Reported? Taking?   Omega-3 Fatty Acids (fish oil) 1,000 mg   Yes No   Sig: Take 2,000 mg by mouth daily   Thiamine Mononitrate (VITAMIN B1) 100 mg tablet   No No   Sig: Take 1 tablet (100 mg total) by mouth daily   acetaminophen (TYLENOL) 325 mg tablet   No No   Sig: Take 3 tablets (975 mg total) by mouth every 8 (eight) hours   atorvastatin (LIPITOR) 40 mg tablet   No No   Sig: Take 1 tablet (40 mg total) by mouth daily with dinner   famotidine (PEPCID) 20 mg tablet   No No   Sig: Take 1 tablet (20 mg total) by mouth daily   folic acid (FOLVITE) 1 mg tablet   No No   Sig: Take 1 tablet (1 mg total) by mouth daily   hydrOXYzine HCL (ATARAX) 25 mg tablet   No No   Sig: Take 0.5-1 tablets (12.5-25 mg total) by mouth 2 (two) times a day as needed for anxiety   ibuprofen (MOTRIN) 400 mg tablet   No No   Sig: Take 1 tablet (400 mg total) by mouth every 6 (six) hours as needed for mild pain   lisinopril (ZESTRIL) 40 mg tablet   No No   Sig: Take 1 tablet (40 mg total) by mouth daily   ondansetron (ZOFRAN-ODT) 4 mg disintegrating tablet   No No   Sig: Take 1 tablet (4 mg total) by mouth every 6 (six) hours as needed for nausea or vomiting for up to 12 doses   pantoprazole (PROTONIX) 40 mg tablet   No No   Sig: Take 1 tablet (40 mg total) by mouth  daily in the early morning   sertraline (ZOLOFT) 50 mg tablet   No No   Si/2 tab by mouth daily x 3 days, then increase 1 tab by mouth daily.      Facility-Administered Medications: None     Patient's Medications   Discharge Prescriptions    No medications on file     No discharge procedures on file.  ED SEPSIS DOCUMENTATION   Time reflects when diagnosis was documented in both MDM as applicable and the Disposition within this note       Time User Action Codes Description Comment    2025 11:53 PM Santos Mason [F10.20] Alcohol use disorder, severe, dependence (HCC)     2025 11:53 PM Santos Mason [F10.930] Alcohol withdrawal syndrome, uncomplicated (HCC)                      [1]   Past Medical History:  Diagnosis Date    Alcohol use disorder 2024    Alcohol use disorder, severe, dependence (HCC) 2023    Alcohol withdrawal seizure (HCC)     Alcoholic ketoacidosis 10/16/2023    Anxiety     AR (allergic rhinitis)     Chronic alcoholic gastritis 2023    Depression     GERD (gastroesophageal reflux disease)     Hepatic steatosis 2023    Hyperlipidemia     Hypertension     IBS (irritable bowel syndrome)     Obesity     Other insomnia 2025    Rosacea     Vitamin B12 deficiency 2024    Vitamin D deficiency 2024   [2]   Past Surgical History:  Procedure Laterality Date    ANTERIOR CRUCIATE LIGAMENT REPAIR Left     WISDOM TOOTH EXTRACTION     [3]   Family History  Problem Relation Name Age of Onset    Cancer Mother Luther Travis 78        Type unknown    Hypertension Father Diogo Jordanjessie     Coronary artery disease Father Diogo JHONATHAN Jordanjessie 60    Cancer Father Diogo JHONATHAN Angy 81        Bladder    No Known Problems Sister Sharon     No Known Problems Sister Sofie     No Known Problems Sister Jackelin     No Known Problems Son Chandu     No Known Problems Son Rojelio     Heart attack Paternal Grandfather  60    Coronary artery disease Paternal Grandfather   60   [4]   Social History  Tobacco Use    Smoking status: Former     Types: Cigars     Start date: 1/1/2014     Passive exposure: Past (Father)    Smokeless tobacco: Never    Tobacco comments:     Smokes cigars 2-3 times per year   Vaping Use    Vaping status: Never Used   Substance Use Topics    Alcohol use: Yes     Alcohol/week: 35.0 standard drinks of alcohol     Types: 35 Standard drinks or equivalent per week     Comment: 2 bottles of wine daily, +vodka    Drug use: Never        Santos Mason MD  07/07/25 4923

## 2025-07-08 NOTE — TELEPHONE ENCOUNTER
Reason for call:   [x] Refill   [] Prior Auth  [] Other:     Office:   [x] PCP/Provider - NEAL Altman,    [] Specialty/Provider -     Medication:  pantoprazole (PROTONIX) 40 mg tablet    Dose/Frequency: Take 1 tablet (40 mg total) by mouth daily in the early morning     Quantity: 90 tablet    Pharmacy: [place] [phone]    Local Pharmacy   Does the patient have enough for 3 days?   [x] Yes   [] No - Send as HP to POD    Mail Away Pharmacy   Does the patient have enough for 10 days?   [] Yes   [] No - Send as HP to POD

## 2025-07-08 NOTE — ED CARE HANDOFF
[facility] Warm Handoff Outcome Note    Patient name Diogo Travis  Location ED-16/ED-16 MRN [id number]  Age: 60 y.o.          Plan Type:  Warm Handoff                                                                                    Plan Date: 7/8/2025  Service:  ED Warm Handoff      Substance Use History:  Alcohol    Warm Handoff Update:  OP Resources    Warm Handoff Outcome: Treatment Related Resources

## 2025-07-09 RX ORDER — PANTOPRAZOLE SODIUM 40 MG/1
40 TABLET, DELAYED RELEASE ORAL
Qty: 90 TABLET | Refills: 1 | Status: SHIPPED | OUTPATIENT
Start: 2025-07-09 | End: 2026-01-05

## 2025-07-18 DIAGNOSIS — F32.1 CURRENT MODERATE EPISODE OF MAJOR DEPRESSIVE DISORDER, UNSPECIFIED WHETHER RECURRENT (HCC): Chronic | ICD-10-CM

## 2025-07-18 DIAGNOSIS — F41.9 ANXIETY: ICD-10-CM

## 2025-07-18 NOTE — TELEPHONE ENCOUNTER
Reason for call:   [x] Refill   [] Prior Auth  [] Other:     Office:   [x] PCP/Provider - [facility]/ DO Agapito  [] Specialty/Provider -     Medication: hydrOXYzine HCL (ATARAX) 25 mg tablet    Dose/Frequency: Take 0.5-1 tablets (12.5-25 mg total) by mouth 2 (two) times a day as needed for anxiety    Quantity: 30    Medication: sertraline (ZOLOFT) 50 mg tablet     Dose/Frequency: 1/2 tab by mouth daily x 3 days, then increase 1 tab by mouth daily.    Quantity: 30    Pharmacy: [facility] [address] - [address] [place], [place] [phone]    Local Pharmacy   Does the patient have enough for 3 days?   [] Yes   [x] No - Send as HP to POD    Mail Away Pharmacy   Does the patient have enough for 10 days?   [] Yes   [] No - Send as HP to POD

## 2025-07-20 RX ORDER — HYDROXYZINE HYDROCHLORIDE 25 MG/1
12.5-25 TABLET, FILM COATED ORAL 2 TIMES DAILY PRN
Qty: 30 TABLET | Refills: 0 | OUTPATIENT
Start: 2025-07-20

## 2025-07-20 NOTE — TELEPHONE ENCOUNTER
Medication refills declined.  Patient is overdue for appt and labs, and this is a recurrent issue.  He would have run out of Zoloft 7/2/25.      Return in about 6 weeks (around 6/13/2025) for  Physical -  F/U HTN, HL, Anx/Dep, ETOH, GERD, Vit B12/D Def, Labs.     FBW ordered 2/12/25 was expected 3/12/25.      He no showed 7/2/25 and did not return call to reschedule 7/2/25.      Sent to clinical as well as management.

## 2025-07-23 NOTE — TELEPHONE ENCOUNTER
Patient was transferred to me. I took the call. Patient is very mad that he is not getting his anxiety medication. He called the doctor bijal [id number] and his refill medication has nothing to do with his appointment or his lab work. Patient has been given courtesy medication for a few months and patient is a recurring no-show patient, not rescheduling. Patient started crying saying his father just passed away and he really needs his anxiety medication. I told him I would talk to the doctor. She still said no. Patient is a non compliant patient and Dr. Altman is not filling these. I called patient back to let him know. He did not answer so I left him a voice mail giving him message that  Will not fill his medications

## 2025-07-23 NOTE — TELEPHONE ENCOUNTER
"Patient calling office to check status of this refill.  He started off the conversation by letting me know \" He's getting really pissed off that his medication is not at pharmacy\".  Called office clinical for assistance on this previous message, clinical unavailable at time of call.    Called office clerical and spoke with Tien.  Tien kindly took the warm transfer to assist the patient further.  "

## 2025-07-23 NOTE — TELEPHONE ENCOUNTER
Management to see my previous note.  Please consider patient discharge from practice due to failure to maintain a therapeutic doctor-patient relationship and inappropriate patient behavior.     Writer spoke with [facility]'s pharmacy regarding the following prescription    Disp Refills Start End JOLLY   Naldemedine Tosylate 0.2 MG TABS 30 tablet 2 12/9/2024 -- No   Sig - Route: Take 0.2 mg by mouth daily. - Oral   Per the pharmacy medication requires a prior authorization. Will submit to PA team in a separate encounter.     Elizabeth Mendosa RN on 12/9/2024 at 4:51 PM

## 2025-07-25 NOTE — TELEPHONE ENCOUNTER
Warm transfer from [facility]. 2 identifiers obtained. Patient calling to follow up on refill. States he does not feel right from being off of medication. Reviewed that he needed to come in for OV for refill and has missed appointments. Lab work is needed because of how medication is processed in the body. Advised if he is feeling poorly  go to UC for evaluation. Patient states he is willing to schedule OV but really needs a refill. Please advise

## 2025-07-25 NOTE — TELEPHONE ENCOUNTER
Pt called in requesting and appt with office as soon as possible for a medication follow so he is able to get a refill on this medication. Pt is also requesting a courtesy refill of this medication today if possible stating he does not believe he is able to go the weekend without this medication and states his signifcant other does not want him driving due to being off of the medication. Pt is requesting a call back from office to schedule appt.    Did not schedule pt due to last message from PCP regarding issue.    Please advise.

## 2025-07-28 NOTE — TELEPHONE ENCOUNTER
Patient called in to follow up on refill request for anxiety medication   hydrOXYzine HCL (ATARAX) 25 mg tablet and  sertraline (ZOLOFT) 50 mg tablet; reports he was going to schedule OV and was looking to get temporary refill for medication. RN was not given opportunity to full discuss provider's response to discharge from the practice. Please have  return call to patient; he did not want to hold on to be transferred to the office. RN spoke with Indu in office; Office mgr not in location today; will send Epic Secure chat. Indu requested RN call patient back to schedule for openings Thurs or Friday this week. RN will discuss with office mgr for direction.

## 2025-08-13 ENCOUNTER — TELEPHONE (OUTPATIENT)
Dept: FAMILY MEDICINE CLINIC | Facility: CLINIC | Age: 60
End: 2025-08-13

## 2025-08-14 ENCOUNTER — APPOINTMENT (EMERGENCY)
Dept: CT IMAGING | Facility: HOSPITAL | Age: 60
End: 2025-08-14
Payer: COMMERCIAL

## 2025-08-14 ENCOUNTER — HOSPITAL ENCOUNTER (EMERGENCY)
Facility: HOSPITAL | Age: 60
Discharge: HOME | End: 2025-08-14
Payer: COMMERCIAL

## 2025-08-14 ENCOUNTER — APPOINTMENT (EMERGENCY)
Dept: ULTRASOUND IMAGING | Facility: HOSPITAL | Age: 60
End: 2025-08-14
Payer: COMMERCIAL

## 2025-08-14 ENCOUNTER — RESULTS FOLLOW-UP (OUTPATIENT)
Dept: FAMILY MEDICINE CLINIC | Facility: CLINIC | Age: 60
End: 2025-08-14

## 2025-08-14 ENCOUNTER — OFFICE VISIT (OUTPATIENT)
Dept: FAMILY MEDICINE CLINIC | Facility: CLINIC | Age: 60
End: 2025-08-14
Payer: COMMERCIAL

## 2025-08-14 ENCOUNTER — APPOINTMENT (OUTPATIENT)
Dept: LAB | Facility: CLINIC | Age: 60
End: 2025-08-14
Attending: FAMILY MEDICINE
Payer: COMMERCIAL

## 2025-08-14 LAB
BACTERIA UR QL AUTO: ABNORMAL /HPF
BILIRUB UR QL STRIP: ABNORMAL
CLARITY UR: CLEAR
COLOR UR: YELLOW
GLUCOSE UR STRIP-MCNC: NEGATIVE MG/DL
HGB UR QL STRIP.AUTO: ABNORMAL
KETONES UR STRIP-MCNC: ABNORMAL MG/DL
LEUKOCYTE ESTERASE UR QL STRIP: ABNORMAL
MUCOUS THREADS UR QL AUTO: ABNORMAL
NITRITE UR QL STRIP: NEGATIVE
NON-SQ EPI CELLS URNS QL MICRO: ABNORMAL /HPF
PH UR STRIP.AUTO: 5.5 [PH]
PROT UR STRIP-MCNC: ABNORMAL MG/DL
RBC #/AREA URNS AUTO: ABNORMAL /HPF
SP GR UR STRIP.AUTO: 1.03 (ref 1–1.03)
UROBILINOGEN UR STRIP-ACNC: 6 MG/DL
WBC #/AREA URNS AUTO: ABNORMAL /HPF

## 2025-08-15 LAB — BACTERIA UR CULT: NORMAL

## 2025-08-18 ENCOUNTER — TELEPHONE (OUTPATIENT)
Age: 60
End: 2025-08-18

## 2025-08-18 ENCOUNTER — PATIENT OUTREACH (OUTPATIENT)
Dept: CASE MANAGEMENT | Facility: OTHER | Age: 60
End: 2025-08-18

## 2025-08-20 ENCOUNTER — CONSULT (OUTPATIENT)
Dept: UROLOGY | Facility: AMBULATORY SURGERY CENTER | Age: 60
End: 2025-08-20
Payer: COMMERCIAL

## 2025-08-20 VITALS
HEART RATE: 85 BPM | WEIGHT: 211 LBS | DIASTOLIC BLOOD PRESSURE: 80 MMHG | BODY MASS INDEX: 31.98 KG/M2 | SYSTOLIC BLOOD PRESSURE: 134 MMHG | HEIGHT: 68 IN | OXYGEN SATURATION: 97 %

## 2025-08-20 DIAGNOSIS — N43.41: Primary | ICD-10-CM

## 2025-08-20 DIAGNOSIS — R31.0 INTERMITTENT GROSS HEMATURIA: ICD-10-CM

## 2025-08-20 PROCEDURE — 99203 OFFICE O/P NEW LOW 30 MIN: CPT | Performed by: UROLOGY
